# Patient Record
Sex: FEMALE | Employment: UNEMPLOYED | URBAN - METROPOLITAN AREA
[De-identification: names, ages, dates, MRNs, and addresses within clinical notes are randomized per-mention and may not be internally consistent; named-entity substitution may affect disease eponyms.]

---

## 2020-08-12 ENCOUNTER — HOSPITAL ENCOUNTER (INPATIENT)
Facility: HOSPITAL | Age: 40
LOS: 48 days | Discharge: HOME | DRG: 856 | End: 2020-09-29
Attending: SURGERY | Admitting: SURGERY

## 2020-08-12 DIAGNOSIS — I10 ESSENTIAL HYPERTENSION: ICD-10-CM

## 2020-08-12 DIAGNOSIS — R00.0 TACHYCARDIA: ICD-10-CM

## 2020-08-12 DIAGNOSIS — D72.825 BANDEMIA: ICD-10-CM

## 2020-08-12 DIAGNOSIS — R10.11 RIGHT UPPER QUADRANT ABDOMINAL PAIN: ICD-10-CM

## 2020-08-12 DIAGNOSIS — D62 ACUTE BLOOD LOSS ANEMIA: ICD-10-CM

## 2020-08-12 DIAGNOSIS — E87.20 METABOLIC ACIDOSIS: ICD-10-CM

## 2020-08-12 DIAGNOSIS — K55.029 ISCHEMIC NECROSIS OF SMALL BOWEL: Primary | ICD-10-CM

## 2020-08-12 DIAGNOSIS — A41.9 SEPTIC SHOCK: ICD-10-CM

## 2020-08-12 DIAGNOSIS — E43 SEVERE PROTEIN-CALORIE MALNUTRITION: ICD-10-CM

## 2020-08-12 DIAGNOSIS — D63.8 ANEMIA, CHRONIC DISEASE: ICD-10-CM

## 2020-08-12 DIAGNOSIS — R65.20 SEVERE SEPSIS: ICD-10-CM

## 2020-08-12 DIAGNOSIS — K31.9: ICD-10-CM

## 2020-08-12 DIAGNOSIS — N17.0 ACUTE RENAL FAILURE WITH TUBULAR NECROSIS: ICD-10-CM

## 2020-08-12 DIAGNOSIS — J96.01 ACUTE HYPOXEMIC RESPIRATORY FAILURE: ICD-10-CM

## 2020-08-12 DIAGNOSIS — E87.5 POTASSIUM (K) EXCESS: ICD-10-CM

## 2020-08-12 DIAGNOSIS — Z99.11 ENCOUNTER FOR WEANING FROM VENTILATOR: ICD-10-CM

## 2020-08-12 DIAGNOSIS — R65.21 SEPTIC SHOCK: ICD-10-CM

## 2020-08-12 DIAGNOSIS — A41.9 SEVERE SEPSIS: ICD-10-CM

## 2020-08-12 LAB
ABO + RH BLD: NORMAL
ALBUMIN SERPL BCP-MCNC: 0.9 G/DL (ref 3.5–5.2)
ALBUMIN SERPL BCP-MCNC: 0.9 G/DL (ref 3.5–5.2)
ALLENS TEST: ABNORMAL
ALP SERPL-CCNC: 165 U/L (ref 55–135)
ALP SERPL-CCNC: 188 U/L (ref 55–135)
ALT SERPL W/O P-5'-P-CCNC: 26 U/L (ref 10–44)
ALT SERPL W/O P-5'-P-CCNC: 28 U/L (ref 10–44)
ANION GAP SERPL CALC-SCNC: 5 MMOL/L (ref 8–16)
ANION GAP SERPL CALC-SCNC: 7 MMOL/L (ref 8–16)
ANISOCYTOSIS BLD QL SMEAR: SLIGHT
APTT BLDCRRT: 33.5 SEC (ref 21–32)
AST SERPL-CCNC: 22 U/L (ref 10–40)
AST SERPL-CCNC: 30 U/L (ref 10–40)
BACTERIA #/AREA URNS AUTO: ABNORMAL /HPF
BASOPHILS # BLD AUTO: ABNORMAL K/UL (ref 0–0.2)
BASOPHILS NFR BLD: 0 % (ref 0–1.9)
BILIRUB SERPL-MCNC: 2.1 MG/DL (ref 0.1–1)
BILIRUB SERPL-MCNC: 2.4 MG/DL (ref 0.1–1)
BILIRUB UR QL STRIP: NEGATIVE
BLD GP AB SCN CELLS X3 SERPL QL: NORMAL
BUN SERPL-MCNC: 18 MG/DL (ref 6–20)
BUN SERPL-MCNC: 18 MG/DL (ref 6–20)
BURR CELLS BLD QL SMEAR: ABNORMAL
CALCIUM SERPL-MCNC: 6.7 MG/DL (ref 8.7–10.5)
CALCIUM SERPL-MCNC: 7 MG/DL (ref 8.7–10.5)
CHLORIDE SERPL-SCNC: 109 MMOL/L (ref 95–110)
CHLORIDE SERPL-SCNC: 110 MMOL/L (ref 95–110)
CLARITY UR REFRACT.AUTO: ABNORMAL
CO2 SERPL-SCNC: 21 MMOL/L (ref 23–29)
CO2 SERPL-SCNC: 22 MMOL/L (ref 23–29)
COLOR UR AUTO: YELLOW
CREAT SERPL-MCNC: 1.9 MG/DL (ref 0.5–1.4)
CREAT SERPL-MCNC: 1.9 MG/DL (ref 0.5–1.4)
DELSYS: ABNORMAL
DIFFERENTIAL METHOD: ABNORMAL
EOSINOPHIL # BLD AUTO: ABNORMAL K/UL (ref 0–0.5)
EOSINOPHIL NFR BLD: 0 % (ref 0–8)
ERYTHROCYTE [DISTWIDTH] IN BLOOD BY AUTOMATED COUNT: 15.6 % (ref 11.5–14.5)
ERYTHROCYTE [SEDIMENTATION RATE] IN BLOOD BY WESTERGREN METHOD: 26 MM/H
EST. GFR  (AFRICAN AMERICAN): 37.7 ML/MIN/1.73 M^2
EST. GFR  (AFRICAN AMERICAN): 37.7 ML/MIN/1.73 M^2
EST. GFR  (NON AFRICAN AMERICAN): 32.7 ML/MIN/1.73 M^2
EST. GFR  (NON AFRICAN AMERICAN): 32.7 ML/MIN/1.73 M^2
FIBRINOGEN PPP-MCNC: 552 MG/DL (ref 182–366)
FIO2: 100
FIO2: 70
GLUCOSE SERPL-MCNC: 139 MG/DL (ref 70–110)
GLUCOSE SERPL-MCNC: 143 MG/DL (ref 70–110)
GLUCOSE UR QL STRIP: NEGATIVE
HCO3 UR-SCNC: 19 MMOL/L (ref 24–28)
HCO3 UR-SCNC: 19.6 MMOL/L (ref 24–28)
HCO3 UR-SCNC: 21.4 MMOL/L (ref 24–28)
HCT VFR BLD AUTO: 31.7 % (ref 37–48.5)
HCT VFR BLD CALC: 33 %PCV (ref 36–54)
HGB BLD-MCNC: 10.3 G/DL (ref 12–16)
HGB UR QL STRIP: ABNORMAL
HYALINE CASTS UR QL AUTO: 0 /LPF
IMM GRANULOCYTES # BLD AUTO: ABNORMAL K/UL (ref 0–0.04)
IMM GRANULOCYTES NFR BLD AUTO: ABNORMAL % (ref 0–0.5)
INR PPP: 1 (ref 0.8–1.2)
KETONES UR QL STRIP: NEGATIVE
LACTATE SERPL-SCNC: 2.9 MMOL/L (ref 0.5–2.2)
LACTATE SERPL-SCNC: 3 MMOL/L (ref 0.5–2.2)
LDH SERPL L TO P-CCNC: 2.95 MMOL/L (ref 0.36–1.25)
LEUKOCYTE ESTERASE UR QL STRIP: ABNORMAL
LYMPHOCYTES # BLD AUTO: ABNORMAL K/UL (ref 1–4.8)
LYMPHOCYTES NFR BLD: 1 % (ref 18–48)
MAGNESIUM SERPL-MCNC: 1.4 MG/DL (ref 1.6–2.6)
MAGNESIUM SERPL-MCNC: 1.4 MG/DL (ref 1.6–2.6)
MAGNESIUM SERPL-MCNC: 1.6 MG/DL (ref 1.6–2.6)
MCH RBC QN AUTO: 29.9 PG (ref 27–31)
MCHC RBC AUTO-ENTMCNC: 32.5 G/DL (ref 32–36)
MCV RBC AUTO: 92 FL (ref 82–98)
METAMYELOCYTES NFR BLD MANUAL: 1 %
MICROSCOPIC COMMENT: ABNORMAL
MIN VOL: 10.1
MIN VOL: 10.3
MODE: ABNORMAL
MONOCYTES # BLD AUTO: ABNORMAL K/UL (ref 0.3–1)
MONOCYTES NFR BLD: 4 % (ref 4–15)
NEUTROPHILS NFR BLD: 78 % (ref 38–73)
NEUTS BAND NFR BLD MANUAL: 16 %
NITRITE UR QL STRIP: NEGATIVE
NRBC BLD-RTO: 0 /100 WBC
PCO2 BLDA: 41.4 MMHG (ref 35–45)
PCO2 BLDA: 45.1 MMHG (ref 35–45)
PCO2 BLDA: 49 MMHG (ref 35–45)
PEEP: 10
PEEP: 10
PEEP: 14
PEEP: 14
PH SMN: 7.25 [PH] (ref 7.35–7.45)
PH SMN: 7.25 [PH] (ref 7.35–7.45)
PH SMN: 7.27 [PH] (ref 7.35–7.45)
PH UR STRIP: 6 [PH] (ref 5–8)
PHOSPHATE SERPL-MCNC: 4.1 MG/DL (ref 2.7–4.5)
PHOSPHATE SERPL-MCNC: 4.4 MG/DL (ref 2.7–4.5)
PIP: 37
PIP: 40
PLATELET # BLD AUTO: 325 K/UL (ref 150–350)
PLATELET BLD QL SMEAR: ABNORMAL
PMV BLD AUTO: 10.8 FL (ref 9.2–12.9)
PO2 BLDA: 100 MMHG (ref 80–100)
PO2 BLDA: 59 MMHG (ref 80–100)
PO2 BLDA: 62 MMHG (ref 80–100)
POC BE: -6 MMOL/L
POC BE: -8 MMOL/L
POC BE: -8 MMOL/L
POC IONIZED CALCIUM: 0.99 MMOL/L (ref 1.06–1.42)
POC SATURATED O2: 86 % (ref 95–100)
POC SATURATED O2: 88 % (ref 95–100)
POC SATURATED O2: 97 % (ref 95–100)
POC TCO2: 20 MMOL/L (ref 23–27)
POC TCO2: 21 MMOL/L (ref 23–27)
POC TCO2: 23 MMOL/L (ref 23–27)
POCT GLUCOSE: 140 MG/DL (ref 70–110)
POCT GLUCOSE: 153 MG/DL (ref 70–110)
POCT GLUCOSE: 157 MG/DL (ref 70–110)
POTASSIUM BLD-SCNC: 4 MMOL/L (ref 3.5–5.1)
POTASSIUM SERPL-SCNC: 4.1 MMOL/L (ref 3.5–5.1)
POTASSIUM SERPL-SCNC: 4.1 MMOL/L (ref 3.5–5.1)
PROT SERPL-MCNC: 3.9 G/DL (ref 6–8.4)
PROT SERPL-MCNC: 4.2 G/DL (ref 6–8.4)
PROT UR QL STRIP: ABNORMAL
PROTHROMBIN TIME: 11.4 SEC (ref 9–12.5)
RBC # BLD AUTO: 3.45 M/UL (ref 4–5.4)
RBC #/AREA URNS AUTO: 10 /HPF (ref 0–4)
SAMPLE: ABNORMAL
SITE: ABNORMAL
SODIUM BLD-SCNC: 138 MMOL/L (ref 136–145)
SODIUM SERPL-SCNC: 137 MMOL/L (ref 136–145)
SODIUM SERPL-SCNC: 137 MMOL/L (ref 136–145)
SP GR UR STRIP: 1.01 (ref 1–1.03)
SP02: 100
SP02: 95
SQUAMOUS #/AREA URNS AUTO: >100 /HPF
TOXIC GRANULES BLD QL SMEAR: PRESENT
URN SPEC COLLECT METH UR: ABNORMAL
VT: 280
VT: 380
WBC # BLD AUTO: 54.62 K/UL (ref 3.9–12.7)
WBC #/AREA URNS AUTO: 8 /HPF (ref 0–5)
YEAST UR QL AUTO: ABNORMAL

## 2020-08-12 PROCEDURE — 27000221 HC OXYGEN, UP TO 24 HOURS

## 2020-08-12 PROCEDURE — 86920 COMPATIBILITY TEST SPIN: CPT

## 2020-08-12 PROCEDURE — 87040 BLOOD CULTURE FOR BACTERIA: CPT

## 2020-08-12 PROCEDURE — 99900026 HC AIRWAY MAINTENANCE (STAT)

## 2020-08-12 PROCEDURE — 83605 ASSAY OF LACTIC ACID: CPT

## 2020-08-12 PROCEDURE — 85060 PATHOLOGIST REVIEW: ICD-10-PCS | Mod: ,,, | Performed by: PATHOLOGY

## 2020-08-12 PROCEDURE — 83735 ASSAY OF MAGNESIUM: CPT

## 2020-08-12 PROCEDURE — 85025 COMPLETE CBC W/AUTO DIFF WBC: CPT

## 2020-08-12 PROCEDURE — 85384 FIBRINOGEN ACTIVITY: CPT

## 2020-08-12 PROCEDURE — 94761 N-INVAS EAR/PLS OXIMETRY MLT: CPT

## 2020-08-12 PROCEDURE — 99900035 HC TECH TIME PER 15 MIN (STAT)

## 2020-08-12 PROCEDURE — 85060 BLOOD SMEAR INTERPRETATION: CPT | Mod: ,,, | Performed by: PATHOLOGY

## 2020-08-12 PROCEDURE — 82803 BLOOD GASES ANY COMBINATION: CPT

## 2020-08-12 PROCEDURE — 20000000 HC ICU ROOM

## 2020-08-12 PROCEDURE — 84132 ASSAY OF SERUM POTASSIUM: CPT

## 2020-08-12 PROCEDURE — 37799 UNLISTED PX VASCULAR SURGERY: CPT

## 2020-08-12 PROCEDURE — 85007 BL SMEAR W/DIFF WBC COUNT: CPT

## 2020-08-12 PROCEDURE — 85610 PROTHROMBIN TIME: CPT

## 2020-08-12 PROCEDURE — 85014 HEMATOCRIT: CPT

## 2020-08-12 PROCEDURE — 84100 ASSAY OF PHOSPHORUS: CPT

## 2020-08-12 PROCEDURE — 82330 ASSAY OF CALCIUM: CPT

## 2020-08-12 PROCEDURE — 63600175 PHARM REV CODE 636 W HCPCS: Performed by: STUDENT IN AN ORGANIZED HEALTH CARE EDUCATION/TRAINING PROGRAM

## 2020-08-12 PROCEDURE — 81001 URINALYSIS AUTO W/SCOPE: CPT

## 2020-08-12 PROCEDURE — 36415 COLL VENOUS BLD VENIPUNCTURE: CPT

## 2020-08-12 PROCEDURE — 84100 ASSAY OF PHOSPHORUS: CPT | Mod: 91

## 2020-08-12 PROCEDURE — 80053 COMPREHEN METABOLIC PANEL: CPT | Mod: 91

## 2020-08-12 PROCEDURE — 80053 COMPREHEN METABOLIC PANEL: CPT

## 2020-08-12 PROCEDURE — 83735 ASSAY OF MAGNESIUM: CPT | Mod: 91

## 2020-08-12 PROCEDURE — 82800 BLOOD PH: CPT

## 2020-08-12 PROCEDURE — 84295 ASSAY OF SERUM SODIUM: CPT

## 2020-08-12 PROCEDURE — 85027 COMPLETE CBC AUTOMATED: CPT

## 2020-08-12 PROCEDURE — 94002 VENT MGMT INPAT INIT DAY: CPT

## 2020-08-12 PROCEDURE — 86901 BLOOD TYPING SEROLOGIC RH(D): CPT

## 2020-08-12 PROCEDURE — 25000003 PHARM REV CODE 250: Performed by: STUDENT IN AN ORGANIZED HEALTH CARE EDUCATION/TRAINING PROGRAM

## 2020-08-12 PROCEDURE — 85730 THROMBOPLASTIN TIME PARTIAL: CPT

## 2020-08-12 RX ORDER — FENTANYL CITRATE 50 UG/ML
50 INJECTION, SOLUTION INTRAMUSCULAR; INTRAVENOUS
Status: DISCONTINUED | OUTPATIENT
Start: 2020-08-12 | End: 2020-08-14 | Stop reason: ALTCHOICE

## 2020-08-12 RX ORDER — PROPOFOL 10 MG/ML
10 INJECTION, EMULSION INTRAVENOUS CONTINUOUS
Status: DISCONTINUED | OUTPATIENT
Start: 2020-08-12 | End: 2020-08-20

## 2020-08-12 RX ORDER — FENTANYL CITRATE 50 UG/ML
50 INJECTION, SOLUTION INTRAMUSCULAR; INTRAVENOUS ONCE
Status: COMPLETED | OUTPATIENT
Start: 2020-08-12 | End: 2020-08-12

## 2020-08-12 RX ORDER — NOREPINEPHRINE BITARTRATE/D5W 4MG/250ML
0.02 PLASTIC BAG, INJECTION (ML) INTRAVENOUS CONTINUOUS
Status: DISCONTINUED | OUTPATIENT
Start: 2020-08-12 | End: 2020-08-14

## 2020-08-12 RX ORDER — FLUCONAZOLE 2 MG/ML
400 INJECTION, SOLUTION INTRAVENOUS
Status: DISCONTINUED | OUTPATIENT
Start: 2020-08-12 | End: 2020-08-12

## 2020-08-12 RX ORDER — FLUCONAZOLE 2 MG/ML
200 INJECTION, SOLUTION INTRAVENOUS
Status: COMPLETED | OUTPATIENT
Start: 2020-08-12 | End: 2020-08-23

## 2020-08-12 RX ORDER — SODIUM CHLORIDE, SODIUM LACTATE, POTASSIUM CHLORIDE, CALCIUM CHLORIDE 600; 310; 30; 20 MG/100ML; MG/100ML; MG/100ML; MG/100ML
INJECTION, SOLUTION INTRAVENOUS CONTINUOUS
Status: DISCONTINUED | OUTPATIENT
Start: 2020-08-13 | End: 2020-08-24

## 2020-08-12 RX ADMIN — FLUCONAZOLE 200 MG: 200 INJECTION, SOLUTION INTRAVENOUS at 11:08

## 2020-08-12 RX ADMIN — CEFTRIAXONE 2 G: 2 INJECTION, SOLUTION INTRAVENOUS at 10:08

## 2020-08-12 RX ADMIN — CALCIUM GLUCONATE 3 G: 98 INJECTION, SOLUTION INTRAVENOUS at 08:08

## 2020-08-12 RX ADMIN — SODIUM CHLORIDE 1000 ML: 0.9 INJECTION, SOLUTION INTRAVENOUS at 09:08

## 2020-08-12 RX ADMIN — SODIUM CHLORIDE 1000 ML: 0.9 INJECTION, SOLUTION INTRAVENOUS at 08:08

## 2020-08-12 RX ADMIN — FENTANYL CITRATE 50 MCG: 50 INJECTION INTRAMUSCULAR; INTRAVENOUS at 09:08

## 2020-08-12 RX ADMIN — VASOPRESSIN 0.04 UNITS/MIN: 20 INJECTION INTRAVENOUS at 09:08

## 2020-08-12 RX ADMIN — PROPOFOL 40 MCG/KG/MIN: 10 INJECTION, EMULSION INTRAVENOUS at 09:08

## 2020-08-13 ENCOUNTER — ANESTHESIA (OUTPATIENT)
Dept: SURGERY | Facility: HOSPITAL | Age: 40
DRG: 856 | End: 2020-08-13

## 2020-08-13 ENCOUNTER — ANESTHESIA EVENT (OUTPATIENT)
Dept: SURGERY | Facility: HOSPITAL | Age: 40
DRG: 856 | End: 2020-08-13

## 2020-08-13 PROBLEM — R65.20 SEVERE SEPSIS: Status: ACTIVE | Noted: 2020-08-13

## 2020-08-13 PROBLEM — A41.9 SEVERE SEPSIS: Status: ACTIVE | Noted: 2020-08-13

## 2020-08-13 LAB
ALBUMIN SERPL BCP-MCNC: 1.4 G/DL (ref 3.5–5.2)
ALBUMIN SERPL BCP-MCNC: 1.5 G/DL (ref 3.5–5.2)
ALBUMIN SERPL BCP-MCNC: 1.9 G/DL (ref 3.5–5.2)
ALBUMIN SERPL BCP-MCNC: 1.9 G/DL (ref 3.5–5.2)
ALLENS TEST: ABNORMAL
ALLENS TEST: NORMAL
ALP SERPL-CCNC: 126 U/L (ref 55–135)
ALP SERPL-CCNC: 134 U/L (ref 55–135)
ALP SERPL-CCNC: 80 U/L (ref 55–135)
ALP SERPL-CCNC: 87 U/L (ref 55–135)
ALT SERPL W/O P-5'-P-CCNC: 23 U/L (ref 10–44)
ALT SERPL W/O P-5'-P-CCNC: 24 U/L (ref 10–44)
AMYLASE SERPL-CCNC: 50 U/L (ref 20–110)
ANION GAP SERPL CALC-SCNC: 7 MMOL/L (ref 8–16)
ANION GAP SERPL CALC-SCNC: 8 MMOL/L (ref 8–16)
ANISOCYTOSIS BLD QL SMEAR: SLIGHT
AST SERPL-CCNC: 33 U/L (ref 10–40)
AST SERPL-CCNC: 34 U/L (ref 10–40)
AST SERPL-CCNC: 70 U/L (ref 10–40)
AST SERPL-CCNC: 78 U/L (ref 10–40)
BASO STIPL BLD QL SMEAR: ABNORMAL
BASO STIPL BLD QL SMEAR: ABNORMAL
BASOPHILS # BLD AUTO: 0.04 K/UL (ref 0–0.2)
BASOPHILS # BLD AUTO: 0.12 K/UL (ref 0–0.2)
BASOPHILS # BLD AUTO: 0.19 K/UL (ref 0–0.2)
BASOPHILS # BLD AUTO: ABNORMAL K/UL (ref 0–0.2)
BASOPHILS NFR BLD: 0 % (ref 0–1.9)
BASOPHILS NFR BLD: 0 % (ref 0–1.9)
BASOPHILS NFR BLD: 0.1 % (ref 0–1.9)
BASOPHILS NFR BLD: 0.2 % (ref 0–1.9)
BASOPHILS NFR BLD: 0.4 % (ref 0–1.9)
BILIRUB SERPL-MCNC: 1.8 MG/DL (ref 0.1–1)
BILIRUB SERPL-MCNC: 2.1 MG/DL (ref 0.1–1)
BILIRUB SERPL-MCNC: 2.6 MG/DL (ref 0.1–1)
BILIRUB SERPL-MCNC: 3.6 MG/DL (ref 0.1–1)
BLD PROD TYP BPU: NORMAL
BLD PROD TYP BPU: NORMAL
BLOOD UNIT EXPIRATION DATE: NORMAL
BLOOD UNIT EXPIRATION DATE: NORMAL
BLOOD UNIT TYPE CODE: 7300
BLOOD UNIT TYPE CODE: 7300
BLOOD UNIT TYPE: NORMAL
BLOOD UNIT TYPE: NORMAL
BUN SERPL-MCNC: 20 MG/DL (ref 6–20)
BUN SERPL-MCNC: 22 MG/DL (ref 6–20)
BUN SERPL-MCNC: 22 MG/DL (ref 6–20)
BUN SERPL-MCNC: 23 MG/DL (ref 6–20)
BURR CELLS BLD QL SMEAR: ABNORMAL
CA-I BLDV-SCNC: 0.89 MMOL/L (ref 1.06–1.42)
CA-I BLDV-SCNC: 1.02 MMOL/L (ref 1.06–1.42)
CALCIUM SERPL-MCNC: 6.9 MG/DL (ref 8.7–10.5)
CALCIUM SERPL-MCNC: 7.1 MG/DL (ref 8.7–10.5)
CALCIUM SERPL-MCNC: 7.6 MG/DL (ref 8.7–10.5)
CALCIUM SERPL-MCNC: 7.7 MG/DL (ref 8.7–10.5)
CHLORIDE SERPL-SCNC: 107 MMOL/L (ref 95–110)
CHLORIDE SERPL-SCNC: 107 MMOL/L (ref 95–110)
CHLORIDE SERPL-SCNC: 108 MMOL/L (ref 95–110)
CHLORIDE SERPL-SCNC: 108 MMOL/L (ref 95–110)
CO2 SERPL-SCNC: 20 MMOL/L (ref 23–29)
CO2 SERPL-SCNC: 20 MMOL/L (ref 23–29)
CO2 SERPL-SCNC: 21 MMOL/L (ref 23–29)
CO2 SERPL-SCNC: 21 MMOL/L (ref 23–29)
CODING SYSTEM: NORMAL
CODING SYSTEM: NORMAL
CREAT SERPL-MCNC: 2 MG/DL (ref 0.5–1.4)
CREAT SERPL-MCNC: 2.3 MG/DL (ref 0.5–1.4)
CREAT SERPL-MCNC: 2.3 MG/DL (ref 0.5–1.4)
CREAT SERPL-MCNC: 2.4 MG/DL (ref 0.5–1.4)
DELSYS: ABNORMAL
DELSYS: NORMAL
DIFFERENTIAL METHOD: ABNORMAL
DISPENSE STATUS: NORMAL
DISPENSE STATUS: NORMAL
DOHLE BOD BLD QL SMEAR: PRESENT
EOSINOPHIL # BLD AUTO: 0 K/UL (ref 0–0.5)
EOSINOPHIL # BLD AUTO: ABNORMAL K/UL (ref 0–0.5)
EOSINOPHIL NFR BLD: 0 % (ref 0–8)
ERYTHROCYTE [DISTWIDTH] IN BLOOD BY AUTOMATED COUNT: 15.1 % (ref 11.5–14.5)
ERYTHROCYTE [DISTWIDTH] IN BLOOD BY AUTOMATED COUNT: 15.6 % (ref 11.5–14.5)
ERYTHROCYTE [DISTWIDTH] IN BLOOD BY AUTOMATED COUNT: 15.7 % (ref 11.5–14.5)
ERYTHROCYTE [DISTWIDTH] IN BLOOD BY AUTOMATED COUNT: 15.9 % (ref 11.5–14.5)
ERYTHROCYTE [DISTWIDTH] IN BLOOD BY AUTOMATED COUNT: 15.9 % (ref 11.5–14.5)
ERYTHROCYTE [SEDIMENTATION RATE] IN BLOOD BY WESTERGREN METHOD: 26 MM/H
EST. GFR  (AFRICAN AMERICAN): 28.5 ML/MIN/1.73 M^2
EST. GFR  (AFRICAN AMERICAN): 30 ML/MIN/1.73 M^2
EST. GFR  (AFRICAN AMERICAN): 30 ML/MIN/1.73 M^2
EST. GFR  (AFRICAN AMERICAN): 35.5 ML/MIN/1.73 M^2
EST. GFR  (NON AFRICAN AMERICAN): 24.7 ML/MIN/1.73 M^2
EST. GFR  (NON AFRICAN AMERICAN): 26 ML/MIN/1.73 M^2
EST. GFR  (NON AFRICAN AMERICAN): 26 ML/MIN/1.73 M^2
EST. GFR  (NON AFRICAN AMERICAN): 30.8 ML/MIN/1.73 M^2
FIO2: 100
FIO2: 60
FIO2: 60
FIO2: 70
FIO2: 70
FIO2: 80
GLUCOSE SERPL-MCNC: 100 MG/DL (ref 70–110)
GLUCOSE SERPL-MCNC: 100 MG/DL (ref 70–110)
GLUCOSE SERPL-MCNC: 112 MG/DL (ref 70–110)
GLUCOSE SERPL-MCNC: 113 MG/DL (ref 70–110)
GLUCOSE SERPL-MCNC: 142 MG/DL (ref 70–110)
GLUCOSE SERPL-MCNC: 91 MG/DL (ref 70–110)
GLUCOSE SERPL-MCNC: 94 MG/DL (ref 70–110)
HCO3 UR-SCNC: 18.2 MMOL/L (ref 24–28)
HCO3 UR-SCNC: 18.5 MMOL/L (ref 24–28)
HCO3 UR-SCNC: 18.7 MMOL/L (ref 24–28)
HCO3 UR-SCNC: 20 MMOL/L (ref 24–28)
HCO3 UR-SCNC: 20.2 MMOL/L (ref 24–28)
HCO3 UR-SCNC: 20.4 MMOL/L (ref 24–28)
HCO3 UR-SCNC: 20.6 MMOL/L (ref 24–28)
HCO3 UR-SCNC: 20.9 MMOL/L (ref 24–28)
HCO3 UR-SCNC: 22.1 MMOL/L (ref 24–28)
HCO3 UR-SCNC: 22.4 MMOL/L (ref 24–28)
HCT VFR BLD AUTO: 23 % (ref 37–48.5)
HCT VFR BLD AUTO: 23.9 % (ref 37–48.5)
HCT VFR BLD AUTO: 27.2 % (ref 37–48.5)
HCT VFR BLD AUTO: 28.7 % (ref 37–48.5)
HCT VFR BLD AUTO: 30.2 % (ref 37–48.5)
HCT VFR BLD CALC: 20 %PCV (ref 36–54)
HCT VFR BLD CALC: 24 %PCV (ref 36–54)
HCT VFR BLD CALC: 24 %PCV (ref 36–54)
HCT VFR BLD CALC: 26 %PCV (ref 36–54)
HCT VFR BLD CALC: 27 %PCV (ref 36–54)
HCT VFR BLD CALC: 30 %PCV (ref 36–54)
HCT VFR BLD CALC: 30 %PCV (ref 36–54)
HGB BLD-MCNC: 10.2 G/DL (ref 12–16)
HGB BLD-MCNC: 7.3 G/DL (ref 12–16)
HGB BLD-MCNC: 7.7 G/DL (ref 12–16)
HGB BLD-MCNC: 9.1 G/DL (ref 12–16)
HGB BLD-MCNC: 9.4 G/DL (ref 12–16)
HYPOCHROMIA BLD QL SMEAR: ABNORMAL
IMM GRANULOCYTES # BLD AUTO: 1.23 K/UL (ref 0–0.04)
IMM GRANULOCYTES # BLD AUTO: 1.68 K/UL (ref 0–0.04)
IMM GRANULOCYTES # BLD AUTO: 1.9 K/UL (ref 0–0.04)
IMM GRANULOCYTES # BLD AUTO: ABNORMAL K/UL (ref 0–0.04)
IMM GRANULOCYTES # BLD AUTO: ABNORMAL K/UL (ref 0–0.04)
IMM GRANULOCYTES NFR BLD AUTO: 2.2 % (ref 0–0.5)
IMM GRANULOCYTES NFR BLD AUTO: 3.5 % (ref 0–0.5)
IMM GRANULOCYTES NFR BLD AUTO: 3.6 % (ref 0–0.5)
IMM GRANULOCYTES NFR BLD AUTO: ABNORMAL % (ref 0–0.5)
IMM GRANULOCYTES NFR BLD AUTO: ABNORMAL % (ref 0–0.5)
LACTATE SERPL-SCNC: 1.1 MMOL/L (ref 0.5–2.2)
LACTATE SERPL-SCNC: 1.7 MMOL/L (ref 0.5–2.2)
LACTATE SERPL-SCNC: 2 MMOL/L (ref 0.5–2.2)
LDH SERPL L TO P-CCNC: 1.08 MMOL/L (ref 0.36–1.25)
LIPASE SERPL-CCNC: 24 U/L (ref 4–60)
LYMPHOCYTES # BLD AUTO: 0.7 K/UL (ref 1–4.8)
LYMPHOCYTES # BLD AUTO: 0.9 K/UL (ref 1–4.8)
LYMPHOCYTES # BLD AUTO: 1.1 K/UL (ref 1–4.8)
LYMPHOCYTES # BLD AUTO: ABNORMAL K/UL (ref 1–4.8)
LYMPHOCYTES NFR BLD: 0 % (ref 18–48)
LYMPHOCYTES NFR BLD: 1 % (ref 18–48)
LYMPHOCYTES NFR BLD: 1.3 % (ref 18–48)
LYMPHOCYTES NFR BLD: 1.9 % (ref 18–48)
LYMPHOCYTES NFR BLD: 2.1 % (ref 18–48)
MAGNESIUM SERPL-MCNC: 1.7 MG/DL (ref 1.6–2.6)
MAGNESIUM SERPL-MCNC: 2.1 MG/DL (ref 1.6–2.6)
MAGNESIUM SERPL-MCNC: 2.1 MG/DL (ref 1.6–2.6)
MAGNESIUM SERPL-MCNC: 2.2 MG/DL (ref 1.6–2.6)
MCH RBC QN AUTO: 29.6 PG (ref 27–31)
MCH RBC QN AUTO: 29.7 PG (ref 27–31)
MCH RBC QN AUTO: 29.9 PG (ref 27–31)
MCH RBC QN AUTO: 30 PG (ref 27–31)
MCH RBC QN AUTO: 30.2 PG (ref 27–31)
MCHC RBC AUTO-ENTMCNC: 31.7 G/DL (ref 32–36)
MCHC RBC AUTO-ENTMCNC: 32.2 G/DL (ref 32–36)
MCHC RBC AUTO-ENTMCNC: 32.8 G/DL (ref 32–36)
MCHC RBC AUTO-ENTMCNC: 33.5 G/DL (ref 32–36)
MCHC RBC AUTO-ENTMCNC: 33.8 G/DL (ref 32–36)
MCV RBC AUTO: 89 FL (ref 82–98)
MCV RBC AUTO: 90 FL (ref 82–98)
MCV RBC AUTO: 91 FL (ref 82–98)
MCV RBC AUTO: 92 FL (ref 82–98)
MCV RBC AUTO: 93 FL (ref 82–98)
MIN VOL: 10.3
MIN VOL: 10.4
MIN VOL: 11.4
MODE: ABNORMAL
MONOCYTES # BLD AUTO: 1.2 K/UL (ref 0.3–1)
MONOCYTES # BLD AUTO: 1.2 K/UL (ref 0.3–1)
MONOCYTES # BLD AUTO: 1.5 K/UL (ref 0.3–1)
MONOCYTES # BLD AUTO: ABNORMAL K/UL (ref 0.3–1)
MONOCYTES NFR BLD: 2 % (ref 4–15)
MONOCYTES NFR BLD: 2.1 % (ref 4–15)
MONOCYTES NFR BLD: 2.6 % (ref 4–15)
MONOCYTES NFR BLD: 2.7 % (ref 4–15)
MONOCYTES NFR BLD: 6 % (ref 4–15)
MYELOCYTES NFR BLD MANUAL: 1 %
NEUTROPHILS # BLD AUTO: 42.2 K/UL (ref 1.8–7.7)
NEUTROPHILS # BLD AUTO: 50 K/UL (ref 1.8–7.7)
NEUTROPHILS # BLD AUTO: 53 K/UL (ref 1.8–7.7)
NEUTROPHILS NFR BLD: 91.5 % (ref 38–73)
NEUTROPHILS NFR BLD: 91.6 % (ref 38–73)
NEUTROPHILS NFR BLD: 93 % (ref 38–73)
NEUTROPHILS NFR BLD: 94 % (ref 38–73)
NEUTROPHILS NFR BLD: 94.2 % (ref 38–73)
NEUTS BAND NFR BLD MANUAL: 3 %
NRBC BLD-RTO: 0 /100 WBC
NUM UNITS TRANS PACKED RBC: NORMAL
NUM UNITS TRANS PACKED RBC: NORMAL
OVALOCYTES BLD QL SMEAR: ABNORMAL
OVALOCYTES BLD QL SMEAR: ABNORMAL
PATH REV BLD -IMP: NORMAL
PCO2 BLDA: 30.3 MMHG (ref 35–45)
PCO2 BLDA: 34.8 MMHG (ref 35–45)
PCO2 BLDA: 37 MMHG (ref 35–45)
PCO2 BLDA: 37.4 MMHG (ref 35–45)
PCO2 BLDA: 37.5 MMHG (ref 35–45)
PCO2 BLDA: 38.1 MMHG (ref 35–45)
PCO2 BLDA: 39.1 MMHG (ref 35–45)
PCO2 BLDA: 39.8 MMHG (ref 35–45)
PCO2 BLDA: 43.4 MMHG (ref 35–45)
PCO2 BLDA: 43.4 MMHG (ref 35–45)
PEEP: 10
PEEP: 10
PEEP: 12
PEEP: 12
PEEP: 14
PEEP: 14
PH SMN: 7.28 [PH] (ref 7.35–7.45)
PH SMN: 7.28 [PH] (ref 7.35–7.45)
PH SMN: 7.29 [PH] (ref 7.35–7.45)
PH SMN: 7.31 [PH] (ref 7.35–7.45)
PH SMN: 7.34 [PH] (ref 7.35–7.45)
PH SMN: 7.38 [PH] (ref 7.35–7.45)
PH SMN: 7.38 [PH] (ref 7.35–7.45)
PH SMN: 7.39 [PH] (ref 7.35–7.45)
PHOSPHATE SERPL-MCNC: 2.9 MG/DL (ref 2.7–4.5)
PHOSPHATE SERPL-MCNC: 3.5 MG/DL (ref 2.7–4.5)
PHOSPHATE SERPL-MCNC: 4 MG/DL (ref 2.7–4.5)
PHOSPHATE SERPL-MCNC: 4.1 MG/DL (ref 2.7–4.5)
PIP: 34
PIP: 37
PIP: 38
PLATELET # BLD AUTO: 166 K/UL (ref 150–350)
PLATELET # BLD AUTO: 177 K/UL (ref 150–350)
PLATELET # BLD AUTO: 239 K/UL (ref 150–350)
PLATELET # BLD AUTO: 244 K/UL (ref 150–350)
PLATELET # BLD AUTO: 289 K/UL (ref 150–350)
PLATELET BLD QL SMEAR: ABNORMAL
PMV BLD AUTO: 10.8 FL (ref 9.2–12.9)
PMV BLD AUTO: 11 FL (ref 9.2–12.9)
PMV BLD AUTO: 11 FL (ref 9.2–12.9)
PMV BLD AUTO: 11.4 FL (ref 9.2–12.9)
PMV BLD AUTO: 11.4 FL (ref 9.2–12.9)
PO2 BLDA: 109 MMHG (ref 80–100)
PO2 BLDA: 123 MMHG (ref 80–100)
PO2 BLDA: 130 MMHG (ref 80–100)
PO2 BLDA: 131 MMHG (ref 80–100)
PO2 BLDA: 139 MMHG (ref 80–100)
PO2 BLDA: 40 MMHG (ref 40–60)
PO2 BLDA: 64 MMHG (ref 80–100)
PO2 BLDA: 73 MMHG (ref 80–100)
PO2 BLDA: 88 MMHG (ref 80–100)
PO2 BLDA: 94 MMHG (ref 80–100)
POC BE: -3 MMOL/L
POC BE: -3 MMOL/L
POC BE: -5 MMOL/L
POC BE: -6 MMOL/L
POC BE: -7 MMOL/L
POC BE: -7 MMOL/L
POC BE: -8 MMOL/L
POC IONIZED CALCIUM: 0.96 MMOL/L (ref 1.06–1.42)
POC IONIZED CALCIUM: 0.98 MMOL/L (ref 1.06–1.42)
POC IONIZED CALCIUM: 0.99 MMOL/L (ref 1.06–1.42)
POC IONIZED CALCIUM: 1.02 MMOL/L (ref 1.06–1.42)
POC IONIZED CALCIUM: 1.03 MMOL/L (ref 1.06–1.42)
POC IONIZED CALCIUM: 1.03 MMOL/L (ref 1.06–1.42)
POC IONIZED CALCIUM: 1.04 MMOL/L (ref 1.06–1.42)
POC SATURATED O2: 73 % (ref 95–100)
POC SATURATED O2: 92 % (ref 95–100)
POC SATURATED O2: 94 % (ref 95–100)
POC SATURATED O2: 96 % (ref 95–100)
POC SATURATED O2: 96 % (ref 95–100)
POC SATURATED O2: 98 % (ref 95–100)
POC SATURATED O2: 98 % (ref 95–100)
POC SATURATED O2: 99 % (ref 95–100)
POC TCO2: 19 MMOL/L (ref 23–27)
POC TCO2: 20 MMOL/L (ref 23–27)
POC TCO2: 20 MMOL/L (ref 24–29)
POC TCO2: 21 MMOL/L (ref 23–27)
POC TCO2: 21 MMOL/L (ref 23–27)
POC TCO2: 22 MMOL/L (ref 23–27)
POC TCO2: 23 MMOL/L (ref 23–27)
POC TCO2: 24 MMOL/L (ref 23–27)
POCT GLUCOSE: 96 MG/DL (ref 70–110)
POCT GLUCOSE: 99 MG/DL (ref 70–110)
POIKILOCYTOSIS BLD QL SMEAR: SLIGHT
POLYCHROMASIA BLD QL SMEAR: ABNORMAL
POTASSIUM BLD-SCNC: 3.7 MMOL/L (ref 3.5–5.1)
POTASSIUM BLD-SCNC: 3.7 MMOL/L (ref 3.5–5.1)
POTASSIUM BLD-SCNC: 3.8 MMOL/L (ref 3.5–5.1)
POTASSIUM BLD-SCNC: 3.9 MMOL/L (ref 3.5–5.1)
POTASSIUM BLD-SCNC: 4 MMOL/L (ref 3.5–5.1)
POTASSIUM SERPL-SCNC: 3.8 MMOL/L (ref 3.5–5.1)
POTASSIUM SERPL-SCNC: 3.9 MMOL/L (ref 3.5–5.1)
POTASSIUM SERPL-SCNC: 4 MMOL/L (ref 3.5–5.1)
POTASSIUM SERPL-SCNC: 4.2 MMOL/L (ref 3.5–5.1)
PROT SERPL-MCNC: 3.5 G/DL (ref 6–8.4)
PROT SERPL-MCNC: 3.9 G/DL (ref 6–8.4)
PROT SERPL-MCNC: 4.1 G/DL (ref 6–8.4)
PROT SERPL-MCNC: 4.3 G/DL (ref 6–8.4)
RBC # BLD AUTO: 2.47 M/UL (ref 4–5.4)
RBC # BLD AUTO: 2.59 M/UL (ref 4–5.4)
RBC # BLD AUTO: 3.03 M/UL (ref 4–5.4)
RBC # BLD AUTO: 3.14 M/UL (ref 4–5.4)
RBC # BLD AUTO: 3.38 M/UL (ref 4–5.4)
SAMPLE: ABNORMAL
SAMPLE: NORMAL
SARS-COV-2 RDRP RESP QL NAA+PROBE: NEGATIVE
SITE: ABNORMAL
SITE: NORMAL
SODIUM BLD-SCNC: 136 MMOL/L (ref 136–145)
SODIUM BLD-SCNC: 137 MMOL/L (ref 136–145)
SODIUM BLD-SCNC: 137 MMOL/L (ref 136–145)
SODIUM BLD-SCNC: 138 MMOL/L (ref 136–145)
SODIUM SERPL-SCNC: 135 MMOL/L (ref 136–145)
SODIUM SERPL-SCNC: 136 MMOL/L (ref 136–145)
SP02: 100
SP02: 99
SP02: 99
TARGETS BLD QL SMEAR: ABNORMAL
TOXIC GRANULES BLD QL SMEAR: PRESENT
VANCOMYCIN SERPL-MCNC: 51.7 UG/ML
VT: 380
WBC # BLD AUTO: 46.2 K/UL (ref 3.9–12.7)
WBC # BLD AUTO: 53.74 K/UL (ref 3.9–12.7)
WBC # BLD AUTO: 53.88 K/UL (ref 3.9–12.7)
WBC # BLD AUTO: 54.52 K/UL (ref 3.9–12.7)
WBC # BLD AUTO: 56.28 K/UL (ref 3.9–12.7)
WBC TOXIC VACUOLES BLD QL SMEAR: PRESENT

## 2020-08-13 PROCEDURE — 44120 PR RESECT SMALL INTEST,SINGL RESEC/ANAS: ICD-10-PCS | Mod: ,,, | Performed by: SURGERY

## 2020-08-13 PROCEDURE — 63600175 PHARM REV CODE 636 W HCPCS: Mod: JG

## 2020-08-13 PROCEDURE — 85025 COMPLETE CBC W/AUTO DIFF WBC: CPT | Mod: 91

## 2020-08-13 PROCEDURE — 63600175 PHARM REV CODE 636 W HCPCS: Mod: JG | Performed by: NURSE ANESTHETIST, CERTIFIED REGISTERED

## 2020-08-13 PROCEDURE — 97605 NEG PRS WND THER DME<=50SQCM: CPT | Mod: ,,, | Performed by: SURGERY

## 2020-08-13 PROCEDURE — 44799: ICD-10-PCS | Mod: ,,, | Performed by: SURGERY

## 2020-08-13 PROCEDURE — 63600175 PHARM REV CODE 636 W HCPCS: Performed by: STUDENT IN AN ORGANIZED HEALTH CARE EDUCATION/TRAINING PROGRAM

## 2020-08-13 PROCEDURE — 85027 COMPLETE CBC AUTOMATED: CPT | Mod: 91

## 2020-08-13 PROCEDURE — D9220A PRA ANESTHESIA: ICD-10-PCS | Mod: ,,, | Performed by: ANESTHESIOLOGY

## 2020-08-13 PROCEDURE — 44799 UNLISTED PX SMALL INTESTINE: CPT | Mod: ,,, | Performed by: SURGERY

## 2020-08-13 PROCEDURE — 37000009 HC ANESTHESIA EA ADD 15 MINS: Performed by: SURGERY

## 2020-08-13 PROCEDURE — 83605 ASSAY OF LACTIC ACID: CPT | Mod: 91

## 2020-08-13 PROCEDURE — 87070 CULTURE OTHR SPECIMN AEROBIC: CPT

## 2020-08-13 PROCEDURE — 44120 REMOVAL OF SMALL INTESTINE: CPT | Mod: ,,, | Performed by: SURGERY

## 2020-08-13 PROCEDURE — 84100 ASSAY OF PHOSPHORUS: CPT | Mod: 91

## 2020-08-13 PROCEDURE — 43820 PR GASTROJEJUNOSTOMY: ICD-10-PCS | Mod: ,,, | Performed by: SURGERY

## 2020-08-13 PROCEDURE — 82150 ASSAY OF AMYLASE: CPT

## 2020-08-13 PROCEDURE — 43820 GASTROJEJUNOSTOMY WO VAGOTMY: CPT | Mod: ,,, | Performed by: SURGERY

## 2020-08-13 PROCEDURE — P9016 RBC LEUKOCYTES REDUCED: HCPCS

## 2020-08-13 PROCEDURE — 82330 ASSAY OF CALCIUM: CPT

## 2020-08-13 PROCEDURE — 84295 ASSAY OF SERUM SODIUM: CPT

## 2020-08-13 PROCEDURE — 25000003 PHARM REV CODE 250: Performed by: STUDENT IN AN ORGANIZED HEALTH CARE EDUCATION/TRAINING PROGRAM

## 2020-08-13 PROCEDURE — S0028 INJECTION, FAMOTIDINE, 20 MG: HCPCS | Performed by: STUDENT IN AN ORGANIZED HEALTH CARE EDUCATION/TRAINING PROGRAM

## 2020-08-13 PROCEDURE — 25000003 PHARM REV CODE 250: Performed by: NURSE ANESTHETIST, CERTIFIED REGISTERED

## 2020-08-13 PROCEDURE — 27000221 HC OXYGEN, UP TO 24 HOURS

## 2020-08-13 PROCEDURE — D9220A PRA ANESTHESIA: Mod: ,,, | Performed by: ANESTHESIOLOGY

## 2020-08-13 PROCEDURE — 36000709 HC OR TIME LEV III EA ADD 15 MIN: Performed by: SURGERY

## 2020-08-13 PROCEDURE — 25000003 PHARM REV CODE 250: Performed by: SURGERY

## 2020-08-13 PROCEDURE — 63600175 PHARM REV CODE 636 W HCPCS: Performed by: SURGERY

## 2020-08-13 PROCEDURE — 37799 UNLISTED PX VASCULAR SURGERY: CPT

## 2020-08-13 PROCEDURE — 27201423 OPTIME MED/SURG SUP & DEVICES STERILE SUPPLY: Performed by: SURGERY

## 2020-08-13 PROCEDURE — 83690 ASSAY OF LIPASE: CPT

## 2020-08-13 PROCEDURE — 84132 ASSAY OF SERUM POTASSIUM: CPT

## 2020-08-13 PROCEDURE — 44120 REMOVAL OF SMALL INTESTINE: CPT | Mod: 82,,, | Performed by: SURGERY

## 2020-08-13 PROCEDURE — 80053 COMPREHEN METABOLIC PANEL: CPT | Mod: 91

## 2020-08-13 PROCEDURE — 88305 TISSUE EXAM BY PATHOLOGIST: CPT | Performed by: PATHOLOGY

## 2020-08-13 PROCEDURE — 85014 HEMATOCRIT: CPT

## 2020-08-13 PROCEDURE — 88305 TISSUE EXAM BY PATHOLOGIST: CPT | Mod: 26,,, | Performed by: PATHOLOGY

## 2020-08-13 PROCEDURE — 36000708 HC OR TIME LEV III 1ST 15 MIN: Performed by: SURGERY

## 2020-08-13 PROCEDURE — 63600175 PHARM REV CODE 636 W HCPCS: Performed by: NURSE ANESTHETIST, CERTIFIED REGISTERED

## 2020-08-13 PROCEDURE — 82800 BLOOD PH: CPT

## 2020-08-13 PROCEDURE — 83735 ASSAY OF MAGNESIUM: CPT | Mod: 91

## 2020-08-13 PROCEDURE — 88307 TISSUE EXAM BY PATHOLOGIST: CPT | Performed by: PATHOLOGY

## 2020-08-13 PROCEDURE — 87205 SMEAR GRAM STAIN: CPT

## 2020-08-13 PROCEDURE — P9045 ALBUMIN (HUMAN), 5%, 250 ML: HCPCS | Mod: JG

## 2020-08-13 PROCEDURE — 99900026 HC AIRWAY MAINTENANCE (STAT)

## 2020-08-13 PROCEDURE — 88305 TISSUE EXAM BY PATHOLOGIST: ICD-10-PCS | Mod: 26,,, | Performed by: PATHOLOGY

## 2020-08-13 PROCEDURE — P9045 ALBUMIN (HUMAN), 5%, 250 ML: HCPCS | Mod: JG | Performed by: NURSE ANESTHETIST, CERTIFIED REGISTERED

## 2020-08-13 PROCEDURE — 99900035 HC TECH TIME PER 15 MIN (STAT)

## 2020-08-13 PROCEDURE — U0002 COVID-19 LAB TEST NON-CDC: HCPCS

## 2020-08-13 PROCEDURE — 37000008 HC ANESTHESIA 1ST 15 MINUTES: Performed by: SURGERY

## 2020-08-13 PROCEDURE — 87106 FUNGI IDENTIFICATION YEAST: CPT

## 2020-08-13 PROCEDURE — 44120 PR RESECT SMALL INTEST,SINGL RESEC/ANAS: ICD-10-PCS | Mod: 82,,, | Performed by: SURGERY

## 2020-08-13 PROCEDURE — 85007 BL SMEAR W/DIFF WBC COUNT: CPT

## 2020-08-13 PROCEDURE — 80202 ASSAY OF VANCOMYCIN: CPT

## 2020-08-13 PROCEDURE — 83605 ASSAY OF LACTIC ACID: CPT

## 2020-08-13 PROCEDURE — 20000000 HC ICU ROOM

## 2020-08-13 PROCEDURE — 82803 BLOOD GASES ANY COMBINATION: CPT

## 2020-08-13 PROCEDURE — 97605 PR NEG PRESS WOUND THERAPY (NPWT) W/NON-DISPOSABLE WOUND VAC DEVICE (DME), <=50 CM: ICD-10-PCS | Mod: ,,, | Performed by: SURGERY

## 2020-08-13 PROCEDURE — 82330 ASSAY OF CALCIUM: CPT | Mod: 91

## 2020-08-13 PROCEDURE — 25500020 PHARM REV CODE 255: Performed by: SURGERY

## 2020-08-13 PROCEDURE — 94761 N-INVAS EAR/PLS OXIMETRY MLT: CPT

## 2020-08-13 RX ORDER — HYDROMORPHONE HYDROCHLORIDE 1 MG/ML
0.2 INJECTION, SOLUTION INTRAMUSCULAR; INTRAVENOUS; SUBCUTANEOUS EVERY 5 MIN PRN
Status: DISCONTINUED | OUTPATIENT
Start: 2020-08-13 | End: 2020-08-18

## 2020-08-13 RX ORDER — POTASSIUM CHLORIDE 14.9 MG/ML
60 INJECTION INTRAVENOUS
Status: DISCONTINUED | OUTPATIENT
Start: 2020-08-13 | End: 2020-08-13

## 2020-08-13 RX ORDER — ALBUMIN HUMAN 50 G/1000ML
SOLUTION INTRAVENOUS CONTINUOUS PRN
Status: DISCONTINUED | OUTPATIENT
Start: 2020-08-13 | End: 2020-08-13

## 2020-08-13 RX ORDER — POTASSIUM CHLORIDE 29.8 MG/ML
40 INJECTION INTRAVENOUS
Status: DISCONTINUED | OUTPATIENT
Start: 2020-08-13 | End: 2020-08-13

## 2020-08-13 RX ORDER — PROPOFOL 10 MG/ML
VIAL (ML) INTRAVENOUS
Status: DISCONTINUED | OUTPATIENT
Start: 2020-08-13 | End: 2020-08-13

## 2020-08-13 RX ORDER — METOPROLOL TARTRATE 1 MG/ML
INJECTION, SOLUTION INTRAVENOUS
Status: DISCONTINUED | OUTPATIENT
Start: 2020-08-13 | End: 2020-08-13

## 2020-08-13 RX ORDER — ESMOLOL HYDROCHLORIDE 10 MG/ML
INJECTION INTRAVENOUS
Status: DISCONTINUED | OUTPATIENT
Start: 2020-08-13 | End: 2020-08-13

## 2020-08-13 RX ORDER — ONDANSETRON 2 MG/ML
4 INJECTION INTRAMUSCULAR; INTRAVENOUS ONCE AS NEEDED
Status: COMPLETED | OUTPATIENT
Start: 2020-08-13 | End: 2020-08-28

## 2020-08-13 RX ORDER — ALBUMIN HUMAN 50 G/1000ML
25 SOLUTION INTRAVENOUS ONCE
Status: COMPLETED | OUTPATIENT
Start: 2020-08-13 | End: 2020-08-13

## 2020-08-13 RX ORDER — MAGNESIUM SULFATE HEPTAHYDRATE 40 MG/ML
4 INJECTION, SOLUTION INTRAVENOUS
Status: DISCONTINUED | OUTPATIENT
Start: 2020-08-13 | End: 2020-08-13

## 2020-08-13 RX ORDER — ROCURONIUM BROMIDE 10 MG/ML
INJECTION, SOLUTION INTRAVENOUS
Status: DISCONTINUED | OUTPATIENT
Start: 2020-08-13 | End: 2020-08-13

## 2020-08-13 RX ORDER — SUMATRIPTAN 50 MG/1
TABLET, FILM COATED ORAL
COMMUNITY
Start: 2018-06-19

## 2020-08-13 RX ORDER — METOPROLOL TARTRATE 25 MG/1
TABLET, FILM COATED ORAL
COMMUNITY
Start: 2018-10-11

## 2020-08-13 RX ORDER — ASPIRIN 81 MG/1
TABLET ORAL
COMMUNITY
Start: 2017-12-19

## 2020-08-13 RX ORDER — MAGNESIUM SULFATE HEPTAHYDRATE 40 MG/ML
2 INJECTION, SOLUTION INTRAVENOUS ONCE
Status: COMPLETED | OUTPATIENT
Start: 2020-08-13 | End: 2020-08-13

## 2020-08-13 RX ORDER — POTASSIUM CHLORIDE 29.8 MG/ML
80 INJECTION INTRAVENOUS
Status: DISCONTINUED | OUTPATIENT
Start: 2020-08-13 | End: 2020-08-13

## 2020-08-13 RX ORDER — FENTANYL CITRATE 50 UG/ML
25 INJECTION, SOLUTION INTRAMUSCULAR; INTRAVENOUS EVERY 5 MIN PRN
Status: DISCONTINUED | OUTPATIENT
Start: 2020-08-13 | End: 2020-08-14

## 2020-08-13 RX ORDER — ALBUMIN HUMAN 50 G/1000ML
SOLUTION INTRAVENOUS
Status: COMPLETED
Start: 2020-08-13 | End: 2020-08-13

## 2020-08-13 RX ORDER — FAMOTIDINE 10 MG/ML
20 INJECTION INTRAVENOUS DAILY
Status: DISCONTINUED | OUTPATIENT
Start: 2020-08-13 | End: 2020-09-04

## 2020-08-13 RX ORDER — MAGNESIUM SULFATE HEPTAHYDRATE 40 MG/ML
2 INJECTION, SOLUTION INTRAVENOUS
Status: DISCONTINUED | OUTPATIENT
Start: 2020-08-13 | End: 2020-08-13

## 2020-08-13 RX ORDER — FENTANYL CITRATE 50 UG/ML
INJECTION, SOLUTION INTRAMUSCULAR; INTRAVENOUS
Status: DISCONTINUED | OUTPATIENT
Start: 2020-08-13 | End: 2020-08-13

## 2020-08-13 RX ORDER — ONDANSETRON 2 MG/ML
INJECTION INTRAMUSCULAR; INTRAVENOUS
Status: DISCONTINUED | OUTPATIENT
Start: 2020-08-13 | End: 2020-08-13

## 2020-08-13 RX ADMIN — METOPROLOL TARTRATE 1 MG: 5 INJECTION, SOLUTION INTRAVENOUS at 03:08

## 2020-08-13 RX ADMIN — FENTANYL CITRATE 50 MCG: 50 INJECTION INTRAMUSCULAR; INTRAVENOUS at 10:08

## 2020-08-13 RX ADMIN — ESMOLOL HYDROCHLORIDE 10 MG: 10 INJECTION INTRAVENOUS at 01:08

## 2020-08-13 RX ADMIN — PIPERACILLIN SODIUM AND TAZOBACTAM SODIUM 4.5 G: 4; .5 INJECTION, POWDER, LYOPHILIZED, FOR SOLUTION INTRAVENOUS at 11:08

## 2020-08-13 RX ADMIN — METOPROLOL TARTRATE 3 MG: 5 INJECTION, SOLUTION INTRAVENOUS at 05:08

## 2020-08-13 RX ADMIN — ALBUMIN (HUMAN): 12.5 SOLUTION INTRAVENOUS at 03:08

## 2020-08-13 RX ADMIN — IOHEXOL 100 ML: 350 INJECTION, SOLUTION INTRAVENOUS at 10:08

## 2020-08-13 RX ADMIN — VANCOMYCIN HYDROCHLORIDE 1500 MG: 1.5 INJECTION, POWDER, LYOPHILIZED, FOR SOLUTION INTRAVENOUS at 12:08

## 2020-08-13 RX ADMIN — ESMOLOL HYDROCHLORIDE 10 MG: 10 INJECTION INTRAVENOUS at 11:08

## 2020-08-13 RX ADMIN — FENTANYL CITRATE 50 MCG: 50 INJECTION, SOLUTION INTRAMUSCULAR; INTRAVENOUS at 03:08

## 2020-08-13 RX ADMIN — FENTANYL CITRATE 50 MCG: 50 INJECTION INTRAMUSCULAR; INTRAVENOUS at 12:08

## 2020-08-13 RX ADMIN — PIPERACILLIN SODIUM AND TAZOBACTAM SODIUM 4.5 G: 4; .5 INJECTION, POWDER, LYOPHILIZED, FOR SOLUTION INTRAVENOUS at 03:08

## 2020-08-13 RX ADMIN — ROCURONIUM BROMIDE 20 MG: 10 INJECTION, SOLUTION INTRAVENOUS at 05:08

## 2020-08-13 RX ADMIN — CALCIUM GLUCONATE 2 G: 98 INJECTION, SOLUTION INTRAVENOUS at 07:08

## 2020-08-13 RX ADMIN — ALBUMIN HUMAN 25 G: 50 SOLUTION INTRAVENOUS at 12:08

## 2020-08-13 RX ADMIN — ALBUMIN (HUMAN) 25 G: 12.5 SOLUTION INTRAVENOUS at 12:08

## 2020-08-13 RX ADMIN — PROPOFOL 30 MCG/KG/MIN: 10 INJECTION, EMULSION INTRAVENOUS at 12:08

## 2020-08-13 RX ADMIN — ROCURONIUM BROMIDE 50 MG: 10 INJECTION, SOLUTION INTRAVENOUS at 12:08

## 2020-08-13 RX ADMIN — FENTANYL CITRATE 50 MCG: 50 INJECTION, SOLUTION INTRAMUSCULAR; INTRAVENOUS at 04:08

## 2020-08-13 RX ADMIN — Medication 0.04 MCG/KG/MIN: at 12:08

## 2020-08-13 RX ADMIN — SODIUM CHLORIDE, SODIUM LACTATE, POTASSIUM CHLORIDE, AND CALCIUM CHLORIDE: .6; .31; .03; .02 INJECTION, SOLUTION INTRAVENOUS at 12:08

## 2020-08-13 RX ADMIN — SODIUM CHLORIDE, SODIUM GLUCONATE, SODIUM ACETATE, POTASSIUM CHLORIDE, MAGNESIUM CHLORIDE, SODIUM PHOSPHATE, DIBASIC, AND POTASSIUM PHOSPHATE: .53; .5; .37; .037; .03; .012; .00082 INJECTION, SOLUTION INTRAVENOUS at 01:08

## 2020-08-13 RX ADMIN — FENTANYL CITRATE 50 MCG: 50 INJECTION, SOLUTION INTRAMUSCULAR; INTRAVENOUS at 01:08

## 2020-08-13 RX ADMIN — FENTANYL CITRATE 50 MCG: 50 INJECTION INTRAMUSCULAR; INTRAVENOUS at 07:08

## 2020-08-13 RX ADMIN — SODIUM CHLORIDE, SODIUM LACTATE, POTASSIUM CHLORIDE, AND CALCIUM CHLORIDE 1000 ML: .6; .31; .03; .02 INJECTION, SOLUTION INTRAVENOUS at 01:08

## 2020-08-13 RX ADMIN — FAMOTIDINE 20 MG: 10 INJECTION INTRAVENOUS at 08:08

## 2020-08-13 RX ADMIN — CALCIUM GLUCONATE 3 G: 98 INJECTION, SOLUTION INTRAVENOUS at 08:08

## 2020-08-13 RX ADMIN — ROCURONIUM BROMIDE 10 MG: 10 INJECTION, SOLUTION INTRAVENOUS at 01:08

## 2020-08-13 RX ADMIN — SODIUM CHLORIDE, SODIUM GLUCONATE, SODIUM ACETATE, POTASSIUM CHLORIDE, MAGNESIUM CHLORIDE, SODIUM PHOSPHATE, DIBASIC, AND POTASSIUM PHOSPHATE: .53; .5; .37; .037; .03; .012; .00082 INJECTION, SOLUTION INTRAVENOUS at 12:08

## 2020-08-13 RX ADMIN — CALCIUM CHLORIDE 500 MG: 100 INJECTION, SOLUTION INTRAVENOUS at 05:08

## 2020-08-13 RX ADMIN — CALCIUM CHLORIDE 500 MG: 100 INJECTION, SOLUTION INTRAVENOUS at 03:08

## 2020-08-13 RX ADMIN — MAGNESIUM SULFATE IN WATER 2 G: 40 INJECTION, SOLUTION INTRAVENOUS at 12:08

## 2020-08-13 RX ADMIN — PROPOFOL 50 MG: 10 INJECTION, EMULSION INTRAVENOUS at 12:08

## 2020-08-13 RX ADMIN — PROPOFOL 40 MCG/KG/MIN: 10 INJECTION, EMULSION INTRAVENOUS at 06:08

## 2020-08-13 RX ADMIN — SODIUM CHLORIDE, SODIUM LACTATE, POTASSIUM CHLORIDE, AND CALCIUM CHLORIDE 1000 ML: .6; .31; .03; .02 INJECTION, SOLUTION INTRAVENOUS at 08:08

## 2020-08-13 RX ADMIN — CALCIUM GLUCONATE 1 G: 98 INJECTION, SOLUTION INTRAVENOUS at 09:08

## 2020-08-13 RX ADMIN — SODIUM CHLORIDE, SODIUM GLUCONATE, SODIUM ACETATE, POTASSIUM CHLORIDE, MAGNESIUM CHLORIDE, SODIUM PHOSPHATE, DIBASIC, AND POTASSIUM PHOSPHATE: .53; .5; .37; .037; .03; .012; .00082 INJECTION, SOLUTION INTRAVENOUS at 05:08

## 2020-08-13 RX ADMIN — MAGNESIUM SULFATE 2 G: 2 INJECTION INTRAVENOUS at 05:08

## 2020-08-13 RX ADMIN — ROCURONIUM BROMIDE 20 MG: 10 INJECTION, SOLUTION INTRAVENOUS at 03:08

## 2020-08-13 NOTE — ASSESSMENT & PLAN NOTE
38 yo F s/p antrectomy with BII reconstruction c/b duodenal necrosis requiring ex lap, washout, drainage c/b aspiration event. Now with severe sepsis and ARDS    .Neuro:  - Sedation with propofol  - fentanyl   - Sedation vacations, neuro exams    Resp:  - Intubated on vent  - Wean vent per ARDSnet protocol    CV:  - Ongoing IVF resuscitation   - Levo gtt - wean as tolerated   - HR improving with volume     Heme:  - Hgb/Hct - trend  - Transfuse as indicated    ID:  - WBC 56,000  - Broad spectrum abx/antifungal   - F/u cultures     Renal:  - Parker in place  - Oliguric on arrival  - Cr 1.9 - trend    FEN/GI:  - NPO  - NGT to LIWS  - Maintain drains to bulb suction  - GI ppx    Endo:  - Monitor BG    Dispo:  - Continue ICU care; appreciate SICU assistance

## 2020-08-13 NOTE — NURSING
Vaso gtt discontinued, Levo gtt initiated to maintain MAP > 65. LR infusion ordered @ 125 mL/hr as well as 500 mL 5% albumin per Dr. Wilson. Continuing to monitor.

## 2020-08-13 NOTE — HPI
Pt is a 40 yo F with recent history of antrectomy with BII reconstruction for ulcer disease at outside hospital. 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 Patient also reportedly aspirated on induction in the OR prior to this, resulting in severe pulmonary edema and hypoxia. [place] [facility] [place]. She arrived as a direct SICU admit on near maximal vent settings and requiring low dose vasopressors with HR in the upper 140s. Her midline laparotomy is closed with 4 Navdeep drains in place draining bilious output.

## 2020-08-13 NOTE — BRIEF OP NOTE
[facility]  Surgery Department  Operative Note    SUMMARY     Date of Procedure: 8/13/2020     Procedure: Procedure(s) (LRB):  LAPAROTOMY, EXPLORATORY (N/A)  EGD (ESOPHAGOGASTRODUODENOSCOPY) (N/A)  APPLICATION, WOUND VAC WITH ABTHERA  DUODENOJEJUNAL ANASTAMOSIS, GASTROJEJUNAL ANASTOMOSIS  KOCHERIZATION OF DUODENUM, DUODENECTOMY     Surgeon(s) and Role:     * Donis Roa MD - Primary     * Ying Cesar MD - Resident - Assisting     * Jann Jeffries MD - Resident - Assisting     * Jim Gordon MD - Resident - Observing     * Chico Ledezma MD - Resident - Observing     * Anthony Barnhart MD - Resident - Observing     * Quang Nicholson MD        Pre-Operative Diagnosis: Duodenum disorder [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    Description of the Findings of the Procedure: Patchy necrosis of duodenum 2nd-4th portion with perforation. Ischemia of first portion of jejunum, breakdown with leakage of gastrojejunal anastomosis. Resection of duodenum up to ampulla. Kayden-N-Y duodenojejunal anastomosis, and gastrojejunal anastomosis. Open abdomen with Abthera wound vac. Plan for take back in 48 hours.      Complications: No    Estimated Blood Loss (EBL): 150 mL           Implants:     Specimens:   Specimen (12h ago, onward)    None                  Condition: Critical    Disposition: ICU - intubated and critically ill.    Attestation: I was present and scrubbed for the entire procedure.

## 2020-08-13 NOTE — ANESTHESIA POSTPROCEDURE EVALUATION
Anesthesia Post Evaluation    Patient: Jaquan Farmer    Procedure(s) Performed: Procedure(s) (LRB):  LAPAROTOMY, EXPLORATORY, kocherization of duodenum, duodenectomy, duodenojejunal anastomosis, gastrojejunal anastomosis, abthera, EGD (N/A)    Final Anesthesia Type: general    Patient location during evaluation: ICU  Patient participation: No - Unable to Participate, Sedation  Level of consciousness: sedated  Post-procedure vital signs: reviewed and stable  Pain management: adequate  Airway patency: patent    PONV status at discharge: No PONV  Anesthetic complications: no      Cardiovascular status: blood pressure returned to baseline  Respiratory status: ETT  Hydration status: euvolemic  Follow-up not needed.          Vitals Value Taken Time   /58 08/13/20 1746   Temp 36.3 °C (97.34 °F) 08/13/20 1746   Pulse 114 08/13/20 1757   Resp 12 08/13/20 1757   SpO2 100 % 08/13/20 1757   Vitals shown include unvalidated device data.      No case tracking events are documented in the log.      Pain/Rod Score: Pain Rating Prior to Med Admin: 10 (8/13/2020 12:07 PM)  Pain Rating Post Med Admin: 0 (8/13/2020 11:01 AM)

## 2020-08-13 NOTE — PROGRESS NOTES
[facility]  General Surgery  Progress Note    Subjective:     History of Present Illness:  Pt is a 40 yo F with recent history of antrectomy with BII reconstruction for ulcer disease at outside hospital. 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 Patient also reportedly aspirated on induction in the OR prior to this, resulting in severe pulmonary edema and hypoxia. [place] [facility] [place]. She arrived as a direct SICU admit on near maximal vent settings and requiring low dose vasopressors with HR in the upper 140s. Her midline laparotomy is closed with 4 Navdeep drains in place draining bilious output.     Post-Op Info:  Procedure(s) (LRB):  LAPAROTOMY, EXPLORATORY (N/A)   Day of Surgery     Interval History: Off pressors. Vent settings weaning. Still tachycardic. Plan for CTA today with possible OR.    Medications:  Continuous Infusions:   lactated ringers 125 mL/hr at 08/13/20 0800    norepinephrine bitartrate-D5W Stopped (08/13/20 0130)    propofoL 50 mcg/kg/min (08/13/20 0800)    vasopressin (PITRESSIN) infusion Stopped (08/12/20 2140)     Scheduled Meds:   cefTRIAXone (ROCEPHIN) IVPB  2 g Intravenous Q24H    famotidine (PF)  20 mg Intravenous Daily    fluconazole (DIFLUCAN) IVPB  200 mg Intravenous Q24H     PRN Meds:calcium gluconate IVPB, calcium gluconate IVPB, calcium gluconate IVPB, fentaNYL, magnesium sulfate IVPB, magnesium sulfate IVPB, potassium chloride in water **AND** potassium chloride in water **AND** potassium chloride in water, sodium phosphate IVPB, sodium phosphate IVPB, sodium phosphate IVPB, Pharmacy to dose Vancomycin consult **AND** vancomycin - pharmacy to dose     Review of patient's allergies indicates:   Allergen Reactions    Latex      Objective:     Vital Signs (Most Recent):  Temp: 99.3 °F (37.4 °C) (08/13/20 0900)  Pulse: (!) 122 (08/13/20 0900)  Resp: (!) 26 (08/13/20  0900)  BP: (!) 100/53 (08/13/20 0900)  SpO2: 98 % (08/13/20 0900) Vital Signs (24h Range):  Temp:  [95.9 °F (35.5 °C)-100 °F (37.8 °C)] 99.3 °F (37.4 °C)  Pulse:  [117-147] 122  Resp:  [26-28] 26  SpO2:  [84 %-100 %] 98 %  BP: ()/(53-71) 100/53  Arterial Line BP: ()/(46-78) 107/50     Weight: 77.7 kg (171 lb 4.8 oz)  Body mass index is 31.33 kg/m².    Intake/Output - Last 3 Shifts       08/11 0700 - 08/12 0659 08/12 0700 - 08/13 0659 08/13 0700 - 08/14 0659    I.V. (mL/kg)  1965.4 (25.3)     IV Piggyback  3000     Total Intake(mL/kg)  4965.4 (63.9)     Urine (mL/kg/hr)  22 10 (0.1)    Drains  515 265    Total Output  537 275    Net  +4428.4 -275                 Physical Exam  Constitutional:       Interventions: She is sedated and intubated.   Eyes:      Conjunctiva/sclera: Conjunctivae normal.      Pupils: Pupils are equal, round, and reactive to light.   Cardiovascular:      Rate and Rhythm: Regular rhythm. Tachycardia present.   Pulmonary:      Effort: She is intubated.      Breath sounds: Rhonchi present.      Comments: Intubated on vent  Vent Mode: A/C  Oxygen Concentration (%):  () 60  Resp Rate Total:  (26 br/min-28 br/min) 26 br/min  Vt Set:  (380 mL) 380 mL  PEEP/CPAP:  (10 umJ68-73 cmH20) 12 cmH20  Mean Airway Pressure:  (16 ypP16-12 cmH20) 20 cmH20  Abdominal:      General: There is distension.      Comments: Navdeep drains x 4 in place - right-sided drains with bilious output; left drains with SS output  Midline incision closed with staples with penrose drain in place draining SS output   Musculoskeletal:         General: Swelling present.      Right lower leg: Edema present.      Left lower leg: Edema present.   Skin:     General: Skin is warm and dry.   Neurological:      Comments: Sedated on propofol         Significant Labs:  CBC:   Recent Labs   Lab 08/13/20  0400 08/13/20  0730   WBC 53.74*  --    RBC 2.59*  --    HGB 7.7*  --    HCT 23.9* 24*     --    MCV 92  --    MCH 29.7   --    MCHC 32.2  --      CMP:   Recent Labs   Lab 08/13/20  0400   *   CALCIUM 6.9*   ALBUMIN 1.5*   PROT 4.1*   *   K 4.2   CO2 20*      BUN 20   CREATININE 2.0*   ALKPHOS 134   ALT 23   AST 34   BILITOT 2.1*     ABGs:   Recent Labs   Lab 08/13/20  0730   PH 7.379   PCO2 37.4   PO2 131*   HCO3 22.1*   POCSATURATED 99   BE -3     Assessment/Plan:     Severe sepsis  38 yo F s/p antrectomy with BII reconstruction c/b duodenal necrosis requiring ex lap, washout, drainage c/b aspiration event. Now with severe sepsis and ARDS    .Neuro:  - Sedation with propofol  - fentanyl   - Sedation vacations, neuro exams    Resp:  - Intubated on vent  - Wean vent per ARDSnet protocol    CV:  - Ongoing IVF resuscitation   - Levo gtt - off  - Tachycardic    Heme:  - Hgb/Hct - trend  - Transfuse as indicated    ID:  - WBC 53,000  - Broad spectrum abx/antifungal   - F/u cultures     Renal:  - Parker in place  - Oliguric on arrival  - Cr 2 - trend    FEN/GI:  - NPO  - NGT to LIWS  - Maintain drains to bulb suction  - GI ppx  - CTA today with plans on OR pending CTA results    Endo:  - Monitor BG    Dispo:  - Continue ICU care; appreciate SICU assistance             Ying Cesar MD  General Surgery  [facility]

## 2020-08-13 NOTE — PROGRESS NOTES
Pharmacokinetic Assessment Follow Up: IV Vancomycin    Vancomycin Regimen Assessment & Plan:  - Vancomycin level drawn ~10h from loading dose this morning resulted as 51.7 ug/mL, supra therapeutic for goal range of 10-20 ug/mL.  - Patient with FLORIDALMA. Will continue pulse dosing while renal function remains unstable.  - No need for vancomycin dose today. Draw vancomycin level with morning labs tomorrow. Will re-dose as needed depending on level and renal function.    Drug levels (last 3 results):  Recent Labs   Lab Result Units 08/13/20  1100   Vancomycin, Random ug/mL 51.7       Pharmacy will continue to follow and monitor vancomycin.    Please contact pharmacy at extension [phone] for questions regarding this assessment.    Thank you for the consult,   Wes Kline     Patient brief summary:  Jaquan Farmer is a 39 y.o. female initiated on antimicrobial therapy with IV Vancomycin for treatment of intra-abdominal infection    The patient's current regimen is pulse dosing.    Drug Allergies:   Review of patient's allergies indicates:   Allergen Reactions    Latex      Actual Body Weight:   77.7 kg    Renal Function:   Estimated Creatinine Clearance: 31.7 mL/min (A) (based on SCr of 2.3 mg/dL (H)).,     Dialysis Method (if applicable):  N/A

## 2020-08-13 NOTE — NURSING
Patient arrived to SICU room [id number] via EMS transport from [facility]. Arrived intubated and sedated, propofol, vaso and bicarb gtts infusing during transport. Connected to bedside monitor and ventilator. Dr. Wilson and Dr. Bates with general surgery at bedside during admission, Dr. Bates updating Dr. Roa on patient's status. Labs and ABG collected. 2L NS bolus ordered and administered. Additional IV access obtained.

## 2020-08-13 NOTE — NURSING
Admit skin note: no acute pressure or moisture-related skin breakdown noted upon admission. Preventative sacral and bilateral heel foam dressings in place as well as heel off-loading boots. Patient on waffle overlay mattress as well.

## 2020-08-13 NOTE — H&P
[facility]  General Surgery  History & Physical    Patient Name: Jaquan Farmer  MRN: [id number]  Admission Date: 8/12/2020  Attending Physician: Donis Roa MD   Primary Care Provider: Primary Doctor No    Patient information was obtained from past medical records and ER records.     Subjective:     Chief Complaint/Reason for Admission: Duodenal necrosis, sepsis, ARDS    History of Present Illness: Pt is a 40 yo F with recent history of antrectomy with BII reconstruction for ulcer disease at outside hospital. 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 Patient also reportedly aspirated on induction in the OR prior to this, resulting in severe pulmonary edema and hypoxia. Patient was [place] [facility] [place] of care. She arrived as a direct SICU admit on near maximal vent settings and requiring low dose vasopressors with HR in the upper 140s. Her midline laparotomy is closed with 4 Navdeep drains in place draining bilious output.     No current facility-administered medications on file prior to encounter.      No current outpatient medications on file prior to encounter.       Review of patient's allergies indicates:  No Known Allergies    No past medical history on file.  No past surgical history on file.  Family History     None        Tobacco Use    Smoking status: Not on file   Substance and Sexual Activity    Alcohol use: Not on file    Drug use: Not on file    Sexual activity: Not on file     Review of Systems   Unable to perform ROS: Intubated     Objective:     Vital Signs (Most Recent):  Temp: 98.4 °F (36.9 °C) (08/13/20 0130)  Pulse: (!) 122 (08/13/20 0130)  Resp: (!) 26 (08/13/20 0130)  BP: 99/64 (08/13/20 0100)  SpO2: 100 % (08/13/20 0130) Vital Signs (24h Range):  Temp:  [95.9 °F (35.5 °C)-98.6 °F (37 °C)] 98.4 °F (36.9 °C)  Pulse:  [117-147] 122  Resp:  [26] 26  SpO2:  [84 %-100 %] 100 %  BP:  ()/(55-71) 99/64  Arterial Line BP: ()/(46-78) 124/59     Weight: 77.7 kg (171 lb 4.8 oz)  Body mass index is 31.33 kg/m².    Physical Exam  Constitutional:       Interventions: She is sedated and intubated.   Eyes:      Conjunctiva/sclera: Conjunctivae normal.      Pupils: Pupils are equal, round, and reactive to light.   Cardiovascular:      Rate and Rhythm: Regular rhythm. Tachycardia present.   Pulmonary:      Effort: She is intubated.      Breath sounds: Rhonchi present.      Comments: Intubated on vent  Vent Mode: A/C  Oxygen Concentration (%):  () 80  Resp Rate Total:  (26 br/min-27 br/min) 26 br/min  Vt Set:  (380 mL) 380 mL  PEEP/CPAP:  (10 glY96-35 cmH20) 14 cmH20  Mean Airway Pressure:  (16 ylZ34-35 cmH20) 22 cmH20  Abdominal:      General: There is distension.      Comments: Navdeep drains x 4 in place - right-sided drains with bilious output; left drains with SS output  Midline incision closed with staples with penrose drain in place draining SS output   Musculoskeletal:         General: Swelling present.      Right lower leg: Edema present.      Left lower leg: Edema present.   Skin:     General: Skin is warm and dry.   Neurological:      Comments: Sedated on propofol         Significant Labs:  CBC:   Recent Labs   Lab 08/12/20 2200   WBC 56.28*   RBC 3.14*   HGB 9.4*   HCT 28.7*      MCV 91   MCH 29.9   MCHC 32.8     CMP:   Recent Labs   Lab 08/12/20  2200   *   CALCIUM 7.0*   ALBUMIN 0.9*   PROT 3.9*      K 4.1   CO2 22*      BUN 18   CREATININE 1.9*   ALKPHOS 165*   ALT 26   AST 30   BILITOT 2.1*     Coagulation:   Recent Labs   Lab 08/12/20  1945   LABPROT 11.4   INR 1.0   APTT 33.5*     ABGs:   Recent Labs   Lab 08/13/20  0132   PH 7.314*   PCO2 39.8   PO2 123*   HCO3 20.2*   POCSATURATED 98   BE -6     Microbiology Results (last 7 days)     Procedure Component Value Units Date/Time    Blood culture [[id number]] Collected: 08/12/20 2035    Order Status:  Sent Specimen: Blood from Peripheral, Forearm, Left Updated: 08/12/20 2120    Blood culture [[id number]] Collected: 08/12/20 2030    Order Status: Sent Specimen: Blood from Peripheral, Forearm, Right Updated: 08/12/20 2120        Lactate 3.0 --> 2.9    Significant Diagnostics:  CXR: Bilateral patchy infiltrates concerning for ARDS    Assessment/Plan:     Severe sepsis  40 yo F s/p antrectomy with BII reconstruction c/b duodenal necrosis requiring ex lap, washout, drainage c/b aspiration event. Now with severe sepsis and ARDS    .Neuro:  - Sedation with propofol  - fentanyl   - Sedation vacations, neuro exams    Resp:  - Intubated on vent  - Wean vent per ARDSnet protocol    CV:  - Ongoing IVF resuscitation   - Levo gtt - wean as tolerated   - HR improving with volume     Heme:  - Hgb/Hct - trend  - Transfuse as indicated    ID:  - WBC 56,000  - Broad spectrum abx/antifungal   - F/u cultures     Renal:  - Parker in place  - Oliguric on arrival  - Cr 1.9 - trend    FEN/GI:  - NPO  - NGT to LIWS  - Maintain drains to bulb suction  - GI ppx    Endo:  - Monitor BG    Dispo:  - Continue ICU care; appreciate SICU assistance           VTE Risk Mitigation (From admission, onward)         Ordered     Place sequential compression device  Until discontinued      08/12/20 2148                Donis Bates MD  General Surgery  [facility]        I have personally performed a detailed history and physical examination on this patient. My findings are summarized in the resident's note included in the record.  Will initially focus on resuscitation  If we can stabilize her will go to surgery to determine if compromised bowel can be resected and reconstructed  If

## 2020-08-13 NOTE — PROGRESS NOTES
Pharmacokinetic Initial Assessment: IV Vancomycin    Assessment/Plan:    Initiate intravenous vancomycin with loading dose of 1500 mg once with subsequent doses when random concentrations are less than 20 mcg/mL  Desired empiric serum trough concentration is 10 to 20 mcg/mL  Draw vancomycin random level on 8/13 at 2330.  Pharmacy will continue to follow and monitor vancomycin.      Please contact pharmacy at extension [phone] with any questions regarding this assessment.     Thank you for the consult,   Maria Antonia Garcia       Patient brief summary:  Jaquan Farmer is a 39 y.o. female initiated on antimicrobial therapy with IV Vancomycin for treatment of suspected intra-abdominal infection    Drug Allergies:   Review of patient's allergies indicates:  No Known Allergies    Actual Body Weight:   77.7kg    Renal Function:   Estimated Creatinine Clearance: 38.3 mL/min (A) (based on SCr of 1.9 mg/dL (H)).,     Dialysis Method (if applicable):  N/A    CBC (last 72 hours):  Recent Labs   Lab Result Units 08/12/20 1945 08/12/20  2200   WBC K/uL 54.62* 56.28*   Hemoglobin g/dL 10.3* 9.4*   Hematocrit % 31.7* 28.7*   Platelets K/uL 325 289   Gran% % 78.0*  --    Lymph% % 1.0*  --    Mono% % 4.0  --    Eosinophil% % 0.0  --    Basophil% % 0.0  --    Differential Method  Manual  --        Metabolic Panel (last 72 hours):  Recent Labs   Lab Result Units 08/12/20 1945 08/12/20  2045   Sodium mmol/L 137  --    Potassium mmol/L 4.1  --    Chloride mmol/L 109  --    CO2 mmol/L 21*  --    Glucose mg/dL 143*  --    Glucose, UA   --  Negative   BUN, Bld mg/dL 18  --    Creatinine mg/dL 1.9*  --    Albumin g/dL 0.9*  --    Total Bilirubin mg/dL 2.4*  --    Alkaline Phosphatase U/L 188*  --    AST U/L 22  --    ALT U/L 28  --    Magnesium mg/dL 1.6  --    Phosphorus mg/dL 4.4  --        Drug levels (last 3 results):  No results for input(s): VANCOMYCINRA, VANCOMYCINPE, VANCOMYCINTR in the last 72 hours.    Microbiologic  Results:  Microbiology Results (last 7 days)     Procedure Component Value Units Date/Time    Blood culture [[id number]] Collected: 08/12/20 2035    Order Status: Sent Specimen: Blood from Peripheral, Forearm, Left Updated: 08/12/20 2120    Blood culture [[id number]] Collected: 08/12/20 2030    Order Status: Sent Specimen: Blood from Peripheral, Forearm, Right Updated: 08/12/20 2120

## 2020-08-13 NOTE — NURSING
Pt disconnected from wall monitor and Ventilator, and connected to portable monitor and Portable ventilator. Pt transferred to 1st floor CT - portable telemetry and ambu bag present. CTA Obtained. Pt transferred back to SICU [id number] and reconnected to wall monitor and Ventilator. Pt reassessed, findings concur with prior assessments. Vital signs remained appropriate for this particular patient during transport and during of procedure.

## 2020-08-13 NOTE — ANESTHESIA PREPROCEDURE EVALUATION
[facility]  Anesthesia Pre-Operative Evaluation         Patient Name: Jaquan Farmer  YOB: 1980  MRN: [id number]    SUBJECTIVE:     Pre-operative evaluation for Procedure(s) (LRB):  LAPAROTOMY, EXPLORATORY (N/A)     08/13/2020    Jaquan Farmer is a 39 y.o. female w/ a significant PMHx of pHTN and recent history of antrectomy with BII reconstruction for ulcer disease at outside hospital. 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 Patient also reportedly aspirated on induction in the OR prior to this, resulting in severe pulmonary edema and hypoxia. Patient was transferred to [facility] [place] higher level of care.    Patient now presents for the above procedure(s).      LDA:  PICC Double Lumen right brachial (Active)   Number of days:             Peripheral IV - Single Lumen 08/12/20 18 G Right Forearm (Active)   Number of days: 1            Peripheral IV - Single Lumen 08/12/20 2007 18 G Left Forearm (Active)   Number of days: 0            Arterial Line 08/06/20 Left Radial (Active)   Number of days: 7           Prev airway:   Currently intubated  Vent Mode: A/C  Oxygen Concentration (%):  [] 60  Resp Rate Total:  [26 br/min-28 br/min] 26 br/min  Vt Set:  [380 mL] 380 mL  PEEP/CPAP:  [10 gjR96-03 cmH20] 12 cmH20  Mean Airway Pressure:  [16 woM74-14 cmH20] 20 cmH20    Drips:   lactated ringers 125 mL/hr at 08/13/20 0700    norepinephrine bitartrate-D5W Stopped (08/13/20 0130)    propofoL 40 mcg/kg/min (08/13/20 0700)    vasopressin (PITRESSIN) infusion Stopped (08/12/20 2140)       Patient Active Problem List   Diagnosis    Duodenum disorder    Severe sepsis       Review of patient's allergies indicates:   Allergen Reactions    Latex        Current Inpatient Medications:   cefTRIAXone (ROCEPHIN) IVPB  2 g Intravenous Q24H    fluconazole (DIFLUCAN) IVPB  200 mg Intravenous Q24H       No current  facility-administered medications on file prior to encounter.      Current Outpatient Medications on File Prior to Encounter   Medication Sig Dispense Refill    aspirin (ECOTRIN) 81 MG EC tablet   = 1 tab, Oral, Daily, Ordered by Dr. Moreira, # 90 tab, 3 Refill(s), Maintenance      metoprolol tartrate (LOPRESSOR) 25 MG tablet   See Instructions, TAKE 1 TABLET BY MOUTH TWICE DAILY, tab, # 60, eRx: [facility] [id number]      sumatriptan (IMITREX) 50 MG tablet   = 1 tab, Oral, Daily, PRN for migraine headache, may repeat dose after 2 hours up to a maximum of 200 mg in 24 hours, # 9 tab, 1 Refill(s), Maintenance, Pharmacy: [facility] [id number]         Past Surgical History:   Procedure Laterality Date    COLONOSCOPY      ESOPHAGOGASTRODUODENOSCOPY  01/23/2018    TUBAL LIGATION         Social History     Socioeconomic History    Marital status: Unknown     Spouse name: Not on file    Number of children: Not on file    Years of education: Not on file    Highest education level: Not on file   Occupational History    Not on file   Social Needs    Financial resource strain: Not on file    Food insecurity     Worry: Not on file     Inability: Not on file    Transportation needs     Medical: Not on file     Non-medical: Not on file   Tobacco Use    Smoking status: Current Some Day Smoker     Packs/day: 0.25     Types: Cigarettes    Smokeless tobacco: Never Used   Substance and Sexual Activity    Alcohol use: Not Currently    Drug use: Not on file    Sexual activity: Not on file   Lifestyle    Physical activity     Days per week: Not on file     Minutes per session: Not on file    Stress: Not on file   Relationships    Social connections     Talks on phone: Not on file     Gets together: Not on file     Attends Gnosticist service: Not on file     Active member of club or organization: Not on file     Attends meetings of clubs or organizations: Not on file     Relationship status: Not on file    Other Topics Concern    Not on file   Social History Narrative    Not on file       OBJECTIVE:     Vital Signs Range (Last 24H):  Temp:  [35.5 °C (95.9 °F)-37.8 °C (100 °F)]   Pulse:  [117-147]   Resp:  [26-28]   BP: ()/(55-71)   SpO2:  [84 %-100 %]   Arterial Line BP: ()/(46-78)       Significant Labs:  Lab Results   Component Value Date    WBC 53.74 (HH) 08/13/2020    HGB 7.7 (L) 08/13/2020    HCT 24 (L) 08/13/2020     08/13/2020    ALT 23 08/13/2020    AST 34 08/13/2020     (L) 08/13/2020    K 4.2 08/13/2020     08/13/2020    CREATININE 2.0 (H) 08/13/2020    BUN 20 08/13/2020    CO2 20 (L) 08/13/2020    INR 1.0 08/12/2020       Diagnostic Studies: No relevant studies.    EKG:   No results found for this or any previous visit.    2D ECHO:  TTE:  No results found for this or any previous visit.    TOBIAS:  No results found for this or any previous visit.    ASSESSMENT/PLAN:         Anesthesia Evaluation    I have reviewed the Patient Summary Reports.   I have reviewed the NPO Status.   I have reviewed the Medications.     Review of Systems  Anesthesia Hx:  History of prior surgery of interest to airway management or planning: Denies Family Hx of Anesthesia complications.   Denies Personal Hx of Anesthesia complications.   Pulmonary:  Pulmonary Hypertension        Physical Exam  General:  Well nourished    Airway/Jaw/Neck:  Airway Findings: Pre-Existing Airway Tube(s): Oral Endotracheal tube      Chest/Lungs:  Chest/Lungs Findings: Normal Respiratory Rate     Heart/Vascular:  Heart Findings: Rate: Tachycardia  Rhythm: Regular Rhythm        Mental Status:  Mental Status Findings: (sedated)         Anesthesia Plan  Type of Anesthesia, risks & benefits discussed:  Anesthesia Type:  general  Patient's Preference:   Intra-op Monitoring Plan: standard ASA monitors and arterial line  Intra-op Monitoring Plan Comments:   Post Op Pain Control Plan: multimodal analgesia, IV/PO Opioids PRN and  per primary service following discharge from PACU  Post Op Pain Control Plan Comments:   Induction:   IV  Beta Blocker:  Patient is not currently on a Beta-Blocker (No further documentation required).       Informed Consent: Patient representative understands risks and agrees with Anesthesia plan.  Questions answered. Anesthesia consent signed with patient representative.  ASA Score: 4     Day of Surgery Review of History & Physical:    H&P update referred to the surgeon.         Ready For Surgery From Anesthesia Perspective.

## 2020-08-13 NOTE — SUBJECTIVE & OBJECTIVE
Interval History: Off pressors. Vent settings weaning. Still tachycardic. Plan for CTA today with possible OR.    Medications:  Continuous Infusions:   lactated ringers 125 mL/hr at 08/13/20 0800    norepinephrine bitartrate-D5W Stopped (08/13/20 0130)    propofoL 50 mcg/kg/min (08/13/20 0800)    vasopressin (PITRESSIN) infusion Stopped (08/12/20 2140)     Scheduled Meds:   cefTRIAXone (ROCEPHIN) IVPB  2 g Intravenous Q24H    famotidine (PF)  20 mg Intravenous Daily    fluconazole (DIFLUCAN) IVPB  200 mg Intravenous Q24H     PRN Meds:calcium gluconate IVPB, calcium gluconate IVPB, calcium gluconate IVPB, fentaNYL, magnesium sulfate IVPB, magnesium sulfate IVPB, potassium chloride in water **AND** potassium chloride in water **AND** potassium chloride in water, sodium phosphate IVPB, sodium phosphate IVPB, sodium phosphate IVPB, Pharmacy to dose Vancomycin consult **AND** vancomycin - pharmacy to dose     Review of patient's allergies indicates:   Allergen Reactions    Latex      Objective:     Vital Signs (Most Recent):  Temp: 99.3 °F (37.4 °C) (08/13/20 0900)  Pulse: (!) 122 (08/13/20 0900)  Resp: (!) 26 (08/13/20 0900)  BP: (!) 100/53 (08/13/20 0900)  SpO2: 98 % (08/13/20 0900) Vital Signs (24h Range):  Temp:  [95.9 °F (35.5 °C)-100 °F (37.8 °C)] 99.3 °F (37.4 °C)  Pulse:  [117-147] 122  Resp:  [26-28] 26  SpO2:  [84 %-100 %] 98 %  BP: ()/(53-71) 100/53  Arterial Line BP: ()/(46-78) 107/50     Weight: 77.7 kg (171 lb 4.8 oz)  Body mass index is 31.33 kg/m².    Intake/Output - Last 3 Shifts       08/11 0700 - 08/12 0659 08/12 0700 - 08/13 0659 08/13 0700 - 08/14 0659    I.V. (mL/kg)  1965.4 (25.3)     IV Piggyback  3000     Total Intake(mL/kg)  4965.4 (63.9)     Urine (mL/kg/hr)  22 10 (0.1)    Drains  515 265    Total Output  537 275    Net  +4428.4 -275                 Physical Exam  Constitutional:       Interventions: She is sedated and intubated.   Eyes:      Conjunctiva/sclera:  Conjunctivae normal.      Pupils: Pupils are equal, round, and reactive to light.   Cardiovascular:      Rate and Rhythm: Regular rhythm. Tachycardia present.   Pulmonary:      Effort: She is intubated.      Breath sounds: Rhonchi present.      Comments: Intubated on vent  Vent Mode: A/C  Oxygen Concentration (%):  () 60  Resp Rate Total:  (26 br/min-28 br/min) 26 br/min  Vt Set:  (380 mL) 380 mL  PEEP/CPAP:  (10 yxY07-41 cmH20) 12 cmH20  Mean Airway Pressure:  (16 seG76-04 cmH20) 20 cmH20  Abdominal:      General: There is distension.      Comments: Navdeep drains x 4 in place - right-sided drains with bilious output; left drains with SS output  Midline incision closed with staples with penrose drain in place draining SS output   Musculoskeletal:         General: Swelling present.      Right lower leg: Edema present.      Left lower leg: Edema present.   Skin:     General: Skin is warm and dry.   Neurological:      Comments: Sedated on propofol         Significant Labs:  CBC:   Recent Labs   Lab 08/13/20  0400 08/13/20  0730   WBC 53.74*  --    RBC 2.59*  --    HGB 7.7*  --    HCT 23.9* 24*     --    MCV 92  --    MCH 29.7  --    MCHC 32.2  --      CMP:   Recent Labs   Lab 08/13/20  0400   *   CALCIUM 6.9*   ALBUMIN 1.5*   PROT 4.1*   *   K 4.2   CO2 20*      BUN 20   CREATININE 2.0*   ALKPHOS 134   ALT 23   AST 34   BILITOT 2.1*     ABGs:   Recent Labs   Lab 08/13/20  0730   PH 7.379   PCO2 37.4   PO2 131*   HCO3 22.1*   POCSATURATED 99   BE -3

## 2020-08-13 NOTE — PLAN OF CARE
Updated Dr. Bates and Dr. Wilson with general surgery service on patient's status overnight. 2L NS, 1L LR and 500 mL albumin given. Maintenance LR @ 125 mL/hr infusing. Trending lactic acid results every 6 hours and further fluid resuscitating this morning. ABG results reviewed with Dr. Bates, current ventilator settings AC/VC FiO2 70% PEEP 12 rate 26. SpO2 %. Follows commands and moves all extremities spontaneously. Sinus tach on bedside monitor 120s-130s. MAP maintained > 65. Low-dose Levo gtt needed for a short time, now stopped. No UOP since admission, jimenez replaced and irrigated. MDs aware. Abdominal GODFREY drains x 4 with minimal bilious output. NGT with dark red/brown output. Abdominal incision dressing changed. Pain controlled with PRN fentanyl. Updates given to and plan of care reviewed with patient's sister over the phone overnight. Emotional support provided. Bed in low position and brake set. See flowsheets for detailed assessment. Continuing to monitor closely.

## 2020-08-13 NOTE — HPI
Pt is a 40 yo F with recent history of antrectomy with BII reconstruction for ulcer disease at outside hospital. 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 Patient also reportedly aspirated on induction in the OR prior to this, resulting in severe pulmonary edema and hypoxia. Patient was transferred to [facility] [place] urgently for higher level of care.

## 2020-08-13 NOTE — NURSING
General surgery team including Dr. Pereyra and Dr. Cesar at bedside this morning examining patient/ Updated on overnight events, current labs, gtts and vitals signs. Plan for today to continue weaning ventilator and obtain a CT A/P. Continuing to monitor.

## 2020-08-13 NOTE — PLAN OF CARE
CM obtained discharge planning assessment from call to patient's sister, due to patient being intubated/sedated.  Per sister, patient does not have medical insurance.      [id number] - [place], [place] - [address] AT N[address] OF [facility]  & [address]  [address]  [facility] [address]  Phone: [phone] Fax: [phone]    Payor: /     Extended Emergency Contact Information  Primary Emergency Contact: Elham Cabrera  Mobile Phone: [phone]  Relation: Sister  Preferred language: English   needed? No  Secondary Emergency Contact: Katerine Kirkpatrick  Mobile Phone: [phone]  Relation: Daughter  Preferred language: English   needed? No    No future appointments.       08/13/20 1502   Discharge Assessment   Assessment Type Discharge Planning Assessment   Confirmed/corrected address and phone number on facesheet? Yes   Assessment information obtained from? Caregiver;Medical Record   Communicated expected length of stay with patient/caregiver yes   Prior to hospitilization cognitive status: Alert/Oriented   Prior to hospitalization functional status: Independent   Current cognitive status: Coma/Sedated/Intubated   Current Functional Status: Completely Dependent   Facility Arrived From: [facility]   Lives With child(parker), dependent   Able to Return to Prior Arrangements other (see comments)  (TBD)   Is patient able to care for self after discharge? Unable to determine at this time (comments)   Who are your caregiver(s) and their phone number(s)? Elham Cabrera (sister)- [phone]/ Katerine Darian (daughter)- [phone]   Readmission Within the Last 30 Days other (see comments)  (transfer from [place], [place])   Patient currently being followed by outpatient case management? No   Patient currently receives any other outside agency services? No   Equipment Currently Used at Home none   Do you have any problems affording any of  your prescribed medications? No   Is the patient taking medications as prescribed? yes   Does the patient have transportation home? Yes   Transportation Anticipated family or friend will provide   Dialysis Name and Scheduled days N/A   Does the patient receive services at the [facility]? No   Discharge Plan A Other  (TBD; patient's discharge disposition will depend on prognosis)   DME Needed Upon Discharge  other (see comments)  (TBD)   Patient/Family in Agreement with Plan yes       Tangela Bradley MPH, RN, CM  Ext. [phone]

## 2020-08-13 NOTE — SUBJECTIVE & OBJECTIVE
No current facility-administered medications on file prior to encounter.      No current outpatient medications on file prior to encounter.       Review of patient's allergies indicates:  No Known Allergies    No past medical history on file.  No past surgical history on file.  Family History     None        Tobacco Use    Smoking status: Not on file   Substance and Sexual Activity    Alcohol use: Not on file    Drug use: Not on file    Sexual activity: Not on file     Review of Systems   Unable to perform ROS: Intubated     Objective:     Vital Signs (Most Recent):  Temp: 98.4 °F (36.9 °C) (08/13/20 0130)  Pulse: (!) 122 (08/13/20 0130)  Resp: (!) 26 (08/13/20 0130)  BP: 99/64 (08/13/20 0100)  SpO2: 100 % (08/13/20 0130) Vital Signs (24h Range):  Temp:  [95.9 °F (35.5 °C)-98.6 °F (37 °C)] 98.4 °F (36.9 °C)  Pulse:  [117-147] 122  Resp:  [26] 26  SpO2:  [84 %-100 %] 100 %  BP: ()/(55-71) 99/64  Arterial Line BP: ()/(46-78) 124/59     Weight: 77.7 kg (171 lb 4.8 oz)  Body mass index is 31.33 kg/m².    Physical Exam  Constitutional:       Interventions: She is sedated and intubated.   Eyes:      Conjunctiva/sclera: Conjunctivae normal.      Pupils: Pupils are equal, round, and reactive to light.   Cardiovascular:      Rate and Rhythm: Regular rhythm. Tachycardia present.   Pulmonary:      Effort: She is intubated.      Breath sounds: Rhonchi present.      Comments: Intubated on vent  Vent Mode: A/C  Oxygen Concentration (%):  () 80  Resp Rate Total:  (26 br/min-27 br/min) 26 br/min  Vt Set:  (380 mL) 380 mL  PEEP/CPAP:  (10 tiL18-68 cmH20) 14 cmH20  Mean Airway Pressure:  (16 ytL91-64 cmH20) 22 cmH20  Abdominal:      General: There is distension.      Comments: Navdeep drains x 4 in place - right-sided drains with bilious output; left drains with SS output  Midline incision closed with staples with penrose drain in place draining SS output   Musculoskeletal:         General: Swelling present.       Right lower leg: Edema present.      Left lower leg: Edema present.   Skin:     General: Skin is warm and dry.   Neurological:      Comments: Sedated on propofol         Significant Labs:  CBC:   Recent Labs   Lab 08/12/20 2200   WBC 56.28*   RBC 3.14*   HGB 9.4*   HCT 28.7*      MCV 91   MCH 29.9   MCHC 32.8     CMP:   Recent Labs   Lab 08/12/20 2200   *   CALCIUM 7.0*   ALBUMIN 0.9*   PROT 3.9*      K 4.1   CO2 22*      BUN 18   CREATININE 1.9*   ALKPHOS 165*   ALT 26   AST 30   BILITOT 2.1*     Coagulation:   Recent Labs   Lab 08/12/20  1945   LABPROT 11.4   INR 1.0   APTT 33.5*     ABGs:   Recent Labs   Lab 08/13/20 0132   PH 7.314*   PCO2 39.8   PO2 123*   HCO3 20.2*   POCSATURATED 98   BE -6     Microbiology Results (last 7 days)     Procedure Component Value Units Date/Time    Blood culture [[id number]] Collected: 08/12/20 2035    Order Status: Sent Specimen: Blood from Peripheral, Forearm, Left Updated: 08/12/20 2120    Blood culture [[id number]] Collected: 08/12/20 2030    Order Status: Sent Specimen: Blood from Peripheral, Forearm, Right Updated: 08/12/20 2120        Lactate 3.0 --> 2.9    Significant Diagnostics:  CXR: Bilateral patchy infiltrates concerning for ARDS

## 2020-08-13 NOTE — PROGRESS NOTES
Anesthesia @ bedside to transport patient to OR. Disconnected form bedside monitor and ventilator per Anesthesia and connected to ambu bag and portable cardiac monitor. VSS at the time of transfer of care.

## 2020-08-13 NOTE — TRANSFER OF CARE
"Anesthesia Transfer of Care Note    Patient: Jaquan Farmer    Procedure(s) Performed: Procedure(s) (LRB):  LAPAROTOMY, EXPLORATORY, kocherization of duodenum, duodenectomy, duodenojejunal anastomosis, gastrojejunal anastomosis, abthera, EGD (N/A)    Patient location: ICU    Anesthesia Type: general    Transport from OR: Transported from OR intubated on 100% O2 by AMBU with assisted ventilation. Continuous ECG monitoring in transport. Continuous SpO2 monitoring in transport. Upon arrival to PACU/ICU, patient attached to ventilator and auscultated to confirm bilateral breath sounds and adequate TV. Continuos invasive BP monitoring in transport    Post pain: adequate analgesia    Post assessment: no apparent anesthetic complications    Post vital signs: stable    Level of consciousness: sedated    Complications: none    Transfer of care protocol was followed      Last vitals:   Visit Vitals  BP (!) 142/63   Pulse (!) 116   Temp 37 °C (98.6 °F)   Resp (!) 26   Ht 5' 2" (1.575 m)   Wt 77.7 kg (171 lb 4.8 oz)   SpO2 100%   Breastfeeding No   BMI 31.33 kg/m²     "

## 2020-08-13 NOTE — PROGRESS NOTES
Dr Singletary notified that Arterial line is reading SBP in the 170s and Cuff is reading SBP 120s. Per Dr. Singletary, go by cuff pressures for a goal SBP < 160.

## 2020-08-13 NOTE — H&P
[facility]  Critical Care - Surgery  History & Physical    Patient Name: Jaquan Farmer  MRN: [id number]  Admission Date: 8/12/2020  Code Status: Full Code  Attending Physician: Donis Roa MD   Primary Care Provider: Primary Doctor No   Principal Problem: <principal problem not specified>    Subjective:     HPI: Pt is a 38 yo F with PMH pHTN and recent history of antrectomy with BII reconstruction for ulcer disease at outside hospital. 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 Patient also reportedly aspirated on induction in the OR prior to this, resulting in severe pulmonary edema and hypoxia. Patient was transferred to [facility] urgently for higher level of care.    Hospital/ICU Course: Patient arrives to the SICU as direct admit on near maximal vent settings and requiring low dose vasopressors with HR in the upper 140s. Her midline laparotomy is closed with 4 Navdeep drains in place draining bilious output.          No past medical history on file.    No past surgical history on file.    Review of patient's allergies indicates:  No Known Allergies    Family History     None        Tobacco Use    Smoking status: Not on file   Substance and Sexual Activity    Alcohol use: Not on file    Drug use: Not on file    Sexual activity: Not on file      Review of Systems   Patient intubated and sedated  Objective:     Vital Signs (Most Recent):  Temp: 98.6 °F (37 °C) (08/13/20 0200)  Pulse: (!) 119 (08/13/20 0200)  Resp: (!) 26 (08/13/20 0200)  BP: (!) 112/59 (08/13/20 0200)  SpO2: 100 % (08/13/20 0200) Vital Signs (24h Range):  Temp:  [95.9 °F (35.5 °C)-98.6 °F (37 °C)] 98.6 °F (37 °C)  Pulse:  [117-147] 119  Resp:  [26-27] 26  SpO2:  [84 %-100 %] 100 %  BP: ()/(55-71) 112/59  Arterial Line BP: ()/(46-78) 136/67     Weight: 77.7 kg (171 lb 4.8 oz)  Body mass index is 31.33 kg/m².      Intake/Output Summary (Last 24 hours) at 8/13/2020  0211  Last data filed at 8/13/2020 0130  Gross per 24 hour   Intake 3691.72 ml   Output 237 ml   Net 3454.72 ml       Physical Exam  Constitutional:       Interventions: She is sedated and intubated.   Eyes:      Conjunctiva/sclera: Conjunctivae normal.      Pupils: Pupils are equal, round, and reactive to light.   Cardiovascular:      Rate and Rhythm: Regular rhythm. Tachycardia present.   Pulmonary:      Effort: She is intubated.      Breath sounds: Rhonchi present.      Comments: Intubated on vent  Abdominal:      General: There is distension.      Comments: drains x 4 in place - right-sided drains with bilious output; left drains with SS output  Midline incision closed with staples with penrose drain in place draining SS output   Musculoskeletal:         General: Swelling present.      Right lower leg: Edema present.      Left lower leg: Edema present.   Skin:     General: Skin is warm and dry.   Neurological:      Comments: Sedated on propofol     Vents:  Vent Mode: A/C (08/12/20 2317)  Ventilator Initiated: Yes (08/12/20 1930)  Set Rate: 26 BPM (08/12/20 2317)  Vt Set: 380 mL (08/12/20 2317)  PEEP/CPAP: 14 cmH20 (08/12/20 2317)  Oxygen Concentration (%): 70 (08/13/20 0200)  Peak Airway Pressure: 36 cmH2O (08/12/20 2317)  Plateau Pressure: 32 cmH20 (08/12/20 2317)  Total Ve: 10.8 mL (08/12/20 2317)  F/VT Ratio<105 (RSBI): (!) 64.04 (08/12/20 2317)    Lines/Drains/Airways     Peripherally Inserted Central Catheter Line            PICC Double Lumen right brachial -- days          Drain                 Closed/Suction Drain 08/06/20 Left 7 days         Closed/Suction Drain 08/06/20 Left 7 days         Closed/Suction Drain 08/06/20 Right 7 days         Closed/Suction Drain 08/06/20 Right 7 days         NG/OG Tube 08/06/20 nasogastric Left nostril 7 days         Urethral Catheter 08/12/20 2021 less than 1 day          Airway                 Airway - Non-Surgical Endotracheal Tube -- days          Arterial Line                  Arterial Line 08/06/20 Left Radial 7 days          Peripheral Intravenous Line                 Peripheral IV - Single Lumen 08/12/20 18 G Right Forearm 1 day         Peripheral IV - Single Lumen 08/12/20 2007 18 G Left Forearm less than 1 day                Significant Labs:    CBC/Anemia Profile:  Recent Labs   Lab 08/12/20 1945 08/12/20 1946 08/12/20 2200   WBC 54.62*  --  56.28*   HGB 10.3*  --  9.4*   HCT 31.7* 33* 28.7*     --  289   MCV 92  --  91   RDW 15.6*  --  15.7*        Chemistries:  Recent Labs   Lab 08/12/20 1945 08/12/20 2200    137   K 4.1 4.1    110   CO2 21* 22*   BUN 18 18   CREATININE 1.9* 1.9*   CALCIUM 6.7* 7.0*   ALBUMIN 0.9* 0.9*   PROT 4.2* 3.9*   BILITOT 2.4* 2.1*   ALKPHOS 188* 165*   ALT 28 26   AST 22 30   MG 1.6 1.4*  1.4*   PHOS 4.4 4.1       ABGs:   Recent Labs   Lab 08/13/20  0132   PH 7.314*   PCO2 39.8   HCO3 20.2*   POCSATURATED 98   BE -6     CMP:   Recent Labs   Lab 08/12/20 1945 08/12/20 2200    137   K 4.1 4.1    110   CO2 21* 22*   * 139*   BUN 18 18   CREATININE 1.9* 1.9*   CALCIUM 6.7* 7.0*   PROT 4.2* 3.9*   ALBUMIN 0.9* 0.9*   BILITOT 2.4* 2.1*   ALKPHOS 188* 165*   AST 22 30   ALT 28 26   ANIONGAP 7* 5*   EGFRNONAA 32.7* 32.7*     Lactic Acid:   Recent Labs   Lab 08/12/20 1945 08/12/20 2200   LACTATE 3.0* 2.9*     All pertinent labs within the past 24 hours have been reviewed.    Significant Imaging: I have reviewed all pertinent imaging results/findings within the past 24 hours.    Assessment/Plan:     Active Diagnoses:    Diagnosis Date Noted POA    Severe sepsis [A41.9, R65.20] 08/13/2020 Unknown    Duodenum disorder [K31.9] 08/12/2020 Yes      Problems Resolved During this Admission:       40 yo F s/p antrectomy with BII reconstruction c/b duodenal necrosis requiring ex lap, washout, drainage c/b aspiration event. Now with severe sepsis and ARDS     .Neuro:  - Pain controlled on fentanyl  - Sedated with  propofol     Resp:  - Intubated on vent  - Wean vent per ARDSnet protocol  - Daily SBT  - PRN breathing treatments  - Daily CXR     CV:  - MAP goal >65  - Systolic <160  - Ongoing IVF resuscitation   - Levo and Vaso gtt - wean as tolerated   - HR improving with volume      Heme:  - Hgb/Hct 9.4 - trend  - Transfuse as indicated     ID:  - WBC 56.28  - Vanc, Rocephin, Fluconazole  - F/u cultures      Renal:  - Parker in place  - Oliguric on arrival  - Cr 1.9 - trend  - Monitor I/Os  - Trend BMP daily     FEN/GI:  - NPO  - NGT to LIWS  - Maintain drains to bulb suction  - GI ppx  - Replace electrolytes as needed to keep K>4, Mg>2     Endo:  - Monitor BG     Dispo:  - Continue SICU care         Thu Wilson MD  Critical Care - Surgery  [facility]

## 2020-08-13 NOTE — PROGRESS NOTES
Dr. Singletary notified of ABG results, ok to decrease FiO2 to 90%. Also notified there is still a discrepency between arterial line and cuff pressures--art line SBP 180s, Cuff SBP 140s. Per Dr Singletary, continue to go by cuff pressures.

## 2020-08-13 NOTE — ASSESSMENT & PLAN NOTE
38 yo F s/p antrectomy with BII reconstruction c/b duodenal necrosis requiring ex lap, washout, drainage c/b aspiration event. Now with severe sepsis and ARDS    .Neuro:  - Sedation with propofol  - fentanyl   - Sedation vacations, neuro exams    Resp:  - Intubated on vent  - Wean vent per ARDSnet protocol    CV:  - Ongoing IVF resuscitation   - Levo gtt - off  - Tachycardic    Heme:  - Hgb/Hct - trend  - Transfuse as indicated    ID:  - WBC 53,000  - Broad spectrum abx/antifungal   - F/u cultures     Renal:  - Parker in place  - Oliguric on arrival  - Cr 2 - trend    FEN/GI:  - NPO  - NGT to LIWS  - Maintain drains to bulb suction  - GI ppx  - CTA today with plans on OR pending CTA results    Endo:  - Monitor BG    Dispo:  - Continue ICU care; appreciate SICU assistance

## 2020-08-14 ENCOUNTER — ANESTHESIA EVENT (OUTPATIENT)
Dept: SURGERY | Facility: HOSPITAL | Age: 40
DRG: 856 | End: 2020-08-14

## 2020-08-14 PROBLEM — E87.20 METABOLIC ACIDOSIS: Status: ACTIVE | Noted: 2020-08-14

## 2020-08-14 PROBLEM — N17.0 ACUTE RENAL FAILURE WITH TUBULAR NECROSIS: Status: ACTIVE | Noted: 2020-08-14

## 2020-08-14 LAB
ALBUMIN SERPL BCP-MCNC: 1.4 G/DL (ref 3.5–5.2)
ALBUMIN SERPL BCP-MCNC: 1.4 G/DL (ref 3.5–5.2)
ALBUMIN SERPL BCP-MCNC: 1.5 G/DL (ref 3.5–5.2)
ALBUMIN SERPL BCP-MCNC: 1.7 G/DL (ref 3.5–5.2)
ALLENS TEST: ABNORMAL
ALLENS TEST: NORMAL
ALP SERPL-CCNC: 79 U/L (ref 55–135)
ALP SERPL-CCNC: 85 U/L (ref 55–135)
ALP SERPL-CCNC: 90 U/L (ref 55–135)
ALP SERPL-CCNC: 92 U/L (ref 55–135)
ALT SERPL W/O P-5'-P-CCNC: 21 U/L (ref 10–44)
ALT SERPL W/O P-5'-P-CCNC: 22 U/L (ref 10–44)
AMYLASE SERPL-CCNC: 64 U/L (ref 20–110)
ANION GAP SERPL CALC-SCNC: 6 MMOL/L (ref 8–16)
ANION GAP SERPL CALC-SCNC: 6 MMOL/L (ref 8–16)
ANION GAP SERPL CALC-SCNC: 7 MMOL/L (ref 8–16)
ANION GAP SERPL CALC-SCNC: 7 MMOL/L (ref 8–16)
ANION GAP SERPL CALC-SCNC: 9 MMOL/L (ref 8–16)
ANION GAP SERPL CALC-SCNC: 9 MMOL/L (ref 8–16)
ANISOCYTOSIS BLD QL SMEAR: ABNORMAL
ANISOCYTOSIS BLD QL SMEAR: SLIGHT
APTT BLDCRRT: 37.1 SEC (ref 21–32)
AST SERPL-CCNC: 47 U/L (ref 10–40)
AST SERPL-CCNC: 52 U/L (ref 10–40)
AST SERPL-CCNC: 52 U/L (ref 10–40)
AST SERPL-CCNC: 66 U/L (ref 10–40)
BASO STIPL BLD QL SMEAR: ABNORMAL
BASOPHILS # BLD AUTO: ABNORMAL K/UL (ref 0–0.2)
BASOPHILS NFR BLD: 0 % (ref 0–1.9)
BILIRUB SERPL-MCNC: 1.5 MG/DL (ref 0.1–1)
BILIRUB SERPL-MCNC: 2.4 MG/DL (ref 0.1–1)
BUN SERPL-MCNC: 13 MG/DL (ref 6–20)
BUN SERPL-MCNC: 16 MG/DL (ref 6–20)
BUN SERPL-MCNC: 23 MG/DL (ref 6–20)
BUN SERPL-MCNC: 23 MG/DL (ref 6–20)
BUN SERPL-MCNC: 8 MG/DL (ref 6–20)
BUN SERPL-MCNC: 8 MG/DL (ref 6–20)
BURR CELLS BLD QL SMEAR: ABNORMAL
CA-I BLDV-SCNC: 1.01 MMOL/L (ref 1.06–1.42)
CA-I BLDV-SCNC: 1.05 MMOL/L (ref 1.06–1.42)
CA-I BLDV-SCNC: 1.06 MMOL/L (ref 1.06–1.42)
CA-I BLDV-SCNC: 1.09 MMOL/L (ref 1.06–1.42)
CALCIUM SERPL-MCNC: 7.4 MG/DL (ref 8.7–10.5)
CALCIUM SERPL-MCNC: 7.5 MG/DL (ref 8.7–10.5)
CALCIUM SERPL-MCNC: 7.5 MG/DL (ref 8.7–10.5)
CALCIUM SERPL-MCNC: 7.8 MG/DL (ref 8.7–10.5)
CALCIUM SERPL-MCNC: 7.8 MG/DL (ref 8.7–10.5)
CALCIUM SERPL-MCNC: 8 MG/DL (ref 8.7–10.5)
CHLORIDE SERPL-SCNC: 108 MMOL/L (ref 95–110)
CHLORIDE SERPL-SCNC: 109 MMOL/L (ref 95–110)
CHLORIDE SERPL-SCNC: 109 MMOL/L (ref 95–110)
CO2 SERPL-SCNC: 19 MMOL/L (ref 23–29)
CO2 SERPL-SCNC: 20 MMOL/L (ref 23–29)
CO2 SERPL-SCNC: 22 MMOL/L (ref 23–29)
CO2 SERPL-SCNC: 24 MMOL/L (ref 23–29)
CREAT SERPL-MCNC: 1 MG/DL (ref 0.5–1.4)
CREAT SERPL-MCNC: 1 MG/DL (ref 0.5–1.4)
CREAT SERPL-MCNC: 1.5 MG/DL (ref 0.5–1.4)
CREAT SERPL-MCNC: 1.8 MG/DL (ref 0.5–1.4)
CREAT SERPL-MCNC: 2.5 MG/DL (ref 0.5–1.4)
CREAT SERPL-MCNC: 2.5 MG/DL (ref 0.5–1.4)
DELSYS: ABNORMAL
DELSYS: NORMAL
DIFFERENTIAL METHOD: ABNORMAL
DOHLE BOD BLD QL SMEAR: PRESENT
DOHLE BOD BLD QL SMEAR: PRESENT
EOSINOPHIL # BLD AUTO: ABNORMAL K/UL (ref 0–0.5)
EOSINOPHIL NFR BLD: 0 % (ref 0–8)
EOSINOPHIL NFR BLD: 0 % (ref 0–8)
EOSINOPHIL NFR BLD: 0.5 % (ref 0–8)
EOSINOPHIL NFR BLD: 1 % (ref 0–8)
ERYTHROCYTE [DISTWIDTH] IN BLOOD BY AUTOMATED COUNT: 15.6 % (ref 11.5–14.5)
ERYTHROCYTE [DISTWIDTH] IN BLOOD BY AUTOMATED COUNT: 15.7 % (ref 11.5–14.5)
ERYTHROCYTE [DISTWIDTH] IN BLOOD BY AUTOMATED COUNT: 15.9 % (ref 11.5–14.5)
ERYTHROCYTE [DISTWIDTH] IN BLOOD BY AUTOMATED COUNT: 16 % (ref 11.5–14.5)
ERYTHROCYTE [SEDIMENTATION RATE] IN BLOOD BY WESTERGREN METHOD: 20 MM/H
ERYTHROCYTE [SEDIMENTATION RATE] IN BLOOD BY WESTERGREN METHOD: 26 MM/H
ERYTHROCYTE [SEDIMENTATION RATE] IN BLOOD BY WESTERGREN METHOD: 30 MM/H
EST. GFR  (AFRICAN AMERICAN): 27.1 ML/MIN/1.73 M^2
EST. GFR  (AFRICAN AMERICAN): 27.1 ML/MIN/1.73 M^2
EST. GFR  (AFRICAN AMERICAN): 40.3 ML/MIN/1.73 M^2
EST. GFR  (AFRICAN AMERICAN): 50.2 ML/MIN/1.73 M^2
EST. GFR  (AFRICAN AMERICAN): >60 ML/MIN/1.73 M^2
EST. GFR  (AFRICAN AMERICAN): >60 ML/MIN/1.73 M^2
EST. GFR  (NON AFRICAN AMERICAN): 23.5 ML/MIN/1.73 M^2
EST. GFR  (NON AFRICAN AMERICAN): 23.5 ML/MIN/1.73 M^2
EST. GFR  (NON AFRICAN AMERICAN): 35 ML/MIN/1.73 M^2
EST. GFR  (NON AFRICAN AMERICAN): 43.6 ML/MIN/1.73 M^2
EST. GFR  (NON AFRICAN AMERICAN): >60 ML/MIN/1.73 M^2
EST. GFR  (NON AFRICAN AMERICAN): >60 ML/MIN/1.73 M^2
FIO2: 40
FIO2: 50
FIO2: 70
FIO2: 90
GLUCOSE SERPL-MCNC: 114 MG/DL (ref 70–110)
GLUCOSE SERPL-MCNC: 76 MG/DL (ref 70–110)
GLUCOSE SERPL-MCNC: 77 MG/DL (ref 70–110)
GLUCOSE SERPL-MCNC: 80 MG/DL (ref 70–110)
GLUCOSE SERPL-MCNC: 88 MG/DL (ref 70–110)
HCO3 UR-SCNC: 19.5 MMOL/L (ref 24–28)
HCO3 UR-SCNC: 20.2 MMOL/L (ref 24–28)
HCO3 UR-SCNC: 20.7 MMOL/L (ref 24–28)
HCO3 UR-SCNC: 21.2 MMOL/L (ref 24–28)
HCO3 UR-SCNC: 22 MMOL/L (ref 24–28)
HCO3 UR-SCNC: 22.6 MMOL/L (ref 24–28)
HCO3 UR-SCNC: 23.2 MMOL/L (ref 24–28)
HCO3 UR-SCNC: 24.9 MMOL/L (ref 24–28)
HCO3 UR-SCNC: 25.2 MMOL/L (ref 24–28)
HCO3 UR-SCNC: 27.4 MMOL/L (ref 24–28)
HCT VFR BLD AUTO: 24.2 % (ref 37–48.5)
HCT VFR BLD AUTO: 26.2 % (ref 37–48.5)
HCT VFR BLD AUTO: 26.5 % (ref 37–48.5)
HCT VFR BLD AUTO: 29.6 % (ref 37–48.5)
HCT VFR BLD CALC: 27 %PCV (ref 36–54)
HCT VFR BLD CALC: 27 %PCV (ref 36–54)
HCT VFR BLD CALC: 31 %PCV (ref 36–54)
HGB BLD-MCNC: 8.4 G/DL (ref 12–16)
HGB BLD-MCNC: 8.8 G/DL (ref 12–16)
HGB BLD-MCNC: 8.8 G/DL (ref 12–16)
HGB BLD-MCNC: 9.9 G/DL (ref 12–16)
HYPOCHROMIA BLD QL SMEAR: ABNORMAL
IMM GRANULOCYTES # BLD AUTO: ABNORMAL K/UL (ref 0–0.04)
IMM GRANULOCYTES NFR BLD AUTO: ABNORMAL % (ref 0–0.5)
INR PPP: 1 (ref 0.8–1.2)
LDH SERPL L TO P-CCNC: 0.77 MMOL/L (ref 0.36–1.25)
LDH SERPL L TO P-CCNC: 0.96 MMOL/L (ref 0.36–1.25)
LIPASE SERPL-CCNC: 31 U/L (ref 4–60)
LYMPHOCYTES # BLD AUTO: ABNORMAL K/UL (ref 1–4.8)
LYMPHOCYTES NFR BLD: 1 % (ref 18–48)
LYMPHOCYTES NFR BLD: 3.5 % (ref 18–48)
LYMPHOCYTES NFR BLD: 4 % (ref 18–48)
LYMPHOCYTES NFR BLD: 4 % (ref 18–48)
MAGNESIUM SERPL-MCNC: 1.8 MG/DL (ref 1.6–2.6)
MAGNESIUM SERPL-MCNC: 1.9 MG/DL (ref 1.6–2.6)
MAGNESIUM SERPL-MCNC: 2 MG/DL (ref 1.6–2.6)
MAGNESIUM SERPL-MCNC: 2.1 MG/DL (ref 1.6–2.6)
MCH RBC QN AUTO: 29.7 PG (ref 27–31)
MCH RBC QN AUTO: 29.8 PG (ref 27–31)
MCH RBC QN AUTO: 30.1 PG (ref 27–31)
MCH RBC QN AUTO: 30.4 PG (ref 27–31)
MCHC RBC AUTO-ENTMCNC: 33.2 G/DL (ref 32–36)
MCHC RBC AUTO-ENTMCNC: 33.4 G/DL (ref 32–36)
MCHC RBC AUTO-ENTMCNC: 33.6 G/DL (ref 32–36)
MCHC RBC AUTO-ENTMCNC: 34.7 G/DL (ref 32–36)
MCV RBC AUTO: 88 FL (ref 82–98)
MCV RBC AUTO: 89 FL (ref 82–98)
MCV RBC AUTO: 90 FL (ref 82–98)
MCV RBC AUTO: 90 FL (ref 82–98)
METAMYELOCYTES NFR BLD MANUAL: 0.5 %
METAMYELOCYTES NFR BLD MANUAL: 0.5 %
MIN VOL: 6.7
MIN VOL: 7.81
MIN VOL: 8.32
MIN VOL: 8.4
MIN VOL: 9.1
MODE: ABNORMAL
MODE: NORMAL
MONOCYTES # BLD AUTO: ABNORMAL K/UL (ref 0.3–1)
MONOCYTES NFR BLD: 1 % (ref 4–15)
MONOCYTES NFR BLD: 1.5 % (ref 4–15)
MONOCYTES NFR BLD: 2 % (ref 4–15)
MONOCYTES NFR BLD: 2.5 % (ref 4–15)
MYELOCYTES NFR BLD MANUAL: 0.5 %
NEUTROPHILS NFR BLD: 88 % (ref 38–73)
NEUTROPHILS NFR BLD: 93 % (ref 38–73)
NEUTROPHILS NFR BLD: 94 % (ref 38–73)
NEUTROPHILS NFR BLD: 94 % (ref 38–73)
NEUTS BAND NFR BLD MANUAL: 1 %
NEUTS BAND NFR BLD MANUAL: 1 %
NEUTS BAND NFR BLD MANUAL: 1.5 %
NEUTS BAND NFR BLD MANUAL: 5 %
NRBC BLD-RTO: 0 /100 WBC
OVALOCYTES BLD QL SMEAR: ABNORMAL
PCO2 BLDA: 39.9 MMHG (ref 35–45)
PCO2 BLDA: 41.5 MMHG (ref 35–45)
PCO2 BLDA: 41.7 MMHG (ref 35–45)
PCO2 BLDA: 42 MMHG (ref 35–45)
PCO2 BLDA: 43.3 MMHG (ref 35–45)
PCO2 BLDA: 43.6 MMHG (ref 35–45)
PCO2 BLDA: 45.5 MMHG (ref 35–45)
PCO2 BLDA: 47.2 MMHG (ref 35–45)
PCO2 BLDA: 49.8 MMHG (ref 35–45)
PCO2 BLDA: 53.5 MMHG (ref 35–45)
PEEP: 10
PEEP: 12
PEEP: 12
PEEP: 8
PH SMN: 7.23 [PH] (ref 7.35–7.45)
PH SMN: 7.25 [PH] (ref 7.35–7.45)
PH SMN: 7.26 [PH] (ref 7.35–7.45)
PH SMN: 7.31 [PH] (ref 7.35–7.45)
PH SMN: 7.36 [PH] (ref 7.35–7.45)
PH SMN: 7.38 [PH] (ref 7.35–7.45)
PH SMN: 7.39 [PH] (ref 7.35–7.45)
PH SMN: 7.41 [PH] (ref 7.35–7.45)
PHOSPHATE SERPL-MCNC: 2.4 MG/DL (ref 2.7–4.5)
PHOSPHATE SERPL-MCNC: 2.4 MG/DL (ref 2.7–4.5)
PHOSPHATE SERPL-MCNC: 2.6 MG/DL (ref 2.7–4.5)
PHOSPHATE SERPL-MCNC: 3.2 MG/DL (ref 2.7–4.5)
PHOSPHATE SERPL-MCNC: 3.4 MG/DL (ref 2.7–4.5)
PHOSPHATE SERPL-MCNC: 4.1 MG/DL (ref 2.7–4.5)
PIP: 26
PIP: 27
PIP: 27
PIP: 28
PIP: 31
PLATELET # BLD AUTO: 131 K/UL (ref 150–350)
PLATELET # BLD AUTO: 143 K/UL (ref 150–350)
PLATELET # BLD AUTO: 158 K/UL (ref 150–350)
PLATELET # BLD AUTO: 176 K/UL (ref 150–350)
PLATELET BLD QL SMEAR: ABNORMAL
PLATELET BLD QL SMEAR: ABNORMAL
PMV BLD AUTO: 11.6 FL (ref 9.2–12.9)
PMV BLD AUTO: 11.8 FL (ref 9.2–12.9)
PMV BLD AUTO: 11.9 FL (ref 9.2–12.9)
PMV BLD AUTO: 12.2 FL (ref 9.2–12.9)
PO2 BLDA: 101 MMHG (ref 80–100)
PO2 BLDA: 102 MMHG (ref 80–100)
PO2 BLDA: 110 MMHG (ref 80–100)
PO2 BLDA: 112 MMHG (ref 80–100)
PO2 BLDA: 145 MMHG (ref 80–100)
PO2 BLDA: 207 MMHG (ref 80–100)
PO2 BLDA: 243 MMHG (ref 80–100)
PO2 BLDA: 90 MMHG (ref 80–100)
POC BE: -2 MMOL/L
POC BE: -5 MMOL/L
POC BE: -5 MMOL/L
POC BE: -6 MMOL/L
POC BE: -8 MMOL/L
POC BE: 0 MMOL/L
POC BE: 0 MMOL/L
POC BE: 3 MMOL/L
POC IONIZED CALCIUM: 0.94 MMOL/L (ref 1.06–1.42)
POC IONIZED CALCIUM: 1.11 MMOL/L (ref 1.06–1.42)
POC IONIZED CALCIUM: 1.12 MMOL/L (ref 1.06–1.42)
POC SATURATED O2: 100 % (ref 95–100)
POC SATURATED O2: 100 % (ref 95–100)
POC SATURATED O2: 97 % (ref 95–100)
POC SATURATED O2: 97 % (ref 95–100)
POC SATURATED O2: 98 % (ref 95–100)
POC SATURATED O2: 99 % (ref 95–100)
POC TCO2: 21 MMOL/L (ref 23–27)
POC TCO2: 21 MMOL/L (ref 23–27)
POC TCO2: 22 MMOL/L (ref 23–27)
POC TCO2: 23 MMOL/L (ref 23–27)
POC TCO2: 24 MMOL/L (ref 23–27)
POC TCO2: 26 MMOL/L (ref 23–27)
POC TCO2: 26 MMOL/L (ref 23–27)
POC TCO2: 29 MMOL/L (ref 23–27)
POCT GLUCOSE: 103 MG/DL (ref 70–110)
POCT GLUCOSE: 80 MG/DL (ref 70–110)
POCT GLUCOSE: 81 MG/DL (ref 70–110)
POCT GLUCOSE: 85 MG/DL (ref 70–110)
POCT GLUCOSE: 86 MG/DL (ref 70–110)
POCT GLUCOSE: 88 MG/DL (ref 70–110)
POCT GLUCOSE: 88 MG/DL (ref 70–110)
POIKILOCYTOSIS BLD QL SMEAR: ABNORMAL
POIKILOCYTOSIS BLD QL SMEAR: SLIGHT
POLYCHROMASIA BLD QL SMEAR: ABNORMAL
POTASSIUM BLD-SCNC: 3.9 MMOL/L (ref 3.5–5.1)
POTASSIUM BLD-SCNC: 4.1 MMOL/L (ref 3.5–5.1)
POTASSIUM BLD-SCNC: 4.1 MMOL/L (ref 3.5–5.1)
POTASSIUM SERPL-SCNC: 3.8 MMOL/L (ref 3.5–5.1)
POTASSIUM SERPL-SCNC: 3.8 MMOL/L (ref 3.5–5.1)
POTASSIUM SERPL-SCNC: 3.9 MMOL/L (ref 3.5–5.1)
POTASSIUM SERPL-SCNC: 3.9 MMOL/L (ref 3.5–5.1)
POTASSIUM SERPL-SCNC: 4.1 MMOL/L (ref 3.5–5.1)
POTASSIUM SERPL-SCNC: 4.3 MMOL/L (ref 3.5–5.1)
PROT SERPL-MCNC: 3.7 G/DL (ref 6–8.4)
PROT SERPL-MCNC: 3.8 G/DL (ref 6–8.4)
PROT SERPL-MCNC: 4 G/DL (ref 6–8.4)
PROT SERPL-MCNC: 4 G/DL (ref 6–8.4)
PROTHROMBIN TIME: 10.9 SEC (ref 9–12.5)
RBC # BLD AUTO: 2.76 M/UL (ref 4–5.4)
RBC # BLD AUTO: 2.95 M/UL (ref 4–5.4)
RBC # BLD AUTO: 2.96 M/UL (ref 4–5.4)
RBC # BLD AUTO: 3.29 M/UL (ref 4–5.4)
SAMPLE: ABNORMAL
SAMPLE: NORMAL
SCHISTOCYTES BLD QL SMEAR: ABNORMAL
SITE: ABNORMAL
SITE: NORMAL
SODIUM BLD-SCNC: 136 MMOL/L (ref 136–145)
SODIUM SERPL-SCNC: 136 MMOL/L (ref 136–145)
SODIUM SERPL-SCNC: 137 MMOL/L (ref 136–145)
SODIUM SERPL-SCNC: 137 MMOL/L (ref 136–145)
SODIUM SERPL-SCNC: 139 MMOL/L (ref 136–145)
SP02: 100
SP02: 99
TARGETS BLD QL SMEAR: ABNORMAL
TOXIC GRANULES BLD QL SMEAR: PRESENT
VANCOMYCIN SERPL-MCNC: 37.1 UG/ML
VT: 380
VT: 420
VT: 420
WBC # BLD AUTO: 26.31 K/UL (ref 3.9–12.7)
WBC # BLD AUTO: 54.3 K/UL (ref 3.9–12.7)
WBC # BLD AUTO: 54.42 K/UL (ref 3.9–12.7)
WBC # BLD AUTO: 58.36 K/UL (ref 3.9–12.7)

## 2020-08-14 PROCEDURE — 83735 ASSAY OF MAGNESIUM: CPT | Mod: 91

## 2020-08-14 PROCEDURE — 83605 ASSAY OF LACTIC ACID: CPT

## 2020-08-14 PROCEDURE — 63600175 PHARM REV CODE 636 W HCPCS: Performed by: STUDENT IN AN ORGANIZED HEALTH CARE EDUCATION/TRAINING PROGRAM

## 2020-08-14 PROCEDURE — 82330 ASSAY OF CALCIUM: CPT

## 2020-08-14 PROCEDURE — 85027 COMPLETE CBC AUTOMATED: CPT | Mod: 91

## 2020-08-14 PROCEDURE — 82330 ASSAY OF CALCIUM: CPT | Mod: 91

## 2020-08-14 PROCEDURE — C1751 CATH, INF, PER/CENT/MIDLINE: HCPCS

## 2020-08-14 PROCEDURE — 27200188 HC TRANSDUCER, ART ADULT/PEDS

## 2020-08-14 PROCEDURE — 20000000 HC ICU ROOM

## 2020-08-14 PROCEDURE — C1729 CATH, DRAINAGE: HCPCS

## 2020-08-14 PROCEDURE — 99291 CRITICAL CARE FIRST HOUR: CPT | Mod: ,,, | Performed by: SURGERY

## 2020-08-14 PROCEDURE — 25000003 PHARM REV CODE 250: Performed by: STUDENT IN AN ORGANIZED HEALTH CARE EDUCATION/TRAINING PROGRAM

## 2020-08-14 PROCEDURE — 84295 ASSAY OF SERUM SODIUM: CPT

## 2020-08-14 PROCEDURE — 80202 ASSAY OF VANCOMYCIN: CPT

## 2020-08-14 PROCEDURE — 99900035 HC TECH TIME PER 15 MIN (STAT)

## 2020-08-14 PROCEDURE — 99900026 HC AIRWAY MAINTENANCE (STAT)

## 2020-08-14 PROCEDURE — 85007 BL SMEAR W/DIFF WBC COUNT: CPT | Mod: 91

## 2020-08-14 PROCEDURE — 84100 ASSAY OF PHOSPHORUS: CPT | Mod: 91

## 2020-08-14 PROCEDURE — 85014 HEMATOCRIT: CPT

## 2020-08-14 PROCEDURE — 82803 BLOOD GASES ANY COMBINATION: CPT

## 2020-08-14 PROCEDURE — 85610 PROTHROMBIN TIME: CPT

## 2020-08-14 PROCEDURE — 83735 ASSAY OF MAGNESIUM: CPT

## 2020-08-14 PROCEDURE — 37799 UNLISTED PX VASCULAR SURGERY: CPT

## 2020-08-14 PROCEDURE — 94761 N-INVAS EAR/PLS OXIMETRY MLT: CPT

## 2020-08-14 PROCEDURE — 94003 VENT MGMT INPAT SUBQ DAY: CPT

## 2020-08-14 PROCEDURE — 25000003 PHARM REV CODE 250: Performed by: SURGERY

## 2020-08-14 PROCEDURE — 83690 ASSAY OF LIPASE: CPT

## 2020-08-14 PROCEDURE — S0028 INJECTION, FAMOTIDINE, 20 MG: HCPCS | Performed by: STUDENT IN AN ORGANIZED HEALTH CARE EDUCATION/TRAINING PROGRAM

## 2020-08-14 PROCEDURE — 90945 DIALYSIS ONE EVALUATION: CPT

## 2020-08-14 PROCEDURE — 84132 ASSAY OF SERUM POTASSIUM: CPT

## 2020-08-14 PROCEDURE — 80053 COMPREHEN METABOLIC PANEL: CPT

## 2020-08-14 PROCEDURE — 99233 SBSQ HOSP IP/OBS HIGH 50: CPT | Mod: ,,, | Performed by: INTERNAL MEDICINE

## 2020-08-14 PROCEDURE — 32551 INSERTION OF CHEST TUBE: CPT

## 2020-08-14 PROCEDURE — 99291 PR CRITICAL CARE, E/M 30-74 MINUTES: ICD-10-PCS | Mod: ,,, | Performed by: SURGERY

## 2020-08-14 PROCEDURE — 85730 THROMBOPLASTIN TIME PARTIAL: CPT

## 2020-08-14 PROCEDURE — 80053 COMPREHEN METABOLIC PANEL: CPT | Mod: 91

## 2020-08-14 PROCEDURE — 99233 PR SUBSEQUENT HOSPITAL CARE,LEVL III: ICD-10-PCS | Mod: ,,, | Performed by: INTERNAL MEDICINE

## 2020-08-14 PROCEDURE — 80069 RENAL FUNCTION PANEL: CPT

## 2020-08-14 PROCEDURE — 27000221 HC OXYGEN, UP TO 24 HOURS

## 2020-08-14 PROCEDURE — 82150 ASSAY OF AMYLASE: CPT

## 2020-08-14 RX ORDER — POTASSIUM CHLORIDE 29.8 MG/ML
80 INJECTION INTRAVENOUS
Status: DISCONTINUED | OUTPATIENT
Start: 2020-08-14 | End: 2020-08-16

## 2020-08-14 RX ORDER — MAGNESIUM SULFATE HEPTAHYDRATE 40 MG/ML
4 INJECTION, SOLUTION INTRAVENOUS
Status: DISCONTINUED | OUTPATIENT
Start: 2020-08-14 | End: 2020-08-16

## 2020-08-14 RX ORDER — POTASSIUM CHLORIDE 29.8 MG/ML
40 INJECTION INTRAVENOUS
Status: DISCONTINUED | OUTPATIENT
Start: 2020-08-14 | End: 2020-08-16

## 2020-08-14 RX ORDER — MAGNESIUM SULFATE HEPTAHYDRATE 40 MG/ML
2 INJECTION, SOLUTION INTRAVENOUS
Status: DISCONTINUED | OUTPATIENT
Start: 2020-08-14 | End: 2020-08-16

## 2020-08-14 RX ORDER — MAGNESIUM SULFATE HEPTAHYDRATE 40 MG/ML
2 INJECTION, SOLUTION INTRAVENOUS
Status: DISPENSED | OUTPATIENT
Start: 2020-08-14 | End: 2020-08-15

## 2020-08-14 RX ORDER — HEPARIN SODIUM 5000 [USP'U]/ML
5000 INJECTION, SOLUTION INTRAVENOUS; SUBCUTANEOUS EVERY 8 HOURS
Status: DISCONTINUED | OUTPATIENT
Start: 2020-08-14 | End: 2020-09-29 | Stop reason: HOSPADM

## 2020-08-14 RX ORDER — HYDROCODONE BITARTRATE AND ACETAMINOPHEN 500; 5 MG/1; MG/1
TABLET ORAL CONTINUOUS
Status: ACTIVE | OUTPATIENT
Start: 2020-08-14 | End: 2020-08-15

## 2020-08-14 RX ORDER — POTASSIUM CHLORIDE 14.9 MG/ML
60 INJECTION INTRAVENOUS
Status: DISCONTINUED | OUTPATIENT
Start: 2020-08-14 | End: 2020-08-16

## 2020-08-14 RX ORDER — FENTANYL CITRATE-0.9 % NACL/PF 10 MCG/ML
PLASTIC BAG, INJECTION (ML) INTRAVENOUS CONTINUOUS
Status: DISCONTINUED | OUTPATIENT
Start: 2020-08-14 | End: 2020-08-19

## 2020-08-14 RX ORDER — FUROSEMIDE 10 MG/ML
120 INJECTION INTRAMUSCULAR; INTRAVENOUS ONCE
Status: COMPLETED | OUTPATIENT
Start: 2020-08-14 | End: 2020-08-14

## 2020-08-14 RX ADMIN — PROPOFOL 20 MCG/KG/MIN: 10 INJECTION, EMULSION INTRAVENOUS at 02:08

## 2020-08-14 RX ADMIN — CALCIUM GLUCONATE 2 G: 98 INJECTION, SOLUTION INTRAVENOUS at 05:08

## 2020-08-14 RX ADMIN — FLUCONAZOLE 200 MG: 200 INJECTION, SOLUTION INTRAVENOUS at 10:08

## 2020-08-14 RX ADMIN — CALCIUM GLUCONATE 1 G: 98 INJECTION, SOLUTION INTRAVENOUS at 11:08

## 2020-08-14 RX ADMIN — CALCIUM GLUCONATE 1 G: 98 INJECTION, SOLUTION INTRAVENOUS at 08:08

## 2020-08-14 RX ADMIN — HEPARIN SODIUM 5000 UNITS: 5000 INJECTION INTRAVENOUS; SUBCUTANEOUS at 02:08

## 2020-08-14 RX ADMIN — FAMOTIDINE 20 MG: 10 INJECTION INTRAVENOUS at 08:08

## 2020-08-14 RX ADMIN — PROPOFOL 35 MCG/KG/MIN: 10 INJECTION, EMULSION INTRAVENOUS at 08:08

## 2020-08-14 RX ADMIN — FENTANYL CITRATE 50 MCG: 50 INJECTION INTRAMUSCULAR; INTRAVENOUS at 08:08

## 2020-08-14 RX ADMIN — PIPERACILLIN SODIUM AND TAZOBACTAM SODIUM 4.5 G: 4; .5 INJECTION, POWDER, LYOPHILIZED, FOR SOLUTION INTRAVENOUS at 11:08

## 2020-08-14 RX ADMIN — PIPERACILLIN SODIUM AND TAZOBACTAM SODIUM 4.5 G: 4; .5 INJECTION, POWDER, LYOPHILIZED, FOR SOLUTION INTRAVENOUS at 03:08

## 2020-08-14 RX ADMIN — SODIUM PHOSPHATE, MONOBASIC, MONOHYDRATE 15 MMOL: 276; 142 INJECTION, SOLUTION INTRAVENOUS at 11:08

## 2020-08-14 RX ADMIN — POTASSIUM CHLORIDE 40 MEQ: 400 INJECTION, SOLUTION INTRAVENOUS at 11:08

## 2020-08-14 RX ADMIN — SODIUM CHLORIDE: 0.9 INJECTION, SOLUTION INTRAVENOUS at 10:08

## 2020-08-14 RX ADMIN — HEPARIN SODIUM 5000 UNITS: 5000 INJECTION INTRAVENOUS; SUBCUTANEOUS at 09:08

## 2020-08-14 RX ADMIN — POTASSIUM CHLORIDE 40 MEQ: 400 INJECTION, SOLUTION INTRAVENOUS at 06:08

## 2020-08-14 RX ADMIN — SODIUM CHLORIDE, SODIUM LACTATE, POTASSIUM CHLORIDE, AND CALCIUM CHLORIDE 1000 ML: .6; .31; .03; .02 INJECTION, SOLUTION INTRAVENOUS at 12:08

## 2020-08-14 RX ADMIN — SODIUM PHOSPHATE, MONOBASIC, MONOHYDRATE 20.01 MMOL: 276; 142 INJECTION, SOLUTION INTRAVENOUS at 03:08

## 2020-08-14 RX ADMIN — FLUCONAZOLE 200 MG: 200 INJECTION, SOLUTION INTRAVENOUS at 12:08

## 2020-08-14 RX ADMIN — SODIUM CHLORIDE, SODIUM LACTATE, POTASSIUM CHLORIDE, AND CALCIUM CHLORIDE 1000 ML: .6; .31; .03; .02 INJECTION, SOLUTION INTRAVENOUS at 04:08

## 2020-08-14 RX ADMIN — Medication 25 MCG/HR: at 09:08

## 2020-08-14 RX ADMIN — CALCIUM GLUCONATE 2 G: 94 INJECTION, SOLUTION INTRAVENOUS at 12:08

## 2020-08-14 RX ADMIN — FUROSEMIDE 120 MG: 10 INJECTION, SOLUTION INTRAMUSCULAR; INTRAVENOUS at 09:08

## 2020-08-14 RX ADMIN — PIPERACILLIN SODIUM AND TAZOBACTAM SODIUM 4.5 G: 4; .5 INJECTION, POWDER, LYOPHILIZED, FOR SOLUTION INTRAVENOUS at 08:08

## 2020-08-14 RX ADMIN — MAGNESIUM SULFATE IN WATER 2 G: 40 INJECTION, SOLUTION INTRAVENOUS at 06:08

## 2020-08-14 NOTE — PROCEDURES
"Jaquan Farmer is a 39 y.o. female patient.    Temp: 98.8 °F (37.1 °C) (08/13/20 2315)  Pulse: (!) 128 (08/14/20 0000)  Resp: 20 (08/14/20 0000)  BP: 102/65 (08/14/20 0000)  SpO2: 98 % (08/14/20 0000)  Weight: 77.7 kg (171 lb 4.8 oz) (08/12/20 2030)  Height: 5' 2" (157.5 cm) (08/13/20 1113)       Central Line    Date/Time: 8/14/2020 2:00 AM  Performed by: Neal Singletary MD  Authorized by: Neal Singletary MD     Location procedure was performed:  [facility] SURGICAL ICU (SICU)  Consent Done ?:  Yes  Time out complete?: Verified correct patient, procedure, equipment, staff, and site/side    Indications:  Hemodialysis and med administration  Preparation:  Skin prepped with ChloraPrep  Skin prep agent dried: Skin prep agent completely dried prior to procedure    Sterile barriers: All five maximal sterile barriers used - gloves, gown, cap, mask and large sterile sheet    Hand hygiene: Hand hygiene performed immediately prior to central venous catheter insertion    Location:  Right internal jugular  Catheter type:  Trialysis  Inserted Catheter Length (cm):  16  Ultrasound guidance: Yes    Vessel Caliber:  Medium   patent  Comprressibility:  Normal  Doppler:  Not done  Needle advanced into vessel with real time ultrasound guidance.    Guidewire confirmed in vessel.    Manometry: No    Number of attempts:  1  Securement:  Line sutured, sterile dressing applied, chlorhexidine patch and blood return through all ports  Complications: Yes (specify)    Specimens: No    Implants: No    XRay:  Placement verified by x-ray  Adverse Events:  Pneumothorax  Other Complications:  Pneumothorax on CXR. Chest tube emergently placed. Reduction in size of pneumothorax.   Pneumothorax on CXR. Chest tube emergently placed. Reduction in size of pneumothorax.        Neal Singletary  8/14/2020  "

## 2020-08-14 NOTE — PROGRESS NOTES
Pharmacokinetic Assessment Follow Up: IV Vancomycin    Vancomycin Regimen Assessment & Plan:  - Vancomycin level drawn with morning labs today resulted as 37.1 ug/mL, goal trough range 10-20 ug/mL.  - Patient with FLORIDALMA, nephrology consulted. Will continue pulse dosing while renal function remains unstable.  - No need for vancomycin dose today. Draw vancomycin level with morning labs tomorrow. Will re-dose as needed depending on level and renal function.    Drug levels (last 3 results):  Recent Labs   Lab Result Units 08/13/20  1100 08/14/20  0315   Vancomycin, Random ug/mL 51.7 37.1       Pharmacy will continue to follow and monitor vancomycin.    Please contact pharmacy at extension [phone] for questions regarding this assessment.    Thank you for the consult,   Wes Kline     Patient brief summary:  Jaquan Farmer is a 39 y.o. female initiated on antimicrobial therapy with IV Vancomycin for treatment of intra-abdominal infection    The patient's current regimen is pulse dosing.    Drug Allergies:   Review of patient's allergies indicates:   Allergen Reactions    Latex      Actual Body Weight:   77.7 kg    Renal Function:   Estimated Creatinine Clearance: 30 mL/min (A) (based on SCr of 2.5 mg/dL (H)).,     Dialysis Method (if applicable):  N/A

## 2020-08-14 NOTE — PLAN OF CARE
Pt remains intubated, ACVC, 50%, 8 peep, rate 30, sats 100%. Sinus tach, 110-120s. All other VSS at this time. Labs trended, lytes replaced. UO minimal, CRRT SLED started at 1000, UF goal 550, tolerating well. Wound vac and drains monitored. BM x1, bloody stool.  See flowsheets for I/Os and assessments.     Pt remains free from falls, injuries, skin breakdown. Turned q2h, bed plugged in, foams in place. Restraints monitored per protocol, no breakdown, injuries. POC reviewed.

## 2020-08-14 NOTE — ASSESSMENT & PLAN NOTE
40 yo F s/p antrectomy with BII reconstruction c/b duodenal necrosis requiring ex lap, washout, drainage c/b aspiration event. Now with severe sepsis and ARDS     .Neuro:  - Pain controlled on fentanyl  - Sedated with propofol     Resp:  - Intubated on vent Fi 50, PEEP 8, rate 30 I:E changed from 1:1 to 1:2  - Wean vent per ARDSnet protocol  - Daily SBT  - PRN breathing treatments  - Daily CXR  - Right chest tube with output     CV:  - MAP goal >65  - Systolic <160  - Levo and Vaso gtt - weaned      Heme:  - Hgb/Hct 9.9 - stable  - Transfuse as indicated     ID:  - WBC 56.28 => still elevated  - Vanc, zosyn, Fluconazole  - F/u cultures      Renal:  - Parker in place  - Oliguric on arrival  - Cr 1.9 => 2.5, minimal UOP  - Monitor I/Os  - Trend BMP daily  - Lasix challenge, possible dialysis needed  - Nephrology consulted, appreciate recs and assistance with patient     FEN/GI:  - NPO  - NGT to LIWS  - Maintain drains to bulb suction  - GI ppx  - Replace electrolytes as needed to keep K>4, Mg>2     Endo:  - Monitor BG     Dispo:  - Continue SICU care

## 2020-08-14 NOTE — PLAN OF CARE
"      SICU PLAN OF CARE NOTE    Dx: <principal problem not specified>    Shift Events: CTA, OR ex lap    Goals of Care: wean vent, OR Saturday for repeat ex lap.     Neuro: Not responsive, currently still recovering from paralytic from OR. Dr Singletary aware. Before OR, GEIGER and FC on sedation.     Cardiac: ST 100s-120s. SBP on cuff 110s-150. Arterial line not correlating with cuff, reading 180s SBP, team aware. Going by cuff pressures.     Vital Signs: BP (!) 149/72 (BP Location: Left arm, Patient Position: Lying)   Pulse (!) 116   Temp 97.9 °F (36.6 °C) (Oral)   Resp (!) 26   Ht 5' 2" (1.575 m)   Wt 77.7 kg (171 lb 4.8 oz)   SpO2 100%   Breastfeeding No   BMI 31.33 kg/m²     Respiratory: Ventilator AC/C 90% 10PEEP, gassess changed to q 3 while requirements are high.     Diet: NPO    Gtts: propofol and MIVF.     Urine Output: Urinary Catheter 20 cc/shift, team aware of extremely low UOP.     Drains: midabdominal wound vac with 150 cc sanguinous output, 2x GODFREY drains with a combined 90cc sanguinous output    Restrained with bilateral soft wrist restraints. Safety checks done q 2, no injury noted.      Labs/Accuchecks: labs q 6, gasses q 3hr.    Skin: Skin CDI, no breakdown noted. Midabdominal incision with wound vac therapy. GODFREY x 2 to RLQ. No breakdown to sacrum or heels noted, sacral and heel foams in place. Turned q 2 to prevent breakdown.     PoC reviewed with pt's mother. All questions answered/concerns addressed. / Hayder updated throughout shift of patient vitals, gtts, and pt condition. VSS. WCTM.        "

## 2020-08-14 NOTE — ASSESSMENT & PLAN NOTE
40 yo F s/p antrectomy with BII reconstruction c/b duodenal necrosis requiring ex lap, washout, drainage c/b aspiration event. S/p duodenal resection jejunal anastomosis.    .Neuro:  - Sedation with propofol  - fentanyl   - Sedation vacations, neuro exams    Resp:  - Intubated on vent  - Wean vent per ARDSnet protocol    CV:  - Ongoing IVF resuscitation   - HDS off pressors  - Tachycardic    Heme:  - Hgb/Hct - trend  - Transfuse as indicated; received 2u PRBC in OR yesterday    ID:  - WBC 58  - Broad spectrum abx/antifungal   - F/u cultures     Renal:  - Parker in place; oliguric  - Nephrology consulted; no indication for dialysis at this time  - Cr 2 - trend    FEN/GI:  - NPO  - NGT to LIWS  - Maintain drains to bulb suction  - GI ppx  - Plan for take back to OR tomorrow    Endo:  - Monitor BG    Dispo:  - Continue ICU care; appreciate SICU assistance

## 2020-08-14 NOTE — SUBJECTIVE & OBJECTIVE
Past Medical History:   Diagnosis Date    Anxiety     Aortic insufficiency     Hyperbilirubinemia     Hypertension     Insomnia     Leukocytosis     Migraines     MR (mitral regurgitation)     Peptic ulcer disease     Pulmonary hypertension     Respiratory distress     Sepsis        Past Surgical History:   Procedure Laterality Date    APPLICATION OF WOUND VACUUM-ASSISTED CLOSURE DEVICE  8/13/2020    Procedure: APPLICATION, WOUND VAC WITH ABTHERA;  Surgeon: Donis Roa MD;  Location: [address];  Service: General;;    COLONOSCOPY      DUODENECTOMY  8/13/2020    Procedure: KOCHERIZATION OF DUODENUM, DUODENECTOMY;  Surgeon: Donis Roa MD;  Location: [facility] OR [address] [place] [address];  Service: General;;    ESOPHAGOGASTRODUODENOSCOPY  01/23/2018    ESOPHAGOGASTRODUODENOSCOPY N/A 8/13/2020    Procedure: EGD (ESOPHAGOGASTRODUODENOSCOPY);  Surgeon: Donis Roa MD;  Location: [address];  Service: General;  Laterality: N/A;    TUBAL LIGATION         Review of patient's allergies indicates:   Allergen Reactions    Latex      Current Facility-Administered Medications   Medication Frequency    0.9%  NaCl infusion (CRRT USE ONLY) Continuous    calcium gluconate 2 g in dextrose 5 % 100 mL IVPB Once    famotidine (PF) injection 20 mg Daily    fentaNYL 2500 mcg in 0.9% sodium chloride 250 mL infusion premix (titrating) Continuous    fluconazole (DIFLUCAN) IVPB 200 mg 100 mL Q24H    heparin (porcine) injection 5,000 Units Q8H    HYDROmorphone injection 0.2 mg Q5 Min PRN    lactated ringers infusion Continuous    magnesium sulfate 2g in water 50mL IVPB (premix) PRN    norepinephrine 4 mg in dextrose 5% 250 mL infusion (premix) (titrating) Continuous    ondansetron injection 4 mg Once PRN    piperacillin-tazobactam 4.5 g in sodium chloride 0.9% 100 mL IVPB (ready to mix system) Q8H    propofol (DIPRIVAN) 10 mg/mL infusion Continuous    sodium phosphate 20.01 mmol in dextrose 5 % 250 mL  IVPB PRN    sodium phosphate 30 mmol in dextrose 5 % 250 mL IVPB PRN    sodium phosphate 39.99 mmol in dextrose 5 % 250 mL IVPB PRN    vancomycin - pharmacy to dose pharmacy to manage frequency    vasopressin (PITRESSIN) 0.2 Units/mL in dextrose 5 % 100 mL infusion Continuous     Family History     None        Tobacco Use    Smoking status: Current Some Day Smoker     Packs/day: 0.25     Types: Cigarettes    Smokeless tobacco: Never Used   Substance and Sexual Activity    Alcohol use: Not Currently    Drug use: Not on file    Sexual activity: Not on file     Review of Systems   Unable to perform ROS: Intubated     Objective:     Vital Signs (Most Recent):  Temp: 99.2 °F (37.3 °C) (08/14/20 1115)  Pulse: (!) 116 (08/14/20 1230)  Resp: (!) 31 (08/14/20 1230)  BP: 126/60 (08/14/20 1230)  SpO2: 100 % (08/14/20 1230)  O2 Device (Oxygen Therapy): ventilator (08/14/20 1146) Vital Signs (24h Range):  Temp:  [97.9 °F (36.6 °C)-99.2 °F (37.3 °C)] 99.2 °F (37.3 °C)  Pulse:  [110-130] 116  Resp:  [17-36] 31  SpO2:  [90 %-100 %] 100 %  BP: ()/(51-77) 126/60  Arterial Line BP: (125-181)/(58-87) 146/68     Weight: 82 kg (180 lb 12.4 oz) (08/14/20 0500)  Body mass index is 33.06 kg/m².  Body surface area is 1.89 meters squared.    I/O last 3 completed shifts:  In: 72751.4 [I.V.:34413.4; Blood:1700; NG/GT:30; IV Piggyback:6000]  Out: 3614 [Urine:87; Drains:2222; Other:1100; Blood:150; Chest Tube:55]    Physical Exam  Vitals signs and nursing note reviewed.   Constitutional:       Appearance: She is obese.   Eyes:      General: No scleral icterus.  Cardiovascular:      Rate and Rhythm: Regular rhythm. Tachycardia present.   Pulmonary:      Comments: Intubated; Bronchial breath sounds  Abdominal:      Palpations: Abdomen is soft.      Comments: Open with drain and wound vac   Musculoskeletal:         General: Swelling (anasarca) present.      Right lower leg: Edema present.      Left lower leg: Edema present.   Skin:      Coloration: Skin is not jaundiced.         Significant Labs:  All labs within the past 24 hours have been reviewed.    Significant Imaging:  Labs: Reviewed  X-Ray: Reviewed  CT: Reviewed

## 2020-08-14 NOTE — PROCEDURES
"Jaquan Farmer is a 39 y.o. female patient.    Temp: 98.8 °F (37.1 °C) (08/13/20 2315)  Pulse: (!) 128 (08/14/20 0000)  Resp: 20 (08/14/20 0000)  BP: 102/65 (08/14/20 0000)  SpO2: 98 % (08/14/20 0000)  Weight: 77.7 kg (171 lb 4.8 oz) (08/12/20 2030)  Height: 5' 2" (157.5 cm) (08/13/20 1113)       Chest Tube Insertion    Date/Time: 8/14/2020 2:02 AM  Location procedure was performed: [facility] SURGICAL ICU (SICU)  Performed by: Neal Singletary MD  Authorized by: Neal Singletary MD   Consent Done: Emergent Situation  Indications: pneumothorax  Patient sedated: yes  Preparation: skin prepped with ChloraPrep  Placement location: right lateral  Tube connected to: suction  Post-insertion x-ray findings: tube in good position  Patient tolerance: Patient tolerated the procedure well with no immediate complications  Complications: No  Specimens: No  Implants: No  Comments: Interval reduction in size of pneumothorax          Neal Singletary  8/14/2020  "

## 2020-08-14 NOTE — PROGRESS NOTES
General Surgery team including Dr. Cesar at bedside this morning assessing patient. Updated on most recent assessment, current labs, ABG, gtts, vitals, and change in GODFREY output throughout shift. Plan for take-back to OR on Saturday. Will continue to monitor closely.

## 2020-08-14 NOTE — CONSULTS
[facility]  Nephrology  Consult Note    Patient Name: Jaquan Farmer  MRN: [id number]  Admission Date: 8/12/2020  Hospital Length of Stay: 2 days  Attending Provider: Donis Roa MD   Primary Care Physician: Primary Doctor No  Principal Problem:<principal problem not specified>    Inpatient consult to Nephrology  Consult performed by: Dharmesh Donaldson MD  Consult ordered by: Neal Singletary MD  Reason for consult: sid  Assessment/Recommendations: sled        Subjective:     HPI: Mrs. Farmer is a 39 year old female with antrectomy at [facility] complicated duodenal necrosis post op. She aspirated, was intubated, and became septic. She developed renal failure and required max vent settings and was transferred here for higher level of care. On arrival she was hypotensive on pressers, max vent setting, and had minimal urine output.  Overnight she was given 6L of fluid, 6g calcium, placed on vac, pip-tazo, and fluconazole. Nephro consulted for sid.    Past Medical History:   Diagnosis Date    Anxiety     Aortic insufficiency     Hyperbilirubinemia     Hypertension     Insomnia     Leukocytosis     Migraines     MR (mitral regurgitation)     Peptic ulcer disease     Pulmonary hypertension     Respiratory distress     Sepsis        Past Surgical History:   Procedure Laterality Date    APPLICATION OF WOUND VACUUM-ASSISTED CLOSURE DEVICE  8/13/2020    Procedure: APPLICATION, WOUND VAC WITH ABTHERA;  Surgeon: Donis Roa MD;  Location: [address];  Service: General;;    COLONOSCOPY      DUODENECTOMY  8/13/2020    Procedure: KOCHERIZATION OF DUODENUM, DUODENECTOMY;  Surgeon: Donis Roa MD;  Location: [facility] OR [address] [place] [address];  Service: General;;    ESOPHAGOGASTRODUODENOSCOPY  01/23/2018    ESOPHAGOGASTRODUODENOSCOPY N/A 8/13/2020    Procedure: EGD (ESOPHAGOGASTRODUODENOSCOPY);  Surgeon: Donis Roa MD;  Location: [facility] OR [address] [place] [address];  Service: General;  Laterality: N/A;     TUBAL LIGATION         Review of patient's allergies indicates:   Allergen Reactions    Latex      Current Facility-Administered Medications   Medication Frequency    0.9%  NaCl infusion (CRRT USE ONLY) Continuous    calcium gluconate 2 g in dextrose 5 % 100 mL IVPB Once    famotidine (PF) injection 20 mg Daily    fentaNYL 2500 mcg in 0.9% sodium chloride 250 mL infusion premix (titrating) Continuous    fluconazole (DIFLUCAN) IVPB 200 mg 100 mL Q24H    heparin (porcine) injection 5,000 Units Q8H    HYDROmorphone injection 0.2 mg Q5 Min PRN    lactated ringers infusion Continuous    magnesium sulfate 2g in water 50mL IVPB (premix) PRN    norepinephrine 4 mg in dextrose 5% 250 mL infusion (premix) (titrating) Continuous    ondansetron injection 4 mg Once PRN    piperacillin-tazobactam 4.5 g in sodium chloride 0.9% 100 mL IVPB (ready to mix system) Q8H    propofol (DIPRIVAN) 10 mg/mL infusion Continuous    sodium phosphate 20.01 mmol in dextrose 5 % 250 mL IVPB PRN    sodium phosphate 30 mmol in dextrose 5 % 250 mL IVPB PRN    sodium phosphate 39.99 mmol in dextrose 5 % 250 mL IVPB PRN    vancomycin - pharmacy to dose pharmacy to manage frequency    vasopressin (PITRESSIN) 0.2 Units/mL in dextrose 5 % 100 mL infusion Continuous     Family History     None        Tobacco Use    Smoking status: Current Some Day Smoker     Packs/day: 0.25     Types: Cigarettes    Smokeless tobacco: Never Used   Substance and Sexual Activity    Alcohol use: Not Currently    Drug use: Not on file    Sexual activity: Not on file     Review of Systems   Unable to perform ROS: Intubated     Objective:     Vital Signs (Most Recent):  Temp: 99.2 °F (37.3 °C) (08/14/20 1115)  Pulse: (!) 116 (08/14/20 1230)  Resp: (!) 31 (08/14/20 1230)  BP: 126/60 (08/14/20 1230)  SpO2: 100 % (08/14/20 1230)  O2 Device (Oxygen Therapy): ventilator (08/14/20 1146) Vital Signs (24h Range):  Temp:  [97.9 °F (36.6 °C)-99.2 °F (37.3 °C)]  99.2 °F (37.3 °C)  Pulse:  [110-130] 116  Resp:  [17-36] 31  SpO2:  [90 %-100 %] 100 %  BP: ()/(51-77) 126/60  Arterial Line BP: (125-181)/(58-87) 146/68     Weight: 82 kg (180 lb 12.4 oz) (08/14/20 0500)  Body mass index is 33.06 kg/m².  Body surface area is 1.89 meters squared.    I/O last 3 completed shifts:  In: 96412.4 [I.V.:37030.4; Blood:1700; NG/GT:30; IV Piggyback:6000]  Out: 3614 [Urine:87; Drains:2222; Other:1100; Blood:150; Chest Tube:55]    Physical Exam  Vitals signs and nursing note reviewed.   Constitutional:       Appearance: She is obese.   Eyes:      General: No scleral icterus.  Cardiovascular:      Rate and Rhythm: Regular rhythm. Tachycardia present.   Pulmonary:      Comments: Intubated; Bronchial breath sounds  Abdominal:      Palpations: Abdomen is soft.      Comments: Open with drain and wound vac   Musculoskeletal:         General: Swelling (anasarca) present.      Right lower leg: Edema present.      Left lower leg: Edema present.   Skin:     Coloration: Skin is not jaundiced.         Significant Labs:  All labs within the past 24 hours have been reviewed.    Significant Imaging:  Labs: Reviewed  X-Ray: Reviewed  CT: Reviewed    Assessment/Plan:     Metabolic acidosis  -secondary to renal failure  -on SLED    Acute renal failure with tubular necrosis  -oliguric stage 3 sid with ischemic atn likely secondary to septic shock  -unknown bl cr  -with metabolic acidosis and anasarca  -muddy brown casts on microscopy  -anuric overnight with elevated bun and cr  -plan for 3 liters net negative fluid balance over 24 hours  -SLED continuous 450-550cc/hr 3k bath                      Dharmesh Donaldson MD  Nephrology  [facility]

## 2020-08-14 NOTE — PROGRESS NOTES
Dr. Neal Singletary and Dr. Ac Romero at bedside for RIJ trialysis placement. Consent obtained via telephone, witnessed, signed, and placed in pt's chart. Time out performed before procedure. Pt tolerated procedure. Shortly after, noted increased peak pressures on ventilator (from 35 to 40-45). CXR obtained shortly afterwards.

## 2020-08-14 NOTE — ASSESSMENT & PLAN NOTE
-oliguric stage 3 sid with ischemic atn likely secondary to septic shock  -unknown bl cr  -with metabolic acidosis and anasarca  -muddy brown casts on microscopy  -anuric overnight with elevated bun and cr  -plan for 3 liters net negative fluid balance over 24 hours  -SLED continuous 450-550cc/hr 3k bath

## 2020-08-14 NOTE — PROGRESS NOTES
[facility]  General Surgery  Progress Note    Subjective:     History of Present Illness:  Pt is a 40 yo F with recent history of antrectomy with BII reconstruction for ulcer disease at outside hospital. 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 Patient also reportedly aspirated on induction in the OR prior to this, resulting in severe pulmonary edema and hypoxia. [place] [facility] [place]. She arrived as a direct SICU admit on near maximal vent settings and requiring low dose vasopressors with HR in the upper 140s. Her midline laparotomy is closed with 4 Navdeep drains in place draining bilious output.     Post-Op Info:  Procedure(s) (LRB):  LAPAROTOMY, EXPLORATORY (N/A)  EGD (ESOPHAGOGASTRODUODENOSCOPY) (N/A)  APPLICATION, WOUND VAC WITH ABTHERA  DUODENOJEJUNAL ANASTAMOSIS, GASTROJEJUNAL ANASTOMOSIS  KOCHERIZATION OF DUODENUM, DUODENECTOMY   1 Day Post-Op     Interval History: Overnight patient had CT placed for peumothorax. Vent settings weaned to 50/10. Tachycardic. GODFREY drain #1 with bilious output.    Medications:  Continuous Infusions:   lactated ringers 125 mL/hr at 08/14/20 0715    norepinephrine bitartrate-D5W Stopped (08/13/20 0130)    propofoL 40 mcg/kg/min (08/14/20 0715)    vasopressin (PITRESSIN) infusion Stopped (08/12/20 2140)     Scheduled Meds:   famotidine (PF)  20 mg Intravenous Daily    fluconazole (DIFLUCAN) IVPB  200 mg Intravenous Q24H    piperacillin-tazobactam (ZOSYN) IVPB  4.5 g Intravenous Q8H     PRN Meds:fentaNYL, fentaNYL, HYDROmorphone, ondansetron, Pharmacy to dose Vancomycin consult **AND** vancomycin - pharmacy to dose     Review of patient's allergies indicates:   Allergen Reactions    Latex      Objective:     Vital Signs (Most Recent):  Temp: 98.9 °F (37.2 °C) (08/14/20 0715)  Pulse: (!) 120 (08/14/20 0745)  Resp: (!) 30 (08/14/20 0800)  BP: 126/60 (08/14/20  0730)  SpO2: 100 % (08/14/20 0745) Vital Signs (24h Range):  Temp:  [97.9 °F (36.6 °C)-99.7 °F (37.6 °C)] 98.9 °F (37.2 °C)  Pulse:  [106-135] 120  Resp:  [17-32] 30  SpO2:  [90 %-100 %] 100 %  BP: ()/(51-77) 126/60  Arterial Line BP: (107-181)/(50-87) 142/70     Weight: 82 kg (180 lb 12.4 oz)  Body mass index is 33.06 kg/m².    Intake/Output - Last 3 Shifts       08/12 0700 - 08/13 0659 08/13 0700 - 08/14 0659 08/14 0700 - 08/15 0659    I.V. (mL/kg) 1965.4 (25.3) 8926 (108.9)     Blood  1700     NG/GT  30     IV Piggyback 3000 3000     Total Intake(mL/kg) 4965.4 (63.9) 23594 (166.5)     Urine (mL/kg/hr) 22 65 (0) 10 (0.1)    Drains 515 1707 225    Other  1100 200    Blood  150     Chest Tube  55 10    Total Output 537 3077 445    Net +4428.4 +67352 -445                 Physical Exam  Constitutional:       Interventions: She is sedated and intubated.   HENT:      Head: Normocephalic.   Cardiovascular:      Rate and Rhythm: Regular rhythm. Tachycardia present.   Pulmonary:      Effort: She is intubated.      Comments: Intubated on vent  Vent Mode: A/C  Oxygen Concentration (%):  () 50  Resp Rate Total:  (20 br/min-39 br/min) 30 br/min  Vt Set:  (380 mL) 380 mL  PEEP/CPAP:  (8 ieG96-99 cmH20) 8 cmH20  Mean Airway Pressure:  (14 beI53-96 cmH20) 14 cmH20  Abdominal:      Comments: Abthera with ss output  GODFREY #1 with bilious output  GODFREY #2 with ss output   Musculoskeletal:         General: Swelling present.      Right lower leg: Edema present.      Left lower leg: Edema present.   Skin:     General: Skin is warm and dry.   Neurological:      Comments: Sedated on propofol         Significant Labs:  CBC:   Recent Labs   Lab 08/14/20 0315 08/14/20  0610   WBC 58.36*  --    RBC 3.29*  --    HGB 9.9*  --    HCT 29.6* 27*     --    MCV 90  --    MCH 30.1  --    MCHC 33.4  --      CMP:   Recent Labs   Lab 08/14/20 0315   GLU 88   CALCIUM 7.4*   ALBUMIN 1.7*   PROT 3.8*      K 4.3   CO2 20*       BUN 23*   CREATININE 2.5*   ALKPHOS 79   ALT 21   AST 66*   BILITOT 2.4*     Assessment/Plan:     Severe sepsis  40 yo F s/p antrectomy with BII reconstruction c/b duodenal necrosis requiring ex lap, washout, drainage c/b aspiration event. S/p duodenal resection jejunal anastomosis.    .Neuro:  - Sedation with propofol  - fentanyl   - Sedation vacations, neuro exams    Resp:  - Intubated on vent  - Wean vent per [employer] protocol    CV:  - Ongoing IVF resuscitation   - HDS off pressors  - Tachycardic    Heme:  - Hgb/Hct - trend  - Transfuse as indicated; received 2u PRBC in OR yesterday    ID:  - WBC 58  - Broad spectrum abx/antifungal   - F/u cultures     Renal:  - Parker in place; oliguric  - Nephrology consulted; no indication for dialysis at this time  - Cr 2 - trend    FEN/GI:  - NPO  - NGT to LIWS  - Maintain drains to bulb suction  - GI ppx  - Plan for take back to OR tomorrow    Endo:  - Monitor BG    Dispo:  - Continue ICU care; appreciate SICU assistance             Ying Cesar MD  General Surgery  [facility]

## 2020-08-14 NOTE — PLAN OF CARE
Shift events: RIJ trialysis placement. R chest tube placement. 3L LR bolus    Vitals: Tmax 99 oral. HR 120s. MAPs maintained > 65. CVP 7-9.     Neuro: Follows commands on sedation vacation. Moves all extremities and nods head appropriately. Pupils equal, round, and reactive.    Cardiac: HR ST 120s. No pressor requirements.    Respiratory: Vent: AC/VC+, rate 30, , FiO2 50%, 10 PEEP, SpO2 100%, ABGs q3h    Drains: GODFREY 1 320 mL/shift, more bilious/dark brown output  GODFREY 2 292 mL/shift, dark brown/yellow output  NGT with 400 mL/shift, yellow/green/brown output  Wound vac with serosanguineous/serous output, 700 mL/shift    GI: Abdomen remains open with abthera wound vac, 125 mmHg suction    : Parker in place, remains oliguric, 45 mL/shift, team aware. Nephrology consulted.    Gtts: LR @ 125 mL/hr, Propofol @ 40 mcg/kg/min    Skin: Skin remains CDI, no breakdown noted. Mid-abdominal incision with wound vac in place. JPx2 on right, more medial GODFREY with some leakage at site, dressing in place remains CDI. Heel foam dressings in place. Sacral dressing in place. Turned q2h as tolerated.

## 2020-08-14 NOTE — SUBJECTIVE & OBJECTIVE
Interval History/Significant Events: See above.    Follow-up For: Procedure(s) (LRB):  LAPAROTOMY, EXPLORATORY (N/A)  EGD (ESOPHAGOGASTRODUODENOSCOPY) (N/A)  APPLICATION, WOUND VAC WITH ABTHERA  DUODENOJEJUNAL ANASTAMOSIS, GASTROJEJUNAL ANASTOMOSIS  KOCHERIZATION OF DUODENUM, DUODENECTOMY    Post-Operative Day: 1 Day Post-Op    Objective:     Vital Signs (Most Recent):  Temp: 98.9 °F (37.2 °C) (08/14/20 0715)  Pulse: (!) 124 (08/14/20 0902)  Resp: (!) 31 (08/14/20 0902)  BP: 127/65 (08/14/20 0902)  SpO2: 100 % (08/14/20 0902) Vital Signs (24h Range):  Temp:  [97.9 °F (36.6 °C)-99 °F (37.2 °C)] 98.9 °F (37.2 °C)  Pulse:  [106-135] 124  Resp:  [17-32] 31  SpO2:  [90 %-100 %] 100 %  BP: ()/(51-77) 127/65  Arterial Line BP: (122-181)/(54-87) 148/71     Weight: 82 kg (180 lb 12.4 oz)  Body mass index is 33.06 kg/m².      Intake/Output Summary (Last 24 hours) at 8/14/2020 0917  Last data filed at 8/14/2020 0715  Gross per 24 hour   Intake 43031 ml   Output 3247 ml   Net 28229 ml       Physical Exam  Vitals signs and nursing note reviewed.   Constitutional:       General: She is not in acute distress.     Appearance: She is ill-appearing.      Comments: Sedated on mechanical ventilation.   HENT:      Head: Normocephalic and atraumatic.      Nose: Nose normal.      Comments: NG present.     Mouth/Throat:      Mouth: Mucous membranes are moist.   Eyes:      Conjunctiva/sclera: Conjunctivae normal.      Pupils: Pupils are equal, round, and reactive to light.   Neck:      Musculoskeletal: Neck supple. No muscular tenderness.      Comments: Right IJ in place  Cardiovascular:      Rate and Rhythm: Normal rate and regular rhythm.   Pulmonary:      Effort: Pulmonary effort is normal.      Breath sounds: Rhonchi present. No wheezing.      Comments: Coarse breath sounds bilaterally.  Abdominal:      Palpations: Abdomen is soft.      Comments: Midline surgical drain   Skin:     General: Skin is warm and dry.   Neurological:       Comments: Follows commands off sedation   Psychiatric:      Comments: Sedated         Vents:  Vent Mode: A/C (08/14/20 0902)  Ventilator Initiated: Yes(chart correction) (08/12/20 1930)  Set Rate: 30 BPM (08/14/20 0902)  Vt Set: 420 mL (08/14/20 0902)  PEEP/CPAP: 8 cmH20 (08/14/20 0902)  Oxygen Concentration (%): 50 (08/14/20 0902)  Peak Airway Pressure: 23 cmH2O (08/14/20 0902)  Plateau Pressure: 0 cmH20 (08/14/20 0902)  Total Ve: 8.54 mL (08/14/20 0902)  F/VT Ratio<105 (RSBI): (!) 103.68 (08/14/20 0902)    Lines/Drains/Airways     Peripherally Inserted Central Catheter Line            PICC Double Lumen right brachial -- days          Central Venous Catheter Line            Trialysis (Dialysis) Catheter 08/13/20 2230 right internal jugular less than 1 day          Drain                 NG/OG Tube 08/06/20 nasogastric Left nostril 8 days         Urethral Catheter 08/12/20 2021 1 day         Chest Tube 08/14/20 0007 Right Midaxillary 14 Fr. less than 1 day         Closed/Suction Drain 08/13/20 1659 Right;Inferior Abdomen Bulb 19 Fr. less than 1 day         Closed/Suction Drain 08/13/20 1659 Right;Superior Abdomen Bulb 19 Fr. less than 1 day          Airway                 Airway - Non-Surgical Endotracheal Tube -- days          Arterial Line                 Arterial Line 08/06/20 Left Radial 8 days          Peripheral Intravenous Line                 Peripheral IV - Single Lumen 08/12/20 18 G Right Forearm 2 days         Peripheral IV - Single Lumen 08/12/20 2007 18 G Left Forearm 1 day                Significant Labs:    CBC/Anemia Profile:  Recent Labs   Lab 08/13/20  1751  08/13/20  2154 08/14/20  0041 08/14/20  0315 08/14/20  0610   WBC 46.20*  --  54.52*  --  58.36*  --    HGB 9.1*  --  10.2*  --  9.9*  --    HCT 27.2*   < > 30.2* 31* 29.6* 27*     --  177  --  176  --    MCV 90  --  89  --  90  --    RDW 15.1*  --  15.6*  --  15.9*  --     < > = values in this interval not displayed.         Chemistries:  Recent Labs   Lab 08/13/20  1751 08/13/20  2154 08/14/20  0315   * 136 137   K 3.8 3.9 4.3    107 108   CO2 20* 21* 20*   BUN 22* 23* 23*   CREATININE 2.3* 2.4* 2.5*   CALCIUM 7.1* 7.6* 7.4*   ALBUMIN 1.9* 1.9* 1.7*   PROT 3.5* 3.9* 3.8*   BILITOT 2.6* 3.6* 2.4*   ALKPHOS 80 87 79   ALT 23 24 21   AST 70* 78* 66*   MG 2.2 2.1 2.1   PHOS 4.1 4.0 4.1       All pertinent labs within the past 24 hours have been reviewed.    Significant Imaging:  I have reviewed all pertinent imaging results/findings within the past 24 hours.

## 2020-08-14 NOTE — ANESTHESIA PREPROCEDURE EVALUATION
[facility]  Anesthesia Pre-Operative Evaluation         Patient Name: Jaquan Farmer  YOB: 1980  MRN: [id number]    SUBJECTIVE:     Pre-operative evaluation for Procedure(s) (LRB):  LAPAROTOMY, EXPLORATORY; possible closure or abthera replacement (N/A)     08/14/2020    Jaquan Farmer is a 39 y.o. female w/ a significant PMHx of pHTN and recent history of antrectomy with BII reconstruction for ulcer disease at outside hospital. 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 Patient also reportedly aspirated on induction in the OR prior to this, resulting in severe pulmonary edema and hypoxia. Patient was transferred to [facility] urgently for higher level of care. Now s/p repeat ex lap, washout, drainage. Also complicated pneumothorax and ARF requiring dialysis.    Patient now presents for the above procedure(s).    LDA:  A-line L radial  Trialysis RIJ  PIV 18G R FA  PIV 18G L FA  PICC R brachial  CT 14Fr R midax  NGT    Prev airway:   Currently intubated  Vent Mode: A/C  Oxygen Concentration (%):  [] 50  Resp Rate Total:  [20 br/min-40 br/min] 30 br/min  Vt Set:  [380 mL-420 mL] 420 mL  PEEP/CPAP:  [8 znJ17-19 cmH20] 8 cmH20  Mean Airway Pressure:  [13 oqF12-82 cmH20] 14 cmH20    Drips:   sodium chloride 0.9% 200 mL/hr at 08/14/20 1700    fentanyl 10 mL/hr at 08/14/20 1700    lactated ringers 5 mL/hr at 08/14/20 1700    propofoL 20 mcg/kg/min (08/14/20 1700)       Patient Active Problem List   Diagnosis    Duodenum disorder    Severe sepsis    Acute renal failure with tubular necrosis    Metabolic acidosis       Review of patient's allergies indicates:   Allergen Reactions    Latex        Current Inpatient Medications:      Current Facility-Administered Medications on File Prior to Visit   Medication Dose Route Frequency Provider Last Rate Last Dose    0.9%  NaCl infusion (CRRT USE ONLY)   Intravenous Continuous Dharmesh  ELVIRA Donaldson  mL/hr at 08/14/20 1700      famotidine (PF) injection 20 mg  20 mg Intravenous Daily Thu Wilson MD   20 mg at 08/14/20 0800    fentaNYL 2500 mcg in 0.9% sodium chloride 250 mL infusion premix (titrating)   Intravenous Continuous Juan Rayo MD 10 mL/hr at 08/14/20 1700      fluconazole (DIFLUCAN) IVPB 200 mg 100 mL  200 mg Intravenous Q24H Thu Wilson  mL/hr at 08/14/20 0000 200 mg at 08/14/20 0000    heparin (porcine) injection 5,000 Units  5,000 Units Subcutaneous Q8H Neal Singletary MD   5,000 Units at 08/14/20 1430    HYDROmorphone injection 0.2 mg  0.2 mg Intravenous Q5 Min PRN Azar Desai MD        lactated ringers infusion   Intravenous Continuous Neal Singletary MD 5 mL/hr at 08/14/20 1700      magnesium sulfate 2g in water 50mL IVPB (premix)  2 g Intravenous PRN Dharmesh Donaldson MD        ondansetron injection 4 mg  4 mg Intravenous Once PRN Azar Desai MD        piperacillin-tazobactam 4.5 g in sodium chloride 0.9% 100 mL IVPB (ready to mix system)  4.5 g Intravenous Q8H Serjio Garcia MD 25 mL/hr at 08/14/20 1530 4.5 g at 08/14/20 1530    propofol (DIPRIVAN) 10 mg/mL infusion  10 mcg/kg/min Intravenous Continuous Donis Bates MD 9.3 mL/hr at 08/14/20 1700 20 mcg/kg/min at 08/14/20 1700    sodium phosphate 20.01 mmol in dextrose 5 % 250 mL IVPB  20.01 mmol Intravenous PRN Dharmesh Donaldson  mL/hr at 08/14/20 1530 20.01 mmol at 08/14/20 1530    sodium phosphate 30 mmol in dextrose 5 % 250 mL IVPB  30 mmol Intravenous PRN Dharmesh Donaldson MD        sodium phosphate 39.99 mmol in dextrose 5 % 250 mL IVPB  39.99 mmol Intravenous PRN Dharmesh Donaldson MD        vancomycin - pharmacy to dose   Intravenous pharmacy to manage frequency Thu Wilson MD         Current Outpatient Medications on File Prior to Visit   Medication Sig Dispense Refill    aspirin (ECOTRIN) 81 MG EC tablet   = 1 tab, Oral,  Daily, Ordered by Dr. Moreira, # 90 tab, 3 Refill(s), Maintenance      metoprolol tartrate (LOPRESSOR) 25 MG tablet   See Instructions, TAKE 1 TABLET BY MOUTH TWICE DAILY, tab, # 60, eRx: [id number]      sumatriptan (IMITREX) 50 MG tablet   = 1 tab, Oral, Daily, PRN for migraine headache, may repeat dose after 2 hours up to a maximum of 200 mg in 24 hours, # 9 tab, 1 Refill(s), Maintenance, Pharmacy: [facility] [id number]         Past Surgical History:   Procedure Laterality Date    APPLICATION OF WOUND VACUUM-ASSISTED CLOSURE DEVICE  8/13/2020    Procedure: APPLICATION, WOUND VAC WITH ABTHERA;  Surgeon: Donis Roa MD;  Location: [facility] OR [address] [place] [address];  Service: General;;    COLONOSCOPY      DUODENECTOMY  8/13/2020    Procedure: KOCHERIZATION OF DUODENUM, DUODENECTOMY;  Surgeon: Donis Roa MD;  Location: [facility] OR [address] [place] [address];  Service: General;;    ESOPHAGOGASTRODUODENOSCOPY  01/23/2018    ESOPHAGOGASTRODUODENOSCOPY N/A 8/13/2020    Procedure: EGD (ESOPHAGOGASTRODUODENOSCOPY);  Surgeon: Donis Roa MD;  Location: [facility] OR [address] [place] [address];  Service: General;  Laterality: N/A;    TUBAL LIGATION         Social History     Socioeconomic History    Marital status: Unknown     Spouse name: Not on file    Number of children: Not on file    Years of education: Not on file    Highest education level: Not on file   Occupational History    Not on file   Social Needs    Financial resource strain: Not on file    Food insecurity     Worry: Not on file     Inability: Not on file    Transportation needs     Medical: Not on file     Non-medical: Not on file   Tobacco Use    Smoking status: Current Some Day Smoker     Packs/day: 0.25     Types: Cigarettes    Smokeless tobacco: Never Used   Substance and Sexual Activity    Alcohol use: Not Currently    Drug use: Not on file    Sexual activity: Not on file   Lifestyle    Physical activity     Days per week: Not on file     Minutes per  session: Not on file    Stress: Not on file   Relationships    Social connections     Talks on phone: Not on file     Gets together: Not on file     Attends Nondenominational service: Not on file     Active member of club or organization: Not on file     Attends meetings of clubs or organizations: Not on file     Relationship status: Not on file   Other Topics Concern    Not on file   Social History Narrative    Not on file       OBJECTIVE:     Vital Signs Range (Last 24H):  Temp:  [36.6 °C (97.9 °F)-37.4 °C (99.3 °F)]   Pulse:  [108-130]   Resp:  [17-36]   BP: ()/(51-77)   SpO2:  [90 %-100 %]   Arterial Line BP: (124-181)/(56-87)       Significant Labs:  Lab Results   Component Value Date    WBC 54.30 (HH) 08/14/2020    HGB 8.8 (L) 08/14/2020    HCT 26.2 (L) 08/14/2020     08/14/2020    ALT 21 08/14/2020    AST 52 (H) 08/14/2020     08/14/2020    K 3.8 08/14/2020     08/14/2020    CREATININE 1.5 (H) 08/14/2020    BUN 13 08/14/2020    CO2 24 08/14/2020    INR 1.0 08/14/2020       Diagnostic Studies: No relevant studies.    EKG:   No results found for this or any previous visit.    2D ECHO:  TTE:  No results found for this or any previous visit.    TOBIAS:  No results found for this or any previous visit.    ASSESSMENT/PLAN:         Pre-op Assessment    I have reviewed the Patient Summary Reports.    I have reviewed the NPO Status.   I have reviewed the Medications.     Review of Systems  Anesthesia Hx:  History of prior surgery of interest to airway management or planning: Denies Family Hx of Anesthesia complications.   Denies Personal Hx of Anesthesia complications.   Pulmonary:  Pulmonary Hypertension        Physical Exam  General:  Well nourished    Airway/Jaw/Neck:  Airway Findings: Pre-Existing Airway Tube(s): Oral Endotracheal tube      Chest/Lungs:  Chest/Lungs Findings: Normal Respiratory Rate     Heart/Vascular:  Heart Findings: Rate: Tachycardia  Rhythm: Regular Rhythm        Mental  Status:  Mental Status Findings: (sedated)         Anesthesia Plan  Type of Anesthesia, risks & benefits discussed:  Anesthesia Type:  general  Patient's Preference:   Intra-op Monitoring Plan: standard ASA monitors and arterial line  Intra-op Monitoring Plan Comments:   Post Op Pain Control Plan: multimodal analgesia, IV/PO Opioids PRN and per primary service following discharge from PACU  Post Op Pain Control Plan Comments:   Induction:   IV  Beta Blocker:  Patient is not currently on a Beta-Blocker (No further documentation required).       Informed Consent: Patient representative understands risks and agrees with Anesthesia plan.  Questions answered. Anesthesia consent signed with patient representative.  ASA Score: 4     Day of Surgery Review of History & Physical:    H&P update referred to the surgeon.         Ready For Surgery From Anesthesia Perspective.

## 2020-08-14 NOTE — SUBJECTIVE & OBJECTIVE
Interval History: Overnight patient had CT placed for peumothorax. Vent settings weaned to 50/10. Tachycardic. GODFREY drain #1 with bilious output.    Medications:  Continuous Infusions:   lactated ringers 125 mL/hr at 08/14/20 0715    norepinephrine bitartrate-D5W Stopped (08/13/20 0130)    propofoL 40 mcg/kg/min (08/14/20 0715)    vasopressin (PITRESSIN) infusion Stopped (08/12/20 2140)     Scheduled Meds:   famotidine (PF)  20 mg Intravenous Daily    fluconazole (DIFLUCAN) IVPB  200 mg Intravenous Q24H    piperacillin-tazobactam (ZOSYN) IVPB  4.5 g Intravenous Q8H     PRN Meds:fentaNYL, fentaNYL, HYDROmorphone, ondansetron, Pharmacy to dose Vancomycin consult **AND** vancomycin - pharmacy to dose     Review of patient's allergies indicates:   Allergen Reactions    Latex      Objective:     Vital Signs (Most Recent):  Temp: 98.9 °F (37.2 °C) (08/14/20 0715)  Pulse: (!) 120 (08/14/20 0745)  Resp: (!) 30 (08/14/20 0800)  BP: 126/60 (08/14/20 0730)  SpO2: 100 % (08/14/20 0745) Vital Signs (24h Range):  Temp:  [97.9 °F (36.6 °C)-99.7 °F (37.6 °C)] 98.9 °F (37.2 °C)  Pulse:  [106-135] 120  Resp:  [17-32] 30  SpO2:  [90 %-100 %] 100 %  BP: ()/(51-77) 126/60  Arterial Line BP: (107-181)/(50-87) 142/70     Weight: 82 kg (180 lb 12.4 oz)  Body mass index is 33.06 kg/m².    Intake/Output - Last 3 Shifts       08/12 0700 - 08/13 0659 08/13 0700 - 08/14 0659 08/14 0700 - 08/15 0659    I.V. (mL/kg) 1965.4 (25.3) 8926 (108.9)     Blood  1700     NG/GT  30     IV Piggyback 3000 3000     Total Intake(mL/kg) 4965.4 (63.9) 32035 (166.5)     Urine (mL/kg/hr) 22 65 (0) 10 (0.1)    Drains 515 1707 225    Other  1100 200    Blood  150     Chest Tube  55 10    Total Output 537 3077 445    Net +4428.4 +76419 -445                 Physical Exam  Constitutional:       Interventions: She is sedated and intubated.   HENT:      Head: Normocephalic.   Cardiovascular:      Rate and Rhythm: Regular rhythm. Tachycardia present.    Pulmonary:      Effort: She is intubated.      Comments: Intubated on vent  Vent Mode: A/C  Oxygen Concentration (%):  () 50  Resp Rate Total:  (20 br/min-39 br/min) 30 br/min  Vt Set:  (380 mL) 380 mL  PEEP/CPAP:  (8 rpX57-41 cmH20) 8 cmH20  Mean Airway Pressure:  (14 grA24-92 cmH20) 14 cmH20  Abdominal:      Comments: Abthera with ss output  GODFREY #1 with bilious output  GODFREY #2 with ss output   Musculoskeletal:         General: Swelling present.      Right lower leg: Edema present.      Left lower leg: Edema present.   Skin:     General: Skin is warm and dry.   Neurological:      Comments: Sedated on propofol         Significant Labs:  CBC:   Recent Labs   Lab 08/14/20 0315 08/14/20  0610   WBC 58.36*  --    RBC 3.29*  --    HGB 9.9*  --    HCT 29.6* 27*     --    MCV 90  --    MCH 30.1  --    MCHC 33.4  --      CMP:   Recent Labs   Lab 08/14/20 0315   GLU 88   CALCIUM 7.4*   ALBUMIN 1.7*   PROT 3.8*      K 4.3   CO2 20*      BUN 23*   CREATININE 2.5*   ALKPHOS 79   ALT 21   AST 66*   BILITOT 2.4*

## 2020-08-14 NOTE — PROGRESS NOTES
2305: Shortly after lying patient flat to measure CVP, returned pt's HOB to 30 degrees and noted peak pressures to drop to 25 and tidal volumes dropped to 50-70. SpO2 decreased from 98% to 80s. Pt immediately disconnected from ventilator and manually bagged ETT. SpO2 immediately increased back to 100%. Dr. Cesar and Dr. Singletary presents to bedside. All other VSS at this time. Orders to obtain CXR STAT.     2345: CXR shows large R pneumo. Pt emergently prepped for right chest tube placement. HR 120s, MAP >65, SpO2 90%. Propofol @ 50 mcg/kg/min. 1L LR bolus infusing. Dr. Romero and Dr. Cesar at bedside for chest tube insertion. Time out performed. Pt tolerated procedure without incident. Chest tube placed to wall suction @ -20 mmHg. 20 mL serous fluid output. CXR ordered.     0010: CXR of right chest tube placement confirmation performed without incident. Dr. Cesar notified of imaging completion.     0040: Notified Dr. Cesar and Dr. Singletary of ABG. Orders to decrease FiO2 to 70% and increase rate from 20 to 26. Will carry out.

## 2020-08-14 NOTE — HPI
Mrs. Farmer is a 39 year old female with antrectomy at [facility] complicated duodenal necrosis post op. She aspirated, was intubated, and became septic. She developed renal failure and required max vent settings and was transferred here for higher level of care. On arrival she was hypotensive on pressers, max vent setting, and had minimal urine output.  Overnight she was given 6L of fluid, 6g calcium, placed on vac, pip-tazo, and fluconazole. Nephro consulted for sid.

## 2020-08-14 NOTE — HOSPITAL COURSE
8/14 Patient had a line replaced along with central line placed, with complication of pneumothorax. Rt pigtail was placed. Patient tolerated complications well. Decreasing vent settings.  8/15 NAEO. Patient was taken back to OR early AM for washout. Possible closure 8/18. ETT exchanged in OR 7.0 -> 7.5  8/16 - NAEON. HDS. Intubated, sedated.   8/17 - NAEON. OR tomorrow for closure.  8/18 - Washed out and closed in OR  8/22 - NAEO. Plan for Repeat CT AP today  8/23 - NAEO. Hbg lower. Giving blood x2 units.  8/25 - NAEO. Went to OR yesterday for Abdominal wall exploration and ligation of bleeding vessels. Wound vac exchanged  8/26 - NAEO. Hypertensive overnight. Hgb trending. Plan for CT today  8/27 - NAEO. Hypertensive overnight. CT yesterday showed no extravasation or acute changes  8/28 - NAEO.   8/29 - Patient in respiratory distress upon arrival. Chest XR w/ obvious left sided pulmonary occlusion. Patient intubated and bronchoscopy performed.  8/30 - Repeat Bronch today. Possible extubation later today  8/31 - Extubated yesterday. Wound vac change, bronch scheduled for OR today. Patient out of bed in chair on AM exam.  9/1 - Doing well without complaints this morning.  L sided permacath placed yesterday  9/2 - NAEO.  Continues to be hypertensive/tachycardic.  9/3 - NAEO.  G-tube clamped by primary team.  Increased output from superior GODFREY  9/4 - Overnight 2 episodes of SOB and increased WOB. CXR stable and ABG without significant abnormality. Placed on comfort flow. Cardene titrated off and oral BP medications increased.

## 2020-08-14 NOTE — PROGRESS NOTES
[facility]  Critical Care - Surgery  Progress Note    Patient Name: Jaquan Farmer  MRN: [id number]  Admission Date: 8/12/2020  Hospital Length of Stay: 2 days  Code Status: Full Code  Attending Provider: Donis Roa MD  Primary Care Provider: Primary Doctor No   Principal Problem: <principal problem not specified>    Subjective:     Hospital/ICU Course:  8/14 Patient had a line replaced along with central line placed, with complication of pneumothorax. Rt pigtail was placed. Patient tolerated complications well. Decreasing vent settings.    Interval History/Significant Events: See above.    Follow-up For: Procedure(s) (LRB):  LAPAROTOMY, EXPLORATORY (N/A)  EGD (ESOPHAGOGASTRODUODENOSCOPY) (N/A)  APPLICATION, WOUND VAC WITH ABTHERA  DUODENOJEJUNAL ANASTAMOSIS, GASTROJEJUNAL ANASTOMOSIS  KOCHERIZATION OF DUODENUM, DUODENECTOMY    Post-Operative Day: 1 Day Post-Op    Objective:     Vital Signs (Most Recent):  Temp: 98.9 °F (37.2 °C) (08/14/20 0715)  Pulse: (!) 124 (08/14/20 0902)  Resp: (!) 31 (08/14/20 0902)  BP: 127/65 (08/14/20 0902)  SpO2: 100 % (08/14/20 0902) Vital Signs (24h Range):  Temp:  [97.9 °F (36.6 °C)-99 °F (37.2 °C)] 98.9 °F (37.2 °C)  Pulse:  [106-135] 124  Resp:  [17-32] 31  SpO2:  [90 %-100 %] 100 %  BP: ()/(51-77) 127/65  Arterial Line BP: (122-181)/(54-87) 148/71     Weight: 82 kg (180 lb 12.4 oz)  Body mass index is 33.06 kg/m².      Intake/Output Summary (Last 24 hours) at 8/14/2020 0917  Last data filed at 8/14/2020 0715  Gross per 24 hour   Intake 26795 ml   Output 3247 ml   Net 64562 ml       Physical Exam  Vitals signs and nursing note reviewed.   Constitutional:       General: She is not in acute distress.     Appearance: She is ill-appearing.      Comments: Sedated on mechanical ventilation.   HENT:      Head: Normocephalic and atraumatic.      Nose: Nose normal.      Comments: NG present.     Mouth/Throat:      Mouth: Mucous membranes are moist.   Eyes:       Conjunctiva/sclera: Conjunctivae normal.      Pupils: Pupils are equal, round, and reactive to light.   Neck:      Musculoskeletal: Neck supple. No muscular tenderness.      Comments: Right IJ in place  Cardiovascular:      Rate and Rhythm: Normal rate and regular rhythm.   Pulmonary:      Effort: Pulmonary effort is normal.      Breath sounds: Rhonchi present. No wheezing.      Comments: Coarse breath sounds bilaterally.  Abdominal:      Palpations: Abdomen is soft.      Comments: Midline surgical drain   Skin:     General: Skin is warm and dry.   Neurological:      Comments: Follows commands off sedation   Psychiatric:      Comments: Sedated         Vents:  Vent Mode: A/C (08/14/20 0902)  Ventilator Initiated: Yes(chart correction) (08/12/20 1930)  Set Rate: 30 BPM (08/14/20 0902)  Vt Set: 420 mL (08/14/20 0902)  PEEP/CPAP: 8 cmH20 (08/14/20 0902)  Oxygen Concentration (%): 50 (08/14/20 0902)  Peak Airway Pressure: 23 cmH2O (08/14/20 0902)  Plateau Pressure: 0 cmH20 (08/14/20 0902)  Total Ve: 8.54 mL (08/14/20 0902)  F/VT Ratio<105 (RSBI): (!) 103.68 (08/14/20 0902)    Lines/Drains/Airways     Peripherally Inserted Central Catheter Line            PICC Double Lumen right brachial -- days          Central Venous Catheter Line            Trialysis (Dialysis) Catheter 08/13/20 2230 right internal jugular less than 1 day          Drain                 NG/OG Tube 08/06/20 nasogastric Left nostril 8 days         Urethral Catheter 08/12/20 2021 1 day         Chest Tube 08/14/20 0007 Right Midaxillary 14 Fr. less than 1 day         Closed/Suction Drain 08/13/20 1659 Right;Inferior Abdomen Bulb 19 Fr. less than 1 day         Closed/Suction Drain 08/13/20 1659 Right;Superior Abdomen Bulb 19 Fr. less than 1 day          Airway                 Airway - Non-Surgical Endotracheal Tube -- days          Arterial Line                 Arterial Line 08/06/20 Left Radial 8 days          Peripheral Intravenous Line                  Peripheral IV - Single Lumen 08/12/20 18 G Right Forearm 2 days         Peripheral IV - Single Lumen 08/12/20 2007 18 G Left Forearm 1 day                Significant Labs:    CBC/Anemia Profile:  Recent Labs   Lab 08/13/20 1751 08/13/20 2154 08/14/20  0041 08/14/20  0315 08/14/20  0610   WBC 46.20*  --  54.52*  --  58.36*  --    HGB 9.1*  --  10.2*  --  9.9*  --    HCT 27.2*   < > 30.2* 31* 29.6* 27*     --  177  --  176  --    MCV 90  --  89  --  90  --    RDW 15.1*  --  15.6*  --  15.9*  --     < > = values in this interval not displayed.        Chemistries:  Recent Labs   Lab 08/13/20 1751 08/13/20 2154 08/14/20 0315   * 136 137   K 3.8 3.9 4.3    107 108   CO2 20* 21* 20*   BUN 22* 23* 23*   CREATININE 2.3* 2.4* 2.5*   CALCIUM 7.1* 7.6* 7.4*   ALBUMIN 1.9* 1.9* 1.7*   PROT 3.5* 3.9* 3.8*   BILITOT 2.6* 3.6* 2.4*   ALKPHOS 80 87 79   ALT 23 24 21   AST 70* 78* 66*   MG 2.2 2.1 2.1   PHOS 4.1 4.0 4.1       All pertinent labs within the past 24 hours have been reviewed.    Significant Imaging:  I have reviewed all pertinent imaging results/findings within the past 24 hours.    Assessment/Plan:     Severe sepsis   40 yo F s/p antrectomy with BII reconstruction c/b duodenal necrosis requiring ex lap, washout, drainage c/b aspiration event. Now with severe sepsis and ARDS     .Neuro:  - Pain controlled on fentanyl  - Sedated with propofol     Resp:  - Intubated on vent Fi 50, PEEP 8, rate 30 I:E changed from 1:1 to 1:2  - Wean vent per ARDSnet protocol  - Daily SBT  - PRN breathing treatments  - Daily CXR  - Right chest tube with output     CV:  - MAP goal >65  - Systolic <160  - Levo and Vaso gtt - weaned      Heme:  - Hgb/Hct 9.9 - stable  - Transfuse as indicated     ID:  - WBC 56.28 => still elevated  - Vanc, zosyn, Fluconazole  - F/u cultures      Renal:  - Parekr in place  - Oliguric on arrival  - Cr 1.9 => 2.5, minimal UOP  - Monitor I/Os  - Trend BMP daily  - Lasix challenge, possible  dialysis needed  - Nephrology consulted, appreciate recs and assistance with patient     FEN/GI:  - NPO  - NGT to LIWS  - Maintain drains to bulb suction  - GI ppx  - Replace electrolytes as needed to keep K>4, Mg>2     Endo:  - Monitor BG     Dispo:  - Continue SICU care           Jadon Tran, DO  Critical Care - Surgery  [facility]

## 2020-08-14 NOTE — OP NOTE
Date of procedure - 08/13/2020  Preoperative diagnosis - proximal gastrointestinal perforation  Postoperative diagnosis - duodenal ischemia with perforation  Procedure - intraoperative esophagogastroduodenoscopy  Resection of the duodenum  Duodeno - jejunostomy  Gastrojejunostomy  Application of ABThera wound VAC  Surgeon - Crispin  Intraoperative consultant and co-surgeon - Quang Nicholson was asked to participate in this case due to the complexity of the operation and no available resident capable of adequately helping with the resection and duodenal-jejunal anastomosis  Assist - Morello  Anesthesia - general  Blood loss - 500 cc  Indications - sign 39-year-old patient was transferred from an [facility] hospital after surgery for perforated ulcer that included vagotomy and antrectomy about a week after surgery bilious output was noted in the abdomen reoperation demonstrated duodenal ischemia leading to the decision to transfer  Operative findings - patient was demonstrated to have the patchy duodenal ischemia and with necrosis and perforation involving the proximal jejunum 3rd and 4th portions of the duodenum the anterolateral wall of the 2nd portion of the due pass gas as to the etiology of this is secondary to a closed loop obstruction with the distal obstructing point being the mesentery through which the jejunal limb was brought up to the stomach for anastomosis proximal obstruction is the staple line across the 1st part of the duodenum  Operative report in detail - after ICU resuscitation during which the patient has an settings were reduced from 100% FiO2 60 and hemodynamic stability was accomplished without the use of vasopressor therapy the patient was taken to the operating room placed in the supine position and the previous midline incision was opened after satisfactory general anesthesia was induced  Very little contamination noted on entering the abdomen  However once the right colon was  mobilized bile staining and soiling were noted along the retroperitoneum with the colon fully mobilized the duodenum was identified and its 2nd portion and demonstrated profound ischemia with a focal point of perforation  Attention was turned to the left side of the abdomen and the gastrojejunostomy was identified just proximal to this (about 5 cm) Once an area of ischemia this was followed more proximally with the 3rd and 4th portion of the duodenum was frankly ischemic in the 4th portion of the duodenum a in the mother small perforation was identified  An EGD was inserted through this area of perforation allowing for assessment of the integrity of the duodenum mucosa profound necrosis was identified and the distal part of the duodenum proximally the posterior medial wall the duodenum appeared viable however  The anterior and lateral walls were ischemic  The gastrojejunostomy was taken down in order to provide full mobilization of the proximal small bowel the proximal jejunum was divided just distal to the gastrojejunal anastomosis the mesentery was taken down with the LigaSure the course of the superior mesenteric artery was clearly identified a 2 in ensure maintaining its viability of the 3rd 4th portion of the duodenum were taken down from its mesentery using the LigaSure and care taken to avoid compromising the remainder of the mesenteric blood flow    It should be noted that during the EGD the ampulla was identified in the mucosa around this area was normal  The 2nd portion of the duodenum was only partially excised excising the anterior and lateral walls that were ischemic but maintaining the viable appearing medial and posterior walls    The divided proximal jejunum was brought up to the right upper quadrant through a defect in the right mesocolon and in an end-to-end fashion the jejunum was opened and anastomosed to the remaining duodenal mucosa prior to performing the anastomosis a 5 mm pediatric feeding  tube was placed in the MP FRANKY as a reminder as to the course of this critical structure assuring the preservation of its integrity    The anastomosis was performed in a single layer of interrupted 2 0 Vicryl suture in an antecolic fashion the more distal proximal jejunum was brought up to the opened and the stomach were a single layer 2 0 silk gastrojejunostomy was performed    Patient was markedly edematous and the thought of closure of the abdomen raise concerns about the possibility of compartment syndrome so an ABThera wound VAC was applied patient remained stable throughout the procedure and was transported to the intensive care unit in critical but stable condition all

## 2020-08-15 ENCOUNTER — ANESTHESIA (OUTPATIENT)
Dept: SURGERY | Facility: HOSPITAL | Age: 40
DRG: 856 | End: 2020-08-15

## 2020-08-15 LAB
ALBUMIN SERPL BCP-MCNC: 1.2 G/DL (ref 3.5–5.2)
ALBUMIN SERPL BCP-MCNC: 1.3 G/DL (ref 3.5–5.2)
ALBUMIN SERPL BCP-MCNC: 1.4 G/DL (ref 3.5–5.2)
ALBUMIN SERPL BCP-MCNC: 1.4 G/DL (ref 3.5–5.2)
ALLENS TEST: ABNORMAL
ALLENS TEST: NORMAL
ALP SERPL-CCNC: 107 U/L (ref 55–135)
ALP SERPL-CCNC: 114 U/L (ref 55–135)
ALP SERPL-CCNC: 90 U/L (ref 55–135)
ALP SERPL-CCNC: 96 U/L (ref 55–135)
ALT SERPL W/O P-5'-P-CCNC: 22 U/L (ref 10–44)
ALT SERPL W/O P-5'-P-CCNC: 25 U/L (ref 10–44)
ALT SERPL W/O P-5'-P-CCNC: 25 U/L (ref 10–44)
ALT SERPL W/O P-5'-P-CCNC: 26 U/L (ref 10–44)
ANION GAP SERPL CALC-SCNC: 7 MMOL/L (ref 8–16)
ANION GAP SERPL CALC-SCNC: 8 MMOL/L (ref 8–16)
ANION GAP SERPL CALC-SCNC: 9 MMOL/L (ref 8–16)
ANION GAP SERPL CALC-SCNC: 9 MMOL/L (ref 8–16)
ANISOCYTOSIS BLD QL SMEAR: SLIGHT
APTT BLDCRRT: 34.1 SEC (ref 21–32)
AST SERPL-CCNC: 59 U/L (ref 10–40)
AST SERPL-CCNC: 59 U/L (ref 10–40)
AST SERPL-CCNC: 67 U/L (ref 10–40)
AST SERPL-CCNC: 71 U/L (ref 10–40)
BASOPHILS # BLD AUTO: ABNORMAL K/UL (ref 0–0.2)
BASOPHILS NFR BLD: 0 % (ref 0–1.9)
BILIRUB SERPL-MCNC: 1.2 MG/DL (ref 0.1–1)
BILIRUB SERPL-MCNC: 1.2 MG/DL (ref 0.1–1)
BILIRUB SERPL-MCNC: 1.3 MG/DL (ref 0.1–1)
BILIRUB SERPL-MCNC: 1.4 MG/DL (ref 0.1–1)
BUN SERPL-MCNC: 5 MG/DL (ref 6–20)
BUN SERPL-MCNC: 7 MG/DL (ref 6–20)
BUN SERPL-MCNC: 7 MG/DL (ref 6–20)
BURR CELLS BLD QL SMEAR: ABNORMAL
CA-I BLDV-SCNC: 1.02 MMOL/L (ref 1.06–1.42)
CA-I BLDV-SCNC: 1.06 MMOL/L (ref 1.06–1.42)
CA-I BLDV-SCNC: 1.06 MMOL/L (ref 1.06–1.42)
CA-I BLDV-SCNC: 1.08 MMOL/L (ref 1.06–1.42)
CALCIUM SERPL-MCNC: 7.1 MG/DL (ref 8.7–10.5)
CALCIUM SERPL-MCNC: 7.1 MG/DL (ref 8.7–10.5)
CALCIUM SERPL-MCNC: 7.2 MG/DL (ref 8.7–10.5)
CALCIUM SERPL-MCNC: 7.2 MG/DL (ref 8.7–10.5)
CALCIUM SERPL-MCNC: 7.4 MG/DL (ref 8.7–10.5)
CALCIUM SERPL-MCNC: 7.5 MG/DL (ref 8.7–10.5)
CALCIUM SERPL-MCNC: 7.5 MG/DL (ref 8.7–10.5)
CHLORIDE SERPL-SCNC: 107 MMOL/L (ref 95–110)
CHLORIDE SERPL-SCNC: 107 MMOL/L (ref 95–110)
CHLORIDE SERPL-SCNC: 108 MMOL/L (ref 95–110)
CHLORIDE SERPL-SCNC: 109 MMOL/L (ref 95–110)
CHLORIDE SERPL-SCNC: 109 MMOL/L (ref 95–110)
CO2 SERPL-SCNC: 23 MMOL/L (ref 23–29)
CO2 SERPL-SCNC: 24 MMOL/L (ref 23–29)
CO2 SERPL-SCNC: 24 MMOL/L (ref 23–29)
CO2 SERPL-SCNC: 25 MMOL/L (ref 23–29)
CREAT SERPL-MCNC: 0.7 MG/DL (ref 0.5–1.4)
CREAT SERPL-MCNC: 0.7 MG/DL (ref 0.5–1.4)
CREAT SERPL-MCNC: 0.8 MG/DL (ref 0.5–1.4)
CREAT SERPL-MCNC: 1 MG/DL (ref 0.5–1.4)
CREAT SERPL-MCNC: 1 MG/DL (ref 0.5–1.4)
DELSYS: ABNORMAL
DELSYS: ABNORMAL
DELSYS: NORMAL
DIFFERENTIAL METHOD: ABNORMAL
DOHLE BOD BLD QL SMEAR: PRESENT
DOHLE BOD BLD QL SMEAR: PRESENT
EOSINOPHIL # BLD AUTO: ABNORMAL K/UL (ref 0–0.5)
EOSINOPHIL NFR BLD: 0 % (ref 0–8)
EOSINOPHIL NFR BLD: 1 % (ref 0–8)
EOSINOPHIL NFR BLD: 2 % (ref 0–8)
EOSINOPHIL NFR BLD: 2.5 % (ref 0–8)
ERYTHROCYTE [DISTWIDTH] IN BLOOD BY AUTOMATED COUNT: 15.7 % (ref 11.5–14.5)
ERYTHROCYTE [DISTWIDTH] IN BLOOD BY AUTOMATED COUNT: 15.8 % (ref 11.5–14.5)
ERYTHROCYTE [SEDIMENTATION RATE] IN BLOOD BY WESTERGREN METHOD: 25 MM/H
ERYTHROCYTE [SEDIMENTATION RATE] IN BLOOD BY WESTERGREN METHOD: 30 MM/H
ERYTHROCYTE [SEDIMENTATION RATE] IN BLOOD BY WESTERGREN METHOD: 30 MM/H
EST. GFR  (AFRICAN AMERICAN): >60 ML/MIN/1.73 M^2
EST. GFR  (NON AFRICAN AMERICAN): >60 ML/MIN/1.73 M^2
FIO2: 40
GIANT PLATELETS BLD QL SMEAR: PRESENT
GIANT PLATELETS BLD QL SMEAR: PRESENT
GLUCOSE SERPL-MCNC: 80 MG/DL (ref 70–110)
GLUCOSE SERPL-MCNC: 80 MG/DL (ref 70–110)
GLUCOSE SERPL-MCNC: 82 MG/DL (ref 70–110)
GLUCOSE SERPL-MCNC: 82 MG/DL (ref 70–110)
GLUCOSE SERPL-MCNC: 85 MG/DL (ref 70–110)
HCO3 UR-SCNC: 24.8 MMOL/L (ref 24–28)
HCO3 UR-SCNC: 24.8 MMOL/L (ref 24–28)
HCO3 UR-SCNC: 25 MMOL/L (ref 24–28)
HCO3 UR-SCNC: 25.5 MMOL/L (ref 24–28)
HCO3 UR-SCNC: 25.8 MMOL/L (ref 24–28)
HCO3 UR-SCNC: 26 MMOL/L (ref 24–28)
HCT VFR BLD AUTO: 22.9 % (ref 37–48.5)
HCT VFR BLD AUTO: 23.6 % (ref 37–48.5)
HCT VFR BLD AUTO: 25.2 % (ref 37–48.5)
HCT VFR BLD AUTO: 25.4 % (ref 37–48.5)
HGB BLD-MCNC: 7.4 G/DL (ref 12–16)
HGB BLD-MCNC: 7.9 G/DL (ref 12–16)
HGB BLD-MCNC: 8.2 G/DL (ref 12–16)
HGB BLD-MCNC: 8.5 G/DL (ref 12–16)
HYPOCHROMIA BLD QL SMEAR: ABNORMAL
IMM GRANULOCYTES # BLD AUTO: ABNORMAL K/UL (ref 0–0.04)
IMM GRANULOCYTES NFR BLD AUTO: ABNORMAL % (ref 0–0.5)
INR PPP: 0.9 (ref 0.8–1.2)
LACTATE SERPL-SCNC: 1 MMOL/L (ref 0.5–2.2)
LYMPHOCYTES # BLD AUTO: ABNORMAL K/UL (ref 1–4.8)
LYMPHOCYTES NFR BLD: 2 % (ref 18–48)
LYMPHOCYTES NFR BLD: 3 % (ref 18–48)
LYMPHOCYTES NFR BLD: 3 % (ref 18–48)
LYMPHOCYTES NFR BLD: 4 % (ref 18–48)
MAGNESIUM SERPL-MCNC: 1.9 MG/DL (ref 1.6–2.6)
MAGNESIUM SERPL-MCNC: 1.9 MG/DL (ref 1.6–2.6)
MAGNESIUM SERPL-MCNC: 2.1 MG/DL (ref 1.6–2.6)
MAGNESIUM SERPL-MCNC: 2.2 MG/DL (ref 1.6–2.6)
MAGNESIUM SERPL-MCNC: 2.2 MG/DL (ref 1.6–2.6)
MCH RBC QN AUTO: 29.5 PG (ref 27–31)
MCH RBC QN AUTO: 29.5 PG (ref 27–31)
MCH RBC QN AUTO: 30.4 PG (ref 27–31)
MCH RBC QN AUTO: 30.6 PG (ref 27–31)
MCHC RBC AUTO-ENTMCNC: 32.3 G/DL (ref 32–36)
MCHC RBC AUTO-ENTMCNC: 32.5 G/DL (ref 32–36)
MCHC RBC AUTO-ENTMCNC: 33.5 G/DL (ref 32–36)
MCHC RBC AUTO-ENTMCNC: 33.5 G/DL (ref 32–36)
MCV RBC AUTO: 91 FL (ref 82–98)
MCV RBC AUTO: 92 FL (ref 82–98)
METAMYELOCYTES NFR BLD MANUAL: 1.5 %
METAMYELOCYTES NFR BLD MANUAL: 3 %
METAMYELOCYTES NFR BLD MANUAL: 6 %
MIN VOL: 11.4
MIN VOL: 8.97
MODE: ABNORMAL
MODE: ABNORMAL
MODE: NORMAL
MONOCYTES # BLD AUTO: ABNORMAL K/UL (ref 0.3–1)
MONOCYTES NFR BLD: 1 % (ref 4–15)
MONOCYTES NFR BLD: 1 % (ref 4–15)
MONOCYTES NFR BLD: 3 % (ref 4–15)
MONOCYTES NFR BLD: 4.5 % (ref 4–15)
MYELOCYTES NFR BLD MANUAL: 1.5 %
MYELOCYTES NFR BLD MANUAL: 3 %
MYELOCYTES NFR BLD MANUAL: 3 %
NEUTROPHILS NFR BLD: 75 % (ref 38–73)
NEUTROPHILS NFR BLD: 88 % (ref 38–73)
NEUTROPHILS NFR BLD: 89 % (ref 38–73)
NEUTROPHILS NFR BLD: 93 % (ref 38–73)
NEUTS BAND NFR BLD MANUAL: 1 %
NEUTS BAND NFR BLD MANUAL: 1 %
NEUTS BAND NFR BLD MANUAL: 8 %
NRBC BLD-RTO: 0 /100 WBC
OVALOCYTES BLD QL SMEAR: ABNORMAL
OVALOCYTES BLD QL SMEAR: ABNORMAL
PCO2 BLDA: 39 MMHG (ref 35–45)
PCO2 BLDA: 42.4 MMHG (ref 35–45)
PCO2 BLDA: 46.8 MMHG (ref 35–45)
PCO2 BLDA: 48.4 MMHG (ref 35–45)
PCO2 BLDA: 49.3 MMHG (ref 35–45)
PCO2 BLDA: 49.8 MMHG (ref 35–45)
PEEP: 8
PH SMN: 7.31 [PH] (ref 7.35–7.45)
PH SMN: 7.32 [PH] (ref 7.35–7.45)
PH SMN: 7.33 [PH] (ref 7.35–7.45)
PH SMN: 7.33 [PH] (ref 7.35–7.45)
PH SMN: 7.39 [PH] (ref 7.35–7.45)
PH SMN: 7.41 [PH] (ref 7.35–7.45)
PHOSPHATE SERPL-MCNC: 2.5 MG/DL (ref 2.7–4.5)
PHOSPHATE SERPL-MCNC: 2.6 MG/DL (ref 2.7–4.5)
PHOSPHATE SERPL-MCNC: 2.6 MG/DL (ref 2.7–4.5)
PHOSPHATE SERPL-MCNC: 2.7 MG/DL (ref 2.7–4.5)
PHOSPHATE SERPL-MCNC: 2.7 MG/DL (ref 2.7–4.5)
PHOSPHATE SERPL-MCNC: 3.4 MG/DL (ref 2.7–4.5)
PHOSPHATE SERPL-MCNC: 3.4 MG/DL (ref 2.7–4.5)
PIP: 31
PIP: 38
PLATELET # BLD AUTO: 135 K/UL (ref 150–350)
PLATELET # BLD AUTO: 143 K/UL (ref 150–350)
PLATELET # BLD AUTO: 149 K/UL (ref 150–350)
PLATELET # BLD AUTO: 153 K/UL (ref 150–350)
PLATELET BLD QL SMEAR: ABNORMAL
PMV BLD AUTO: 11.9 FL (ref 9.2–12.9)
PMV BLD AUTO: 11.9 FL (ref 9.2–12.9)
PMV BLD AUTO: 12.1 FL (ref 9.2–12.9)
PMV BLD AUTO: 12.4 FL (ref 9.2–12.9)
PO2 BLDA: 100 MMHG (ref 80–100)
PO2 BLDA: 100 MMHG (ref 80–100)
PO2 BLDA: 130 MMHG (ref 80–100)
PO2 BLDA: 60 MMHG (ref 80–100)
PO2 BLDA: 75 MMHG (ref 80–100)
PO2 BLDA: 85 MMHG (ref 80–100)
POC BE: -1 MMOL/L
POC BE: -1 MMOL/L
POC BE: 0 MMOL/L
POC BE: 1 MMOL/L
POC SATURATED O2: 88 % (ref 95–100)
POC SATURATED O2: 95 % (ref 95–100)
POC SATURATED O2: 95 % (ref 95–100)
POC SATURATED O2: 97 % (ref 95–100)
POC SATURATED O2: 98 % (ref 95–100)
POC SATURATED O2: 99 % (ref 95–100)
POC TCO2: 26 MMOL/L (ref 23–27)
POC TCO2: 27 MMOL/L (ref 23–27)
POCT GLUCOSE: 100 MG/DL (ref 70–110)
POCT GLUCOSE: 76 MG/DL (ref 70–110)
POCT GLUCOSE: 77 MG/DL (ref 70–110)
POCT GLUCOSE: 81 MG/DL (ref 70–110)
POCT GLUCOSE: 83 MG/DL (ref 70–110)
POCT GLUCOSE: 85 MG/DL (ref 70–110)
POIKILOCYTOSIS BLD QL SMEAR: SLIGHT
POLYCHROMASIA BLD QL SMEAR: ABNORMAL
POTASSIUM SERPL-SCNC: 4.1 MMOL/L (ref 3.5–5.1)
POTASSIUM SERPL-SCNC: 4.1 MMOL/L (ref 3.5–5.1)
POTASSIUM SERPL-SCNC: 4.3 MMOL/L (ref 3.5–5.1)
POTASSIUM SERPL-SCNC: 4.4 MMOL/L (ref 3.5–5.1)
PROT SERPL-MCNC: 4 G/DL (ref 6–8.4)
PROT SERPL-MCNC: 4 G/DL (ref 6–8.4)
PROT SERPL-MCNC: 4.2 G/DL (ref 6–8.4)
PROT SERPL-MCNC: 4.2 G/DL (ref 6–8.4)
PROTHROMBIN TIME: 10.4 SEC (ref 9–12.5)
RBC # BLD AUTO: 2.51 M/UL (ref 4–5.4)
RBC # BLD AUTO: 2.58 M/UL (ref 4–5.4)
RBC # BLD AUTO: 2.78 M/UL (ref 4–5.4)
RBC # BLD AUTO: 2.8 M/UL (ref 4–5.4)
SAMPLE: ABNORMAL
SAMPLE: NORMAL
SITE: ABNORMAL
SITE: NORMAL
SMUDGE CELLS BLD QL SMEAR: PRESENT
SODIUM SERPL-SCNC: 139 MMOL/L (ref 136–145)
SODIUM SERPL-SCNC: 140 MMOL/L (ref 136–145)
SODIUM SERPL-SCNC: 140 MMOL/L (ref 136–145)
SODIUM SERPL-SCNC: 142 MMOL/L (ref 136–145)
SODIUM SERPL-SCNC: 142 MMOL/L (ref 136–145)
SP02: 100
SP02: 100
TARGETS BLD QL SMEAR: ABNORMAL
TOXIC GRANULES BLD QL SMEAR: PRESENT
TOXIC GRANULES BLD QL SMEAR: PRESENT
TRIGL SERPL-MCNC: 255 MG/DL (ref 30–150)
VANCOMYCIN SERPL-MCNC: 23 UG/ML
VT: 300
VT: 380
VT: 380
WBC # BLD AUTO: 46.85 K/UL (ref 3.9–12.7)
WBC # BLD AUTO: 56.99 K/UL (ref 3.9–12.7)
WBC # BLD AUTO: 60.13 K/UL (ref 3.9–12.7)
WBC # BLD AUTO: 61.69 K/UL (ref 3.9–12.7)

## 2020-08-15 PROCEDURE — 63600175 PHARM REV CODE 636 W HCPCS: Performed by: SURGERY

## 2020-08-15 PROCEDURE — 25000003 PHARM REV CODE 250: Performed by: SURGERY

## 2020-08-15 PROCEDURE — 27000221 HC OXYGEN, UP TO 24 HOURS

## 2020-08-15 PROCEDURE — 83735 ASSAY OF MAGNESIUM: CPT

## 2020-08-15 PROCEDURE — 84478 ASSAY OF TRIGLYCERIDES: CPT

## 2020-08-15 PROCEDURE — 94003 VENT MGMT INPAT SUBQ DAY: CPT

## 2020-08-15 PROCEDURE — 36000707: Performed by: SURGERY

## 2020-08-15 PROCEDURE — 25000003 PHARM REV CODE 250: Performed by: STUDENT IN AN ORGANIZED HEALTH CARE EDUCATION/TRAINING PROGRAM

## 2020-08-15 PROCEDURE — 94761 N-INVAS EAR/PLS OXIMETRY MLT: CPT

## 2020-08-15 PROCEDURE — 83605 ASSAY OF LACTIC ACID: CPT

## 2020-08-15 PROCEDURE — D9220A PRA ANESTHESIA: ICD-10-PCS | Mod: ,,, | Performed by: ANESTHESIOLOGY

## 2020-08-15 PROCEDURE — 84100 ASSAY OF PHOSPHORUS: CPT | Mod: 91

## 2020-08-15 PROCEDURE — 63600175 PHARM REV CODE 636 W HCPCS: Performed by: NURSE ANESTHETIST, CERTIFIED REGISTERED

## 2020-08-15 PROCEDURE — 63600175 PHARM REV CODE 636 W HCPCS: Performed by: STUDENT IN AN ORGANIZED HEALTH CARE EDUCATION/TRAINING PROGRAM

## 2020-08-15 PROCEDURE — 85007 BL SMEAR W/DIFF WBC COUNT: CPT | Mod: 91

## 2020-08-15 PROCEDURE — 99291 CRITICAL CARE FIRST HOUR: CPT | Mod: 24,,, | Performed by: SURGERY

## 2020-08-15 PROCEDURE — S0028 INJECTION, FAMOTIDINE, 20 MG: HCPCS | Performed by: STUDENT IN AN ORGANIZED HEALTH CARE EDUCATION/TRAINING PROGRAM

## 2020-08-15 PROCEDURE — 37799 UNLISTED PX VASCULAR SURGERY: CPT

## 2020-08-15 PROCEDURE — 82800 BLOOD PH: CPT

## 2020-08-15 PROCEDURE — 90945 DIALYSIS ONE EVALUATION: CPT

## 2020-08-15 PROCEDURE — 25000242 PHARM REV CODE 250 ALT 637 W/ HCPCS: Performed by: NURSE ANESTHETIST, CERTIFIED REGISTERED

## 2020-08-15 PROCEDURE — 25000003 PHARM REV CODE 250: Performed by: GENERAL PRACTICE

## 2020-08-15 PROCEDURE — 99900035 HC TECH TIME PER 15 MIN (STAT)

## 2020-08-15 PROCEDURE — 85610 PROTHROMBIN TIME: CPT

## 2020-08-15 PROCEDURE — 37000008 HC ANESTHESIA 1ST 15 MINUTES: Performed by: SURGERY

## 2020-08-15 PROCEDURE — 37000009 HC ANESTHESIA EA ADD 15 MINS: Performed by: SURGERY

## 2020-08-15 PROCEDURE — 20000000 HC ICU ROOM

## 2020-08-15 PROCEDURE — 99233 SBSQ HOSP IP/OBS HIGH 50: CPT | Mod: ,,, | Performed by: INTERNAL MEDICINE

## 2020-08-15 PROCEDURE — 85027 COMPLETE CBC AUTOMATED: CPT | Mod: 91

## 2020-08-15 PROCEDURE — 97605 PR NEG PRESS WOUND THERAPY (NPWT) W/NON-DISPOSABLE WOUND VAC DEVICE (DME), <=50 CM: ICD-10-PCS | Mod: ,,, | Performed by: SURGERY

## 2020-08-15 PROCEDURE — 99900026 HC AIRWAY MAINTENANCE (STAT)

## 2020-08-15 PROCEDURE — 80100008 HC CRRT DAILY MAINTENANCE

## 2020-08-15 PROCEDURE — 82803 BLOOD GASES ANY COMBINATION: CPT

## 2020-08-15 PROCEDURE — 99233 PR SUBSEQUENT HOSPITAL CARE,LEVL III: ICD-10-PCS | Mod: ,,, | Performed by: INTERNAL MEDICINE

## 2020-08-15 PROCEDURE — 99291 PR CRITICAL CARE, E/M 30-74 MINUTES: ICD-10-PCS | Mod: 24,,, | Performed by: SURGERY

## 2020-08-15 PROCEDURE — 82330 ASSAY OF CALCIUM: CPT | Mod: 91

## 2020-08-15 PROCEDURE — 36000706: Performed by: SURGERY

## 2020-08-15 PROCEDURE — 85730 THROMBOPLASTIN TIME PARTIAL: CPT

## 2020-08-15 PROCEDURE — 80053 COMPREHEN METABOLIC PANEL: CPT

## 2020-08-15 PROCEDURE — 43830 PR GASTROSTOMY,OPEN,W/O TUBE CNSTR: ICD-10-PCS | Mod: ,,, | Performed by: SURGERY

## 2020-08-15 PROCEDURE — 43830 GSTRST OPEN WO CONSTJ TUBE: CPT | Mod: ,,, | Performed by: SURGERY

## 2020-08-15 PROCEDURE — D9220A PRA ANESTHESIA: Mod: ,,, | Performed by: ANESTHESIOLOGY

## 2020-08-15 PROCEDURE — 97605 NEG PRS WND THER DME<=50SQCM: CPT | Mod: ,,, | Performed by: SURGERY

## 2020-08-15 PROCEDURE — 80202 ASSAY OF VANCOMYCIN: CPT

## 2020-08-15 PROCEDURE — 25000003 PHARM REV CODE 250: Performed by: NURSE ANESTHETIST, CERTIFIED REGISTERED

## 2020-08-15 PROCEDURE — 27201423 OPTIME MED/SURG SUP & DEVICES STERILE SUPPLY: Performed by: SURGERY

## 2020-08-15 RX ORDER — FENTANYL CITRATE 50 UG/ML
INJECTION, SOLUTION INTRAMUSCULAR; INTRAVENOUS
Status: DISCONTINUED | OUTPATIENT
Start: 2020-08-15 | End: 2020-08-15

## 2020-08-15 RX ORDER — ROCURONIUM BROMIDE 10 MG/ML
INJECTION, SOLUTION INTRAVENOUS
Status: DISCONTINUED | OUTPATIENT
Start: 2020-08-15 | End: 2020-08-15

## 2020-08-15 RX ORDER — ALBUTEROL SULFATE 90 UG/1
AEROSOL, METERED RESPIRATORY (INHALATION)
Status: DISCONTINUED | OUTPATIENT
Start: 2020-08-15 | End: 2020-08-15

## 2020-08-15 RX ORDER — VASOPRESSIN 20 [USP'U]/ML
INJECTION, SOLUTION INTRAMUSCULAR; SUBCUTANEOUS
Status: DISCONTINUED | OUTPATIENT
Start: 2020-08-15 | End: 2020-08-15

## 2020-08-15 RX ORDER — MIDAZOLAM HYDROCHLORIDE 1 MG/ML
INJECTION, SOLUTION INTRAMUSCULAR; INTRAVENOUS
Status: DISCONTINUED | OUTPATIENT
Start: 2020-08-15 | End: 2020-08-15

## 2020-08-15 RX ORDER — ONDANSETRON 2 MG/ML
INJECTION INTRAMUSCULAR; INTRAVENOUS
Status: DISCONTINUED | OUTPATIENT
Start: 2020-08-15 | End: 2020-08-15

## 2020-08-15 RX ORDER — HYDROCODONE BITARTRATE AND ACETAMINOPHEN 500; 5 MG/1; MG/1
TABLET ORAL CONTINUOUS
Status: DISCONTINUED | OUTPATIENT
Start: 2020-08-15 | End: 2020-08-17

## 2020-08-15 RX ADMIN — PROPOFOL 50 MCG/KG/MIN: 10 INJECTION, EMULSION INTRAVENOUS at 05:08

## 2020-08-15 RX ADMIN — MAGNESIUM SULFATE IN WATER 2 G: 40 INJECTION, SOLUTION INTRAVENOUS at 06:08

## 2020-08-15 RX ADMIN — VASOPRESSIN 2 UNITS: 20 INJECTION INTRAVENOUS at 08:08

## 2020-08-15 RX ADMIN — Medication 125 MCG: at 09:08

## 2020-08-15 RX ADMIN — SODIUM PHOSPHATE, MONOBASIC, MONOHYDRATE 15 MMOL: 276; 142 INJECTION, SOLUTION INTRAVENOUS at 10:08

## 2020-08-15 RX ADMIN — FENTANYL CITRATE 50 MCG: 50 INJECTION, SOLUTION INTRAMUSCULAR; INTRAVENOUS at 07:08

## 2020-08-15 RX ADMIN — PIPERACILLIN SODIUM AND TAZOBACTAM SODIUM 4.5 G: 4; .5 INJECTION, POWDER, LYOPHILIZED, FOR SOLUTION INTRAVENOUS at 03:08

## 2020-08-15 RX ADMIN — SODIUM CHLORIDE, SODIUM GLUCONATE, SODIUM ACETATE, POTASSIUM CHLORIDE, MAGNESIUM CHLORIDE, SODIUM PHOSPHATE, DIBASIC, AND POTASSIUM PHOSPHATE: .53; .5; .37; .037; .03; .012; .00082 INJECTION, SOLUTION INTRAVENOUS at 07:08

## 2020-08-15 RX ADMIN — VANCOMYCIN HYDROCHLORIDE 500 MG: 500 INJECTION, POWDER, LYOPHILIZED, FOR SOLUTION INTRAVENOUS at 10:08

## 2020-08-15 RX ADMIN — FLUCONAZOLE 200 MG: 200 INJECTION, SOLUTION INTRAVENOUS at 10:08

## 2020-08-15 RX ADMIN — DEXTROSE MONOHYDRATE 12.5 G: 25 INJECTION, SOLUTION INTRAVENOUS at 08:08

## 2020-08-15 RX ADMIN — ROCURONIUM BROMIDE 50 MG: 10 INJECTION, SOLUTION INTRAVENOUS at 07:08

## 2020-08-15 RX ADMIN — PROPOFOL 40 MCG/KG/MIN: 10 INJECTION, EMULSION INTRAVENOUS at 04:08

## 2020-08-15 RX ADMIN — HEPARIN SODIUM 5000 UNITS: 5000 INJECTION INTRAVENOUS; SUBCUTANEOUS at 05:08

## 2020-08-15 RX ADMIN — PROPOFOL 50 MCG/KG/MIN: 10 INJECTION, EMULSION INTRAVENOUS at 10:08

## 2020-08-15 RX ADMIN — HEPARIN SODIUM 5000 UNITS: 5000 INJECTION INTRAVENOUS; SUBCUTANEOUS at 09:08

## 2020-08-15 RX ADMIN — ALBUTEROL SULFATE 4 PUFF: 90 AEROSOL, METERED RESPIRATORY (INHALATION) at 08:08

## 2020-08-15 RX ADMIN — FENTANYL CITRATE 50 MCG: 50 INJECTION, SOLUTION INTRAMUSCULAR; INTRAVENOUS at 08:08

## 2020-08-15 RX ADMIN — VASOPRESSIN 0.04 UNITS/MIN: 20 INJECTION INTRAVENOUS at 01:08

## 2020-08-15 RX ADMIN — CALCIUM GLUCONATE 1 G: 98 INJECTION, SOLUTION INTRAVENOUS at 06:08

## 2020-08-15 RX ADMIN — SUGAMMADEX 159 MG: 100 INJECTION, SOLUTION INTRAVENOUS at 09:08

## 2020-08-15 RX ADMIN — VASOPRESSIN 0.5 UNITS: 20 INJECTION INTRAVENOUS at 08:08

## 2020-08-15 RX ADMIN — PROPOFOL 40 MCG/KG/MIN: 10 INJECTION, EMULSION INTRAVENOUS at 03:08

## 2020-08-15 RX ADMIN — VASOPRESSIN 0.04 UNITS/MIN: 20 INJECTION INTRAVENOUS at 06:08

## 2020-08-15 RX ADMIN — PIPERACILLIN SODIUM AND TAZOBACTAM SODIUM 4.5 G: 4; .5 INJECTION, POWDER, LYOPHILIZED, FOR SOLUTION INTRAVENOUS at 11:08

## 2020-08-15 RX ADMIN — SODIUM CHLORIDE: 0.9 INJECTION, SOLUTION INTRAVENOUS at 08:08

## 2020-08-15 RX ADMIN — PROPOFOL 40 MCG/KG/MIN: 10 INJECTION, EMULSION INTRAVENOUS at 08:08

## 2020-08-15 RX ADMIN — MIDAZOLAM HYDROCHLORIDE 2 MG: 1 INJECTION, SOLUTION INTRAMUSCULAR; INTRAVENOUS at 07:08

## 2020-08-15 RX ADMIN — HEPARIN SODIUM 5000 UNITS: 5000 INJECTION INTRAVENOUS; SUBCUTANEOUS at 02:08

## 2020-08-15 RX ADMIN — FAMOTIDINE 20 MG: 10 INJECTION INTRAVENOUS at 10:08

## 2020-08-15 RX ADMIN — PIPERACILLIN SODIUM AND TAZOBACTAM SODIUM 4.5 G: 4; .5 INJECTION, POWDER, LYOPHILIZED, FOR SOLUTION INTRAVENOUS at 06:08

## 2020-08-15 RX ADMIN — VASOPRESSIN 0.5 UNITS: 20 INJECTION INTRAVENOUS at 09:08

## 2020-08-15 RX ADMIN — ONDANSETRON 4 MG: 2 INJECTION, SOLUTION INTRAMUSCULAR; INTRAVENOUS at 08:08

## 2020-08-15 NOTE — OP NOTE
Date of procedure-08/15/2020  Preop diagnosis - status post duodenectomy with reconstruction  Postop diagnosis - same  Procedure - exploratory laparotomy abdominal washout gastrostomy tube placement re-application of wound VAC  Surgeon - Crispin  Assist - Shexnayder and Berenson  Anesthesia - general  Blood loss - minimal  Indications - 39-year-old patient with ischemic duodenum resected 2 days prior this is a take back for inspection of surgery abdominal washout and placement of gastrostomy tube  Operative findings - no evidence of leak  Operative report in detail - patient brought the operating placed in supine position prepped draped sterile fashion after satisfactory general endotracheal anesthesia was induced  Wound VAC removed there was no evidence of leakage in the abdominal cavity after thorough exploration abdominal washout was accomplished with several L of saline  The duodenal jejunal anastomotic suture line was thoroughly inspected and demonstrated no evidence of leakage  An 18 Parker was brought in through a left upper quadrant stab incision and secured to the residual stomach in a standard Witzel fashion  The abdominal wall demonstrated continued but less overall edema compared to the original operation  A decision was made to defer closure for an additional 72 hr and so an ABThera wound VAC was reapplied  Patient remained stable throughout the operation was transported back to the ICU in stable condition

## 2020-08-15 NOTE — PLAN OF CARE
Pt remains intubated/sedated; follows commands in all extremities. VSS currently. MAP maintained > 65; vaso started for soft pressures (low 60s) this afternoon. Fentanyl and propofol gtts continued as well. Vent settings AC/VC FiO2 40%, PEEP 8, .  Pt returned from OR this morning s/p washout, g-tube placement, and wound vac exchange. Pt also had an ETT exchange; changed from 7.0 to 7.5. Pt had somewhat difficult time with exchange (I.e. bronchospasm); if needed, future ETT exchanges are to be done with anesthesia present only for pt's safety). G-tube to gravity with ~225 cc brown bile output. NGT with ~200cc brown bile output. CT with ~30cc serous output. GODFREY drains with ~140cc serosanguinous output. U/O minimal with 0-10cc/hr. 1 large bloody, mucoid BM. CRRT SLED restarted this evening at 1800. No skin breakdown noted. Foam dressings and heel boots in place. Family and pt updated on POC. WCTM.

## 2020-08-15 NOTE — PROGRESS NOTES
[facility]  General Surgery  Progress Note    Subjective:     History of Present Illness:  Pt is a 40 yo F with recent history of antrectomy with BII reconstruction for ulcer disease at outside hospital. 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 Patient also reportedly aspirated on induction in the OR prior to this, resulting in severe pulmonary edema and hypoxia. Patient was transferred to [facility] urgently for higher level of care. She arrived as a direct SICU admit on near maximal vent settings and requiring low dose vasopressors with HR in the upper 140s. Her midline laparotomy is closed with 4 Navdeep drains in place draining bilious output.     Post-Op Info:  Procedure(s) (LRB):  LAPAROTOMY, EXPLORATORY; abthera replacement (N/A)  INSERTION, GASTROSTOMY TUBE, PERCUTANEOUS  WASHOUT; abdominal (N/A)   Day of Surgery     Interval History: Stable overnight  Tolerating CRRT with volume removal  No pressers  Minimal UOP  GODFREY drains with minimal bile stained drainage    Medications:  Continuous Infusions:   sodium chloride 0.9% Stopped (08/15/20 0730)    fentanyl 125 mcg/hr (08/15/20 0701)    lactated ringers 5 mL/hr at 08/15/20 0701    propofoL 100 mcg/kg/min (08/15/20 0750)     Scheduled Meds:   famotidine (PF)  20 mg Intravenous Daily    fluconazole (DIFLUCAN) IVPB  200 mg Intravenous Q24H    heparin (porcine)  5,000 Units Subcutaneous Q8H    piperacillin-tazobactam (ZOSYN) IVPB  4.5 g Intravenous Q8H    vancomycin (VANCOCIN) IVPB  500 mg Intravenous Once     PRN Meds:calcium gluconate IVPB, calcium gluconate IVPB, calcium gluconate IVPB, HYDROmorphone, magnesium sulfate IVPB, magnesium sulfate IVPB, magnesium sulfate IVPB, ondansetron, potassium chloride in water **AND** potassium chloride in water **AND** potassium chloride in water, sodium phosphate IVPB, sodium phosphate IVPB, sodium phosphate IVPB, sodium phosphate IVPB, sodium phosphate  IVPB, sodium phosphate IVPB, Pharmacy to dose Vancomycin consult **AND** vancomycin - pharmacy to dose     Review of patient's allergies indicates:   Allergen Reactions    Latex      Objective:     Vital Signs (Most Recent):  Temp: 98.5 °F (36.9 °C) (08/15/20 0945)  Pulse: 105 (08/15/20 0945)  Resp: (!) 4 (08/15/20 0945)  BP: (!) 120/58 (08/15/20 0730)  SpO2: 100 % (08/15/20 0945) Vital Signs (24h Range):  Temp:  [98.4 °F (36.9 °C)-99.3 °F (37.4 °C)] 98.5 °F (36.9 °C)  Pulse:  [] 105  Resp:  [4-36] 4  SpO2:  [96 %-100 %] 100 %  BP: (102-127)/(52-73) 120/58  Arterial Line BP: (112-162)/(53-77) 113/55     Weight: 79.5 kg (175 lb 4.3 oz)  Body mass index is 32.06 kg/m².    Intake/Output - Last 3 Shifts       08/13 0700 - 08/14 0659 08/14 0700 - 08/15 0659 08/15 0700 - 08/16 0659    I.V. (mL/kg) 8926 (108.9) 6340.5 (79.8) 300 (3.8)    Blood 1700      NG/GT 30      IV Piggyback 3000      Total Intake(mL/kg) 64477 (166.5) 6340.5 (79.8) 300 (3.8)    Urine (mL/kg/hr) 65 (0) 364 (0.2) 13 (0.1)    Drains 1707 1658 20    Other 1100 29381 926    Stool  0     Blood 150  50    Chest Tube 55 85 6    Total Output 3077 47973 1015    Net +31839 -7543.5 -715           Stool Occurrence  1 x           Physical Exam  Constitutional:       Interventions: She is sedated and intubated.   HENT:      Head: Normocephalic.   Cardiovascular:      Rate and Rhythm: Regular rhythm. Tachycardia present.      Comments: Improving  Pulmonary:      Effort: She is intubated.      Comments: Intubated and mechanically ventilated  Vent Mode: A/C  Oxygen Concentration (%):  (40-50) 40  Resp Rate Total:  (29 br/min-40 br/min) 30 br/min  Vt Set:  (380 mL-420 mL) 380 mL  PEEP/CPAP:  (8 cmH20) 8 cmH20  Mean Airway Pressure:  (13 ajY16-14 cmH20) 13 cmH20    Abdominal:      Comments: Abthera with ss output  GODFREY x2 with bile stained output   Musculoskeletal:         General: Swelling present.      Right lower leg: Edema present.      Left lower leg: Edema  present.   Skin:     General: Skin is warm and dry.   Neurological:      Comments: Sedated on propofol         Significant Labs:  Reviewed    Significant Diagnostics:  Reviewed    Assessment/Plan:     Severe sepsis  38 yo F s/p antrectomy with BII reconstruction c/b duodenal necrosis requiring ex lap, washout, drainage c/b aspiration event. S/p duodenal resection with d-j anastomosis, g-j and abthera vac placement on 8/13 and washout with g-tube placement on 8/15.    .Neuro:  - Sedation with propofol  - fentanyl   - Sedation vacations, neuro exams    Resp:  - Intubated on vent  - Wean vent per ARDSnet protocol    CV:  - HDS off pressors  - Tachycardia improving    Heme:  - Hgb/Hct - stable  - Transfuse as indicated; received 2u PRBC on 8/13    ID:  - Broad spectrum abx/antifungal  - F/u cultures     Renal:  - Parker in place; oliguric  - Nephrology consulted; Continue CRRT with aggressive volume removal as tolerated  - Cr 2 - trend    FEN/GI:  - NPO  - NGT to LIWS  - Maintain drains to bulb suction  - G tube to gravity  - GI ppx  - Will discuss starting TPN with staff    Endo:  - Monitor BG    Dispo:  - Continue ICU care; appreciate SICU assistance; Plan for return to OR on 8/18 for possible closure          Jann Jeffries MD  General Surgery  [facility]

## 2020-08-15 NOTE — PROGRESS NOTES
0945: Pt returned from OR after wash out, new G-tube in place to gravity. Abdomen remains open with wound vac in place. VSS. Dialysis RN notified of pt's return to restart pt on CRRT. WCTM.

## 2020-08-15 NOTE — PLAN OF CARE
"      SICU PLAN OF CARE NOTE    Dx: <principal problem not specified>    Shift Events: CHG bath given. Vent weaning from fio2 50% to 40% at start of shift with p02 fluctuation - latest is 75.     Goals of Care: OR today for washout/close abdomen    Neuro: Sedated, Follows Commands, and Moves All Extremities    Vital Signs: /61 (BP Location: Left arm, Patient Position: Lying)   Pulse 103   Temp 98.4 °F (36.9 °C) (Oral)   Resp (!) 27   Ht 5' 2" (1.575 m)   Wt 79.5 kg (175 lb 4.3 oz)   SpO2 97%   Breastfeeding No   BMI 32.06 kg/m²     Respiratory: Ventilator 40%/8 PEEP    Diet: NPO    Gtts: Propfol, Fentanyl, and MIVF    Urine Output: Urinary Catheter 164 cc/shift    Drains: Chest Tube, total output 12 cc /  shift  GODFREY: 59 ml total  N ml shift  Abd Woundvac:  300 ml shift    CRRT  @ goal     Labs/Accuchecks: Q6H labs, Q4H accuchecks    Skin: patient on hay bed plugged in, heel foams and heel boots on, turned as best q2h with abdomen open. Foams in between thighs for prevention. Godfrey dressings changed. Sacrum CDI. No other skin breakdown noted.        "

## 2020-08-15 NOTE — ANESTHESIA PROCEDURE NOTES
Intubation  Performed by: Brittany Cage CRNA  Authorized by: Tree Maurer MD     Intubation:     Mask Ventilation:  N/a    Attempts:  1    Attempted By:  CRNA    Method of Intubation:  Other (see comments) (Cook airway exchanger)    Blade:  Francois 3    Laryngeal View Grade: Grade I - full view of chords      Difficult Airway Encountered?: No      Complications:  None    Airway Device:  Oral endotracheal tube    Airway Device Size:  7.5    Style/Cuff Inflation:  Cuffed (inflated to minimal occlusive pressure)    Tube secured:  22    Secured at:  The lips    Placement Verified By:  Capnometry    Complicating Factors:  None    Findings Post-Intubation:  BS equal bilateral and atraumatic/condition of teeth unchanged

## 2020-08-15 NOTE — PROGRESS NOTES
Pt transported to OR by CRNAs for wash out/possible closure. Connected to portable monitor and O2 tank, propofol and fentanyl gtts infusing. VSS. CRRT rinsed back prior to transfer, blood returned successfully. WCTM.

## 2020-08-15 NOTE — PROGRESS NOTES
[facility]  Critical Care - Surgery  Progress Note    Patient Name: Jaquan Farmer  MRN: [id number]  Admission Date: 8/12/2020  Hospital Length of Stay: 3 days  Code Status: Full Code  Attending Provider: Donis Roa MD  Primary Care Provider: Primary Doctor No   Principal Problem: <principal problem not specified>    Subjective:     Hospital/ICU Course:  8/14 Patient had a line replaced along with central line placed, with complication of pneumothorax. Rt pigtail was placed. Patient tolerated complications well. Decreasing vent settings.  8/15 NAEO. Patient was taken back to OR early AM for washout. Possible closure 8/18. ETT exchanged in OR 7.0 -> 7.5    Interval History/Significant Events: See above.    Follow-up For: Procedure(s) (LRB):  LAPAROTOMY, EXPLORATORY (N/A)  EGD (ESOPHAGOGASTRODUODENOSCOPY) (N/A)  APPLICATION, WOUND VAC WITH ABTHERA  DUODENOJEJUNAL ANASTAMOSIS, GASTROJEJUNAL ANASTOMOSIS  KOCHERIZATION OF DUODENUM, DUODENECTOMY    Post-Operative Day: Day of surgery    Objective:     Vital Signs (Most Recent):  Temp: 98.5 °F (36.9 °C) (08/15/20 0945)  Pulse: (!) 115 (08/15/20 1230)  Resp: (!) 31 (08/15/20 1230)  BP: (!) 113/58 (08/15/20 1200)  SpO2: 97 % (08/15/20 1230) Vital Signs (24h Range):  Temp:  [98.4 °F (36.9 °C)-99.3 °F (37.4 °C)] 98.5 °F (36.9 °C)  Pulse:  [] 115  Resp:  [20-38] 31  SpO2:  [94 %-100 %] 97 %  BP: (102-127)/(52-73) 113/58  Arterial Line BP: (112-162)/(53-77) 121/59     Weight: 79.5 kg (175 lb 4.3 oz)  Body mass index is 32.06 kg/m².      Intake/Output Summary (Last 24 hours) at 8/15/2020 1251  Last data filed at 8/15/2020 1200  Gross per 24 hour   Intake 6640.5 ml   Output 57878 ml   Net -6326.5 ml       Physical Exam  Vitals signs and nursing note reviewed.   Constitutional:       General: She is not in acute distress.     Appearance: She is ill-appearing.      Comments: Sedated on mechanical ventilation.   HENT:      Head: Normocephalic and  atraumatic.      Nose: Nose normal.      Comments: NG present.     Mouth/Throat:      Mouth: Mucous membranes are moist.   Eyes:      Conjunctiva/sclera: Conjunctivae normal.      Pupils: Pupils are equal, round, and reactive to light.   Neck:      Musculoskeletal: Neck supple. No muscular tenderness.      Comments: Right IJ in place  Cardiovascular:      Rate and Rhythm: Normal rate and regular rhythm.      Heart sounds: Normal heart sounds.   Pulmonary:      Effort: Pulmonary effort is normal.      Breath sounds: Rhonchi present. No wheezing.      Comments: Coarse breath sounds bilaterally.  Abdominal:      Palpations: Abdomen is soft.      Comments: Midline surgical drain   Skin:     General: Skin is warm and dry.   Neurological:      Comments: Follows commands off sedation   Psychiatric:      Comments: Sedated         Vents:  Vent Mode: A/C (08/15/20 1150)  Ventilator Initiated: Yes(chart correction) (08/12/20 1930)  Set Rate: 25 BPM (08/15/20 1150)  Vt Set: 300 mL (08/15/20 1150)  PEEP/CPAP: 8 cmH20 (08/15/20 1150)  Oxygen Concentration (%): 50 (08/15/20 1230)  Peak Airway Pressure: 21 cmH2O (08/15/20 1150)  Plateau Pressure: 27 cmH20 (08/15/20 1150)  Total Ve: 8.85 mL (08/15/20 1150)  F/VT Ratio<105 (RSBI): (!) 83.09 (08/15/20 1150)    Lines/Drains/Airways     Peripherally Inserted Central Catheter Line            PICC Double Lumen right brachial -- days          Central Venous Catheter Line            Trialysis (Dialysis) Catheter 08/13/20 2230 right internal jugular 1 day          Drain                 NG/OG Tube 08/06/20 nasogastric Left nostril 9 days         Urethral Catheter 08/12/20 2021 2 days         Chest Tube 08/14/20 0007 Right Midaxillary 14 Fr. 1 day         Closed/Suction Drain 08/13/20 1659 Right;Inferior Abdomen Bulb 19 Fr. 1 day         Closed/Suction Drain 08/13/20 1659 Right;Superior Abdomen Bulb 19 Fr. 1 day         Gastrostomy/Enterostomy 08/15/20 0916 Gastrostomy tube w/ balloon LUQ  decompression less than 1 day          Airway                 Airway - Non-Surgical 08/15/20 0835 Endotracheal Tube less than 1 day          Arterial Line                 Arterial Line 08/06/20 Left Radial 9 days          Peripheral Intravenous Line                 Peripheral IV - Single Lumen 08/12/20 18 G Right Forearm 3 days         Peripheral IV - Single Lumen 08/12/20 2007 18 G Left Forearm 2 days                Significant Labs:    CBC/Anemia Profile:  Recent Labs   Lab 08/14/20  2200 08/15/20  0400 08/15/20  0949   WBC 54.42* 56.99* 61.69*   HGB 8.4* 8.2* 8.5*   HCT 24.2* 25.2* 25.4*   * 143* 153   MCV 88 91 91   RDW 15.7* 15.8* 15.7*        Chemistries:  Recent Labs   Lab 08/14/20  2200 08/15/20  0400 08/15/20  0949     139 140  140 139   K 3.9  3.9 4.3  4.3 4.4     109 108  108 108   CO2 24  24 25  25 23   BUN 8  8 5*  5* 5*   CREATININE 1.0  1.0 0.8  0.8 0.8   CALCIUM 7.8*  7.8* 7.1*  7.1* 7.4*   ALBUMIN 1.4*  1.4* 1.4*  1.4* 1.3*   PROT 4.0* 4.2* 4.2*   BILITOT 1.5* 1.4* 1.3*   ALKPHOS 90 114 96   ALT 22 22 25   AST 52* 71* 59*   MG 2.1  2.1 1.9 1.9   PHOS 2.4*  2.4* 2.7  2.7 2.5*       All pertinent labs within the past 24 hours have been reviewed.    Significant Imaging:  I have reviewed all pertinent imaging results/findings within the past 24 hours.    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 Now with severe sepsis and ARDS     .Neuro:  - Pain controlled on fentanyl  - Sedated with propofol     Resp:  - Intubated on vent Fi 50, PEEP 8, rate 30 I:E changed from 1:1 to 1:2  - Wean vent per ARDSnet protocol  - Daily SBT  - PRN breathing treatments  - Daily CXR  - Right chest tube with output     CV:  - MAP goal >65  - Systolic <160  - Levo and Vaso gtt - off     Heme:  - Hgb/Hct 8.2 - stable  - Transfuse as indicated     ID:  - WBC 57 => still  elevated  - Vanc, zosyn, Fluconazole  - F/u cultures      Renal:  - Parker in place  - Oliguric on arrival  - Cr 1.9 => 0.8, minimal UOP  - Monitor I/Os  - Trend BMP daily  - Lasix challenge, possible dialysis needed  - Nephrology consulted, appreciate recs and assistance with patient     FEN/GI:  - NPO  - NGT to LIWS  - Maintain drains to bulb suction  - GI ppx  - Replace electrolytes as needed to keep K>4, Mg>2     Endo:  - Monitor BG     Dispo:  - Continue SICU care             Jadon Tran, DO  Critical Care - Surgery  [facility]

## 2020-08-15 NOTE — PROGRESS NOTES
Progress Note  Nephrology      Consult Requested By: PCP  Reason for Consult: FLORIDALMA    HISTORY OF PRESENT ILLNESS      Mrs. Farmer is a 39 year old female with antrectomy at [facility] complicated duodenal necrosis post op. She aspirated, was intubated, and became septic. She developed renal failure and required max vent settings and was transferred here for higher level of care. On arrival she was hypotensive on pressers, max vent setting, and had minimal urine output. She was given 6L of fluid, 6g calcium, placed on vac, pip-tazo, and fluconazole. Nephro consulted for floridalma.      Patient Active Problem List   Diagnosis    Duodenum disorder    Severe sepsis    Acute renal failure with tubular necrosis    Metabolic acidosis       Past Medical History:   Diagnosis Date    Anxiety     Aortic insufficiency     Hyperbilirubinemia     Hypertension     Insomnia     Leukocytosis     Migraines     MR (mitral regurgitation)     Peptic ulcer disease     Pulmonary hypertension     Respiratory distress     Sepsis        Past Surgical History:   Procedure Laterality Date    APPLICATION OF WOUND VACUUM-ASSISTED CLOSURE DEVICE  8/13/2020    Procedure: APPLICATION, WOUND VAC WITH ABTHERA;  Surgeon: Donis Roa MD;  Location: [facility] OR [address] [place] [address];  Service: General;;    COLONOSCOPY      DUODENECTOMY  8/13/2020    Procedure: KOCHERIZATION OF DUODENUM, DUODENECTOMY;  Surgeon: Donis Roa MD;  Location: [facility] OR [address] [place] [address];  Service: General;;    ESOPHAGOGASTRODUODENOSCOPY  01/23/2018    ESOPHAGOGASTRODUODENOSCOPY N/A 8/13/2020    Procedure: EGD (ESOPHAGOGASTRODUODENOSCOPY);  Surgeon: Donis Roa MD;  Location: [address];  Service: General;  Laterality: N/A;    TUBAL LIGATION         History reviewed. No pertinent family history.    Social History     Socioeconomic History    Marital status: Unknown     Spouse name: Not on file    Number of children: Not on file    Years of education: Not on file     Highest education level: Not on file   Occupational History    Not on file   Social Needs    Financial resource strain: Not on file    Food insecurity     Worry: Not on file     Inability: Not on file    Transportation needs     Medical: Not on file     Non-medical: Not on file   Tobacco Use    Smoking status: Current Some Day Smoker     Packs/day: 0.25     Types: Cigarettes    Smokeless tobacco: Never Used   Substance and Sexual Activity    Alcohol use: Not Currently    Drug use: Not on file    Sexual activity: Not on file   Lifestyle    Physical activity     Days per week: Not on file     Minutes per session: Not on file    Stress: Not on file   Relationships    Social connections     Talks on phone: Not on file     Gets together: Not on file     Attends Evangelical service: Not on file     Active member of club or organization: Not on file     Attends meetings of clubs or organizations: Not on file     Relationship status: Not on file   Other Topics Concern    Not on file   Social History Narrative    Not on file       Current Facility-Administered Medications   Medication Dose Route Frequency Provider Last Rate Last Dose    0.9%  NaCl infusion (CRRT USE ONLY)   Intravenous Continuous Gunner Stapleton  mL/hr at 08/16/20 0500      calcium gluconate 1g in dextrose 5% 100mL (ready to mix system)  1 g Intravenous PRN Serjio Garcia MD   1 g at 08/15/20 1811    calcium gluconate 2 g in dextrose 5 % 100 mL IVPB  2 g Intravenous PRN Serjio Garcia MD   2 g at 08/16/20 0125    calcium gluconate 3 g in dextrose 5 % 100 mL IVPB  3 g Intravenous PRN Serjio Garcia MD        famotidine (PF) injection 20 mg  20 mg Intravenous Daily Thu Wilson MD   20 mg at 08/15/20 1000    fentaNYL 2500 mcg in 0.9% sodium chloride 250 mL infusion premix (titrating)   Intravenous Continuous Juan Rayo MD 12.5 mL/hr at 08/16/20 0500      fluconazole (DIFLUCAN) IVPB 200 mg 100  mL  200 mg Intravenous Q24H Thu Wilson  mL/hr at 08/15/20 2202 200 mg at 08/15/20 2202    heparin (porcine) injection 5,000 Units  5,000 Units Subcutaneous Q8H Neal Singletary MD   5,000 Units at 08/16/20 0521    HYDROmorphone injection 0.2 mg  0.2 mg Intravenous Q5 Min PRN Azar Desai MD        lactated ringers infusion   Intravenous Continuous Neal Singletary MD 5 mL/hr at 08/16/20 0500      magnesium sulfate 2g in water 50mL IVPB (premix)  2 g Intravenous PRN Serjio Garcia MD   2 g at 08/15/20 0611    magnesium sulfate 2g in water 50mL IVPB (premix)  4 g Intravenous PRN Serjio Garcia MD        ondansetron injection 4 mg  4 mg Intravenous Once PRN Azar Desai MD        piperacillin-tazobactam 4.5 g in sodium chloride 0.9% 100 mL IVPB (ready to mix system)  4.5 g Intravenous Q8H Serjio Garcia MD 25 mL/hr at 08/15/20 2300 4.5 g at 08/15/20 2300    potassium chloride 40 mEq in 100 mL IVPB (FOR CENTRAL LINE ADMINISTRATION ONLY)  40 mEq Intravenous PRN Serjio Garcia MD 25 mL/hr at 08/14/20 2319 40 mEq at 08/14/20 2319    And    potassium chloride 20 mEq in 100 mL IVPB (FOR CENTRAL LINE ADMINISTRATION ONLY)  60 mEq Intravenous PRN Serjio Garcia MD        And    potassium chloride 40 mEq in 100 mL IVPB (FOR CENTRAL LINE ADMINISTRATION ONLY)  80 mEq Intravenous PRN Serjio Garcia MD        propofol (DIPRIVAN) 10 mg/mL infusion  10 mcg/kg/min Intravenous Continuous Donis Bates MD 21 mL/hr at 08/16/20 0500 45 mcg/kg/min at 08/16/20 0500    sodium phosphate 15 mmol in dextrose 5 % 250 mL IVPB  15 mmol Intravenous PRN Serjio Garcia MD 83.3 mL/hr at 08/16/20 0125 15 mmol at 08/16/20 0125    sodium phosphate 20.01 mmol in dextrose 5 % 250 mL IVPB  20.01 mmol Intravenous PRN Serjio Garcia MD        sodium phosphate 30 mmol in dextrose 5 % 250 mL IVPB  30 mmol Intravenous PRN Serjio Garcia MD         vancomycin - pharmacy to dose   Intravenous pharmacy to manage frequency Thu Wilson MD        vasopressin (PITRESSIN) 0.2 Units/mL in dextrose 5 % 100 mL infusion  0.04 Units/min Intravenous Continuous Jadon Tran    Stopped at 08/16/20 0316       Review of patient's allergies indicates:   Allergen Reactions    Latex          OBJECTIVE:     Medications:  No current facility-administered medications on file prior to encounter.      Current Outpatient Medications on File Prior to Encounter   Medication Sig Dispense Refill    aspirin (ECOTRIN) 81 MG EC tablet   = 1 tab, Oral, Daily, Ordered by Dr. Moreira, # 90 tab, 3 Refill(s), Maintenance      metoprolol tartrate (LOPRESSOR) 25 MG tablet   See Instructions, TAKE 1 TABLET BY MOUTH TWICE DAILY, tab, # 60, eRx: [email or web address] [id number]      sumatriptan (IMITREX) 50 MG tablet   = 1 tab, Oral, Daily, PRN for migraine headache, may repeat dose after 2 hours up to a maximum of 200 mg in 24 hours, # 9 tab, 1 Refill(s), Maintenance, Pharmacy: [email or web address] [id number]         Vitals:    08/16/20 0515   BP:    Pulse: 95   Resp: (!) 25   Temp:      Wt Readings from Last 3 Encounters:   08/16/20 74.5 kg (164 lb 3.9 oz)   08/12/20 68.5 kg (151 lb 0.2 oz)     Temp Readings from Last 3 Encounters:   08/16/20 98.6 °F (37 °C) (Oral)     BP Readings from Last 3 Encounters:   08/16/20 (!) 103/56     Pulse Readings from Last 3 Encounters:   08/16/20 95       Physical Exam:  Physical Exam   Constitutional: No distress.   HENT:   ET on MV   Cardiovascular: Normal rate and regular rhythm.   Abdominal: She exhibits distension.   JPx2. Wound VAC. Distended   Musculoskeletal:         General: Edema (B/L LE) present.   Neurological:   Sedated       Laboratory:  Lab Results   Component Value Date    WBC 55.67 (HH) 08/16/2020    HGB 7.5 (L) 08/16/2020    HCT 23.5 (L) 08/16/2020    MCV 93 08/16/2020     (L) 08/16/2020     Sodium   Date Value Ref Range Status    08/15/2020 142 136 - 145 mmol/L Final   08/15/2020 142 136 - 145 mmol/L Final     Potassium   Date Value Ref Range Status   08/15/2020 4.1 3.5 - 5.1 mmol/L Final   08/15/2020 4.1 3.5 - 5.1 mmol/L Final     Chloride   Date Value Ref Range Status   08/15/2020 109 95 - 110 mmol/L Final   08/15/2020 109 95 - 110 mmol/L Final     CO2   Date Value Ref Range Status   08/15/2020 24 23 - 29 mmol/L Final   08/15/2020 24 23 - 29 mmol/L Final     Glucose   Date Value Ref Range Status   08/15/2020 82 70 - 110 mg/dL Final   08/15/2020 82 70 - 110 mg/dL Final     BUN, Bld   Date Value Ref Range Status   08/15/2020 5 (L) 6 - 20 mg/dL Final   08/15/2020 5 (L) 6 - 20 mg/dL Final     Creatinine   Date Value Ref Range Status   08/15/2020 0.7 0.5 - 1.4 mg/dL Final   08/15/2020 0.7 0.5 - 1.4 mg/dL Final     Calcium   Date Value Ref Range Status   08/15/2020 7.2 (L) 8.7 - 10.5 mg/dL Final   08/15/2020 7.2 (L) 8.7 - 10.5 mg/dL Final     Total Protein   Date Value Ref Range Status   08/15/2020 4.0 (L) 6.0 - 8.4 g/dL Final     Albumin   Date Value Ref Range Status   08/15/2020 1.2 (L) 3.5 - 5.2 g/dL Final   08/15/2020 1.2 (L) 3.5 - 5.2 g/dL Final     Total Bilirubin   Date Value Ref Range Status   08/15/2020 1.2 (H) 0.1 - 1.0 mg/dL Final     Comment:     For infants and newborns, interpretation of results should be based  on gestational age, weight and in agreement with clinical  observations.  Premature Infant recommended reference ranges:  Up to 24 hours.............<8.0 mg/dL  Up to 48 hours............<12.0 mg/dL  3-5 days..................<15.0 mg/dL  6-29 days.................<15.0 mg/dL       Alkaline Phosphatase   Date Value Ref Range Status   08/15/2020 107 55 - 135 U/L Final     AST   Date Value Ref Range Status   08/15/2020 59 (H) 10 - 40 U/L Final     ALT   Date Value Ref Range Status   08/15/2020 25 10 - 44 U/L Final     Anion Gap   Date Value Ref Range Status   08/15/2020 9 8 - 16 mmol/L Final   08/15/2020 9 8 - 16 mmol/L  Final     eGFR if    Date Value Ref Range Status   08/15/2020 >60.0 >60 mL/min/1.73 m^2 Final   08/15/2020 >60.0 >60 mL/min/1.73 m^2 Final     eGFR if non    Date Value Ref Range Status   08/15/2020 >60.0 >60 mL/min/1.73 m^2 Final     Comment:     Calculation used to obtain the estimated glomerular filtration  rate (eGFR) is the CKD-EPI equation.      08/15/2020 >60.0 >60 mL/min/1.73 m^2 Final     Comment:     Calculation used to obtain the estimated glomerular filtration  rate (eGFR) is the CKD-EPI equation.        Color, UA   Date Value Ref Range Status   08/12/2020 Yellow Yellow, Straw, Lise Final     Appearance, UA   Date Value Ref Range Status   08/12/2020 Hazy (A) Clear Final     pH, UA   Date Value Ref Range Status   08/12/2020 6.0 5.0 - 8.0 Final     Specific Gravity, UA   Date Value Ref Range Status   08/12/2020 1.010 1.005 - 1.030 Final     Protein, UA   Date Value Ref Range Status   08/12/2020 1+ (A) Negative Final     Comment:     Recommend a 24 hour urine protein or a urine   protein/creatinine ratio if globulin induced proteinuria is  clinically suspected.       Glucose, UA   Date Value Ref Range Status   08/12/2020 Negative Negative Final     Ketones, UA   Date Value Ref Range Status   08/12/2020 Negative Negative Final     Bilirubin (UA)   Date Value Ref Range Status   08/12/2020 Negative Negative Final     Occult Blood UA   Date Value Ref Range Status   08/12/2020 3+ (A) Negative Final     Nitrite, UA   Date Value Ref Range Status   08/12/2020 Negative Negative Final     Leukocytes, UA   Date Value Ref Range Status   08/12/2020 1+ (A) Negative Final     RBC, UA   Date Value Ref Range Status   08/12/2020 10 (H) 0 - 4 /hpf Final     WBC, UA   Date Value Ref Range Status   08/12/2020 8 (H) 0 - 5 /hpf Final     Bacteria   Date Value Ref Range Status   08/12/2020 Rare None-Occ /hpf Final     Microscopic Comment   Date Value Ref Range Status   08/12/2020 SEE COMMENT  Final      Comment:     Other formed elements not mentioned in the report are not   present in the microscopic examination.          Labs reviewed  Diagnostic Results:  X-Ray: Reviewed  US: Reviewed      ASSESSMENT/PLAN:       Jaquan Farmer is a 39 y.o. female currently on SLED for removal of uremic toxins and volume.     Patient seen and evaluated on SLED, tolerating treatment, see CRRT flowsheet for vitals and assessments.    Labs have been reviewed and the dialysate bath has been adjusted.    Labs:      Recent Labs   Lab 08/15/20  0949 08/15/20  1606 08/15/20  2200    139  139 142  142   K 4.4 4.3  4.3 4.1  4.1    107  107 109  109   CO2 23 25  25 24  24   BUN 5* 7  7 5*  5*   CREATININE 0.8 1.0  1.0 0.7  0.7   CALCIUM 7.4* 7.5*  7.5* 7.2*  7.2*   PHOS 2.5* 3.4  3.4 2.6*  2.6*       Recent Labs   Lab 08/15/20  1606 08/15/20  2200 08/16/20  0400   WBC 60.13* 46.85* 55.67*   HGB 7.9* 7.4* 7.5*   HCT 23.6* 22.9* 23.5*   * 135* 137*        Patient Active Problem List   Diagnosis    Duodenum disorder    Severe sepsis    Acute renal failure with tubular necrosis    Metabolic acidosis     Assessment/Plan    -Pt s/p exploratory laparotomy, washout and drainage today.  -Continues with peripheral edema and increasing O2 requirement.  -Patient currently hemodynamically not stable for intermittent hemodialysis.   -Will plan on Continuous SLED  for metabolic clearance and volume management.  -Will decrease UF to 300cc/hr  -SLED prescription and dialysate baths were reviewed and changes made according to most recent labs.    -Maintain MAP>65  -Monitor electrolytes with serial labs while on SLED.  -Follow replacement protocol.  -Avoid morphine in ESRD patients.  -Renal function panel daily.   -Avoid nephrotoxins.  -Renal dose meds to GFR   -Strict I+O daily.  -Daily weights   -Plan discussed with HD RN.  -Please see attending attestation to follow    Gunner Sparks MD  Nephrology  [facility]

## 2020-08-15 NOTE — SUBJECTIVE & OBJECTIVE
Interval History/Significant Events: See above.    Follow-up For: Procedure(s) (LRB):  LAPAROTOMY, EXPLORATORY (N/A)  EGD (ESOPHAGOGASTRODUODENOSCOPY) (N/A)  APPLICATION, WOUND VAC WITH ABTHERA  DUODENOJEJUNAL ANASTAMOSIS, GASTROJEJUNAL ANASTOMOSIS  KOCHERIZATION OF DUODENUM, DUODENECTOMY    Post-Operative Day: Day of surgery    Objective:     Vital Signs (Most Recent):  Temp: 98.5 °F (36.9 °C) (08/15/20 0945)  Pulse: (!) 115 (08/15/20 1230)  Resp: (!) 31 (08/15/20 1230)  BP: (!) 113/58 (08/15/20 1200)  SpO2: 97 % (08/15/20 1230) Vital Signs (24h Range):  Temp:  [98.4 °F (36.9 °C)-99.3 °F (37.4 °C)] 98.5 °F (36.9 °C)  Pulse:  [] 115  Resp:  [20-38] 31  SpO2:  [94 %-100 %] 97 %  BP: (102-127)/(52-73) 113/58  Arterial Line BP: (112-162)/(53-77) 121/59     Weight: 79.5 kg (175 lb 4.3 oz)  Body mass index is 32.06 kg/m².      Intake/Output Summary (Last 24 hours) at 8/15/2020 1251  Last data filed at 8/15/2020 1200  Gross per 24 hour   Intake 6640.5 ml   Output 53364 ml   Net -6326.5 ml       Physical Exam  Vitals signs and nursing note reviewed.   Constitutional:       General: She is not in acute distress.     Appearance: She is ill-appearing.      Comments: Sedated on mechanical ventilation.   HENT:      Head: Normocephalic and atraumatic.      Nose: Nose normal.      Comments: NG present.     Mouth/Throat:      Mouth: Mucous membranes are moist.   Eyes:      Conjunctiva/sclera: Conjunctivae normal.      Pupils: Pupils are equal, round, and reactive to light.   Neck:      Musculoskeletal: Neck supple. No muscular tenderness.      Comments: Right IJ in place  Cardiovascular:      Rate and Rhythm: Normal rate and regular rhythm.      Heart sounds: Normal heart sounds.   Pulmonary:      Effort: Pulmonary effort is normal.      Breath sounds: Rhonchi present. No wheezing.      Comments: Coarse breath sounds bilaterally.  Abdominal:      Palpations: Abdomen is soft.      Comments: Midline surgical drain   Skin:      General: Skin is warm and dry.   Neurological:      Comments: Follows commands off sedation   Psychiatric:      Comments: Sedated         Vents:  Vent Mode: A/C (08/15/20 1150)  Ventilator Initiated: Yes(chart correction) (08/12/20 1930)  Set Rate: 25 BPM (08/15/20 1150)  Vt Set: 300 mL (08/15/20 1150)  PEEP/CPAP: 8 cmH20 (08/15/20 1150)  Oxygen Concentration (%): 50 (08/15/20 1230)  Peak Airway Pressure: 21 cmH2O (08/15/20 1150)  Plateau Pressure: 27 cmH20 (08/15/20 1150)  Total Ve: 8.85 mL (08/15/20 1150)  F/VT Ratio<105 (RSBI): (!) 83.09 (08/15/20 1150)    Lines/Drains/Airways     Peripherally Inserted Central Catheter Line            PICC Double Lumen right brachial -- days          Central Venous Catheter Line            Trialysis (Dialysis) Catheter 08/13/20 2230 right internal jugular 1 day          Drain                 NG/OG Tube 08/06/20 nasogastric Left nostril 9 days         Urethral Catheter 08/12/20 2021 2 days         Chest Tube 08/14/20 0007 Right Midaxillary 14 Fr. 1 day         Closed/Suction Drain 08/13/20 1659 Right;Inferior Abdomen Bulb 19 Fr. 1 day         Closed/Suction Drain 08/13/20 1659 Right;Superior Abdomen Bulb 19 Fr. 1 day         Gastrostomy/Enterostomy 08/15/20 0916 Gastrostomy tube w/ balloon LUQ decompression less than 1 day          Airway                 Airway - Non-Surgical 08/15/20 0835 Endotracheal Tube less than 1 day          Arterial Line                 Arterial Line 08/06/20 Left Radial 9 days          Peripheral Intravenous Line                 Peripheral IV - Single Lumen 08/12/20 18 G Right Forearm 3 days         Peripheral IV - Single Lumen 08/12/20 2007 18 G Left Forearm 2 days                Significant Labs:    CBC/Anemia Profile:  Recent Labs   Lab 08/14/20  2200 08/15/20  0400 08/15/20  0949   WBC 54.42* 56.99* 61.69*   HGB 8.4* 8.2* 8.5*   HCT 24.2* 25.2* 25.4*   * 143* 153   MCV 88 91 91   RDW 15.7* 15.8* 15.7*        Chemistries:  Recent Labs   Lab  08/14/20  2200 08/15/20  0400 08/15/20  0949     139 140  140 139   K 3.9  3.9 4.3  4.3 4.4     109 108  108 108   CO2 24  24 25  25 23   BUN 8  8 5*  5* 5*   CREATININE 1.0  1.0 0.8  0.8 0.8   CALCIUM 7.8*  7.8* 7.1*  7.1* 7.4*   ALBUMIN 1.4*  1.4* 1.4*  1.4* 1.3*   PROT 4.0* 4.2* 4.2*   BILITOT 1.5* 1.4* 1.3*   ALKPHOS 90 114 96   ALT 22 22 25   AST 52* 71* 59*   MG 2.1  2.1 1.9 1.9   PHOS 2.4*  2.4* 2.7  2.7 2.5*       All pertinent labs within the past 24 hours have been reviewed.    Significant Imaging:  I have reviewed all pertinent imaging results/findings within the past 24 hours.

## 2020-08-15 NOTE — ASSESSMENT & PLAN NOTE
38 yo F s/p antrectomy with BII reconstruction c/b duodenal necrosis requiring ex lap, washout, drainage c/b aspiration event. Now with severe sepsis and ARDS     .Neuro:  - Pain controlled on fentanyl  - Sedated with propofol     Resp:  - Intubated on vent Fi 50, PEEP 8, rate 30 I:E changed from 1:1 to 1:2  - Wean vent per ARDSnet protocol  - Daily SBT  - PRN breathing treatments  - Daily CXR  - Right chest tube with output     CV:  - MAP goal >65  - Systolic <160  - Levo and Vaso gtt - off     Heme:  - Hgb/Hct 8.2 - stable  - Transfuse as indicated     ID:  - WBC 57 => still elevated  - Vanc, zosyn, Fluconazole  - F/u cultures      Renal:  - Parker in place  - Oliguric on arrival  - Cr 1.9 => 0.8, minimal UOP  - Monitor I/Os  - Trend BMP daily  - Lasix challenge, possible dialysis needed  - Nephrology consulted, appreciate recs and assistance with patient     FEN/GI:  - NPO  - NGT to LIWS  - Maintain drains to bulb suction  - GI ppx  - Replace electrolytes as needed to keep K>4, Mg>2     Endo:  - Monitor BG     Dispo:  - Continue SICU care

## 2020-08-15 NOTE — OP NOTE
[facility]  Surgery Department  Operative Note       Date of Procedure: 8/13/2020       Surgeon(s):  Surgeon(s) and Role:     * Donis Roa MD - Co-surgeon     * Quang Nicholson MD - Co-surgeon      Pre-Operative Diagnosis:   1. See Dr. Roa's note    Post-Operative Diagnosis:   1. same     Anesthesia: General    Operative Findings:   1. Necrotic and perforated duodenum and afferent limb    Procedures:  1. Resection of duodenum and proximal jejunum  2. Duodenojejunostomy  3. On table enteroscopy    Estimated Blood Loss (EBL):  See Dr. Roa's note      Specimen(s):    Specimen (12h ago, onward)    None                 Indications:  Jaquan Farmer was undergoing laparotomy with Dr. Roa.  I was asked to scrub in.    Details:  The duodenum and afferent limb were markedly thin and necrotic.  This was multifocal and quite extensive.  The prior GJ had been taken down and on table enteroscopy was performed via a perforation in the distal duodenum.   This confirmed multifocal full-thickness necrosis.  The medial (mesenteric/pancreatic-side) of the duodenal sweep remained grossly viable.  The scope was used to localize the ampulla endoscopically.      We considered our options, which were severely limited.  Given the degree of duodenal loss, tube duodenostomy or patch was not feasible.  We had to remove the non-viable duodenum.  She was in no condition for pancreaticoduodenectomy.     The right colon and small bowel mesentery were mobilized completely in [facility] fasion.  The proximal jejunum, D4 and D3 were resected flush with the bowel with the Ligasure up to the level of the ampulla.  The necrotic portions of the remaining D3 and D2 were cut out with scissors back to bleeding bowel wall.  This left us with a short segment of D1/D2 at the prior duodenal stump, which was closed and viable, and with the duodenal wall surrounding the ampullary complex.  The ampulla was  cannulated with a 5Fr pediatric feeding, which was left in place for the reconstruction.      The remaining proximal jejunum was brought through a defect in the mesocolon.  This was oriented for an end-end duodenojejunostomy, which was performed using single-layer interrupted 2-0 Vicryl sutures between proximal jejunum and medial wall of periampullary duodenum.  Care was taken not to occlude the ampulla.  This required an antimesenteric slit in the jejunum for size match.  The repair was technically satisfactory, but certainly tenuous and high-risk given her condition and the quality of tissues.      The case was returned to Dr. Roa for gastrojejunostomy and temporary abdominal closure.    Attestation: I was scrubbed for the procedures named above.

## 2020-08-15 NOTE — SUBJECTIVE & OBJECTIVE
Interval History: Stable overnight  Tolerating CRRT with volume removal  No pressers  Minimal UOP  GODFREY drains with minimal bile stained drainage    Medications:  Continuous Infusions:   sodium chloride 0.9% Stopped (08/15/20 0730)    fentanyl 125 mcg/hr (08/15/20 0701)    lactated ringers 5 mL/hr at 08/15/20 0701    propofoL 100 mcg/kg/min (08/15/20 0750)     Scheduled Meds:   famotidine (PF)  20 mg Intravenous Daily    fluconazole (DIFLUCAN) IVPB  200 mg Intravenous Q24H    heparin (porcine)  5,000 Units Subcutaneous Q8H    piperacillin-tazobactam (ZOSYN) IVPB  4.5 g Intravenous Q8H    vancomycin (VANCOCIN) IVPB  500 mg Intravenous Once     PRN Meds:calcium gluconate IVPB, calcium gluconate IVPB, calcium gluconate IVPB, HYDROmorphone, magnesium sulfate IVPB, magnesium sulfate IVPB, magnesium sulfate IVPB, ondansetron, potassium chloride in water **AND** potassium chloride in water **AND** potassium chloride in water, sodium phosphate IVPB, sodium phosphate IVPB, sodium phosphate IVPB, sodium phosphate IVPB, sodium phosphate IVPB, sodium phosphate IVPB, Pharmacy to dose Vancomycin consult **AND** vancomycin - pharmacy to dose     Review of patient's allergies indicates:   Allergen Reactions    Latex      Objective:     Vital Signs (Most Recent):  Temp: 98.5 °F (36.9 °C) (08/15/20 0945)  Pulse: 105 (08/15/20 0945)  Resp: (!) 4 (08/15/20 0945)  BP: (!) 120/58 (08/15/20 0730)  SpO2: 100 % (08/15/20 0945) Vital Signs (24h Range):  Temp:  [98.4 °F (36.9 °C)-99.3 °F (37.4 °C)] 98.5 °F (36.9 °C)  Pulse:  [] 105  Resp:  [4-36] 4  SpO2:  [96 %-100 %] 100 %  BP: (102-127)/(52-73) 120/58  Arterial Line BP: (112-162)/(53-77) 113/55     Weight: 79.5 kg (175 lb 4.3 oz)  Body mass index is 32.06 kg/m².    Intake/Output - Last 3 Shifts       08/13 0700 - 08/14 0659 08/14 0700 - 08/15 0659 08/15 0700 - 08/16 0659    I.V. (mL/kg) 8926 (108.9) 6340.5 (79.8) 300 (3.8)    Blood 1700      NG/GT 30      IV Piggyback 3000       Total Intake(mL/kg) 10100 (166.5) 6340.5 (79.8) 300 (3.8)    Urine (mL/kg/hr) 65 (0) 364 (0.2) 13 (0.1)    Drains 1707 1658 20    Other 1100 47855 926    Stool  0     Blood 150  50    Chest Tube 55 85 6    Total Output 3077 09514 1015    Net +[phone] -7543.5 -715           Stool Occurrence  1 x           Physical Exam  Constitutional:       Interventions: She is sedated and intubated.   HENT:      Head: Normocephalic.   Cardiovascular:      Rate and Rhythm: Regular rhythm. Tachycardia present.      Comments: Improving  Pulmonary:      Effort: She is intubated.      Comments: Intubated and mechanically ventilated  Vent Mode: A/C  Oxygen Concentration (%):  (40-50) 40  Resp Rate Total:  (29 br/min-40 br/min) 30 br/min  Vt Set:  (380 mL-420 mL) 380 mL  PEEP/CPAP:  (8 cmH20) 8 cmH20  Mean Airway Pressure:  (13 bkK96-67 cmH20) 13 cmH20    Abdominal:      Comments: Abthera with ss output  GODFREY x2 with bile stained output   Musculoskeletal:         General: Swelling present.      Right lower leg: Edema present.      Left lower leg: Edema present.   Skin:     General: Skin is warm and dry.   Neurological:      Comments: Sedated on propofol         Significant Labs:  Reviewed    Significant Diagnostics:  Reviewed

## 2020-08-15 NOTE — NURSING
Pt's god-sister (Elham; also goes by Christy) at bedside to visit pt, updated on POC. Elham took pt's light blue wallet home with her for bill paying/safe-keeping, left pt's cell phone in room with pt.

## 2020-08-15 NOTE — ASSESSMENT & PLAN NOTE
38 yo F s/p antrectomy with BII reconstruction c/b duodenal necrosis requiring ex lap, washout, drainage c/b aspiration event. S/p duodenal resection with d-j anastomosis, g-j and abthera vac placement on 8/13 and washout with g-tube placement on 8/15.    .Neuro:  - Sedation with propofol  - fentanyl   - Sedation vacations, neuro exams    Resp:  - Intubated on vent  - Wean vent per ARDSnet protocol    CV:  - HDS off pressors  - Tachycardia improving    Heme:  - Hgb/Hct - stable  - Transfuse as indicated; received 2u PRBC on 8/13    ID:  - Broad spectrum abx/antifungal  - F/u cultures     Renal:  - Parker in place; oliguric  - Nephrology consulted; Continue CRRT with aggressive volume removal as tolerated  - Cr 2 - trend    FEN/GI:  - NPO  - NGT to LIWS  - Maintain drains to bulb suction  - G tube to gravity  - GI ppx  - Will discuss starting TPN with staff    Endo:  - Monitor BG    Dispo:  - Continue ICU care; appreciate SICU assistance; Plan for return to OR on 8/18 for possible closure

## 2020-08-15 NOTE — PROGRESS NOTES
Pharmacokinetic Assessment Follow Up: IV Vancomycin    Vancomycin Regimen Assessment & Plan:  - Vancomycin level drawn with morning labs today resulted as 23.0 ug/mL, goal trough range 10-20 ug/mL.  - Patient with FLORIDALMA, nephrology consulted for RRT, SLED initiated.    - Significant decrease in serum vancomycin levels, redose with vancomycin 500 mg once today.   - Draw vancomycin level with morning labs tomorrow.   - Will re-dose as needed depending on level and renal function.    Drug levels (last 3 results):  Recent Labs   Lab Result Units 08/13/20  1100 08/14/20  0315 08/15/20  0400   Vancomycin, Random ug/mL 51.7 37.1 23.0       Pharmacy will continue to follow and monitor vancomycin.    Please contact pharmacy at extension [phone] for questions regarding this assessment.    Thank you for the consult,   El Shetty     Patient brief summary:  Jaquan Farmer is a 39 y.o. female initiated on antimicrobial therapy with IV Vancomycin for treatment of intra-abdominal infection    The patient's current regimen is pulse dosing.    Drug Allergies:   Review of patient's allergies indicates:   Allergen Reactions    Latex      Actual Body Weight:   79.5 kg    Renal Function:   Estimated Creatinine Clearance: 92.3 mL/min (based on SCr of 0.8 mg/dL).,     Dialysis Method (if applicable):  N/A

## 2020-08-15 NOTE — TRANSFER OF CARE
"Anesthesia Transfer of Care Note    Patient: Jaquan Farmer    Procedure(s) Performed: Procedure(s) (LRB):  LAPAROTOMY, EXPLORATORY; abthera replacement (N/A)  INSERTION, GASTROSTOMY TUBE, PERCUTANEOUS  WASHOUT; abdominal (N/A)    Patient location: ICU    Anesthesia Type: general    Transport from OR: Transported from OR intubated on 100% O2 by AMBU with assisted ventilation. Upon arrival to PACU/ICU, patient attached to ventilator and auscultated to confirm bilateral breath sounds and adequate TV. Continuous ECG monitoring in transport. Continuous SpO2 monitoring in transport. Continuos invasive BP monitoring in transport    Post pain: adequate analgesia    Post assessment: no apparent anesthetic complications and tolerated procedure well    Post vital signs: stable    Level of consciousness: sedated    Nausea/Vomiting: no nausea/vomiting    Complications: none    Transfer of care protocol was followed      Last vitals:   Visit Vitals  BP (!) 120/58   Pulse 105   Temp 36.9 °C (98.5 °F) (Oral)   Resp (!) 4   Ht 5' 2" (1.575 m)   Wt 79.5 kg (175 lb 4.3 oz)   SpO2 100%   Breastfeeding No   BMI 32.06 kg/m²     "

## 2020-08-16 LAB
ABO + RH BLD: NORMAL
ALBUMIN SERPL BCP-MCNC: 1.2 G/DL (ref 3.5–5.2)
ALLENS TEST: ABNORMAL
ALP SERPL-CCNC: 105 U/L (ref 55–135)
ALP SERPL-CCNC: 114 U/L (ref 55–135)
ALP SERPL-CCNC: 132 U/L (ref 55–135)
ALP SERPL-CCNC: 155 U/L (ref 55–135)
ALT SERPL W/O P-5'-P-CCNC: 26 U/L (ref 10–44)
ALT SERPL W/O P-5'-P-CCNC: 27 U/L (ref 10–44)
ALT SERPL W/O P-5'-P-CCNC: 27 U/L (ref 10–44)
ALT SERPL W/O P-5'-P-CCNC: 28 U/L (ref 10–44)
ANION GAP SERPL CALC-SCNC: 6 MMOL/L (ref 8–16)
ANION GAP SERPL CALC-SCNC: 7 MMOL/L (ref 8–16)
ANION GAP SERPL CALC-SCNC: 8 MMOL/L (ref 8–16)
ANION GAP SERPL CALC-SCNC: 8 MMOL/L (ref 8–16)
ANISOCYTOSIS BLD QL SMEAR: SLIGHT
APTT BLDCRRT: 34.2 SEC (ref 21–32)
AST SERPL-CCNC: 54 U/L (ref 10–40)
AST SERPL-CCNC: 54 U/L (ref 10–40)
AST SERPL-CCNC: 63 U/L (ref 10–40)
AST SERPL-CCNC: 67 U/L (ref 10–40)
BACTERIA SPEC AEROBE CULT: ABNORMAL
BACTERIA SPEC AEROBE CULT: ABNORMAL
BASO STIPL BLD QL SMEAR: ABNORMAL
BASOPHILS # BLD AUTO: ABNORMAL K/UL (ref 0–0.2)
BASOPHILS NFR BLD: 0 % (ref 0–1.9)
BASOPHILS NFR BLD: 0 % (ref 0–1.9)
BASOPHILS NFR BLD: 0.5 % (ref 0–1.9)
BASOPHILS NFR BLD: 0.5 % (ref 0–1.9)
BILIRUB SERPL-MCNC: 0.9 MG/DL (ref 0.1–1)
BILIRUB SERPL-MCNC: 1 MG/DL (ref 0.1–1)
BILIRUB SERPL-MCNC: 1 MG/DL (ref 0.1–1)
BILIRUB SERPL-MCNC: 1.1 MG/DL (ref 0.1–1)
BLD GP AB SCN CELLS X3 SERPL QL: NORMAL
BUN SERPL-MCNC: 3 MG/DL (ref 6–20)
BUN SERPL-MCNC: 3 MG/DL (ref 6–20)
BUN SERPL-MCNC: <2 MG/DL (ref 6–20)
CA-I BLDV-SCNC: 1.03 MMOL/L (ref 1.06–1.42)
CA-I BLDV-SCNC: 1.08 MMOL/L (ref 1.06–1.42)
CA-I BLDV-SCNC: 1.11 MMOL/L (ref 1.06–1.42)
CA-I BLDV-SCNC: 1.13 MMOL/L (ref 1.06–1.42)
CALCIUM SERPL-MCNC: 7.1 MG/DL (ref 8.7–10.5)
CALCIUM SERPL-MCNC: 7.1 MG/DL (ref 8.7–10.5)
CALCIUM SERPL-MCNC: 7.2 MG/DL (ref 8.7–10.5)
CALCIUM SERPL-MCNC: 7.6 MG/DL (ref 8.7–10.5)
CALCIUM SERPL-MCNC: 7.6 MG/DL (ref 8.7–10.5)
CALCIUM SERPL-MCNC: 7.7 MG/DL (ref 8.7–10.5)
CHLORIDE SERPL-SCNC: 107 MMOL/L (ref 95–110)
CO2 SERPL-SCNC: 23 MMOL/L (ref 23–29)
CO2 SERPL-SCNC: 23 MMOL/L (ref 23–29)
CO2 SERPL-SCNC: 24 MMOL/L (ref 23–29)
CO2 SERPL-SCNC: 24 MMOL/L (ref 23–29)
CO2 SERPL-SCNC: 26 MMOL/L (ref 23–29)
CO2 SERPL-SCNC: 27 MMOL/L (ref 23–29)
CREAT SERPL-MCNC: 0.5 MG/DL (ref 0.5–1.4)
CREAT SERPL-MCNC: 0.6 MG/DL (ref 0.5–1.4)
DELSYS: ABNORMAL
DIFFERENTIAL METHOD: ABNORMAL
DOHLE BOD BLD QL SMEAR: PRESENT
EOSINOPHIL # BLD AUTO: ABNORMAL K/UL (ref 0–0.5)
EOSINOPHIL NFR BLD: 1.5 % (ref 0–8)
EOSINOPHIL NFR BLD: 1.5 % (ref 0–8)
EOSINOPHIL NFR BLD: 2 % (ref 0–8)
EOSINOPHIL NFR BLD: 2.5 % (ref 0–8)
ERYTHROCYTE [DISTWIDTH] IN BLOOD BY AUTOMATED COUNT: 15.6 % (ref 11.5–14.5)
ERYTHROCYTE [DISTWIDTH] IN BLOOD BY AUTOMATED COUNT: 15.6 % (ref 11.5–14.5)
ERYTHROCYTE [DISTWIDTH] IN BLOOD BY AUTOMATED COUNT: 15.7 % (ref 11.5–14.5)
ERYTHROCYTE [DISTWIDTH] IN BLOOD BY AUTOMATED COUNT: 15.7 % (ref 11.5–14.5)
ERYTHROCYTE [SEDIMENTATION RATE] IN BLOOD BY WESTERGREN METHOD: 25 MM/H
EST. GFR  (AFRICAN AMERICAN): >60 ML/MIN/1.73 M^2
EST. GFR  (NON AFRICAN AMERICAN): >60 ML/MIN/1.73 M^2
FIO2: 40
GIANT PLATELETS BLD QL SMEAR: PRESENT
GLUCOSE SERPL-MCNC: 74 MG/DL (ref 70–110)
GLUCOSE SERPL-MCNC: 88 MG/DL (ref 70–110)
GLUCOSE SERPL-MCNC: 88 MG/DL (ref 70–110)
GLUCOSE SERPL-MCNC: 91 MG/DL (ref 70–110)
GLUCOSE SERPL-MCNC: 91 MG/DL (ref 70–110)
GLUCOSE SERPL-MCNC: 93 MG/DL (ref 70–110)
GRAM STN SPEC: ABNORMAL
HCO3 UR-SCNC: 23.9 MMOL/L (ref 24–28)
HCO3 UR-SCNC: 26.7 MMOL/L (ref 24–28)
HCO3 UR-SCNC: 27.3 MMOL/L (ref 24–28)
HCT VFR BLD AUTO: 23 % (ref 37–48.5)
HCT VFR BLD AUTO: 23.2 % (ref 37–48.5)
HCT VFR BLD AUTO: 23.3 % (ref 37–48.5)
HCT VFR BLD AUTO: 23.5 % (ref 37–48.5)
HGB BLD-MCNC: 7.4 G/DL (ref 12–16)
HGB BLD-MCNC: 7.5 G/DL (ref 12–16)
HGB BLD-MCNC: 7.5 G/DL (ref 12–16)
HGB BLD-MCNC: 7.6 G/DL (ref 12–16)
HYPOCHROMIA BLD QL SMEAR: ABNORMAL
IMM GRANULOCYTES # BLD AUTO: ABNORMAL K/UL (ref 0–0.04)
IMM GRANULOCYTES NFR BLD AUTO: ABNORMAL % (ref 0–0.5)
INR PPP: 0.9 (ref 0.8–1.2)
LYMPHOCYTES # BLD AUTO: ABNORMAL K/UL (ref 1–4.8)
LYMPHOCYTES NFR BLD: 2 % (ref 18–48)
LYMPHOCYTES NFR BLD: 3.5 % (ref 18–48)
LYMPHOCYTES NFR BLD: 4 % (ref 18–48)
LYMPHOCYTES NFR BLD: 4.5 % (ref 18–48)
MAGNESIUM SERPL-MCNC: 1.7 MG/DL (ref 1.6–2.6)
MAGNESIUM SERPL-MCNC: 1.8 MG/DL (ref 1.6–2.6)
MAGNESIUM SERPL-MCNC: 1.9 MG/DL (ref 1.6–2.6)
MAGNESIUM SERPL-MCNC: 1.9 MG/DL (ref 1.6–2.6)
MCH RBC QN AUTO: 29.6 PG (ref 27–31)
MCH RBC QN AUTO: 29.8 PG (ref 27–31)
MCH RBC QN AUTO: 30 PG (ref 27–31)
MCH RBC QN AUTO: 30 PG (ref 27–31)
MCHC RBC AUTO-ENTMCNC: 31.9 G/DL (ref 32–36)
MCHC RBC AUTO-ENTMCNC: 31.9 G/DL (ref 32–36)
MCHC RBC AUTO-ENTMCNC: 32.2 G/DL (ref 32–36)
MCHC RBC AUTO-ENTMCNC: 33 G/DL (ref 32–36)
MCV RBC AUTO: 91 FL (ref 82–98)
MCV RBC AUTO: 93 FL (ref 82–98)
MCV RBC AUTO: 93 FL (ref 82–98)
MCV RBC AUTO: 94 FL (ref 82–98)
METAMYELOCYTES NFR BLD MANUAL: 0.5 %
METAMYELOCYTES NFR BLD MANUAL: 0.5 %
METAMYELOCYTES NFR BLD MANUAL: 1 %
METAMYELOCYTES NFR BLD MANUAL: 5 %
MODE: ABNORMAL
MONOCYTES # BLD AUTO: ABNORMAL K/UL (ref 0.3–1)
MONOCYTES NFR BLD: 2.5 % (ref 4–15)
MONOCYTES NFR BLD: 2.5 % (ref 4–15)
MONOCYTES NFR BLD: 3 % (ref 4–15)
MONOCYTES NFR BLD: 4 % (ref 4–15)
MYELOCYTES NFR BLD MANUAL: 1.5 %
MYELOCYTES NFR BLD MANUAL: 1.5 %
MYELOCYTES NFR BLD MANUAL: 3 %
MYELOCYTES NFR BLD MANUAL: 4.5 %
NEUTROPHILS NFR BLD: 74 % (ref 38–73)
NEUTROPHILS NFR BLD: 82.5 % (ref 38–73)
NEUTROPHILS NFR BLD: 85.5 % (ref 38–73)
NEUTROPHILS NFR BLD: 88 % (ref 38–73)
NEUTS BAND NFR BLD MANUAL: 1.5 %
NEUTS BAND NFR BLD MANUAL: 2 %
NEUTS BAND NFR BLD MANUAL: 3.5 %
NEUTS BAND NFR BLD MANUAL: 9 %
NRBC BLD-RTO: 0 /100 WBC
OVALOCYTES BLD QL SMEAR: ABNORMAL
PCO2 BLDA: 43.8 MMHG (ref 35–45)
PCO2 BLDA: 48.4 MMHG (ref 35–45)
PCO2 BLDA: 50.2 MMHG (ref 35–45)
PEEP: 8
PH SMN: 7.34 [PH] (ref 7.35–7.45)
PH SMN: 7.35 [PH] (ref 7.35–7.45)
PH SMN: 7.35 [PH] (ref 7.35–7.45)
PHOSPHATE SERPL-MCNC: 1.6 MG/DL (ref 2.7–4.5)
PHOSPHATE SERPL-MCNC: 1.7 MG/DL (ref 2.7–4.5)
PHOSPHATE SERPL-MCNC: 1.8 MG/DL (ref 2.7–4.5)
PHOSPHATE SERPL-MCNC: 3.3 MG/DL (ref 2.7–4.5)
PHOSPHATE SERPL-MCNC: 3.3 MG/DL (ref 2.7–4.5)
PLATELET # BLD AUTO: 137 K/UL (ref 150–350)
PLATELET # BLD AUTO: 167 K/UL (ref 150–350)
PLATELET # BLD AUTO: 183 K/UL (ref 150–350)
PLATELET # BLD AUTO: 184 K/UL (ref 150–350)
PLATELET BLD QL SMEAR: ABNORMAL
PMV BLD AUTO: 11.8 FL (ref 9.2–12.9)
PMV BLD AUTO: 12 FL (ref 9.2–12.9)
PO2 BLDA: 102 MMHG (ref 80–100)
PO2 BLDA: 103 MMHG (ref 80–100)
PO2 BLDA: 93 MMHG (ref 80–100)
POC BE: -2 MMOL/L
POC BE: 1 MMOL/L
POC BE: 2 MMOL/L
POC SATURATED O2: 97 % (ref 95–100)
POC TCO2: 25 MMOL/L (ref 23–27)
POC TCO2: 28 MMOL/L (ref 23–27)
POC TCO2: 29 MMOL/L (ref 23–27)
POCT GLUCOSE: 110 MG/DL (ref 70–110)
POCT GLUCOSE: 70 MG/DL (ref 70–110)
POCT GLUCOSE: 71 MG/DL (ref 70–110)
POCT GLUCOSE: 79 MG/DL (ref 70–110)
POCT GLUCOSE: 80 MG/DL (ref 70–110)
POCT GLUCOSE: 82 MG/DL (ref 70–110)
POCT GLUCOSE: 93 MG/DL (ref 70–110)
POCT GLUCOSE: 96 MG/DL (ref 70–110)
POCT GLUCOSE: 98 MG/DL (ref 70–110)
POIKILOCYTOSIS BLD QL SMEAR: SLIGHT
POLYCHROMASIA BLD QL SMEAR: ABNORMAL
POTASSIUM SERPL-SCNC: 3.5 MMOL/L (ref 3.5–5.1)
POTASSIUM SERPL-SCNC: 3.5 MMOL/L (ref 3.5–5.1)
POTASSIUM SERPL-SCNC: 3.7 MMOL/L (ref 3.5–5.1)
POTASSIUM SERPL-SCNC: 3.9 MMOL/L (ref 3.5–5.1)
POTASSIUM SERPL-SCNC: 4.2 MMOL/L (ref 3.5–5.1)
POTASSIUM SERPL-SCNC: 4.2 MMOL/L (ref 3.5–5.1)
PROMYELOCYTES NFR BLD MANUAL: 0.5 %
PROMYELOCYTES NFR BLD MANUAL: 1.5 %
PROT SERPL-MCNC: 4 G/DL (ref 6–8.4)
PROT SERPL-MCNC: 4.4 G/DL (ref 6–8.4)
PROT SERPL-MCNC: 4.4 G/DL (ref 6–8.4)
PROT SERPL-MCNC: 4.5 G/DL (ref 6–8.4)
PROTHROMBIN TIME: 10.4 SEC (ref 9–12.5)
RBC # BLD AUTO: 2.47 M/UL (ref 4–5.4)
RBC # BLD AUTO: 2.52 M/UL (ref 4–5.4)
RBC # BLD AUTO: 2.53 M/UL (ref 4–5.4)
RBC # BLD AUTO: 2.53 M/UL (ref 4–5.4)
SAMPLE: ABNORMAL
SITE: ABNORMAL
SMUDGE CELLS BLD QL SMEAR: PRESENT
SMUDGE CELLS BLD QL SMEAR: PRESENT
SODIUM SERPL-SCNC: 137 MMOL/L (ref 136–145)
SODIUM SERPL-SCNC: 137 MMOL/L (ref 136–145)
SODIUM SERPL-SCNC: 139 MMOL/L (ref 136–145)
SODIUM SERPL-SCNC: 141 MMOL/L (ref 136–145)
TARGETS BLD QL SMEAR: ABNORMAL
TOXIC GRANULES BLD QL SMEAR: PRESENT
URATE SERPL-MCNC: 0.6 MG/DL (ref 2.4–5.7)
VANCOMYCIN SERPL-MCNC: 18.8 UG/ML
VT: 300
WBC # BLD AUTO: 49.23 K/UL (ref 3.9–12.7)
WBC # BLD AUTO: 54.49 K/UL (ref 3.9–12.7)
WBC # BLD AUTO: 55.67 K/UL (ref 3.9–12.7)
WBC # BLD AUTO: 59.45 K/UL (ref 3.9–12.7)

## 2020-08-16 PROCEDURE — 94761 N-INVAS EAR/PLS OXIMETRY MLT: CPT

## 2020-08-16 PROCEDURE — 86901 BLOOD TYPING SEROLOGIC RH(D): CPT

## 2020-08-16 PROCEDURE — 82330 ASSAY OF CALCIUM: CPT | Mod: 91

## 2020-08-16 PROCEDURE — 99233 PR SUBSEQUENT HOSPITAL CARE,LEVL III: ICD-10-PCS | Mod: ,,, | Performed by: INTERNAL MEDICINE

## 2020-08-16 PROCEDURE — 25000003 PHARM REV CODE 250: Performed by: STUDENT IN AN ORGANIZED HEALTH CARE EDUCATION/TRAINING PROGRAM

## 2020-08-16 PROCEDURE — 99900026 HC AIRWAY MAINTENANCE (STAT)

## 2020-08-16 PROCEDURE — A4217 STERILE WATER/SALINE, 500 ML: HCPCS | Performed by: STUDENT IN AN ORGANIZED HEALTH CARE EDUCATION/TRAINING PROGRAM

## 2020-08-16 PROCEDURE — 83735 ASSAY OF MAGNESIUM: CPT | Mod: 91

## 2020-08-16 PROCEDURE — 82803 BLOOD GASES ANY COMBINATION: CPT

## 2020-08-16 PROCEDURE — 80100008 HC CRRT DAILY MAINTENANCE

## 2020-08-16 PROCEDURE — 63600175 PHARM REV CODE 636 W HCPCS: Performed by: SURGERY

## 2020-08-16 PROCEDURE — 85027 COMPLETE CBC AUTOMATED: CPT

## 2020-08-16 PROCEDURE — 99291 PR CRITICAL CARE, E/M 30-74 MINUTES: ICD-10-PCS | Mod: 24,,, | Performed by: SURGERY

## 2020-08-16 PROCEDURE — 94003 VENT MGMT INPAT SUBQ DAY: CPT

## 2020-08-16 PROCEDURE — 85730 THROMBOPLASTIN TIME PARTIAL: CPT

## 2020-08-16 PROCEDURE — 90945 DIALYSIS ONE EVALUATION: CPT

## 2020-08-16 PROCEDURE — 25000003 PHARM REV CODE 250: Performed by: SURGERY

## 2020-08-16 PROCEDURE — 84100 ASSAY OF PHOSPHORUS: CPT | Mod: 91

## 2020-08-16 PROCEDURE — 84550 ASSAY OF BLOOD/URIC ACID: CPT

## 2020-08-16 PROCEDURE — 20000000 HC ICU ROOM

## 2020-08-16 PROCEDURE — 63600175 PHARM REV CODE 636 W HCPCS: Performed by: STUDENT IN AN ORGANIZED HEALTH CARE EDUCATION/TRAINING PROGRAM

## 2020-08-16 PROCEDURE — 80069 RENAL FUNCTION PANEL: CPT

## 2020-08-16 PROCEDURE — 85007 BL SMEAR W/DIFF WBC COUNT: CPT | Mod: 91

## 2020-08-16 PROCEDURE — 99291 CRITICAL CARE FIRST HOUR: CPT | Mod: 24,,, | Performed by: SURGERY

## 2020-08-16 PROCEDURE — 37799 UNLISTED PX VASCULAR SURGERY: CPT

## 2020-08-16 PROCEDURE — 27000221 HC OXYGEN, UP TO 24 HOURS

## 2020-08-16 PROCEDURE — S0028 INJECTION, FAMOTIDINE, 20 MG: HCPCS | Performed by: STUDENT IN AN ORGANIZED HEALTH CARE EDUCATION/TRAINING PROGRAM

## 2020-08-16 PROCEDURE — 83735 ASSAY OF MAGNESIUM: CPT

## 2020-08-16 PROCEDURE — 99900035 HC TECH TIME PER 15 MIN (STAT)

## 2020-08-16 PROCEDURE — 99233 SBSQ HOSP IP/OBS HIGH 50: CPT | Mod: ,,, | Performed by: INTERNAL MEDICINE

## 2020-08-16 PROCEDURE — 80202 ASSAY OF VANCOMYCIN: CPT

## 2020-08-16 PROCEDURE — 85610 PROTHROMBIN TIME: CPT

## 2020-08-16 PROCEDURE — 80053 COMPREHEN METABOLIC PANEL: CPT | Mod: 91

## 2020-08-16 RX ORDER — MAGNESIUM SULFATE HEPTAHYDRATE 40 MG/ML
4 INJECTION, SOLUTION INTRAVENOUS
Status: DISCONTINUED | OUTPATIENT
Start: 2020-08-16 | End: 2020-08-17

## 2020-08-16 RX ORDER — MIDAZOLAM HYDROCHLORIDE 1 MG/ML
1 INJECTION INTRAMUSCULAR; INTRAVENOUS EVERY 4 HOURS PRN
Status: DISCONTINUED | OUTPATIENT
Start: 2020-08-16 | End: 2020-08-21

## 2020-08-16 RX ORDER — POTASSIUM CHLORIDE 29.8 MG/ML
80 INJECTION INTRAVENOUS
Status: DISCONTINUED | OUTPATIENT
Start: 2020-08-16 | End: 2020-08-17

## 2020-08-16 RX ORDER — POTASSIUM CHLORIDE 14.9 MG/ML
60 INJECTION INTRAVENOUS
Status: DISCONTINUED | OUTPATIENT
Start: 2020-08-16 | End: 2020-08-17

## 2020-08-16 RX ORDER — POTASSIUM CHLORIDE 29.8 MG/ML
40 INJECTION INTRAVENOUS
Status: DISCONTINUED | OUTPATIENT
Start: 2020-08-16 | End: 2020-08-17

## 2020-08-16 RX ORDER — MAGNESIUM SULFATE HEPTAHYDRATE 40 MG/ML
2 INJECTION, SOLUTION INTRAVENOUS
Status: DISCONTINUED | OUTPATIENT
Start: 2020-08-16 | End: 2020-08-17

## 2020-08-16 RX ADMIN — FAMOTIDINE 20 MG: 10 INJECTION INTRAVENOUS at 08:08

## 2020-08-16 RX ADMIN — VASOPRESSIN 0.02 UNITS/MIN: 20 INJECTION INTRAVENOUS at 01:08

## 2020-08-16 RX ADMIN — POTASSIUM CHLORIDE 40 MEQ: 400 INJECTION, SOLUTION INTRAVENOUS at 05:08

## 2020-08-16 RX ADMIN — CALCIUM GLUCONATE 1 G: 98 INJECTION, SOLUTION INTRAVENOUS at 06:08

## 2020-08-16 RX ADMIN — SODIUM PHOSPHATE, MONOBASIC, MONOHYDRATE 20.01 MMOL: 276; 142 INJECTION, SOLUTION INTRAVENOUS at 03:08

## 2020-08-16 RX ADMIN — MIDAZOLAM 1 MG: 1 INJECTION INTRAMUSCULAR; INTRAVENOUS at 03:08

## 2020-08-16 RX ADMIN — PIPERACILLIN SODIUM AND TAZOBACTAM SODIUM 4.5 G: 4; .5 INJECTION, POWDER, LYOPHILIZED, FOR SOLUTION INTRAVENOUS at 11:08

## 2020-08-16 RX ADMIN — FLUCONAZOLE 200 MG: 200 INJECTION, SOLUTION INTRAVENOUS at 10:08

## 2020-08-16 RX ADMIN — PIPERACILLIN SODIUM AND TAZOBACTAM SODIUM 4.5 G: 4; .5 INJECTION, POWDER, LYOPHILIZED, FOR SOLUTION INTRAVENOUS at 07:08

## 2020-08-16 RX ADMIN — HEPARIN SODIUM 5000 UNITS: 5000 INJECTION INTRAVENOUS; SUBCUTANEOUS at 05:08

## 2020-08-16 RX ADMIN — CALCIUM GLUCONATE 2 G: 98 INJECTION, SOLUTION INTRAVENOUS at 01:08

## 2020-08-16 RX ADMIN — SODIUM PHOSPHATE, MONOBASIC, MONOHYDRATE 20.01 MMOL: 276; 142 INJECTION, SOLUTION INTRAVENOUS at 11:08

## 2020-08-16 RX ADMIN — HEPARIN SODIUM 5000 UNITS: 5000 INJECTION INTRAVENOUS; SUBCUTANEOUS at 09:08

## 2020-08-16 RX ADMIN — PROPOFOL 50 MCG/KG/MIN: 10 INJECTION, EMULSION INTRAVENOUS at 05:08

## 2020-08-16 RX ADMIN — PIPERACILLIN SODIUM AND TAZOBACTAM SODIUM 4.5 G: 4; .5 INJECTION, POWDER, LYOPHILIZED, FOR SOLUTION INTRAVENOUS at 03:08

## 2020-08-16 RX ADMIN — PROPOFOL 50 MCG/KG/MIN: 10 INJECTION, EMULSION INTRAVENOUS at 01:08

## 2020-08-16 RX ADMIN — VANCOMYCIN HYDROCHLORIDE 750 MG: 750 INJECTION, POWDER, LYOPHILIZED, FOR SOLUTION INTRAVENOUS at 11:08

## 2020-08-16 RX ADMIN — MAGNESIUM SULFATE 2 G: 2 INJECTION INTRAVENOUS at 03:08

## 2020-08-16 RX ADMIN — PROPOFOL 50 MCG/KG/MIN: 10 INJECTION, EMULSION INTRAVENOUS at 04:08

## 2020-08-16 RX ADMIN — CALCIUM GLUCONATE 1 G: 98 INJECTION, SOLUTION INTRAVENOUS at 05:08

## 2020-08-16 RX ADMIN — PROPOFOL 45 MCG/KG/MIN: 10 INJECTION, EMULSION INTRAVENOUS at 10:08

## 2020-08-16 RX ADMIN — MIDAZOLAM 1 MG: 1 INJECTION INTRAMUSCULAR; INTRAVENOUS at 11:08

## 2020-08-16 RX ADMIN — SODIUM PHOSPHATE, MONOBASIC, MONOHYDRATE 15 MMOL: 276; 142 INJECTION, SOLUTION INTRAVENOUS at 01:08

## 2020-08-16 RX ADMIN — MAGNESIUM SULFATE 2 G: 2 INJECTION INTRAVENOUS at 11:08

## 2020-08-16 RX ADMIN — Medication 150 MCG: at 08:08

## 2020-08-16 RX ADMIN — HEPARIN SODIUM 5000 UNITS: 5000 INJECTION INTRAVENOUS; SUBCUTANEOUS at 02:08

## 2020-08-16 RX ADMIN — ASCORBIC ACID, VITAMIN A PALMITATE, CHOLECALCIFEROL, THIAMINE HYDROCHLORIDE, RIBOFLAVIN-5 PHOSPHATE SODIUM, PYRIDOXINE HYDROCHLORIDE, NIACINAMIDE, DEXPANTHENOL, ALPHA-TOCOPHEROL ACETATE, VITAMIN K1, FOLIC ACID, BIOTIN, CYANOCOBALAMIN: 200; 3300; 200; 6; 3.6; 6; 40; 15; 10; 150; 600; 60; 5 INJECTION, SOLUTION INTRAVENOUS at 09:08

## 2020-08-16 RX ADMIN — DEXTROSE MONOHYDRATE 12.5 G: 25 INJECTION, SOLUTION INTRAVENOUS at 12:08

## 2020-08-16 RX ADMIN — PROPOFOL 45 MCG/KG/MIN: 10 INJECTION, EMULSION INTRAVENOUS at 01:08

## 2020-08-16 NOTE — PLAN OF CARE
Pt remains intubated/sedated; follows commands in all extremities. VSS currently. MAP maintained > 65. Fentanyl and propofol gtts continued. Vent settings AC/VC FiO2 40%, PEEP 8, , rate 25. Abdomen remains open with abthera wound vac in place, ~300cc serosanguinous output.  G-tube to gravity with ~340cc brown bile output. NGT to LIMS with no output. CT to water seal with ~10cc serous output. GODFREY drains with ~80cc serosanguinous output. U/O minimal with 0-5cc/hr, jimenez d/c'd this morning per Dr. Rayo, no urine output noted since. CRRT SLED continued, electrolytes replaced as needed. No skin breakdown noted. Foam dressings and heel boots in place. Family and pt updated on POC. WCTM.

## 2020-08-16 NOTE — PLAN OF CARE
08/16/20 0903   Post-Acute Status   Post-Acute Authorization Other   Discharge Delays None known at this time   Discharge Plan   Discharge Plan A Home with family   Discharge Plan B Other  (TBD)

## 2020-08-16 NOTE — ASSESSMENT & PLAN NOTE
38 yo F s/p antrectomy with BII reconstruction c/b duodenal necrosis requiring ex lap, washout, drainage c/b aspiration event. Now with severe sepsis and ARDS     .Neuro:  - Pain controlled on fentanyl  - Sedated with propofol  - Add Versed     Resp:  - Intubated on vent Fi 50, PEEP 8, rate 30 I:E changed from 1:1 to 1:2  - Wean vent per ARDSnet protocol  - Daily SBT  - PRN breathing treatments  - Daily CXR  - Right chest tube with output     CV:  - MAP goal >65  - Systolic <160  - Levo and Vaso gtt - off     Heme:  - Hgb/Hct 7.5 - stable  - Transfuse as indicated     ID:  - WBC 55 => still elevated  - Vanc, zosyn, Fluconazole  - F/u cultures      Renal:  - D/c Parker  - Oliguric on arrival  - Cr 1.9 => 0.6, minimal UOP  - Monitor I/Os  - Trend BMP daily  - Lasix challenge, possible dialysis needed  - Nephrology consulted, appreciate recs and assistance with patient     FEN/GI:  - NPO  - Start TPN  - NGT to LIWS  - Maintain drains to bulb suction  - GI ppx  - Replace electrolytes as needed to keep K>4, Mg>2     Endo:  - Monitor BG     Dispo:  - Continue SICU care

## 2020-08-16 NOTE — PLAN OF CARE
"      SICU PLAN OF CARE NOTE    Dx: <principal problem not specified>    Shift Events: Vaso gtt on/off throughout the night to maintain map > 65, currently off. 1 BM. 2 doses of 12.5 mg D50 given for BG 75 & 70 overnight.     Neuro: Arouses to Voice, Follows Commands, and Moves All Extremities    Vital Signs: BP (!) 103/56 (BP Location: Left arm, Patient Position: Lying)   Pulse 95   Temp 98.6 °F (37 °C) (Oral)   Resp (!) 25   Ht 5' 2" (1.575 m)   Wt 74.5 kg (164 lb 3.9 oz)   SpO2 (!) 93%   Breastfeeding No   BMI 30.04 kg/m²     Respiratory: Ventilator 40%/8 PEEP    Diet: NPO    Gtts: Propfol, Fentanyl, Vasopressin, and MIVF    Urine Output: Urinary Catheter 29 cc/shift    Drains: Chest Tube, total output 14 cc /  shift  GODFREY total: 72 ml  G tube: 275 ml   Wound vac: 350 ml    CRRT UF at 300, goal is to be net negative 3L in 24 hours.     Labs/Accuchecks: Q6h labs, Q4H accuchecks    Skin: no skin breakdown noted for shift. Patient turned with hay bed Q2H. Heel foams and boots on. Foams placed on thighs to protect from lines.        "

## 2020-08-16 NOTE — ASSESSMENT & PLAN NOTE
40 yo F s/p antrectomy with BII reconstruction c/b duodenal necrosis requiring ex lap, washout, drainage c/b aspiration event. S/p duodenal resection with d-j anastomosis, g-j and abthera vac placement on 8/13 and washout with g-tube placement on 8/15.    .Neuro:  - Sedation with propofol  - fentanyl   - Sedation vacations, neuro exams    Resp:  - Intubated on vent  - Wean vent as tolerated    CV:  - HDS off pressors  - Tachycardia resolved    Heme:  - Hgb/Hct - stable  - Transfuse as indicated; received 2u PRBC on 8/13    ID:  - Broad spectrum abx/antifungal  - F/u cultures     Renal:  - Parker in place; oliguric  - Nephrology consulted; Continue CRRT with aggressive volume removal as tolerated    FEN/GI:  - NPO  - NGT to LIWS  - Maintain drains to bulb suction  - G tube to gravity  - GI ppx  - Start Tpn    Endo:  - Monitor BG    Dispo:  - Continue ICU care; appreciate SICU assistance; Plan for return to OR on 8/18 for possible closure

## 2020-08-16 NOTE — ANESTHESIA POSTPROCEDURE EVALUATION
Anesthesia Post Evaluation    Patient: Jaquan Farmer had no intraoperative  Complications. ETT change with video laryngoscopy showed laryngeal edema and large amounts of mucopurulent secretions were suctioned from the trachea. No complications.    Procedure(s) Performed: Procedure(s) (LRB):  LAPAROTOMY, EXPLORATORY; abthera replacement (N/A)  INSERTION, GASTROSTOMY TUBE, PERCUTANEOUS  WASHOUT; abdominal (N/A)    Final Anesthesia Type: general    Patient location during evaluation: ICU  Patient participation: No - Unable to Participate, Intubation  Level of consciousness: awake and alert and sedated  Post-procedure vital signs: reviewed and stable  Pain management: adequate  Airway patency: patent    PONV status at discharge: No PONV  Anesthetic complications: no      Cardiovascular status: blood pressure returned to baseline  Respiratory status: intubated, ventilator and ETT  Hydration status: euvolemic  Follow-up not needed.          Vitals Value Taken Time   BP 98/56 08/16/20 1801   Temp 37.4 °C (99.3 °F) 08/16/20 1500   Pulse 105 08/16/20 1851   Resp 25 08/16/20 1851   SpO2 99 % 08/16/20 1851   Vitals shown include unvalidated device data.      No case tracking events are documented in the log.      Pain/Rod Score: Pain Rating Prior to Med Admin: 6 (8/16/2020  3:00 AM)

## 2020-08-16 NOTE — SUBJECTIVE & OBJECTIVE
Interval History: NAEON. Per primary team, UF increased to 400-450.     Review of patient's allergies indicates:   Allergen Reactions    Latex      Current Facility-Administered Medications   Medication Frequency    0.9%  NaCl infusion (CRRT USE ONLY) Continuous    calcium gluconate 1g in dextrose 5% 100mL (ready to mix system) PRN    calcium gluconate 2 g in dextrose 5 % 100 mL IVPB PRN    calcium gluconate 3 g in dextrose 5 % 100 mL IVPB PRN    famotidine (PF) injection 20 mg Daily    fentaNYL 2500 mcg in 0.9% sodium chloride 250 mL infusion premix (titrating) Continuous    fluconazole (DIFLUCAN) IVPB 200 mg 100 mL Q24H    heparin (porcine) injection 5,000 Units Q8H    HYDROmorphone injection 0.2 mg Q5 Min PRN    lactated ringers infusion Continuous    magnesium sulfate 2g in water 50mL IVPB (premix) PRN    magnesium sulfate 2g in water 50mL IVPB (premix) PRN    midazolam (VERSED) 1 mg/mL injection 1 mg Q4H PRN    ondansetron injection 4 mg Once PRN    piperacillin-tazobactam 4.5 g in sodium chloride 0.9% 100 mL IVPB (ready to mix system) Q8H    potassium chloride 20 mEq in 100 mL IVPB (FOR CENTRAL LINE ADMINISTRATION ONLY) PRN    potassium chloride 40 mEq in 100 mL IVPB (FOR CENTRAL LINE ADMINISTRATION ONLY) PRN    potassium chloride 40 mEq in 100 mL IVPB (FOR CENTRAL LINE ADMINISTRATION ONLY) PRN    propofol (DIPRIVAN) 10 mg/mL infusion Continuous    sodium phosphate 15 mmol in dextrose 5 % 250 mL IVPB PRN    sodium phosphate 20.01 mmol in dextrose 5 % 250 mL IVPB PRN    sodium phosphate 30 mmol in dextrose 5 % 250 mL IVPB PRN    Standard Custom Day One ADULT TPN for patient WITH electrolyte abnormality or renal dysfunction (CENTRAL) Continuous    vancomycin - pharmacy to dose pharmacy to manage frequency       Objective:     Vital Signs (Most Recent):  Temp: 99.3 °F (37.4 °C) (08/16/20 1500)  Pulse: 108 (08/16/20 1500)  Resp: (!) 25 (08/16/20 1500)  BP: (!) 104/59 (08/16/20 1500)  SpO2:  100 % (08/16/20 1500)  O2 Device (Oxygen Therapy): ventilator (08/16/20 1500) Vital Signs (24h Range):  Temp:  [98.6 °F (37 °C)-99.5 °F (37.5 °C)] 99.3 °F (37.4 °C)  Pulse:  [] 108  Resp:  [6-34] 25  SpO2:  [91 %-100 %] 100 %  BP: ()/(53-68) 104/59  Arterial Line BP: ()/(48-73) 122/53     Weight: 74.5 kg (164 lb 3.9 oz) (08/16/20 0500)  Body mass index is 30.04 kg/m².  Body surface area is 1.81 meters squared.    I/O last 3 completed shifts:  In: 8912.5 [I.V.:8912.5]  Out: 00492 [Urine:264; Drains:1371; Other:52636; Blood:50; Chest Tube:56]    Physical Exam  Constitutional:       Appearance: She is ill-appearing.   Eyes:      Conjunctiva/sclera: Conjunctivae normal.      Pupils: Pupils are equal, round, and reactive to light.   Cardiovascular:      Rate and Rhythm: Normal rate and regular rhythm.      Pulses: Normal pulses.      Heart sounds: Normal heart sounds.   Pulmonary:      Effort: Pulmonary effort is normal. No respiratory distress.      Breath sounds: Rhonchi present.   Abdominal:      General: Bowel sounds are normal.      Palpations: Abdomen is soft.      Comments: Wound vac in place   Skin:     General: Skin is warm and dry.      Findings: No bruising or rash.   Neurological:      Comments: Sedated on vent         Significant Labs:  CBC:   Recent Labs   Lab 08/16/20  1045   WBC 54.49*   RBC 2.53*   HGB 7.6*   HCT 23.0*      MCV 91   MCH 30.0   MCHC 33.0     CMP:   Recent Labs   Lab 08/16/20  1045   GLU 93   CALCIUM 7.7*   ALBUMIN 1.2*   PROT 4.5*      K 3.7   CO2 27      BUN <2*   CREATININE 0.6   ALKPHOS 114   ALT 28   AST 54*   BILITOT 1.0     Coagulation:   Recent Labs   Lab 08/16/20  0400   INR 0.9   APTT 34.2*     All labs within the past 24 hours have been reviewed.     Significant Imaging:  All imaging within the last 24 hours have been reviewed.

## 2020-08-16 NOTE — PROGRESS NOTES
[facility]  Nephrology  Progress Note    Patient Name: Jaquan Farmer  MRN: [id number]  Admission Date: 8/12/2020  Hospital Length of Stay: 4 days  Attending Provider: Donis Roa MD   Primary Care Physician: Primary Doctor No  Principal Problem:<principal problem not specified>    Subjective:     HPI: Mrs. Farmer is a 39 year old female with antrectomy at [facility] complicated duodenal necrosis post op. She aspirated, was intubated, and became septic. She developed renal failure and required max vent settings and was transferred here for higher level of care. On arrival she was hypotensive on pressers, max vent setting, and had minimal urine output.  Overnight she was given 6L of fluid, 6g calcium, placed on vac, pip-tazo, and fluconazole. Nephro consulted for sid.    Interval History: NAEON. Per primary team, UF increased to 400-450.     Review of patient's allergies indicates:   Allergen Reactions    Latex      Current Facility-Administered Medications   Medication Frequency    0.9%  NaCl infusion (CRRT USE ONLY) Continuous    calcium gluconate 1g in dextrose 5% 100mL (ready to mix system) PRN    calcium gluconate 2 g in dextrose 5 % 100 mL IVPB PRN    calcium gluconate 3 g in dextrose 5 % 100 mL IVPB PRN    famotidine (PF) injection 20 mg Daily    fentaNYL 2500 mcg in 0.9% sodium chloride 250 mL infusion premix (titrating) Continuous    fluconazole (DIFLUCAN) IVPB 200 mg 100 mL Q24H    heparin (porcine) injection 5,000 Units Q8H    HYDROmorphone injection 0.2 mg Q5 Min PRN    lactated ringers infusion Continuous    magnesium sulfate 2g in water 50mL IVPB (premix) PRN    magnesium sulfate 2g in water 50mL IVPB (premix) PRN    midazolam (VERSED) 1 mg/mL injection 1 mg Q4H PRN    ondansetron injection 4 mg Once PRN    piperacillin-tazobactam 4.5 g in sodium chloride 0.9% 100 mL IVPB (ready to mix system) Q8H    potassium chloride 20 mEq in 100 mL IVPB (FOR CENTRAL LINE  ADMINISTRATION ONLY) PRN    potassium chloride 40 mEq in 100 mL IVPB (FOR CENTRAL LINE ADMINISTRATION ONLY) PRN    potassium chloride 40 mEq in 100 mL IVPB (FOR CENTRAL LINE ADMINISTRATION ONLY) PRN    propofol (DIPRIVAN) 10 mg/mL infusion Continuous    sodium phosphate 15 mmol in dextrose 5 % 250 mL IVPB PRN    sodium phosphate 20.01 mmol in dextrose 5 % 250 mL IVPB PRN    sodium phosphate 30 mmol in dextrose 5 % 250 mL IVPB PRN    Standard Custom Day One ADULT TPN for patient WITH electrolyte abnormality or renal dysfunction (CENTRAL) Continuous    vancomycin - pharmacy to dose pharmacy to manage frequency       Objective:     Vital Signs (Most Recent):  Temp: 99.3 °F (37.4 °C) (08/16/20 1500)  Pulse: 108 (08/16/20 1500)  Resp: (!) 25 (08/16/20 1500)  BP: (!) 104/59 (08/16/20 1500)  SpO2: 100 % (08/16/20 1500)  O2 Device (Oxygen Therapy): ventilator (08/16/20 1500) Vital Signs (24h Range):  Temp:  [98.6 °F (37 °C)-99.5 °F (37.5 °C)] 99.3 °F (37.4 °C)  Pulse:  [] 108  Resp:  [6-34] 25  SpO2:  [91 %-100 %] 100 %  BP: ()/(53-68) 104/59  Arterial Line BP: ()/(48-73) 122/53     Weight: 74.5 kg (164 lb 3.9 oz) (08/16/20 0500)  Body mass index is 30.04 kg/m².  Body surface area is 1.81 meters squared.    I/O last 3 completed shifts:  In: 8912.5 [I.V.:8912.5]  Out: 51154 [Urine:264; Drains:1371; Other:39744; Blood:50; Chest Tube:56]    Physical Exam  Constitutional:       Appearance: She is ill-appearing.   Eyes:      Conjunctiva/sclera: Conjunctivae normal.      Pupils: Pupils are equal, round, and reactive to light.   Cardiovascular:      Rate and Rhythm: Normal rate and regular rhythm.      Pulses: Normal pulses.      Heart sounds: Normal heart sounds.   Pulmonary:      Effort: Pulmonary effort is normal. No respiratory distress.      Breath sounds: Rhonchi present.   Abdominal:      General: Bowel sounds are normal.      Palpations: Abdomen is soft.      Comments: Wound vac in place   Skin:      General: Skin is warm and dry.      Findings: No bruising or rash.   Neurological:      Comments: Sedated on vent         Significant Labs:  CBC:   Recent Labs   Lab 08/16/20  1045   WBC 54.49*   RBC 2.53*   HGB 7.6*   HCT 23.0*      MCV 91   MCH 30.0   MCHC 33.0     CMP:   Recent Labs   Lab 08/16/20  1045   GLU 93   CALCIUM 7.7*   ALBUMIN 1.2*   PROT 4.5*      K 3.7   CO2 27      BUN <2*   CREATININE 0.6   ALKPHOS 114   ALT 28   AST 54*   BILITOT 1.0     Coagulation:   Recent Labs   Lab 08/16/20  0400   INR 0.9   APTT 34.2*     All labs within the past 24 hours have been reviewed.     Significant Imaging:  All imaging within the last 24 hours have been reviewed.    Assessment/Plan:     Metabolic acidosis  -secondary to renal failure  -on SLED    Acute renal failure with tubular necrosis  -oliguric stage 3 sid with ischemic atn likely secondary to septic shock  -unknown bl cr  -with metabolic acidosis and anasarca  -muddy brown casts on microscopy  -anuric overnight with elevated bun and cr  -plan for 3 liters net negative fluid balance over 24 hours  -SLED continuous 450-550cc/hr 3k bath  - Will consider intermittent SLED tomorrow.                        Thank you for your consult. I will follow-up with patient. Please contact us if you have any additional questions.    Nneka Alvarado MD  Nephrology  [facility]    I have personally verified the history and examined the patient. I thoroughly reviewed the demographic, clinical, laboratorial and imaging information available in medical records. I agree with the assessment and recommendations provided except as I have documented.     Change potassium bath to 4 mmol/L. Continue fluid removal as tolerated.     Alanna Nicolas MD  [facility], Nephorlogy

## 2020-08-16 NOTE — PROGRESS NOTES
Pharmacokinetic Assessment Follow Up: IV Vancomycin    Vancomycin Regimen Assessment & Plan:  - Vancomycin level drawn with morning labs today resulted as 18.8 ug/mL, goal trough range 10-20 ug/mL.  - Patient with FLORIDALMA, nephrology consulted for RRT, SLED initiated.    - Redose with vancomycin 750 mg once today.   - Draw vancomycin level with morning labs tomorrow.   - Will re-dose as needed depending on level and renal function.    Drug levels (last 3 results):  Recent Labs   Lab Result Units 08/14/20  0315 08/15/20  0400 08/16/20  0400   Vancomycin, Random ug/mL 37.1 23.0 18.8       Pharmacy will continue to follow and monitor vancomycin.    Please contact pharmacy at extension [phone] for questions regarding this assessment.    Thank you for the consult,   El Shetty     Patient brief summary:  Jaquan Farmer is a 39 y.o. female initiated on antimicrobial therapy with IV Vancomycin for treatment of intra-abdominal infection    The patient's current regimen is pulse dosing.    Drug Allergies:   Review of patient's allergies indicates:   Allergen Reactions    Latex      Actual Body Weight:   79.5 kg    Renal Function:   Estimated Creatinine Clearance: 119 mL/min (based on SCr of 0.6 mg/dL).,     Dialysis Method (if applicable):  N/A

## 2020-08-16 NOTE — PROGRESS NOTES
[facility]  General Surgery  Progress Note    Subjective:     History of Present Illness:  Pt is a 38 yo F with recent history of antrectomy with BII reconstruction for ulcer disease at outside hospital. 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 Patient also reportedly aspirated on induction in the OR prior to this, resulting in severe pulmonary edema and hypoxia. [place] [facility] [place] higher level of care. She arrived as a direct SICU admit on near maximal vent settings and requiring low dose vasopressors with HR in the upper 140s. Her midline laparotomy is closed with 4 Navdeep drains in place draining bilious output.     Post-Op Info:  Procedure(s) (LRB):  LAPAROTOMY, EXPLORATORY; abthera replacement (N/A)  INSERTION, GASTROSTOMY TUBE, PERCUTANEOUS  WASHOUT; abdominal (N/A)   1 Day Post-Op     Interval History: Stable overnight  Tolerating CRRT  GODFREY drains with minimal bile stained output    Medications:  Continuous Infusions:   sodium chloride 0.9% 200 mL/hr at 08/16/20 0900    fentanyl 150 mcg/hr (08/16/20 0900)    lactated ringers 5 mL/hr at 08/16/20 0600    propofoL 45 mcg/kg/min (08/16/20 0900)    Standard Custom Day One ADULT TPN for patient WITH electrolyte abnormality or renal dysfunction (CENTRAL)      vasopressin (PITRESSIN) infusion Stopped (08/16/20 0316)     Scheduled Meds:   famotidine (PF)  20 mg Intravenous Daily    fluconazole (DIFLUCAN) IVPB  200 mg Intravenous Q24H    heparin (porcine)  5,000 Units Subcutaneous Q8H    piperacillin-tazobactam (ZOSYN) IVPB  4.5 g Intravenous Q8H    vancomycin (VANCOCIN) IVPB  750 mg Intravenous Once     PRN Meds:HYDROmorphone, midazolam, ondansetron, Pharmacy to dose Vancomycin consult **AND** vancomycin - pharmacy to dose     Review of patient's allergies indicates:   Allergen Reactions    Latex      Objective:     Vital Signs (Most Recent):  Temp: 99 °F (37.2  °C) (08/16/20 0700)  Pulse: 104 (08/16/20 0930)  Resp: (!) 25 (08/16/20 0930)  BP: (!) 113/58 (08/16/20 0900)  SpO2: 99 % (08/16/20 0930) Vital Signs (24h Range):  Temp:  [98.6 °F (37 °C)-99.5 °F (37.5 °C)] 99 °F (37.2 °C)  Pulse:  [] 104  Resp:  [6-34] 25  SpO2:  [91 %-100 %] 99 %  BP: ()/(53-66) 113/58  Arterial Line BP: ()/(48-70) 141/56     Weight: 74.5 kg (164 lb 3.9 oz)  Body mass index is 30.04 kg/m².    Intake/Output - Last 3 Shifts       08/14 0700 - 08/15 0659 08/15 0700 - 08/16 0659 08/16 0700 - 08/17 0659    I.V. (mL/kg) 6340.5 (79.8) 5466 (73.4)     Blood       NG/GT       IV Piggyback       Total Intake(mL/kg) 6340.5 (79.8) 5466 (73.4)     Urine (mL/kg/hr) 364 (0.2) 100 (0.1) 0 (0)    Drains 1658 912 165    Other 50025 5206 1405    Stool 0 0     Blood  50     Chest Tube 85 44 8    Total Output 60408 6312 1578    Net -7543.5 -846 -1578           Stool Occurrence 1 x 2 x           Physical Exam  Constitutional:       Interventions: She is sedated and intubated.   HENT:      Head: Normocephalic.   Cardiovascular:      Rate and Rhythm: Regular rhythm. Tachycardia present.      Comments: Improving  Pulmonary:      Effort: She is intubated.      Comments: Intubated and mechanically ventilated  Vent Mode: A/C  Oxygen Concentration (%):  (40-50) 40  Resp Rate Total:  (25 br/min-34 br/min) 26 br/min  Vt Set:  (300 mL) 300 mL  PEEP/CPAP:  (8 cmH20) 8 cmH20  Mean Airway Pressure:  (9.8 cvL99-93 cmH20) 17 cmH20  Abdominal:      Comments: Abthera with ss output  GODFREY x2 with bile stained output   Musculoskeletal:         General: Swelling present.      Right lower leg: Edema present.      Left lower leg: Edema present.   Skin:     General: Skin is warm and dry.   Neurological:      Comments: Sedated on propofol         Significant Labs:  Reviewed    Significant Diagnostics:  reviewed    Assessment/Plan:     Severe sepsis  38 yo F s/p antrectomy with BII reconstruction c/b duodenal necrosis requiring  ex lap, washout, drainage c/b aspiration event. S/p duodenal resection with d-j anastomosis, g-j and abthera vac placement on 8/13 and washout with g-tube placement on 8/15.    .Neuro:  - Sedation with propofol  - fentanyl   - Sedation vacations, neuro exams    Resp:  - Intubated on vent  - Wean vent as tolerated    CV:  - HDS off pressors  - Tachycardia resolved    Heme:  - Hgb/Hct - stable  - Transfuse as indicated; received 2u PRBC on 8/13    ID:  - Broad spectrum abx/antifungal  - F/u cultures     Renal:  - Parker in place; oliguric  - Nephrology consulted; Continue CRRT with aggressive volume removal as tolerated    FEN/GI:  - NPO  - NGT to LIWS  - Maintain drains to bulb suction  - G tube to gravity  - GI ppx  - Start Tpn    Endo:  - Monitor BG    Dispo:  - Continue ICU care; appreciate SICU assistance; Plan for return to OR on 8/18 for possible closure            Jann Jeffries MD  General Surgery  [facility]

## 2020-08-16 NOTE — PROGRESS NOTES
[facility]  Critical Care - Surgery  Progress Note    Patient Name: Jaquan Farmer  MRN: [id number]  Admission Date: 8/12/2020  Hospital Length of Stay: 4 days  Code Status: Full Code  Attending Provider: Donis Roa MD  Primary Care Provider: Primary Doctor No   Principal Problem: <principal problem not specified>    Subjective:     Hospital/ICU Course:  8/14 Patient had a line replaced along with central line placed, with complication of pneumothorax. Rt pigtail was placed. Patient tolerated complications well. Decreasing vent settings.  8/15 NAEO. Patient was taken back to OR early AM for washout. Possible closure 8/18. ETT exchanged in OR 7.0 -> 7.5    Interval History/Significant Events: NAEON. HDS. Intubated, sedated. OR tomorrow for closure.    Follow-up For: Procedure(s) (LRB):  LAPAROTOMY, EXPLORATORY (N/A)  EGD (ESOPHAGOGASTRODUODENOSCOPY) (N/A)  APPLICATION, WOUND VAC WITH ABTHERA  DUODENOJEJUNAL ANASTAMOSIS, GASTROJEJUNAL ANASTOMOSIS  KOCHERIZATION OF DUODENUM, DUODENECTOMY    Post-Operative Day: POD 1 s/p washout    Objective:     Vital Signs (Most Recent):  Temp: 99 °F (37.2 °C) (08/16/20 1100)  Pulse: 103 (08/16/20 1200)  Resp: (!) 25 (08/16/20 1200)  BP: 119/63 (08/16/20 1200)  SpO2: 100 % (08/16/20 1200) Vital Signs (24h Range):  Temp:  [98.6 °F (37 °C)-99.5 °F (37.5 °C)] 99 °F (37.2 °C)  Pulse:  [] 103  Resp:  [6-34] 25  SpO2:  [91 %-100 %] 100 %  BP: ()/(53-68) 119/63  Arterial Line BP: ()/(48-73) 140/60     Weight: 74.5 kg (164 lb 3.9 oz)  Body mass index is 30.04 kg/m².      Intake/Output Summary (Last 24 hours) at 8/16/2020 1220  Last data filed at 8/16/2020 1200  Gross per 24 hour   Intake 5166 ml   Output 7826 ml   Net -2660 ml       Physical Exam  Vitals signs and nursing note reviewed.   Constitutional:       General: She is not in acute distress.     Appearance: She is ill-appearing.      Comments: Sedated on mechanical ventilation.   HENT:       Head: Normocephalic and atraumatic.      Nose: Nose normal.      Comments: NG present.     Mouth/Throat:      Mouth: Mucous membranes are moist.   Eyes:      Conjunctiva/sclera: Conjunctivae normal.      Pupils: Pupils are equal, round, and reactive to light.   Neck:      Musculoskeletal: Neck supple. No muscular tenderness.      Comments: Right IJ in place  Cardiovascular:      Rate and Rhythm: Normal rate and regular rhythm.      Heart sounds: Normal heart sounds.   Pulmonary:      Effort: Pulmonary effort is normal.      Breath sounds: Rhonchi present. No wheezing.      Comments: Coarse breath sounds bilaterally.  Abdominal:      Palpations: Abdomen is soft.      Comments: Midline surgical drain   Skin:     General: Skin is warm and dry.   Neurological:      Comments: Follows commands off sedation   Psychiatric:      Comments: Sedated         Vents:  Vent Mode: A/C (08/16/20 0739)  Ventilator Initiated: Yes(chart correction) (08/12/20 1930)  Set Rate: 25 BPM (08/16/20 0739)  Vt Set: 300 mL (08/16/20 0739)  PEEP/CPAP: 8 cmH20 (08/16/20 0739)  Oxygen Concentration (%): 40 (08/16/20 1200)  Peak Airway Pressure: 38 cmH2O (08/16/20 0739)  Plateau Pressure: 22 cmH20 (08/16/20 0739)  Total Ve: 6.44 mL (08/16/20 0739)  F/VT Ratio<105 (RSBI): 110.58 (08/16/20 0739)    Lines/Drains/Airways     Peripherally Inserted Central Catheter Line            PICC Double Lumen right brachial -- days          Central Venous Catheter Line            Trialysis (Dialysis) Catheter 08/13/20 2230 right internal jugular 2 days          Drain                 NG/OG Tube 08/06/20 nasogastric Left nostril 10 days         Chest Tube 08/14/20 0007 Right Midaxillary 14 Fr. 2 days         Closed/Suction Drain 08/13/20 1659 Right;Inferior Abdomen Bulb 19 Fr. 2 days         Closed/Suction Drain 08/13/20 1659 Right;Superior Abdomen Bulb 19 Fr. 2 days         Gastrostomy/Enterostomy 08/15/20 0916 Gastrostomy tube w/ balloon LUQ decompression 1 day           Airway                 Airway - Non-Surgical 08/15/20 0835 Endotracheal Tube 1 day          Arterial Line                 Arterial Line 08/06/20 Left Radial 10 days          Peripheral Intravenous Line                 Peripheral IV - Single Lumen 08/12/20 18 G Right Forearm 4 days         Peripheral IV - Single Lumen 08/12/20 2007 18 G Left Forearm 3 days                Significant Labs:    CBC/Anemia Profile:  Recent Labs   Lab 08/15/20  1606 08/15/20  2200 08/16/20  0400   WBC 60.13* 46.85* 55.67*   HGB 7.9* 7.4* 7.5*   HCT 23.6* 22.9* 23.5*   * 135* 137*   MCV 92 91 93   RDW 15.7* 15.7* 15.6*        Chemistries:  Recent Labs   Lab 08/15/20  1606 08/15/20  2200 08/16/20  0400     139 142  142 139  139   K 4.3  4.3 4.1  4.1 3.5  3.5     107 109  109 107  107   CO2 25  25 24  24 24  24   BUN 7  7 5*  5* 3*  3*   CREATININE 1.0  1.0 0.7  0.7 0.6  0.6   CALCIUM 7.5*  7.5* 7.2*  7.2* 7.6*  7.6*   ALBUMIN 1.2*  1.2* 1.2*  1.2* 1.2*  1.2*   PROT 4.0* 4.0* 4.0*   BILITOT 1.2* 1.2* 1.0   ALKPHOS 90 107 105   ALT 26 25 26   AST 67* 59* 54*   MG 2.2  2.2 2.1 1.9   PHOS 3.4  3.4 2.6*  2.6* 3.3  3.3       All pertinent labs within the past 24 hours have been reviewed.    Significant Imaging:  I have reviewed all pertinent imaging results/findings within the past 24 hours.    Assessment/Plan:     Severe sepsis   40 yo F s/p antrectomy with BII reconstruction c/b duodenal necrosis requiring ex lap, washout, drainage c/b aspiration event. Now with severe sepsis and ARDS     .Neuro:  - Pain controlled on fentanyl  - Sedated with propofol  - Add Versed     Resp:  - Intubated on vent Fi 50, PEEP 8, rate 30 I:E changed from 1:1 to 1:2  - Wean vent per ARDSnet protocol  - Daily SBT  - PRN breathing treatments  - Daily CXR  - Right chest tube with output     CV:  - MAP goal >65  - Systolic <160  - Levo and Vaso gtt - off     Heme:  - Hgb/Hct 7.5 - stable  - Transfuse as indicated      ID:  - WBC 55 => still elevated  - Vanc, zosyn, Fluconazole  - F/u cultures      Renal:  - D/c Parker  - Oliguric on arrival  - Cr 1.9 => 0.6, minimal UOP  - Monitor I/Os  - Trend BMP daily  - Lasix challenge, possible dialysis needed  - Nephrology consulted, appreciate recs and assistance with patient     FEN/GI:  - NPO  - Start TPN  - NGT to LIWS  - Maintain drains to bulb suction  - GI ppx  - Replace electrolytes as needed to keep K>4, Mg>2     Endo:  - Monitor BG     Dispo:  - Continue SICU care           Thu Wilson MD  Critical Care - Surgery  [facility]

## 2020-08-16 NOTE — SUBJECTIVE & OBJECTIVE
Interval History: Stable overnight  Tolerating CRRT  GODFREY drains with minimal bile stained output    Medications:  Continuous Infusions:   sodium chloride 0.9% 200 mL/hr at 08/16/20 0900    fentanyl 150 mcg/hr (08/16/20 0900)    lactated ringers 5 mL/hr at 08/16/20 0600    propofoL 45 mcg/kg/min (08/16/20 0900)    Standard Custom Day One ADULT TPN for patient WITH electrolyte abnormality or renal dysfunction (CENTRAL)      vasopressin (PITRESSIN) infusion Stopped (08/16/20 0316)     Scheduled Meds:   famotidine (PF)  20 mg Intravenous Daily    fluconazole (DIFLUCAN) IVPB  200 mg Intravenous Q24H    heparin (porcine)  5,000 Units Subcutaneous Q8H    piperacillin-tazobactam (ZOSYN) IVPB  4.5 g Intravenous Q8H    vancomycin (VANCOCIN) IVPB  750 mg Intravenous Once     PRN Meds:HYDROmorphone, midazolam, ondansetron, Pharmacy to dose Vancomycin consult **AND** vancomycin - pharmacy to dose     Review of patient's allergies indicates:   Allergen Reactions    Latex      Objective:     Vital Signs (Most Recent):  Temp: 99 °F (37.2 °C) (08/16/20 0700)  Pulse: 104 (08/16/20 0930)  Resp: (!) 25 (08/16/20 0930)  BP: (!) 113/58 (08/16/20 0900)  SpO2: 99 % (08/16/20 0930) Vital Signs (24h Range):  Temp:  [98.6 °F (37 °C)-99.5 °F (37.5 °C)] 99 °F (37.2 °C)  Pulse:  [] 104  Resp:  [6-34] 25  SpO2:  [91 %-100 %] 99 %  BP: ()/(53-66) 113/58  Arterial Line BP: ()/(48-70) 141/56     Weight: 74.5 kg (164 lb 3.9 oz)  Body mass index is 30.04 kg/m².    Intake/Output - Last 3 Shifts       08/14 0700 - 08/15 0659 08/15 0700 - 08/16 0659 08/16 0700 - 08/17 0659    I.V. (mL/kg) 6340.5 (79.8) 5466 (73.4)     Blood       NG/GT       IV Piggyback       Total Intake(mL/kg) 6340.5 (79.8) 5466 (73.4)     Urine (mL/kg/hr) 364 (0.2) 100 (0.1) 0 (0)    Drains 1658 912 165    Other 34348 5206 1405    Stool 0 0     Blood  50     Chest Tube 85 44 8    Total Output 86115 8451 0061    Net -7543.5 -154 -4614           Stool  Occurrence 1 x 2 x           Physical Exam  Constitutional:       Interventions: She is sedated and intubated.   HENT:      Head: Normocephalic.   Cardiovascular:      Rate and Rhythm: Regular rhythm. Tachycardia present.      Comments: Improving  Pulmonary:      Effort: She is intubated.      Comments: Intubated and mechanically ventilated  Vent Mode: A/C  Oxygen Concentration (%):  (40-50) 40  Resp Rate Total:  (25 br/min-34 br/min) 26 br/min  Vt Set:  (300 mL) 300 mL  PEEP/CPAP:  (8 cmH20) 8 cmH20  Mean Airway Pressure:  (9.8 viH44-53 cmH20) 17 cmH20  Abdominal:      Comments: Abthera with ss output  GODFREY x2 with bile stained output   Musculoskeletal:         General: Swelling present.      Right lower leg: Edema present.      Left lower leg: Edema present.   Skin:     General: Skin is warm and dry.   Neurological:      Comments: Sedated on propofol         Significant Labs:  Reviewed    Significant Diagnostics:  reviewed

## 2020-08-16 NOTE — SUBJECTIVE & OBJECTIVE
Interval History/Significant Events: NAEON. HDS. Intubated, sedated. OR tomorrow for closure.    Follow-up For: Procedure(s) (LRB):  LAPAROTOMY, EXPLORATORY (N/A)  EGD (ESOPHAGOGASTRODUODENOSCOPY) (N/A)  APPLICATION, WOUND VAC WITH ABTHERA  DUODENOJEJUNAL ANASTAMOSIS, GASTROJEJUNAL ANASTOMOSIS  KOCHERIZATION OF DUODENUM, DUODENECTOMY    Post-Operative Day: POD 1 s/p washout    Objective:     Vital Signs (Most Recent):  Temp: 99 °F (37.2 °C) (08/16/20 1100)  Pulse: 103 (08/16/20 1200)  Resp: (!) 25 (08/16/20 1200)  BP: 119/63 (08/16/20 1200)  SpO2: 100 % (08/16/20 1200) Vital Signs (24h Range):  Temp:  [98.6 °F (37 °C)-99.5 °F (37.5 °C)] 99 °F (37.2 °C)  Pulse:  [] 103  Resp:  [6-34] 25  SpO2:  [91 %-100 %] 100 %  BP: ()/(53-68) 119/63  Arterial Line BP: ()/(48-73) 140/60     Weight: 74.5 kg (164 lb 3.9 oz)  Body mass index is 30.04 kg/m².      Intake/Output Summary (Last 24 hours) at 8/16/2020 1220  Last data filed at 8/16/2020 1200  Gross per 24 hour   Intake 5166 ml   Output 7826 ml   Net -2660 ml       Physical Exam  Vitals signs and nursing note reviewed.   Constitutional:       General: She is not in acute distress.     Appearance: She is ill-appearing.      Comments: Sedated on mechanical ventilation.   HENT:      Head: Normocephalic and atraumatic.      Nose: Nose normal.      Comments: NG present.     Mouth/Throat:      Mouth: Mucous membranes are moist.   Eyes:      Conjunctiva/sclera: Conjunctivae normal.      Pupils: Pupils are equal, round, and reactive to light.   Neck:      Musculoskeletal: Neck supple. No muscular tenderness.      Comments: Right IJ in place  Cardiovascular:      Rate and Rhythm: Normal rate and regular rhythm.      Heart sounds: Normal heart sounds.   Pulmonary:      Effort: Pulmonary effort is normal.      Breath sounds: Rhonchi present. No wheezing.      Comments: Coarse breath sounds bilaterally.  Abdominal:      Palpations: Abdomen is soft.      Comments:  Midline surgical drain   Skin:     General: Skin is warm and dry.   Neurological:      Comments: Follows commands off sedation   Psychiatric:      Comments: Sedated         Vents:  Vent Mode: A/C (08/16/20 0739)  Ventilator Initiated: Yes(chart correction) (08/12/20 1930)  Set Rate: 25 BPM (08/16/20 0739)  Vt Set: 300 mL (08/16/20 0739)  PEEP/CPAP: 8 cmH20 (08/16/20 0739)  Oxygen Concentration (%): 40 (08/16/20 1200)  Peak Airway Pressure: 38 cmH2O (08/16/20 0739)  Plateau Pressure: 22 cmH20 (08/16/20 0739)  Total Ve: 6.44 mL (08/16/20 0739)  F/VT Ratio<105 (RSBI): 110.58 (08/16/20 0739)    Lines/Drains/Airways     Peripherally Inserted Central Catheter Line            PICC Double Lumen right brachial -- days          Central Venous Catheter Line            Trialysis (Dialysis) Catheter 08/13/20 2230 right internal jugular 2 days          Drain                 NG/OG Tube 08/06/20 nasogastric Left nostril 10 days         Chest Tube 08/14/20 0007 Right Midaxillary 14 Fr. 2 days         Closed/Suction Drain 08/13/20 1659 Right;Inferior Abdomen Bulb 19 Fr. 2 days         Closed/Suction Drain 08/13/20 1659 Right;Superior Abdomen Bulb 19 Fr. 2 days         Gastrostomy/Enterostomy 08/15/20 0916 Gastrostomy tube w/ balloon LUQ decompression 1 day          Airway                 Airway - Non-Surgical 08/15/20 0835 Endotracheal Tube 1 day          Arterial Line                 Arterial Line 08/06/20 Left Radial 10 days          Peripheral Intravenous Line                 Peripheral IV - Single Lumen 08/12/20 18 G Right Forearm 4 days         Peripheral IV - Single Lumen 08/12/20 2007 18 G Left Forearm 3 days                Significant Labs:    CBC/Anemia Profile:  Recent Labs   Lab 08/15/20  1606 08/15/20  2200 08/16/20  0400   WBC 60.13* 46.85* 55.67*   HGB 7.9* 7.4* 7.5*   HCT 23.6* 22.9* 23.5*   * 135* 137*   MCV 92 91 93   RDW 15.7* 15.7* 15.6*        Chemistries:  Recent Labs   Lab 08/15/20  1605 08/15/20  9728  08/16/20  0400     139 142  142 139  139   K 4.3  4.3 4.1  4.1 3.5  3.5     107 109  109 107  107   CO2 25  25 24  24 24  24   BUN 7  7 5*  5* 3*  3*   CREATININE 1.0  1.0 0.7  0.7 0.6  0.6   CALCIUM 7.5*  7.5* 7.2*  7.2* 7.6*  7.6*   ALBUMIN 1.2*  1.2* 1.2*  1.2* 1.2*  1.2*   PROT 4.0* 4.0* 4.0*   BILITOT 1.2* 1.2* 1.0   ALKPHOS 90 107 105   ALT 26 25 26   AST 67* 59* 54*   MG 2.2  2.2 2.1 1.9   PHOS 3.4  3.4 2.6*  2.6* 3.3  3.3       All pertinent labs within the past 24 hours have been reviewed.    Significant Imaging:  I have reviewed all pertinent imaging results/findings within the past 24 hours.

## 2020-08-16 NOTE — ASSESSMENT & PLAN NOTE
-oliguric stage 3 sid with ischemic atn likely secondary to septic shock  -unknown bl cr  -with metabolic acidosis and anasarca  -muddy brown casts on microscopy  -anuric overnight with elevated bun and cr  -plan for 3 liters net negative fluid balance over 24 hours  -SLED continuous 450-550cc/hr 3k bath  - Will consider intermittent SLED tomorrow.

## 2020-08-16 NOTE — PROGRESS NOTES
Spoke with nephrology MD, Dr. Colbert, regarding urine labs awaiting collection. Parker d/c'd this morning per Dr. Rayo's order, pt anuric; 18cc on bladder scan at 1630. Verbal order received to hold on collecting urine labs until am in hopes more urine is available. TM.

## 2020-08-17 ENCOUNTER — ANESTHESIA EVENT (OUTPATIENT)
Dept: SURGERY | Facility: HOSPITAL | Age: 40
DRG: 856 | End: 2020-08-17

## 2020-08-17 LAB
ALBUMIN SERPL BCP-MCNC: 1.1 G/DL (ref 3.5–5.2)
ALBUMIN SERPL BCP-MCNC: 1.2 G/DL (ref 3.5–5.2)
ALP SERPL-CCNC: 136 U/L (ref 55–135)
ALP SERPL-CCNC: 141 U/L (ref 55–135)
ALP SERPL-CCNC: 142 U/L (ref 55–135)
ALP SERPL-CCNC: 144 U/L (ref 55–135)
ALT SERPL W/O P-5'-P-CCNC: 27 U/L (ref 10–44)
ALT SERPL W/O P-5'-P-CCNC: 29 U/L (ref 10–44)
ALT SERPL W/O P-5'-P-CCNC: 30 U/L (ref 10–44)
ALT SERPL W/O P-5'-P-CCNC: 31 U/L (ref 10–44)
ANION GAP SERPL CALC-SCNC: 4 MMOL/L (ref 8–16)
ANION GAP SERPL CALC-SCNC: 4 MMOL/L (ref 8–16)
ANION GAP SERPL CALC-SCNC: 6 MMOL/L (ref 8–16)
ANION GAP SERPL CALC-SCNC: 7 MMOL/L (ref 8–16)
ANION GAP SERPL CALC-SCNC: 7 MMOL/L (ref 8–16)
ANISOCYTOSIS BLD QL SMEAR: SLIGHT
AST SERPL-CCNC: 39 U/L (ref 10–40)
AST SERPL-CCNC: 41 U/L (ref 10–40)
AST SERPL-CCNC: 43 U/L (ref 10–40)
AST SERPL-CCNC: 60 U/L (ref 10–40)
BACTERIA BLD CULT: NORMAL
BACTERIA BLD CULT: NORMAL
BASOPHILS # BLD AUTO: ABNORMAL K/UL (ref 0–0.2)
BASOPHILS NFR BLD: 0 % (ref 0–1.9)
BILIRUB SERPL-MCNC: 0.9 MG/DL (ref 0.1–1)
BILIRUB SERPL-MCNC: 0.9 MG/DL (ref 0.1–1)
BILIRUB SERPL-MCNC: 1 MG/DL (ref 0.1–1)
BILIRUB SERPL-MCNC: 1 MG/DL (ref 0.1–1)
BUN SERPL-MCNC: 2 MG/DL (ref 6–20)
BUN SERPL-MCNC: 2 MG/DL (ref 6–20)
BUN SERPL-MCNC: <2 MG/DL (ref 6–20)
BURR CELLS BLD QL SMEAR: ABNORMAL
BURR CELLS BLD QL SMEAR: ABNORMAL
CA-I BLDV-SCNC: 1.02 MMOL/L (ref 1.06–1.42)
CA-I BLDV-SCNC: 1.03 MMOL/L (ref 1.06–1.42)
CA-I BLDV-SCNC: 1.08 MMOL/L (ref 1.06–1.42)
CA-I BLDV-SCNC: 1.09 MMOL/L (ref 1.06–1.42)
CALCIUM SERPL-MCNC: 7.1 MG/DL (ref 8.7–10.5)
CALCIUM SERPL-MCNC: 7.2 MG/DL (ref 8.7–10.5)
CALCIUM SERPL-MCNC: 7.2 MG/DL (ref 8.7–10.5)
CALCIUM SERPL-MCNC: 7.4 MG/DL (ref 8.7–10.5)
CALCIUM SERPL-MCNC: 7.6 MG/DL (ref 8.7–10.5)
CHLORIDE SERPL-SCNC: 105 MMOL/L (ref 95–110)
CHLORIDE SERPL-SCNC: 106 MMOL/L (ref 95–110)
CO2 SERPL-SCNC: 25 MMOL/L (ref 23–29)
CREAT SERPL-MCNC: 0.5 MG/DL (ref 0.5–1.4)
CREAT SERPL-MCNC: 0.6 MG/DL (ref 0.5–1.4)
CREAT SERPL-MCNC: 0.7 MG/DL (ref 0.5–1.4)
CREAT SERPL-MCNC: 0.8 MG/DL (ref 0.5–1.4)
CREAT SERPL-MCNC: 0.8 MG/DL (ref 0.5–1.4)
DACRYOCYTES BLD QL SMEAR: ABNORMAL
DACRYOCYTES BLD QL SMEAR: ABNORMAL
DIFFERENTIAL METHOD: ABNORMAL
DOHLE BOD BLD QL SMEAR: PRESENT
EOSINOPHIL # BLD AUTO: ABNORMAL K/UL (ref 0–0.5)
EOSINOPHIL NFR BLD: 2 % (ref 0–8)
EOSINOPHIL NFR BLD: 3 % (ref 0–8)
EOSINOPHIL NFR BLD: 3 % (ref 0–8)
EOSINOPHIL NFR BLD: 4 % (ref 0–8)
ERYTHROCYTE [DISTWIDTH] IN BLOOD BY AUTOMATED COUNT: 15.4 % (ref 11.5–14.5)
ERYTHROCYTE [DISTWIDTH] IN BLOOD BY AUTOMATED COUNT: 15.5 % (ref 11.5–14.5)
ERYTHROCYTE [DISTWIDTH] IN BLOOD BY AUTOMATED COUNT: 15.6 % (ref 11.5–14.5)
ERYTHROCYTE [DISTWIDTH] IN BLOOD BY AUTOMATED COUNT: 15.7 % (ref 11.5–14.5)
EST. GFR  (AFRICAN AMERICAN): >60 ML/MIN/1.73 M^2
EST. GFR  (NON AFRICAN AMERICAN): >60 ML/MIN/1.73 M^2
GIANT PLATELETS BLD QL SMEAR: PRESENT
GLUCOSE SERPL-MCNC: 110 MG/DL (ref 70–110)
GLUCOSE SERPL-MCNC: 113 MG/DL (ref 70–110)
GLUCOSE SERPL-MCNC: 113 MG/DL (ref 70–110)
GLUCOSE SERPL-MCNC: 119 MG/DL (ref 70–110)
GLUCOSE SERPL-MCNC: 126 MG/DL (ref 70–110)
HCT VFR BLD AUTO: 23.2 % (ref 37–48.5)
HCT VFR BLD AUTO: 24 % (ref 37–48.5)
HCT VFR BLD AUTO: 24.4 % (ref 37–48.5)
HCT VFR BLD AUTO: 24.8 % (ref 37–48.5)
HGB BLD-MCNC: 7.5 G/DL (ref 12–16)
HGB BLD-MCNC: 7.7 G/DL (ref 12–16)
HGB BLD-MCNC: 7.7 G/DL (ref 12–16)
HGB BLD-MCNC: 7.8 G/DL (ref 12–16)
HYPOCHROMIA BLD QL SMEAR: ABNORMAL
IMM GRANULOCYTES # BLD AUTO: ABNORMAL K/UL (ref 0–0.04)
IMM GRANULOCYTES NFR BLD AUTO: ABNORMAL % (ref 0–0.5)
LYMPHOCYTES # BLD AUTO: ABNORMAL K/UL (ref 1–4.8)
LYMPHOCYTES NFR BLD: 2 % (ref 18–48)
LYMPHOCYTES NFR BLD: 3 % (ref 18–48)
LYMPHOCYTES NFR BLD: 3 % (ref 18–48)
LYMPHOCYTES NFR BLD: 6 % (ref 18–48)
MAGNESIUM SERPL-MCNC: 1.6 MG/DL (ref 1.6–2.6)
MAGNESIUM SERPL-MCNC: 1.7 MG/DL (ref 1.6–2.6)
MAGNESIUM SERPL-MCNC: 2 MG/DL (ref 1.6–2.6)
MCH RBC QN AUTO: 29.3 PG (ref 27–31)
MCH RBC QN AUTO: 29.6 PG (ref 27–31)
MCH RBC QN AUTO: 30.1 PG (ref 27–31)
MCH RBC QN AUTO: 30.4 PG (ref 27–31)
MCHC RBC AUTO-ENTMCNC: 31 G/DL (ref 32–36)
MCHC RBC AUTO-ENTMCNC: 31.6 G/DL (ref 32–36)
MCHC RBC AUTO-ENTMCNC: 32.3 G/DL (ref 32–36)
MCHC RBC AUTO-ENTMCNC: 32.5 G/DL (ref 32–36)
MCV RBC AUTO: 93 FL (ref 82–98)
MCV RBC AUTO: 94 FL (ref 82–98)
METAMYELOCYTES NFR BLD MANUAL: 1 %
METAMYELOCYTES NFR BLD MANUAL: 2 %
MONOCYTES # BLD AUTO: ABNORMAL K/UL (ref 0.3–1)
MONOCYTES NFR BLD: 1 % (ref 4–15)
MONOCYTES NFR BLD: 1 % (ref 4–15)
MONOCYTES NFR BLD: 4 % (ref 4–15)
MONOCYTES NFR BLD: 4 % (ref 4–15)
MYELOCYTES NFR BLD MANUAL: 1 %
NEUTROPHILS NFR BLD: 80 % (ref 38–73)
NEUTROPHILS NFR BLD: 82 % (ref 38–73)
NEUTROPHILS NFR BLD: 82 % (ref 38–73)
NEUTROPHILS NFR BLD: 89 % (ref 38–73)
NEUTS BAND NFR BLD MANUAL: 1 %
NEUTS BAND NFR BLD MANUAL: 10 %
NEUTS BAND NFR BLD MANUAL: 10 %
NEUTS BAND NFR BLD MANUAL: 6 %
NRBC BLD-RTO: 0 /100 WBC
OVALOCYTES BLD QL SMEAR: ABNORMAL
PHOSPHATE SERPL-MCNC: 2 MG/DL (ref 2.7–4.5)
PHOSPHATE SERPL-MCNC: 2 MG/DL (ref 2.7–4.5)
PHOSPHATE SERPL-MCNC: 2.2 MG/DL (ref 2.7–4.5)
PLATELET # BLD AUTO: 197 K/UL (ref 150–350)
PLATELET # BLD AUTO: 203 K/UL (ref 150–350)
PLATELET # BLD AUTO: 203 K/UL (ref 150–350)
PLATELET # BLD AUTO: 208 K/UL (ref 150–350)
PLATELET BLD QL SMEAR: ABNORMAL
PMV BLD AUTO: 12 FL (ref 9.2–12.9)
PMV BLD AUTO: 12.2 FL (ref 9.2–12.9)
PMV BLD AUTO: 12.3 FL (ref 9.2–12.9)
PMV BLD AUTO: 12.4 FL (ref 9.2–12.9)
POCT GLUCOSE: 117 MG/DL (ref 70–110)
POCT GLUCOSE: 117 MG/DL (ref 70–110)
POCT GLUCOSE: 121 MG/DL (ref 70–110)
POCT GLUCOSE: 135 MG/DL (ref 70–110)
POCT GLUCOSE: 149 MG/DL (ref 70–110)
POIKILOCYTOSIS BLD QL SMEAR: SLIGHT
POLYCHROMASIA BLD QL SMEAR: ABNORMAL
POTASSIUM SERPL-SCNC: 3.8 MMOL/L (ref 3.5–5.1)
POTASSIUM SERPL-SCNC: 3.8 MMOL/L (ref 3.5–5.1)
POTASSIUM SERPL-SCNC: 4 MMOL/L (ref 3.5–5.1)
POTASSIUM SERPL-SCNC: 4.1 MMOL/L (ref 3.5–5.1)
POTASSIUM SERPL-SCNC: 4.1 MMOL/L (ref 3.5–5.1)
PROT SERPL-MCNC: 4.6 G/DL (ref 6–8.4)
PROT SERPL-MCNC: 4.7 G/DL (ref 6–8.4)
PROT SERPL-MCNC: 4.8 G/DL (ref 6–8.4)
PROT SERPL-MCNC: 4.9 G/DL (ref 6–8.4)
RBC # BLD AUTO: 2.47 M/UL (ref 4–5.4)
RBC # BLD AUTO: 2.59 M/UL (ref 4–5.4)
RBC # BLD AUTO: 2.6 M/UL (ref 4–5.4)
RBC # BLD AUTO: 2.63 M/UL (ref 4–5.4)
SARS-COV-2 RDRP RESP QL NAA+PROBE: NEGATIVE
SODIUM SERPL-SCNC: 134 MMOL/L (ref 136–145)
SODIUM SERPL-SCNC: 134 MMOL/L (ref 136–145)
SODIUM SERPL-SCNC: 136 MMOL/L (ref 136–145)
SODIUM SERPL-SCNC: 137 MMOL/L (ref 136–145)
SODIUM SERPL-SCNC: 138 MMOL/L (ref 136–145)
TARGETS BLD QL SMEAR: ABNORMAL
TOXIC GRANULES BLD QL SMEAR: ABNORMAL
TOXIC GRANULES BLD QL SMEAR: PRESENT
TOXIC GRANULES BLD QL SMEAR: PRESENT
VANCOMYCIN SERPL-MCNC: 15.4 UG/ML
WBC # BLD AUTO: 55.12 K/UL (ref 3.9–12.7)
WBC # BLD AUTO: 56.97 K/UL (ref 3.9–12.7)
WBC # BLD AUTO: 59.79 K/UL (ref 3.9–12.7)
WBC # BLD AUTO: 60.3 K/UL (ref 3.9–12.7)

## 2020-08-17 PROCEDURE — 99900026 HC AIRWAY MAINTENANCE (STAT)

## 2020-08-17 PROCEDURE — 25000003 PHARM REV CODE 250: Performed by: NURSE PRACTITIONER

## 2020-08-17 PROCEDURE — U0002 COVID-19 LAB TEST NON-CDC: HCPCS

## 2020-08-17 PROCEDURE — 99291 CRITICAL CARE FIRST HOUR: CPT | Mod: 24,,, | Performed by: SURGERY

## 2020-08-17 PROCEDURE — 80100008 HC CRRT DAILY MAINTENANCE

## 2020-08-17 PROCEDURE — 99291 PR CRITICAL CARE, E/M 30-74 MINUTES: ICD-10-PCS | Mod: 24,,, | Performed by: SURGERY

## 2020-08-17 PROCEDURE — 85027 COMPLETE CBC AUTOMATED: CPT | Mod: 91

## 2020-08-17 PROCEDURE — 63600175 PHARM REV CODE 636 W HCPCS: Performed by: SURGERY

## 2020-08-17 PROCEDURE — 94003 VENT MGMT INPAT SUBQ DAY: CPT

## 2020-08-17 PROCEDURE — 63600175 PHARM REV CODE 636 W HCPCS: Performed by: NURSE PRACTITIONER

## 2020-08-17 PROCEDURE — 80053 COMPREHEN METABOLIC PANEL: CPT | Mod: 91

## 2020-08-17 PROCEDURE — 80202 ASSAY OF VANCOMYCIN: CPT

## 2020-08-17 PROCEDURE — 63600175 PHARM REV CODE 636 W HCPCS: Performed by: STUDENT IN AN ORGANIZED HEALTH CARE EDUCATION/TRAINING PROGRAM

## 2020-08-17 PROCEDURE — 83735 ASSAY OF MAGNESIUM: CPT

## 2020-08-17 PROCEDURE — 25000003 PHARM REV CODE 250: Performed by: SURGERY

## 2020-08-17 PROCEDURE — S0028 INJECTION, FAMOTIDINE, 20 MG: HCPCS | Performed by: STUDENT IN AN ORGANIZED HEALTH CARE EDUCATION/TRAINING PROGRAM

## 2020-08-17 PROCEDURE — 90945 PR DIALYSIS, NOT HEMO, 1 EVAL: ICD-10-PCS | Mod: ,,, | Performed by: NURSE PRACTITIONER

## 2020-08-17 PROCEDURE — 94761 N-INVAS EAR/PLS OXIMETRY MLT: CPT

## 2020-08-17 PROCEDURE — 99900035 HC TECH TIME PER 15 MIN (STAT)

## 2020-08-17 PROCEDURE — 20000000 HC ICU ROOM

## 2020-08-17 PROCEDURE — 25000003 PHARM REV CODE 250: Performed by: STUDENT IN AN ORGANIZED HEALTH CARE EDUCATION/TRAINING PROGRAM

## 2020-08-17 PROCEDURE — A4217 STERILE WATER/SALINE, 500 ML: HCPCS | Performed by: SURGERY

## 2020-08-17 PROCEDURE — 82330 ASSAY OF CALCIUM: CPT

## 2020-08-17 PROCEDURE — 90945 DIALYSIS ONE EVALUATION: CPT

## 2020-08-17 PROCEDURE — 83735 ASSAY OF MAGNESIUM: CPT | Mod: 91

## 2020-08-17 PROCEDURE — 90945 DIALYSIS ONE EVALUATION: CPT | Mod: ,,, | Performed by: NURSE PRACTITIONER

## 2020-08-17 PROCEDURE — 85007 BL SMEAR W/DIFF WBC COUNT: CPT | Mod: 91

## 2020-08-17 PROCEDURE — 80069 RENAL FUNCTION PANEL: CPT | Mod: 91

## 2020-08-17 PROCEDURE — 27000221 HC OXYGEN, UP TO 24 HOURS

## 2020-08-17 PROCEDURE — 80069 RENAL FUNCTION PANEL: CPT

## 2020-08-17 RX ORDER — MAGNESIUM SULFATE HEPTAHYDRATE 40 MG/ML
2 INJECTION, SOLUTION INTRAVENOUS
Status: DISCONTINUED | OUTPATIENT
Start: 2020-08-17 | End: 2020-08-18

## 2020-08-17 RX ORDER — HYDROCODONE BITARTRATE AND ACETAMINOPHEN 500; 5 MG/1; MG/1
TABLET ORAL CONTINUOUS
Status: DISCONTINUED | OUTPATIENT
Start: 2020-08-17 | End: 2020-08-18

## 2020-08-17 RX ORDER — POTASSIUM CHLORIDE 29.8 MG/ML
40 INJECTION INTRAVENOUS ONCE
Status: COMPLETED | OUTPATIENT
Start: 2020-08-17 | End: 2020-08-17

## 2020-08-17 RX ADMIN — POTASSIUM CHLORIDE 40 MEQ: 29.8 INJECTION, SOLUTION INTRAVENOUS at 10:08

## 2020-08-17 RX ADMIN — MAGNESIUM SULFATE IN WATER 2 G: 40 INJECTION, SOLUTION INTRAVENOUS at 09:08

## 2020-08-17 RX ADMIN — MIDAZOLAM 1 MG: 1 INJECTION INTRAMUSCULAR; INTRAVENOUS at 02:08

## 2020-08-17 RX ADMIN — MIDAZOLAM 1 MG: 1 INJECTION INTRAMUSCULAR; INTRAVENOUS at 06:08

## 2020-08-17 RX ADMIN — Medication 150 MCG/HR: at 08:08

## 2020-08-17 RX ADMIN — Medication 125 MCG/HR: at 01:08

## 2020-08-17 RX ADMIN — FAMOTIDINE 20 MG: 10 INJECTION INTRAVENOUS at 08:08

## 2020-08-17 RX ADMIN — PROPOFOL 50 MCG/KG/MIN: 10 INJECTION, EMULSION INTRAVENOUS at 05:08

## 2020-08-17 RX ADMIN — PROPOFOL 40 MCG/KG/MIN: 10 INJECTION, EMULSION INTRAVENOUS at 03:08

## 2020-08-17 RX ADMIN — CALCIUM GLUCONATE 2 G: 98 INJECTION, SOLUTION INTRAVENOUS at 10:08

## 2020-08-17 RX ADMIN — SODIUM PHOSPHATE, MONOBASIC, MONOHYDRATE 20.01 MMOL: 276; 142 INJECTION, SOLUTION INTRAVENOUS at 05:08

## 2020-08-17 RX ADMIN — HEPARIN SODIUM 5000 UNITS: 5000 INJECTION INTRAVENOUS; SUBCUTANEOUS at 09:08

## 2020-08-17 RX ADMIN — FLUCONAZOLE 200 MG: 200 INJECTION, SOLUTION INTRAVENOUS at 11:08

## 2020-08-17 RX ADMIN — PROPOFOL 50 MCG/KG/MIN: 10 INJECTION, EMULSION INTRAVENOUS at 01:08

## 2020-08-17 RX ADMIN — PIPERACILLIN SODIUM AND TAZOBACTAM SODIUM 4.5 G: 4; .5 INJECTION, POWDER, LYOPHILIZED, FOR SOLUTION INTRAVENOUS at 03:08

## 2020-08-17 RX ADMIN — HEPARIN SODIUM 5000 UNITS: 5000 INJECTION INTRAVENOUS; SUBCUTANEOUS at 05:08

## 2020-08-17 RX ADMIN — CALCIUM GLUCONATE 1 G: 98 INJECTION, SOLUTION INTRAVENOUS at 05:08

## 2020-08-17 RX ADMIN — MAGNESIUM SULFATE HEPTAHYDRATE: 500 INJECTION, SOLUTION INTRAMUSCULAR; INTRAVENOUS at 09:08

## 2020-08-17 RX ADMIN — PIPERACILLIN SODIUM AND TAZOBACTAM SODIUM 4.5 G: 4; .5 INJECTION, POWDER, LYOPHILIZED, FOR SOLUTION INTRAVENOUS at 06:08

## 2020-08-17 RX ADMIN — PROPOFOL 40 MCG/KG/MIN: 10 INJECTION, EMULSION INTRAVENOUS at 11:08

## 2020-08-17 RX ADMIN — CALCIUM GLUCONATE 2 G: 98 INJECTION, SOLUTION INTRAVENOUS at 12:08

## 2020-08-17 RX ADMIN — SODIUM PHOSPHATE, MONOBASIC, MONOHYDRATE 39.99 MMOL: 276; 142 INJECTION, SOLUTION INTRAVENOUS at 09:08

## 2020-08-17 RX ADMIN — HEPARIN SODIUM 5000 UNITS: 5000 INJECTION INTRAVENOUS; SUBCUTANEOUS at 02:08

## 2020-08-17 NOTE — PLAN OF CARE
Recommendations     1.) Continue TPN with 110g of amino acids and 175g of dextrose to provide 1035kcal (100% of EEN with propofol).   2.) Once GI function improves and J tube placed; initiate EN of Peptamen Intense VHP; goal rate at 40mL/hr to provide 960kcal, 88g of protein, 806mL of free water. Initiate at 10mL/hr and advance 5-33hHP3vi as tolerated. Hold EN for residuals >500cc.   3.) Monitor and replace electrolytes as needed.     Goals: 1.) Pt to meet >75% of estimated energy and protein needs over the course of 7 days.  Nutrition Goal Status: new  Communication of RD Recs: reviewed with RN(POC)

## 2020-08-17 NOTE — PLAN OF CARE
SW is following this Pt for DC planning needs. There are no identified needs at this time. Discharge Disposition: TBD - pending return to OR tomorrow    SW will continue to coordinate with patient, family, team and insurance to complete patient's discharge plan.    Jazzy Eid LMSW   - Case Management

## 2020-08-17 NOTE — PROGRESS NOTES
[facility]  Nephrology  Progress Note    Patient Name: Jaquan Farmer  MRN: [id number]  Admission Date: 8/12/2020  Hospital Length of Stay: 5 days  Attending Provider: Donis Roa MD   Primary Care Physician: Primary Doctor No  Principal Problem:<principal problem not specified>    Subjective:     HPI: Mrs. Farmer is a 39 year old female with antrectomy at [facility] complicated duodenal necrosis post op. She aspirated, was intubated, and became septic. She developed renal failure and required max vent settings and was transferred here for higher level of care. On arrival she was hypotensive on pressers, max vent setting, and had minimal urine output.  Overnight she was given 6L of fluid, 6g calcium, placed on vac, pip-tazo, and fluconazole. Nephro consulted for sid.    Interval History: seen at the bedside Stable overnight  Tolerating CRRT    Review of patient's allergies indicates:   Allergen Reactions    Latex      Current Facility-Administered Medications   Medication Frequency    0.9%  NaCl infusion (CRRT USE ONLY) Continuous    calcium gluconate 1g in dextrose 5% 100mL (ready to mix system) PRN    calcium gluconate 2 g in dextrose 5 % 100 mL IVPB PRN    calcium gluconate 3 g in dextrose 5 % 100 mL IVPB PRN    famotidine (PF) injection 20 mg Daily    fentaNYL 2500 mcg in 0.9% sodium chloride 250 mL infusion premix (titrating) Continuous    fluconazole (DIFLUCAN) IVPB 200 mg 100 mL Q24H    heparin (porcine) injection 5,000 Units Q8H    HYDROmorphone injection 0.2 mg Q5 Min PRN    lactated ringers infusion Continuous    magnesium sulfate 2g in water 50mL IVPB (premix) PRN    midazolam (VERSED) 1 mg/mL injection 1 mg Q4H PRN    ondansetron injection 4 mg Once PRN    piperacillin-tazobactam 4.5 g in sodium chloride 0.9% 100 mL IVPB (ready to mix system) Q8H    propofol (DIPRIVAN) 10 mg/mL infusion Continuous    sodium phosphate 20.01 mmol in dextrose 5 % 250 mL IVPB PRN     sodium phosphate 30 mmol in dextrose 5 % 250 mL IVPB PRN    sodium phosphate 39.99 mmol in dextrose 5 % 250 mL IVPB PRN    Standard Custom Day One ADULT TPN for patient WITH electrolyte abnormality or renal dysfunction (CENTRAL) Continuous    TPN ADULT CENTRAL LINE CUSTOM Continuous    vancomycin - pharmacy to dose pharmacy to manage frequency       Objective:     Vital Signs (Most Recent):  Temp: 99.6 °F (37.6 °C) (08/17/20 1500)  Pulse: (!) 114 (08/17/20 1600)  Resp: (!) 23 (08/17/20 1600)  BP: 126/71 (08/17/20 1600)  SpO2: 96 % (08/17/20 1600)  O2 Device (Oxygen Therapy): ventilator (08/17/20 1600) Vital Signs (24h Range):  Temp:  [97.8 °F (36.6 °C)-99.7 °F (37.6 °C)] 99.6 °F (37.6 °C)  Pulse:  [] 114  Resp:  [20-29] 23  SpO2:  [93 %-100 %] 96 %  BP: ()/(52-71) 126/71  Arterial Line BP: (100-129)/(50-63) 118/63     Weight: 73.5 kg (162 lb 0.6 oz) (08/17/20 0600)  Body mass index is 29.64 kg/m².  Body surface area is 1.79 meters squared.    I/O last 3 completed shifts:  In: 30420.8 [I.V.:88462.1]  Out: 30309 [Urine:47; Drains:1020; Other:05482; Chest Tube:22]    Physical Exam  Constitutional:       Appearance: She is ill-appearing.   Eyes:      Conjunctiva/sclera: Conjunctivae normal.      Pupils: Pupils are equal, round, and reactive to light.   Cardiovascular:      Rate and Rhythm: Normal rate and regular rhythm.      Pulses: Normal pulses.      Heart sounds: Normal heart sounds.   Pulmonary:      Effort: Pulmonary effort is normal. No respiratory distress.      Breath sounds: Rhonchi present.   Abdominal:      General: Bowel sounds are normal.      Palpations: Abdomen is soft.      Comments: Wound vac in place   Skin:     General: Skin is warm and dry.      Findings: No bruising or rash.   Neurological:      Comments: Sedated on vent         Significant Labs:  BMP:   Recent Labs   Lab 08/17/20  0330 08/17/20  1009   *  113* 110     105 105   CO2 25  25 25   BUN <2*  <2* <2*    CREATININE 0.5  0.5 0.5   CALCIUM 7.4*  7.4* 7.1*   MG 2.0  --      All labs within the past 24 hours have been reviewed.     Significant Imaging:  bmp    Assessment/Plan:     Metabolic acidosis  -secondary to renal failure  -on SLED    Acute renal failure with tubular necrosis  -oliguric stage 3 sid with ischemic atn likely secondary to septic shock  -unknown bl cr  -with metabolic acidosis and anasarca  -muddy brown casts on microscopy  -anuric with elevated bun and cr  -SLED continuous 200 cc/hr 3k bath                        Thank you for your consult. I will follow-up with patient. Please contact us if you have any additional questions.    Morro Zamarripa MD  Nephrology  [facility]

## 2020-08-17 NOTE — PROGRESS NOTES
[facility]  Critical Care - Surgery  Progress Note    Patient Name: Jaquan Farmer  MRN: [id number]  Admission Date: 8/12/2020  Hospital Length of Stay: 5 days  Code Status: Full Code  Attending Provider: Donis Roa MD  Primary Care Provider: Primary Doctor No   Principal Problem: <principal problem not specified>    Subjective:     Hospital/ICU Course: Pt is a 40 yo F with recent history of antrectomy with BII reconstruction for ulcer disease at outside hospital. 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 Patient also reportedly aspirated on induction in the OR prior to this, resulting in severe pulmonary edema and hypoxia. Patient was transferred to [facility] urgently for higher level of care. She arrived as a direct SICU admit on near maximal vent settings and requiring low dose vasopressors with HR in the upper 140s. Her midline laparotomy is closed with 4 Navdeep drains in place draining bilious output.     8/15: Exploratory Laparotomy; gastrostomy tube with Abthera replacement     Interval History/Significant Events:   Patient receiving continuous CRRT treatment with  to achieve -3L fluid status by ~noon today per nephrology orders.   Remains intubated and sedated, follows commands with sedation paused. Ventilator set to AC/VC, 40% FiO2, 8 PEEP, rate 25. SpO2 > 92%. NSR - ST on bedside monitor.  UOP 15 over 24 hours.   Drains with 673mL out over 24 hours (Gtube 540; JPx2 with 133mL)    Post-Operative Day: 2 Days Post-Op    Objective:     Vital Signs (Most Recent):  Temp: 99.4 °F (37.4 °C) (08/17/20 0315)  Pulse: 105 (08/17/20 0530)  Resp: (!) 23 (08/17/20 0530)  BP: 110/63 (08/17/20 0530)  SpO2: 95 % (08/17/20 0530) Vital Signs (24h Range):  Temp:  [99 °F (37.2 °C)-99.7 °F (37.6 °C)] 99.4 °F (37.4 °C)  Pulse:  [] 105  Resp:  [11-34] 23  SpO2:  [93 %-100 %] 95 %  BP: ()/(52-68) 110/63  Arterial Line BP: (100-165)/(48-73)  118/63     Weight: 74.5 kg (164 lb 3.9 oz)  Body mass index is 30.04 kg/m².    Intake/Output Summary (Last 24 hours) at 8/17/2020 0558  Last data filed at 8/17/2020 0500  Gross per 24 hour   Intake 7428.88 ml   Output 9562 ml   Net -2133.12 ml     Physical Exam  Constitutional:       Interventions: She is sedated and intubated.   HENT:      Head: Normocephalic and atraumatic.   Cardiovascular:      Rate and Rhythm: Normal rate.   Pulmonary:      Effort: Pulmonary effort is normal. She is intubated.   Abdominal:      Comments: Gastrostomy tube in place with no surrounding erythema   Skin:     General: Skin is warm and dry.     Vents:  Vent Mode: A/C (08/17/20 0356)  Ventilator Initiated: Yes(chart correction) (08/12/20 1930)  Set Rate: 25 BPM (08/17/20 0356)  Vt Set: 300 mL (08/17/20 0356)  PEEP/CPAP: 8 cmH20 (08/17/20 0356)  Oxygen Concentration (%): 40 (08/17/20 0530)  Peak Airway Pressure: 21 cmH2O (08/17/20 0356)  Plateau Pressure: 21 cmH20 (08/17/20 0356)  Total Ve: 8.12 mL (08/17/20 0356)  F/VT Ratio<105 (RSBI): (!) 83.61 (08/17/20 0356)    Lines/Drains/Airways     Peripherally Inserted Central Catheter Line            PICC Double Lumen 08/08/20 1400 right brachial 8 days          Central Venous Catheter Line            Trialysis (Dialysis) Catheter 08/13/20 2230 right internal jugular 3 days          Drain                 NG/OG Tube 08/06/20 nasogastric Left nostril 11 days         Chest Tube 08/14/20 0007 Right Midaxillary 14 Fr. 3 days         Closed/Suction Drain 08/13/20 1659 Right;Inferior Abdomen Bulb 19 Fr. 3 days         Closed/Suction Drain 08/13/20 1659 Right;Superior Abdomen Bulb 19 Fr. 3 days         Gastrostomy/Enterostomy 08/15/20 0916 Gastrostomy tube w/ balloon LUQ decompression 1 day          Airway                 Airway - Non-Surgical 08/15/20 0835 Endotracheal Tube 1 day          Peripheral Intravenous Line                 Peripheral IV - Single Lumen 08/12/20 18 G Right Forearm 5 days          Peripheral IV - Single Lumen 08/16/20 1300 20 G Left Forearm less than 1 day              Significant Labs:    CBC/Anemia Profile:  Recent Labs   Lab 08/16/20  1600 08/16/20  2144 08/17/20  0330   WBC 49.23* 59.45* 56.97*   HGB 7.5* 7.4* 7.5*   HCT 23.3* 23.2* 23.2*    183 197   MCV 93 94 94   RDW 15.7* 15.7* 15.7*      Chemistries:  Recent Labs   Lab 08/16/20  1600 08/16/20 2144 08/17/20  0330    137  137 134*  134*   K 3.9 4.2  4.2 4.1  4.1    107  107 105  105   CO2 26 23  23 25  25   BUN <2* <2*  <2* <2*  <2*   CREATININE 0.5 0.5  0.5 0.5  0.5   CALCIUM 7.2* 7.1*  7.1* 7.4*  7.4*   ALBUMIN 1.2* 1.2*  1.2* 1.2*  1.2*   PROT 4.4* 4.4* 4.6*   BILITOT 1.1* 0.9 0.9   ALKPHOS 155* 132 136*   ALT 27 27 27   AST 63* 67* 60*   MG 1.8 1.9 2.0   PHOS 1.8* 1.6* 2.0*     Significant Imaging:  I have reviewed and interpreted all pertinent imaging results/findings within the past 24 hours.    Assessment/Plan:   40 yo F s/p antrectomy with BII reconstruction c/b duodenal necrosis requiring ex lap, washout, drainage c/b aspiration event. S/p duodenal resection with d-j anastomosis, g-j and abthera vac placement on 8/13 and washout with g-tube placement on 8/15.    Neuro:  - Sedation with propofol  - Fentanyl for pain   - Sedation vacations, neuro exams; answers questions when sedation weaned      Resp:  - Intubated on vent  - Wean vent as tolerated     CV:  - HDS off pressors  - Tachycardia resolved     Heme:  - Hgb/Hct - stable  - Transfuse as indicated; received 2u PRBC on 8/13     ID:  - Broad spectrum abx/antifungal  - F/u cultures      Renal:  - Parker removed yesterday; bladder scan this am   - Nephrology consulted; Continue CRRT with aggressive volume removal as tolerated     FEN/GI:  - NPO  - NGT to LIWS  - Maintain drains to bulb suction  - G tube to gravity  - GI ppx  - Started Tpn yesterday   - Planning on returning to OR tomorrow for possible closure.      Endo:  - Monitor  BG     Dispo:  - Continue SICU care; Plan for return to OR on 8/18 for possible closure      Nica Pichardo MD  [employer] General Surgery - PGY2  9/7/2020 6:02 AM

## 2020-08-17 NOTE — PLAN OF CARE
Updated Dr. Gordon and Dr. Wilson on patient's status overnight. No acute events overnight. Patient receiving continuous CRRT treatment with  to achieve -3L fluid status by ~noon today per nephrology orders. Remains intubated and sedated, follows commands with sedation paused. Ventilator set to AC/VC, 40% FiO2, 8 PEEP, rate 25. SpO2 > 92%. NSR - ST on bedside monitor. MAP maintained > 65, no pressors required. Arterial line became dampened and positional overnight, notified Dr. Gordon and Dr. Wilson. Order placed to discontinue art line, no indication for replacement at this time. CVP 7-8. TPN initiated overnight. Blood glucose checks stable every 4 hours. No BM overnight. Abdominal wound vac with moderate amount of serosanguinous output. Chest tube with minimal to no serous output. Plan of care reviewed with patient's sister over the phone, verbalized understanding. Plan for patient to return to OR tomorrow for belly closure, continue with volume removal with CRRT. Bed in low position and brake set. See flowsheets for detailed assessment. Continuing to monitor.

## 2020-08-17 NOTE — ASSESSMENT & PLAN NOTE
38 yo F s/p antrectomy with BII reconstruction c/b duodenal necrosis requiring ex lap, washout, drainage c/b aspiration event. S/p duodenal resection with d-j anastomosis, g-j and abthera vac placement on 8/13 and washout with g-tube placement on 8/15.    .Neuro:  - Sedation with propofol  - fentanyl   - Sedation vacations, neuro exams    Resp:  - Intubated on vent  - Wean vent as tolerated    CV:  - HDS off pressors  - Tachycardia resolved    Heme:  - Hgb/Hct - stable  - Transfuse as indicated; received 2u PRBC on 8/13    ID:  - Broad spectrum abx/antifungal  - F/u cultures     Renal:  - Parker in place; oliguric  - Nephrology consulted; Continue CRRT with aggressive volume removal as tolerated    FEN/GI:  - NPO  - NGT to LIWS  - Maintain drains to bulb suction  - G tube to gravity  - GI ppx  - Start Tpn    Endo:  - Monitor BG    Dispo:  - Continue ICU care; appreciate SICU assistance; Plan for return to OR on 8/18 for possible closure

## 2020-08-17 NOTE — ANESTHESIA PREPROCEDURE EVALUATION
[facility]  Anesthesia Pre-Operative Evaluation         Patient Name: Jaquan Farmer  YOB: 1980  MRN: [id number]    SUBJECTIVE:     Pre-operative evaluation for Procedure(s) (LRB):  LAPAROTOMY, EXPLORATORY; possible abdominal closure (N/A)     08/17/2020    Jaquan Farmer is a 39 y.o. female w/ a recent history of antrectomy with BII reconstruction for ulcer disease at outside hospital. 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 Patient also reportedly aspirated on induction in the OR prior to this, resulting in severe pulmonary edema and hypoxia. Patient was transferred to [facility] urgently for higher level of care. She arrived as a direct SICU admit on near maximal vent settings and requiring low dose vasopressors with HR in the upper 140s. Her midline laparotomy is closed with 4 Navdeep drains in place draining bilious output.     Patient now presents for the above procedure(s).      LDA:   PICC Double Lumen 08/08/20 1400 right brachial (Active)   Verification by X-ray Yes 08/17/20 1100   Site Assessment No drainage;No redness;No swelling;No warmth 08/17/20 1100   Line Securement Device Secured with sutures 08/17/20 1100   Dressing Type Biopatch in place;Central line dressing with pants 08/17/20 1100   Dressing Status Clean;Intact;Dry 08/17/20 1100   Dressing Intervention Integrity maintained 08/17/20 1100   Date on Dressing 08/12/20 08/17/20 1100   Dressing Due to be Changed 08/19/20 08/17/20 1100   Left Lumen Patency/Care Blood return present;Flushed w/o difficulty;Infusing 08/17/20 1100   Right Lumen Patency/Care Blood return present;Flushed w/o difficulty;Infusing 08/17/20 1100   Waveform Other (Comments) 08/17/20 1100   Line Necessity Review Longterm central access required 08/17/20 1100   Number of days: 8       Trialysis (Dialysis) Catheter 08/13/20 2230 right internal jugular (Active)   Verification by X-ray Yes  08/17/20 1100   Site Assessment No drainage;No redness;No warmth;No swelling 08/17/20 1100   Line Securement Device Secured with sutures 08/17/20 1100   Dressing Type Biopatch in place;Central line dressing with pants 08/17/20 1100   Dressing Status Clean;Dry;Intact 08/17/20 1100   Dressing Intervention Integrity maintained 08/17/20 1100   Date on Dressing 08/13/20 08/17/20 1100   Dressing Due to be Changed 08/20/20 08/17/20 1100   Venous Patency/Care flushed w/o difficulty;infusing 08/17/20 1100   Arterial Patency/Care infusing;blood return present 08/17/20 1100   Distal Patency/Care flushed w/o difficulty;infusing 08/17/20 1100   Flows Good 08/17/20 1100   Waveform Normal 08/17/20 1100   Line Necessity Review CRRT/HD;Medication caustic to vasculature 08/17/20 1100   Number of days: 3            Peripheral IV - Single Lumen 08/12/20 18 G Right Forearm (Active)   Site Assessment Clean;Dry;No redness;No swelling;Intact 08/17/20 1100   Line Status Infusing 08/17/20 1100   Dressing Status Clean;Intact;Dry 08/17/20 1100   Dressing Intervention Integrity maintained 08/17/20 1100   Dressing Change Due 08/16/20 08/17/20 1100   Site Change Due 08/16/20 08/17/20 0700   Reason Not Rotated Poor venous access 08/17/20 1100   Number of days: 5            Peripheral IV - Single Lumen 08/16/20 1300 20 G Left Forearm (Active)   Site Assessment Clean;Intact;Dry;No redness;No swelling 08/17/20 1100   Line Status Blood return noted;Flushed;Saline locked 08/17/20 1100   Dressing Status Clean;Intact;Dry 08/17/20 1100   Dressing Intervention Integrity maintained 08/17/20 1100   Dressing Change Due 08/20/20 08/17/20 1100   Site Change Due 08/20/20 08/17/20 0700   Reason Not Rotated Not due 08/17/20 1100   Number of days: 0            Chest Tube 08/14/20 0007 Right Midaxillary 14 Fr. (Active)   Chest Tube WDL ex 08/17/20 0700   Function To water seal 08/17/20 1100   Air Leak/Fluctuation connections tightened;dependent drainage  cleared;fluctuation present 08/17/20 1100   Safety all tubing connections taped;all connections secured;suction checked 08/17/20 1100   Securement tubing secured to body distal to insertion site w/ tape 08/17/20 1100   Patency Intervention Tip/tilt 08/17/20 1100   Drainage Description Serous 08/17/20 1100   Dressing Appearance occlusive gauze dressing intact;clean and dry 08/17/20 1100   Dressing Care dressing reinforced 08/17/20 1100   Left Subcutaneous Emphysema none present 08/17/20 1100   Right Subcutaneous Emphysema none present 08/17/20 1100   Site Assessment Not assessed;Other (Comment) 08/17/20 1100   Surrounding Skin Unable to view 08/17/20 1100   Output (mL) 0 mL 08/17/20 1100   Number of days: 3            Closed/Suction Drain 08/13/20 1659 Right;Superior Abdomen Bulb 19 Fr. (Active)   Site Description Unable to view 08/17/20 1100   Dressing Type Gauze 08/17/20 1100   Dressing Status Clean;Intact;Dry 08/17/20 1100   Dressing Intervention Integrity maintained 08/17/20 1100   Drainage Serosanguineous 08/17/20 1100   Status To bulb suction 08/17/20 1100   Output (mL) 0 mL 08/17/20 1100   Number of days: 3            Closed/Suction Drain 08/13/20 1659 Right;Inferior Abdomen Bulb 19 Fr. (Active)   Site Description Unable to view 08/17/20 1100   Dressing Type Gauze 08/17/20 1100   Dressing Status Clean;Intact;Dry 08/17/20 1100   Dressing Intervention Integrity maintained 08/17/20 1100   Drainage Serosanguineous 08/17/20 1100   Status To bulb suction 08/17/20 1100   Output (mL) 0 mL 08/17/20 1100   Number of days: 3            NG/OG Tube 08/06/20 nasogastric Left nostril (Active)   Placement Check placement verified by x-ray;placement verified by aspirate characteristics 08/17/20 1100   Advancement advanced manually 08/12/20 1945   Distal Tube Length (cm) 60 08/16/20 1905   Tolerance no signs/symptoms of discomfort 08/17/20 1100   Securement secured to nostril center 08/17/20 1100   Clamp Status/Tolerance  unclamped 08/17/20 1100   Suction Setting/Drainage Method suction at;low;intermittent setting 08/17/20 1100   Insertion Site Appearance no redness, warmth, tenderness, skin breakdown, drainage 08/17/20 1100   Drainage Green 08/17/20 1100   Flush/Irrigation flushed w/;no resistance met;water 08/17/20 1100   Intake (mL) 30 mL 08/14/20 0500   Tube Output(mL)(Include Discarded Residual) 0 mL 08/16/20 1500   Number of days: 11            Gastrostomy/Enterostomy 08/15/20 0916 Gastrostomy tube w/ balloon LUQ decompression (Active)   Securement sutured to abdomen 08/17/20 1100   Suction Setting/Drainage Method dependent drainage 08/17/20 1100   Drainage thin;green 08/17/20 1100   Clamp Status/Tolerance unclamped 08/17/20 1100   Dressing dry and intact 08/17/20 1100   Insertion Site dry;suture(s) 08/17/20 1100   Site Care site cleansed w/ sterile normal saline;sterile precut T-slit dressing applied 08/17/20 1100   Dressing Change Due 08/18/20 08/17/20 0315   Tube Output(mL)(Include Discarded Residual) 100 mL 08/17/20 0400   Number of days: 2        Prev airway:     Mask Ventilation:  N/a    Attempts:  1    Attempted By:  CRNA    Method of Intubation:  Other (see comments) (TriCipher airway exchanger)    Blade:  Francois 3    Laryngeal View Grade: Grade I - full view of chords      Difficult Airway Encountered?: No      Complications:  None    Airway Device:  Oral endotracheal tube    Airway Device Size:  7.5    Style/Cuff Inflation:  Cuffed (inflated to minimal occlusive pressure)    Tube secured:  22    Secured at:  The lips    Placement Verified By:  Capnometry    Complicating Factors:  None    Findings Post-Intubation:  BS equal bilateral and atraumatic/condition of teeth unchanged    Drips: None documented.   sodium chloride 0.9%      fentanyl 12.5 mL/hr at 08/17/20 1200    lactated ringers 5 mL/hr at 08/16/20 0600    propofoL 40 mcg/kg/min (08/17/20 1200)    Standard Custom Day One ADULT TPN for patient WITH electrolyte  abnormality or renal dysfunction (CENTRAL) 42 mL/hr at 08/17/20 1200        Patient Active Problem List   Diagnosis    Duodenum disorder    Severe sepsis    Acute renal failure with tubular necrosis    Metabolic acidosis       Review of patient's allergies indicates:   Allergen Reactions    Latex        Current Outpatient Medications:    Current Facility-Administered Medications:     0.9%  NaCl infusion (CRRT USE ONLY), , Intravenous, Continuous, Sary Gutierrez DNP, FNP-C    calcium gluconate 1g in dextrose 5% 100mL (ready to mix system), 1 g, Intravenous, PRN, Donis Roa MD, 1 g at 08/17/20 0500    calcium gluconate 2 g in dextrose 5 % 100 mL IVPB, 2 g, Intravenous, PRN, Donis Roa MD    calcium gluconate 3 g in dextrose 5 % 100 mL IVPB, 3 g, Intravenous, PRN, Donis Roa MD    famotidine (PF) injection 20 mg, 20 mg, Intravenous, Daily, Thu Wilson MD, 20 mg at 08/17/20 0838    fentaNYL 2500 mcg in 0.9% sodium chloride 250 mL infusion premix (titrating), , Intravenous, Continuous, Juan Rayo MD, Last Rate: 12.5 mL/hr at 08/17/20 1200    fluconazole (DIFLUCAN) IVPB 200 mg 100 mL, 200 mg, Intravenous, Q24H, Thu Wilson MD, Last Rate: 100 mL/hr at 08/16/20 2200, 200 mg at 08/16/20 2200    heparin (porcine) injection 5,000 Units, 5,000 Units, Subcutaneous, Q8H, Neal Singletary MD, 5,000 Units at 08/17/20 0510    HYDROmorphone injection 0.2 mg, 0.2 mg, Intravenous, Q5 Min PRN, Azar Desai MD    lactated ringers infusion, , Intravenous, Continuous, Neal Singletary MD, Last Rate: 5 mL/hr at 08/16/20 0600    magnesium sulfate 2g in water 50mL IVPB (premix), 2 g, Intravenous, PRN, Sary Gutierrez DNP, FNP-C    midazolam (VERSED) 1 mg/mL injection 1 mg, 1 mg, Intravenous, Q4H PRN, Thu Wilson MD, 1 mg at 08/17/20 0622    ondansetron injection 4 mg, 4 mg, Intravenous, Once PRN, Azar Desai MD    piperacillin-tazobactam 4.5 g in  sodium chloride 0.9% 100 mL IVPB (ready to mix system), 4.5 g, Intravenous, Q8H, Serjio Garcia MD, Last Rate: 25 mL/hr at 08/17/20 0642, 4.5 g at 08/17/20 0642    propofol (DIPRIVAN) 10 mg/mL infusion, 10 mcg/kg/min, Intravenous, Continuous, Donsi Bates MD, Last Rate: 18.6 mL/hr at 08/17/20 1200, 40 mcg/kg/min at 08/17/20 1200    sodium phosphate 20.01 mmol in dextrose 5 % 250 mL IVPB, 20.01 mmol, Intravenous, PRN, Sary Gutierrez, KAREN, FNP-C    sodium phosphate 30 mmol in dextrose 5 % 250 mL IVPB, 30 mmol, Intravenous, PRN, Sary LIVAN Gutierrez, DNP, FNP-C    sodium phosphate 39.99 mmol in dextrose 5 % 250 mL IVPB, 39.99 mmol, Intravenous, PRN, Sary LIVAN Gutierrez, DNP, FNP-C    Standard Custom Day One ADULT TPN for patient WITH electrolyte abnormality or renal dysfunction (CENTRAL), , Intravenous, Continuous, Thu Wilson MD, Last Rate: 42 mL/hr at 08/17/20 1200    [COMPLETED] Pharmacy to dose Vancomycin consult, , , Once **AND** vancomycin - pharmacy to dose, , Intravenous, pharmacy to manage frequency, Thu Wilson MD    Past Surgical History:   Procedure Laterality Date    APPLICATION OF WOUND VACUUM-ASSISTED CLOSURE DEVICE  8/13/2020    Procedure: APPLICATION, WOUND VAC WITH ABTHERA;  Surgeon: Donis Roa MD;  Location: [facility] OR [address] [place] [address];  Service: General;;    COLONOSCOPY      DUODENECTOMY  8/13/2020    Procedure: KOCHERIZATION OF DUODENUM, DUODENECTOMY;  Surgeon: Donis Roa MD;  Location: [facility] OR [address] [place] [address];  Service: General;;    ESOPHAGOGASTRODUODENOSCOPY  01/23/2018    ESOPHAGOGASTRODUODENOSCOPY N/A 8/13/2020    Procedure: EGD (ESOPHAGOGASTRODUODENOSCOPY);  Surgeon: Donis Roa MD;  Location: [facility] OR [address] [place] [address];  Service: General;  Laterality: N/A;    PERCUTANEOUS INSERTION OF GASTROSTOMY TUBE  8/15/2020    Procedure: INSERTION, GASTROSTOMY TUBE, PERCUTANEOUS;  Surgeon: Donis Roa MD;  Location: [facility] OR [address] [place] [address];  Service: General;;    TUBAL  LIGATION         Social History     Socioeconomic History    Marital status: Unknown     Spouse name: Not on file    Number of children: Not on file    Years of education: Not on file    Highest education level: Not on file   Occupational History    Not on file   Social Needs    Financial resource strain: Not on file    Food insecurity     Worry: Not on file     Inability: Not on file    Transportation needs     Medical: Not on file     Non-medical: Not on file   Tobacco Use    Smoking status: Current Some Day Smoker     Packs/day: 0.25     Types: Cigarettes    Smokeless tobacco: Never Used   Substance and Sexual Activity    Alcohol use: Not Currently    Drug use: Not on file    Sexual activity: Not on file   Lifestyle    Physical activity     Days per week: Not on file     Minutes per session: Not on file    Stress: Not on file   Relationships    Social connections     Talks on phone: Not on file     Gets together: Not on file     Attends Jew service: Not on file     Active member of club or organization: Not on file     Attends meetings of clubs or organizations: Not on file     Relationship status: Not on file   Other Topics Concern    Not on file   Social History Narrative    Not on file       OBJECTIVE:     Vital Signs Range (Last 24H):  Temp:  [36.6 °C (97.8 °F)-37.6 °C (99.7 °F)]   Pulse:  []   Resp:  [23-29]   BP: ()/(52-69)   SpO2:  [93 %-100 %]   Arterial Line BP: (100-155)/(48-67)       Significant Labs:  Lab Results   Component Value Date    WBC 55.12 (HH) 08/17/2020    HGB 7.7 (L) 08/17/2020    HCT 24.8 (L) 08/17/2020     08/17/2020    TRIG 255 (H) 08/15/2020    ALT 30 08/17/2020    AST 43 (H) 08/17/2020     08/17/2020    K 4.0 08/17/2020     08/17/2020    CREATININE 0.5 08/17/2020    BUN <2 (L) 08/17/2020    CO2 25 08/17/2020    INR 0.9 08/16/2020       Diagnostic Studies: No relevant studies.    EKG:   No results found for this or any previous  visit.    2D ECHO:  TTE:  No results found for this or any previous visit.    TOBIAS:  No results found for this or any previous visit.    ASSESSMENT/PLAN:                                                                                                                08/17/2020  Jaquan Farmer is a 39 y.o., female.    Anesthesia Evaluation    I have reviewed the Patient Summary Reports.   I have reviewed the NPO Status.   I have reviewed the Medications.     Review of Systems  Anesthesia Hx:  No problems with previous Anesthesia  History of prior surgery of interest to airway management or planning: Denies Family Hx of Anesthesia complications.   Denies Personal Hx of Anesthesia complications.   Social:  Non-Smoker, Smoker    Hematology/Oncology:     Oncology Normal    -- Anemia:   EENT/Dental:EENT/Dental Normal   Cardiovascular:   Hypertension    Pulmonary:   Pneumonia    Renal/:   Chronic Renal Disease, ARF    Hepatic/GI:   PUD,    Musculoskeletal:  Musculoskeletal Normal    Neurological:  Neurology Normal    Endocrine:  Endocrine Normal    Dermatological:  Skin Normal    Psych:  Psychiatric Normal           Physical Exam  General:  Well nourished    Airway/Jaw/Neck:  Airway Findings: Mouth Opening: Normal Pre-Existing Airway Tube(s): Oral Endotracheal tube  General Airway Assessment: Adult  TM Distance: 4 - 6 cm  Jaw/Neck Findings:  Neck ROM: Normal ROM       Chest/Lungs:  Chest/Lungs Findings: Rhonchi, Tachypnea     Heart/Vascular:  Heart Findings: Rate: Normal  Rhythm: Regular Rhythm  Sounds: Normal     Abdomen:  Abdomen Findings:  Surgical incision      Skin:  Skin Findings:  Surgical Incision, Recent     Mental Status:  Mental Status Findings:  Somnolent         Anesthesia Plan  Type of Anesthesia, risks & benefits discussed:  Anesthesia Type:  general  Patient's Preference:   Intra-op Monitoring Plan: standard ASA monitors and arterial line  Intra-op Monitoring Plan Comments:   Post Op Pain Control Plan:  multimodal analgesia, IV/PO Opioids PRN and per primary service following discharge from PACU  Post Op Pain Control Plan Comments:   Induction:   IV  Beta Blocker:  Patient is not currently on a Beta-Blocker (No further documentation required).       Informed Consent: Patient representative understands risks and agrees with Anesthesia plan.  Questions answered. Anesthesia consent signed with patient representative.  ASA Score: 4     Day of Surgery Review of History & Physical: I have interviewed and examined the patient. I have reviewed the patient's H&P dated:  There are no significant changes.  H&P update referred to the surgeon.         Ready For Surgery From Anesthesia Perspective.

## 2020-08-17 NOTE — PLAN OF CARE
Pt free from falls and injury this shift. All VSS at this time. Pt GEIGER and FC with sedation paused. Sats >93% on vent settings: AC/VC+ 40% and 8. MAPs maintained >65 with no issues, pt on continuous propofol, fentanyl, and TPN drips. Chest tube with no output this shift. Abdomen open, wound vac in place, 150cc serosanguinous output this shift. GODFREY drains with minimal output. NG tube and G tube out 350cc thin/green output this shift. No BM this shift. Pt remains anuric on continuous CRRT SCUF. No significant events this shift. Pt turned q2h to prevent skin breakdown, full bath given. Pt to return to OR tomorrow for washout and abdomen closure. Plan of care reviewed with pt. Will continue to monitor closely.    Skin Note: No new skin breakdown noted this shift, pt turned q2h and heels floated in boots to prevent skin breakdown

## 2020-08-17 NOTE — PROGRESS NOTES
[facility] SLED NOTE    Jaquan Farmer is a 39 y.o. female currently on SLED for removal of uremic toxins and volume.     Patient seen and evaluated on SLED, tolerating treatment, see CRRT flowsheet for vitals and assessments.    Labs have been reviewed and the dialysate bath has been adjusted.    Labs:      Recent Labs   Lab 08/16/20  1600 08/16/20  2144 08/17/20  0330 08/17/20  1009    137  137 134*  134* 137   K 3.9 4.2  4.2 4.1  4.1 4.0    107  107 105  105 105   CO2 26 23  23 25  25 25   BUN <2* <2*  <2* <2*  <2* <2*   CREATININE 0.5 0.5  0.5 0.5  0.5 0.5   CALCIUM 7.2* 7.1*  7.1* 7.4*  7.4* 7.1*   PHOS 1.8* 1.6* 2.0*  --        Recent Labs   Lab 08/16/20  2144 08/17/20  0330 08/17/20  1009   WBC 59.45* 56.97* 55.12*   HGB 7.4* 7.5* 7.7*   HCT 23.2* 23.2* 24.8*    197 203        Assessment/Plan  - Seen on SLED this morning, tolerating session with current UFR, no complications.  Tolerating a UFR of 450 mL/hr as tolerated, keep MAP >65. No pressor requirements thus far.   - Will continue switch to SCUF and continue for volume management.  Patient net negative 1.9 L/24 hrs. Goal to be net negative 2-3 L per 24 hrs per charting. CVP 5 this AM.   - SLED prescription and dialysate baths were reviewed and changes made according to most recent labs.    - Follow labs serially while on SLED to monitor electrolytes and follow replacement protocol as needed.   - Patient hemodynamically not stable for intermittent HD yet.  - Continue to monitor intake and output, daily weights   - Avoid nephrotoxic medication and renal dose medications to GFR  - Plans for OR tomorrow for abdominal closure.     ROBERTA Gutierrez, KAREN, APRN, FNP-C  Department of Nephrology  [facility]  Pager: [phone]

## 2020-08-17 NOTE — SUBJECTIVE & OBJECTIVE
Interval History: Stable overnight  Tolerating CRRT  GODFREY drains with minimal bile stained output.   Will plan for OR tomorrow     Medications:  Continuous Infusions:   sodium chloride 0.9%      fentanyl 12.5 mL/hr at 08/17/20 1200    lactated ringers 5 mL/hr at 08/16/20 0600    propofoL 40 mcg/kg/min (08/17/20 1200)    Standard Custom Day One ADULT TPN for patient WITH electrolyte abnormality or renal dysfunction (CENTRAL) 42 mL/hr at 08/17/20 1200     Scheduled Meds:   famotidine (PF)  20 mg Intravenous Daily    fluconazole (DIFLUCAN) IVPB  200 mg Intravenous Q24H    heparin (porcine)  5,000 Units Subcutaneous Q8H    piperacillin-tazobactam (ZOSYN) IVPB  4.5 g Intravenous Q8H     PRN Meds:calcium gluconate IVPB, calcium gluconate IVPB, calcium gluconate IVPB, HYDROmorphone, magnesium sulfate IVPB, midazolam, ondansetron, sodium phosphate IVPB, sodium phosphate IVPB, sodium phosphate IVPB, [COMPLETED] Pharmacy to dose Vancomycin consult **AND** vancomycin - pharmacy to dose     Review of patient's allergies indicates:   Allergen Reactions    Latex      Objective:     Vital Signs (Most Recent):  Temp: 97.8 °F (36.6 °C) (08/17/20 1100)  Pulse: (!) 119 (08/17/20 1200)  Resp: (!) 24 (08/17/20 1200)  BP: (!) 118/56 (08/17/20 1145)  SpO2: (!) 94 % (08/17/20 1200) Vital Signs (24h Range):  Temp:  [97.8 °F (36.6 °C)-99.7 °F (37.6 °C)] 97.8 °F (36.6 °C)  Pulse:  [] 119  Resp:  [23-29] 24  SpO2:  [93 %-100 %] 94 %  BP: ()/(52-69) 118/56  Arterial Line BP: (100-155)/(48-67) 118/63     Weight: 73.5 kg (162 lb 0.6 oz)  Body mass index is 29.64 kg/m².    Intake/Output - Last 3 Shifts       08/15 0700 - 08/16 0659 08/16 0700 - 08/17 0659 08/17 0700 - 08/18 0659    I.V. (mL/kg) 5466 (73.4) 7830.1 (106.5)     TPN  428.8     Total Intake(mL/kg) 5466 (73.4) 8258.8 (112.4)     Urine (mL/kg/hr) 100 (0.1) 15 (0)     Drains 912 673 0    Other 5206 9045 2199    Stool 0      Blood 50      Chest Tube 44 8 0    Total  Output 6312 9741 2199    Net -846 -1482.2 -2199           Stool Occurrence 2 x            Physical Exam  Constitutional:       Interventions: She is sedated and intubated.   HENT:      Head: Normocephalic.   Cardiovascular:      Rate and Rhythm: Regular rhythm. Tachycardia present.      Comments: Improving  Pulmonary:      Effort: She is intubated.      Comments: Intubated and mechanically ventilated  Vent Mode: A/C  Oxygen Concentration (%):  (40-50) 40  Resp Rate Total:  (25 br/min-34 br/min) 26 br/min  Vt Set:  (300 mL) 300 mL  PEEP/CPAP:  (8 cmH20) 8 cmH20  Mean Airway Pressure:  (9.8 oaW43-66 cmH20) 17 cmH20  Abdominal:      Comments: Abthera with ss output  GODFREY x2 with bile stained output   Musculoskeletal:         General: Swelling present.      Right lower leg: Edema present.      Left lower leg: Edema present.   Skin:     General: Skin is warm and dry.   Neurological:      Comments: Sedated on propofol         Significant Labs:  Reviewed    Significant Diagnostics:  reviewed

## 2020-08-17 NOTE — NURSING
Overnight skin note:    No new pressure or moisture associated skin breakdown assessed. Patient on air fluidized mattress repositioned every 2 hours. Heel boots in place to elevate heels off of bed. Preventative sacral foam dressing in place.

## 2020-08-17 NOTE — PROGRESS NOTES
"[facility]  Adult Nutrition  Progress Note    SUMMARY       Recommendations    1.) Continue TPN with 110g (100% of EPN) of amino acids and 175g of dextrose to provide 1035kcal (100% of EEN with propofol).   2.) Once GI function improves and J tube placed; initiate EN of Peptamen Intense VHP; goal rate at 40mL/hr to provide 960kcal, 88g of protein, 806mL of free water. Initiate at 10mL/hr and advance 5-34gVQ0ks as tolerated. Hold EN for residuals >500cc.   3.) Monitor and replace electrolytes as needed.    Goals: 1.) Pt to meet >75% of estimated energy and protein needs over the course of 7 days.  Nutrition Goal Status: new  Communication of RD Recs: reviewed with RN(POC)    Reason for Assessment    Reason For Assessment: new TPN  Diagnosis: (Duodenum disorder)  Relevant Medical History: HTN, anxiety, respiratory distress, leukocytosis, sepsis, pulmonary HTN  Interdisciplinary Rounds: did not attend  General Information Comments: Nsg staff reports TPN was initiated last night and pt is tolerating well. GI- LBM 8/16, ~540mL output from G tube, minimal output from NG tube. NFPE completed- pt has severe muscle wasting to temple, clavicle; severe fat wasting to orbital; legs, arms and torso were unable to be evaluted due to restraints/waffle boots. Pt likely has severe PCM due to acute illness. RD to monitor.  Nutrition Discharge Planning: Unable to determine at this time.    Nutrition Risk Screen    Nutrition Risk Screen: tube feeding or parenteral nutrition    Nutrition/Diet History    Spiritual, Cultural Beliefs, Jewish Practices, Values that Affect Care: no  Food Allergies: NKFA  Factors Affecting Nutritional Intake: on mechanical ventilation, NPO    Anthropometrics    Temp: 97.8 °F (36.6 °C)  Height Method: Stated(from outside records)  Height: 5' 2" (157.5 cm)  Height (inches): 62 in  Weight Method: Bed Scale  Weight: 73.5 kg (162 lb 0.6 oz)  Weight (lb): 162.04 lb  Ideal Body Weight (IBW), " Female: 110 lb  % Ideal Body Weight, Female (lb): 155.73 %  BMI (Calculated): 29.6  BMI Grade: 25 - 29.9 - overweight  Usual Body Weight (UBW), kg: (No wt history)       Lab/Procedures/Meds    Pertinent Labs Reviewed: reviewed  Pertinent Labs Comments: WBC 56.97, Hgb 7.5, Hct 23.2, Na 134, BUN <2, Glucose 113, Ca 7.4, Phosphorus 2.0, Alb 1.2  Pertinent Medications Reviewed: reviewed  Pertinent Medications Comments: famotidine, heparin, piperacillin, vancomycin, fentanyl    Physical Findings/Assessment     Edema 2+ generalized    Estimated/Assessed Needs    Weight Used For Calorie Calculations: 73 kg (160 lb 15 oz)  Energy Calorie Requirements (kcal): 1460  Energy Need Method: Kcal/kg(20kcal/kg)  Protein Requirements:  g (1.2-1.5g/kg)  Weight Used For Protein Calculations: 73 kg (160 lb 15 oz)  Fluid Requirements (mL): 1mL/kcal or per MD recommendations  Estimated Fluid Requirement Method: RDA Method  RDA Method (mL): 1460         Nutrition Prescription Ordered    Current Diet Order: NPO  Current Nutrition Support Formula Ordered: (Custom TPN 15% Amino Acids; 70% dextrose)  Current Nutrition Support Rate Ordered: 42 (ml)  Current Nutrition Support Frequency Ordered: mL/hr    Evaluation of Received Nutrient/Fluid Intake    Parenteral Calories (kcal): 715  Parenteral Protein (gm): 50  Parenteral Fluid (mL): 1008  GIR (Glucose Infusion Rate) (mg/kg/min): 1.4 mg/kg/min  Other Calories (kcal): 491(Propofol at 18.6mL/hr)  Total Calories (kcal): 1206  Total Calories (kcal/kg): 16.4  % Kcal Needs: 82  I/O: I: 8258; O: 59159; +4.6L since admission  Energy Calories Required: not meeting needs  Protein Required: not meeting needs  Fluid Required: not meeting needs  Tolerance: tolerating  % Intake of Estimated Energy Needs: 82  % Meal Intake: 0    Nutrition Risk    Level of Risk/Frequency of Follow-up: high , 2x weekly    Assessment and Plan        Nutrition Problem  Malnutrition (severe) due to acute illness    Related to  (etiology):   NPO    Signs and Symptoms (as evidenced by):   NPO (8/12) for 5 days.  Severe muscle wasting of temple, clavicle  Severe fat wasting of orbital    Interventions(treatment strategy):  1.) Collaboration with other providers  2.) Parenteral nutrition    Nutrition Diagnosis Status:   new     Monitor and Evaluation    Food and Nutrient Intake: parenteral nutrition intake  Food and Nutrient Adminstration: enteral and parenteral nutrition administration  Anthropometric Measurements: weight, weight change  Biochemical Data, Medical Tests and Procedures: electrolyte and renal panel, gastrointestinal profile  Nutrition-Focused Physical Findings: overall appearance, skin     Malnutrition Assessment  Malnutrition Type: acute illness or injury              Orbital Region (Subcutaneous Fat Loss): severe depletion   [place] Region (Muscle Loss): severe depletion  Clavicle Bone Region (Muscle Loss): severe depletion                 Nutrition Follow-Up    RD Follow-up?: Yes

## 2020-08-17 NOTE — ASSESSMENT & PLAN NOTE
-oliguric stage 3 sid with ischemic atn likely secondary to septic shock  -unknown bl cr  -with metabolic acidosis and anasarca  -muddy brown casts on microscopy  -anuric with elevated bun and cr  -SLED continuous 200 cc/hr 3k bath

## 2020-08-17 NOTE — SUBJECTIVE & OBJECTIVE
Interval History: seen at the bedside Stable overnight  Tolerating CRRT    Review of patient's allergies indicates:   Allergen Reactions    Latex      Current Facility-Administered Medications   Medication Frequency    0.9%  NaCl infusion (CRRT USE ONLY) Continuous    calcium gluconate 1g in dextrose 5% 100mL (ready to mix system) PRN    calcium gluconate 2 g in dextrose 5 % 100 mL IVPB PRN    calcium gluconate 3 g in dextrose 5 % 100 mL IVPB PRN    famotidine (PF) injection 20 mg Daily    fentaNYL 2500 mcg in 0.9% sodium chloride 250 mL infusion premix (titrating) Continuous    fluconazole (DIFLUCAN) IVPB 200 mg 100 mL Q24H    heparin (porcine) injection 5,000 Units Q8H    HYDROmorphone injection 0.2 mg Q5 Min PRN    lactated ringers infusion Continuous    magnesium sulfate 2g in water 50mL IVPB (premix) PRN    midazolam (VERSED) 1 mg/mL injection 1 mg Q4H PRN    ondansetron injection 4 mg Once PRN    piperacillin-tazobactam 4.5 g in sodium chloride 0.9% 100 mL IVPB (ready to mix system) Q8H    propofol (DIPRIVAN) 10 mg/mL infusion Continuous    sodium phosphate 20.01 mmol in dextrose 5 % 250 mL IVPB PRN    sodium phosphate 30 mmol in dextrose 5 % 250 mL IVPB PRN    sodium phosphate 39.99 mmol in dextrose 5 % 250 mL IVPB PRN    Standard Custom Day One ADULT TPN for patient WITH electrolyte abnormality or renal dysfunction (CENTRAL) Continuous    TPN ADULT CENTRAL LINE CUSTOM Continuous    vancomycin - pharmacy to dose pharmacy to manage frequency       Objective:     Vital Signs (Most Recent):  Temp: 99.6 °F (37.6 °C) (08/17/20 1500)  Pulse: (!) 114 (08/17/20 1600)  Resp: (!) 23 (08/17/20 1600)  BP: 126/71 (08/17/20 1600)  SpO2: 96 % (08/17/20 1600)  O2 Device (Oxygen Therapy): ventilator (08/17/20 1600) Vital Signs (24h Range):  Temp:  [97.8 °F (36.6 °C)-99.7 °F (37.6 °C)] 99.6 °F (37.6 °C)  Pulse:  [] 114  Resp:  [20-29] 23  SpO2:  [93 %-100 %] 96 %  BP: ()/(52-71)  126/71  Arterial Line BP: (100-129)/(50-63) 118/63     Weight: 73.5 kg (162 lb 0.6 oz) (08/17/20 0600)  Body mass index is 29.64 kg/m².  Body surface area is 1.79 meters squared.    I/O last 3 completed shifts:  In: 26242.8 [I.V.:60690.1]  Out: 54988 [Urine:47; Drains:1020; Other:02982; Chest Tube:22]    Physical Exam  Constitutional:       Appearance: She is ill-appearing.   Eyes:      Conjunctiva/sclera: Conjunctivae normal.      Pupils: Pupils are equal, round, and reactive to light.   Cardiovascular:      Rate and Rhythm: Normal rate and regular rhythm.      Pulses: Normal pulses.      Heart sounds: Normal heart sounds.   Pulmonary:      Effort: Pulmonary effort is normal. No respiratory distress.      Breath sounds: Rhonchi present.   Abdominal:      General: Bowel sounds are normal.      Palpations: Abdomen is soft.      Comments: Wound vac in place   Skin:     General: Skin is warm and dry.      Findings: No bruising or rash.   Neurological:      Comments: Sedated on vent         Significant Labs:  BMP:   Recent Labs   Lab 08/17/20  0330 08/17/20  1009   *  113* 110     105 105   CO2 25  25 25   BUN <2*  <2* <2*   CREATININE 0.5  0.5 0.5   CALCIUM 7.4*  7.4* 7.1*   MG 2.0  --      All labs within the past 24 hours have been reviewed.     Significant Imaging:  bmp

## 2020-08-17 NOTE — PROGRESS NOTES
[facility]  General Surgery  Progress Note    Subjective:     History of Present Illness:  Pt is a 38 yo F with recent history of antrectomy with BII reconstruction for ulcer disease at outside hospital. 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 Patient also reportedly aspirated on induction in the OR prior to this, resulting in severe pulmonary edema and hypoxia. [place] [facility] [place] higher level of care. She arrived as a direct SICU admit on near maximal vent settings and requiring low dose vasopressors with HR in the upper 140s. Her midline laparotomy is closed with 4 Navdeep drains in place draining bilious output.     Post-Op Info:  Procedure(s) (LRB):  LAPAROTOMY, EXPLORATORY; abthera replacement (N/A)  INSERTION, GASTROSTOMY TUBE, PERCUTANEOUS  WASHOUT; abdominal (N/A)   2 Days Post-Op     Interval History: Stable overnight  Tolerating CRRT  GODFREY drains with minimal bile stained output.   Will plan for OR tomorrow     Medications:  Continuous Infusions:   sodium chloride 0.9%      fentanyl 12.5 mL/hr at 08/17/20 1200    lactated ringers 5 mL/hr at 08/16/20 0600    propofoL 40 mcg/kg/min (08/17/20 1200)    Standard Custom Day One ADULT TPN for patient WITH electrolyte abnormality or renal dysfunction (CENTRAL) 42 mL/hr at 08/17/20 1200     Scheduled Meds:   famotidine (PF)  20 mg Intravenous Daily    fluconazole (DIFLUCAN) IVPB  200 mg Intravenous Q24H    heparin (porcine)  5,000 Units Subcutaneous Q8H    piperacillin-tazobactam (ZOSYN) IVPB  4.5 g Intravenous Q8H     PRN Meds:calcium gluconate IVPB, calcium gluconate IVPB, calcium gluconate IVPB, HYDROmorphone, magnesium sulfate IVPB, midazolam, ondansetron, sodium phosphate IVPB, sodium phosphate IVPB, sodium phosphate IVPB, [COMPLETED] Pharmacy to dose Vancomycin consult **AND** vancomycin - pharmacy to dose     Review of patient's allergies indicates:    Allergen Reactions    Latex      Objective:     Vital Signs (Most Recent):  Temp: 97.8 °F (36.6 °C) (08/17/20 1100)  Pulse: (!) 119 (08/17/20 1200)  Resp: (!) 24 (08/17/20 1200)  BP: (!) 118/56 (08/17/20 1145)  SpO2: (!) 94 % (08/17/20 1200) Vital Signs (24h Range):  Temp:  [97.8 °F (36.6 °C)-99.7 °F (37.6 °C)] 97.8 °F (36.6 °C)  Pulse:  [] 119  Resp:  [23-29] 24  SpO2:  [93 %-100 %] 94 %  BP: ()/(52-69) 118/56  Arterial Line BP: (100-155)/(48-67) 118/63     Weight: 73.5 kg (162 lb 0.6 oz)  Body mass index is 29.64 kg/m².    Intake/Output - Last 3 Shifts       08/15 0700 - 08/16 0659 08/16 0700 - 08/17 0659 08/17 0700 - 08/18 0659    I.V. (mL/kg) 5466 (73.4) 7830.1 (106.5)     TPN  428.8     Total Intake(mL/kg) 5466 (73.4) 8258.8 (112.4)     Urine (mL/kg/hr) 100 (0.1) 15 (0)     Drains 912 673 0    Other 5206 9045 2199    Stool 0      Blood 50      Chest Tube 44 8 0    Total Output 6312 9741 2199    Net -846 -1482.2 -2199           Stool Occurrence 2 x            Physical Exam  Constitutional:       Interventions: She is sedated and intubated.   HENT:      Head: Normocephalic.   Cardiovascular:      Rate and Rhythm: Regular rhythm. Tachycardia present.      Comments: Improving  Pulmonary:      Effort: She is intubated.      Comments: Intubated and mechanically ventilated  Vent Mode: A/C  Oxygen Concentration (%):  (40-50) 40  Resp Rate Total:  (25 br/min-34 br/min) 26 br/min  Vt Set:  (300 mL) 300 mL  PEEP/CPAP:  (8 cmH20) 8 cmH20  Mean Airway Pressure:  (9.8 vzY69-37 cmH20) 17 cmH20  Abdominal:      Comments: Abthera with ss output  GODFREY x2 with bile stained output   Musculoskeletal:         General: Swelling present.      Right lower leg: Edema present.      Left lower leg: Edema present.   Skin:     General: Skin is warm and dry.   Neurological:      Comments: Sedated on propofol         Significant Labs:  Reviewed    Significant Diagnostics:  reviewed    Assessment/Plan:     Severe sepsis  40 yo F  s/p antrectomy with BII reconstruction c/b duodenal necrosis requiring ex lap, washout, drainage c/b aspiration event. S/p duodenal resection with d-j anastomosis, g-j and abthera vac placement on 8/13 and washout with g-tube placement on 8/15.    .Neuro:  - Sedation with propofol  - fentanyl   - Sedation vacations, neuro exams    Resp:  - Intubated on vent  - Wean vent as tolerated    CV:  - HDS off pressors  - Tachycardia resolved    Heme:  - Hgb/Hct - stable  - Transfuse as indicated; received 2u PRBC on 8/13    ID:  - Broad spectrum abx/antifungal  - F/u cultures     Renal:  - Parker in place; oliguric  - Nephrology consulted; Continue CRRT with aggressive volume removal as tolerated    FEN/GI:  - NPO  - NGT to LIWS  - Maintain drains to bulb suction  - G tube to gravity  - GI ppx  - Start Tpn    Endo:  - Monitor BG    Dispo:  - Continue ICU care; appreciate SICU assistance; Plan for return to OR on 8/18 for possible closure            Chico Ledezma MD  General Surgery  [facility]

## 2020-08-18 ENCOUNTER — ANESTHESIA (OUTPATIENT)
Dept: SURGERY | Facility: HOSPITAL | Age: 40
DRG: 856 | End: 2020-08-18

## 2020-08-18 LAB
ABO + RH BLD: NORMAL
ALBUMIN SERPL BCP-MCNC: 0.9 G/DL (ref 3.5–5.2)
ALBUMIN SERPL BCP-MCNC: 0.9 G/DL (ref 3.5–5.2)
ALBUMIN SERPL BCP-MCNC: 1.1 G/DL (ref 3.5–5.2)
ALBUMIN SERPL BCP-MCNC: 1.1 G/DL (ref 3.5–5.2)
ALBUMIN SERPL BCP-MCNC: 1.2 G/DL (ref 3.5–5.2)
ALP SERPL-CCNC: 137 U/L (ref 55–135)
ALP SERPL-CCNC: 159 U/L (ref 55–135)
ALP SERPL-CCNC: 171 U/L (ref 55–135)
ALP SERPL-CCNC: 226 U/L (ref 55–135)
ALT SERPL W/O P-5'-P-CCNC: 26 U/L (ref 10–44)
ALT SERPL W/O P-5'-P-CCNC: 29 U/L (ref 10–44)
ALT SERPL W/O P-5'-P-CCNC: 31 U/L (ref 10–44)
ALT SERPL W/O P-5'-P-CCNC: 33 U/L (ref 10–44)
ANION GAP SERPL CALC-SCNC: 10 MMOL/L (ref 8–16)
ANION GAP SERPL CALC-SCNC: 7 MMOL/L (ref 8–16)
ANION GAP SERPL CALC-SCNC: 9 MMOL/L (ref 8–16)
ANISOCYTOSIS BLD QL SMEAR: ABNORMAL
ANISOCYTOSIS BLD QL SMEAR: SLIGHT
ANISOCYTOSIS BLD QL SMEAR: SLIGHT
AST SERPL-CCNC: 35 U/L (ref 10–40)
AST SERPL-CCNC: 38 U/L (ref 10–40)
AST SERPL-CCNC: 42 U/L (ref 10–40)
AST SERPL-CCNC: 51 U/L (ref 10–40)
BASO STIPL BLD QL SMEAR: ABNORMAL
BASO STIPL BLD QL SMEAR: ABNORMAL
BASOPHILS NFR BLD: 0 % (ref 0–1.9)
BILIRUB SERPL-MCNC: 0.7 MG/DL (ref 0.1–1)
BILIRUB SERPL-MCNC: 0.8 MG/DL (ref 0.1–1)
BILIRUB SERPL-MCNC: 0.9 MG/DL (ref 0.1–1)
BILIRUB SERPL-MCNC: 1 MG/DL (ref 0.1–1)
BLD GP AB SCN CELLS X3 SERPL QL: NORMAL
BUN SERPL-MCNC: 11 MG/DL (ref 6–20)
BUN SERPL-MCNC: 11 MG/DL (ref 6–20)
BUN SERPL-MCNC: 15 MG/DL (ref 6–20)
BUN SERPL-MCNC: 15 MG/DL (ref 6–20)
BUN SERPL-MCNC: 5 MG/DL (ref 6–20)
BUN SERPL-MCNC: 5 MG/DL (ref 6–20)
BUN SERPL-MCNC: 9 MG/DL (ref 6–20)
BURR CELLS BLD QL SMEAR: ABNORMAL
CA-I BLDV-SCNC: 1.05 MMOL/L (ref 1.06–1.42)
CA-I BLDV-SCNC: 1.1 MMOL/L (ref 1.06–1.42)
CA-I BLDV-SCNC: 1.1 MMOL/L (ref 1.06–1.42)
CALCIUM SERPL-MCNC: 7.3 MG/DL (ref 8.7–10.5)
CALCIUM SERPL-MCNC: 7.3 MG/DL (ref 8.7–10.5)
CALCIUM SERPL-MCNC: 7.5 MG/DL (ref 8.7–10.5)
CALCIUM SERPL-MCNC: 7.6 MG/DL (ref 8.7–10.5)
CALCIUM SERPL-MCNC: 7.6 MG/DL (ref 8.7–10.5)
CALCIUM SERPL-MCNC: 7.7 MG/DL (ref 8.7–10.5)
CALCIUM SERPL-MCNC: 7.9 MG/DL (ref 8.7–10.5)
CHLORIDE SERPL-SCNC: 105 MMOL/L (ref 95–110)
CHLORIDE SERPL-SCNC: 107 MMOL/L (ref 95–110)
CO2 SERPL-SCNC: 22 MMOL/L (ref 23–29)
CO2 SERPL-SCNC: 23 MMOL/L (ref 23–29)
CO2 SERPL-SCNC: 23 MMOL/L (ref 23–29)
CO2 SERPL-SCNC: 25 MMOL/L (ref 23–29)
CREAT SERPL-MCNC: 1 MG/DL (ref 0.5–1.4)
CREAT SERPL-MCNC: 1 MG/DL (ref 0.5–1.4)
CREAT SERPL-MCNC: 1.3 MG/DL (ref 0.5–1.4)
CREAT SERPL-MCNC: 1.4 MG/DL (ref 0.5–1.4)
CREAT SERPL-MCNC: 1.5 MG/DL (ref 0.5–1.4)
CREAT SERPL-MCNC: 1.9 MG/DL (ref 0.5–1.4)
CREAT SERPL-MCNC: 1.9 MG/DL (ref 0.5–1.4)
DACRYOCYTES BLD QL SMEAR: ABNORMAL
DIFFERENTIAL METHOD: ABNORMAL
DOHLE BOD BLD QL SMEAR: PRESENT
EOSINOPHIL NFR BLD: 1 % (ref 0–8)
EOSINOPHIL NFR BLD: 1 % (ref 0–8)
EOSINOPHIL NFR BLD: 2 % (ref 0–8)
ERYTHROCYTE [DISTWIDTH] IN BLOOD BY AUTOMATED COUNT: 15.6 % (ref 11.5–14.5)
ERYTHROCYTE [DISTWIDTH] IN BLOOD BY AUTOMATED COUNT: 15.6 % (ref 11.5–14.5)
ERYTHROCYTE [DISTWIDTH] IN BLOOD BY AUTOMATED COUNT: 15.9 % (ref 11.5–14.5)
EST. GFR  (AFRICAN AMERICAN): 37.7 ML/MIN/1.73 M^2
EST. GFR  (AFRICAN AMERICAN): 37.7 ML/MIN/1.73 M^2
EST. GFR  (AFRICAN AMERICAN): 50.2 ML/MIN/1.73 M^2
EST. GFR  (AFRICAN AMERICAN): 54.6 ML/MIN/1.73 M^2
EST. GFR  (AFRICAN AMERICAN): 59.7 ML/MIN/1.73 M^2
EST. GFR  (AFRICAN AMERICAN): >60 ML/MIN/1.73 M^2
EST. GFR  (AFRICAN AMERICAN): >60 ML/MIN/1.73 M^2
EST. GFR  (NON AFRICAN AMERICAN): 32.7 ML/MIN/1.73 M^2
EST. GFR  (NON AFRICAN AMERICAN): 32.7 ML/MIN/1.73 M^2
EST. GFR  (NON AFRICAN AMERICAN): 43.6 ML/MIN/1.73 M^2
EST. GFR  (NON AFRICAN AMERICAN): 47.4 ML/MIN/1.73 M^2
EST. GFR  (NON AFRICAN AMERICAN): 51.8 ML/MIN/1.73 M^2
EST. GFR  (NON AFRICAN AMERICAN): >60 ML/MIN/1.73 M^2
EST. GFR  (NON AFRICAN AMERICAN): >60 ML/MIN/1.73 M^2
GIANT PLATELETS BLD QL SMEAR: PRESENT
GLUCOSE SERPL-MCNC: 106 MG/DL (ref 70–110)
GLUCOSE SERPL-MCNC: 106 MG/DL (ref 70–110)
GLUCOSE SERPL-MCNC: 111 MG/DL (ref 70–110)
GLUCOSE SERPL-MCNC: 115 MG/DL (ref 70–110)
GLUCOSE SERPL-MCNC: 115 MG/DL (ref 70–110)
GLUCOSE SERPL-MCNC: 138 MG/DL (ref 70–110)
GLUCOSE SERPL-MCNC: 140 MG/DL (ref 70–110)
HCT VFR BLD AUTO: 24.6 % (ref 37–48.5)
HCT VFR BLD AUTO: 25.6 % (ref 37–48.5)
HCT VFR BLD AUTO: 25.6 % (ref 37–48.5)
HGB BLD-MCNC: 7.7 G/DL (ref 12–16)
HGB BLD-MCNC: 7.9 G/DL (ref 12–16)
HGB BLD-MCNC: 8.1 G/DL (ref 12–16)
HYPOCHROMIA BLD QL SMEAR: ABNORMAL
HYPOCHROMIA BLD QL SMEAR: ABNORMAL
IMM GRANULOCYTES # BLD AUTO: ABNORMAL K/UL (ref 0–0.04)
IMM GRANULOCYTES NFR BLD AUTO: ABNORMAL % (ref 0–0.5)
LYMPHOCYTES NFR BLD: 2 % (ref 18–48)
LYMPHOCYTES NFR BLD: 3 % (ref 18–48)
LYMPHOCYTES NFR BLD: 5 % (ref 18–48)
MAGNESIUM SERPL-MCNC: 2.2 MG/DL (ref 1.6–2.6)
MAGNESIUM SERPL-MCNC: 2.5 MG/DL (ref 1.6–2.6)
MAGNESIUM SERPL-MCNC: 2.5 MG/DL (ref 1.6–2.6)
MCH RBC QN AUTO: 29.5 PG (ref 27–31)
MCH RBC QN AUTO: 29.7 PG (ref 27–31)
MCH RBC QN AUTO: 29.8 PG (ref 27–31)
MCHC RBC AUTO-ENTMCNC: 30.9 G/DL (ref 32–36)
MCHC RBC AUTO-ENTMCNC: 31.3 G/DL (ref 32–36)
MCHC RBC AUTO-ENTMCNC: 31.6 G/DL (ref 32–36)
MCV RBC AUTO: 94 FL (ref 82–98)
MCV RBC AUTO: 94 FL (ref 82–98)
MCV RBC AUTO: 96 FL (ref 82–98)
METAMYELOCYTES NFR BLD MANUAL: 1 %
METAMYELOCYTES NFR BLD MANUAL: 2 %
METAMYELOCYTES NFR BLD MANUAL: 4 %
MONOCYTES NFR BLD: 1 % (ref 4–15)
MONOCYTES NFR BLD: 1.5 % (ref 4–15)
MONOCYTES NFR BLD: 4 % (ref 4–15)
MYELOCYTES NFR BLD MANUAL: 1 %
MYELOCYTES NFR BLD MANUAL: 2 %
MYELOCYTES NFR BLD MANUAL: 3 %
NEUTROPHILS NFR BLD: 82 % (ref 38–73)
NEUTROPHILS NFR BLD: 88.5 % (ref 38–73)
NEUTROPHILS NFR BLD: 92 % (ref 38–73)
NEUTS BAND NFR BLD MANUAL: 2 %
NEUTS BAND NFR BLD MANUAL: 2 %
NRBC BLD-RTO: 0 /100 WBC
OVALOCYTES BLD QL SMEAR: ABNORMAL
OVALOCYTES BLD QL SMEAR: ABNORMAL
PHOSPHATE SERPL-MCNC: 4.3 MG/DL (ref 2.7–4.5)
PHOSPHATE SERPL-MCNC: 4.7 MG/DL (ref 2.7–4.5)
PHOSPHATE SERPL-MCNC: 5.2 MG/DL (ref 2.7–4.5)
PLATELET # BLD AUTO: 162 K/UL (ref 150–350)
PLATELET # BLD AUTO: 193 K/UL (ref 150–350)
PLATELET # BLD AUTO: 201 K/UL (ref 150–350)
PLATELET BLD QL SMEAR: ABNORMAL
PLATELET BLD QL SMEAR: ABNORMAL
PMV BLD AUTO: 12.1 FL (ref 9.2–12.9)
PMV BLD AUTO: 12.3 FL (ref 9.2–12.9)
PMV BLD AUTO: 12.5 FL (ref 9.2–12.9)
POCT GLUCOSE: 113 MG/DL (ref 70–110)
POCT GLUCOSE: 135 MG/DL (ref 70–110)
POCT GLUCOSE: 140 MG/DL (ref 70–110)
POCT GLUCOSE: 150 MG/DL (ref 70–110)
POIKILOCYTOSIS BLD QL SMEAR: SLIGHT
POLYCHROMASIA BLD QL SMEAR: ABNORMAL
POTASSIUM SERPL-SCNC: 3.9 MMOL/L (ref 3.5–5.1)
POTASSIUM SERPL-SCNC: 4 MMOL/L (ref 3.5–5.1)
POTASSIUM SERPL-SCNC: 4 MMOL/L (ref 3.5–5.1)
POTASSIUM SERPL-SCNC: 4.1 MMOL/L (ref 3.5–5.1)
POTASSIUM SERPL-SCNC: 4.1 MMOL/L (ref 3.5–5.1)
POTASSIUM SERPL-SCNC: 4.2 MMOL/L (ref 3.5–5.1)
POTASSIUM SERPL-SCNC: 4.2 MMOL/L (ref 3.5–5.1)
PROT SERPL-MCNC: 4.7 G/DL (ref 6–8.4)
PROT SERPL-MCNC: 4.9 G/DL (ref 6–8.4)
PROT SERPL-MCNC: 5.2 G/DL (ref 6–8.4)
PROT SERPL-MCNC: 5.4 G/DL (ref 6–8.4)
RBC # BLD AUTO: 2.61 M/UL (ref 4–5.4)
RBC # BLD AUTO: 2.66 M/UL (ref 4–5.4)
RBC # BLD AUTO: 2.72 M/UL (ref 4–5.4)
SCHISTOCYTES BLD QL SMEAR: ABNORMAL
SODIUM SERPL-SCNC: 137 MMOL/L (ref 136–145)
SODIUM SERPL-SCNC: 137 MMOL/L (ref 136–145)
SODIUM SERPL-SCNC: 138 MMOL/L (ref 136–145)
SODIUM SERPL-SCNC: 139 MMOL/L (ref 136–145)
SODIUM SERPL-SCNC: 139 MMOL/L (ref 136–145)
STOMATOCYTES BLD QL SMEAR: PRESENT
TARGETS BLD QL SMEAR: ABNORMAL
TOXIC GRANULES BLD QL SMEAR: PRESENT
WBC # BLD AUTO: 46.04 K/UL (ref 3.9–12.7)
WBC # BLD AUTO: 61.6 K/UL (ref 3.9–12.7)
WBC # BLD AUTO: 65.33 K/UL (ref 3.9–12.7)

## 2020-08-18 PROCEDURE — 15734 PR MUSCLE-SKIN FLAP,TRUNK: ICD-10-PCS | Mod: 59,,, | Performed by: SURGERY

## 2020-08-18 PROCEDURE — 80069 RENAL FUNCTION PANEL: CPT

## 2020-08-18 PROCEDURE — 82330 ASSAY OF CALCIUM: CPT | Mod: 91

## 2020-08-18 PROCEDURE — C1729 CATH, DRAINAGE: HCPCS | Performed by: SURGERY

## 2020-08-18 PROCEDURE — 90945 DIALYSIS ONE EVALUATION: CPT | Mod: ,,, | Performed by: NURSE PRACTITIONER

## 2020-08-18 PROCEDURE — 94003 VENT MGMT INPAT SUBQ DAY: CPT

## 2020-08-18 PROCEDURE — 99900026 HC AIRWAY MAINTENANCE (STAT)

## 2020-08-18 PROCEDURE — 63600175 PHARM REV CODE 636 W HCPCS: Performed by: NURSE ANESTHETIST, CERTIFIED REGISTERED

## 2020-08-18 PROCEDURE — 99291 CRITICAL CARE FIRST HOUR: CPT | Mod: 24,,, | Performed by: SURGERY

## 2020-08-18 PROCEDURE — 25000003 PHARM REV CODE 250: Performed by: STUDENT IN AN ORGANIZED HEALTH CARE EDUCATION/TRAINING PROGRAM

## 2020-08-18 PROCEDURE — 63600175 PHARM REV CODE 636 W HCPCS: Performed by: STUDENT IN AN ORGANIZED HEALTH CARE EDUCATION/TRAINING PROGRAM

## 2020-08-18 PROCEDURE — 94761 N-INVAS EAR/PLS OXIMETRY MLT: CPT

## 2020-08-18 PROCEDURE — 15734 MUSCLE-SKIN GRAFT TRUNK: CPT | Mod: ,,, | Performed by: SURGERY

## 2020-08-18 PROCEDURE — 20000000 HC ICU ROOM

## 2020-08-18 PROCEDURE — D9220A PRA ANESTHESIA: Mod: ,,, | Performed by: ANESTHESIOLOGY

## 2020-08-18 PROCEDURE — 99291 PR CRITICAL CARE, E/M 30-74 MINUTES: ICD-10-PCS | Mod: 24,,, | Performed by: SURGERY

## 2020-08-18 PROCEDURE — 27000221 HC OXYGEN, UP TO 24 HOURS

## 2020-08-18 PROCEDURE — A4217 STERILE WATER/SALINE, 500 ML: HCPCS | Performed by: STUDENT IN AN ORGANIZED HEALTH CARE EDUCATION/TRAINING PROGRAM

## 2020-08-18 PROCEDURE — 36000707: Performed by: SURGERY

## 2020-08-18 PROCEDURE — 85027 COMPLETE CBC AUTOMATED: CPT | Mod: 91

## 2020-08-18 PROCEDURE — 85007 BL SMEAR W/DIFF WBC COUNT: CPT | Mod: 91

## 2020-08-18 PROCEDURE — S0028 INJECTION, FAMOTIDINE, 20 MG: HCPCS | Performed by: STUDENT IN AN ORGANIZED HEALTH CARE EDUCATION/TRAINING PROGRAM

## 2020-08-18 PROCEDURE — 99900035 HC TECH TIME PER 15 MIN (STAT)

## 2020-08-18 PROCEDURE — 86901 BLOOD TYPING SEROLOGIC RH(D): CPT

## 2020-08-18 PROCEDURE — 86920 COMPATIBILITY TEST SPIN: CPT

## 2020-08-18 PROCEDURE — 83735 ASSAY OF MAGNESIUM: CPT | Mod: 91

## 2020-08-18 PROCEDURE — 37000008 HC ANESTHESIA 1ST 15 MINUTES: Performed by: SURGERY

## 2020-08-18 PROCEDURE — D9220A PRA ANESTHESIA: ICD-10-PCS | Mod: ,,, | Performed by: ANESTHESIOLOGY

## 2020-08-18 PROCEDURE — 25000003 PHARM REV CODE 250: Performed by: NURSE PRACTITIONER

## 2020-08-18 PROCEDURE — 36000706: Performed by: SURGERY

## 2020-08-18 PROCEDURE — 25000003 PHARM REV CODE 250: Performed by: SURGERY

## 2020-08-18 PROCEDURE — 25000003 PHARM REV CODE 250: Performed by: NURSE ANESTHETIST, CERTIFIED REGISTERED

## 2020-08-18 PROCEDURE — 80053 COMPREHEN METABOLIC PANEL: CPT | Mod: 91

## 2020-08-18 PROCEDURE — 80100008 HC CRRT DAILY MAINTENANCE

## 2020-08-18 PROCEDURE — 90945 DIALYSIS ONE EVALUATION: CPT

## 2020-08-18 PROCEDURE — 37000009 HC ANESTHESIA EA ADD 15 MINS: Performed by: SURGERY

## 2020-08-18 PROCEDURE — B4185 PARENTERAL SOL 10 GM LIPIDS: HCPCS | Performed by: STUDENT IN AN ORGANIZED HEALTH CARE EDUCATION/TRAINING PROGRAM

## 2020-08-18 PROCEDURE — 90945 PR DIALYSIS, NOT HEMO, 1 EVAL: ICD-10-PCS | Mod: ,,, | Performed by: NURSE PRACTITIONER

## 2020-08-18 RX ORDER — FENTANYL CITRATE 50 UG/ML
INJECTION, SOLUTION INTRAMUSCULAR; INTRAVENOUS
Status: DISCONTINUED | OUTPATIENT
Start: 2020-08-18 | End: 2020-08-18

## 2020-08-18 RX ORDER — DEXMEDETOMIDINE HYDROCHLORIDE 4 UG/ML
0.2 INJECTION, SOLUTION INTRAVENOUS CONTINUOUS
Status: DISCONTINUED | OUTPATIENT
Start: 2020-08-18 | End: 2020-08-21

## 2020-08-18 RX ORDER — LIDOCAINE HYDROCHLORIDE 10 MG/ML
INJECTION INFILTRATION; PERINEURAL
Status: COMPLETED
Start: 2020-08-18 | End: 2020-08-18

## 2020-08-18 RX ORDER — MAGNESIUM SULFATE HEPTAHYDRATE 40 MG/ML
2 INJECTION, SOLUTION INTRAVENOUS
Status: DISCONTINUED | OUTPATIENT
Start: 2020-08-18 | End: 2020-08-19

## 2020-08-18 RX ORDER — HYDROCODONE BITARTRATE AND ACETAMINOPHEN 500; 5 MG/1; MG/1
TABLET ORAL CONTINUOUS
Status: DISCONTINUED | OUTPATIENT
Start: 2020-08-18 | End: 2020-08-19

## 2020-08-18 RX ORDER — PROPOFOL 10 MG/ML
VIAL (ML) INTRAVENOUS
Status: DISCONTINUED | OUTPATIENT
Start: 2020-08-18 | End: 2020-08-18

## 2020-08-18 RX ORDER — ROCURONIUM BROMIDE 10 MG/ML
INJECTION, SOLUTION INTRAVENOUS
Status: DISCONTINUED | OUTPATIENT
Start: 2020-08-18 | End: 2020-08-18

## 2020-08-18 RX ORDER — LIDOCAINE HYDROCHLORIDE 10 MG/ML
INJECTION INFILTRATION; PERINEURAL
Status: DISPENSED
Start: 2020-08-18 | End: 2020-08-19

## 2020-08-18 RX ADMIN — PIPERACILLIN SODIUM AND TAZOBACTAM SODIUM 4.5 G: 4; .5 INJECTION, POWDER, LYOPHILIZED, FOR SOLUTION INTRAVENOUS at 10:08

## 2020-08-18 RX ADMIN — FENTANYL CITRATE 100 MCG: 50 INJECTION, SOLUTION INTRAMUSCULAR; INTRAVENOUS at 11:08

## 2020-08-18 RX ADMIN — PROPOFOL 40 MCG/KG/MIN: 10 INJECTION, EMULSION INTRAVENOUS at 03:08

## 2020-08-18 RX ADMIN — MIDAZOLAM 1 MG: 1 INJECTION INTRAMUSCULAR; INTRAVENOUS at 10:08

## 2020-08-18 RX ADMIN — MIDAZOLAM 2 MG: 1 INJECTION INTRAMUSCULAR; INTRAVENOUS at 11:08

## 2020-08-18 RX ADMIN — DEXMEDETOMIDINE HYDROCHLORIDE 0.4 MCG/KG/HR: 4 INJECTION, SOLUTION INTRAVENOUS at 08:08

## 2020-08-18 RX ADMIN — HEPARIN SODIUM 5000 UNITS: 5000 INJECTION INTRAVENOUS; SUBCUTANEOUS at 02:08

## 2020-08-18 RX ADMIN — I.V. FAT EMULSION 250 ML: 20 EMULSION INTRAVENOUS at 09:08

## 2020-08-18 RX ADMIN — FENTANYL CITRATE 50 MCG: 50 INJECTION, SOLUTION INTRAMUSCULAR; INTRAVENOUS at 12:08

## 2020-08-18 RX ADMIN — FLUCONAZOLE 200 MG: 200 INJECTION, SOLUTION INTRAVENOUS at 10:08

## 2020-08-18 RX ADMIN — MAGNESIUM SULFATE HEPTAHYDRATE: 500 INJECTION, SOLUTION INTRAMUSCULAR; INTRAVENOUS at 09:08

## 2020-08-18 RX ADMIN — PIPERACILLIN SODIUM AND TAZOBACTAM SODIUM 4.5 G: 4; .5 INJECTION, POWDER, LYOPHILIZED, FOR SOLUTION INTRAVENOUS at 12:08

## 2020-08-18 RX ADMIN — FAMOTIDINE 20 MG: 10 INJECTION INTRAVENOUS at 08:08

## 2020-08-18 RX ADMIN — PROPOFOL 40 MCG/KG/MIN: 10 INJECTION, EMULSION INTRAVENOUS at 02:08

## 2020-08-18 RX ADMIN — PROPOFOL 5 MCG/KG/MIN: 10 INJECTION, EMULSION INTRAVENOUS at 10:08

## 2020-08-18 RX ADMIN — HEPARIN SODIUM 5000 UNITS: 5000 INJECTION INTRAVENOUS; SUBCUTANEOUS at 05:08

## 2020-08-18 RX ADMIN — DEXMEDETOMIDINE HYDROCHLORIDE 0.2 MCG/KG/HR: 4 INJECTION, SOLUTION INTRAVENOUS at 03:08

## 2020-08-18 RX ADMIN — Medication 2500 MCG: at 01:08

## 2020-08-18 RX ADMIN — SODIUM CHLORIDE, SODIUM GLUCONATE, SODIUM ACETATE, POTASSIUM CHLORIDE, MAGNESIUM CHLORIDE, SODIUM PHOSPHATE, DIBASIC, AND POTASSIUM PHOSPHATE: .53; .5; .37; .037; .03; .012; .00082 INJECTION, SOLUTION INTRAVENOUS at 11:08

## 2020-08-18 RX ADMIN — HEPARIN SODIUM 5000 UNITS: 5000 INJECTION INTRAVENOUS; SUBCUTANEOUS at 10:08

## 2020-08-18 RX ADMIN — PIPERACILLIN SODIUM AND TAZOBACTAM SODIUM 4.5 G: 4; .5 INJECTION, POWDER, LYOPHILIZED, FOR SOLUTION INTRAVENOUS at 02:08

## 2020-08-18 RX ADMIN — PIPERACILLIN SODIUM AND TAZOBACTAM SODIUM 4.5 G: 4; .5 INJECTION, POWDER, LYOPHILIZED, FOR SOLUTION INTRAVENOUS at 07:08

## 2020-08-18 RX ADMIN — SODIUM CHLORIDE: 0.9 INJECTION, SOLUTION INTRAVENOUS at 09:08

## 2020-08-18 RX ADMIN — PROPOFOL 40 MCG/KG/MIN: 10 INJECTION, EMULSION INTRAVENOUS at 06:08

## 2020-08-18 RX ADMIN — PROPOFOL 80 MG: 10 INJECTION, EMULSION INTRAVENOUS at 11:08

## 2020-08-18 RX ADMIN — ROCURONIUM BROMIDE 30 MG: 10 INJECTION, SOLUTION INTRAVENOUS at 11:08

## 2020-08-18 RX ADMIN — ROCURONIUM BROMIDE 20 MG: 10 INJECTION, SOLUTION INTRAVENOUS at 12:08

## 2020-08-18 NOTE — RESPIRATORY THERAPY
Received pt from OR. See vent flowsheet for settings, which are prior settings. Will continue to monitor.

## 2020-08-18 NOTE — PLAN OF CARE
No acute events overnight. Patient receiving continuous SCUF treatment with UF @ 450. Remains intubated and sedated, follows commands with sedation paused. Ventilator set to AC/VC, 40% FiO2, 8 PEEP, rate 20. SpO2 > 95%. NSR - ST on bedside monitor. MAP maintained > 65, no pressors required. TPN continued overnight. Blood glucose checks stable every 6 hours. No BM overnight. Abdominal wound vac with moderate amount of serosanguinous output. GODFREY drains with serosanguinous output. Chest tube with minimal to no serous output. Plan of care reviewed with patient's sister over the phone, verbalized understanding. Plan for patient to return to OR today for belly closure, continue with volume removal with CRRT. Bed in low position and brake set. See flowsheets for detailed assessment. Continuing to monitor.

## 2020-08-18 NOTE — PLAN OF CARE
SW is following this Pt for DC planning needs. There are no identified needs at this time. Discharge Disposition: TBD - pending patient progress; returned to OR for 3rd visit today.     SW will continue to coordinate with patient, family, team and insurance to complete patient's discharge plan.    Jazzy Eid LMSW   - Case Management

## 2020-08-18 NOTE — PROGRESS NOTES
CRRT:    Treatment ran for  36 Hours until system clotted off.  SCUF  restarted; both ports with good flow.  UF rate set to 450ml/hr    Daily checks done. Orders verified.

## 2020-08-18 NOTE — PHYSICIAN QUERY
PT Name: Jaquan Farmer  MR #: [id number]    Consultant Diagnosis Clarification     CDS: Dayna CARROLL RN  Contact information: [email or web address]    This form is a permanent document in the medical record.    Query Date: August 18, 2020      By submitting this query, we are merely seeking further clarification of documentation.  Please utilize your independent clinical judgment when addressing the question(s) below.    The Medical Record reflects the following:    Clinical Information Location in Medical Record   NFPE completed- pt has severe muscle wasting to temple, clavicle; severe fat wasting to orbital; legs, arms and torso were unable to be evaluted due to restraints/waffle boots. Pt likely has severe PCM due to acute illness. RD to monitor.    Weight: 73.5 kg (162 lb 0.6 oz)  Weight (lb): 162.04 lb  Ideal Body Weight (IBW), Female: 110 lb  % Ideal Body Weight, Female (lb): 155.73 %  BMI (Calculated): 29.6  BMI Grade: 25 - 29.9 - overweight  Usual Body Weight (UBW), kg: (No wt history)    Energy Calories Required: not meeting needs  Protein Required: not meeting needs  Fluid Required: not meeting needs  Tolerance: tolerating  % Intake of Estimated Energy Needs: 82  % Meal Intake: 0    Nutrition Problem  Malnutrition (severe) due to acute illness  Related to (etiology):   NPO  Signs and Symptoms (as evidenced by):   NPO (8/12) for 5 days.  Severe muscle wasting of temple, clavicle  Severe fat wasting of orbital    Malnutrition Assessment  Malnutrition Type: acute illness or injury  Orbital Region (Subcutaneous Fat Loss): severe depletion   [place] Region (Muscle Loss): severe depletion  Clavicle Bone Region (Muscle Loss): severe depletion               Please clarify/confirm the Consultants diagnosis of ____Severe Protein-Calorie Malnutrition__________:     [ x ] Diagnosis ruled in   [  ] Diagnosis ruled out   [  ] Other diagnosis (please specify): _____________________________   [  ] Clinically  undetermined     Present on admission (POA) status:   [   ] Yes (Y)                          [  ] Clinically Undetermined (W)  [   ] No (N)                            [   ] Documentation insufficient to determine if condition is POA (U)            100

## 2020-08-18 NOTE — TRANSFER OF CARE
"Anesthesia Transfer of Care Note    Patient: Jaquan Farmer    Procedure(s) Performed: Procedure(s) (LRB):  LAPAROTOMY, EXPLORATORY; possible abdominal closure (N/A)    Patient location: ICU    Anesthesia Type: general    Transport from OR: Transported from OR intubated on 100% O2 by AMBU with assisted ventilation. Continuous ECG monitoring in transport. Continuous SpO2 monitoring in transport    Post pain: adequate analgesia    Post assessment: no apparent anesthetic complications and tolerated procedure well    Post vital signs: stable    Level of consciousness: sedated    Nausea/Vomiting: no nausea/vomiting    Complications: none    Transfer of care protocol was followed      Last vitals:   Visit Vitals  /68   Pulse 106   Temp 36.6 °C (97.8 °F) (Oral)   Resp (!) 43   Ht 5' 2" (1.575 m)   Wt 69.5 kg (153 lb 3.5 oz)   SpO2 100%   Breastfeeding No   BMI 28.02 kg/m²     "

## 2020-08-18 NOTE — PROGRESS NOTES
[facility] SLED NOTE    Jaquan Farmer is a 39 y.o. female currently on SLED for removal of uremic toxins and volume.     Patient seen and evaluated on SLED, tolerating treatment, see CRRT flowsheet for vitals and assessments.    Labs have been reviewed and the dialysate bath has been adjusted.    Labs:      Recent Labs   Lab 08/17/20  1421 08/17/20  1636 08/17/20  2100 08/18/20  0400    138 136  136 138  138   K 4.0 4.0 3.8  3.8 4.1  4.1    106 105  105 107  107   CO2 25 25 25  25 22*  22*   BUN <2* <2* 2*  2* 5*  5*   CREATININE 0.6 0.7 0.8  0.8 1.0  1.0   CALCIUM 7.6* 7.4* 7.2*  7.2* 7.6*  7.6*   PHOS 2.0*  --  2.2* 5.2*       Recent Labs   Lab 08/17/20  1636 08/17/20  2100 08/18/20  0400   WBC 60.30* 59.79* 46.04*   HGB 7.8* 7.7* 7.7*   HCT 24.0* 24.4* 24.6*    203 162        Assessment/Plan  - Seen on SCUF this morning, tolerating session with current UFR, no complications. Patient net negative 4 L/24 hrs. Intubated FiO2 40%. Electrolytes and acid base stable.   - Plans for OR for abdominal closure today.   - Will continue SLED for volume management and metabolic clearance. Will plan for a 12 hr SCUF tonight for volume management.   - SLED prescription and dialysate baths were reviewed and changes made according to most recent labs.    - Follow labs serially while on SLED to monitor electrolytes and follow replacement protocol as needed.   - Patient hemodynamically not stable for intermittent HD yet.  - Continue to monitor intake and output, daily weights   - Avoid nephrotoxic medication and renal dose medications to GFR    ROBERTA Gutierrez, DNP, APRN, FNP-C  Department of Nephrology  [facility]  Pager: [phone]

## 2020-08-18 NOTE — PROGRESS NOTES
[facility]  Critical Care - Surgery  Progress Note    Patient Name: Jaquan Farmer  MRN: [id number]  Admission Date: 8/12/2020  Hospital Length of Stay: 6 days  Code Status: Full Code  Attending Provider: Donis Roa MD  Primary Care Provider: Primary Doctor No   Principal Problem: <principal problem not specified>    Subjective:     Hospital/ICU Course: Pt is a 38 yo F with recent history of antrectomy with BII reconstruction for ulcer disease at outside hospital. 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 Patient also reportedly aspirated on induction in the OR prior to this, resulting in severe pulmonary edema and hypoxia. Patient was transferred to [facility] urgently for higher level of care. She arrived as a direct SICU admit on near maximal vent settings and requiring low dose vasopressors with HR in the upper 140s. Her midline laparotomy is closed with 4 Navdeep drains in place draining bilious output.     8/15: Exploratory Laparotomy; gastrostomy tube with Abthera replacement     Interval History/Significant Events:   CRRT; goal net negative daily.   Remains intubated and sedated, follows commands with sedation paused. Ventilator set to AC/VC, 40% FiO2, 8 PEEP, rate 25. SpO2 > 92%. NSR - ST on bedside monitor.  UOP 15 over 24 hours.   Drains with 172 and 336 out over 24 hours       Post-Operative Day: 3 Days Post-Op    Objective:     Vital Signs (Most Recent):  Temp: 98.5 °F (36.9 °C) (08/17/20 2305)  Pulse: 106 (08/18/20 0600)  Resp: 19 (08/18/20 0600)  BP: 124/65 (08/18/20 0600)  SpO2: 100 % (08/18/20 0600) Vital Signs (24h Range):  Temp:  [97.8 °F (36.6 °C)-99.6 °F (37.6 °C)] 98.5 °F (36.9 °C)  Pulse:  [] 106  Resp:  [18-39] 19  SpO2:  [93 %-100 %] 100 %  BP: (104-152)/(53-74) 124/65     Weight: 69.5 kg (153 lb 3.5 oz)  Body mass index is 28.02 kg/m².    Intake/Output Summary (Last 24 hours) at 8/18/2020 0662  Last data filed at  8/18/2020 0600  Gross per 24 hour   Intake 7014.48 ml   Output 39116 ml   Net -4024.52 ml     Physical Exam  Constitutional:       Interventions: She is sedated and intubated.   HENT:      Head: Normocephalic and atraumatic.   Cardiovascular:      Rate and Rhythm: Normal rate.   Pulmonary:      Effort: Pulmonary effort is normal. She is intubated.   Abdominal:      Comments: Gastrostomy tube in place with no surrounding erythema   Skin:     General: Skin is warm and dry.     Vents:  Vent Mode: A/C (08/18/20 0354)  Ventilator Initiated: Yes(chart correction) (08/12/20 1930)  Set Rate: 20 BPM (08/18/20 0354)  Vt Set: 330 mL (08/18/20 0354)  PEEP/CPAP: 8 cmH20 (08/18/20 0354)  Oxygen Concentration (%): 40 (08/18/20 0600)  Peak Airway Pressure: 24 cmH2O (08/18/20 0354)  Plateau Pressure: 22 cmH20 (08/18/20 0354)  Total Ve: 6.84 mL (08/18/20 0354)  F/VT Ratio<105 (RSBI): (!) 44.6 (08/18/20 0354)    Lines/Drains/Airways     Peripherally Inserted Central Catheter Line            PICC Double Lumen 08/08/20 1400 right brachial 9 days          Central Venous Catheter Line            Trialysis (Dialysis) Catheter 08/13/20 2230 right internal jugular 4 days          Drain                 NG/OG Tube 08/06/20 nasogastric Left nostril 12 days         Chest Tube 08/14/20 0007 Right Midaxillary 14 Fr. 4 days         Closed/Suction Drain 08/13/20 1659 Right;Inferior Abdomen Bulb 19 Fr. 4 days         Closed/Suction Drain 08/13/20 1659 Right;Superior Abdomen Bulb 19 Fr. 4 days         Gastrostomy/Enterostomy 08/15/20 0916 Gastrostomy tube w/ balloon LUQ decompression 2 days          Airway                 Airway - Non-Surgical 08/15/20 0835 Endotracheal Tube 2 days          Peripheral Intravenous Line                 Peripheral IV - Single Lumen 08/12/20 18 G Right Forearm 6 days         Peripheral IV - Single Lumen 08/16/20 1300 20 G Left Forearm 1 day              Significant Labs:    CBC/Anemia Profile:  Recent Labs   Lab  08/17/20  1636 08/17/20  2100 08/18/20  0400   WBC 60.30* 59.79* 46.04*   HGB 7.8* 7.7* 7.7*   HCT 24.0* 24.4* 24.6*    203 162   MCV 93 94 94   RDW 15.4* 15.5* 15.6*      Chemistries:  Recent Labs   Lab 08/17/20  1421 08/17/20  1636 08/17/20  2100 08/18/20  0400    138 136  136 138  138   K 4.0 4.0 3.8  3.8 4.1  4.1    106 105  105 107  107   CO2 25 25 25  25 22*  22*   BUN <2* <2* 2*  2* 5*  5*   CREATININE 0.6 0.7 0.8  0.8 1.0  1.0   CALCIUM 7.6* 7.4* 7.2*  7.2* 7.6*  7.6*   ALBUMIN 1.1* 1.2* 1.2*  1.2* 1.1*  1.1*   PROT  --  4.8* 4.9* 4.7*   BILITOT  --  1.0 0.9 1.0   ALKPHOS  --  141* 142* 159*   ALT  --  31 29 29   AST  --  41* 39 38   MG 1.6  --  1.7 2.2   PHOS 2.0*  --  2.2* 5.2*     Significant Imaging:  I have reviewed and interpreted all pertinent imaging results/findings within the past 24 hours.    Assessment/Plan:   40 yo F s/p antrectomy with BII reconstruction c/b duodenal necrosis requiring ex lap, washout, drainage c/b aspiration event. S/p duodenal resection with d-j anastomosis, g-j and abthera vac placement on 8/13 and washout with g-tube placement on 8/15.     Neuro:  - Sedation with propofol  - Fentanyl for pain   - Sedation vacations, neuro exams; answers questions when sedation weaned      Resp:  - Intubated on vent  - Wean vent as tolerated     CV:  - HDS off pressors  - Tachycardia resolved     Heme:  - Hgb/Hct - stable  - Transfuse as indicated     ID:  - Broad spectrum abx/antifungal; Vancomycin discontinued yesterday   - F/u cultures      Renal:  - Parker removed yesterday; bladder scan this am   - Nephrology consulted; Continue CRRT with aggressive volume removal as tolerated     FEN/GI:   - NPO  - NGT to LIWS   - Maintain drains to bulb suction  - G tube to gravity   - GI ppx  - Start TPN   - Planning on returning to OR tomorrow for possible     Endo:  - Monitor BG     Dispo:  - Continue SICU care; OR today for possible closure       Nica Andrews  MD Kylee  U General Surgery - PGY2  9/8/2020 6:02 AM

## 2020-08-18 NOTE — SUBJECTIVE & OBJECTIVE
Interval History: Stable overnight  Tolerating CRRT  GODFREY drains with minimal bile stained output.   Will plan for OR today     Medications:  Continuous Infusions:   sodium chloride 0.9% 200 mL/hr at 08/18/20 0700    fentanyl 12.5 mL/hr at 08/18/20 0700    lactated ringers 5 mL/hr at 08/16/20 0600    propofoL 40 mcg/kg/min (08/18/20 0700)    TPN ADULT CENTRAL LINE CUSTOM 45 mL/hr at 08/18/20 0700     Scheduled Meds:   famotidine (PF)  20 mg Intravenous Daily    fluconazole (DIFLUCAN) IVPB  200 mg Intravenous Q24H    heparin (porcine)  5,000 Units Subcutaneous Q8H    piperacillin-tazobactam (ZOSYN) IVPB  4.5 g Intravenous Q8H     PRN Meds:calcium gluconate IVPB, calcium gluconate IVPB, calcium gluconate IVPB, HYDROmorphone, magnesium sulfate IVPB, midazolam, ondansetron, sodium phosphate IVPB, sodium phosphate IVPB, sodium phosphate IVPB     Review of patient's allergies indicates:   Allergen Reactions    Latex      Objective:     Vital Signs (Most Recent):  Temp: 97.8 °F (36.6 °C) (08/18/20 0700)  Pulse: 102 (08/18/20 0748)  Resp: (!) 29 (08/18/20 0748)  BP: 130/78 (08/18/20 0745)  SpO2: 100 % (08/18/20 0748) Vital Signs (24h Range):  Temp:  [97.8 °F (36.6 °C)-99.6 °F (37.6 °C)] 97.8 °F (36.6 °C)  Pulse:  [] 102  Resp:  [18-39] 29  SpO2:  [93 %-100 %] 100 %  BP: (104-152)/(53-78) 130/78     Weight: 69.5 kg (153 lb 3.5 oz)  Body mass index is 28.02 kg/m².    Intake/Output - Last 3 Shifts       08/16 0700 - 08/17 0659 08/17 0700 - 08/18 0659 08/18 0700 - 08/19 0659    I.V. (mL/kg) 7830.1 (106.5) 6038.6 (86.9)     NG/GT   50    .8 975.9     Total Intake(mL/kg) 8258.8 (112.4) 7014.5 (100.9) 50 (0.7)    Urine (mL/kg/hr) 15 (0)      Drains 673 695 54    Other 9045 91089 972    Stool       Blood       Chest Tube 8 0 0    Total Output 9741 83661 1026    Net -1482.2 -4024.5 -976                 Physical Exam  Constitutional:       Interventions: She is sedated and intubated.   HENT:      Head:  Normocephalic.   Cardiovascular:      Rate and Rhythm: Regular rhythm. Tachycardia present.      Comments: Improving  Pulmonary:      Effort: She is intubated.      Comments: Intubated and mechanically ventilated  Vent Mode: A/C  Oxygen Concentration (%):  (40-50) 40  Resp Rate Total:  (25 br/min-34 br/min) 26 br/min  Vt Set:  (300 mL) 300 mL  PEEP/CPAP:  (8 cmH20) 8 cmH20  Mean Airway Pressure:  (9.8 imF42-78 cmH20) 17 cmH20  Abdominal:      Comments: Abthera with ss output  GODFREY x2 with bile stained output   Musculoskeletal:         General: Swelling present.      Right lower leg: Edema present.      Left lower leg: Edema present.   Skin:     General: Skin is warm and dry.   Neurological:      Comments: Sedated on propofol         Significant Labs:  Reviewed    Significant Diagnostics:  reviewedI

## 2020-08-18 NOTE — OP NOTE
Date of procedure - 08/18/2020  Preop diagnosis - status post duodenectomy for necrosis and reconstruction with open abdomen  Postop diagnosis - same  Procedure - bilateral myocutaneous advancement flap closure of the abdomen (component separation)  Surgeon - Crispin  Assist - Morello  Anesthesia - general  Blood loss - 100 cc  Indications - this patient is 5 days out from duodenal resection with reconstruction and jejunal drainage of the medial wall of the residual 2nd part of the duodenum to include the MPL a the patient has made a nice recovery has demonstrated no evidence of leakage from this a reconstruction and is stable enough and about ready for extubation that we have returned her to the operating room for abdominal closure  Operative report in detail - patient brought the operating placed in supine position prepped draped sterile fashion after satisfactory general endotracheal anesthesia was induced  Wound VAC was removed  Bilateral myocutaneous advancement flaps were performed by achieving a component separation the lateral edge of the rectus was divided from the external oblique musculature bilaterally from the arcuate line cephalad to the anterior ribs  This allowed medialization of the midline and a primary closure utilizing a running 1.  PDS with multiple internal retention sutures of 1.  Vicryl allowed for closure of the abdomen  Patient's airway pressures were 31 at the completion of the closure and these were unchanged from prior  A 19 Navdeep drain was placed in the subcutaneous space which was now quite generous given the extensive abdominal wall surgery reconstruction that was required this was secured to the skin with nylon and then the skin edges were reapproximated  With clips  Needle sponge instrument counts were correct patient tolerated the procedure and was stable throughout Marcello

## 2020-08-18 NOTE — BRIEF OP NOTE
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    Attestation: I was present and scrubbed for the entire procedure.

## 2020-08-18 NOTE — NURSING
Overnight skin note:     No new pressure or moisture associated skin breakdown assessed. Patient on air fluidized mattress, repositioned every 2 hours. Heel boots in place to elevate heels off of bed. Preventative sacral foam dressing in place.

## 2020-08-18 NOTE — ASSESSMENT & PLAN NOTE
40 yo F s/p antrectomy with BII reconstruction c/b duodenal necrosis requiring ex lap, washout, drainage c/b aspiration event. S/p duodenal resection with d-j anastomosis, g-j and abthera vac placement on 8/13 and washout with g-tube placement on 8/15.    .Neuro:  - Sedation with propofol  - fentanyl   - Sedation vacations, neuro exams    Resp:  - Intubated on vent  - Wean vent as tolerated    CV:  - HDS off pressors  - Tachycardia resolved    Heme:  - Hgb/Hct - stable  - Transfuse as indicated; received 2u PRBC on 8/13    ID:  - Broad spectrum abx/antifungal  - F/u cultures     Renal:  - Parker in place; oliguric  - Nephrology consulted; Continue CRRT with aggressive volume removal as tolerated    FEN/GI:  - NPO  - NGT to LIWS  - Maintain drains to bulb suction  - G tube to gravity  - GI ppx  - Start Tpn    Endo:  - Monitor BG    Dispo:  - Continue ICU care; appreciate SICU assistance; Plan for return to OR today for possible closure

## 2020-08-19 LAB
ABO + RH BLD: NORMAL
ALBUMIN SERPL BCP-MCNC: 1 G/DL (ref 3.5–5.2)
ALBUMIN SERPL BCP-MCNC: 1 G/DL (ref 3.5–5.2)
ALBUMIN SERPL BCP-MCNC: 1.1 G/DL (ref 3.5–5.2)
ALBUMIN SERPL BCP-MCNC: 1.3 G/DL (ref 3.5–5.2)
ALBUMIN SERPL BCP-MCNC: 1.4 G/DL (ref 3.5–5.2)
ALP SERPL-CCNC: 105 U/L (ref 55–135)
ALP SERPL-CCNC: 112 U/L (ref 55–135)
ALP SERPL-CCNC: 132 U/L (ref 55–135)
ALP SERPL-CCNC: 281 U/L (ref 55–135)
ALT SERPL W/O P-5'-P-CCNC: 22 U/L (ref 10–44)
ALT SERPL W/O P-5'-P-CCNC: 25 U/L (ref 10–44)
ALT SERPL W/O P-5'-P-CCNC: 26 U/L (ref 10–44)
ALT SERPL W/O P-5'-P-CCNC: 26 U/L (ref 10–44)
ANION GAP SERPL CALC-SCNC: 10 MMOL/L (ref 8–16)
ANION GAP SERPL CALC-SCNC: 11 MMOL/L (ref 8–16)
ANION GAP SERPL CALC-SCNC: 11 MMOL/L (ref 8–16)
ANION GAP SERPL CALC-SCNC: 12 MMOL/L (ref 8–16)
ANION GAP SERPL CALC-SCNC: 12 MMOL/L (ref 8–16)
ANISOCYTOSIS BLD QL SMEAR: SLIGHT
AST SERPL-CCNC: 31 U/L (ref 10–40)
AST SERPL-CCNC: 33 U/L (ref 10–40)
AST SERPL-CCNC: 40 U/L (ref 10–40)
AST SERPL-CCNC: 45 U/L (ref 10–40)
BASO STIPL BLD QL SMEAR: ABNORMAL
BASOPHILS # BLD AUTO: ABNORMAL K/UL (ref 0–0.2)
BASOPHILS NFR BLD: 0 % (ref 0–1.9)
BASOPHILS NFR BLD: 0.5 % (ref 0–1.9)
BASOPHILS NFR BLD: 0.5 % (ref 0–1.9)
BILIRUB SERPL-MCNC: 0.6 MG/DL (ref 0.1–1)
BILIRUB SERPL-MCNC: 0.6 MG/DL (ref 0.1–1)
BILIRUB SERPL-MCNC: 0.7 MG/DL (ref 0.1–1)
BILIRUB SERPL-MCNC: 0.7 MG/DL (ref 0.1–1)
BLD GP AB SCN CELLS X3 SERPL QL: NORMAL
BLD PROD TYP BPU: NORMAL
BLOOD UNIT EXPIRATION DATE: NORMAL
BLOOD UNIT TYPE CODE: 7300
BLOOD UNIT TYPE: NORMAL
BUN SERPL-MCNC: 19 MG/DL (ref 6–20)
BUN SERPL-MCNC: 19 MG/DL (ref 6–20)
BUN SERPL-MCNC: 23 MG/DL (ref 6–20)
BUN SERPL-MCNC: 27 MG/DL (ref 6–20)
BUN SERPL-MCNC: 31 MG/DL (ref 6–20)
BURR CELLS BLD QL SMEAR: ABNORMAL
CA-I BLDV-SCNC: 1.1 MMOL/L (ref 1.06–1.42)
CA-I BLDV-SCNC: 1.13 MMOL/L (ref 1.06–1.42)
CA-I BLDV-SCNC: 1.13 MMOL/L (ref 1.06–1.42)
CA-I BLDV-SCNC: 1.14 MMOL/L (ref 1.06–1.42)
CA-I BLDV-SCNC: 1.15 MMOL/L (ref 1.06–1.42)
CALCIUM SERPL-MCNC: 7.5 MG/DL (ref 8.7–10.5)
CALCIUM SERPL-MCNC: 7.5 MG/DL (ref 8.7–10.5)
CALCIUM SERPL-MCNC: 7.8 MG/DL (ref 8.7–10.5)
CALCIUM SERPL-MCNC: 8 MG/DL (ref 8.7–10.5)
CALCIUM SERPL-MCNC: 8.1 MG/DL (ref 8.7–10.5)
CHLORIDE SERPL-SCNC: 106 MMOL/L (ref 95–110)
CHLORIDE SERPL-SCNC: 107 MMOL/L (ref 95–110)
CHLORIDE SERPL-SCNC: 107 MMOL/L (ref 95–110)
CO2 SERPL-SCNC: 20 MMOL/L (ref 23–29)
CO2 SERPL-SCNC: 21 MMOL/L (ref 23–29)
CODING SYSTEM: NORMAL
CREAT SERPL-MCNC: 2 MG/DL (ref 0.5–1.4)
CREAT SERPL-MCNC: 2.1 MG/DL (ref 0.5–1.4)
CREAT SERPL-MCNC: 2.1 MG/DL (ref 0.5–1.4)
CREAT SERPL-MCNC: 2.2 MG/DL (ref 0.5–1.4)
CREAT SERPL-MCNC: 2.4 MG/DL (ref 0.5–1.4)
DACRYOCYTES BLD QL SMEAR: ABNORMAL
DIFFERENTIAL METHOD: ABNORMAL
DISPENSE STATUS: NORMAL
DOHLE BOD BLD QL SMEAR: PRESENT
EOSINOPHIL # BLD AUTO: ABNORMAL K/UL (ref 0–0.5)
EOSINOPHIL NFR BLD: 0 % (ref 0–8)
EOSINOPHIL NFR BLD: 0.5 % (ref 0–8)
EOSINOPHIL NFR BLD: 0.5 % (ref 0–8)
EOSINOPHIL NFR BLD: 1 % (ref 0–8)
ERYTHROCYTE [DISTWIDTH] IN BLOOD BY AUTOMATED COUNT: 14.7 % (ref 11.5–14.5)
ERYTHROCYTE [DISTWIDTH] IN BLOOD BY AUTOMATED COUNT: 15.1 % (ref 11.5–14.5)
ERYTHROCYTE [DISTWIDTH] IN BLOOD BY AUTOMATED COUNT: 15.2 % (ref 11.5–14.5)
ERYTHROCYTE [DISTWIDTH] IN BLOOD BY AUTOMATED COUNT: 15.6 % (ref 11.5–14.5)
ERYTHROCYTE [DISTWIDTH] IN BLOOD BY AUTOMATED COUNT: 15.9 % (ref 11.5–14.5)
ERYTHROCYTE [DISTWIDTH] IN BLOOD BY AUTOMATED COUNT: 15.9 % (ref 11.5–14.5)
ERYTHROCYTE [DISTWIDTH] IN BLOOD BY AUTOMATED COUNT: 16.1 % (ref 11.5–14.5)
EST. GFR  (AFRICAN AMERICAN): 28.5 ML/MIN/1.73 M^2
EST. GFR  (AFRICAN AMERICAN): 31.6 ML/MIN/1.73 M^2
EST. GFR  (AFRICAN AMERICAN): 33.4 ML/MIN/1.73 M^2
EST. GFR  (AFRICAN AMERICAN): 33.4 ML/MIN/1.73 M^2
EST. GFR  (AFRICAN AMERICAN): 35.5 ML/MIN/1.73 M^2
EST. GFR  (NON AFRICAN AMERICAN): 24.7 ML/MIN/1.73 M^2
EST. GFR  (NON AFRICAN AMERICAN): 27.4 ML/MIN/1.73 M^2
EST. GFR  (NON AFRICAN AMERICAN): 29 ML/MIN/1.73 M^2
EST. GFR  (NON AFRICAN AMERICAN): 29 ML/MIN/1.73 M^2
EST. GFR  (NON AFRICAN AMERICAN): 30.8 ML/MIN/1.73 M^2
FINAL PATHOLOGIC DIAGNOSIS: NORMAL
GIANT PLATELETS BLD QL SMEAR: PRESENT
GLUCOSE SERPL-MCNC: 129 MG/DL (ref 70–110)
GLUCOSE SERPL-MCNC: 138 MG/DL (ref 70–110)
GLUCOSE SERPL-MCNC: 140 MG/DL (ref 70–110)
GLUCOSE SERPL-MCNC: 142 MG/DL (ref 70–110)
GLUCOSE SERPL-MCNC: 142 MG/DL (ref 70–110)
GROSS: NORMAL
HCT VFR BLD AUTO: 18.3 % (ref 37–48.5)
HCT VFR BLD AUTO: 19.8 % (ref 37–48.5)
HCT VFR BLD AUTO: 20 % (ref 37–48.5)
HCT VFR BLD AUTO: 20.1 % (ref 37–48.5)
HCT VFR BLD AUTO: 22.8 % (ref 37–48.5)
HCT VFR BLD AUTO: 23.3 % (ref 37–48.5)
HCT VFR BLD AUTO: 23.7 % (ref 37–48.5)
HGB BLD-MCNC: 5.7 G/DL (ref 12–16)
HGB BLD-MCNC: 6.2 G/DL (ref 12–16)
HGB BLD-MCNC: 6.3 G/DL (ref 12–16)
HGB BLD-MCNC: 6.6 G/DL (ref 12–16)
HGB BLD-MCNC: 7.4 G/DL (ref 12–16)
HGB BLD-MCNC: 7.7 G/DL (ref 12–16)
HGB BLD-MCNC: 7.7 G/DL (ref 12–16)
HYPOCHROMIA BLD QL SMEAR: ABNORMAL
IMM GRANULOCYTES # BLD AUTO: ABNORMAL K/UL (ref 0–0.04)
IMM GRANULOCYTES NFR BLD AUTO: ABNORMAL % (ref 0–0.5)
LYMPHOCYTES # BLD AUTO: ABNORMAL K/UL (ref 1–4.8)
LYMPHOCYTES NFR BLD: 2.5 % (ref 18–48)
LYMPHOCYTES NFR BLD: 3 % (ref 18–48)
LYMPHOCYTES NFR BLD: 4 % (ref 18–48)
LYMPHOCYTES NFR BLD: 5 % (ref 18–48)
LYMPHOCYTES NFR BLD: 5.5 % (ref 18–48)
MAGNESIUM SERPL-MCNC: 2.6 MG/DL (ref 1.6–2.6)
MCH RBC QN AUTO: 29 PG (ref 27–31)
MCH RBC QN AUTO: 29.4 PG (ref 27–31)
MCH RBC QN AUTO: 29.7 PG (ref 27–31)
MCH RBC QN AUTO: 30.1 PG (ref 27–31)
MCH RBC QN AUTO: 30.1 PG (ref 27–31)
MCH RBC QN AUTO: 30.2 PG (ref 27–31)
MCH RBC QN AUTO: 30.7 PG (ref 27–31)
MCHC RBC AUTO-ENTMCNC: 31.1 G/DL (ref 32–36)
MCHC RBC AUTO-ENTMCNC: 31.3 G/DL (ref 32–36)
MCHC RBC AUTO-ENTMCNC: 31.3 G/DL (ref 32–36)
MCHC RBC AUTO-ENTMCNC: 32.5 G/DL (ref 32–36)
MCHC RBC AUTO-ENTMCNC: 32.5 G/DL (ref 32–36)
MCHC RBC AUTO-ENTMCNC: 33 G/DL (ref 32–36)
MCHC RBC AUTO-ENTMCNC: 33 G/DL (ref 32–36)
MCV RBC AUTO: 91 FL (ref 82–98)
MCV RBC AUTO: 93 FL (ref 82–98)
MCV RBC AUTO: 94 FL (ref 82–98)
MCV RBC AUTO: 95 FL (ref 82–98)
METAMYELOCYTES NFR BLD MANUAL: 0.5 %
METAMYELOCYTES NFR BLD MANUAL: 1 %
METAMYELOCYTES NFR BLD MANUAL: 2 %
MONOCYTES # BLD AUTO: ABNORMAL K/UL (ref 0.3–1)
MONOCYTES NFR BLD: 0 % (ref 4–15)
MONOCYTES NFR BLD: 0 % (ref 4–15)
MONOCYTES NFR BLD: 1 % (ref 4–15)
MONOCYTES NFR BLD: 1.5 % (ref 4–15)
MONOCYTES NFR BLD: 2 % (ref 4–15)
MONOCYTES NFR BLD: 2.5 % (ref 4–15)
MONOCYTES NFR BLD: 2.5 % (ref 4–15)
MYELOCYTES NFR BLD MANUAL: 0.5 %
MYELOCYTES NFR BLD MANUAL: 1 %
MYELOCYTES NFR BLD MANUAL: 1.5 %
MYELOCYTES NFR BLD MANUAL: 2 %
MYELOCYTES NFR BLD MANUAL: 3 %
NEUTROPHILS NFR BLD: 86 % (ref 38–73)
NEUTROPHILS NFR BLD: 88 % (ref 38–73)
NEUTROPHILS NFR BLD: 90 % (ref 38–73)
NEUTROPHILS NFR BLD: 91 % (ref 38–73)
NEUTROPHILS NFR BLD: 91.5 % (ref 38–73)
NEUTROPHILS NFR BLD: 92 % (ref 38–73)
NEUTROPHILS NFR BLD: 93 % (ref 38–73)
NEUTS BAND NFR BLD MANUAL: 1 %
NEUTS BAND NFR BLD MANUAL: 1.5 %
NEUTS BAND NFR BLD MANUAL: 2.5 %
NEUTS BAND NFR BLD MANUAL: 3.5 %
NRBC BLD-RTO: 0 /100 WBC
OVALOCYTES BLD QL SMEAR: ABNORMAL
PHOSPHATE SERPL-MCNC: 4.6 MG/DL (ref 2.7–4.5)
PLATELET # BLD AUTO: 164 K/UL (ref 150–350)
PLATELET # BLD AUTO: 171 K/UL (ref 150–350)
PLATELET # BLD AUTO: 178 K/UL (ref 150–350)
PLATELET # BLD AUTO: 180 K/UL (ref 150–350)
PLATELET # BLD AUTO: 184 K/UL (ref 150–350)
PLATELET # BLD AUTO: 193 K/UL (ref 150–350)
PLATELET # BLD AUTO: 203 K/UL (ref 150–350)
PLATELET BLD QL SMEAR: ABNORMAL
PMV BLD AUTO: 11.7 FL (ref 9.2–12.9)
PMV BLD AUTO: 12 FL (ref 9.2–12.9)
PMV BLD AUTO: 12.4 FL (ref 9.2–12.9)
PMV BLD AUTO: 12.5 FL (ref 9.2–12.9)
PMV BLD AUTO: 12.6 FL (ref 9.2–12.9)
PMV BLD AUTO: 12.8 FL (ref 9.2–12.9)
PMV BLD AUTO: 12.8 FL (ref 9.2–12.9)
POCT GLUCOSE: 142 MG/DL (ref 70–110)
POCT GLUCOSE: 144 MG/DL (ref 70–110)
POCT GLUCOSE: 148 MG/DL (ref 70–110)
POIKILOCYTOSIS BLD QL SMEAR: SLIGHT
POLYCHROMASIA BLD QL SMEAR: ABNORMAL
POTASSIUM SERPL-SCNC: 4 MMOL/L (ref 3.5–5.1)
POTASSIUM SERPL-SCNC: 4.2 MMOL/L (ref 3.5–5.1)
POTASSIUM SERPL-SCNC: 4.7 MMOL/L (ref 3.5–5.1)
PROT SERPL-MCNC: 4.8 G/DL (ref 6–8.4)
PROT SERPL-MCNC: 5.1 G/DL (ref 6–8.4)
PROT SERPL-MCNC: 5.2 G/DL (ref 6–8.4)
PROT SERPL-MCNC: 5.4 G/DL (ref 6–8.4)
RBC # BLD AUTO: 1.92 M/UL (ref 4–5.4)
RBC # BLD AUTO: 2.11 M/UL (ref 4–5.4)
RBC # BLD AUTO: 2.15 M/UL (ref 4–5.4)
RBC # BLD AUTO: 2.17 M/UL (ref 4–5.4)
RBC # BLD AUTO: 2.46 M/UL (ref 4–5.4)
RBC # BLD AUTO: 2.55 M/UL (ref 4–5.4)
RBC # BLD AUTO: 2.56 M/UL (ref 4–5.4)
SCHISTOCYTES BLD QL SMEAR: ABNORMAL
SODIUM SERPL-SCNC: 137 MMOL/L (ref 136–145)
SODIUM SERPL-SCNC: 138 MMOL/L (ref 136–145)
TARGETS BLD QL SMEAR: ABNORMAL
TOXIC GRANULES BLD QL SMEAR: PRESENT
TRANS ERYTHROCYTES VOL PATIENT: NORMAL ML
WBC # BLD AUTO: 39.09 K/UL (ref 3.9–12.7)
WBC # BLD AUTO: 44.07 K/UL (ref 3.9–12.7)
WBC # BLD AUTO: 45.93 K/UL (ref 3.9–12.7)
WBC # BLD AUTO: 49.57 K/UL (ref 3.9–12.7)
WBC # BLD AUTO: 52.53 K/UL (ref 3.9–12.7)
WBC # BLD AUTO: 52.58 K/UL (ref 3.9–12.7)
WBC # BLD AUTO: 54.62 K/UL (ref 3.9–12.7)

## 2020-08-19 PROCEDURE — 25000003 PHARM REV CODE 250: Performed by: STUDENT IN AN ORGANIZED HEALTH CARE EDUCATION/TRAINING PROGRAM

## 2020-08-19 PROCEDURE — 27000221 HC OXYGEN, UP TO 24 HOURS

## 2020-08-19 PROCEDURE — 99900035 HC TECH TIME PER 15 MIN (STAT)

## 2020-08-19 PROCEDURE — S0028 INJECTION, FAMOTIDINE, 20 MG: HCPCS | Performed by: STUDENT IN AN ORGANIZED HEALTH CARE EDUCATION/TRAINING PROGRAM

## 2020-08-19 PROCEDURE — 86901 BLOOD TYPING SEROLOGIC RH(D): CPT

## 2020-08-19 PROCEDURE — 63600175 PHARM REV CODE 636 W HCPCS: Performed by: STUDENT IN AN ORGANIZED HEALTH CARE EDUCATION/TRAINING PROGRAM

## 2020-08-19 PROCEDURE — 87040 BLOOD CULTURE FOR BACTERIA: CPT

## 2020-08-19 PROCEDURE — 20000000 HC ICU ROOM

## 2020-08-19 PROCEDURE — P9045 ALBUMIN (HUMAN), 5%, 250 ML: HCPCS | Mod: JG

## 2020-08-19 PROCEDURE — 85027 COMPLETE CBC AUTOMATED: CPT | Mod: 91

## 2020-08-19 PROCEDURE — 87071 CULTURE AEROBIC QUANT OTHER: CPT

## 2020-08-19 PROCEDURE — 83735 ASSAY OF MAGNESIUM: CPT

## 2020-08-19 PROCEDURE — P9021 RED BLOOD CELLS UNIT: HCPCS

## 2020-08-19 PROCEDURE — 80100008 HC CRRT DAILY MAINTENANCE

## 2020-08-19 PROCEDURE — 90945 DIALYSIS ONE EVALUATION: CPT

## 2020-08-19 PROCEDURE — 99900026 HC AIRWAY MAINTENANCE (STAT)

## 2020-08-19 PROCEDURE — 25000003 PHARM REV CODE 250: Performed by: SURGERY

## 2020-08-19 PROCEDURE — 90945 DIALYSIS ONE EVALUATION: CPT | Mod: ,,, | Performed by: NURSE PRACTITIONER

## 2020-08-19 PROCEDURE — 87205 SMEAR GRAM STAIN: CPT

## 2020-08-19 PROCEDURE — 99291 PR CRITICAL CARE, E/M 30-74 MINUTES: ICD-10-PCS | Mod: 24,,, | Performed by: SURGERY

## 2020-08-19 PROCEDURE — 80069 RENAL FUNCTION PANEL: CPT

## 2020-08-19 PROCEDURE — 85007 BL SMEAR W/DIFF WBC COUNT: CPT | Mod: 91

## 2020-08-19 PROCEDURE — 89220 SPUTUM SPECIMEN COLLECTION: CPT

## 2020-08-19 PROCEDURE — 94003 VENT MGMT INPAT SUBQ DAY: CPT

## 2020-08-19 PROCEDURE — 90945 PR DIALYSIS, NOT HEMO, 1 EVAL: ICD-10-PCS | Mod: ,,, | Performed by: NURSE PRACTITIONER

## 2020-08-19 PROCEDURE — 80053 COMPREHEN METABOLIC PANEL: CPT | Mod: 91

## 2020-08-19 PROCEDURE — 63600175 PHARM REV CODE 636 W HCPCS: Performed by: SURGERY

## 2020-08-19 PROCEDURE — 82330 ASSAY OF CALCIUM: CPT | Mod: 91

## 2020-08-19 PROCEDURE — 63600175 PHARM REV CODE 636 W HCPCS: Mod: JG

## 2020-08-19 PROCEDURE — 99291 CRITICAL CARE FIRST HOUR: CPT | Mod: 24,,, | Performed by: SURGERY

## 2020-08-19 PROCEDURE — A4217 STERILE WATER/SALINE, 500 ML: HCPCS | Performed by: STUDENT IN AN ORGANIZED HEALTH CARE EDUCATION/TRAINING PROGRAM

## 2020-08-19 PROCEDURE — 94761 N-INVAS EAR/PLS OXIMETRY MLT: CPT

## 2020-08-19 RX ORDER — NOREPINEPHRINE BITARTRATE/D5W 4MG/250ML
PLASTIC BAG, INJECTION (ML) INTRAVENOUS
Status: DISPENSED
Start: 2020-08-19 | End: 2020-08-20

## 2020-08-19 RX ORDER — HYDROCODONE BITARTRATE AND ACETAMINOPHEN 500; 5 MG/1; MG/1
TABLET ORAL
Status: DISCONTINUED | OUTPATIENT
Start: 2020-08-19 | End: 2020-08-20

## 2020-08-19 RX ORDER — MICONAZOLE NITRATE 2 %
POWDER (GRAM) TOPICAL 2 TIMES DAILY
Status: DISCONTINUED | OUTPATIENT
Start: 2020-08-19 | End: 2020-09-29 | Stop reason: HOSPADM

## 2020-08-19 RX ORDER — ALBUMIN HUMAN 50 G/1000ML
12.5 SOLUTION INTRAVENOUS ONCE
Status: COMPLETED | OUTPATIENT
Start: 2020-08-19 | End: 2020-08-19

## 2020-08-19 RX ORDER — HYDROMORPHONE HYDROCHLORIDE 1 MG/ML
0.5 INJECTION, SOLUTION INTRAMUSCULAR; INTRAVENOUS; SUBCUTANEOUS EVERY 4 HOURS PRN
Status: DISCONTINUED | OUTPATIENT
Start: 2020-08-19 | End: 2020-08-22

## 2020-08-19 RX ORDER — ALBUMIN HUMAN 50 G/1000ML
SOLUTION INTRAVENOUS
Status: COMPLETED
Start: 2020-08-19 | End: 2020-08-19

## 2020-08-19 RX ORDER — FENTANYL CITRATE-0.9 % NACL/PF 10 MCG/ML
PLASTIC BAG, INJECTION (ML) INTRAVENOUS CONTINUOUS
Status: DISCONTINUED | OUTPATIENT
Start: 2020-08-19 | End: 2020-08-21

## 2020-08-19 RX ADMIN — CALCIUM GLUCONATE 1 G: 98 INJECTION, SOLUTION INTRAVENOUS at 02:08

## 2020-08-19 RX ADMIN — ALBUMIN (HUMAN) 12.5 G: 12.5 SOLUTION INTRAVENOUS at 03:08

## 2020-08-19 RX ADMIN — MAGNESIUM SULFATE HEPTAHYDRATE: 500 INJECTION, SOLUTION INTRAMUSCULAR; INTRAVENOUS at 10:08

## 2020-08-19 RX ADMIN — MICONAZOLE NITRATE: 20 POWDER TOPICAL at 11:08

## 2020-08-19 RX ADMIN — FLUCONAZOLE 200 MG: 200 INJECTION, SOLUTION INTRAVENOUS at 11:08

## 2020-08-19 RX ADMIN — VASOPRESSIN 0.04 UNITS/MIN: 20 INJECTION INTRAVENOUS at 03:08

## 2020-08-19 RX ADMIN — HYDROMORPHONE HYDROCHLORIDE 0.5 MG: 1 INJECTION, SOLUTION INTRAMUSCULAR; INTRAVENOUS; SUBCUTANEOUS at 01:08

## 2020-08-19 RX ADMIN — FAMOTIDINE 20 MG: 10 INJECTION INTRAVENOUS at 08:08

## 2020-08-19 RX ADMIN — HEPARIN SODIUM 5000 UNITS: 5000 INJECTION INTRAVENOUS; SUBCUTANEOUS at 01:08

## 2020-08-19 RX ADMIN — PIPERACILLIN SODIUM AND TAZOBACTAM SODIUM 4.5 G: 4; .5 INJECTION, POWDER, LYOPHILIZED, FOR SOLUTION INTRAVENOUS at 08:08

## 2020-08-19 RX ADMIN — HEPARIN SODIUM 5000 UNITS: 5000 INJECTION INTRAVENOUS; SUBCUTANEOUS at 05:08

## 2020-08-19 RX ADMIN — DEXMEDETOMIDINE HYDROCHLORIDE 1 MCG/KG/HR: 4 INJECTION, SOLUTION INTRAVENOUS at 04:08

## 2020-08-19 RX ADMIN — ALBUMIN HUMAN 12.5 G: 50 SOLUTION INTRAVENOUS at 03:08

## 2020-08-19 RX ADMIN — HEPARIN SODIUM 5000 UNITS: 5000 INJECTION INTRAVENOUS; SUBCUTANEOUS at 10:08

## 2020-08-19 NOTE — PROGRESS NOTES
Patient tolerated PAV 50% for ~4 hours, patient then became tachypneic, tachycardic, and hypertensive, sedation restarted and patient put back on rate. Will continue to monitor.

## 2020-08-19 NOTE — ANESTHESIA POSTPROCEDURE EVALUATION
Anesthesia Post Evaluation    Patient: Jaquan Farmer    Procedure(s) Performed: Procedure(s) (LRB):  LAPAROTOMY, EXPLORATORY; possible abdominal closure (N/A)    Final Anesthesia Type: general    Patient location during evaluation: PACU  Patient participation: Yes- Able to Participate  Level of consciousness: awake and alert  Post-procedure vital signs: reviewed and stable  Pain management: adequate  Airway patency: patent    PONV status at discharge: No PONV  Anesthetic complications: no      Cardiovascular status: blood pressure returned to baseline and stable  Respiratory status: unassisted  Hydration status: euvolemic  Follow-up not needed.          Vitals Value Taken Time   /60 08/19/20 0917   Temp 36.4 °C (97.5 °F) 08/19/20 0700   Pulse 76 08/19/20 0924   Resp 24 08/19/20 0924   SpO2 100 % 08/19/20 0924   Vitals shown include unvalidated device data.      No case tracking events are documented in the log.      Pain/Rod Score: Pain Rating Prior to Med Admin: 10 (8/19/2020  4:00 AM)

## 2020-08-19 NOTE — PROGRESS NOTES
[facility]  Critical Care - Surgery  Progress Note    Patient Name: Jaquan Farmer  MRN: [id number]  Admission Date: 8/12/2020  Hospital Length of Stay: 7 days  Code Status: Full Code  Attending Provider: Donis Roa MD  Primary Care Provider: Primary Doctor No   Principal Problem: <principal problem not specified>    Subjective:     Hospital/ICU Course:  8/14 Patient had a line replaced along with central line placed, with complication of pneumothorax. Rt pigtail was placed. Patient tolerated complications well. Decreasing vent settings.  8/15 NAEO. Patient was taken back to OR early AM for washout. Possible closure 8/18. ETT exchanged in OR 7.0 -> 7.5  8/16 - NAEON. HDS. Intubated, sedated.   8/17 - NAEON. OR tomorrow for closure.  8/18 - Washed out and closed in OR    Interval History: Abdomen closed in OR yesterday. 1 U pRBC given this am. Remains on minimal vent settings. No respiratory distress. Plan for extubation.    Medications:  Continuous Infusions:   dexmedetomidine (PRECEDEX) infusion 0.2 mcg/kg/hr (08/19/20 1100)    lactated ringers 5 mL/hr at 08/16/20 0600    propofoL Stopped (08/19/20 0400)    TPN ADULT CENTRAL LINE CUSTOM 45 mL/hr at 08/19/20 0800    TPN ADULT CENTRAL LINE CUSTOM       Scheduled Meds:   famotidine (PF)  20 mg Intravenous Daily    fat emulsion 20%  250 mL Intravenous Daily    fluconazole (DIFLUCAN) IVPB  200 mg Intravenous Q24H    heparin (porcine)  5,000 Units Subcutaneous Q8H    piperacillin-tazobactam (ZOSYN) IVPB  4.5 g Intravenous Q12H     PRN Meds:sodium chloride, sodium chloride, calcium gluconate IVPB, calcium gluconate IVPB, calcium gluconate IVPB, HYDROmorphone, midazolam, ondansetron     Review of patient's allergies indicates:   Allergen Reactions    Latex      Objective:     Vital Signs (Most Recent):  Temp: 98 °F (36.7 °C) (08/19/20 1100)  Pulse: 86 (08/19/20 1200)  Resp: (!) 40 (08/19/20 1200)  BP: 112/64 (08/19/20 1200)  SpO2:  96 % (08/19/20 1200) Vital Signs (24h Range):  Temp:  [97.5 °F (36.4 °C)-100.4 °F (38 °C)] 98 °F (36.7 °C)  Pulse:  [] 86  Resp:  [20-53] 40  SpO2:  [96 %-100 %] 96 %  BP: ()/(42-95) 112/64     Weight: 69.5 kg (153 lb 3.5 oz)  Body mass index is 28.02 kg/m².    Intake/Output - Last 3 Shifts       08/17 0700 - 08/18 0659 08/18 0700 - 08/19 0659 08/19 0700 - 08/20 0659    I.V. (mL/kg) 6038.6 (86.9) 2976 (42.8) 955 (13.7)    Blood  286     NG/GT  120     .9 1087     Total Intake(mL/kg) 7014.5 (100.9) 4469 (64.3) 955 (13.7)    Urine (mL/kg/hr)       Drains 695 818 22    Other 15766 5696 1425    Chest Tube 0 0 3    Total Output 87465 6514 1450    Net -4024.5 -8510 -797                 Physical Exam  Constitutional:       Interventions: She is sedated and intubated.   HENT:      Head: Normocephalic.   Cardiovascular:      Rate and Rhythm: Regular rhythm. Tachycardia present.      Comments: Improving  Pulmonary:      Effort: She is intubated.      Comments: Intubated and mechanically ventilated  Vent Mode: PAV+  Oxygen Concentration (%):  (40) 40  Resp Rate Total:  (20 br/min-48 br/min) 33 br/min  Vt Set:  (330 mL-350 mL) 350 mL  PEEP/CPAP:  (5 cmH20) 5 cmH20  Mean Airway Pressure:  (7.9 cmH20-15 cmH20) 7.9 cmH20  Abdominal:      Comments: Abthera with ss output  GODFREY x2 with bile stained output   Musculoskeletal:         General: Swelling present.      Right lower leg: Edema present.      Left lower leg: Edema present.   Skin:     General: Skin is warm and dry.   Neurological:      Comments: Following commands on precedex and fentanyl         Significant Labs:  Reviewed    Significant Diagnostics:  Reviewed    Assessment/Plan:     Severe sepsis   40 yo F s/p antrectomy with BII reconstruction c/b duodenal necrosis requiring ex lap, washout, drainage c/b aspiration event. Now with severe sepsis and ARDS     .Neuro:  - Pain controlled on fentanyl gtt  - Sedated with propofol  - follows commands     Resp:  -  Intubated on vent  - Wean vent per ARDSnet protocol  - Daily SBT, plan for extubation today  - PRN breathing treatments  - Daily CXR  - Right chest tube with no output     CV:  - MAP goal >65  - Systolic <160  - Levo and Vaso gtt - off     Heme:  - Hgb/Hct 7.7 after 1 U pRBC given for hgb 5.7  - Transfuse as indicated     ID:  - WBC 52 => still elevated  - Zosyn, Fluconazole  - F/u cultures   - fever 100.4 - blood cultures and BAL  - remove PICC     Renal:  - D/c Parker  - Oliguric on arrival  - Cr 1.9on arrival  - on SCUF. Trend BUN/Cr  - Monitor I/Os  - Trend BMP daily     FEN/GI:  - NPO  - TPN  - NGT to LIWS  - Maintain drains to bulb suction  - GI ppx  - Replace electrolytes as needed to keep K>4, Mg>2     Endo:  - Monitor BG     Dispo:  - Continue SICU care           Thu Wilson MD  Critical Care - Surgery  [facility]

## 2020-08-19 NOTE — PLAN OF CARE
SW is following this Pt for DC planning needs. There are no identified needs at this time. Discharge Disposition: TBD - pending extubation    SW will continue to coordinate with patient, family, team and insurance to complete patient's discharge plan.    Jazzy Eid LMSW   - Case Management

## 2020-08-19 NOTE — PROGRESS NOTES
[facility]  General Surgery  Progress Note    Subjective:     History of Present Illness:  Pt is a 38 yo F with recent history of antrectomy with BII reconstruction for ulcer disease at outside hospital. 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 Patient also reportedly aspirated on induction in the OR prior to this, resulting in severe pulmonary edema and hypoxia. Patient was transferred to [facility] urgently for higher level of care. She arrived as a direct SICU admit on near maximal vent settings and requiring low dose vasopressors with HR in the upper 140s. Her midline laparotomy is closed with 4 Navdeep drains in place draining bilious output.     Post-Op Info:  Procedure(s) (LRB):  LAPAROTOMY, EXPLORATORY; possible abdominal closure (N/A)   1 Day Post-Op     Interval History:   Abdomen closed in OR yesterday  Remains on minimal vent settings    Medications:  Continuous Infusions:   dexmedetomidine (PRECEDEX) infusion 0.2 mcg/kg/hr (08/19/20 1100)    fentanyl 2.5 mL/hr at 08/19/20 1000    lactated ringers 5 mL/hr at 08/16/20 0600    propofoL Stopped (08/19/20 0400)    TPN ADULT CENTRAL LINE CUSTOM 45 mL/hr at 08/19/20 0800    TPN ADULT CENTRAL LINE CUSTOM       Scheduled Meds:   famotidine (PF)  20 mg Intravenous Daily    fat emulsion 20%  250 mL Intravenous Daily    fluconazole (DIFLUCAN) IVPB  200 mg Intravenous Q24H    heparin (porcine)  5,000 Units Subcutaneous Q8H    piperacillin-tazobactam (ZOSYN) IVPB  4.5 g Intravenous Q8H     PRN Meds:sodium chloride, sodium chloride, calcium gluconate IVPB, calcium gluconate IVPB, calcium gluconate IVPB, midazolam, ondansetron     Review of patient's allergies indicates:   Allergen Reactions    Latex      Objective:     Vital Signs (Most Recent):  Temp: 98 °F (36.7 °C) (08/19/20 1100)  Pulse: 80 (08/19/20 1110)  Resp: (!) 22 (08/19/20 1110)  BP: 111/65 (08/19/20 1100)  SpO2: 97 % (08/19/20 1110)  Vital Signs (24h Range):  Temp:  [97.5 °F (36.4 °C)-100.4 °F (38 °C)] 98 °F (36.7 °C)  Pulse:  [] 80  Resp:  [20-53] 22  SpO2:  [97 %-100 %] 97 %  BP: ()/(42-95) 111/65     Weight: 69.5 kg (153 lb 3.5 oz)  Body mass index is 28.02 kg/m².    Intake/Output - Last 3 Shifts       08/17 0700 - 08/18 0659 08/18 0700 - 08/19 0659 08/19 0700 - 08/20 0659    I.V. (mL/kg) 6038.6 (86.9) 2976 (42.8) 955 (13.7)    Blood  286     NG/GT  120     .9 1087     Total Intake(mL/kg) 7014.5 (100.9) 4469 (64.3) 955 (13.7)    Urine (mL/kg/hr)       Drains 695 818 22    Other 37608 5696 1425    Chest Tube 0 0 3    Total Output 57811 6514 1450    Net -4024.5 -2044 -316                 Physical Exam  Constitutional:       Interventions: She is sedated and intubated.   HENT:      Head: Normocephalic.   Cardiovascular:      Rate and Rhythm: Regular rhythm. Tachycardia present.      Comments: Improving  Pulmonary:      Effort: She is intubated.      Comments: Intubated and mechanically ventilated  Vent Mode: PAV+  Oxygen Concentration (%):  (40) 40  Resp Rate Total:  (20 br/min-48 br/min) 33 br/min  Vt Set:  (330 mL-350 mL) 350 mL  PEEP/CPAP:  (5 cmH20) 5 cmH20  Mean Airway Pressure:  (7.9 cmH20-15 cmH20) 7.9 cmH20  Abdominal:      Comments: Abthera with ss output  GODFREY x2 with bile stained output   Musculoskeletal:         General: Swelling present.      Right lower leg: Edema present.      Left lower leg: Edema present.   Skin:     General: Skin is warm and dry.   Neurological:      Comments: Following commands on precedex and fentanyl         Significant Labs:  Reviewed    Significant Diagnostics:  Reviewed    Assessment/Plan:     Severe sepsis  38 yo F s/p antrectomy with BII reconstruction c/b duodenal necrosis requiring ex lap, washout, drainage c/b aspiration event. S/p duodenal resection with d-j anastomosis, g-j and abthera vac placement on 8/13 and washout with g-tube placement on 8/15.    .Neuro:  - Wean sedation as  tolerated   - Sedation vacations, neuro exams    Resp:  - Intubated on vent  - Wean vent as tolerated    CV:  - HDS off pressors    Heme:  - Hgb/Hct - stable  - Transfuse as indicated; received 2u PRBC on 8/13 and 2u PRBC on 8/18    ID:  - Broad spectrum abx/antifungal  - F/u cultures     Renal:  - Parker in place; oliguric  - Nephrology consulted; appreciate recs    FEN/GI:  - NPO; will discuss starting tube feeds with staff  - NGT to LIWS  - Maintain drains to bulb suction  - G tube to gravity  - GI ppx  - Continue TPN until tolerating goal tube feeds    Endo:  - Monitor BG    Dispo:  - Continue ICU care          Jann Jeffries MD  General Surgery  [facility]

## 2020-08-19 NOTE — SUBJECTIVE & OBJECTIVE
Interval History: Abdomen closed in OR yesterday. 1 U pRBC given this am. Remains on minimal vent settings. No respiratory distress. Plan for extubation.    Medications:  Continuous Infusions:   dexmedetomidine (PRECEDEX) infusion 0.2 mcg/kg/hr (08/19/20 1100)    lactated ringers 5 mL/hr at 08/16/20 0600    propofoL Stopped (08/19/20 0400)    TPN ADULT CENTRAL LINE CUSTOM 45 mL/hr at 08/19/20 0800    TPN ADULT CENTRAL LINE CUSTOM       Scheduled Meds:   famotidine (PF)  20 mg Intravenous Daily    fat emulsion 20%  250 mL Intravenous Daily    fluconazole (DIFLUCAN) IVPB  200 mg Intravenous Q24H    heparin (porcine)  5,000 Units Subcutaneous Q8H    piperacillin-tazobactam (ZOSYN) IVPB  4.5 g Intravenous Q12H     PRN Meds:sodium chloride, sodium chloride, calcium gluconate IVPB, calcium gluconate IVPB, calcium gluconate IVPB, HYDROmorphone, midazolam, ondansetron     Review of patient's allergies indicates:   Allergen Reactions    Latex      Objective:     Vital Signs (Most Recent):  Temp: 98 °F (36.7 °C) (08/19/20 1100)  Pulse: 86 (08/19/20 1200)  Resp: (!) 40 (08/19/20 1200)  BP: 112/64 (08/19/20 1200)  SpO2: 96 % (08/19/20 1200) Vital Signs (24h Range):  Temp:  [97.5 °F (36.4 °C)-100.4 °F (38 °C)] 98 °F (36.7 °C)  Pulse:  [] 86  Resp:  [20-53] 40  SpO2:  [96 %-100 %] 96 %  BP: ()/(42-95) 112/64     Weight: 69.5 kg (153 lb 3.5 oz)  Body mass index is 28.02 kg/m².    Intake/Output - Last 3 Shifts       08/17 0700 - 08/18 0659 08/18 0700 - 08/19 0659 08/19 0700 - 08/20 0659    I.V. (mL/kg) 6038.6 (86.9) 2976 (42.8) 955 (13.7)    Blood  286     NG/GT  120     .9 1087     Total Intake(mL/kg) 7014.5 (100.9) 4469 (64.3) 955 (13.7)    Urine (mL/kg/hr)       Drains 695 818 22    Other 01222 5696 1425    Chest Tube 0 0 3    Total Output 23560 6502 1450    Net -4024.5 -2045 -495                 Physical Exam  Constitutional:       Interventions: She is sedated and intubated.   HENT:      Head:  Normocephalic.   Cardiovascular:      Rate and Rhythm: Regular rhythm. Tachycardia present.      Comments: Improving  Pulmonary:      Effort: She is intubated.      Comments: Intubated and mechanically ventilated  Vent Mode: PAV+  Oxygen Concentration (%):  (40) 40  Resp Rate Total:  (20 br/min-48 br/min) 33 br/min  Vt Set:  (330 mL-350 mL) 350 mL  PEEP/CPAP:  (5 cmH20) 5 cmH20  Mean Airway Pressure:  (7.9 cmH20-15 cmH20) 7.9 cmH20  Abdominal:      Comments: Abthera with ss output  GODFREY x2 with bile stained output   Musculoskeletal:         General: Swelling present.      Right lower leg: Edema present.      Left lower leg: Edema present.   Skin:     General: Skin is warm and dry.   Neurological:      Comments: Following commands on precedex and fentanyl         Significant Labs:  Reviewed    Significant Diagnostics:  Reviewed

## 2020-08-19 NOTE — PROGRESS NOTES
[facility] SLED NOTE    Jaquan Farmer is a 39 y.o. female currently on SLED for removal of uremic toxins and volume.     Patient seen and evaluated on SLED, tolerating treatment, see CRRT flowsheet for vitals and assessments.    Labs have been reviewed and the dialysate bath has been adjusted.    Labs:      Recent Labs   Lab 08/18/20  1431 08/18/20  1545 08/18/20  2232 08/19/20  0350    138 139  139 138  138   K 3.9 4.0 4.2  4.2 4.2  4.2    105 107  107 106  106   CO2 25 23 22*  22* 20*  20*   BUN 11 11 15  15 19  19   CREATININE 1.4 1.5* 1.9*  1.9* 2.1*  2.1*   CALCIUM 7.7* 7.9* 7.3*  7.3* 7.5*  7.5*   PHOS 4.7*  --  4.3 4.6*       Recent Labs   Lab 08/19/20  0350 08/19/20  0628 08/19/20  0952   WBC 52.53* 52.58* 49.57*   HGB 5.7* 7.7* 7.7*   HCT 18.3* 23.7* 23.3*    164 171        Assessment/Plan  - Seen on SLED this morning, tolerating session with current UFR, no complications. Patient net negative 2 L/24 hrs. Electrolytes and acid base stable. Patient s/p abdominal closure on yesterday. Notable bleeding overnight. Hgb 5.7 requiring blood transfusion. Remains intubated, FiO2 40%, following commands.   - SLED prescription and dialysate baths were reviewed and changes made according to most recent labs.    - Follow labs serially while on SLED to monitor electrolytes and follow replacement protocol as needed.   - Will plan to hold CRRT after today's treatment. Will reevaluate in the AM.   - Will consider intermittent HD at some point. Patient has been off of pressor support for > 72 hrs.   - Continue to monitor intake and output, daily weights   - Avoid nephrotoxic medication and renal dose medications to GFR    ROBERTA Gutierrez, KAREN, APRN, FNP-C  Department of Nephrology  [facility]  Pager: [phone]

## 2020-08-19 NOTE — ASSESSMENT & PLAN NOTE
40 yo F s/p antrectomy with BII reconstruction c/b duodenal necrosis requiring ex lap, washout, drainage c/b aspiration event. Now with severe sepsis and ARDS     .Neuro:  - Pain controlled on fentanyl gtt  - Sedated with propofol  - follows commands     Resp:  - Intubated on vent  - Wean vent per ARDSnet protocol  - Daily SBT, plan for extubation today  - PRN breathing treatments  - Daily CXR  - Right chest tube with no output     CV:  - MAP goal >65  - Systolic <160  - Levo and Vaso gtt - off     Heme:  - Hgb/Hct 7.7 after 1 U pRBC given for hgb 5.7  - Transfuse as indicated     ID:  - WBC 52 => still elevated  - Zosyn, Fluconazole  - F/u cultures   - fever 100.4 - blood cultures and BAL  - remove PICC     Renal:  - D/c Parker  - Oliguric on arrival  - Cr 1.9on arrival  - on SCUF. Trend BUN/Cr  - Monitor I/Os  - Trend BMP daily     FEN/GI:  - NPO  - TPN  - NGT to LIWS  - Maintain drains to bulb suction  - GI ppx  - Replace electrolytes as needed to keep K>4, Mg>2     Endo:  - Monitor BG     Dispo:  - Continue SICU care

## 2020-08-19 NOTE — PROGRESS NOTES
MD called for pupillary change. Right pupil change from 3 to 5, left pupil remained at 3. Patient was able to follow commands, and otherwise neurologically intact.

## 2020-08-19 NOTE — ASSESSMENT & PLAN NOTE
40 yo F s/p antrectomy with BII reconstruction c/b duodenal necrosis requiring ex lap, washout, drainage c/b aspiration event. S/p duodenal resection with d-j anastomosis, g-j and abthera vac placement on 8/13 and washout with g-tube placement on 8/15.    .Neuro:  - Wean sedation as tolerated   - Sedation vacations, neuro exams    Resp:  - Intubated on vent  - Wean vent as tolerated    CV:  - HDS off pressors    Heme:  - Hgb/Hct - stable  - Transfuse as indicated; received 2u PRBC on 8/13 and 2u PRBC on 8/18    ID:  - Broad spectrum abx/antifungal  - F/u cultures     Renal:  - Parker in place; oliguric  - Nephrology consulted; appreciate recs    FEN/GI:  - NPO; will discuss starting tube feeds with staff  - NGT to LIWS  - Maintain drains to bulb suction  - G tube to gravity  - GI ppx  - Continue TPN until tolerating goal tube feeds    Endo:  - Monitor BG    Dispo:  - Continue ICU care

## 2020-08-19 NOTE — NURSING
Prior to transfusion, H/H 5.7/18.3. 1 unit PRBCs currently transfusing. MD notified. Will follow up with CBC at 06:30.

## 2020-08-19 NOTE — SUBJECTIVE & OBJECTIVE
Interval History:   Abdomen closed in OR yesterday  Remains on minimal vent settings    Medications:  Continuous Infusions:   dexmedetomidine (PRECEDEX) infusion 0.2 mcg/kg/hr (08/19/20 1100)    fentanyl 2.5 mL/hr at 08/19/20 1000    lactated ringers 5 mL/hr at 08/16/20 0600    propofoL Stopped (08/19/20 0400)    TPN ADULT CENTRAL LINE CUSTOM 45 mL/hr at 08/19/20 0800    TPN ADULT CENTRAL LINE CUSTOM       Scheduled Meds:   famotidine (PF)  20 mg Intravenous Daily    fat emulsion 20%  250 mL Intravenous Daily    fluconazole (DIFLUCAN) IVPB  200 mg Intravenous Q24H    heparin (porcine)  5,000 Units Subcutaneous Q8H    piperacillin-tazobactam (ZOSYN) IVPB  4.5 g Intravenous Q8H     PRN Meds:sodium chloride, sodium chloride, calcium gluconate IVPB, calcium gluconate IVPB, calcium gluconate IVPB, midazolam, ondansetron     Review of patient's allergies indicates:   Allergen Reactions    Latex      Objective:     Vital Signs (Most Recent):  Temp: 98 °F (36.7 °C) (08/19/20 1100)  Pulse: 80 (08/19/20 1110)  Resp: (!) 22 (08/19/20 1110)  BP: 111/65 (08/19/20 1100)  SpO2: 97 % (08/19/20 1110) Vital Signs (24h Range):  Temp:  [97.5 °F (36.4 °C)-100.4 °F (38 °C)] 98 °F (36.7 °C)  Pulse:  [] 80  Resp:  [20-53] 22  SpO2:  [97 %-100 %] 97 %  BP: ()/(42-95) 111/65     Weight: 69.5 kg (153 lb 3.5 oz)  Body mass index is 28.02 kg/m².    Intake/Output - Last 3 Shifts       08/17 0700 - 08/18 0659 08/18 0700 - 08/19 0659 08/19 0700 - 08/20 0659    I.V. (mL/kg) 6038.6 (86.9) 2976 (42.8) 955 (13.7)    Blood  286     NG/GT  120     .9 1087     Total Intake(mL/kg) 7014.5 (100.9) 4469 (64.3) 955 (13.7)    Urine (mL/kg/hr)       Drains 695 818 22    Other 74009 5696 1425    Chest Tube 0 0 3    Total Output 37233 6514 1450    Net -4024.5 -2045 -495                 Physical Exam  Constitutional:       Interventions: She is sedated and intubated.   HENT:      Head: Normocephalic.   Cardiovascular:      Rate and  Rhythm: Regular rhythm. Tachycardia present.      Comments: Improving  Pulmonary:      Effort: She is intubated.      Comments: Intubated and mechanically ventilated  Vent Mode: PAV+  Oxygen Concentration (%):  (40) 40  Resp Rate Total:  (20 br/min-48 br/min) 33 br/min  Vt Set:  (330 mL-350 mL) 350 mL  PEEP/CPAP:  (5 cmH20) 5 cmH20  Mean Airway Pressure:  (7.9 cmH20-15 cmH20) 7.9 cmH20  Abdominal:      Comments: Abthera with ss output  GODFREY x2 with bile stained output   Musculoskeletal:         General: Swelling present.      Right lower leg: Edema present.      Left lower leg: Edema present.   Skin:     General: Skin is warm and dry.   Neurological:      Comments: Following commands on precedex and fentanyl         Significant Labs:  Reviewed    Significant Diagnostics:  Reviewed

## 2020-08-19 NOTE — PROGRESS NOTES
VS stable at this time. Patient remains intubated after failing SBT; 40% 5 PEEP.  Afebrile throughout shift. CRRT rinsed back at 1000. See flowsheet for blood pressure throughout shift, MD aware. Drips titrated to maintain MAPS >65. JOY of -2. 1 unit PBRC given. See flowsheet for drainage from GODFREY drains.     Skin: Full bath given. Dressing changes complete. No skin breakdown noted. Will continue to monitor.

## 2020-08-19 NOTE — PLAN OF CARE
"      SICU PLAN OF CARE NOTE    Dx: duodenal necrosis    Shift Events: OR for ex lap and closure of abdomen, placed new GODFREY drain that required stitches at bedside for continuous bleeding    Goals of Care: wean sedation to extubate tomorrow AM    Neuro: follows commands, PERRLA    Vital Signs: /61   Pulse 104   Temp (!) 100.4 °F (38 °C) (Oral)   Resp (!) 30   Ht 5' 2" (1.575 m)   Wt 69.5 kg (153 lb 3.5 oz)   SpO2 100%   Breastfeeding No   BMI 28.02 kg/m²     Respiratory: A/C, FiO2: 40%, PEEP 5    Diet: TPN @ 45 mL/hr    Gtts: Fentanyl @ 75, Precedex @ 0.5    Urine Output: anuric on CRRT    Drains: R chest tube, 2 GODFREY drains on R side of abdomen, 1 GODFREY drain on L abdomen, G tube to gravity, NG tube to LIS     Labs/Accuchecks: CBC, CMP, Mg, Phos, iCa q 6, accuchecks q 4    Skin: No new skin breakdown noted       Problem: Adult Inpatient Plan of Care  Goal: Optimal Comfort and Wellbeing  Outcome: Ongoing, Progressing  Intervention: Provide Person-Centered Care  Flowsheets (Taken 8/18/2020 1958)  Trust Relationship/Rapport:   care explained   emotional support provided   thoughts/feelings acknowledged   reassurance provided     Problem: Infection  Goal: Infection Symptom Resolution  Outcome: Ongoing, Progressing  Intervention: Prevent or Manage Infection  Flowsheets (Taken 8/18/2020 1958)  Fever Reduction/Comfort Measures:   lightweight clothing   lightweight bedding  Infection Management: aseptic technique maintained  Isolation Precautions: protective environment maintained     Problem: Communication Impairment (Mechanical Ventilation, Invasive)  Goal: Effective Communication  Outcome: Ongoing, Progressing  Intervention: Ensure Effective Communication  Flowsheets (Taken 8/18/2020 1958)  Communication Enhancement Strategies:   extra time allowed for response   nonverbal strategies used   one-step directions provided     Problem: Skin and Tissue Injury (Artificial Airway)  Goal: Absence of Device-Related Skin " or Tissue Injury  Outcome: Ongoing, Progressing  Intervention: Maintain Skin and Tissue Health  Flowsheets (Taken 8/18/2020 1958)  Device Skin Pressure Protection:   absorbent pad utilized/changed   positioning supports utilized   pressure points protected   skin-to-device areas padded   skin-to-skin areas padded

## 2020-08-19 NOTE — PLAN OF CARE
1 unit PRBC given overnight due to drop in H/H.  Pt remains intubated. Plans for SBT this AM.   VSS. Afebrile.    Neuro: Pt follows commands, moving all extremities. PERRLA.    Vent Settings:   A/C 40% 5 PEEP 20 RR.    Gtts:  TPN @ 45   Precedex @ 1.0 mcg/kg/hr   Fentanyl @ 25 mcg/hr     CRRT:  SCUF   UF goal 400-450     Drains: see I/O flowsheet.    Skin: no new breakdown to sacrum, heels, or elbows. Foams in place.

## 2020-08-20 LAB
ALBUMIN SERPL BCP-MCNC: 1.2 G/DL (ref 3.5–5.2)
ALBUMIN SERPL BCP-MCNC: 1.2 G/DL (ref 3.5–5.2)
ALBUMIN SERPL BCP-MCNC: 1.3 G/DL (ref 3.5–5.2)
ALP SERPL-CCNC: 110 U/L (ref 55–135)
ALP SERPL-CCNC: 118 U/L (ref 55–135)
ALP SERPL-CCNC: 123 U/L (ref 55–135)
ALP SERPL-CCNC: 127 U/L (ref 55–135)
ALT SERPL W/O P-5'-P-CCNC: 21 U/L (ref 10–44)
ALT SERPL W/O P-5'-P-CCNC: 22 U/L (ref 10–44)
ALT SERPL W/O P-5'-P-CCNC: 23 U/L (ref 10–44)
ALT SERPL W/O P-5'-P-CCNC: 25 U/L (ref 10–44)
ANION GAP SERPL CALC-SCNC: 10 MMOL/L (ref 8–16)
ANION GAP SERPL CALC-SCNC: 11 MMOL/L (ref 8–16)
ANION GAP SERPL CALC-SCNC: 12 MMOL/L (ref 8–16)
ANISOCYTOSIS BLD QL SMEAR: SLIGHT
AST SERPL-CCNC: 29 U/L (ref 10–40)
AST SERPL-CCNC: 31 U/L (ref 10–40)
AST SERPL-CCNC: 34 U/L (ref 10–40)
AST SERPL-CCNC: 45 U/L (ref 10–40)
BASO STIPL BLD QL SMEAR: ABNORMAL
BASOPHILS # BLD AUTO: ABNORMAL K/UL (ref 0–0.2)
BASOPHILS NFR BLD: 0 % (ref 0–1.9)
BILIRUB SERPL-MCNC: 0.5 MG/DL (ref 0.1–1)
BILIRUB SERPL-MCNC: 0.5 MG/DL (ref 0.1–1)
BILIRUB SERPL-MCNC: 0.6 MG/DL (ref 0.1–1)
BILIRUB SERPL-MCNC: 0.6 MG/DL (ref 0.1–1)
BUN SERPL-MCNC: 23 MG/DL (ref 6–20)
BUN SERPL-MCNC: 23 MG/DL (ref 6–20)
BUN SERPL-MCNC: 35 MG/DL (ref 6–20)
BUN SERPL-MCNC: 39 MG/DL (ref 6–20)
BUN SERPL-MCNC: 45 MG/DL (ref 6–20)
BUN SERPL-MCNC: 45 MG/DL (ref 6–20)
CA-I BLDV-SCNC: 1.06 MMOL/L (ref 1.06–1.42)
CA-I BLDV-SCNC: 1.13 MMOL/L (ref 1.06–1.42)
CA-I BLDV-SCNC: 1.15 MMOL/L (ref 1.06–1.42)
CA-I BLDV-SCNC: 1.15 MMOL/L (ref 1.06–1.42)
CALCIUM SERPL-MCNC: 7.8 MG/DL (ref 8.7–10.5)
CALCIUM SERPL-MCNC: 7.8 MG/DL (ref 8.7–10.5)
CALCIUM SERPL-MCNC: 8.1 MG/DL (ref 8.7–10.5)
CALCIUM SERPL-MCNC: 8.1 MG/DL (ref 8.7–10.5)
CALCIUM SERPL-MCNC: 8.4 MG/DL (ref 8.7–10.5)
CALCIUM SERPL-MCNC: 8.4 MG/DL (ref 8.7–10.5)
CHLORIDE SERPL-SCNC: 105 MMOL/L (ref 95–110)
CHLORIDE SERPL-SCNC: 106 MMOL/L (ref 95–110)
CO2 SERPL-SCNC: 20 MMOL/L (ref 23–29)
CO2 SERPL-SCNC: 21 MMOL/L (ref 23–29)
CO2 SERPL-SCNC: 22 MMOL/L (ref 23–29)
CO2 SERPL-SCNC: 22 MMOL/L (ref 23–29)
CREAT SERPL-MCNC: 1.9 MG/DL (ref 0.5–1.4)
CREAT SERPL-MCNC: 1.9 MG/DL (ref 0.5–1.4)
CREAT SERPL-MCNC: 2.8 MG/DL (ref 0.5–1.4)
CREAT SERPL-MCNC: 2.9 MG/DL (ref 0.5–1.4)
CREAT SERPL-MCNC: 3.3 MG/DL (ref 0.5–1.4)
CREAT SERPL-MCNC: 3.3 MG/DL (ref 0.5–1.4)
DACRYOCYTES BLD QL SMEAR: ABNORMAL
DACRYOCYTES BLD QL SMEAR: ABNORMAL
DIFFERENTIAL METHOD: ABNORMAL
DOHLE BOD BLD QL SMEAR: PRESENT
DOHLE BOD BLD QL SMEAR: PRESENT
EOSINOPHIL # BLD AUTO: ABNORMAL K/UL (ref 0–0.5)
EOSINOPHIL NFR BLD: 1 % (ref 0–8)
EOSINOPHIL NFR BLD: 1 % (ref 0–8)
EOSINOPHIL NFR BLD: 2 % (ref 0–8)
EOSINOPHIL NFR BLD: 3 % (ref 0–8)
ERYTHROCYTE [DISTWIDTH] IN BLOOD BY AUTOMATED COUNT: 14.6 % (ref 11.5–14.5)
ERYTHROCYTE [DISTWIDTH] IN BLOOD BY AUTOMATED COUNT: 15 % (ref 11.5–14.5)
EST. GFR  (AFRICAN AMERICAN): 19.4 ML/MIN/1.73 M^2
EST. GFR  (AFRICAN AMERICAN): 19.4 ML/MIN/1.73 M^2
EST. GFR  (AFRICAN AMERICAN): 22.6 ML/MIN/1.73 M^2
EST. GFR  (AFRICAN AMERICAN): 23.6 ML/MIN/1.73 M^2
EST. GFR  (AFRICAN AMERICAN): 37.7 ML/MIN/1.73 M^2
EST. GFR  (AFRICAN AMERICAN): 37.7 ML/MIN/1.73 M^2
EST. GFR  (NON AFRICAN AMERICAN): 16.8 ML/MIN/1.73 M^2
EST. GFR  (NON AFRICAN AMERICAN): 16.8 ML/MIN/1.73 M^2
EST. GFR  (NON AFRICAN AMERICAN): 19.6 ML/MIN/1.73 M^2
EST. GFR  (NON AFRICAN AMERICAN): 20.5 ML/MIN/1.73 M^2
EST. GFR  (NON AFRICAN AMERICAN): 32.7 ML/MIN/1.73 M^2
EST. GFR  (NON AFRICAN AMERICAN): 32.7 ML/MIN/1.73 M^2
GIANT PLATELETS BLD QL SMEAR: PRESENT
GIANT PLATELETS BLD QL SMEAR: PRESENT
GLUCOSE SERPL-MCNC: 106 MG/DL (ref 70–110)
GLUCOSE SERPL-MCNC: 106 MG/DL (ref 70–110)
GLUCOSE SERPL-MCNC: 112 MG/DL (ref 70–110)
GLUCOSE SERPL-MCNC: 116 MG/DL (ref 70–110)
GLUCOSE SERPL-MCNC: 116 MG/DL (ref 70–110)
GLUCOSE SERPL-MCNC: 141 MG/DL (ref 70–110)
HCT VFR BLD AUTO: 19.8 % (ref 37–48.5)
HCT VFR BLD AUTO: 20.7 % (ref 37–48.5)
HCT VFR BLD AUTO: 22.2 % (ref 37–48.5)
HCT VFR BLD AUTO: 26.1 % (ref 37–48.5)
HGB BLD-MCNC: 6.5 G/DL (ref 12–16)
HGB BLD-MCNC: 6.8 G/DL (ref 12–16)
HGB BLD-MCNC: 7.2 G/DL (ref 12–16)
HGB BLD-MCNC: 8.6 G/DL (ref 12–16)
HYPOCHROMIA BLD QL SMEAR: ABNORMAL
IMM GRANULOCYTES # BLD AUTO: ABNORMAL K/UL (ref 0–0.04)
IMM GRANULOCYTES NFR BLD AUTO: ABNORMAL % (ref 0–0.5)
LYMPHOCYTES # BLD AUTO: ABNORMAL K/UL (ref 1–4.8)
LYMPHOCYTES NFR BLD: 2 % (ref 18–48)
LYMPHOCYTES NFR BLD: 4 % (ref 18–48)
LYMPHOCYTES NFR BLD: 6 % (ref 18–48)
LYMPHOCYTES NFR BLD: 9 % (ref 18–48)
MAGNESIUM SERPL-MCNC: 2.3 MG/DL (ref 1.6–2.6)
MAGNESIUM SERPL-MCNC: 2.7 MG/DL (ref 1.6–2.6)
MAGNESIUM SERPL-MCNC: 2.8 MG/DL (ref 1.6–2.6)
MCH RBC QN AUTO: 30 PG (ref 27–31)
MCH RBC QN AUTO: 30.1 PG (ref 27–31)
MCH RBC QN AUTO: 30.2 PG (ref 27–31)
MCH RBC QN AUTO: 30.2 PG (ref 27–31)
MCHC RBC AUTO-ENTMCNC: 32.4 G/DL (ref 32–36)
MCHC RBC AUTO-ENTMCNC: 32.8 G/DL (ref 32–36)
MCHC RBC AUTO-ENTMCNC: 32.9 G/DL (ref 32–36)
MCHC RBC AUTO-ENTMCNC: 33 G/DL (ref 32–36)
MCV RBC AUTO: 91 FL (ref 82–98)
MCV RBC AUTO: 92 FL (ref 82–98)
MCV RBC AUTO: 92 FL (ref 82–98)
MCV RBC AUTO: 93 FL (ref 82–98)
METAMYELOCYTES NFR BLD MANUAL: 1 %
METAMYELOCYTES NFR BLD MANUAL: 1 %
MONOCYTES # BLD AUTO: ABNORMAL K/UL (ref 0.3–1)
MONOCYTES NFR BLD: 0 % (ref 4–15)
MONOCYTES NFR BLD: 3 % (ref 4–15)
MONOCYTES NFR BLD: 5 % (ref 4–15)
MONOCYTES NFR BLD: 6 % (ref 4–15)
MYELOCYTES NFR BLD MANUAL: 2 %
NEUTROPHILS NFR BLD: 81 % (ref 38–73)
NEUTROPHILS NFR BLD: 85 % (ref 38–73)
NEUTROPHILS NFR BLD: 85 % (ref 38–73)
NEUTROPHILS NFR BLD: 86 % (ref 38–73)
NEUTS BAND NFR BLD MANUAL: 2 %
NEUTS BAND NFR BLD MANUAL: 9 %
NRBC BLD-RTO: 0 /100 WBC
OVALOCYTES BLD QL SMEAR: ABNORMAL
PHOSPHATE SERPL-MCNC: 2.8 MG/DL (ref 2.7–4.5)
PHOSPHATE SERPL-MCNC: 3.5 MG/DL (ref 2.7–4.5)
PHOSPHATE SERPL-MCNC: 4.1 MG/DL (ref 2.7–4.5)
PLATELET # BLD AUTO: 195 K/UL (ref 150–350)
PLATELET # BLD AUTO: 202 K/UL (ref 150–350)
PLATELET # BLD AUTO: 230 K/UL (ref 150–350)
PLATELET # BLD AUTO: 233 K/UL (ref 150–350)
PLATELET BLD QL SMEAR: ABNORMAL
PMV BLD AUTO: 10.9 FL (ref 9.2–12.9)
PMV BLD AUTO: 11.4 FL (ref 9.2–12.9)
PMV BLD AUTO: 11.8 FL (ref 9.2–12.9)
PMV BLD AUTO: 11.9 FL (ref 9.2–12.9)
POCT GLUCOSE: 142 MG/DL (ref 70–110)
POCT GLUCOSE: 142 MG/DL (ref 70–110)
POIKILOCYTOSIS BLD QL SMEAR: SLIGHT
POLYCHROMASIA BLD QL SMEAR: ABNORMAL
POTASSIUM SERPL-SCNC: 4 MMOL/L (ref 3.5–5.1)
POTASSIUM SERPL-SCNC: 4 MMOL/L (ref 3.5–5.1)
POTASSIUM SERPL-SCNC: 4.4 MMOL/L (ref 3.5–5.1)
PROMYELOCYTES NFR BLD MANUAL: 1 %
PROMYELOCYTES NFR BLD MANUAL: 1 %
PROT SERPL-MCNC: 5 G/DL (ref 6–8.4)
PROT SERPL-MCNC: 5 G/DL (ref 6–8.4)
PROT SERPL-MCNC: 5.4 G/DL (ref 6–8.4)
PROT SERPL-MCNC: 5.5 G/DL (ref 6–8.4)
RBC # BLD AUTO: 2.15 M/UL (ref 4–5.4)
RBC # BLD AUTO: 2.25 M/UL (ref 4–5.4)
RBC # BLD AUTO: 2.4 M/UL (ref 4–5.4)
RBC # BLD AUTO: 2.86 M/UL (ref 4–5.4)
SCHISTOCYTES BLD QL SMEAR: ABNORMAL
SCHISTOCYTES BLD QL SMEAR: PRESENT
SODIUM SERPL-SCNC: 137 MMOL/L (ref 136–145)
SODIUM SERPL-SCNC: 137 MMOL/L (ref 136–145)
SODIUM SERPL-SCNC: 138 MMOL/L (ref 136–145)
SODIUM SERPL-SCNC: 138 MMOL/L (ref 136–145)
SODIUM SERPL-SCNC: 139 MMOL/L (ref 136–145)
SODIUM SERPL-SCNC: 139 MMOL/L (ref 136–145)
SPHEROCYTES BLD QL SMEAR: ABNORMAL
TARGETS BLD QL SMEAR: ABNORMAL
TOXIC GRANULES BLD QL SMEAR: PRESENT
TOXIC GRANULES BLD QL SMEAR: PRESENT
WBC # BLD AUTO: 35.01 K/UL (ref 3.9–12.7)
WBC # BLD AUTO: 36.88 K/UL (ref 3.9–12.7)
WBC # BLD AUTO: 40.1 K/UL (ref 3.9–12.7)
WBC # BLD AUTO: 41.09 K/UL (ref 3.9–12.7)

## 2020-08-20 PROCEDURE — 63600175 PHARM REV CODE 636 W HCPCS: Performed by: STUDENT IN AN ORGANIZED HEALTH CARE EDUCATION/TRAINING PROGRAM

## 2020-08-20 PROCEDURE — 99900035 HC TECH TIME PER 15 MIN (STAT)

## 2020-08-20 PROCEDURE — 99900026 HC AIRWAY MAINTENANCE (STAT)

## 2020-08-20 PROCEDURE — 93010 ELECTROCARDIOGRAM REPORT: CPT | Mod: ,,, | Performed by: INTERNAL MEDICINE

## 2020-08-20 PROCEDURE — 25000003 PHARM REV CODE 250

## 2020-08-20 PROCEDURE — 85027 COMPLETE CBC AUTOMATED: CPT | Mod: 91

## 2020-08-20 PROCEDURE — 99291 PR CRITICAL CARE, E/M 30-74 MINUTES: ICD-10-PCS | Mod: 24,,, | Performed by: SURGERY

## 2020-08-20 PROCEDURE — 99900017 HC EXTUBATION W/PARAMETERS (STAT)

## 2020-08-20 PROCEDURE — 83735 ASSAY OF MAGNESIUM: CPT | Mod: 91

## 2020-08-20 PROCEDURE — A4217 STERILE WATER/SALINE, 500 ML: HCPCS | Performed by: STUDENT IN AN ORGANIZED HEALTH CARE EDUCATION/TRAINING PROGRAM

## 2020-08-20 PROCEDURE — 93010 EKG 12-LEAD: ICD-10-PCS | Mod: ,,, | Performed by: INTERNAL MEDICINE

## 2020-08-20 PROCEDURE — 90945 DIALYSIS ONE EVALUATION: CPT

## 2020-08-20 PROCEDURE — 25000003 PHARM REV CODE 250: Performed by: NURSE PRACTITIONER

## 2020-08-20 PROCEDURE — 94761 N-INVAS EAR/PLS OXIMETRY MLT: CPT

## 2020-08-20 PROCEDURE — 93005 ELECTROCARDIOGRAM TRACING: CPT

## 2020-08-20 PROCEDURE — 25000003 PHARM REV CODE 250: Performed by: STUDENT IN AN ORGANIZED HEALTH CARE EDUCATION/TRAINING PROGRAM

## 2020-08-20 PROCEDURE — 94150 VITAL CAPACITY TEST: CPT

## 2020-08-20 PROCEDURE — 83735 ASSAY OF MAGNESIUM: CPT

## 2020-08-20 PROCEDURE — 25000003 PHARM REV CODE 250: Performed by: SURGERY

## 2020-08-20 PROCEDURE — 27000646 HC AEROBIKA DEVICE

## 2020-08-20 PROCEDURE — 82330 ASSAY OF CALCIUM: CPT | Mod: 91

## 2020-08-20 PROCEDURE — P9021 RED BLOOD CELLS UNIT: HCPCS

## 2020-08-20 PROCEDURE — 99291 CRITICAL CARE FIRST HOUR: CPT | Mod: 24,,, | Performed by: SURGERY

## 2020-08-20 PROCEDURE — 99233 SBSQ HOSP IP/OBS HIGH 50: CPT | Mod: ,,, | Performed by: INTERNAL MEDICINE

## 2020-08-20 PROCEDURE — 80069 RENAL FUNCTION PANEL: CPT | Mod: 91

## 2020-08-20 PROCEDURE — 27000221 HC OXYGEN, UP TO 24 HOURS

## 2020-08-20 PROCEDURE — 99233 PR SUBSEQUENT HOSPITAL CARE,LEVL III: ICD-10-PCS | Mod: ,,, | Performed by: INTERNAL MEDICINE

## 2020-08-20 PROCEDURE — 84100 ASSAY OF PHOSPHORUS: CPT

## 2020-08-20 PROCEDURE — 94003 VENT MGMT INPAT SUBQ DAY: CPT

## 2020-08-20 PROCEDURE — 94667 MNPJ CHEST WALL 1ST: CPT

## 2020-08-20 PROCEDURE — 36430 TRANSFUSION BLD/BLD COMPNT: CPT

## 2020-08-20 PROCEDURE — 20000000 HC ICU ROOM

## 2020-08-20 PROCEDURE — 94010 BREATHING CAPACITY TEST: CPT

## 2020-08-20 PROCEDURE — 94664 DEMO&/EVAL PT USE INHALER: CPT

## 2020-08-20 PROCEDURE — 85007 BL SMEAR W/DIFF WBC COUNT: CPT | Mod: 91

## 2020-08-20 PROCEDURE — B4185 PARENTERAL SOL 10 GM LIPIDS: HCPCS | Performed by: STUDENT IN AN ORGANIZED HEALTH CARE EDUCATION/TRAINING PROGRAM

## 2020-08-20 PROCEDURE — 80053 COMPREHEN METABOLIC PANEL: CPT

## 2020-08-20 PROCEDURE — S0028 INJECTION, FAMOTIDINE, 20 MG: HCPCS | Performed by: STUDENT IN AN ORGANIZED HEALTH CARE EDUCATION/TRAINING PROGRAM

## 2020-08-20 PROCEDURE — 80053 COMPREHEN METABOLIC PANEL: CPT | Mod: 91

## 2020-08-20 PROCEDURE — 63600175 PHARM REV CODE 636 W HCPCS: Performed by: SURGERY

## 2020-08-20 RX ORDER — METOPROLOL TARTRATE 1 MG/ML
INJECTION, SOLUTION INTRAVENOUS
Status: COMPLETED
Start: 2020-08-20 | End: 2020-08-20

## 2020-08-20 RX ORDER — FLUCONAZOLE 2 MG/ML
200 INJECTION, SOLUTION INTRAVENOUS
Status: CANCELLED | OUTPATIENT
Start: 2020-08-20 | End: 2020-08-22

## 2020-08-20 RX ORDER — HYDROCODONE BITARTRATE AND ACETAMINOPHEN 500; 5 MG/1; MG/1
TABLET ORAL CONTINUOUS
Status: DISCONTINUED | OUTPATIENT
Start: 2020-08-20 | End: 2020-08-21

## 2020-08-20 RX ORDER — METOPROLOL TARTRATE 1 MG/ML
5 INJECTION, SOLUTION INTRAVENOUS EVERY 4 HOURS PRN
Status: DISCONTINUED | OUTPATIENT
Start: 2020-08-20 | End: 2020-09-02

## 2020-08-20 RX ORDER — HYDROCODONE BITARTRATE AND ACETAMINOPHEN 500; 5 MG/1; MG/1
TABLET ORAL
Status: DISCONTINUED | OUTPATIENT
Start: 2020-08-20 | End: 2020-08-21

## 2020-08-20 RX ORDER — TALC
3 POWDER (GRAM) TOPICAL NIGHTLY PRN
Status: DISCONTINUED | OUTPATIENT
Start: 2020-08-20 | End: 2020-09-08

## 2020-08-20 RX ORDER — MAGNESIUM SULFATE HEPTAHYDRATE 40 MG/ML
2 INJECTION, SOLUTION INTRAVENOUS
Status: DISCONTINUED | OUTPATIENT
Start: 2020-08-20 | End: 2020-08-21

## 2020-08-20 RX ORDER — METOPROLOL TARTRATE 1 MG/ML
5 INJECTION, SOLUTION INTRAVENOUS EVERY 4 HOURS PRN
Status: DISCONTINUED | OUTPATIENT
Start: 2020-08-20 | End: 2020-08-20

## 2020-08-20 RX ADMIN — FAMOTIDINE 20 MG: 10 INJECTION INTRAVENOUS at 08:08

## 2020-08-20 RX ADMIN — METOPROLOL TARTRATE 5 MG: 1 INJECTION, SOLUTION INTRAVENOUS at 07:08

## 2020-08-20 RX ADMIN — FLUCONAZOLE 200 MG: 200 INJECTION, SOLUTION INTRAVENOUS at 11:08

## 2020-08-20 RX ADMIN — HEPARIN SODIUM 5000 UNITS: 5000 INJECTION INTRAVENOUS; SUBCUTANEOUS at 06:08

## 2020-08-20 RX ADMIN — I.V. FAT EMULSION 250 ML: 20 EMULSION INTRAVENOUS at 10:08

## 2020-08-20 RX ADMIN — HEPARIN SODIUM 5000 UNITS: 5000 INJECTION INTRAVENOUS; SUBCUTANEOUS at 03:08

## 2020-08-20 RX ADMIN — MICONAZOLE NITRATE: 20 POWDER TOPICAL at 08:08

## 2020-08-20 RX ADMIN — METOPROLOL TARTRATE 5 MG: 5 INJECTION INTRAVENOUS at 07:08

## 2020-08-20 RX ADMIN — HEPARIN SODIUM 5000 UNITS: 5000 INJECTION INTRAVENOUS; SUBCUTANEOUS at 10:08

## 2020-08-20 RX ADMIN — SODIUM CHLORIDE: 0.9 INJECTION, SOLUTION INTRAVENOUS at 06:08

## 2020-08-20 RX ADMIN — DEXMEDETOMIDINE HYDROCHLORIDE 0.4 MCG/KG/HR: 4 INJECTION, SOLUTION INTRAVENOUS at 12:08

## 2020-08-20 RX ADMIN — PIPERACILLIN SODIUM AND TAZOBACTAM SODIUM 4.5 G: 4; .5 INJECTION, POWDER, LYOPHILIZED, FOR SOLUTION INTRAVENOUS at 08:08

## 2020-08-20 RX ADMIN — MAGNESIUM SULFATE HEPTAHYDRATE: 500 INJECTION, SOLUTION INTRAMUSCULAR; INTRAVENOUS at 10:08

## 2020-08-20 RX ADMIN — MICONAZOLE NITRATE: 20 POWDER TOPICAL at 09:08

## 2020-08-20 NOTE — SUBJECTIVE & OBJECTIVE
Interval History/Significant Events:   SHELBI. Tmax 101.7 @ 03:00am   Noted to have sudden decrease in LLQ GODFREY with acute development of SQ hematoma.   Hgb low at 6.5, transfused 1 upRBCs overnight.   SLED yesterday.     Post-Operative Day: 2 Days Post-Op from abdominal closure     Objective:     Vital Signs (Most Recent):  Temp: 98.9 °F (37.2 °C) (08/20/20 0700)  Pulse: 92 (08/20/20 0738)  Resp: (!) 22 (08/20/20 0738)  BP: (!) 108/55 (08/20/20 0715)  SpO2: 100 % (08/20/20 0738) Vital Signs (24h Range):  Temp:  [98 °F (36.7 °C)-101.7 °F (38.7 °C)] 98.9 °F (37.2 °C)  Pulse:  [] 92  Resp:  [20-52] 22  SpO2:  [92 %-100 %] 100 %  BP: ()/(40-85) 108/55     Weight: 68.5 kg (151 lb 0.2 oz)  Body mass index is 27.62 kg/m².    Intake/Output Summary (Last 24 hours) at 8/20/2020 0815  Last data filed at 8/20/2020 0700  Gross per 24 hour   Intake 3599.25 ml   Output 2194 ml   Net 1405.25 ml     Physical Exam  Vitals signs reviewed.   Constitutional:       Interventions: She is sedated, intubated and restrained.   HENT:      Head: Normocephalic and atraumatic.   Cardiovascular:      Rate and Rhythm: Normal rate.   Pulmonary:      Effort: She is intubated.   Abdominal:      General: There is distension.      Tenderness: There is abdominal tenderness.   Skin:     General: Skin is warm and dry.       Vents:  Vent Mode: A/C (08/20/20 0738)  Ventilator Initiated: Yes(chart correction) (08/12/20 1930)  Set Rate: 20 BPM (08/20/20 0738)  Vt Set: 360 mL (08/20/20 0738)  PEEP/CPAP: 5 cmH20 (08/20/20 0738)  Oxygen Concentration (%): 40 (08/20/20 0738)  Peak Airway Pressure: 25 cmH2O (08/20/20 0738)  Plateau Pressure: 22 cmH20 (08/20/20 0738)  Total Ve: 8.6 mL (08/20/20 0738)  F/VT Ratio<105 (RSBI): (!) 57.44 (08/20/20 0738)    Lines/Drains/Airways     Central Venous Catheter Line            Trialysis (Dialysis) Catheter 08/13/20 2230 right internal jugular 6 days          Drain                 NG/OG Tube 08/06/20 nasogastric Left  nostril 14 days         Chest Tube 08/14/20 0007 Right Midaxillary 14 Fr. 6 days         Closed/Suction Drain 08/13/20 1659 Right;Inferior Abdomen Bulb 19 Fr. 6 days         Closed/Suction Drain 08/13/20 1659 Right;Superior Abdomen Bulb 19 Fr. 6 days         Gastrostomy/Enterostomy 08/15/20 0916 Gastrostomy tube w/ balloon LUQ decompression 4 days         Closed/Suction Drain 08/18/20 1239 Medial Abdomen 19 Fr. 1 day          Airway                 Airway - Non-Surgical 08/15/20 0835 Endotracheal Tube 4 days          Peripheral Intravenous Line                 Peripheral IV - Single Lumen 08/19/20 0930 20 G Anterior;Distal;Left Wrist less than 1 day         Peripheral IV - Single Lumen 08/20/20 0430 20 G Anterior;Right Forearm less than 1 day                Significant Labs:    CBC/Anemia Profile:  Recent Labs   Lab 08/19/20  1549 08/19/20  2102 08/20/20  0258   WBC 45.93* 39.09* 36.88*   HGB 6.3* 7.4* 6.5*   HCT 20.1* 22.8* 19.8*    193 202   MCV 93 93 92   RDW 15.6* 14.7* 15.0*     Chemistries:  Recent Labs   Lab 08/18/20  2232 08/19/20  0350  08/19/20  1549 08/19/20  2102 08/20/20  0258 08/20/20  0301     139 138  138   < > 138 137 137  --    K 4.2  4.2 4.2  4.2   < > 4.7 4.0 4.0  --      107 106  106   < > 107 106 105  --    CO2 22*  22* 20*  20*   < > 20* 20* 20*  --    BUN 15  15 19  19   < > 27* 31* 35*  --    CREATININE 1.9*  1.9* 2.1*  2.1*   < > 2.2* 2.4* 2.8*  --    CALCIUM 7.3*  7.3* 7.5*  7.5*   < > 8.1* 7.8* 7.8*  --    ALBUMIN 0.9*  0.9* 1.0*  1.0*   < > 1.4* 1.3* 1.2*  --    PROT 4.9* 4.8*   < > 5.4* 5.1* 5.0*  --    BILITOT 0.7 0.7   < > 0.6 0.6 0.5  --    ALKPHOS 137* 281*   < > 112 105 110  --    ALT 26 26   < > 25 22 21  --    AST 35 33   < > 40 31 29  --    MG 2.5 2.6  --   --   --   --  2.7*   PHOS 4.3 4.6*  --   --   --   --  3.5    < > = values in this interval not displayed.     Significant Imaging:  I have reviewed and interpreted all pertinent imaging  results/findings within the past 24 hours.

## 2020-08-20 NOTE — SUBJECTIVE & OBJECTIVE
Interval History: On minimal vent settings but became tachypneic on spontaneous. Swelling noted left of midline, L subcutaneous drain with decreased output. Pt is off pressors.     Medications:  Continuous Infusions:   dexmedetomidine (PRECEDEX) infusion 0.6 mcg/kg/hr (08/20/20 0800)    fentanyl 50 mcg/hr (08/20/20 0800)    lactated ringers 5 mL/hr at 08/16/20 0600    TPN ADULT CENTRAL LINE CUSTOM 45 mL/hr at 08/20/20 0800    vasopressin (PITRESSIN) infusion Stopped (08/19/20 1700)     Scheduled Meds:   famotidine (PF)  20 mg Intravenous Daily    fat emulsion 20%  250 mL Intravenous Daily    fluconazole (DIFLUCAN) IVPB  200 mg Intravenous Q24H    heparin (porcine)  5,000 Units Subcutaneous Q8H    miconazole NITRATE 2 %   Topical (Top) BID    piperacillin-tazobactam (ZOSYN) IVPB  4.5 g Intravenous Q12H     PRN Meds:sodium chloride, sodium chloride, sodium chloride, sodium chloride, calcium gluconate IVPB, calcium gluconate IVPB, calcium gluconate IVPB, HYDROmorphone, midazolam, ondansetron     Review of patient's allergies indicates:   Allergen Reactions    Latex      Objective:     Vital Signs (Most Recent):  Temp: 98.9 °F (37.2 °C) (08/20/20 0700)  Pulse: 100 (08/20/20 0815)  Resp: (!) 22 (08/20/20 0815)  BP: 119/60 (08/20/20 0815)  SpO2: 100 % (08/20/20 0815) Vital Signs (24h Range):  Temp:  [98 °F (36.7 °C)-101.7 °F (38.7 °C)] 98.9 °F (37.2 °C)  Pulse:  [] 100  Resp:  [20-52] 22  SpO2:  [92 %-100 %] 100 %  BP: ()/(40-85) 119/60     Weight: 68.5 kg (151 lb 0.2 oz)  Body mass index is 27.62 kg/m².    Intake/Output - Last 3 Shifts       08/18 0700 - 08/19 0659 08/19 0700 - 08/20 0659 08/20 0700 - 08/21 0659    I.V. (mL/kg) 2976 (42.8) 1692 (24.7)     Blood 286 810.3     NG/      TPN 1087 1097     Total Intake(mL/kg) 4469 (64.3) 3599.3 (52.5)     Drains 818 1163 245    Other 5696 1425     Chest Tube 0 8 0    Total Output 6514 2596 245    Net -2045 +1003.3 -245                 Physical  Exam  Constitutional:       Interventions: She is sedated and intubated.   HENT:      Head: Normocephalic.   Cardiovascular:      Rate and Rhythm: Regular rhythm. Tachycardia present.      Comments: Improving  Pulmonary:      Effort: She is intubated.      Comments: Intubated and mechanically ventilated  Vent Mode: PAV+  Oxygen Concentration (%):  (40) 40  Resp Rate Total:  (20 br/min-48 br/min) 33 br/min  Vt Set:  (330 mL-350 mL) 350 mL  PEEP/CPAP:  (5 cmH20) 5 cmH20  Mean Airway Pressure:  (7.9 cmH20-15 cmH20) 7.9 cmH20  Abdominal:      Comments: R abdomen GODFREY x2 with SS fluid present  L subcutaneous GODFREY with minimal bloody drainage present  5 x 5 area of firm swelling, left of midline adjacent to umbilicus   Musculoskeletal:         General: Swelling present.      Right lower leg: Edema present.      Left lower leg: Edema present.   Skin:     General: Skin is warm and dry.   Neurological:      Comments: Following commands on precedex and fentanyl         Significant Labs:  Reviewed    Significant Diagnostics:  Reviewed

## 2020-08-20 NOTE — ASSESSMENT & PLAN NOTE
38 yo F s/p antrectomy with BII reconstruction c/b duodenal necrosis requiring ex lap, washout, drainage c/b aspiration event. S/p duodenal resection with d-j anastomosis, g-j and abthera vac placement on 8/13 and washout with g-tube placement on 8/15.    - Continue ICU care  - OR today for washout   - Respiratory treatment q4h  - NPO  - TPN   - Morning labs   - Trend CBC   - PT / OT

## 2020-08-20 NOTE — PHYSICIAN QUERY
PT Name: Jaquan Farmer  MR #: [id number]    HEMATOLOGY CLARIFICATION      CDS: Dayna CARROLL RN  Contact information: [email or web address]    This form is a permanent document in the medical record.      Query Date: August 20, 2020    By submitting this query, we are merely seeking further clarification of documentation. Please utilize your independent clinical judgment when addressing the question(s) below.    The Medical Record contains the following:   Indicators  Supporting Clinical Findings Location in Medical Record   X Anemia documented Anemia of critical illness and postoperative anemia. Currently no need for further interventions.   PN Crit Care Surg 8/15/2020   X H&H H/H= 10.3/31.7 -- > 9.4/28.7 -- > 7.3/23 -- > 10.3/30.2 -- > 8.8/26.2 -- > 7.4/22.9 -- > 6.6/20 -- > 5.7/18.3 -- > 7.7/23.7 -- > 6.5/19.8 Labs 8/12/2020- 8/20/2020   X BP                    HR Pt hypotensive. MAP < 65.  MD notified. Orders for 1 unit PRBCs.    Vital Signs (24h Range):  Pulse: [] 86  BP: ()/(42-95) 112/64 Nursing Cyndie Guo RN 8/19/2020 0425    PN Gen SUrg 8/19/2020    GI bleeding documented     X Acute bleeding (Non GI site) Blood loss - 500 cc    Blood loss - 100 cc Op Note 8/14/2020    Op Note 8/18/2020   X Transfusion(s) - Transfuse as indicated; received 2u PRBC on 8/13 and 2u PRBC on 8/18    Patient s/p abdominal closure on yesterday. Notable bleeding overnight. Hgb 5.7 requiring blood transfusion.  - Hgb/Hct 7.7 after 1 U pRBC given for hgb 5.7 PN Gen Surg 8/19/2020      PN Nephro 8/19/2020      PN Crit Care Surg 8/19/2020   X Acute/Chronic illness Severe sepsis  38 yo F s/p antrectomy with BII reconstruction c/b duodenal necrosis requiring ex lap, washout, drainage c/b aspiration event. Now with severe sepsis and ARDS    Acute renal failure with tubular necrosis  -oliguric stage 3 sid with ischemic atn likely secondary to septic shock PN Crit Care Surg 8/15/2020        Consult Nephro 8/14/2020    X Treatments Prior to transfusion, H/H 5.7/18.3. 1 unit PRBCs currently transfusing. MD notified. Will follow up with CBC at 06:30. Nursing Cyndie Guo RN 8/19/2020 0555   X Other Procedure - intraoperative esophagogastroduodenoscopy  Resection of the duodenum  Duodeno - jejunostomy  Gastrojejunostomy  Application of ABThera wound VAC    Procedure - bilateral myocutaneous advancement flap closure of the abdomen (component separation) Op Note 8/14/2020              Op Note 8/18/2020     Provider, please specify diagnosis or diagnoses associated with above clinical findings.   [ x  ] Acute blood loss anemia    [   ] Acute blood loss anemia expected post-operatively    [   ] Anemia of chronic kidney disease (please specify stage): _______________   [   ] Anemia, unspecified    [   ] Other Hematological Diagnosis (please specify): _________________   [   ] Clinically Undetermined     Present on admission (POA) status:   [   ] Yes (Y)                          [  ] Clinically Undetermined (W)  [   ] No (N)                            [   ] Documentation insufficient to determine if condition is POA (U)          Please document in your progress notes daily for the duration of treatment, until resolved, and include in your discharge summary.

## 2020-08-20 NOTE — ASSESSMENT & PLAN NOTE
40 yo F s/p antrectomy with BII reconstruction c/b duodenal necrosis requiring ex lap, washout, drainage c/b aspiration event. Now with severe sepsis and ARDS     .Neuro:  - Pain controlled on fentanyl gtt  - Sedated with propofol, precedex prn   - Follows commands     Resp:  - Intubated on vent  - Wean vent per ARDSnet protocol  - Daily SBT, plan for extubation today  - PRN breathing treatments  - Daily CXR  - Right chest tube with no output     CV:  - MAP goal >65  - Systolic <160  - Levo and Vaso gtt - off     Heme:  - Hgb/Hct 6.5; transfused 1upRBCs  - new hematoma in RLQ with acute drop in GODFREY output from that site     ID:  - WBC 36.88 => still elevated but downtrending  - Zosyn, Fluconazole  - F/u cultures   - fever 101.7 @ 0300  - Blood cultures and BAL taken 8/19 with NGTD and no abnormal findings     Renal:  - Oliguric on arrival; Cr 1.9 at that time   - SCUF yesterday  - Trend BUN/Cr; 35/2.8 today   - Monitor I/Os  - Anuric; bladder scan with no fluid      FEN/GI:  - NPO  - TPN  - NGT to LIWS   - Maintain drains to bulb suction; LLQ with acute drop in drainage and accumulation in SQ tissue  - GI ppx  - Replace electrolytes as needed to keep K>4, Mg>2     Endo:  - Monitor BG     Dispo:  - Continue SICU care

## 2020-08-20 NOTE — ASSESSMENT & PLAN NOTE
40 yo F s/p antrectomy with BII reconstruction c/b duodenal necrosis requiring ex lap, washout, drainage c/b aspiration event. S/p duodenal resection with d-j anastomosis, g-j and abthera vac placement on 8/13 and washout with g-tube placement on 8/15.    .Neuro:  - Wean sedation as tolerated   - Sedation vacations, neuro exams    Resp:  - Intubated on vent  - Wean vent as tolerated    CV:  - HDS off pressors    Heme:  - Hgb/Hct - stable  - Transfuse as indicated; received 2u PRBC on 8/13 and 2u PRBC on 8/18  - Monitor hematoma formation on abdominal wall, drain output    ID:  - Broad spectrum abx/antifungal  - F/u cultures     Renal:  - Parker in place; oliguric  - Nephrology consulted; appreciate recs    FEN/GI:  - NPO; will discuss starting tube feeds with staff  - NGT to LIWS  - Maintain drains to bulb suction  - G tube to gravity  - GI ppx  - Continue TPN until tolerating goal tube feeds    Endo:  - Monitor BG    Dispo:  - Continue ICU care

## 2020-08-20 NOTE — PROGRESS NOTES
[facility]  Nephrology  Progress Note    Patient Name: Jaquan Farmer  MRN: [id number]  Admission Date: 8/12/2020  Hospital Length of Stay: 8 days  Attending Provider: Donis Roa MD   Primary Care Physician: Primary Doctor No  Principal Problem:Duodenum disorder    Subjective:     HPI: Mrs. Farmer is a 39 year old female with antrectomy at [facility] complicated duodenal necrosis post op. She aspirated, was intubated, and became septic. She developed renal failure and required max vent settings and was transferred here for higher level of care. On arrival she was hypotensive on pressers, max vent setting, and had minimal urine output.  Overnight she was given 6L of fluid, 6g calcium, placed on vac, pip-tazo, and fluconazole. Nephro consulted for sid.    Interval History:   SLED held overnight. Patient net positive 1.1 L/24 hrs. Failed SBT on yesterday. Remains intubated at FiO2 40%. No pressor support. Electrolytes and acid base acceptable at this time.     Review of patient's allergies indicates:   Allergen Reactions    Latex      Current Facility-Administered Medications   Medication Frequency    0.9%  NaCl infusion (for blood administration) Q24H PRN    0.9%  NaCl infusion (for blood administration) Q24H PRN    0.9%  NaCl infusion (for blood administration) Q24H PRN    0.9%  NaCl infusion (for blood administration) Q24H PRN    calcium gluconate 1g in dextrose 5% 100mL (ready to mix system) PRN    calcium gluconate 2 g in dextrose 5 % 100 mL IVPB PRN    calcium gluconate 3 g in dextrose 5 % 100 mL IVPB PRN    dexmedetomidine (PRECEDEX) 400mcg/100mL 0.9% NaCL infusion Continuous    famotidine (PF) injection 20 mg Daily    fat emulsion 20% infusion 250 mL Daily    fentaNYL 2500 mcg in 0.9% sodium chloride 250 mL infusion premix (titrating) Continuous    fluconazole (DIFLUCAN) IVPB 200 mg 100 mL Q24H    heparin (porcine) injection 5,000 Units Q8H    HYDROmorphone injection 0.5  mg Q4H PRN    lactated ringers infusion Continuous    miconazole NITRATE 2 % top powder BID    midazolam (VERSED) 1 mg/mL injection 1 mg Q4H PRN    ondansetron injection 4 mg Once PRN    piperacillin-tazobactam 4.5 g in sodium chloride 0.9% 100 mL IVPB (ready to mix system) Q12H    TPN ADULT CENTRAL LINE CUSTOM Continuous    vasopressin (PITRESSIN) 0.2 Units/mL in dextrose 5 % 100 mL infusion Continuous       Objective:     Vital Signs (Most Recent):  Temp: 98.9 °F (37.2 °C) (08/20/20 0700)  Pulse: 102 (08/20/20 0900)  Resp: (!) 22 (08/20/20 0900)  BP: 121/67 (08/20/20 0845)  SpO2: 100 % (08/20/20 0900)  O2 Device (Oxygen Therapy): ventilator (08/20/20 0900) Vital Signs (24h Range):  Temp:  [98 °F (36.7 °C)-101.7 °F (38.7 °C)] 98.9 °F (37.2 °C)  Pulse:  [] 102  Resp:  [20-52] 22  SpO2:  [92 %-100 %] 100 %  BP: ()/(40-85) 121/67     Weight: 68.5 kg (151 lb 0.2 oz) (08/20/20 0600)  Body mass index is 27.62 kg/m².  Body surface area is 1.73 meters squared.    I/O last 3 completed shifts:  In: 6440.3 [I.V.:3760; Blood:1096.3]  Out: 6331 [Drains:1719; Other:4604; Chest Tube:8]    Physical Exam  Vitals signs and nursing note reviewed.   Constitutional:       Appearance: She is ill-appearing.      Interventions: She is sedated and intubated.   HENT:      Head: Normocephalic and atraumatic.      Mouth/Throat:      Mouth: Mucous membranes are moist.   Eyes:      General: No scleral icterus.  Cardiovascular:      Rate and Rhythm: Normal rate and regular rhythm.      Pulses: Normal pulses.      Heart sounds: Normal heart sounds.   Pulmonary:      Effort: Pulmonary effort is normal. No respiratory distress. She is intubated.      Breath sounds: No rales.   Abdominal:      General: Bowel sounds are normal.      Palpations: Abdomen is soft.   Musculoskeletal:      Right lower leg: Edema present.      Left lower leg: Edema present.   Skin:     General: Skin is warm and dry.      Findings: No bruising or rash.    Neurological:      Comments: Sedated on vent         Significant Labs:  CBC:   Recent Labs   Lab 08/20/20  0258   WBC 36.88*   RBC 2.15*   HGB 6.5*   HCT 19.8*      MCV 92   MCH 30.2   MCHC 32.8     CMP:   Recent Labs   Lab 08/20/20  0258   *   CALCIUM 7.8*   ALBUMIN 1.2*   PROT 5.0*      K 4.0   CO2 20*      BUN 35*   CREATININE 2.8*   ALKPHOS 110   ALT 21   AST 29   BILITOT 0.5     All labs within the past 24 hours have been reviewed.       Assessment/Plan:     * Duodenum disorder  - Management per primary team     Acute renal failure with tubular necrosis  oliguric stage 3 sid with ischemic atn likely secondary to septic shock  unknown bl cr  with metabolic acidosis and anasarca  muddy brown casts on microscopy              Assessment:   -- Will plan for a 10 hr SLED treatment today for metabolic clearance and volume management, target -450 mL/hr as tolerated. Off pressor support.   -- Will transitioned to intermittent HD treatments once extubated.   -- Renal function panels per RRT protocol  -- Straight I/Os and daily weights  -- Avoid nephrotoxins           Severe sepsis  - Management per primary team         Thank you for your consult. I will follow-up with patient. Please contact us if you have any additional questions.    Sary Gutierrez DNP, FNP-C  Nephrology  [facility]

## 2020-08-20 NOTE — PLAN OF CARE
"  SICU PLAN OF CARE NOTE    Dx: Ischemic Duodenum    Goals of Care: MAP >65, maintain SpO2 on vent settings, extubation tomorrow    Vital Signs: BP (!) 106/52   Pulse (!) 116   Temp 100.2 °F (37.9 °C) (Oral)   Resp (!) 26   Ht 5' 2" (1.575 m)   Wt 69.5 kg (153 lb 3.5 oz)   SpO2 100%   Breastfeeding No   BMI 28.02 kg/m²     Exam: appears acutely ill, well developed, mildly obese    Cardiac: Sinus Tachycardia    Resp: AC/VC+ 40% FiO2, PEEP 5.0, maintained SpO2 >95%, requiring frequent in line suctioning throughout shift w white thick sputum noted    Neuro: follows all commands, moves all extremities, JOSE ARMANDO orientation at this time pt remains intubated    Gtts: TPN @ 45, Propofol @ 10, Fentanyl @ 25, precedex @ 0.4    Urine Output: Anuric, no jimenez present     Drains:   R GODFREY #1 (superior): 28 cc/shift; tan, serous thick drainage  R GODFREY #2 (inferior): 220 cc/shift; serosanguinous thin drainage  L GODFREY #3 (medial): 30 cc/shift sanguinous drainage   R CT: 0 cc/shift   G tube to gravity: 325 cc/shift; green, brown thin drainage  L NGT: 50 cc/shift    Diet: NPO and TPN     Labs/Accuchecks: CMP, ical, accuchecks Q6h, daily CBC    Skin: GODFREY drain #1 and #2 guaze dressing changed, sites remain CDI w scant drainage at the site, GODFREY #3 continues to ooze sanguinous drainage at the site, Abd pad in place for excess drainage and site remains WILFRED, mid abdomen incision dressing in place from OR w dried drainage marked, all pressure points protected w heels elevated, pt turned Q2h as tolerated, sacrum CDI foam dressing in place w no breakdown noted, will continue to monitor all dressings for signs of bleeding, bed plugged into wall and no new injuries noted this shift.    Shift Events: VSS at this time, pt max temp recorded was 101.7, all blankets removed and MD notified, pt monitored for all signs of infection, MD called to bedside for increased swelling and asymmetrical appearance on L side of abdomen above GODFREY drain #3. GODFREY #3 " output decreased and abdomen became more swollen, taut and firm. Area marked and mid abd dressing removed by MD, all staples intact, pt monitored for signs of bleeding and 1 Unit of PRBC given for hbg 6.5, will continue to monitor at this time.

## 2020-08-20 NOTE — PLAN OF CARE
SW is following this Pt for DC planning needs. There are no identified needs at this time. Discharge Disposition: TBD - pending patient progress; patient extubated this afternoon    SW will continue to coordinate with patient, family, team and insurance to complete patient's discharge plan.    Jazzy Eid LMSW   - Case Management

## 2020-08-20 NOTE — PROGRESS NOTES
T-max 99.8. MAPs >65. SpO2 100% on 3LNC. Pt extubated this afternoon. Pt AAOx4. Moves all extremities. TPN @ 45. Chest tube removed. 3 abdominal JPs in place, see I/O flowsheet for details. RLQ hematoma evaluated by general surgery. Plan for 12hr SLED tonight. POC reviewed with pt and pt's family members over the phone, all questions and concerns addressed.

## 2020-08-20 NOTE — PROGRESS NOTES
[facility]  Critical Care - Surgery  Progress Note    Patient Name: Jaquan Farmer  MRN: [id number]  Admission Date: 8/12/2020  Hospital Length of Stay: 8 days  Code Status: Full Code  Attending Provider: Donis Roa MD  Primary Care Provider: Primary Doctor No   Principal Problem: <principal problem not specified>    Subjective:     Hospital/ICU Course:  8/14 Patient had a line replaced along with central line placed, with complication of pneumothorax. Rt pigtail was placed. Patient tolerated complications well. Decreasing vent settings.  8/15 NAEO. Patient was taken back to OR early AM for washout. Possible closure 8/18. ETT exchanged in OR 7.0 -> 7.5  8/16 - NAEON. HDS. Intubated, sedated.   8/17 - NAEON. OR tomorrow for closure.  8/18 - Washed out and closed in OR    Interval History/Significant Events:   NAEON. Tmax 101.7 @ 03:00am   Noted to have sudden decrease in LLQ GODFREY with acute development of SQ hematoma.   Hgb low at 6.5, transfused 1 upRBCs overnight.   SLED yesterday.     Post-Operative Day: 2 Days Post-Op from abdominal closure     Objective:     Vital Signs (Most Recent):  Temp: 98.9 °F (37.2 °C) (08/20/20 0700)  Pulse: 92 (08/20/20 0738)  Resp: (!) 22 (08/20/20 0738)  BP: (!) 108/55 (08/20/20 0715)  SpO2: 100 % (08/20/20 0738) Vital Signs (24h Range):  Temp:  [98 °F (36.7 °C)-101.7 °F (38.7 °C)] 98.9 °F (37.2 °C)  Pulse:  [] 92  Resp:  [20-52] 22  SpO2:  [92 %-100 %] 100 %  BP: ()/(40-85) 108/55     Weight: 68.5 kg (151 lb 0.2 oz)  Body mass index is 27.62 kg/m².    Intake/Output Summary (Last 24 hours) at 8/20/2020 0815  Last data filed at 8/20/2020 0700  Gross per 24 hour   Intake 3599.25 ml   Output 2194 ml   Net 1405.25 ml     Physical Exam  Vitals signs reviewed.   Constitutional:       Interventions: She is sedated, intubated and restrained.   HENT:      Head: Normocephalic and atraumatic.   Cardiovascular:      Rate and Rhythm: Normal rate.    Pulmonary:      Effort: She is intubated.   Abdominal:      General: There is distension.      Tenderness: There is abdominal tenderness.   Skin:     General: Skin is warm and dry.       Vents:  Vent Mode: A/C (08/20/20 0738)  Ventilator Initiated: Yes(chart correction) (08/12/20 1930)  Set Rate: 20 BPM (08/20/20 0738)  Vt Set: 360 mL (08/20/20 0738)  PEEP/CPAP: 5 cmH20 (08/20/20 0738)  Oxygen Concentration (%): 40 (08/20/20 0738)  Peak Airway Pressure: 25 cmH2O (08/20/20 0738)  Plateau Pressure: 22 cmH20 (08/20/20 0738)  Total Ve: 8.6 mL (08/20/20 0738)  F/VT Ratio<105 (RSBI): (!) 57.44 (08/20/20 0738)    Lines/Drains/Airways     Central Venous Catheter Line            Trialysis (Dialysis) Catheter 08/13/20 2230 right internal jugular 6 days          Drain                 NG/OG Tube 08/06/20 nasogastric Left nostril 14 days         Chest Tube 08/14/20 0007 Right Midaxillary 14 Fr. 6 days         Closed/Suction Drain 08/13/20 1659 Right;Inferior Abdomen Bulb 19 Fr. 6 days         Closed/Suction Drain 08/13/20 1659 Right;Superior Abdomen Bulb 19 Fr. 6 days         Gastrostomy/Enterostomy 08/15/20 0916 Gastrostomy tube w/ balloon LUQ decompression 4 days         Closed/Suction Drain 08/18/20 1239 Medial Abdomen 19 Fr. 1 day          Airway                 Airway - Non-Surgical 08/15/20 0835 Endotracheal Tube 4 days          Peripheral Intravenous Line                 Peripheral IV - Single Lumen 08/19/20 0930 20 G Anterior;Distal;Left Wrist less than 1 day         Peripheral IV - Single Lumen 08/20/20 0430 20 G Anterior;Right Forearm less than 1 day                Significant Labs:    CBC/Anemia Profile:  Recent Labs   Lab 08/19/20  1549 08/19/20  2102 08/20/20  0258   WBC 45.93* 39.09* 36.88*   HGB 6.3* 7.4* 6.5*   HCT 20.1* 22.8* 19.8*    193 202   MCV 93 93 92   RDW 15.6* 14.7* 15.0*     Chemistries:  Recent Labs   Lab 08/18/20  2232 08/19/20  0350  08/19/20  1549 08/19/20  2102 08/20/20  0258  08/20/20  0301     139 138  138   < > 138 137 137  --    K 4.2  4.2 4.2  4.2   < > 4.7 4.0 4.0  --      107 106  106   < > 107 106 105  --    CO2 22*  22* 20*  20*   < > 20* 20* 20*  --    BUN 15  15 19  19   < > 27* 31* 35*  --    CREATININE 1.9*  1.9* 2.1*  2.1*   < > 2.2* 2.4* 2.8*  --    CALCIUM 7.3*  7.3* 7.5*  7.5*   < > 8.1* 7.8* 7.8*  --    ALBUMIN 0.9*  0.9* 1.0*  1.0*   < > 1.4* 1.3* 1.2*  --    PROT 4.9* 4.8*   < > 5.4* 5.1* 5.0*  --    BILITOT 0.7 0.7   < > 0.6 0.6 0.5  --    ALKPHOS 137* 281*   < > 112 105 110  --    ALT 26 26   < > 25 22 21  --    AST 35 33   < > 40 31 29  --    MG 2.5 2.6  --   --   --   --  2.7*   PHOS 4.3 4.6*  --   --   --   --  3.5    < > = values in this interval not displayed.     Significant Imaging:  I have reviewed and interpreted all pertinent imaging results/findings within the past 24 hours.         Assessment/Plan:    40 yo F s/p antrectomy with BII reconstruction c/b duodenal necrosis requiring ex lap, washout, drainage c/b aspiration event. Now with severe sepsis and ARDS     .Neuro:  - Pain controlled on fentanyl gtt  - Sedated with propofol, precedex prn   - Follows commands     Resp:  - Intubated on vent  - Wean vent per ARDSnet protocol  - Daily SBT, plan for extubation today  - PRN breathing treatments  - Daily CXR  - Right chest tube with no output     CV:  - MAP goal >65  - Systolic <160  - Levo and Vaso gtt - off     Heme:  - Hgb/Hct 6.5; transfused 1upRBCs  - new hematoma in RLQ with acute drop in GODFREY output from that site     ID:  - WBC 36.88 => still elevated but downtrending  - Zosyn, Fluconazole  - F/u cultures   - fever 101.7 @ 0300  - Blood cultures and BAL taken 8/19 with NGTD and no abnormal findings     Renal:  - Oliguric on arrival; Cr 1.9 at that time   - SCUF yesterday  - Trend BUN/Cr; 35/2.8 today   - Monitor I/Os  - Anuric; bladder scan with no fluid      FEN/GI:  - NPO  - TPN  - NGT to LIWS   - Maintain drains to bulb  suction; LLQ with acute drop in drainage and accumulation in SQ tissue  - GI ppx  - Replace electrolytes as needed to keep K>4, Mg>2     Endo:  - Monitor BG     Dispo:  - Continue SICU care      Nica Pichardo MD  [employer] General Surgery - PGY 2  9/10/2020 8:21 AM

## 2020-08-20 NOTE — PHYSICIAN QUERY
PT Name: Jaquan Farmer  MR #: [id number]     Documentation Clarification      CDS: Dayna CARROLL RN  Contact information: [email or web address]    This form is a permanent document in the medical record.     Query Date: August 20, 2020    By submitting this query, we are merely seeking further clarification of documentation. Please utilize your independent clinical judgment when addressing the question(s) below.    The Medical Record reflects the following:    Supporting Clinical Findings Location in Medical Record   Pt is a 38 yo F with recent history of antrectomy with BII reconstruction for ulcer disease at outside hospital. 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 Patient also reportedly aspirated on induction in the OR prior to this, resulting in severe pulmonary edema and hypoxia.    Date of procedure - 08/13/2020  Preoperative diagnosis - proximal gastrointestinal perforation  Postoperative diagnosis - duodenal ischemia with perforation  Procedure - intraoperative esophagogastroduodenoscopy  Resection of the duodenum  Duodeno - jejunostomy  Gastrojejunostomy  Application of ABThera wound VAC    Operative findings - patient was demonstrated to have the patchy duodenal ischemia and with necrosis and perforation involving the proximal jejunum 3rd and 4th portions of the duodenum the anterolateral wall of the 2nd portion of the due pass gas as to the etiology of this is secondary to a closed loop obstruction with the distal obstructing point being the mesentery through which the jejunal limb was brought up to the stomach for anastomosis proximal obstruction is the staple line across the 1st part of the duodenum    1. DUODENUM, RESECTION:   - Negative for atypia or malignancy.   - Small intestinal mucosa with ischemic histologic changes, necrosis and   perforation.   - Margin is appears viable.   2. JEJUNUM, RESECTION:   - Negative for atypia or malignancy.    - Small intestinal mucosa with ischemic histologic changes.   - Margin is appears viable.    H&P Gen Surg 8/13/2020          Op Note 8/14/2020                  Op Note 8/14/2020                Surgical Pathology 8/13/2020                                                                          Provider, please provide diagnosis or diagnoses associated with above clinical findings.    Doctor, please further specify duodenal ischemia     [   ] Focal duodenal ischemia    [  x ] Diffuse duodenal ischemia    [   ] Other (please specify): ____________   [  ] Clinically undetermined                                                                                                           Present on admission (POA) status:   [   ] Yes (Y)                          [  ] Clinically Undetermined (W)  [   ] No (N)                            [   ] Documentation insufficient to determine if condition is POA (U)

## 2020-08-20 NOTE — PROGRESS NOTES
[facility]  General Surgery  Progress Note    Subjective:     History of Present Illness:  Pt is a 40 yo F with recent history of antrectomy with BII reconstruction for ulcer disease at outside hospital. 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 Patient also reportedly aspirated on induction in the OR prior to this, resulting in severe pulmonary edema and hypoxia. Patient was transferred to [facility] [place] higher level of care. She arrived as a direct SICU admit on near maximal vent settings and requiring low dose vasopressors with HR in the upper 140s. Her midline laparotomy is closed with 4 Navdeep drains in place draining bilious output.     Post-Op Info:  Procedure(s) (LRB):  LAPAROTOMY, EXPLORATORY; possible abdominal closure (N/A)   2 Days Post-Op     Interval History: On minimal vent settings but became tachypneic on spontaneous. Swelling noted left of midline, L subcutaneous drain with decreased output. Pt is off pressors.     Medications:  Continuous Infusions:   dexmedetomidine (PRECEDEX) infusion 0.6 mcg/kg/hr (08/20/20 0800)    fentanyl 50 mcg/hr (08/20/20 0800)    lactated ringers 5 mL/hr at 08/16/20 0600    TPN ADULT CENTRAL LINE CUSTOM 45 mL/hr at 08/20/20 0800    vasopressin (PITRESSIN) infusion Stopped (08/19/20 1700)     Scheduled Meds:   famotidine (PF)  20 mg Intravenous Daily    fat emulsion 20%  250 mL Intravenous Daily    fluconazole (DIFLUCAN) IVPB  200 mg Intravenous Q24H    heparin (porcine)  5,000 Units Subcutaneous Q8H    miconazole NITRATE 2 %   Topical (Top) BID    piperacillin-tazobactam (ZOSYN) IVPB  4.5 g Intravenous Q12H     PRN Meds:sodium chloride, sodium chloride, sodium chloride, sodium chloride, calcium gluconate IVPB, calcium gluconate IVPB, calcium gluconate IVPB, HYDROmorphone, midazolam, ondansetron     Review of patient's allergies indicates:   Allergen Reactions    Latex      Objective:      Vital Signs (Most Recent):  Temp: 98.9 °F (37.2 °C) (08/20/20 0700)  Pulse: 100 (08/20/20 0815)  Resp: (!) 22 (08/20/20 0815)  BP: 119/60 (08/20/20 0815)  SpO2: 100 % (08/20/20 0815) Vital Signs (24h Range):  Temp:  [98 °F (36.7 °C)-101.7 °F (38.7 °C)] 98.9 °F (37.2 °C)  Pulse:  [] 100  Resp:  [20-52] 22  SpO2:  [92 %-100 %] 100 %  BP: ()/(40-85) 119/60     Weight: 68.5 kg (151 lb 0.2 oz)  Body mass index is 27.62 kg/m².    Intake/Output - Last 3 Shifts       08/18 0700 - 08/19 0659 08/19 0700 - 08/20 0659 08/20 0700 - 08/21 0659    I.V. (mL/kg) 2976 (42.8) 1692 (24.7)     Blood 286 810.3     NG/      TPN 1087 1097     Total Intake(mL/kg) 4469 (64.3) 3599.3 (52.5)     Drains 818 1163 245    Other 5696 1425     Chest Tube 0 8 0    Total Output 6514 2596 245    Net -2045 +1003.3 -245                 Physical Exam  Constitutional:       Interventions: She is sedated and intubated.   HENT:      Head: Normocephalic.   Cardiovascular:      Rate and Rhythm: Regular rhythm. Tachycardia present.      Comments: Improving  Pulmonary:      Effort: She is intubated.      Comments: Intubated and mechanically ventilated  Vent Mode: PAV+  Oxygen Concentration (%):  (40) 40  Resp Rate Total:  (20 br/min-48 br/min) 33 br/min  Vt Set:  (330 mL-350 mL) 350 mL  PEEP/CPAP:  (5 cmH20) 5 cmH20  Mean Airway Pressure:  (7.9 cmH20-15 cmH20) 7.9 cmH20  Abdominal:      Comments: R abdomen GODFREY x2 with SS fluid present  L subcutaneous GODFREY with minimal bloody drainage present  5 x 5 area of firm swelling, left of midline adjacent to umbilicus   Musculoskeletal:         General: Swelling present.      Right lower leg: Edema present.      Left lower leg: Edema present.   Skin:     General: Skin is warm and dry.   Neurological:      Comments: Following commands on precedex and fentanyl         Significant Labs:  Reviewed    Significant Diagnostics:  Reviewed    Assessment/Plan:     Severe sepsis  40 yo F s/p antrectomy with BII  reconstruction c/b duodenal necrosis requiring ex lap, washout, drainage c/b aspiration event. S/p duodenal resection with d-j anastomosis, g-j and abthera vac placement on 8/13 and washout with g-tube placement on 8/15.    .Neuro:  - Wean sedation as tolerated   - Sedation vacations, neuro exams    Resp:  - Intubated on vent  - Wean vent as tolerated  - F/u CXR; if stable then remove chest tube    CV:  - HDS off pressors    Heme:  - Hgb/Hct - stable  - Transfuse as indicated; received 2u PRBC on 8/13 and 2u PRBC on 8/18  - Monitor hematoma formation on abdominal wall, drain output    ID:  - Broad spectrum abx/antifungal  - F/u cultures     Renal:  - Parker in place; oliguric  - Nephrology consulted; appreciate recs    FEN/GI:  - NPO; will discuss starting tube feeds with staff  - NGT to LIWS  - Maintain drains to bulb suction  - G tube to gravity  - GI ppx  - Continue TPN until tolerating goal tube feeds    Endo:  - Monitor BG    Dispo:  - Continue ICU care        Jim Gordon MD  General Surgery  [facility]

## 2020-08-20 NOTE — ASSESSMENT & PLAN NOTE
oliguric stage 3 sid with ischemic atn likely secondary to septic shock  unknown bl cr  with metabolic acidosis and anasarca  muddy brown casts on microscopy              Assessment:   -- Will plan for a 10 hr SLED treatment today for metabolic clearance and volume management, target -450 mL/hr as tolerated. Off pressor support.   -- Will transitioned to intermittent HD treatments once extubated.   -- Renal function panels per RRT protocol  -- Straight I/Os and daily weights  -- Avoid nephrotoxins

## 2020-08-20 NOTE — SUBJECTIVE & OBJECTIVE
Interval History:   SLED held overnight. Patient net positive 1.1 L/24 hrs. Failed SBT on yesterday. Remains intubated at FiO2 40%. No pressor support. Electrolytes and acid base acceptable at this time.     Review of patient's allergies indicates:   Allergen Reactions    Latex      Current Facility-Administered Medications   Medication Frequency    0.9%  NaCl infusion (for blood administration) Q24H PRN    0.9%  NaCl infusion (for blood administration) Q24H PRN    0.9%  NaCl infusion (for blood administration) Q24H PRN    0.9%  NaCl infusion (for blood administration) Q24H PRN    calcium gluconate 1g in dextrose 5% 100mL (ready to mix system) PRN    calcium gluconate 2 g in dextrose 5 % 100 mL IVPB PRN    calcium gluconate 3 g in dextrose 5 % 100 mL IVPB PRN    dexmedetomidine (PRECEDEX) 400mcg/100mL 0.9% NaCL infusion Continuous    famotidine (PF) injection 20 mg Daily    fat emulsion 20% infusion 250 mL Daily    fentaNYL 2500 mcg in 0.9% sodium chloride 250 mL infusion premix (titrating) Continuous    fluconazole (DIFLUCAN) IVPB 200 mg 100 mL Q24H    heparin (porcine) injection 5,000 Units Q8H    HYDROmorphone injection 0.5 mg Q4H PRN    lactated ringers infusion Continuous    miconazole NITRATE 2 % top powder BID    midazolam (VERSED) 1 mg/mL injection 1 mg Q4H PRN    ondansetron injection 4 mg Once PRN    piperacillin-tazobactam 4.5 g in sodium chloride 0.9% 100 mL IVPB (ready to mix system) Q12H    TPN ADULT CENTRAL LINE CUSTOM Continuous    vasopressin (PITRESSIN) 0.2 Units/mL in dextrose 5 % 100 mL infusion Continuous       Objective:     Vital Signs (Most Recent):  Temp: 98.9 °F (37.2 °C) (08/20/20 0700)  Pulse: 102 (08/20/20 0900)  Resp: (!) 22 (08/20/20 0900)  BP: 121/67 (08/20/20 0845)  SpO2: 100 % (08/20/20 0900)  O2 Device (Oxygen Therapy): ventilator (08/20/20 0900) Vital Signs (24h Range):  Temp:  [98 °F (36.7 °C)-101.7 °F (38.7 °C)] 98.9 °F (37.2 °C)  Pulse:  []  102  Resp:  [20-52] 22  SpO2:  [92 %-100 %] 100 %  BP: ()/(40-85) 121/67     Weight: 68.5 kg (151 lb 0.2 oz) (08/20/20 0600)  Body mass index is 27.62 kg/m².  Body surface area is 1.73 meters squared.    I/O last 3 completed shifts:  In: 6440.3 [I.V.:3760; Blood:1096.3]  Out: 6331 [Drains:1719; Other:4604; Chest Tube:8]    Physical Exam  Vitals signs and nursing note reviewed.   Constitutional:       Appearance: She is ill-appearing.      Interventions: She is sedated and intubated.   HENT:      Head: Normocephalic and atraumatic.      Mouth/Throat:      Mouth: Mucous membranes are moist.   Eyes:      General: No scleral icterus.  Cardiovascular:      Rate and Rhythm: Normal rate and regular rhythm.      Pulses: Normal pulses.      Heart sounds: Normal heart sounds.   Pulmonary:      Effort: Pulmonary effort is normal. No respiratory distress. She is intubated.      Breath sounds: No rales.   Abdominal:      General: Bowel sounds are normal.      Palpations: Abdomen is soft.   Musculoskeletal:      Right lower leg: Edema present.      Left lower leg: Edema present.   Skin:     General: Skin is warm and dry.      Findings: No bruising or rash.   Neurological:      Comments: Sedated on vent         Significant Labs:  CBC:   Recent Labs   Lab 08/20/20 0258   WBC 36.88*   RBC 2.15*   HGB 6.5*   HCT 19.8*      MCV 92   MCH 30.2   MCHC 32.8     CMP:   Recent Labs   Lab 08/20/20 0258   *   CALCIUM 7.8*   ALBUMIN 1.2*   PROT 5.0*      K 4.0   CO2 20*      BUN 35*   CREATININE 2.8*   ALKPHOS 110   ALT 21   AST 29   BILITOT 0.5     All labs within the past 24 hours have been reviewed.

## 2020-08-21 ENCOUNTER — ANESTHESIA EVENT (OUTPATIENT)
Dept: SURGERY | Facility: HOSPITAL | Age: 40
DRG: 856 | End: 2020-08-21

## 2020-08-21 ENCOUNTER — ANESTHESIA (OUTPATIENT)
Dept: SURGERY | Facility: HOSPITAL | Age: 40
DRG: 856 | End: 2020-08-21

## 2020-08-21 LAB
ABO + RH BLD: NORMAL
ALBUMIN SERPL BCP-MCNC: 1.4 G/DL (ref 3.5–5.2)
ALBUMIN SERPL BCP-MCNC: 1.4 G/DL (ref 3.5–5.2)
ALBUMIN SERPL BCP-MCNC: 1.5 G/DL (ref 3.5–5.2)
ALBUMIN SERPL BCP-MCNC: 1.6 G/DL (ref 3.5–5.2)
ALBUMIN SERPL BCP-MCNC: 1.6 G/DL (ref 3.5–5.2)
ALP SERPL-CCNC: 104 U/L (ref 55–135)
ALP SERPL-CCNC: 105 U/L (ref 55–135)
ALP SERPL-CCNC: 112 U/L (ref 55–135)
ALP SERPL-CCNC: 124 U/L (ref 55–135)
ALT SERPL W/O P-5'-P-CCNC: 22 U/L (ref 10–44)
ALT SERPL W/O P-5'-P-CCNC: 22 U/L (ref 10–44)
ALT SERPL W/O P-5'-P-CCNC: 23 U/L (ref 10–44)
ALT SERPL W/O P-5'-P-CCNC: 24 U/L (ref 10–44)
ANION GAP SERPL CALC-SCNC: 11 MMOL/L (ref 8–16)
ANION GAP SERPL CALC-SCNC: 11 MMOL/L (ref 8–16)
ANION GAP SERPL CALC-SCNC: 9 MMOL/L (ref 8–16)
ANISOCYTOSIS BLD QL SMEAR: SLIGHT
APTT BLDCRRT: 32.2 SEC (ref 21–32)
AST SERPL-CCNC: 28 U/L (ref 10–40)
AST SERPL-CCNC: 29 U/L (ref 10–40)
AST SERPL-CCNC: 34 U/L (ref 10–40)
AST SERPL-CCNC: 55 U/L (ref 10–40)
BACTERIA BAL AEROBE CULT: NO GROWTH
BASO STIPL BLD QL SMEAR: ABNORMAL
BASOPHILS # BLD AUTO: ABNORMAL K/UL (ref 0–0.2)
BASOPHILS NFR BLD: 0 % (ref 0–1.9)
BASOPHILS NFR BLD: 0 % (ref 0–1.9)
BASOPHILS NFR BLD: 1 % (ref 0–1.9)
BASOPHILS NFR BLD: 1 % (ref 0–1.9)
BILIRUB SERPL-MCNC: 0.6 MG/DL (ref 0.1–1)
BILIRUB SERPL-MCNC: 0.7 MG/DL (ref 0.1–1)
BLD GP AB SCN CELLS X3 SERPL QL: NORMAL
BLD PROD TYP BPU: NORMAL
BLOOD UNIT EXPIRATION DATE: NORMAL
BLOOD UNIT TYPE CODE: 1700
BLOOD UNIT TYPE CODE: 1700
BLOOD UNIT TYPE CODE: 5100
BLOOD UNIT TYPE CODE: 7300
BLOOD UNIT TYPE: NORMAL
BUN SERPL-MCNC: 11 MG/DL (ref 6–20)
BUN SERPL-MCNC: 12 MG/DL (ref 6–20)
BUN SERPL-MCNC: 12 MG/DL (ref 6–20)
BUN SERPL-MCNC: 16 MG/DL (ref 6–20)
BUN SERPL-MCNC: 20 MG/DL (ref 6–20)
BURR CELLS BLD QL SMEAR: ABNORMAL
CA-I BLDV-SCNC: 1.04 MMOL/L (ref 1.06–1.42)
CA-I BLDV-SCNC: 1.09 MMOL/L (ref 1.06–1.42)
CA-I BLDV-SCNC: 1.1 MMOL/L (ref 1.06–1.42)
CA-I BLDV-SCNC: 1.12 MMOL/L (ref 1.06–1.42)
CALCIUM SERPL-MCNC: 7.6 MG/DL (ref 8.7–10.5)
CALCIUM SERPL-MCNC: 7.9 MG/DL (ref 8.7–10.5)
CALCIUM SERPL-MCNC: 7.9 MG/DL (ref 8.7–10.5)
CALCIUM SERPL-MCNC: 8 MG/DL (ref 8.7–10.5)
CALCIUM SERPL-MCNC: 8 MG/DL (ref 8.7–10.5)
CHLORIDE SERPL-SCNC: 105 MMOL/L (ref 95–110)
CHLORIDE SERPL-SCNC: 107 MMOL/L (ref 95–110)
CHLORIDE SERPL-SCNC: 107 MMOL/L (ref 95–110)
CO2 SERPL-SCNC: 23 MMOL/L (ref 23–29)
CO2 SERPL-SCNC: 24 MMOL/L (ref 23–29)
CO2 SERPL-SCNC: 25 MMOL/L (ref 23–29)
CODING SYSTEM: NORMAL
CREAT SERPL-MCNC: 1 MG/DL (ref 0.5–1.4)
CREAT SERPL-MCNC: 1.1 MG/DL (ref 0.5–1.4)
CREAT SERPL-MCNC: 1.1 MG/DL (ref 0.5–1.4)
CREAT SERPL-MCNC: 1.4 MG/DL (ref 0.5–1.4)
CREAT SERPL-MCNC: 1.7 MG/DL (ref 0.5–1.4)
DIFFERENTIAL METHOD: ABNORMAL
DISPENSE STATUS: NORMAL
EOSINOPHIL # BLD AUTO: ABNORMAL K/UL (ref 0–0.5)
EOSINOPHIL NFR BLD: 0 % (ref 0–8)
EOSINOPHIL NFR BLD: 1 % (ref 0–8)
ERYTHROCYTE [DISTWIDTH] IN BLOOD BY AUTOMATED COUNT: 13.8 % (ref 11.5–14.5)
ERYTHROCYTE [DISTWIDTH] IN BLOOD BY AUTOMATED COUNT: 14.1 % (ref 11.5–14.5)
ERYTHROCYTE [DISTWIDTH] IN BLOOD BY AUTOMATED COUNT: 14.3 % (ref 11.5–14.5)
ERYTHROCYTE [DISTWIDTH] IN BLOOD BY AUTOMATED COUNT: 14.5 % (ref 11.5–14.5)
EST. GFR  (AFRICAN AMERICAN): 43.2 ML/MIN/1.73 M^2
EST. GFR  (AFRICAN AMERICAN): 54.6 ML/MIN/1.73 M^2
EST. GFR  (AFRICAN AMERICAN): >60 ML/MIN/1.73 M^2
EST. GFR  (NON AFRICAN AMERICAN): 37.5 ML/MIN/1.73 M^2
EST. GFR  (NON AFRICAN AMERICAN): 47.4 ML/MIN/1.73 M^2
EST. GFR  (NON AFRICAN AMERICAN): >60 ML/MIN/1.73 M^2
GIANT PLATELETS BLD QL SMEAR: PRESENT
GLUCOSE SERPL-MCNC: 134 MG/DL (ref 70–110)
GLUCOSE SERPL-MCNC: 134 MG/DL (ref 70–110)
GLUCOSE SERPL-MCNC: 135 MG/DL (ref 70–110)
GLUCOSE SERPL-MCNC: 144 MG/DL (ref 70–110)
GLUCOSE SERPL-MCNC: 165 MG/DL (ref 70–110)
GRAM STN SPEC: NORMAL
HCT VFR BLD AUTO: 17.3 % (ref 37–48.5)
HCT VFR BLD AUTO: 22.5 % (ref 37–48.5)
HCT VFR BLD AUTO: 24.3 % (ref 37–48.5)
HCT VFR BLD AUTO: 28 % (ref 37–48.5)
HGB BLD-MCNC: 5.9 G/DL (ref 12–16)
HGB BLD-MCNC: 7.5 G/DL (ref 12–16)
HGB BLD-MCNC: 8.1 G/DL (ref 12–16)
HGB BLD-MCNC: 9.6 G/DL (ref 12–16)
HYPOCHROMIA BLD QL SMEAR: ABNORMAL
IMM GRANULOCYTES # BLD AUTO: ABNORMAL K/UL (ref 0–0.04)
IMM GRANULOCYTES NFR BLD AUTO: ABNORMAL % (ref 0–0.5)
INR PPP: 1 (ref 0.8–1.2)
LACTATE SERPL-SCNC: 0.9 MMOL/L (ref 0.5–2.2)
LIPASE SERPL-CCNC: 52 U/L (ref 4–60)
LIPASE SERPL-CCNC: 52 U/L (ref 4–60)
LYMPHOCYTES # BLD AUTO: ABNORMAL K/UL (ref 1–4.8)
LYMPHOCYTES NFR BLD: 1 % (ref 18–48)
LYMPHOCYTES NFR BLD: 3.5 % (ref 18–48)
LYMPHOCYTES NFR BLD: 4 % (ref 18–48)
LYMPHOCYTES NFR BLD: 9 % (ref 18–48)
MAGNESIUM SERPL-MCNC: 2 MG/DL (ref 1.6–2.6)
MAGNESIUM SERPL-MCNC: 2.1 MG/DL (ref 1.6–2.6)
MCH RBC QN AUTO: 30.4 PG (ref 27–31)
MCH RBC QN AUTO: 30.5 PG (ref 27–31)
MCH RBC QN AUTO: 30.6 PG (ref 27–31)
MCH RBC QN AUTO: 31.4 PG (ref 27–31)
MCHC RBC AUTO-ENTMCNC: 33.3 G/DL (ref 32–36)
MCHC RBC AUTO-ENTMCNC: 33.3 G/DL (ref 32–36)
MCHC RBC AUTO-ENTMCNC: 34.1 G/DL (ref 32–36)
MCHC RBC AUTO-ENTMCNC: 34.3 G/DL (ref 32–36)
MCV RBC AUTO: 90 FL (ref 82–98)
MCV RBC AUTO: 91 FL (ref 82–98)
MCV RBC AUTO: 91 FL (ref 82–98)
MCV RBC AUTO: 92 FL (ref 82–98)
METAMYELOCYTES NFR BLD MANUAL: 0.5 %
METAMYELOCYTES NFR BLD MANUAL: 0.5 %
METAMYELOCYTES NFR BLD MANUAL: 1 %
MONOCYTES # BLD AUTO: ABNORMAL K/UL (ref 0.3–1)
MONOCYTES NFR BLD: 1 % (ref 4–15)
MONOCYTES NFR BLD: 1 % (ref 4–15)
MONOCYTES NFR BLD: 1.5 % (ref 4–15)
MONOCYTES NFR BLD: 3 % (ref 4–15)
MYELOCYTES NFR BLD MANUAL: 0.5 %
MYELOCYTES NFR BLD MANUAL: 0.5 %
MYELOCYTES NFR BLD MANUAL: 1 %
NEUTROPHILS NFR BLD: 88 % (ref 38–73)
NEUTROPHILS NFR BLD: 90 % (ref 38–73)
NEUTROPHILS NFR BLD: 92 % (ref 38–73)
NEUTROPHILS NFR BLD: 95 % (ref 38–73)
NEUTS BAND NFR BLD MANUAL: 1 %
NEUTS BAND NFR BLD MANUAL: 1 %
NEUTS BAND NFR BLD MANUAL: 2 %
NRBC BLD-RTO: 0 /100 WBC
NUM UNITS TRANS PACKED RBC: NORMAL
OVALOCYTES BLD QL SMEAR: ABNORMAL
PHOSPHATE SERPL-MCNC: 2.2 MG/DL (ref 2.7–4.5)
PLATELET # BLD AUTO: 169 K/UL (ref 150–350)
PLATELET # BLD AUTO: 203 K/UL (ref 150–350)
PLATELET # BLD AUTO: 205 K/UL (ref 150–350)
PLATELET # BLD AUTO: 207 K/UL (ref 150–350)
PLATELET BLD QL SMEAR: ABNORMAL
PMV BLD AUTO: 11.1 FL (ref 9.2–12.9)
PMV BLD AUTO: 11.2 FL (ref 9.2–12.9)
PMV BLD AUTO: 11.2 FL (ref 9.2–12.9)
PMV BLD AUTO: 11.3 FL (ref 9.2–12.9)
POCT GLUCOSE: 125 MG/DL (ref 70–110)
POCT GLUCOSE: 128 MG/DL (ref 70–110)
POCT GLUCOSE: 162 MG/DL (ref 70–110)
POIKILOCYTOSIS BLD QL SMEAR: SLIGHT
POIKILOCYTOSIS BLD QL SMEAR: SLIGHT
POLYCHROMASIA BLD QL SMEAR: ABNORMAL
POTASSIUM SERPL-SCNC: 3.8 MMOL/L (ref 3.5–5.1)
POTASSIUM SERPL-SCNC: 4 MMOL/L (ref 3.5–5.1)
POTASSIUM SERPL-SCNC: 4.4 MMOL/L (ref 3.5–5.1)
PROT SERPL-MCNC: 5.4 G/DL (ref 6–8.4)
PROT SERPL-MCNC: 5.4 G/DL (ref 6–8.4)
PROT SERPL-MCNC: 5.5 G/DL (ref 6–8.4)
PROT SERPL-MCNC: 5.5 G/DL (ref 6–8.4)
PROTHROMBIN TIME: 10.8 SEC (ref 9–12.5)
RBC # BLD AUTO: 1.93 M/UL (ref 4–5.4)
RBC # BLD AUTO: 2.47 M/UL (ref 4–5.4)
RBC # BLD AUTO: 2.66 M/UL (ref 4–5.4)
RBC # BLD AUTO: 3.06 M/UL (ref 4–5.4)
SARS-COV-2 RDRP RESP QL NAA+PROBE: NEGATIVE
SMUDGE CELLS BLD QL SMEAR: PRESENT
SODIUM SERPL-SCNC: 137 MMOL/L (ref 136–145)
SODIUM SERPL-SCNC: 140 MMOL/L (ref 136–145)
SODIUM SERPL-SCNC: 141 MMOL/L (ref 136–145)
TOXIC GRANULES BLD QL SMEAR: PRESENT
TRANS ERYTHROCYTES VOL PATIENT: NORMAL ML
WBC # BLD AUTO: 39.08 K/UL (ref 3.9–12.7)
WBC # BLD AUTO: 39.95 K/UL (ref 3.9–12.7)
WBC # BLD AUTO: 44.59 K/UL (ref 3.9–12.7)
WBC # BLD AUTO: 47.71 K/UL (ref 3.9–12.7)

## 2020-08-21 PROCEDURE — 36000707: Performed by: SURGERY

## 2020-08-21 PROCEDURE — 25000003 PHARM REV CODE 250: Performed by: STUDENT IN AN ORGANIZED HEALTH CARE EDUCATION/TRAINING PROGRAM

## 2020-08-21 PROCEDURE — S0028 INJECTION, FAMOTIDINE, 20 MG: HCPCS | Performed by: STUDENT IN AN ORGANIZED HEALTH CARE EDUCATION/TRAINING PROGRAM

## 2020-08-21 PROCEDURE — P9016 RBC LEUKOCYTES REDUCED: HCPCS

## 2020-08-21 PROCEDURE — 63600175 PHARM REV CODE 636 W HCPCS: Performed by: STUDENT IN AN ORGANIZED HEALTH CARE EDUCATION/TRAINING PROGRAM

## 2020-08-21 PROCEDURE — A4217 STERILE WATER/SALINE, 500 ML: HCPCS | Performed by: STUDENT IN AN ORGANIZED HEALTH CARE EDUCATION/TRAINING PROGRAM

## 2020-08-21 PROCEDURE — 27000646 HC AEROBIKA DEVICE

## 2020-08-21 PROCEDURE — 99900035 HC TECH TIME PER 15 MIN (STAT)

## 2020-08-21 PROCEDURE — 80069 RENAL FUNCTION PANEL: CPT

## 2020-08-21 PROCEDURE — 80053 COMPREHEN METABOLIC PANEL: CPT | Mod: 91

## 2020-08-21 PROCEDURE — U0002 COVID-19 LAB TEST NON-CDC: HCPCS

## 2020-08-21 PROCEDURE — 85007 BL SMEAR W/DIFF WBC COUNT: CPT | Mod: 91

## 2020-08-21 PROCEDURE — 37000009 HC ANESTHESIA EA ADD 15 MINS: Performed by: SURGERY

## 2020-08-21 PROCEDURE — 86920 COMPATIBILITY TEST SPIN: CPT

## 2020-08-21 PROCEDURE — 97605 NEG PRS WND THER DME<=50SQCM: CPT | Mod: ,,, | Performed by: SURGERY

## 2020-08-21 PROCEDURE — 99291 CRITICAL CARE FIRST HOUR: CPT | Mod: 24,,, | Performed by: SURGERY

## 2020-08-21 PROCEDURE — 83605 ASSAY OF LACTIC ACID: CPT

## 2020-08-21 PROCEDURE — B4185 PARENTERAL SOL 10 GM LIPIDS: HCPCS | Performed by: STUDENT IN AN ORGANIZED HEALTH CARE EDUCATION/TRAINING PROGRAM

## 2020-08-21 PROCEDURE — 20000000 HC ICU ROOM

## 2020-08-21 PROCEDURE — P9021 RED BLOOD CELLS UNIT: HCPCS

## 2020-08-21 PROCEDURE — 83690 ASSAY OF LIPASE: CPT

## 2020-08-21 PROCEDURE — 85610 PROTHROMBIN TIME: CPT

## 2020-08-21 PROCEDURE — 82330 ASSAY OF CALCIUM: CPT | Mod: 91

## 2020-08-21 PROCEDURE — 36000706: Performed by: SURGERY

## 2020-08-21 PROCEDURE — 99291 PR CRITICAL CARE, E/M 30-74 MINUTES: ICD-10-PCS | Mod: 24,,, | Performed by: SURGERY

## 2020-08-21 PROCEDURE — 25000003 PHARM REV CODE 250: Performed by: SURGERY

## 2020-08-21 PROCEDURE — 85730 THROMBOPLASTIN TIME PARTIAL: CPT

## 2020-08-21 PROCEDURE — 27000221 HC OXYGEN, UP TO 24 HOURS

## 2020-08-21 PROCEDURE — 63600175 PHARM REV CODE 636 W HCPCS: Mod: JG

## 2020-08-21 PROCEDURE — P9045 ALBUMIN (HUMAN), 5%, 250 ML: HCPCS | Mod: JG

## 2020-08-21 PROCEDURE — 49002 PR REOPEN RECENT ABD EXPLORATORY: ICD-10-PCS | Mod: 78,,, | Performed by: SURGERY

## 2020-08-21 PROCEDURE — 83735 ASSAY OF MAGNESIUM: CPT | Mod: 91

## 2020-08-21 PROCEDURE — 37000008 HC ANESTHESIA 1ST 15 MINUTES: Performed by: SURGERY

## 2020-08-21 PROCEDURE — 85027 COMPLETE CBC AUTOMATED: CPT | Mod: 91

## 2020-08-21 PROCEDURE — 99232 SBSQ HOSP IP/OBS MODERATE 35: CPT | Mod: ,,, | Performed by: INTERNAL MEDICINE

## 2020-08-21 PROCEDURE — 94664 DEMO&/EVAL PT USE INHALER: CPT

## 2020-08-21 PROCEDURE — 49002 REOPENING OF ABDOMEN: CPT | Mod: 78,,, | Performed by: SURGERY

## 2020-08-21 PROCEDURE — 86850 RBC ANTIBODY SCREEN: CPT

## 2020-08-21 PROCEDURE — 83735 ASSAY OF MAGNESIUM: CPT

## 2020-08-21 PROCEDURE — 99232 PR SUBSEQUENT HOSPITAL CARE,LEVL II: ICD-10-PCS | Mod: ,,, | Performed by: INTERNAL MEDICINE

## 2020-08-21 PROCEDURE — 97605 PR NEG PRESS WOUND THERAPY (NPWT) W/NON-DISPOSABLE WOUND VAC DEVICE (DME), <=50 CM: ICD-10-PCS | Mod: ,,, | Performed by: SURGERY

## 2020-08-21 PROCEDURE — D9220A PRA ANESTHESIA: Mod: ,,, | Performed by: SURGERY

## 2020-08-21 PROCEDURE — 94761 N-INVAS EAR/PLS OXIMETRY MLT: CPT

## 2020-08-21 PROCEDURE — D9220A PRA ANESTHESIA: ICD-10-PCS | Mod: ,,, | Performed by: SURGERY

## 2020-08-21 RX ORDER — MAGNESIUM SULFATE HEPTAHYDRATE 40 MG/ML
2 INJECTION, SOLUTION INTRAVENOUS
Status: DISCONTINUED | OUTPATIENT
Start: 2020-08-21 | End: 2020-08-25

## 2020-08-21 RX ORDER — MAGNESIUM SULFATE HEPTAHYDRATE 40 MG/ML
4 INJECTION, SOLUTION INTRAVENOUS
Status: DISCONTINUED | OUTPATIENT
Start: 2020-08-21 | End: 2020-08-21

## 2020-08-21 RX ORDER — POTASSIUM CHLORIDE 14.9 MG/ML
60 INJECTION INTRAVENOUS
Status: DISCONTINUED | OUTPATIENT
Start: 2020-08-21 | End: 2020-08-25

## 2020-08-21 RX ORDER — ONDANSETRON 2 MG/ML
INJECTION INTRAMUSCULAR; INTRAVENOUS
Status: DISCONTINUED | OUTPATIENT
Start: 2020-08-21 | End: 2020-08-21

## 2020-08-21 RX ORDER — HYDROCODONE BITARTRATE AND ACETAMINOPHEN 500; 5 MG/1; MG/1
TABLET ORAL
Status: DISCONTINUED | OUTPATIENT
Start: 2020-08-21 | End: 2020-08-24

## 2020-08-21 RX ORDER — MIDAZOLAM HYDROCHLORIDE 1 MG/ML
INJECTION, SOLUTION INTRAMUSCULAR; INTRAVENOUS
Status: DISCONTINUED | OUTPATIENT
Start: 2020-08-21 | End: 2020-08-21

## 2020-08-21 RX ORDER — FENTANYL CITRATE 50 UG/ML
INJECTION, SOLUTION INTRAMUSCULAR; INTRAVENOUS
Status: DISCONTINUED | OUTPATIENT
Start: 2020-08-21 | End: 2020-08-21

## 2020-08-21 RX ORDER — POTASSIUM CHLORIDE 29.8 MG/ML
80 INJECTION INTRAVENOUS
Status: DISCONTINUED | OUTPATIENT
Start: 2020-08-21 | End: 2020-08-25

## 2020-08-21 RX ORDER — POTASSIUM CHLORIDE 29.8 MG/ML
40 INJECTION INTRAVENOUS
Status: DISCONTINUED | OUTPATIENT
Start: 2020-08-21 | End: 2020-08-25

## 2020-08-21 RX ORDER — POTASSIUM CHLORIDE 7.45 MG/ML
40 INJECTION INTRAVENOUS
Status: DISCONTINUED | OUTPATIENT
Start: 2020-08-21 | End: 2020-08-21

## 2020-08-21 RX ORDER — POTASSIUM CHLORIDE 7.45 MG/ML
60 INJECTION INTRAVENOUS
Status: DISCONTINUED | OUTPATIENT
Start: 2020-08-21 | End: 2020-08-21

## 2020-08-21 RX ORDER — POTASSIUM CHLORIDE 7.45 MG/ML
80 INJECTION INTRAVENOUS
Status: DISCONTINUED | OUTPATIENT
Start: 2020-08-21 | End: 2020-08-21

## 2020-08-21 RX ORDER — MAGNESIUM SULFATE HEPTAHYDRATE 40 MG/ML
4 INJECTION, SOLUTION INTRAVENOUS
Status: DISCONTINUED | OUTPATIENT
Start: 2020-08-21 | End: 2020-08-25

## 2020-08-21 RX ORDER — KETAMINE HCL IN 0.9 % NACL 50 MG/5 ML
SYRINGE (ML) INTRAVENOUS
Status: DISCONTINUED | OUTPATIENT
Start: 2020-08-21 | End: 2020-08-21

## 2020-08-21 RX ORDER — LORAZEPAM 2 MG/ML
1 INJECTION INTRAMUSCULAR ONCE
Status: DISCONTINUED | OUTPATIENT
Start: 2020-08-21 | End: 2020-08-25

## 2020-08-21 RX ORDER — ALBUMIN HUMAN 50 G/1000ML
25 SOLUTION INTRAVENOUS ONCE
Status: COMPLETED | OUTPATIENT
Start: 2020-08-21 | End: 2020-08-21

## 2020-08-21 RX ORDER — ALBUMIN HUMAN 50 G/1000ML
SOLUTION INTRAVENOUS
Status: COMPLETED
Start: 2020-08-21 | End: 2020-08-21

## 2020-08-21 RX ORDER — MAGNESIUM SULFATE HEPTAHYDRATE 40 MG/ML
2 INJECTION, SOLUTION INTRAVENOUS
Status: DISCONTINUED | OUTPATIENT
Start: 2020-08-21 | End: 2020-08-21

## 2020-08-21 RX ADMIN — HEPARIN SODIUM 5000 UNITS: 5000 INJECTION INTRAVENOUS; SUBCUTANEOUS at 05:08

## 2020-08-21 RX ADMIN — Medication 10 MG: at 12:08

## 2020-08-21 RX ADMIN — SODIUM CHLORIDE 1000 ML: 0.9 INJECTION, SOLUTION INTRAVENOUS at 08:08

## 2020-08-21 RX ADMIN — FENTANYL CITRATE 50 MCG: 50 INJECTION, SOLUTION INTRAMUSCULAR; INTRAVENOUS at 12:08

## 2020-08-21 RX ADMIN — ALBUMIN HUMAN 25 G: 50 SOLUTION INTRAVENOUS at 07:08

## 2020-08-21 RX ADMIN — HEPARIN SODIUM 5000 UNITS: 5000 INJECTION INTRAVENOUS; SUBCUTANEOUS at 09:08

## 2020-08-21 RX ADMIN — HEPARIN SODIUM 5000 UNITS: 5000 INJECTION INTRAVENOUS; SUBCUTANEOUS at 01:08

## 2020-08-21 RX ADMIN — SODIUM CHLORIDE: 0.9 INJECTION, SOLUTION INTRAVENOUS at 11:08

## 2020-08-21 RX ADMIN — ALBUMIN (HUMAN) 25 G: 12.5 SOLUTION INTRAVENOUS at 07:08

## 2020-08-21 RX ADMIN — SODIUM CHLORIDE, SODIUM GLUCONATE, SODIUM ACETATE, POTASSIUM CHLORIDE, MAGNESIUM CHLORIDE, SODIUM PHOSPHATE, DIBASIC, AND POTASSIUM PHOSPHATE: .53; .5; .37; .037; .03; .012; .00082 INJECTION, SOLUTION INTRAVENOUS at 12:08

## 2020-08-21 RX ADMIN — Medication 30 MG: at 12:08

## 2020-08-21 RX ADMIN — FAMOTIDINE 20 MG: 10 INJECTION INTRAVENOUS at 09:08

## 2020-08-21 RX ADMIN — CALCIUM GLUCONATE 1 G: 98 INJECTION, SOLUTION INTRAVENOUS at 12:08

## 2020-08-21 RX ADMIN — MICONAZOLE NITRATE: 20 POWDER TOPICAL at 08:08

## 2020-08-21 RX ADMIN — FLUCONAZOLE 200 MG: 200 INJECTION, SOLUTION INTRAVENOUS at 10:08

## 2020-08-21 RX ADMIN — HYDROMORPHONE HYDROCHLORIDE 0.5 MG: 1 INJECTION, SOLUTION INTRAMUSCULAR; INTRAVENOUS; SUBCUTANEOUS at 08:08

## 2020-08-21 RX ADMIN — I.V. FAT EMULSION 250 ML: 20 EMULSION INTRAVENOUS at 09:08

## 2020-08-21 RX ADMIN — CALCIUM GLUCONATE 1 G: 98 INJECTION, SOLUTION INTRAVENOUS at 02:08

## 2020-08-21 RX ADMIN — PIPERACILLIN SODIUM AND TAZOBACTAM SODIUM 4.5 G: 4; .5 INJECTION, POWDER, LYOPHILIZED, FOR SOLUTION INTRAVENOUS at 08:08

## 2020-08-21 RX ADMIN — Medication 20 MG: at 12:08

## 2020-08-21 RX ADMIN — ONDANSETRON 4 MG: 2 INJECTION, SOLUTION INTRAMUSCULAR; INTRAVENOUS at 12:08

## 2020-08-21 RX ADMIN — MAGNESIUM SULFATE HEPTAHYDRATE: 500 INJECTION, SOLUTION INTRAMUSCULAR; INTRAVENOUS at 09:08

## 2020-08-21 RX ADMIN — POTASSIUM CHLORIDE 40 MEQ: 29.8 INJECTION, SOLUTION INTRAVENOUS at 06:08

## 2020-08-21 RX ADMIN — CALCIUM GLUCONATE 1 G: 98 INJECTION, SOLUTION INTRAVENOUS at 07:08

## 2020-08-21 RX ADMIN — MIDAZOLAM HYDROCHLORIDE 2 MG: 1 INJECTION, SOLUTION INTRAMUSCULAR; INTRAVENOUS at 12:08

## 2020-08-21 NOTE — PHYSICIAN QUERY
PT Name: Jaquan Farmer  MR #: [id number]    Relationship to Procedure Clarification     CDS: Dayna Stanford BSN RN  Contact information: [email or web address]    This form is a permanent document in the medical record.     Query Date: August 21, 2020    Dear Provider,  By submitting this query, we are merely seeking further clarification of documentation. Please utilize your independent clinical judgment when addressing the question(s) below.    The medical record contains the following:  Supporting Clinical Findings Location in Medical Record   Pt is a 38 yo F with recent history of antrectomy with BII reconstruction for ulcer disease at outside hospital. 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 Patient also reportedly aspirated on induction in the OR prior to this, resulting in severe pulmonary edema and hypoxia. [place] [facility] [place]. She arrived as a direct SICU admit on near maximal vent settings and requiring low dose vasopressors with HR in the upper 140s. Her midline laparotomy is closed with 4 Navdeep drains in place draining bilious output.     Procedure - bilateral myocutaneous advancement flap closure of the abdomen (component separation)    A 19 Navdeep drain was placed in the subcutaneous space which was now quite generous given the extensive abdominal wall surgery reconstruction that was required this was secured to the skin with nylon and then the skin edges were reapproximated    Shift Events: VSS at this time, pt max temp recorded was 101.7, all blankets removed and MD notified, pt monitored for all signs of infection, MD called to bedside for increased swelling and asymmetrical appearance on L side of abdomen above GODFREY drain #3. GODFREY #3 output decreased and abdomen became more swollen, taut and firm. Area marked and mid abd dressing removed by MD, all staples intact, pt monitored for signs of bleeding and 1 Unit  of PRBC given for hbg 6.5, will continue to monitor at this time.     Noted to have sudden decrease in LLQ GODFREY with acute development of SQ hematoma.   Hgb low at 6.5, transfused 1 upRBCs overnight.    Abdominal:      Comments: R abdomen GODFREY x2 with SS fluid present  L subcutaneous GODFREY with minimal bloody drainage present  5 x 5 area of firm swelling, left of midline adjacent to umbilicus     - Hgb/Hct 6.5; transfused 1upRBCs  - new hematoma in RLQ with acute drop in GODFREY output from that site; expanding, general surgery team aware  - 1u pRBCs overnight    H/H= 10.3/31.7 -- > 9.4/28.7 -- > 7.3/23 -- > 10.3/30.2 -- > 8.8/26.2 -- > 7.4/22.9 -- > 6.6/20 -- > 5.7/18.3 -- > 7.7/23.7 -- > 6.5/19.8 PN Gen Surg 8/17/2020                  Op Note 8/18/2020      Op Note 8/18/2020          Plan of Care Kiana Edmond RN 8/20/2020              PN Crit Care Surg 8/20/2020    PN Gen Surg 8/20/2020          PN Crit Care Surg 8/21/2020        Labs 8/12/2020- 8/20/2020       Please clarify if __hematoma______ (as it relates to __Procedure - bilateral myocutaneous advancement   flap closure of the abdomen on 8/18/2020___) is:    [  ] Inherent/Integral to the procedure   [x  ] Complication of the procedure   [  ] Present, but not a complication of the procedure   [  ] Incidental finding, not clinically significant   [  ] Intended/required to complete procedure   [  ] Other (please specify): __________________   [  ] Clinically Undetermined       Please document in your progress notes daily for the duration of treatment until resolved and include in your discharge summary.

## 2020-08-21 NOTE — PROGRESS NOTES
[facility]  Nephrology  Progress Note    Patient Name: Jaquan Farmer  MRN: [id number]  Admission Date: 8/12/2020  Hospital Length of Stay: 9 days  Attending Provider: Donis Roa MD   Primary Care Physician: Primary Doctor No  Principal Problem:Duodenum disorder    Subjective:     Interval History:   12 hour SLED overnight, discontinued this morning.  Net negative 2.4l/24h with abdominal drains increasing to 2.4L/24h.  Plans for OR today for abdominal washout due to hematoma.  Tachycardic on examination with low CVPs.    Review of patient's allergies indicates:   Allergen Reactions    Latex      Current Facility-Administered Medications   Medication Frequency    0.9%  NaCl infusion (CRRT USE ONLY) Continuous    0.9%  NaCl infusion (for blood administration) Q24H PRN    0.9%  NaCl infusion (for blood administration) Q24H PRN    calcium gluconate 1g in dextrose 5% 100mL (ready to mix system) PRN    calcium gluconate 2 g in dextrose 5 % 100 mL IVPB PRN    calcium gluconate 3 g in dextrose 5 % 100 mL IVPB PRN    dexmedetomidine (PRECEDEX) 400mcg/100mL 0.9% NaCL infusion Continuous    famotidine (PF) injection 20 mg Daily    fat emulsion 20% infusion 250 mL Daily    fentaNYL 2500 mcg in 0.9% sodium chloride 250 mL infusion premix (titrating) Continuous    fluconazole (DIFLUCAN) IVPB 200 mg 100 mL Q24H    heparin (porcine) injection 5,000 Units Q8H    HYDROmorphone injection 0.5 mg Q4H PRN    lactated ringers infusion Continuous    magnesium sulfate 2g in water 50mL IVPB (premix) PRN    melatonin tablet 3 mg Nightly PRN    metoprolol injection 5 mg Q4H PRN    miconazole NITRATE 2 % top powder BID    midazolam (VERSED) 1 mg/mL injection 1 mg Q4H PRN    ondansetron injection 4 mg Once PRN    piperacillin-tazobactam 4.5 g in sodium chloride 0.9% 100 mL IVPB (ready to mix system) Q12H    sodium phosphate 20.01 mmol in dextrose 5 % 250 mL IVPB PRN    sodium phosphate 30  mmol in dextrose 5 % 250 mL IVPB PRN    sodium phosphate 39.99 mmol in dextrose 5 % 250 mL IVPB PRN    TPN ADULT CENTRAL LINE CUSTOM Continuous    vasopressin (PITRESSIN) 0.2 Units/mL in dextrose 5 % 100 mL infusion Continuous       Objective:     Vital Signs (Most Recent):  Temp: 99.4 °F (37.4 °C) (08/21/20 0700)  Pulse: (!) 140 (08/21/20 0800)  Resp: (!) 32 (08/21/20 0800)  BP: 128/62 (08/21/20 0800)  SpO2: 100 % (08/21/20 0800)  O2 Device (Oxygen Therapy): nasal cannula (08/21/20 0800) Vital Signs (24h Range):  Temp:  [98.5 °F (36.9 °C)-99.8 °F (37.7 °C)] 99.4 °F (37.4 °C)  Pulse:  [] 140  Resp:  [21-41] 32  SpO2:  [83 %-100 %] 100 %  BP: ()/(51-81) 128/62     Weight: 68.5 kg (151 lb 0.2 oz) (08/20/20 0600)  Body mass index is 27.62 kg/m².  Body surface area is 1.73 meters squared.    I/O last 3 completed shifts:  In: 5797 [I.V.:3800; Blood:494]  Out: 7923 [Drains:3020; Other:4903]    Physical Exam  Vitals signs and nursing note reviewed.   Constitutional:       Appearance: She is not ill-appearing.      Interventions: She is sedated and intubated.   HENT:      Head: Normocephalic and atraumatic.      Mouth/Throat:      Mouth: Mucous membranes are dry.   Eyes:      General: No scleral icterus.        Right eye: No discharge.         Left eye: No discharge.      Extraocular Movements: Extraocular movements intact.      Conjunctiva/sclera: Conjunctivae normal.   Neck:      Musculoskeletal: Normal range of motion and neck supple.   Cardiovascular:      Rate and Rhythm: Regular rhythm. Tachycardia present.      Pulses: Normal pulses.      Heart sounds: Normal heart sounds.   Pulmonary:      Effort: Pulmonary effort is normal. No respiratory distress. She is intubated.      Breath sounds: No rales.   Abdominal:      General: Bowel sounds are normal.      Palpations: Abdomen is soft.   Musculoskeletal:      Right lower leg: Edema present.      Left lower leg: Edema present.   Skin:     General: Skin is  warm and dry.      Findings: No bruising or rash.   Neurological:      General: No focal deficit present.      Mental Status: She is alert and oriented to person, place, and time.   Psychiatric:         Mood and Affect: Mood normal.         Behavior: Behavior normal.         Significant Labs:  CBC:   Recent Labs   Lab 08/21/20  0332   WBC 44.59*   RBC 2.47*   HGB 7.5*   HCT 22.5*      MCV 91   MCH 30.4   MCHC 33.3     CMP:   Recent Labs   Lab 08/21/20  0332   *  134*   CALCIUM 7.9*  7.9*   ALBUMIN 1.4*  1.4*   PROT 5.5*     140   K 4.0  4.0   CO2 24  24     105   BUN 12  12   CREATININE 1.1  1.1   ALKPHOS 124   ALT 24   AST 34   BILITOT 0.6            Assessment/Plan:     Acute renal failure with tubular necrosis  oliguric stage 3 isd with ischemic atn likely secondary to septic shock  unknown bl cr  muddy brown casts on microscopy              08/21:  SLED overnight, increased drain output, net negative 2.1L/24h.  Tachycardic with low CVPs    Assessment:   -Hold RRT today.  Plans for OR per primary team.  -recheck labs this afternoon  -if electrolytes stable, plan to hold RRT and re-evaluate in AM  - Straight I/Os and daily weights  - Avoid nephrotoxins           Momo Iyer NP  Nephrology  [facility]

## 2020-08-21 NOTE — PROGRESS NOTES
Occupational Therapy      Jaquan Farmer   MRN: [id number]         Pt to OR for hematoma evacuation.  Please re-consult when pt is medically stable.    CHRISTIANO Solo  8/21/2020

## 2020-08-21 NOTE — ASSESSMENT & PLAN NOTE
oliguric stage 3 sid with ischemic atn likely secondary to septic shock  unknown bl cr  muddy brown casts on microscopy              08/21:  SLED overnight, increased drain output, net negative 2.1L/24h.  Tachycardic with low CVPs    Assessment:   -Hold RRT today.  Plans for OR per primary team.  -recheck labs this afternoon  -if electrolytes stable, plan to hold RRT and re-evaluate in AM  - Straight I/Os and daily weights  - Avoid nephrotoxins

## 2020-08-21 NOTE — SUBJECTIVE & OBJECTIVE
Interval History:   Patient extubated yesterday  Received 1pRBC overnight   Stable.       Medications:  Continuous Infusions:   lactated ringers 5 mL/hr at 08/16/20 0600    TPN ADULT CENTRAL LINE CUSTOM 45 mL/hr at 08/21/20 1000    TPN ADULT CENTRAL LINE CUSTOM       Scheduled Meds:   famotidine (PF)  20 mg Intravenous Daily    fat emulsion 20%  250 mL Intravenous Daily    fluconazole (DIFLUCAN) IVPB  200 mg Intravenous Q24H    heparin (porcine)  5,000 Units Subcutaneous Q8H    lorazepam  1 mg Intravenous Once    miconazole NITRATE 2 %   Topical (Top) BID    piperacillin-tazobactam (ZOSYN) IVPB  4.5 g Intravenous Q12H    sodium chloride 0.9%  1,000 mL Intravenous Once     PRN Meds:sodium chloride, calcium gluconate IVPB, calcium gluconate IVPB, calcium gluconate IVPB, HYDROmorphone, melatonin, metoprolol, ondansetron     Review of patient's allergies indicates:   Allergen Reactions    Latex      Objective:     Vital Signs (Most Recent):  Temp: 100 °F (37.8 °C) (08/21/20 1044)  Pulse: (!) 148 (08/21/20 1102)  Resp: (!) 28 (08/21/20 1102)  BP: 130/66 (08/21/20 1102)  SpO2: 99 % (08/21/20 1102) Vital Signs (24h Range):  Temp:  [98.5 °F (36.9 °C)-100 °F (37.8 °C)] 100 °F (37.8 °C)  Pulse:  [] 148  Resp:  [22-41] 28  SpO2:  [83 %-100 %] 99 %  BP: ()/(51-81) 130/66     Weight: 68.5 kg (151 lb 0.2 oz)  Body mass index is 27.62 kg/m².    Intake/Output - Last 3 Shifts       08/19 0700 - 08/20 0659 08/20 0700 - 08/21 0659 08/21 0700 - 08/22 0659    I.V. (mL/kg) 1692 (24.7) 2728 (39.8) 1435 (20.9)    Blood 810.3 494     NG/GT       IV Piggyback   1000    TPN 1097 980     Total Intake(mL/kg) 3599.3 (52.5) 4202 (61.3) 2435 (35.5)    Drains 1163 1720 1160    Other 1425 4399 504    Stool  0 0    Chest Tube 8 0     Total Output 2596 6119 1664    Net +1003.3 -1917 +771           Stool Occurrence  1 x 1 x          Physical Exam  Constitutional:       Interventions: She is sedated and intubated.   HENT:       Head: Normocephalic.   Cardiovascular:      Rate and Rhythm: Regular rhythm. Tachycardia present.      Comments: Improving  Pulmonary:      Effort: Pulmonary effort is normal. She is intubated.      Breath sounds: Normal breath sounds.   Abdominal:      Comments: R abdomen GODFREY x2 with SS fluid present  L subcutaneous GODFREY with minimal bloody drainage present  7 x 5 area of firm swelling, left of midline adjacent to umbilicus   Musculoskeletal:         General: Swelling present.      Right lower leg: Edema present.      Left lower leg: Edema present.   Skin:     General: Skin is warm and dry.   Neurological:      Comments: Following commands on precedex and fentanyl         Significant Labs:  Reviewed    Significant Diagnostics:  Reviewed

## 2020-08-21 NOTE — SUBJECTIVE & OBJECTIVE
Interval History/Significant Events:   Extubated successfully yesterday.   NAEON. Hgb dropped requiring 1u pRBCs yesterday.   Hematoma expanding in subcutaneous tissue with drainage from midline staples that is bloody.     Objective:     Vital Signs (Most Recent):  Temp: 99.6 °F (37.6 °C) (08/21/20 0300)  Pulse: (!) 138 (08/21/20 0615)  Resp: (!) 33 (08/21/20 0615)  BP: (!) 126/59 (08/21/20 0615)  SpO2: 100 % (08/21/20 0615) Vital Signs (24h Range):  Temp:  [98.5 °F (36.9 °C)-99.8 °F (37.7 °C)] 99.6 °F (37.6 °C)  Pulse:  [] 138  Resp:  [21-41] 33  SpO2:  [83 %-100 %] 100 %  BP: ()/(51-81) 126/59     Weight: 68.5 kg (151 lb 0.2 oz)  Body mass index is 27.62 kg/m².    Intake/Output Summary (Last 24 hours) at 8/21/2020 0719  Last data filed at 8/21/2020 0600  Gross per 24 hour   Intake 4202 ml   Output 5874 ml   Net -1672 ml     Physical Exam  Vitals signs reviewed.   HENT:      Head: Normocephalic and atraumatic.   Eyes:      Extraocular Movements: Extraocular movements intact.   Pulmonary:      Comments: On 3L NC   Abdominal:      General: There is distension.      Tenderness: There is no abdominal tenderness. There is no guarding or rebound.      Comments: Hematoma expanded since yesterday with bloody drainage from midline staples    Skin:     General: Skin is warm and dry.   Neurological:      General: No focal deficit present.      Mental Status: She is alert.       Vents:  Extubated yesterday     Lines/Drains/Airways     Central Venous Catheter Line            Trialysis (Dialysis) Catheter 08/13/20 2230 right internal jugular 7 days          Drain                 NG/OG Tube 08/06/20 nasogastric Left nostril 15 days         Closed/Suction Drain 08/13/20 1659 Right;Inferior Abdomen Bulb 19 Fr. 7 days         Closed/Suction Drain 08/13/20 1659 Right;Superior Abdomen Bulb 19 Fr. 7 days         Gastrostomy/Enterostomy 08/15/20 0916 Gastrostomy tube w/ balloon LUQ decompression 5 days         Closed/Suction  Drain 08/18/20 1239 Medial Abdomen 19 Fr. 2 days          Peripheral Intravenous Line                 Peripheral IV - Single Lumen 08/19/20 0930 20 G Anterior;Distal;Left Wrist 1 day         Peripheral IV - Single Lumen 08/20/20 0430 20 G Anterior;Right Forearm 1 day              Significant Labs:  CBC/Anemia Profile:  Recent Labs   Lab 08/20/20 2014 08/21/20  0015 08/21/20  0332   WBC 41.09* 47.71* 44.59*   HGB 6.8* 8.1* 7.5*   HCT 20.7* 24.3* 22.5*    203 207   MCV 92 91 91   RDW 14.6* 14.1 14.3     Chemistries:  Recent Labs   Lab 08/20/20 1600 08/20/20  2202 08/21/20  0332     138 139  139 140  140   K 4.4  4.4 4.4  4.4 4.0  4.0     106 106  106 105  105   CO2 21*  21* 22*  22* 24  24   BUN 45*  45* 23*  23* 12  12   CREATININE 3.3*  3.3* 1.9*  1.9* 1.1  1.1   CALCIUM 8.4*  8.4* 8.1*  8.1* 7.9*  7.9*   ALBUMIN 1.3*  1.3* 1.3*  1.3* 1.4*  1.4*   PROT 5.4* 5.5* 5.5*   BILITOT 0.6 0.6 0.6   ALKPHOS 123 127 124   ALT 23 25 24   AST 31 34 34   MG 2.8* 2.3 2.0   PHOS 4.1 2.8 2.2*     Significant Imaging:  I have reviewed and interpreted all pertinent imaging results/findings within the past 24 hours.

## 2020-08-21 NOTE — PLAN OF CARE
"      SICU PLAN OF CARE NOTE       Dx: Ischemic Duodenum     Goals of Care: MAP >65, maintain SpO2 on 3L NC    Vital Signs: BP (!) 106/52   Pulse (!) 116   Temp 100.2 °F (37.9 °C) (Oral)   Resp (!) 26   Ht 5' 2" (1.575 m)   Wt 69.5 kg (153 lb 3.5 oz)   SpO2 100%   Breastfeeding No   BMI 28.02 kg/m²      Exam: appears acutely ill, well developed, mildly obese     Cardiac: Sinus Tachycardia, Metoprolol given IVP x1 for sustained HR >140     Resp: 3L NC, frequent oral suctioning performed by patient independently     Neuro: follows all commands, moves all extremities, AAOx4     Gtts: TPN @ 45     Urine Output: Anuric, no jimenez present, CRRT SLED running for 12 hrs     Drains:   R GODFREY #1 (superior):75cc/shift ; tan, serous thick drainage  R GODFREY #2 (inferior): 215 cc/shift; serosanguinous thin drainage  L GODFREY #3 (medial):40cc/shift; sanguinous drainage   G tube to gravity: 750 cc/shift; green, brown thin drainage  L NGT: 0cc/shift     Diet: NPO and TPN     Labs/Accuchecks: CMP, ical, accuchecks Q6h, daily CBC, all trended and replaced accordingly     Skin: GODFREY drain #1 and #2 guaze dressing changed, sites remain CDI w scant drainage at the site, GODFREY #3 continues to ooze sanguinous drainage at the site, Abd pad in place for excess drainage and site remains WILFRED, all pressure points protected w heels elevated, pt turned Q2h as tolerated, sacrum CDI foam dressing in place w no breakdown noted, will continue to monitor all dressings for signs of bleeding, bed plugged into wall and no new injuries noted this shift.     Shift Events: VSS at this time, MD called to bedside for increased swelling,asymmetrical appearance on L side of abdomen above GODFREY drain #3, and saturated sheets/ blue pads x2 with bloody drainage from mid abd incision site. Abdomen became more swollen, taut and firm throughout shift, MD aware, labs sent, 1 unit PRBC given. Area marked and mid abd dressing removed by MD, all staples intact, pt monitored for " signs of bleeding.

## 2020-08-21 NOTE — SUBJECTIVE & OBJECTIVE
Interval History:   12 hour SLED overnight, discontinued this morning.  Net negative 2.4l/24h with abdominal drains increasing to 2.4L/24h.  Plans for OR today for abdominal washout due to hematoma.  Tachycardic on examination with low CVPs.    Review of patient's allergies indicates:   Allergen Reactions    Latex      Current Facility-Administered Medications   Medication Frequency    0.9%  NaCl infusion (CRRT USE ONLY) Continuous    0.9%  NaCl infusion (for blood administration) Q24H PRN    0.9%  NaCl infusion (for blood administration) Q24H PRN    calcium gluconate 1g in dextrose 5% 100mL (ready to mix system) PRN    calcium gluconate 2 g in dextrose 5 % 100 mL IVPB PRN    calcium gluconate 3 g in dextrose 5 % 100 mL IVPB PRN    dexmedetomidine (PRECEDEX) 400mcg/100mL 0.9% NaCL infusion Continuous    famotidine (PF) injection 20 mg Daily    fat emulsion 20% infusion 250 mL Daily    fentaNYL 2500 mcg in 0.9% sodium chloride 250 mL infusion premix (titrating) Continuous    fluconazole (DIFLUCAN) IVPB 200 mg 100 mL Q24H    heparin (porcine) injection 5,000 Units Q8H    HYDROmorphone injection 0.5 mg Q4H PRN    lactated ringers infusion Continuous    magnesium sulfate 2g in water 50mL IVPB (premix) PRN    melatonin tablet 3 mg Nightly PRN    metoprolol injection 5 mg Q4H PRN    miconazole NITRATE 2 % top powder BID    midazolam (VERSED) 1 mg/mL injection 1 mg Q4H PRN    ondansetron injection 4 mg Once PRN    piperacillin-tazobactam 4.5 g in sodium chloride 0.9% 100 mL IVPB (ready to mix system) Q12H    sodium phosphate 20.01 mmol in dextrose 5 % 250 mL IVPB PRN    sodium phosphate 30 mmol in dextrose 5 % 250 mL IVPB PRN    sodium phosphate 39.99 mmol in dextrose 5 % 250 mL IVPB PRN    TPN ADULT CENTRAL LINE CUSTOM Continuous    vasopressin (PITRESSIN) 0.2 Units/mL in dextrose 5 % 100 mL infusion Continuous       Objective:     Vital Signs (Most Recent):  Temp: 99.4 °F (37.4 °C) (08/21/20  0700)  Pulse: (!) 140 (08/21/20 0800)  Resp: (!) 32 (08/21/20 0800)  BP: 128/62 (08/21/20 0800)  SpO2: 100 % (08/21/20 0800)  O2 Device (Oxygen Therapy): nasal cannula (08/21/20 0800) Vital Signs (24h Range):  Temp:  [98.5 °F (36.9 °C)-99.8 °F (37.7 °C)] 99.4 °F (37.4 °C)  Pulse:  [] 140  Resp:  [21-41] 32  SpO2:  [83 %-100 %] 100 %  BP: ()/(51-81) 128/62     Weight: 68.5 kg (151 lb 0.2 oz) (08/20/20 0600)  Body mass index is 27.62 kg/m².  Body surface area is 1.73 meters squared.    I/O last 3 completed shifts:  In: 5797 [I.V.:3800; Blood:494]  Out: 7923 [Drains:3020; Other:4903]    Physical Exam  Vitals signs and nursing note reviewed.   Constitutional:       Appearance: She is not ill-appearing.      Interventions: She is sedated and intubated.   HENT:      Head: Normocephalic and atraumatic.      Mouth/Throat:      Mouth: Mucous membranes are dry.   Eyes:      General: No scleral icterus.        Right eye: No discharge.         Left eye: No discharge.      Extraocular Movements: Extraocular movements intact.      Conjunctiva/sclera: Conjunctivae normal.   Neck:      Musculoskeletal: Normal range of motion and neck supple.   Cardiovascular:      Rate and Rhythm: Regular rhythm. Tachycardia present.      Pulses: Normal pulses.      Heart sounds: Normal heart sounds.   Pulmonary:      Effort: Pulmonary effort is normal. No respiratory distress. She is intubated.      Breath sounds: No rales.   Abdominal:      General: Bowel sounds are normal.      Palpations: Abdomen is soft.   Musculoskeletal:      Right lower leg: Edema present.      Left lower leg: Edema present.   Skin:     General: Skin is warm and dry.      Findings: No bruising or rash.   Neurological:      General: No focal deficit present.      Mental Status: She is alert and oriented to person, place, and time.   Psychiatric:         Mood and Affect: Mood normal.         Behavior: Behavior normal.         Significant Labs:  CBC:   Recent Labs    Lab 08/21/20  0332   WBC 44.59*   RBC 2.47*   HGB 7.5*   HCT 22.5*      MCV 91   MCH 30.4   MCHC 33.3     CMP:   Recent Labs   Lab 08/21/20  0332   *  134*   CALCIUM 7.9*  7.9*   ALBUMIN 1.4*  1.4*   PROT 5.5*     140   K 4.0  4.0   CO2 24  24     105   BUN 12  12   CREATININE 1.1  1.1   ALKPHOS 124   ALT 24   AST 34   BILITOT 0.6

## 2020-08-21 NOTE — PROGRESS NOTES
"[facility]  Adult Nutrition  Progress Note    SUMMARY       Recommendations    1.) Continue TPN with 90g of amino acids and 175g of dextrose to provide 955kcal (99% of EEN with propofol).   2.) Once GI function improves; initiate EN of Peptamen Intense VHP; goal rate at 40mL/hr to provide 960kcal, 88g of protein, 806mL of free water. Initiate at 10mL/hr and advance [id number] as tolerated. Hold EN for residuals >500cc. 3.) Monitor and replace electrolytes as needed.    Goals: 1.) Pt to meet >75% of estimated energy and protein needs over the course of 7 days.  Nutrition Goal Status: goal met  Communication of RD Recs: reviewed with RN(POC)    Reason for Assessment    Reason For Assessment: RD follow-up  Diagnosis: (Duodenum disorder)  Relevant Medical History: HTN, anxiety, respiratory distress, leukocytosis, sepsis, pulmonary HTN  Interdisciplinary Rounds: did not attend  General Information Comments: Pt TPN at 45cc/hr. Pt extubated, however pt is scheduled to go to OR today for abdominal washout. NGT to LIWS. G tube with 650cc of output. GI- LBM 8/21. TPN is at 45mL/hr. NFPE completed 8/17, pt meets ASPEN guidelines for severe PCM due to acute illness.   Nutrition Discharge Planning: Unable to determine at this time.    Nutrition Risk Screen    Nutrition Risk Screen: tube feeding or parenteral nutrition    Nutrition/Diet History    Spiritual, Cultural Beliefs, Caodaism Practices, Values that Affect Care: no  Food Allergies: NKFA  Factors Affecting Nutritional Intake: altered gastrointestinal function, NPO    Anthropometrics    Temp: 100 °F (37.8 °C)  Height Method: Stated(from outside records)  Height: 5' 2" (157.5 cm)  Height (inches): 62 in  Weight Method: Bed Scale  Weight: 68.5 kg (151 lb 0.2 oz)  Weight (lb): 151.02 lb  Ideal Body Weight (IBW), Female: 110 lb  % Ideal Body Weight, Female (lb): 155.73 %  BMI (Calculated): 27.6  BMI Grade: 25 - 29.9 - overweight  Usual Body Weight (UBW), " kg: (No wt history)       Lab/Procedures/Meds    Pertinent Labs Reviewed: reviewed  Pertinent Labs Comments: WBC 44.59, Hgb 7.5, Hct 22.5, Glucose 134, Ca 7.9, Phosphorus 2.2, Alb 1.4  Pertinent Medications Reviewed: reviewed  Pertinent Medications Comments: Albumin, famotidine, heparin, piperacillin, precedex, fentanyl    Physical Findings/Assessment     Edema 1+ generalized    Estimated/Assessed Needs    Weight Used For Calorie Calculations: 73 kg (160 lb 15 oz)  Energy Calorie Requirements (kcal): 1460  Energy Need Method: Kcal/kg(20kcal/kg)  Protein Requirements:  g (1.2-1.5g/kg)  Weight Used For Protein Calculations: 73 kg (160 lb 15 oz)  Fluid Requirements (mL): 1mL/kcal or per MD recommendations  Estimated Fluid Requirement Method: RDA Method  RDA Method (mL): 1460         Nutrition Prescription Ordered    Current Diet Order: NPO  Current Nutrition Support Formula Ordered: (Custom TPN 15% Amino Acids; 70% dextrose)  Current Nutrition Support Rate Ordered: 42 (ml)  Current Nutrition Support Frequency Ordered: mL/hr    Evaluation of Received Nutrient/Fluid Intake    Parenteral Calories (kcal): 1471  Parenteral Protein (gm): 90  Parenteral Fluid (mL): 1080  Lipid Calories (kcals): 500 kcals  GIR (Glucose Infusion Rate) (mg/kg/min): 1.8 mg/kg/min  Other Calories (kcal): 0  Total Calories (kcal): 1471  Total Calories (kcal/kg): 21  % Kcal Needs: 100  I/O: I: 4832; O: 7148; -2.4L since admission  Energy Calories Required: meeting needs  Protein Required: meeting needs  Fluid Required: meeting needs  Tolerance: tolerating  % Intake of Estimated Energy Needs: 100  % Meal Intake: 0    Nutrition Risk    Level of Risk/Frequency of Follow-up: high , 2x weekly    Assessment and Plan    Nutrition Problem  Malnutrition (severe) due to acute illness     Related to (etiology):   NPO     Signs and Symptoms (as evidenced by):   NPO (8/12) for 5 days.  Severe muscle wasting of temple, clavicle  Severe fat wasting of  orbital     Interventions(treatment strategy):  1.) Collaboration with other providers  2.) Parenteral nutrition     Nutrition Diagnosis Status:   ongoing        Monitor and Evaluation    Food and Nutrient Intake: parenteral nutrition intake  Food and Nutrient Adminstration: enteral and parenteral nutrition administration  Anthropometric Measurements: weight, weight change  Biochemical Data, Medical Tests and Procedures: electrolyte and renal panel, gastrointestinal profile  Nutrition-Focused Physical Findings: overall appearance, skin     Malnutrition Assessment  Malnutrition Type: acute illness or injury              Orbital Region (Subcutaneous Fat Loss): severe depletion   [place] Region (Muscle Loss): severe depletion  Clavicle Bone Region (Muscle Loss): severe depletion                 Nutrition Follow-Up    RD Follow-up?: Yes

## 2020-08-21 NOTE — ASSESSMENT & PLAN NOTE
40 yo F s/p antrectomy with BII reconstruction c/b duodenal necrosis requiring ex lap, washout, drainage c/b aspiration event. Now with severe sepsis and ARDS     .Neuro:  - Pain controlled on fentanyl gtt  - Follows commands     Resp:  - PRN breathing treatments  - Daily CXR  - Right chest tube with no output     CV:  - MAP goal >65  - Systolic <160  - Levo and Vaso gtt - off     Heme:  - Hgb/Hct 6.5; transfused 1upRBCs  - new hematoma in RLQ with acute drop in GODFREY output from that site; expanding, general surgery team aware  - 1u pRBCs overnight     ID:  - WBC 44 => up trending   - Zosyn, Fluconazole  - Blood cultures and BAL taken 8/19 with NGTD and no abnormal findings     Renal:  - Oliguric on arrival; Cr 1.9 at that time   - SCUF yesterday  - Trend BUN/Cr; 12/1.1 today   - Monitor I/Os     FEN/GI:  - NPO  - TPN  - NGT to LIWS   - Maintain drains to bulb suction; LLQ with acute drop in drainage and accumulation in SQ tissue  - GI ppx  - Replace electrolytes as needed to keep K>4, Mg>2     Endo:  - Monitor BG     Dispo:  - Continue SICU care

## 2020-08-21 NOTE — ANESTHESIA PREPROCEDURE EVALUATION
[facility]  Anesthesia Pre-Operative Evaluation         Patient Name: Jaquan Farmer  YOB: 1980  MRN: [id number]    SUBJECTIVE:     Pre-operative evaluation for Procedure(s) (LRB):  EXPLORATION, WOUND (N/A)     08/21/2020    Jaquan Farmer is a 39 y.o. female w/ a recent history of antrectomy with BII reconstruction for ulcer disease at outside hospital. 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 Patient also reportedly aspirated on induction in the OR prior to this, resulting in severe pulmonary edema and hypoxia. [place] [facility] [place]. She arrived as a direct SICU admit on near maximal vent settings and requiring low dose vasopressors with HR in the upper 140s. Her midline laparotomy is closed with 4 Navdeep drains in place draining bilious output. Has had 3x ex-lap since admission (8/13,15,18/2020). Plan for wound exploration with hematoma evacuation that is superficial to the fascia.   Patient was weaned from the vent and extubated successfully yesterday. Currently not requiring any vasopressors.    Patient now presents for the above procedure(s).    ADDENDUM Sebastian - after speaking with the patient and with the surgical team, request for not intubating. She is NPO per guidelines but given all of her recent history, aspiration is still a concern of mine. We agreed to proceed with Ketamine and patient explained the side effects and agrees to try ketamine prior to general with endotracheal      LDA:   PICC Double Lumen 08/08/20 1400 right brachial (Active)   Verification by X-ray Yes 08/17/20 1100   Site Assessment No drainage;No redness;No swelling;No warmth 08/17/20 1100   Line Securement Device Secured with sutures 08/17/20 1100   Dressing Type Biopatch in place;Central line dressing with pants 08/17/20 1100   Dressing Status Clean;Intact;Dry 08/17/20 1100   Dressing Intervention  Integrity maintained 08/17/20 1100   Date on Dressing 08/12/20 08/17/20 1100   Dressing Due to be Changed 08/19/20 08/17/20 1100   Left Lumen Patency/Care Blood return present;Flushed w/o difficulty;Infusing 08/17/20 1100   Right Lumen Patency/Care Blood return present;Flushed w/o difficulty;Infusing 08/17/20 1100   Waveform Other (Comments) 08/17/20 1100   Line Necessity Review Longterm central access required 08/17/20 1100   Number of days: 8       Trialysis (Dialysis) Catheter 08/13/20 2230 right internal jugular (Active)   Verification by X-ray Yes 08/17/20 1100   Site Assessment No drainage;No redness;No warmth;No swelling 08/17/20 1100   Line Securement Device Secured with sutures 08/17/20 1100   Dressing Type Biopatch in place;Central line dressing with pants 08/17/20 1100   Dressing Status Clean;Dry;Intact 08/17/20 1100   Dressing Intervention Integrity maintained 08/17/20 1100   Date on Dressing 08/13/20 08/17/20 1100   Dressing Due to be Changed 08/20/20 08/17/20 1100   Venous Patency/Care flushed w/o difficulty;infusing 08/17/20 1100   Arterial Patency/Care infusing;blood return present 08/17/20 1100   Distal Patency/Care flushed w/o difficulty;infusing 08/17/20 1100   Flows Good 08/17/20 1100   Waveform Normal 08/17/20 1100   Line Necessity Review CRRT/HD;Medication caustic to vasculature 08/17/20 1100   Number of days: 3            Peripheral IV - Single Lumen 08/12/20 18 G Right Forearm (Active)   Site Assessment Clean;Dry;No redness;No swelling;Intact 08/17/20 1100   Line Status Infusing 08/17/20 1100   Dressing Status Clean;Intact;Dry 08/17/20 1100   Dressing Intervention Integrity maintained 08/17/20 1100   Dressing Change Due 08/16/20 08/17/20 1100   Site Change Due 08/16/20 08/17/20 0700   Reason Not Rotated Poor venous access 08/17/20 1100   Number of days: 5            Peripheral IV - Single Lumen 08/16/20 1300 20 G Left Forearm (Active)   Site Assessment Clean;Intact;Dry;No redness;No swelling  08/17/20 1100   Line Status Blood return noted;Flushed;Saline locked 08/17/20 1100   Dressing Status Clean;Intact;Dry 08/17/20 1100   Dressing Intervention Integrity maintained 08/17/20 1100   Dressing Change Due 08/20/20 08/17/20 1100   Site Change Due 08/20/20 08/17/20 0700   Reason Not Rotated Not due 08/17/20 1100   Number of days: 0            Chest Tube 08/14/20 0007 Right Midaxillary 14 Fr. (Active)   Chest Tube WDL ex 08/17/20 0700   Function To water seal 08/17/20 1100   Air Leak/Fluctuation connections tightened;dependent drainage cleared;fluctuation present 08/17/20 1100   Safety all tubing connections taped;all connections secured;suction checked 08/17/20 1100   Securement tubing secured to body distal to insertion site w/ tape 08/17/20 1100   Patency Intervention Tip/tilt 08/17/20 1100   Drainage Description Serous 08/17/20 1100   Dressing Appearance occlusive gauze dressing intact;clean and dry 08/17/20 1100   Dressing Care dressing reinforced 08/17/20 1100   Left Subcutaneous Emphysema none present 08/17/20 1100   Right Subcutaneous Emphysema none present 08/17/20 1100   Site Assessment Not assessed;Other (Comment) 08/17/20 1100   Surrounding Skin Unable to view 08/17/20 1100   Output (mL) 0 mL 08/17/20 1100   Number of days: 3            Closed/Suction Drain 08/13/20 1659 Right;Superior Abdomen Bulb 19 Fr. (Active)   Site Description Unable to view 08/17/20 1100   Dressing Type Gauze 08/17/20 1100   Dressing Status Clean;Intact;Dry 08/17/20 1100   Dressing Intervention Integrity maintained 08/17/20 1100   Drainage Serosanguineous 08/17/20 1100   Status To bulb suction 08/17/20 1100   Output (mL) 0 mL 08/17/20 1100   Number of days: 3            Closed/Suction Drain 08/13/20 1659 Right;Inferior Abdomen Bulb 19 Fr. (Active)   Site Description Unable to view 08/17/20 1100   Dressing Type Gauze 08/17/20 1100   Dressing Status Clean;Intact;Dry 08/17/20 1100   Dressing Intervention Integrity maintained  08/17/20 1100   Drainage Serosanguineous 08/17/20 1100   Status To bulb suction 08/17/20 1100   Output (mL) 0 mL 08/17/20 1100   Number of days: 3            NG/OG Tube 08/06/20 nasogastric Left nostril (Active)   Placement Check placement verified by x-ray;placement verified by aspirate characteristics 08/17/20 1100   Advancement advanced manually 08/12/20 1945   Distal Tube Length (cm) 60 08/16/20 1905   Tolerance no signs/symptoms of discomfort 08/17/20 1100   Securement secured to nostril center 08/17/20 1100   Clamp Status/Tolerance unclamped 08/17/20 1100   Suction Setting/Drainage Method suction at;low;intermittent setting 08/17/20 1100   Insertion Site Appearance no redness, warmth, tenderness, skin breakdown, drainage 08/17/20 1100   Drainage Green 08/17/20 1100   Flush/Irrigation flushed w/;no resistance met;water 08/17/20 1100   Intake (mL) 30 mL 08/14/20 0500   Tube Output(mL)(Include Discarded Residual) 0 mL 08/16/20 1500   Number of days: 11            Gastrostomy/Enterostomy 08/15/20 0916 Gastrostomy tube w/ balloon LUQ decompression (Active)   Securement sutured to abdomen 08/17/20 1100   Suction Setting/Drainage Method dependent drainage 08/17/20 1100   Drainage thin;green 08/17/20 1100   Clamp Status/Tolerance unclamped 08/17/20 1100   Dressing dry and intact 08/17/20 1100   Insertion Site dry;suture(s) 08/17/20 1100   Site Care site cleansed w/ sterile normal saline;sterile precut T-slit dressing applied 08/17/20 1100   Dressing Change Due 08/18/20 08/17/20 0315   Tube Output(mL)(Include Discarded Residual) 100 mL 08/17/20 0400   Number of days: 2        Prev airway:     Mask Ventilation:  N/a    Attempts:  1    Attempted By:  CRNA    Method of Intubation:  Other (see comments) (Opbeat airway exchanger)    Blade:  Francois 3    Laryngeal View Grade: Grade I - full view of chords      Difficult Airway Encountered?: No      Complications:  None    Airway Device:  Oral endotracheal tube    Airway Device  Size:  7.5    Style/Cuff Inflation:  Cuffed (inflated to minimal occlusive pressure)    Tube secured:  22    Secured at:  The lips    Placement Verified By:  Capnometry    Complicating Factors:  None    Findings Post-Intubation:  BS equal bilateral and atraumatic/condition of teeth unchanged    Drips: None documented.       Patient Active Problem List   Diagnosis    Duodenum disorder    Severe sepsis    Acute renal failure with tubular necrosis    Metabolic acidosis    Ischemic necrosis of small bowel       Review of patient's allergies indicates:   Allergen Reactions    Latex        Current Outpatient Medications:  No current facility-administered medications for this visit.   No current outpatient medications on file.    Facility-Administered Medications Ordered in Other Visits:     0.9%  NaCl infusion (for blood administration), , Intravenous, Q24H PRN, Jadon Tran DO    0.9%  NaCl infusion (for blood administration), , Intravenous, Q24H PRN, Chico Ledezma MD    calcium gluconate 1g in dextrose 5% 100mL (ready to mix system), 1 g, Intravenous, PRN, Donis Roa MD, 1 g at 08/21/20 0739    calcium gluconate 2 g in dextrose 5 % 100 mL IVPB, 2 g, Intravenous, PRN, Donis Roa MD, 2 g at 08/17/20 2240    calcium gluconate 3 g in dextrose 5 % 100 mL IVPB, 3 g, Intravenous, PRN, Donis Roa MD    famotidine (PF) injection 20 mg, 20 mg, Intravenous, Daily, Thu Wilson MD, 20 mg at 08/21/20 0910    fat emulsion 20% infusion 250 mL, 250 mL, Intravenous, Daily, Serjio Garcia MD, Last Rate: 20.8 mL/hr at 08/21/20 0700    fluconazole (DIFLUCAN) IVPB 200 mg 100 mL, 200 mg, Intravenous, Q24H, Nica Pichardo MD, Last Rate: 100 mL/hr at 08/20/20 2300, 200 mg at 08/20/20 2300    heparin (porcine) injection 5,000 Units, 5,000 Units, Subcutaneous, Q8H, Neal Singletary MD, 5,000 Units at 08/21/20 0539    HYDROmorphone injection 0.5 mg, 0.5 mg, Intravenous,  Q4H PRN, Thu Wilson MD, 0.5 mg at 08/19/20 1326    lactated ringers infusion, , Intravenous, Continuous, Neal Singletary MD, Last Rate: 5 mL/hr at 08/16/20 0600    melatonin tablet 3 mg, 3 mg, Per NG tube, Nightly PRN, Thu Wilson MD    metoprolol injection 5 mg, 5 mg, Intravenous, Q4H PRN, Thu Wilson MD    miconazole NITRATE 2 % top powder, , Topical (Top), BID, Jadon Tran DO    midazolam (VERSED) 1 mg/mL injection 1 mg, 1 mg, Intravenous, Q4H PRN, Thu Wilson MD, 1 mg at 08/18/20 2224    ondansetron injection 4 mg, 4 mg, Intravenous, Once PRN, Azar Desai MD    piperacillin-tazobactam 4.5 g in sodium chloride 0.9% 100 mL IVPB (ready to mix system), 4.5 g, Intravenous, Q12H, Nica Pichardo MD, Last Rate: 25 mL/hr at 08/21/20 0809, 4.5 g at 08/21/20 0809    TPN ADULT CENTRAL LINE CUSTOM, , Intravenous, Continuous, Serjiolilly Garcia MD, Last Rate: 45 mL/hr at 08/21/20 0700    Past Surgical History:   Procedure Laterality Date    APPLICATION OF WOUND VACUUM-ASSISTED CLOSURE DEVICE  8/13/2020    Procedure: APPLICATION, WOUND VAC WITH ABTHERA;  Surgeon: Donis Roa MD;  [facility];  Service: General;;    COLONOSCOPY      DUODENECTOMY  8/13/2020    Procedure: KOCHERIZATION OF DUODENUM, DUODENECTOMY;  Surgeon: Donis Roa MD;  Location: [facility] OR 2ND FLR;  Service: General;;    ESOPHAGOGASTRODUODENOSCOPY  01/23/2018    ESOPHAGOGASTRODUODENOSCOPY N/A 8/13/2020    Procedure: EGD (ESOPHAGOGASTRODUODENOSCOPY);  Surgeon: Donis Roa MD;  Location: [facility] OR 2ND FLR;  Service: General;  Laterality: N/A;    PERCUTANEOUS INSERTION OF GASTROSTOMY TUBE  8/15/2020    Procedure: INSERTION, GASTROSTOMY TUBE, PERCUTANEOUS;  Surgeon: Donis Roa MD;  Location: [facility] OR [address] [place] [address];  Service: General;;    TUBAL LIGATION         Social History     Socioeconomic History    Marital status: Unknown     Spouse name: Not on file     Number of children: Not on file    Years of education: Not on file    Highest education level: Not on file   Occupational History    Not on file   Social Needs    Financial resource strain: Not on file    Food insecurity     Worry: Not on file     Inability: Not on file    Transportation needs     Medical: Not on file     Non-medical: Not on file   Tobacco Use    Smoking status: Current Some Day Smoker     Packs/day: 0.25     Types: Cigarettes    Smokeless tobacco: Never Used   Substance and Sexual Activity    Alcohol use: Not Currently    Drug use: Not on file    Sexual activity: Not on file   Lifestyle    Physical activity     Days per week: Not on file     Minutes per session: Not on file    Stress: Not on file   Relationships    Social connections     Talks on phone: Not on file     Gets together: Not on file     Attends Mandaeism service: Not on file     Active member of club or organization: Not on file     Attends meetings of clubs or organizations: Not on file     Relationship status: Not on file   Other Topics Concern    Not on file   Social History Narrative    Not on file       OBJECTIVE:     Vital Signs Range (Last 24H):  Temp:  [36.9 °C (98.5 °F)-37.7 °C (99.8 °F)]   Pulse:  []   Resp:  [21-41]   BP: ()/(51-81)   SpO2:  [83 %-100 %]       Significant Labs:  Lab Results   Component Value Date    WBC 44.59 (H) 08/21/2020    HGB 7.5 (L) 08/21/2020    HCT 22.5 (L) 08/21/2020     08/21/2020    TRIG 255 (H) 08/15/2020    ALT 24 08/21/2020    AST 34 08/21/2020     08/21/2020     08/21/2020    K 4.0 08/21/2020    K 4.0 08/21/2020     08/21/2020     08/21/2020    CREATININE 1.1 08/21/2020    CREATININE 1.1 08/21/2020    BUN 12 08/21/2020    BUN 12 08/21/2020    CO2 24 08/21/2020    CO2 24 08/21/2020    INR 1.0 08/21/2020       Diagnostic Studies: No relevant studies.    EKG:   Results for orders placed or performed during the hospital encounter of  08/12/20   EKG 12-lead    Collection Time: 08/20/20  7:09 PM    Narrative    Test Reason : R00.0,    Vent. Rate : 131 BPM     Atrial Rate : 131 BPM     P-R Int : 100 ms          QRS Dur : 070 ms      QT Int : 276 ms       P-R-T Axes : 073 049 051 degrees     QTc Int : 407 ms    Sinus tachycardia with short IN  Possible Left atrial enlargement  Borderline Abnormal ECG  No previous ECGs available    Referred By: SONALI MACE           Confirmed By:        2D ECHO:  TTE:  No results found for this or any previous visit.    TOBIAS:  No results found for this or any previous visit.    ASSESSMENT/PLAN:                                                                                                                08/21/2020  Jaquan Farmer is a 39 y.o., female.    Pre-op Assessment    I have reviewed the Patient Summary Reports.    I have reviewed the NPO Status.   I have reviewed the Medications.     Review of Systems  Anesthesia Hx:  No problems with previous Anesthesia  History of prior surgery of interest to airway management or planning: Denies Family Hx of Anesthesia complications.   Denies Personal Hx of Anesthesia complications.   Social:  Non-Smoker, Smoker    Hematology/Oncology:     Oncology Normal    -- Anemia:   EENT/Dental:EENT/Dental Normal   Cardiovascular:   Hypertension    Pulmonary:   Pneumonia    Renal/:   Chronic Renal Disease, ARF    Hepatic/GI:   PUD,    Musculoskeletal:  Musculoskeletal Normal    Neurological:  Neurology Normal    Endocrine:  Endocrine Normal    Dermatological:  Skin Normal    Psych:  Psychiatric Normal           Physical Exam  General:  Well nourished    Airway/Jaw/Neck:  Airway Findings: Mouth Opening: Normal General Airway Assessment: Adult  Mallampati: II  TM Distance: 4 - 6 cm  Jaw/Neck Findings:  Neck ROM: Normal ROM       Chest/Lungs:  Chest/Lungs Findings: Rhonchi, Tachypnea     Heart/Vascular:  Heart Findings: Rate: Normal  Rhythm: Regular Rhythm  Sounds: Normal      Abdomen:  Abdomen Findings:  Surgical incision      Skin:  Skin Findings:  Surgical Incision, Recent     Mental Status:  Mental Status Findings:  Cooperative, Alert and Oriented         Anesthesia Plan  Type of Anesthesia, risks & benefits discussed:  Anesthesia Type:  general, MAC  Patient's Preference:   Intra-op Monitoring Plan: standard ASA monitors  Intra-op Monitoring Plan Comments:   Post Op Pain Control Plan: multimodal analgesia, IV/PO Opioids PRN and per primary service following discharge from PACU  Post Op Pain Control Plan Comments:   Induction:   IV  Beta Blocker:  Patient is not currently on a Beta-Blocker (No further documentation required).       Informed Consent: Patient understands risks and agrees with Anesthesia plan.  Questions answered. Anesthesia consent signed with patient.  ASA Score: 4     Day of Surgery Review of History & Physical: I have interviewed and examined the patient. I have reviewed the patient's H&P dated:  There are no significant changes.  H&P update referred to the surgeon.         Ready For Surgery From Anesthesia Perspective.

## 2020-08-21 NOTE — TRANSFER OF CARE
"Anesthesia Transfer of Care Note    Patient: Jaquan Farmer    Procedure(s) Performed: Procedure(s) (LRB):  EXPLORATION, WOUND (N/A)  EVACUATION, HEMATOMA (N/A)  APPLICATION, WOUND VAC    Patient location: PACU    Anesthesia Type: general    Transport from OR: Transported from OR on 2-3 L/min O2 by NC with adequate spontaneous ventilation    Post pain: adequate analgesia    Post assessment: no apparent anesthetic complications    Post vital signs: stable    Level of consciousness: responds to stimulation    Nausea/Vomiting: no nausea/vomiting    Complications: none    Transfer of care protocol was followed      Last vitals:   Visit Vitals  /68 (BP Location: Left arm, Patient Position: Lying)   Pulse (!) 141   Temp 37.8 °C (100 °F) (Oral)   Resp (!) 32   Ht 5' 2" (1.575 m)   Wt 68.5 kg (151 lb 0.2 oz)   SpO2 100%   Breastfeeding No   BMI 27.62 kg/m²     "

## 2020-08-21 NOTE — PT/OT/SLP PROGRESS
Physical Therapy      Patient Name:  Jaquan Farmer   MRN:  [id number]    Patient not seen today secondary to resting heart 146 and patient scheduled to go to OR today. Orders d/c'd as new orders will be required to evaluate. Will follow-up as new orders are placed after procedure.    Ellen Garcia, PT   8/21/2020

## 2020-08-21 NOTE — PROGRESS NOTES
[facility]  General Surgery  Progress Note    Subjective:     History of Present Illness:  Pt is a 40 yo F with recent history of antrectomy with BII reconstruction for ulcer disease at outside hospital. 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 Patient also reportedly aspirated on induction in the OR prior to this, resulting in severe pulmonary edema and hypoxia. Patient was transferred to [facility] [place] higher level of care. She arrived as a direct SICU admit on near maximal vent settings and requiring low dose vasopressors with HR in the upper 140s. Her midline laparotomy is closed with 4 Navdeep drains in place draining bilious output.     Post-Op Info:  Procedure(s) (LRB):  EXPLORATION, WOUND (N/A)   Day of Surgery     Interval History:   Patient extubated yesterday  Received 1pRBC overnight   Stable.       Medications:  Continuous Infusions:   lactated ringers 5 mL/hr at 08/16/20 0600    TPN ADULT CENTRAL LINE CUSTOM 45 mL/hr at 08/21/20 1000    TPN ADULT CENTRAL LINE CUSTOM       Scheduled Meds:   famotidine (PF)  20 mg Intravenous Daily    fat emulsion 20%  250 mL Intravenous Daily    fluconazole (DIFLUCAN) IVPB  200 mg Intravenous Q24H    heparin (porcine)  5,000 Units Subcutaneous Q8H    lorazepam  1 mg Intravenous Once    miconazole NITRATE 2 %   Topical (Top) BID    piperacillin-tazobactam (ZOSYN) IVPB  4.5 g Intravenous Q12H    sodium chloride 0.9%  1,000 mL Intravenous Once     PRN Meds:sodium chloride, calcium gluconate IVPB, calcium gluconate IVPB, calcium gluconate IVPB, HYDROmorphone, melatonin, metoprolol, ondansetron     Review of patient's allergies indicates:   Allergen Reactions    Latex      Objective:     Vital Signs (Most Recent):  Temp: 100 °F (37.8 °C) (08/21/20 1044)  Pulse: (!) 148 (08/21/20 1102)  Resp: (!) 28 (08/21/20 1102)  BP: 130/66 (08/21/20 1102)  SpO2: 99 % (08/21/20 1102) Vital Signs (24h  Range):  Temp:  [98.5 °F (36.9 °C)-100 °F (37.8 °C)] 100 °F (37.8 °C)  Pulse:  [] 148  Resp:  [22-41] 28  SpO2:  [83 %-100 %] 99 %  BP: ()/(51-81) 130/66     Weight: 68.5 kg (151 lb 0.2 oz)  Body mass index is 27.62 kg/m².    Intake/Output - Last 3 Shifts       08/19 0700 - 08/20 0659 08/20 0700 - 08/21 0659 08/21 0700 - 08/22 0659    I.V. (mL/kg) 1692 (24.7) 2728 (39.8) 1435 (20.9)    Blood 810.3 494     NG/GT       IV Piggyback   1000    TPN 1097 980     Total Intake(mL/kg) 3599.3 (52.5) 4202 (61.3) 2435 (35.5)    Drains 1163 1720 1160    Other 1425 4399 504    Stool  0 0    Chest Tube 8 0     Total Output 2596 6119 1664    Net +1003.3 -1917 +771           Stool Occurrence  1 x 1 x          Physical Exam  Constitutional:       Interventions: She is sedated and intubated.   HENT:      Head: Normocephalic.   Cardiovascular:      Rate and Rhythm: Regular rhythm. Tachycardia present.      Comments: Improving  Pulmonary:      Effort: Pulmonary effort is normal. She is intubated.      Breath sounds: Normal breath sounds.   Abdominal:      Comments: R abdomen GODFREY x2 with SS fluid present  L subcutaneous GODFREY with minimal bloody drainage present  7 x 5 area of firm swelling, left of midline adjacent to umbilicus   Musculoskeletal:         General: Swelling present.      Right lower leg: Edema present.      Left lower leg: Edema present.   Skin:     General: Skin is warm and dry.   Neurological:      Comments: Following commands on precedex and fentanyl         Significant Labs:  Reviewed    Significant Diagnostics:  Reviewed    Assessment/Plan:     Severe sepsis  38 yo F s/p antrectomy with BII reconstruction c/b duodenal necrosis requiring ex lap, washout, drainage c/b aspiration event. S/p duodenal resection with d-j anastomosis, g-j and abthera vac placement on 8/13 and washout with g-tube placement on 8/15.    - Continue ICU care  - OR today for washout   - Respiratory treatment q4h  - NPO  - TPN   - Morning  labs   - Trend CBC   - PT / OT         Chico Ledezma MD  General Surgery  [facility]

## 2020-08-21 NOTE — PROGRESS NOTES
[facility]  Critical Care - Surgery  Progress Note    Patient Name: Jaquan Farmer  MRN: [id number]  Admission Date: 8/12/2020  Hospital Length of Stay: 9 days  Code Status: Full Code  Attending Provider: Donis Roa MD  Primary Care Provider: Primary Doctor No   Principal Problem: Duodenum disorder    Subjective:     Hospital/ICU Course:  8/14 Patient had a line replaced along with central line placed, with complication of pneumothorax. Rt pigtail was placed. Patient tolerated complications well. Decreasing vent settings.  8/15 NAEO. Patient was taken back to OR early AM for washout. Possible closure 8/18. ETT exchanged in OR 7.0 -> 7.5  8/16 - NAEON. HDS. Intubated, sedated.   8/17 - NAEON. OR tomorrow for closure.  8/18 - Washed out and closed in OR    Interval History/Significant Events:   Extubated successfully yesterday.   NAEON. Hgb dropped requiring 1u pRBCs yesterday.   Hematoma expanding in subcutaneous tissue with drainage from midline staples that is bloody.     Objective:     Vital Signs (Most Recent):  Temp: 99.6 °F (37.6 °C) (08/21/20 0300)  Pulse: (!) 138 (08/21/20 0615)  Resp: (!) 33 (08/21/20 0615)  BP: (!) 126/59 (08/21/20 0615)  SpO2: 100 % (08/21/20 0615) Vital Signs (24h Range):  Temp:  [98.5 °F (36.9 °C)-99.8 °F (37.7 °C)] 99.6 °F (37.6 °C)  Pulse:  [] 138  Resp:  [21-41] 33  SpO2:  [83 %-100 %] 100 %  BP: ()/(51-81) 126/59     Weight: 68.5 kg (151 lb 0.2 oz)  Body mass index is 27.62 kg/m².    Intake/Output Summary (Last 24 hours) at 8/21/2020 0719  Last data filed at 8/21/2020 0600  Gross per 24 hour   Intake 4202 ml   Output 5874 ml   Net -1672 ml     Physical Exam  Vitals signs reviewed.   HENT:      Head: Normocephalic and atraumatic.   Eyes:      Extraocular Movements: Extraocular movements intact.   Pulmonary:      Comments: On 3L NC   Abdominal:      General: There is distension.      Tenderness: There is no abdominal tenderness. There is no  guarding or rebound.      Comments: Hematoma expanded since yesterday with bloody drainage from midline staples    Skin:     General: Skin is warm and dry.   Neurological:      General: No focal deficit present.      Mental Status: She is alert.       Vents:  Extubated yesterday     Lines/Drains/Airways     Central Venous Catheter Line            Trialysis (Dialysis) Catheter 08/13/20 2230 right internal jugular 7 days          Drain                 NG/OG Tube 08/06/20 nasogastric Left nostril 15 days         Closed/Suction Drain 08/13/20 1659 Right;Inferior Abdomen Bulb 19 Fr. 7 days         Closed/Suction Drain 08/13/20 1659 Right;Superior Abdomen Bulb 19 Fr. 7 days         Gastrostomy/Enterostomy 08/15/20 0916 Gastrostomy tube w/ balloon LUQ decompression 5 days         Closed/Suction Drain 08/18/20 1239 Medial Abdomen 19 Fr. 2 days          Peripheral Intravenous Line                 Peripheral IV - Single Lumen 08/19/20 0930 20 G Anterior;Distal;Left Wrist 1 day         Peripheral IV - Single Lumen 08/20/20 0430 20 G Anterior;Right Forearm 1 day              Significant Labs:  CBC/Anemia Profile:  Recent Labs   Lab 08/20/20 2014 08/21/20  0015 08/21/20  0332   WBC 41.09* 47.71* 44.59*   HGB 6.8* 8.1* 7.5*   HCT 20.7* 24.3* 22.5*    203 207   MCV 92 91 91   RDW 14.6* 14.1 14.3     Chemistries:  Recent Labs   Lab 08/20/20  1600 08/20/20  2202 08/21/20  0332     138 139  139 140  140   K 4.4  4.4 4.4  4.4 4.0  4.0     106 106  106 105  105   CO2 21*  21* 22*  22* 24  24   BUN 45*  45* 23*  23* 12  12   CREATININE 3.3*  3.3* 1.9*  1.9* 1.1  1.1   CALCIUM 8.4*  8.4* 8.1*  8.1* 7.9*  7.9*   ALBUMIN 1.3*  1.3* 1.3*  1.3* 1.4*  1.4*   PROT 5.4* 5.5* 5.5*   BILITOT 0.6 0.6 0.6   ALKPHOS 123 127 124   ALT 23 25 24   AST 31 34 34   MG 2.8* 2.3 2.0   PHOS 4.1 2.8 2.2*     Significant Imaging:  I have reviewed and interpreted all pertinent imaging results/findings within the past  24 hours.    Assessment/Plan:   40 yo F s/p antrectomy with BII reconstruction c/b duodenal necrosis requiring ex lap, washout, drainage c/b aspiration event. Now with severe sepsis and ARDS     .Neuro:  - Pain controlled on fentanyl gtt  - Follows commands     Resp:  - PRN breathing treatments  - Daily CXR  - Right chest tube with no output     CV:  - MAP goal >65  - Systolic <160  - Levo and Vaso gtt - off     Heme:  - Hgb/Hct 6.5; transfused 1upRBCs  - new hematoma in RLQ with acute drop in GODFREY output from that site; expanding, general surgery team aware  - 1u pRBCs overnight     ID:  - WBC 44 => up trending   - Zosyn, Fluconazole  - Blood cultures and BAL taken 8/19 with NGTD and no abnormal findings     Renal:  - Oliguric on arrival; Cr 1.9 at that time   - SCUF yesterday  - Trend BUN/Cr; 12/1.1 today   - Monitor I/Os     FEN/GI:  - NPO  - TPN  - NGT to LIWS   - Maintain drains to bulb suction; LLQ with acute drop in drainage and accumulation in SQ tissue  - GI ppx  - Replace electrolytes as needed to keep K>4, Mg>2     Endo:  - Monitor BG     Dispo:  - Continue SICU care      Nica Pichardo MD  [employer] General Surgery - PGY 2  08/21/2020 7:26 AM

## 2020-08-21 NOTE — ANESTHESIA POSTPROCEDURE EVALUATION
Anesthesia Post Evaluation    Patient: Jaquan Farmer    Procedure(s) Performed: Procedure(s) (LRB):  EXPLORATION, WOUND (N/A)  EVACUATION, HEMATOMA (N/A)  APPLICATION, WOUND VAC    Final Anesthesia Type: general    Patient location during evaluation: ICU  Patient participation: Yes- Able to Participate  Level of consciousness: awake and alert  Post-procedure vital signs: reviewed and stable  Pain management: adequate  Airway patency: patent  DOUGIE mitigation strategies: Extubation while patient is awake  PONV status at discharge: No PONV  Anesthetic complications: no      Cardiovascular status: blood pressure returned to baseline and tachycardic (tachycardic but at baseline to what it was prior to proceeding to the OR. )  Respiratory status: unassisted  Hydration status: euvolemic  Follow-up not needed.          Vitals Value Taken Time   /84 08/21/20 1315   Temp 36.5 08/21/20 1401   Pulse 120 08/21/20 1400   Resp 28 08/21/20 1400   SpO2 100 % 08/21/20 1400   Vitals shown include unvalidated device data.      No case tracking events are documented in the log.      Pain/Rod Score: Pain Rating Prior to Med Admin: 0 (8/21/2020  3:00 AM)  Pain Rating Post Med Admin: 0 (8/21/2020  3:00 AM)

## 2020-08-21 NOTE — PLAN OF CARE
SW is following this Pt for DC planning needs. There are no identified needs at this time. Discharge Disposition: TBD - return to OR today.    SW will continue to coordinate with patient, family, team and insurance to complete patient's discharge plan.    Jazzy Eid LMSW   - Case Management

## 2020-08-21 NOTE — BRIEF OP NOTE
[facility]  Surgery Department  Operative Note    SUMMARY     Date of Procedure: 8/21/2020     Procedure: Procedure(s) (LRB):  EXPLORATION, WOUND (N/A)  EVACUATION, HEMATOMA (N/A)  APPLICATION, WOUND VAC     Surgeon(s) and Role:     * Donis Roa MD - Primary     * Ying Cesar MD - Resident - Assisting        Pre-Operative Diagnosis: Duodenum disorder [K31.9]    Post-Operative Diagnosis: Post-Op Diagnosis Codes:     * Duodenum disorder [K31.9]    Anesthesia: General    Technical Procedures Used: Evacuation of rectus sheath hematoma, wound vac placement.    Description of the Findings of the Procedure: Rectus sheath hematoma (evacuation of 2L of clot), no active bleed identified, wound vac placement    Complications: No    Estimated Blood Loss (EBL): minimal, 2 L of clot removed           Implants: * No implants in log *    Specimens:   Specimen (12h ago, onward)    None                  Condition: Serious    Disposition: ICU - extubated and stable.    Attestation: I was present and scrubbed for the entire procedure.

## 2020-08-22 LAB
ALBUMIN SERPL BCP-MCNC: 1.4 G/DL (ref 3.5–5.2)
ALBUMIN SERPL BCP-MCNC: 1.5 G/DL (ref 3.5–5.2)
ALBUMIN SERPL BCP-MCNC: 1.6 G/DL (ref 3.5–5.2)
ALBUMIN SERPL BCP-MCNC: 1.7 G/DL (ref 3.5–5.2)
ALP SERPL-CCNC: 104 U/L (ref 55–135)
ALP SERPL-CCNC: 105 U/L (ref 55–135)
ALP SERPL-CCNC: 108 U/L (ref 55–135)
ALP SERPL-CCNC: 91 U/L (ref 55–135)
ALT SERPL W/O P-5'-P-CCNC: 18 U/L (ref 10–44)
ALT SERPL W/O P-5'-P-CCNC: 20 U/L (ref 10–44)
ALT SERPL W/O P-5'-P-CCNC: 21 U/L (ref 10–44)
ALT SERPL W/O P-5'-P-CCNC: 22 U/L (ref 10–44)
ANION GAP SERPL CALC-SCNC: 12 MMOL/L (ref 8–16)
ANION GAP SERPL CALC-SCNC: 12 MMOL/L (ref 8–16)
ANION GAP SERPL CALC-SCNC: 13 MMOL/L (ref 8–16)
ANION GAP SERPL CALC-SCNC: 14 MMOL/L (ref 8–16)
ANISOCYTOSIS BLD QL SMEAR: SLIGHT
AST SERPL-CCNC: 23 U/L (ref 10–40)
AST SERPL-CCNC: 25 U/L (ref 10–40)
AST SERPL-CCNC: 31 U/L (ref 10–40)
AST SERPL-CCNC: 32 U/L (ref 10–40)
BASOPHILS # BLD AUTO: 0.2 K/UL (ref 0–0.2)
BASOPHILS # BLD AUTO: 0.23 K/UL (ref 0–0.2)
BASOPHILS # BLD AUTO: ABNORMAL K/UL (ref 0–0.2)
BASOPHILS NFR BLD: 0 % (ref 0–1.9)
BASOPHILS NFR BLD: 0.5 % (ref 0–1.9)
BASOPHILS NFR BLD: 0.6 % (ref 0–1.9)
BILIRUB SERPL-MCNC: 0.6 MG/DL (ref 0.1–1)
BUN SERPL-MCNC: 24 MG/DL (ref 6–20)
BUN SERPL-MCNC: 32 MG/DL (ref 6–20)
BUN SERPL-MCNC: 36 MG/DL (ref 6–20)
BUN SERPL-MCNC: 43 MG/DL (ref 6–20)
BURR CELLS BLD QL SMEAR: ABNORMAL
CA-I BLDV-SCNC: 1.09 MMOL/L (ref 1.06–1.42)
CA-I BLDV-SCNC: 1.15 MMOL/L (ref 1.06–1.42)
CA-I BLDV-SCNC: 1.17 MMOL/L (ref 1.06–1.42)
CA-I BLDV-SCNC: 1.21 MMOL/L (ref 1.06–1.42)
CALCIUM SERPL-MCNC: 8.2 MG/DL (ref 8.7–10.5)
CALCIUM SERPL-MCNC: 8.5 MG/DL (ref 8.7–10.5)
CALCIUM SERPL-MCNC: 8.5 MG/DL (ref 8.7–10.5)
CALCIUM SERPL-MCNC: 8.6 MG/DL (ref 8.7–10.5)
CHLORIDE SERPL-SCNC: 106 MMOL/L (ref 95–110)
CO2 SERPL-SCNC: 20 MMOL/L (ref 23–29)
CO2 SERPL-SCNC: 21 MMOL/L (ref 23–29)
CO2 SERPL-SCNC: 23 MMOL/L (ref 23–29)
CO2 SERPL-SCNC: 23 MMOL/L (ref 23–29)
CREAT SERPL-MCNC: 2.1 MG/DL (ref 0.5–1.4)
CREAT SERPL-MCNC: 2.4 MG/DL (ref 0.5–1.4)
CREAT SERPL-MCNC: 2.8 MG/DL (ref 0.5–1.4)
CREAT SERPL-MCNC: 3.1 MG/DL (ref 0.5–1.4)
DIFFERENTIAL METHOD: ABNORMAL
EOSINOPHIL # BLD AUTO: 0.1 K/UL (ref 0–0.5)
EOSINOPHIL # BLD AUTO: 0.1 K/UL (ref 0–0.5)
EOSINOPHIL # BLD AUTO: ABNORMAL K/UL (ref 0–0.5)
EOSINOPHIL NFR BLD: 0 % (ref 0–8)
EOSINOPHIL NFR BLD: 0.3 % (ref 0–8)
EOSINOPHIL NFR BLD: 0.3 % (ref 0–8)
ERYTHROCYTE [DISTWIDTH] IN BLOOD BY AUTOMATED COUNT: 14.1 % (ref 11.5–14.5)
ERYTHROCYTE [DISTWIDTH] IN BLOOD BY AUTOMATED COUNT: 14.3 % (ref 11.5–14.5)
ERYTHROCYTE [DISTWIDTH] IN BLOOD BY AUTOMATED COUNT: 14.5 % (ref 11.5–14.5)
EST. GFR  (AFRICAN AMERICAN): 20.9 ML/MIN/1.73 M^2
EST. GFR  (AFRICAN AMERICAN): 23.6 ML/MIN/1.73 M^2
EST. GFR  (AFRICAN AMERICAN): 28.5 ML/MIN/1.73 M^2
EST. GFR  (AFRICAN AMERICAN): 33.4 ML/MIN/1.73 M^2
EST. GFR  (NON AFRICAN AMERICAN): 18.1 ML/MIN/1.73 M^2
EST. GFR  (NON AFRICAN AMERICAN): 20.5 ML/MIN/1.73 M^2
EST. GFR  (NON AFRICAN AMERICAN): 24.7 ML/MIN/1.73 M^2
EST. GFR  (NON AFRICAN AMERICAN): 29 ML/MIN/1.73 M^2
GIANT PLATELETS BLD QL SMEAR: PRESENT
GLUCOSE SERPL-MCNC: 124 MG/DL (ref 70–110)
GLUCOSE SERPL-MCNC: 128 MG/DL (ref 70–110)
GLUCOSE SERPL-MCNC: 135 MG/DL (ref 70–110)
GLUCOSE SERPL-MCNC: 147 MG/DL (ref 70–110)
HCT VFR BLD AUTO: 23.4 % (ref 37–48.5)
HCT VFR BLD AUTO: 24.3 % (ref 37–48.5)
HCT VFR BLD AUTO: 26.1 % (ref 37–48.5)
HGB BLD-MCNC: 8 G/DL (ref 12–16)
HGB BLD-MCNC: 8.2 G/DL (ref 12–16)
HGB BLD-MCNC: 8.8 G/DL (ref 12–16)
HYPOCHROMIA BLD QL SMEAR: ABNORMAL
HYPOCHROMIA BLD QL SMEAR: ABNORMAL
IMM GRANULOCYTES # BLD AUTO: 1.48 K/UL (ref 0–0.04)
IMM GRANULOCYTES # BLD AUTO: 1.48 K/UL (ref 0–0.04)
IMM GRANULOCYTES # BLD AUTO: ABNORMAL K/UL (ref 0–0.04)
IMM GRANULOCYTES NFR BLD AUTO: 3.9 % (ref 0–0.5)
IMM GRANULOCYTES NFR BLD AUTO: 4 % (ref 0–0.5)
IMM GRANULOCYTES NFR BLD AUTO: ABNORMAL % (ref 0–0.5)
LYMPHOCYTES # BLD AUTO: 2.6 K/UL (ref 1–4.8)
LYMPHOCYTES # BLD AUTO: 2.6 K/UL (ref 1–4.8)
LYMPHOCYTES # BLD AUTO: ABNORMAL K/UL (ref 1–4.8)
LYMPHOCYTES NFR BLD: 5 % (ref 18–48)
LYMPHOCYTES NFR BLD: 6.8 % (ref 18–48)
LYMPHOCYTES NFR BLD: 7.1 % (ref 18–48)
MAGNESIUM SERPL-MCNC: 2.2 MG/DL (ref 1.6–2.6)
MAGNESIUM SERPL-MCNC: 2.2 MG/DL (ref 1.6–2.6)
MAGNESIUM SERPL-MCNC: 2.3 MG/DL (ref 1.6–2.6)
MCH RBC QN AUTO: 30.7 PG (ref 27–31)
MCH RBC QN AUTO: 31 PG (ref 27–31)
MCH RBC QN AUTO: 31.1 PG (ref 27–31)
MCHC RBC AUTO-ENTMCNC: 33.7 G/DL (ref 32–36)
MCHC RBC AUTO-ENTMCNC: 33.7 G/DL (ref 32–36)
MCHC RBC AUTO-ENTMCNC: 34.2 G/DL (ref 32–36)
MCV RBC AUTO: 90 FL (ref 82–98)
MCV RBC AUTO: 92 FL (ref 82–98)
MCV RBC AUTO: 92 FL (ref 82–98)
METAMYELOCYTES NFR BLD MANUAL: 2 %
MONOCYTES # BLD AUTO: 2.1 K/UL (ref 0.3–1)
MONOCYTES # BLD AUTO: 2.3 K/UL (ref 0.3–1)
MONOCYTES # BLD AUTO: ABNORMAL K/UL (ref 0.3–1)
MONOCYTES NFR BLD: 5.7 % (ref 4–15)
MONOCYTES NFR BLD: 6 % (ref 4–15)
MONOCYTES NFR BLD: 6 % (ref 4–15)
NEUTROPHILS # BLD AUTO: 30.2 K/UL (ref 1.8–7.7)
NEUTROPHILS # BLD AUTO: 31.3 K/UL (ref 1.8–7.7)
NEUTROPHILS NFR BLD: 82.4 % (ref 38–73)
NEUTROPHILS NFR BLD: 82.4 % (ref 38–73)
NEUTROPHILS NFR BLD: 87 % (ref 38–73)
NRBC BLD-RTO: 0 /100 WBC
OVALOCYTES BLD QL SMEAR: ABNORMAL
OVALOCYTES BLD QL SMEAR: ABNORMAL
PHOSPHATE SERPL-MCNC: 2.9 MG/DL (ref 2.7–4.5)
PHOSPHATE SERPL-MCNC: 3.2 MG/DL (ref 2.7–4.5)
PHOSPHATE SERPL-MCNC: 3.7 MG/DL (ref 2.7–4.5)
PLATELET # BLD AUTO: 200 K/UL (ref 150–350)
PLATELET # BLD AUTO: 218 K/UL (ref 150–350)
PLATELET # BLD AUTO: 250 K/UL (ref 150–350)
PLATELET BLD QL SMEAR: ABNORMAL
PLATELET BLD QL SMEAR: ABNORMAL
PMV BLD AUTO: 10.8 FL (ref 9.2–12.9)
PMV BLD AUTO: 11 FL (ref 9.2–12.9)
PMV BLD AUTO: 11 FL (ref 9.2–12.9)
POCT GLUCOSE: 116 MG/DL (ref 70–110)
POCT GLUCOSE: 128 MG/DL (ref 70–110)
POCT GLUCOSE: 140 MG/DL (ref 70–110)
POIKILOCYTOSIS BLD QL SMEAR: SLIGHT
POIKILOCYTOSIS BLD QL SMEAR: SLIGHT
POLYCHROMASIA BLD QL SMEAR: ABNORMAL
POLYCHROMASIA BLD QL SMEAR: ABNORMAL
POTASSIUM SERPL-SCNC: 3.8 MMOL/L (ref 3.5–5.1)
POTASSIUM SERPL-SCNC: 4 MMOL/L (ref 3.5–5.1)
POTASSIUM SERPL-SCNC: 4.1 MMOL/L (ref 3.5–5.1)
POTASSIUM SERPL-SCNC: 4.8 MMOL/L (ref 3.5–5.1)
PROT SERPL-MCNC: 5.4 G/DL (ref 6–8.4)
PROT SERPL-MCNC: 5.5 G/DL (ref 6–8.4)
PROT SERPL-MCNC: 5.6 G/DL (ref 6–8.4)
PROT SERPL-MCNC: 5.7 G/DL (ref 6–8.4)
RBC # BLD AUTO: 2.61 M/UL (ref 4–5.4)
RBC # BLD AUTO: 2.64 M/UL (ref 4–5.4)
RBC # BLD AUTO: 2.84 M/UL (ref 4–5.4)
SODIUM SERPL-SCNC: 140 MMOL/L (ref 136–145)
SODIUM SERPL-SCNC: 140 MMOL/L (ref 136–145)
SODIUM SERPL-SCNC: 141 MMOL/L (ref 136–145)
SODIUM SERPL-SCNC: 141 MMOL/L (ref 136–145)
SPHEROCYTES BLD QL SMEAR: ABNORMAL
TARGETS BLD QL SMEAR: ABNORMAL
TOXIC GRANULES BLD QL SMEAR: PRESENT
WBC # BLD AUTO: 34.48 K/UL (ref 3.9–12.7)
WBC # BLD AUTO: 36.72 K/UL (ref 3.9–12.7)
WBC # BLD AUTO: 37.95 K/UL (ref 3.9–12.7)

## 2020-08-22 PROCEDURE — S0028 INJECTION, FAMOTIDINE, 20 MG: HCPCS | Performed by: STUDENT IN AN ORGANIZED HEALTH CARE EDUCATION/TRAINING PROGRAM

## 2020-08-22 PROCEDURE — 63600175 PHARM REV CODE 636 W HCPCS: Performed by: STUDENT IN AN ORGANIZED HEALTH CARE EDUCATION/TRAINING PROGRAM

## 2020-08-22 PROCEDURE — 84100 ASSAY OF PHOSPHORUS: CPT | Mod: 91

## 2020-08-22 PROCEDURE — 85025 COMPLETE CBC W/AUTO DIFF WBC: CPT | Mod: 91

## 2020-08-22 PROCEDURE — 94761 N-INVAS EAR/PLS OXIMETRY MLT: CPT

## 2020-08-22 PROCEDURE — 94664 DEMO&/EVAL PT USE INHALER: CPT

## 2020-08-22 PROCEDURE — 82330 ASSAY OF CALCIUM: CPT | Mod: 91

## 2020-08-22 PROCEDURE — 99233 PR SUBSEQUENT HOSPITAL CARE,LEVL III: ICD-10-PCS | Mod: 24,,, | Performed by: SURGERY

## 2020-08-22 PROCEDURE — 83735 ASSAY OF MAGNESIUM: CPT

## 2020-08-22 PROCEDURE — 27200966 HC CLOSED SUCTION SYSTEM

## 2020-08-22 PROCEDURE — 25000003 PHARM REV CODE 250: Performed by: STUDENT IN AN ORGANIZED HEALTH CARE EDUCATION/TRAINING PROGRAM

## 2020-08-22 PROCEDURE — 99900035 HC TECH TIME PER 15 MIN (STAT)

## 2020-08-22 PROCEDURE — 27000646 HC AEROBIKA DEVICE

## 2020-08-22 PROCEDURE — 27000221 HC OXYGEN, UP TO 24 HOURS

## 2020-08-22 PROCEDURE — 80053 COMPREHEN METABOLIC PANEL: CPT | Mod: 91

## 2020-08-22 PROCEDURE — 84100 ASSAY OF PHOSPHORUS: CPT

## 2020-08-22 PROCEDURE — 85027 COMPLETE CBC AUTOMATED: CPT

## 2020-08-22 PROCEDURE — 94668 MNPJ CHEST WALL SBSQ: CPT

## 2020-08-22 PROCEDURE — 25500020 PHARM REV CODE 255: Performed by: STUDENT IN AN ORGANIZED HEALTH CARE EDUCATION/TRAINING PROGRAM

## 2020-08-22 PROCEDURE — 85007 BL SMEAR W/DIFF WBC COUNT: CPT

## 2020-08-22 PROCEDURE — 99233 PR SUBSEQUENT HOSPITAL CARE,LEVL III: ICD-10-PCS | Mod: ,,, | Performed by: INTERNAL MEDICINE

## 2020-08-22 PROCEDURE — 83735 ASSAY OF MAGNESIUM: CPT | Mod: 91

## 2020-08-22 PROCEDURE — 80053 COMPREHEN METABOLIC PANEL: CPT

## 2020-08-22 PROCEDURE — 20000000 HC ICU ROOM

## 2020-08-22 PROCEDURE — 99233 SBSQ HOSP IP/OBS HIGH 50: CPT | Mod: ,,, | Performed by: INTERNAL MEDICINE

## 2020-08-22 PROCEDURE — 82330 ASSAY OF CALCIUM: CPT

## 2020-08-22 PROCEDURE — 25000003 PHARM REV CODE 250: Performed by: SURGERY

## 2020-08-22 PROCEDURE — A4217 STERILE WATER/SALINE, 500 ML: HCPCS | Performed by: STUDENT IN AN ORGANIZED HEALTH CARE EDUCATION/TRAINING PROGRAM

## 2020-08-22 PROCEDURE — 99233 SBSQ HOSP IP/OBS HIGH 50: CPT | Mod: 24,,, | Performed by: SURGERY

## 2020-08-22 RX ORDER — HYDROMORPHONE HYDROCHLORIDE 1 MG/ML
0.5 INJECTION, SOLUTION INTRAMUSCULAR; INTRAVENOUS; SUBCUTANEOUS EVERY 4 HOURS PRN
Status: DISCONTINUED | OUTPATIENT
Start: 2020-08-22 | End: 2020-08-26

## 2020-08-22 RX ORDER — METOPROLOL TARTRATE 1 MG/ML
5 INJECTION, SOLUTION INTRAVENOUS EVERY 6 HOURS
Status: DISCONTINUED | OUTPATIENT
Start: 2020-08-22 | End: 2020-08-27

## 2020-08-22 RX ORDER — HYDRALAZINE HYDROCHLORIDE 20 MG/ML
5 INJECTION INTRAMUSCULAR; INTRAVENOUS ONCE
Status: COMPLETED | OUTPATIENT
Start: 2020-08-22 | End: 2020-08-22

## 2020-08-22 RX ADMIN — IOHEXOL 15 ML: 350 INJECTION, SOLUTION INTRAVENOUS at 12:08

## 2020-08-22 RX ADMIN — HYDROMORPHONE HYDROCHLORIDE 0.5 MG: 1 INJECTION, SOLUTION INTRAMUSCULAR; INTRAVENOUS; SUBCUTANEOUS at 05:08

## 2020-08-22 RX ADMIN — POTASSIUM CHLORIDE 40 MEQ: 29.8 INJECTION, SOLUTION INTRAVENOUS at 06:08

## 2020-08-22 RX ADMIN — HEPARIN SODIUM 5000 UNITS: 5000 INJECTION INTRAVENOUS; SUBCUTANEOUS at 05:08

## 2020-08-22 RX ADMIN — PIPERACILLIN SODIUM AND TAZOBACTAM SODIUM 4.5 G: 4; .5 INJECTION, POWDER, LYOPHILIZED, FOR SOLUTION INTRAVENOUS at 07:08

## 2020-08-22 RX ADMIN — MICONAZOLE NITRATE: 20 POWDER TOPICAL at 08:08

## 2020-08-22 RX ADMIN — CALCIUM GLUCONATE 1 G: 98 INJECTION, SOLUTION INTRAVENOUS at 06:08

## 2020-08-22 RX ADMIN — PIPERACILLIN SODIUM AND TAZOBACTAM SODIUM 4.5 G: 4; .5 INJECTION, POWDER, LYOPHILIZED, FOR SOLUTION INTRAVENOUS at 09:08

## 2020-08-22 RX ADMIN — CALCIUM GLUCONATE 1 G: 98 INJECTION, SOLUTION INTRAVENOUS at 12:08

## 2020-08-22 RX ADMIN — FLUCONAZOLE 200 MG: 200 INJECTION, SOLUTION INTRAVENOUS at 11:08

## 2020-08-22 RX ADMIN — METOROPROLOL TARTRATE 5 MG: 5 INJECTION, SOLUTION INTRAVENOUS at 11:08

## 2020-08-22 RX ADMIN — HEPARIN SODIUM 5000 UNITS: 5000 INJECTION INTRAVENOUS; SUBCUTANEOUS at 09:08

## 2020-08-22 RX ADMIN — HYDROMORPHONE HYDROCHLORIDE 0.5 MG: 1 INJECTION, SOLUTION INTRAMUSCULAR; INTRAVENOUS; SUBCUTANEOUS at 11:08

## 2020-08-22 RX ADMIN — MICONAZOLE NITRATE: 20 POWDER TOPICAL at 09:08

## 2020-08-22 RX ADMIN — FAMOTIDINE 20 MG: 10 INJECTION INTRAVENOUS at 08:08

## 2020-08-22 RX ADMIN — HYDRALAZINE HYDROCHLORIDE 5 MG: 20 INJECTION INTRAMUSCULAR; INTRAVENOUS at 03:08

## 2020-08-22 RX ADMIN — HEPARIN SODIUM 5000 UNITS: 5000 INJECTION INTRAVENOUS; SUBCUTANEOUS at 01:08

## 2020-08-22 RX ADMIN — METOROPROLOL TARTRATE 5 MG: 5 INJECTION, SOLUTION INTRAVENOUS at 04:08

## 2020-08-22 RX ADMIN — MAGNESIUM SULFATE HEPTAHYDRATE: 500 INJECTION, SOLUTION INTRAMUSCULAR; INTRAVENOUS at 10:08

## 2020-08-22 NOTE — ASSESSMENT & PLAN NOTE
oliguric stage 3 sid with ischemic atn likely secondary to septic shock  unknown bl cr  muddy brown casts on microscopy              08/21:  SLED overnight, increased drain output, net negative 2.1L/24h.  Tachycardic with low CVPs    Assessment:   -Hold RRT today.    -recheck labs this afternoon  -if electrolytes stable, plan to hold RRT and re-evaluate in AM  - Straight I/Os and daily weights  - Avoid nephrotoxins

## 2020-08-22 NOTE — ASSESSMENT & PLAN NOTE
38 yo F s/p antrectomy with BII reconstruction c/b duodenal necrosis requiring ex lap, washout, drainage c/b aspiration event. S/p duodenal resection with d-j anastomosis, g-j.  - Continue ICU care  - CT today  - Respiratory treatment q4h  - NPO  - NGT to LIWS  - G-tube to gravity  - Abx: diflucan, zosyn  - TPN   - Daily labs  - Trend CBC  - GI and DVT ppx  - PT / OT

## 2020-08-22 NOTE — SUBJECTIVE & OBJECTIVE
Interval History/Significant Events: NAEO. Plan for Repeat CT scan today to assess abdomen.     Follow-up For: Procedure(s) (LRB):  EXPLORATION, WOUND (N/A)  EVACUATION, HEMATOMA (N/A)  APPLICATION, WOUND VAC (N/A)    Post-Operative Day: 1 Day Post-Op    Objective:     Vital Signs (Most Recent):  Temp: 98.9 °F (37.2 °C) (08/22/20 1530)  Pulse: (!) 122 (08/22/20 1545)  Resp: (!) 30 (08/22/20 1545)  BP: (!) 175/87 (08/22/20 1530)  SpO2: 99 % (08/22/20 1545) Vital Signs (24h Range):  Temp:  [98.9 °F (37.2 °C)-100.1 °F (37.8 °C)] 98.9 °F (37.2 °C)  Pulse:  [112-144] 122  Resp:  [18-46] 30  SpO2:  [95 %-100 %] 99 %  BP: (153-180)/() 175/87     Weight: 63.5 kg (139 lb 15.9 oz)  Body mass index is 25.6 kg/m².      Intake/Output Summary (Last 24 hours) at 8/22/2020 1550  Last data filed at 8/22/2020 1500  Gross per 24 hour   Intake 2736 ml   Output 3538 ml   Net -802 ml       Physical Exam  Constitutional:       Interventions: She is sedated.   HENT:      Head: Normocephalic.      Nose:      Comments: NGT in place  Cardiovascular:      Rate and Rhythm: Regular rhythm. Tachycardia present.   Pulmonary:      Effort: Pulmonary effort is normal.      Comments: NC 2L  Abdominal:      General: There is no distension.      Palpations: Abdomen is soft.      Tenderness: There is abdominal tenderness (appropriate).      Comments: R abdomen GODFREY x2 with bilious output  Wound vac in midline incision  G-tube with gastric fluid output   Musculoskeletal:         General: Swelling present.      Right lower leg: Edema present.      Left lower leg: Edema present.   Skin:     General: Skin is warm and dry.   Neurological:      Mental Status: She is alert.         Vents:  Vent Mode: Spont (08/20/20 1501)  Ventilator Initiated: Yes(chart correction) (08/12/20 1930)  Set Rate: 20 BPM (08/20/20 0738)  Vt Set: 360 mL (08/20/20 0738)  PEEP/CPAP: 5 cmH20 (08/20/20 1501)  Oxygen Concentration (%): 28 (08/22/20 0043)  Peak Airway Pressure: 7.8  cmH2O (08/20/20 1501)  Plateau Pressure: 22 cmH20 (08/20/20 1501)  Total Ve: 7.21 mL (08/20/20 1501)  Negative Inspiratory Force (cm H2O): -23 (08/20/20 1501)  F/VT Ratio<105 (RSBI): (!) 64.6 (08/20/20 1205)    Lines/Drains/Airways     Central Venous Catheter Line            Trialysis (Dialysis) Catheter 08/13/20 2230 right internal jugular 8 days          Drain                 Closed/Suction Drain 08/13/20 1659 Right;Inferior Abdomen Bulb 19 Fr. 8 days         Closed/Suction Drain 08/13/20 1659 Right;Superior Abdomen Bulb 19 Fr. 8 days         Gastrostomy/Enterostomy 08/15/20 0916 Gastrostomy tube w/ balloon LUQ decompression 7 days          Peripheral Intravenous Line                 Peripheral IV - Single Lumen 08/19/20 0930 20 G Anterior;Distal;Left Wrist 3 days         Peripheral IV - Single Lumen 08/20/20 0430 20 G Anterior;Right Forearm 2 days                Significant Labs:    CBC/Anemia Profile:  Recent Labs   Lab 08/21/20  1620 08/22/20  0300 08/22/20  1059   WBC 39.95* 34.48* 36.72*   HGB 9.6* 8.8* 8.2*   HCT 28.0* 26.1* 24.3*    200 218   MCV 92 92 92   RDW 13.8 14.1 14.3        Chemistries:  Recent Labs   Lab 08/21/20  0332  08/21/20  1620 08/21/20  2200 08/22/20  0300 08/22/20  1059     140   < > 140 141 141 141   K 4.0  4.0   < > 3.8 4.4 4.1 4.0     105   < > 107 107 106 106   CO2 24  24   < > 24 25 23 23   BUN 12  12   < > 16 20 24* 32*   CREATININE 1.1  1.1   < > 1.4 1.7* 2.1* 2.4*   CALCIUM 7.9*  7.9*   < > 8.0* 8.0* 8.5* 8.2*   ALBUMIN 1.4*  1.4*   < > 1.5* 1.6* 1.7* 1.5*   PROT 5.5*   < > 5.4* 5.5* 5.6* 5.5*   BILITOT 0.6   < > 0.7 0.6 0.6 0.6   ALKPHOS 124   < > 104 105 108 104   ALT 24   < > 22 22 22 21   AST 34   < > 29 28 32 31   MG 2.0  --  2.1  --  2.2 2.3   PHOS 2.2*  --   --   --  3.7 3.2    < > = values in this interval not displayed.       All pertinent labs within the past 24 hours have been reviewed.    Significant Imaging:  I have reviewed all pertinent  imaging results/findings within the past 24 hours.

## 2020-08-22 NOTE — PROGRESS NOTES
[facility]  Critical Care - Surgery  Progress Note    Patient Name: Jaquan Farmer  MRN: [id number]  Admission Date: 8/12/2020  Hospital Length of Stay: 10 days  Code Status: Full Code  Attending Provider: Donis Roa MD  Primary Care Provider: Primary Doctor No   Principal Problem: Duodenum disorder    Subjective:     Hospital/ICU Course:  8/14 Patient had a line replaced along with central line placed, with complication of pneumothorax. Rt pigtail was placed. Patient tolerated complications well. Decreasing vent settings.  8/15 NAEO. Patient was taken back to OR early AM for washout. Possible closure 8/18. ETT exchanged in OR 7.0 -> 7.5  8/16 - NAEON. HDS. Intubated, sedated.   8/17 - NAEON. OR tomorrow for closure.  8/18 - Washed out and closed in OR  8/22 - NAEO. Plan for Repeat CT AP today    Interval History/Significant Events: NAEO. Plan for Repeat CT scan today to assess abdomen.     Follow-up For: Procedure(s) (LRB):  EXPLORATION, WOUND (N/A)  EVACUATION, HEMATOMA (N/A)  APPLICATION, WOUND VAC (N/A)    Post-Operative Day: 1 Day Post-Op    Objective:     Vital Signs (Most Recent):  Temp: 98.9 °F (37.2 °C) (08/22/20 1530)  Pulse: (!) 122 (08/22/20 1545)  Resp: (!) 30 (08/22/20 1545)  BP: (!) 175/87 (08/22/20 1530)  SpO2: 99 % (08/22/20 1545) Vital Signs (24h Range):  Temp:  [98.9 °F (37.2 °C)-100.1 °F (37.8 °C)] 98.9 °F (37.2 °C)  Pulse:  [112-144] 122  Resp:  [18-46] 30  SpO2:  [95 %-100 %] 99 %  BP: (153-180)/() 175/87     Weight: 63.5 kg (139 lb 15.9 oz)  Body mass index is 25.6 kg/m².      Intake/Output Summary (Last 24 hours) at 8/22/2020 1550  Last data filed at 8/22/2020 1500  Gross per 24 hour   Intake 2736 ml   Output 3538 ml   Net -802 ml       Physical Exam  Constitutional:       Interventions: She is sedated.   HENT:      Head: Normocephalic.      Nose:      Comments: NGT in place  Cardiovascular:      Rate and Rhythm: Regular rhythm. Tachycardia present.    Pulmonary:      Effort: Pulmonary effort is normal.      Comments: NC 2L  Abdominal:      General: There is no distension.      Palpations: Abdomen is soft.      Tenderness: There is abdominal tenderness (appropriate).      Comments: R abdomen GODFREY x2 with bilious output  Wound vac in midline incision  G-tube with gastric fluid output   Musculoskeletal:         General: Swelling present.      Right lower leg: Edema present.      Left lower leg: Edema present.   Skin:     General: Skin is warm and dry.   Neurological:      Mental Status: She is alert.         Vents:  Vent Mode: Spont (08/20/20 1501)  Ventilator Initiated: Yes(chart correction) (08/12/20 1930)  Set Rate: 20 BPM (08/20/20 0738)  Vt Set: 360 mL (08/20/20 0738)  PEEP/CPAP: 5 cmH20 (08/20/20 1501)  Oxygen Concentration (%): 28 (08/22/20 0043)  Peak Airway Pressure: 7.8 cmH2O (08/20/20 1501)  Plateau Pressure: 22 cmH20 (08/20/20 1501)  Total Ve: 7.21 mL (08/20/20 1501)  Negative Inspiratory Force (cm H2O): -23 (08/20/20 1501)  F/VT Ratio<105 (RSBI): (!) 64.6 (08/20/20 1205)    Lines/Drains/Airways     Central Venous Catheter Line            Trialysis (Dialysis) Catheter 08/13/20 2230 right internal jugular 8 days          Drain                 Closed/Suction Drain 08/13/20 1659 Right;Inferior Abdomen Bulb 19 Fr. 8 days         Closed/Suction Drain 08/13/20 1659 Right;Superior Abdomen Bulb 19 Fr. 8 days         Gastrostomy/Enterostomy 08/15/20 0916 Gastrostomy tube w/ balloon LUQ decompression 7 days          Peripheral Intravenous Line                 Peripheral IV - Single Lumen 08/19/20 0930 20 G Anterior;Distal;Left Wrist 3 days         Peripheral IV - Single Lumen 08/20/20 0430 20 G Anterior;Right Forearm 2 days                Significant Labs:    CBC/Anemia Profile:  Recent Labs   Lab 08/21/20  1620 08/22/20  0300 08/22/20  1059   WBC 39.95* 34.48* 36.72*   HGB 9.6* 8.8* 8.2*   HCT 28.0* 26.1* 24.3*    200 218   MCV 92 92 92   RDW 13.8 14.1  14.3        Chemistries:  Recent Labs   Lab 08/21/20  0332  08/21/20  1620 08/21/20  2200 08/22/20  0300 08/22/20  1059     140   < > 140 141 141 141   K 4.0  4.0   < > 3.8 4.4 4.1 4.0     105   < > 107 107 106 106   CO2 24  24   < > 24 25 23 23   BUN 12  12   < > 16 20 24* 32*   CREATININE 1.1  1.1   < > 1.4 1.7* 2.1* 2.4*   CALCIUM 7.9*  7.9*   < > 8.0* 8.0* 8.5* 8.2*   ALBUMIN 1.4*  1.4*   < > 1.5* 1.6* 1.7* 1.5*   PROT 5.5*   < > 5.4* 5.5* 5.6* 5.5*   BILITOT 0.6   < > 0.7 0.6 0.6 0.6   ALKPHOS 124   < > 104 105 108 104   ALT 24   < > 22 22 22 21   AST 34   < > 29 28 32 31   MG 2.0  --  2.1  --  2.2 2.3   PHOS 2.2*  --   --   --  3.7 3.2    < > = values in this interval not displayed.       All pertinent labs within the past 24 hours have been reviewed.    Significant Imaging:  I have reviewed all pertinent imaging results/findings within the past 24 hours.    Assessment/Plan:     Severe sepsis   40 yo F s/p antrectomy with BII reconstruction c/b duodenal necrosis requiring ex lap, washout, drainage c/b aspiration event. Now with severe sepsis and ARDS     .Neuro:  - AOx4  - PRN hydromorphone for analgesia     Resp:  - 100% on 2L NC  - PRN breathing treatments  - Right chest tube with no output     CV:  - MAP goal >65  - Systolic <160  - Levo and Vaso gtt - off     Heme:  - Hgb/Hct 8.6;   - S/p Abdominal washout on 8/21  - Repeat CT scan today     ID:  - WBC 34   - Zosyn, Fluconazole  - Blood cultures and BAL taken 8/19 with NGTD and no abnormal findings     Renal:  - Oliguric on arrival; Cr 1.9 at that time   - CRRT held yesterday. Will also hold today. Nephrology appreciated  - Trend BUN/Cr; 24/2.1 today   - Monitor I/Os     FEN/GI:  - NPO  - TPN  - NGT to LIWS   - Maintain drains to bulb suction; LLQ with acute drop in drainage and accumulation in SQ tissue  - GI ppx  - Replace electrolytes as needed to keep K>4, Mg>2     Endo:  - Monitor BG     Dispo:  - Continue SICU care        Critical  care was time spent personally by me on the following activities: development of treatment plan with patient or surrogate and bedside caregivers, discussions with consultants, evaluation of patient's response to treatment, examination of patient, ordering and performing treatments and interventions, ordering and review of laboratory studies, ordering and review of radiographic studies, pulse oximetry, re-evaluation of patient's condition.  This critical care time did not overlap with that of any other provider or involve time for any procedures.     Serjio Garcia MD  Critical Care - Surgery  [facility]

## 2020-08-22 NOTE — PROGRESS NOTES
[facility]  Nephrology  Progress Note    Patient Name: Jaquan Farmer  MRN: [id number]  Admission Date: 8/12/2020  Hospital Length of Stay: 10 days  Attending Provider: Donis Roa MD   Primary Care Physician: Primary Doctor No  Principal Problem:Duodenum disorder    Subjective:     HPI: Mrs. Farmer is a 39 year old female with antrectomy at [facility] complicated duodenal necrosis post op. She aspirated, was intubated, and became septic. She developed renal failure and required max vent settings and was transferred here for higher level of care. On arrival she was hypotensive on pressers, max vent setting, and had minimal urine output.  Overnight she was given 6L of fluid, 6g calcium, placed on vac, pip-tazo, and fluconazole. Nephro consulted for sid.    Interval History: Patient on 2 liters NC, 1 episode of unmeasured urine output. Labs stable since Yesterday. Underwent evacuation of hematoma in the OR yesterday. Off vasopressors.     Review of patient's allergies indicates:   Allergen Reactions    Latex      Current Facility-Administered Medications   Medication Frequency    0.9%  NaCl infusion (for blood administration) Q24H PRN    0.9%  NaCl infusion (for blood administration) Q24H PRN    calcium gluconate 1g in dextrose 5% 100mL (ready to mix system) PRN    calcium gluconate 2 g in dextrose 5 % 100 mL IVPB PRN    calcium gluconate 3 g in dextrose 5 % 100 mL IVPB PRN    famotidine (PF) injection 20 mg Daily    fluconazole (DIFLUCAN) IVPB 200 mg 100 mL Q24H    heparin (porcine) injection 5,000 Units Q8H    HYDROmorphone injection 0.5 mg Q4H PRN    lactated ringers infusion Continuous    lorazepam injection 1 mg Once    magnesium sulfate 2g in water 50mL IVPB (premix) PRN    magnesium sulfate 2g in water 50mL IVPB (premix) PRN    melatonin tablet 3 mg Nightly PRN    metoprolol injection 5 mg Q4H PRN    miconazole NITRATE 2 % top powder BID    ondansetron injection 4 mg  Once PRN    piperacillin-tazobactam 4.5 g in sodium chloride 0.9% 100 mL IVPB (ready to mix system) Q12H    potassium chloride 40 mEq in 100 mL IVPB (FOR CENTRAL LINE ADMINISTRATION ONLY) PRN    And    potassium chloride 20 mEq in 100 mL IVPB (FOR CENTRAL LINE ADMINISTRATION ONLY) PRN    And    potassium chloride 40 mEq in 100 mL IVPB (FOR CENTRAL LINE ADMINISTRATION ONLY) PRN    sodium chloride 0.9% bolus 1,000 mL Once    sodium phosphate 15 mmol in dextrose 5 % 250 mL IVPB PRN    sodium phosphate 20.01 mmol in dextrose 5 % 250 mL IVPB PRN    sodium phosphate 30 mmol in dextrose 5 % 250 mL IVPB PRN    TPN ADULT CENTRAL LINE CUSTOM Continuous       Objective:     Vital Signs (Most Recent):  Temp: 99.8 °F (37.7 °C) (08/22/20 0700)  Pulse: (!) 131 (08/22/20 0924)  Resp: (!) 36 (08/22/20 0924)  BP: (!) 172/84 (08/22/20 0800)  SpO2: 99 % (08/22/20 0924)  O2 Device (Oxygen Therapy): nasal cannula (08/22/20 0924) Vital Signs (24h Range):  Temp:  [99.4 °F (37.4 °C)-100.1 °F (37.8 °C)] 99.8 °F (37.7 °C)  Pulse:  [113-150] 131  Resp:  [18-48] 36  SpO2:  [95 %-100 %] 99 %  BP: (125-180)/() 172/84     Weight: 63.5 kg (139 lb 15.9 oz) (08/22/20 0503)  Body mass index is 25.6 kg/m².  Body surface area is 1.67 meters squared.    I/O last 3 completed shifts:  In: 7803 [I.V.:4614; Blood:725; IV Piggyback:1000]  Out: 9066 [Drains:4323; Other:4743]    Physical Exam  Vitals signs and nursing note reviewed.   Constitutional:       Appearance: She is not ill-appearing.      Interventions: She is sedated and intubated.   HENT:      Head: Normocephalic and atraumatic.      Mouth/Throat:      Mouth: Mucous membranes are dry.   Eyes:      General: No scleral icterus.        Right eye: No discharge.         Left eye: No discharge.      Extraocular Movements: Extraocular movements intact.      Conjunctiva/sclera: Conjunctivae normal.   Neck:      Musculoskeletal: Normal range of motion and neck supple.   Cardiovascular:       Rate and Rhythm: Regular rhythm. Tachycardia present.      Pulses: Normal pulses.      Heart sounds: Normal heart sounds.   Pulmonary:      Effort: Pulmonary effort is normal. No respiratory distress. She is intubated.      Breath sounds: No rales.   Abdominal:      General: Bowel sounds are normal.      Palpations: Abdomen is soft.   Musculoskeletal:      Right lower leg: Edema present.      Left lower leg: Edema present.   Skin:     General: Skin is warm and dry.      Findings: No bruising or rash.   Neurological:      General: No focal deficit present.      Mental Status: She is alert and oriented to person, place, and time.   Psychiatric:         Mood and Affect: Mood normal.         Behavior: Behavior normal.         Significant Labs:  CBC:   Recent Labs   Lab 08/22/20  0300   WBC 34.48*   RBC 2.84*   HGB 8.8*   HCT 26.1*      MCV 92   MCH 31.0   MCHC 33.7     CMP:   Recent Labs   Lab 08/22/20  0300   *   CALCIUM 8.5*   ALBUMIN 1.7*   PROT 5.6*      K 4.1   CO2 23      BUN 24*   CREATININE 2.1*   ALKPHOS 108   ALT 22   AST 32   BILITOT 0.6     Coagulation:   Recent Labs   Lab 08/21/20  0015   INR 1.0   APTT 32.2*     All labs within the past 24 hours have been reviewed.     Significant Imaging:  Labs: Reviewed    Assessment/Plan:     * Duodenum disorder  - Management per primary team     Metabolic acidosis  -secondary to renal failure, stable        Acute renal failure with tubular necrosis  oliguric stage 3 sid with ischemic atn likely secondary to septic shock  unknown bl cr  muddy brown casts on microscopy              08/21:  SLED overnight, increased drain output, net negative 2.1L/24h.  Tachycardic with low CVPs    Assessment:   -Hold RRT today.    -recheck labs this afternoon  -if electrolytes stable, plan to hold RRT and re-evaluate in AM  - Straight I/Os and daily weights  - Avoid nephrotoxins         Severe sepsis  - Management per primary team         Thank you for your  consult. I will follow-up with patient. Please contact us if you have any additional questions.    Nestor Urban MD  Nephrology  [facility]    ATTENDING PHYSICIAN ATTESTATION  I have personally verified the history and examined the patient. I thoroughly reviewed the demographic, clinical, laboratorial and imaging information available in medical records. I agree with the assessment and recommendations provided by the subspecialty resident who was under my supervision.

## 2020-08-22 NOTE — PROGRESS NOTES
[facility]  General Surgery  Progress Note    Subjective:     History of Present Illness:  Pt is a 38 yo F with recent history of antrectomy with BII reconstruction for ulcer disease at outside hospital. 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 Patient also reportedly aspirated on induction in the OR prior to this, resulting in severe pulmonary edema and hypoxia. Patient was transferred to [facility] urgently for higher level of care. She arrived as a direct SICU admit on near maximal vent settings and requiring low dose vasopressors with HR in the upper 140s. Her midline laparotomy is closed with 4 Navdeep drains in place draining bilious output.     Post-Op Info:  Procedure(s) (LRB):  EXPLORATION, WOUND (N/A)  EVACUATION, HEMATOMA (N/A)  APPLICATION, WOUND VAC (N/A)   1 Day Post-Op     Interval History: NAEON. RLQ drain change to bilious overnight. HDS, afebrile.  GODFREY 1: 321cc output bilious  GODFREY 2: 357cc output bilious    Medications:  Continuous Infusions:   lactated ringers 5 mL/hr at 08/16/20 0600    TPN ADULT CENTRAL LINE CUSTOM 45 mL/hr at 08/22/20 1000     Scheduled Meds:   famotidine (PF)  20 mg Intravenous Daily    fluconazole (DIFLUCAN) IVPB  200 mg Intravenous Q24H    heparin (porcine)  5,000 Units Subcutaneous Q8H    lorazepam  1 mg Intravenous Once    miconazole NITRATE 2 %   Topical (Top) BID    piperacillin-tazobactam (ZOSYN) IVPB  4.5 g Intravenous Q12H    sodium chloride 0.9%  1,000 mL Intravenous Once     PRN Meds:sodium chloride, sodium chloride, calcium gluconate IVPB, calcium gluconate IVPB, calcium gluconate IVPB, HYDROmorphone, magnesium sulfate IVPB, magnesium sulfate IVPB, melatonin, metoprolol, ondansetron, potassium chloride in water **AND** potassium chloride in water **AND** potassium chloride in water, sodium phosphate IVPB, sodium phosphate IVPB, sodium phosphate IVPB     Review of patient's allergies indicates:    Allergen Reactions    Latex      Objective:     Vital Signs (Most Recent):  Temp: 99.8 °F (37.7 °C) (08/22/20 0700)  Pulse: (!) 134 (08/22/20 1030)  Resp: (!) 34 (08/22/20 1030)  BP: (!) 156/81 (08/22/20 1030)  SpO2: 99 % (08/22/20 1030) Vital Signs (24h Range):  Temp:  [99.4 °F (37.4 °C)-100.1 °F (37.8 °C)] 99.8 °F (37.7 °C)  Pulse:  [113-150] 134  Resp:  [18-48] 34  SpO2:  [95 %-100 %] 99 %  BP: (125-180)/() 156/81     Weight: 63.5 kg (139 lb 15.9 oz)  Body mass index is 25.6 kg/m².    Intake/Output - Last 3 Shifts       08/20 0700 - 08/21 0659 08/21 0700 - 08/22 0659 08/22 0700 - 08/23 0659    I.V. (mL/kg) 2728 (39.8) 2310 (36.4)     Blood 494 451     IV Piggyback  1000      961     Total Intake(mL/kg) 4202 (61.3) 4722 (74.4)     Urine (mL/kg/hr)  0 (0)     Drains 1720 3178 430    Other 4399 804 50    Stool 0 0 0    Chest Tube 0      Total Output 6119 3982 480    Net -1917 +740 -480           Urine Occurrence  1 x     Stool Occurrence 1 x 3 x 1 x          Physical Exam  Constitutional:       Interventions: She is sedated.   HENT:      Head: Normocephalic.      Nose:      Comments: NGT in place  Cardiovascular:      Rate and Rhythm: Regular rhythm. Tachycardia present.   Pulmonary:      Effort: Pulmonary effort is normal.      Comments: NC 2L  Abdominal:      General: There is no distension.      Palpations: Abdomen is soft.      Tenderness: There is abdominal tenderness (appropriate).      Comments: R abdomen GODFREY x2 with bilious output  Wound vac in midline incision  G-tube with gastric fluid output   Musculoskeletal:         General: Swelling present.      Right lower leg: Edema present.      Left lower leg: Edema present.   Skin:     General: Skin is warm and dry.   Neurological:      Mental Status: She is alert.         Significant Labs:  CBC:   Recent Labs   Lab 08/22/20  0300   WBC 34.48*   RBC 2.84*   HGB 8.8*   HCT 26.1*      MCV 92   MCH 31.0   MCHC 33.7     CMP:   Recent Labs   Lab  08/22/20  0300   *   CALCIUM 8.5*   ALBUMIN 1.7*   PROT 5.6*      K 4.1   CO2 23      BUN 24*   CREATININE 2.1*   ALKPHOS 108   ALT 22   AST 32   BILITOT 0.6     Assessment/Plan:     Severe sepsis  38 yo F s/p antrectomy with BII reconstruction c/b duodenal necrosis requiring ex lap, washout, drainage c/b aspiration event. S/p duodenal resection with d-j anastomosis, g-j.  - Continue ICU care  - CT today  - Respiratory treatment q4h  - NPO  - NGT to LIWS  - G-tube to gravity  - Abx: diflucan, zosyn  - TPN   - Daily labs  - Trend CBC  - GI and DVT ppx  - PT / OT        Ying Cesar MD  General Surgery  [facility]

## 2020-08-22 NOTE — ASSESSMENT & PLAN NOTE
40 yo F s/p antrectomy with BII reconstruction c/b duodenal necrosis requiring ex lap, washout, drainage c/b aspiration event. Now with severe sepsis and ARDS     .Neuro:  - AOx4  - PRN hydromorphone for analgesia     Resp:  - 100% on 2L NC  - PRN breathing treatments  - Right chest tube with no output     CV:  - MAP goal >65  - Systolic <160  - Levo and Vaso gtt - off     Heme:  - Hgb/Hct 8.6;   - S/p Abdominal washout on 8/21  - Repeat CT scan today     ID:  - WBC 34   - Zosyn, Fluconazole  - Blood cultures and BAL taken 8/19 with NGTD and no abnormal findings     Renal:  - Oliguric on arrival; Cr 1.9 at that time   - CRRT held yesterday. Will also hold today. Nephrology appreciated  - Trend BUN/Cr; 24/2.1 today   - Monitor I/Os     FEN/GI:  - NPO  - TPN  - NGT to LIWS   - Maintain drains to bulb suction; LLQ with acute drop in drainage and accumulation in SQ tissue  - GI ppx  - Replace electrolytes as needed to keep K>4, Mg>2     Endo:  - Monitor BG     Dispo:  - Continue SICU care

## 2020-08-22 NOTE — NURSING
Dr. Roa notified of change in color and amount for both GODFREY drains. Superior drain changed from tan/brown/creamy to dark red/brown. Inferior drain changed from serosanguineous/red to dark red/brown. Output is 80-90ml/hr compared to 80-90ml Q4 yesterday. Will plan to go to CT later.

## 2020-08-22 NOTE — SUBJECTIVE & OBJECTIVE
Interval History: Patient on 2 liters NC, 1 episode of unmeasured urine output. Labs stable since Yesterday. Underwent evacuation of hematoma in the OR yesterday. Off vasopressors.     Review of patient's allergies indicates:   Allergen Reactions    Latex      Current Facility-Administered Medications   Medication Frequency    0.9%  NaCl infusion (for blood administration) Q24H PRN    0.9%  NaCl infusion (for blood administration) Q24H PRN    calcium gluconate 1g in dextrose 5% 100mL (ready to mix system) PRN    calcium gluconate 2 g in dextrose 5 % 100 mL IVPB PRN    calcium gluconate 3 g in dextrose 5 % 100 mL IVPB PRN    famotidine (PF) injection 20 mg Daily    fluconazole (DIFLUCAN) IVPB 200 mg 100 mL Q24H    heparin (porcine) injection 5,000 Units Q8H    HYDROmorphone injection 0.5 mg Q4H PRN    lactated ringers infusion Continuous    lorazepam injection 1 mg Once    magnesium sulfate 2g in water 50mL IVPB (premix) PRN    magnesium sulfate 2g in water 50mL IVPB (premix) PRN    melatonin tablet 3 mg Nightly PRN    metoprolol injection 5 mg Q4H PRN    miconazole NITRATE 2 % top powder BID    ondansetron injection 4 mg Once PRN    piperacillin-tazobactam 4.5 g in sodium chloride 0.9% 100 mL IVPB (ready to mix system) Q12H    potassium chloride 40 mEq in 100 mL IVPB (FOR CENTRAL LINE ADMINISTRATION ONLY) PRN    And    potassium chloride 20 mEq in 100 mL IVPB (FOR CENTRAL LINE ADMINISTRATION ONLY) PRN    And    potassium chloride 40 mEq in 100 mL IVPB (FOR CENTRAL LINE ADMINISTRATION ONLY) PRN    sodium chloride 0.9% bolus 1,000 mL Once    sodium phosphate 15 mmol in dextrose 5 % 250 mL IVPB PRN    sodium phosphate 20.01 mmol in dextrose 5 % 250 mL IVPB PRN    sodium phosphate 30 mmol in dextrose 5 % 250 mL IVPB PRN    TPN ADULT CENTRAL LINE CUSTOM Continuous       Objective:     Vital Signs (Most Recent):  Temp: 99.8 °F (37.7 °C) (08/22/20 0700)  Pulse: (!) 131 (08/22/20 0924)  Resp: (!)  36 (08/22/20 0924)  BP: (!) 172/84 (08/22/20 0800)  SpO2: 99 % (08/22/20 0924)  O2 Device (Oxygen Therapy): nasal cannula (08/22/20 0924) Vital Signs (24h Range):  Temp:  [99.4 °F (37.4 °C)-100.1 °F (37.8 °C)] 99.8 °F (37.7 °C)  Pulse:  [113-150] 131  Resp:  [18-48] 36  SpO2:  [95 %-100 %] 99 %  BP: (125-180)/() 172/84     Weight: 63.5 kg (139 lb 15.9 oz) (08/22/20 0503)  Body mass index is 25.6 kg/m².  Body surface area is 1.67 meters squared.    I/O last 3 completed shifts:  In: 7803 [I.V.:4614; Blood:725; IV Piggyback:1000]  Out: 9066 [Drains:4323; Other:4743]    Physical Exam  Vitals signs and nursing note reviewed.   Constitutional:       Appearance: She is not ill-appearing.      Interventions: She is sedated and intubated.   HENT:      Head: Normocephalic and atraumatic.      Mouth/Throat:      Mouth: Mucous membranes are dry.   Eyes:      General: No scleral icterus.        Right eye: No discharge.         Left eye: No discharge.      Extraocular Movements: Extraocular movements intact.      Conjunctiva/sclera: Conjunctivae normal.   Neck:      Musculoskeletal: Normal range of motion and neck supple.   Cardiovascular:      Rate and Rhythm: Regular rhythm. Tachycardia present.      Pulses: Normal pulses.      Heart sounds: Normal heart sounds.   Pulmonary:      Effort: Pulmonary effort is normal. No respiratory distress. She is intubated.      Breath sounds: No rales.   Abdominal:      General: Bowel sounds are normal.      Palpations: Abdomen is soft.   Musculoskeletal:      Right lower leg: Edema present.      Left lower leg: Edema present.   Skin:     General: Skin is warm and dry.      Findings: No bruising or rash.   Neurological:      General: No focal deficit present.      Mental Status: She is alert and oriented to person, place, and time.   Psychiatric:         Mood and Affect: Mood normal.         Behavior: Behavior normal.         Significant Labs:  CBC:   Recent Labs   Lab 08/22/20  0300    WBC 34.48*   RBC 2.84*   HGB 8.8*   HCT 26.1*      MCV 92   MCH 31.0   MCHC 33.7     CMP:   Recent Labs   Lab 08/22/20  0300   *   CALCIUM 8.5*   ALBUMIN 1.7*   PROT 5.6*      K 4.1   CO2 23      BUN 24*   CREATININE 2.1*   ALKPHOS 108   ALT 22   AST 32   BILITOT 0.6     Coagulation:   Recent Labs   Lab 08/21/20  0015   INR 1.0   APTT 32.2*     All labs within the past 24 hours have been reviewed.     Significant Imaging:  Labs: Reviewed

## 2020-08-23 ENCOUNTER — ANESTHESIA EVENT (OUTPATIENT)
Dept: SURGERY | Facility: HOSPITAL | Age: 40
DRG: 856 | End: 2020-08-23

## 2020-08-23 LAB
ABO + RH BLD: NORMAL
ALBUMIN SERPL BCP-MCNC: 1.4 G/DL (ref 3.5–5.2)
ALBUMIN SERPL BCP-MCNC: 1.4 G/DL (ref 3.5–5.2)
ALBUMIN SERPL BCP-MCNC: 1.5 G/DL (ref 3.5–5.2)
ALBUMIN SERPL BCP-MCNC: 1.5 G/DL (ref 3.5–5.2)
ALP SERPL-CCNC: 84 U/L (ref 55–135)
ALP SERPL-CCNC: 85 U/L (ref 55–135)
ALP SERPL-CCNC: 88 U/L (ref 55–135)
ALP SERPL-CCNC: 88 U/L (ref 55–135)
ALT SERPL W/O P-5'-P-CCNC: 16 U/L (ref 10–44)
ALT SERPL W/O P-5'-P-CCNC: 16 U/L (ref 10–44)
ALT SERPL W/O P-5'-P-CCNC: 17 U/L (ref 10–44)
ALT SERPL W/O P-5'-P-CCNC: 17 U/L (ref 10–44)
ANION GAP SERPL CALC-SCNC: 12 MMOL/L (ref 8–16)
ANION GAP SERPL CALC-SCNC: 14 MMOL/L (ref 8–16)
ANISOCYTOSIS BLD QL SMEAR: SLIGHT
AST SERPL-CCNC: 16 U/L (ref 10–40)
AST SERPL-CCNC: 30 U/L (ref 10–40)
AST SERPL-CCNC: 33 U/L (ref 10–40)
AST SERPL-CCNC: 33 U/L (ref 10–40)
BASOPHILS # BLD AUTO: 0.12 K/UL (ref 0–0.2)
BASOPHILS # BLD AUTO: 0.12 K/UL (ref 0–0.2)
BASOPHILS # BLD AUTO: 0.19 K/UL (ref 0–0.2)
BASOPHILS # BLD AUTO: 0.26 K/UL (ref 0–0.2)
BASOPHILS NFR BLD: 0.3 % (ref 0–1.9)
BASOPHILS NFR BLD: 0.3 % (ref 0–1.9)
BASOPHILS NFR BLD: 0.5 % (ref 0–1.9)
BASOPHILS NFR BLD: 0.7 % (ref 0–1.9)
BILIRUB SERPL-MCNC: 0.6 MG/DL (ref 0.1–1)
BILIRUB SERPL-MCNC: 0.7 MG/DL (ref 0.1–1)
BILIRUB SERPL-MCNC: 0.8 MG/DL (ref 0.1–1)
BILIRUB SERPL-MCNC: 1 MG/DL (ref 0.1–1)
BLD GP AB SCN CELLS X3 SERPL QL: NORMAL
BLD PROD TYP BPU: NORMAL
BLOOD UNIT EXPIRATION DATE: NORMAL
BLOOD UNIT TYPE CODE: 7300
BLOOD UNIT TYPE: NORMAL
BUN SERPL-MCNC: 49 MG/DL (ref 6–20)
BUN SERPL-MCNC: 55 MG/DL (ref 6–20)
BUN SERPL-MCNC: 60 MG/DL (ref 6–20)
BUN SERPL-MCNC: 68 MG/DL (ref 6–20)
CA-I BLDV-SCNC: 1.18 MMOL/L (ref 1.06–1.42)
CA-I BLDV-SCNC: 1.19 MMOL/L (ref 1.06–1.42)
CA-I BLDV-SCNC: 1.2 MMOL/L (ref 1.06–1.42)
CA-I BLDV-SCNC: 1.21 MMOL/L (ref 1.06–1.42)
CALCIUM SERPL-MCNC: 8.3 MG/DL (ref 8.7–10.5)
CALCIUM SERPL-MCNC: 8.7 MG/DL (ref 8.7–10.5)
CALCIUM SERPL-MCNC: 8.7 MG/DL (ref 8.7–10.5)
CALCIUM SERPL-MCNC: 8.8 MG/DL (ref 8.7–10.5)
CHLORIDE SERPL-SCNC: 103 MMOL/L (ref 95–110)
CHLORIDE SERPL-SCNC: 104 MMOL/L (ref 95–110)
CHLORIDE SERPL-SCNC: 104 MMOL/L (ref 95–110)
CHLORIDE SERPL-SCNC: 105 MMOL/L (ref 95–110)
CO2 SERPL-SCNC: 20 MMOL/L (ref 23–29)
CO2 SERPL-SCNC: 21 MMOL/L (ref 23–29)
CO2 SERPL-SCNC: 22 MMOL/L (ref 23–29)
CO2 SERPL-SCNC: 22 MMOL/L (ref 23–29)
CODING SYSTEM: NORMAL
CREAT SERPL-MCNC: 3.3 MG/DL (ref 0.5–1.4)
CREAT SERPL-MCNC: 3.7 MG/DL (ref 0.5–1.4)
CREAT SERPL-MCNC: 3.7 MG/DL (ref 0.5–1.4)
CREAT SERPL-MCNC: 4.1 MG/DL (ref 0.5–1.4)
DIFFERENTIAL METHOD: ABNORMAL
DISPENSE STATUS: NORMAL
EOSINOPHIL # BLD AUTO: 0.2 K/UL (ref 0–0.5)
EOSINOPHIL # BLD AUTO: 0.2 K/UL (ref 0–0.5)
EOSINOPHIL # BLD AUTO: 0.3 K/UL (ref 0–0.5)
EOSINOPHIL # BLD AUTO: 0.3 K/UL (ref 0–0.5)
EOSINOPHIL NFR BLD: 0.5 % (ref 0–8)
EOSINOPHIL NFR BLD: 0.7 % (ref 0–8)
EOSINOPHIL NFR BLD: 0.7 % (ref 0–8)
EOSINOPHIL NFR BLD: 0.8 % (ref 0–8)
ERYTHROCYTE [DISTWIDTH] IN BLOOD BY AUTOMATED COUNT: 14 % (ref 11.5–14.5)
ERYTHROCYTE [DISTWIDTH] IN BLOOD BY AUTOMATED COUNT: 14.4 % (ref 11.5–14.5)
ERYTHROCYTE [DISTWIDTH] IN BLOOD BY AUTOMATED COUNT: 14.5 % (ref 11.5–14.5)
ERYTHROCYTE [DISTWIDTH] IN BLOOD BY AUTOMATED COUNT: 14.7 % (ref 11.5–14.5)
EST. GFR  (AFRICAN AMERICAN): 14.9 ML/MIN/1.73 M^2
EST. GFR  (AFRICAN AMERICAN): 16.9 ML/MIN/1.73 M^2
EST. GFR  (AFRICAN AMERICAN): 16.9 ML/MIN/1.73 M^2
EST. GFR  (AFRICAN AMERICAN): 19.4 ML/MIN/1.73 M^2
EST. GFR  (NON AFRICAN AMERICAN): 12.9 ML/MIN/1.73 M^2
EST. GFR  (NON AFRICAN AMERICAN): 14.6 ML/MIN/1.73 M^2
EST. GFR  (NON AFRICAN AMERICAN): 14.6 ML/MIN/1.73 M^2
EST. GFR  (NON AFRICAN AMERICAN): 16.8 ML/MIN/1.73 M^2
GLUCOSE SERPL-MCNC: 124 MG/DL (ref 70–110)
GLUCOSE SERPL-MCNC: 130 MG/DL (ref 70–110)
GLUCOSE SERPL-MCNC: 131 MG/DL (ref 70–110)
GLUCOSE SERPL-MCNC: 131 MG/DL (ref 70–110)
HCT VFR BLD AUTO: 18.5 % (ref 37–48.5)
HCT VFR BLD AUTO: 22.2 % (ref 37–48.5)
HCT VFR BLD AUTO: 24.6 % (ref 37–48.5)
HCT VFR BLD AUTO: 25.6 % (ref 37–48.5)
HGB BLD-MCNC: 6.2 G/DL (ref 12–16)
HGB BLD-MCNC: 7.2 G/DL (ref 12–16)
HGB BLD-MCNC: 8.1 G/DL (ref 12–16)
HGB BLD-MCNC: 8.5 G/DL (ref 12–16)
HYPOCHROMIA BLD QL SMEAR: ABNORMAL
IMM GRANULOCYTES # BLD AUTO: 1.19 K/UL (ref 0–0.04)
IMM GRANULOCYTES # BLD AUTO: 1.3 K/UL (ref 0–0.04)
IMM GRANULOCYTES # BLD AUTO: 1.33 K/UL (ref 0–0.04)
IMM GRANULOCYTES # BLD AUTO: 1.42 K/UL (ref 0–0.04)
IMM GRANULOCYTES NFR BLD AUTO: 3.2 % (ref 0–0.5)
IMM GRANULOCYTES NFR BLD AUTO: 3.6 % (ref 0–0.5)
IMM GRANULOCYTES NFR BLD AUTO: 3.7 % (ref 0–0.5)
IMM GRANULOCYTES NFR BLD AUTO: 3.9 % (ref 0–0.5)
LYMPHOCYTES # BLD AUTO: 3.4 K/UL (ref 1–4.8)
LYMPHOCYTES # BLD AUTO: 3.5 K/UL (ref 1–4.8)
LYMPHOCYTES # BLD AUTO: 3.5 K/UL (ref 1–4.8)
LYMPHOCYTES # BLD AUTO: 3.7 K/UL (ref 1–4.8)
LYMPHOCYTES NFR BLD: 10.2 % (ref 18–48)
LYMPHOCYTES NFR BLD: 9.3 % (ref 18–48)
LYMPHOCYTES NFR BLD: 9.6 % (ref 18–48)
LYMPHOCYTES NFR BLD: 9.6 % (ref 18–48)
MAGNESIUM SERPL-MCNC: 2.5 MG/DL (ref 1.6–2.6)
MAGNESIUM SERPL-MCNC: 2.5 MG/DL (ref 1.6–2.6)
MAGNESIUM SERPL-MCNC: 2.6 MG/DL (ref 1.6–2.6)
MCH RBC QN AUTO: 29.5 PG (ref 27–31)
MCH RBC QN AUTO: 30.4 PG (ref 27–31)
MCH RBC QN AUTO: 30.7 PG (ref 27–31)
MCH RBC QN AUTO: 30.8 PG (ref 27–31)
MCHC RBC AUTO-ENTMCNC: 32.4 G/DL (ref 32–36)
MCHC RBC AUTO-ENTMCNC: 32.9 G/DL (ref 32–36)
MCHC RBC AUTO-ENTMCNC: 33.2 G/DL (ref 32–36)
MCHC RBC AUTO-ENTMCNC: 33.5 G/DL (ref 32–36)
MCV RBC AUTO: 91 FL (ref 82–98)
MCV RBC AUTO: 91 FL (ref 82–98)
MCV RBC AUTO: 92 FL (ref 82–98)
MCV RBC AUTO: 93 FL (ref 82–98)
MONOCYTES # BLD AUTO: 1.8 K/UL (ref 0.3–1)
MONOCYTES # BLD AUTO: 1.8 K/UL (ref 0.3–1)
MONOCYTES # BLD AUTO: 2.1 K/UL (ref 0.3–1)
MONOCYTES # BLD AUTO: 2.2 K/UL (ref 0.3–1)
MONOCYTES NFR BLD: 4.9 % (ref 4–15)
MONOCYTES NFR BLD: 5.2 % (ref 4–15)
MONOCYTES NFR BLD: 5.8 % (ref 4–15)
MONOCYTES NFR BLD: 5.8 % (ref 4–15)
NEUTROPHILS # BLD AUTO: 28.6 K/UL (ref 1.8–7.7)
NEUTROPHILS # BLD AUTO: 28.8 K/UL (ref 1.8–7.7)
NEUTROPHILS # BLD AUTO: 29.6 K/UL (ref 1.8–7.7)
NEUTROPHILS # BLD AUTO: 30.2 K/UL (ref 1.8–7.7)
NEUTROPHILS NFR BLD: 78.7 % (ref 38–73)
NEUTROPHILS NFR BLD: 80.6 % (ref 38–73)
NEUTROPHILS NFR BLD: 80.7 % (ref 38–73)
NEUTROPHILS NFR BLD: 80.7 % (ref 38–73)
NRBC BLD-RTO: 0 /100 WBC
OVALOCYTES BLD QL SMEAR: ABNORMAL
PHOSPHATE SERPL-MCNC: 3.3 MG/DL (ref 2.7–4.5)
PHOSPHATE SERPL-MCNC: 3.5 MG/DL (ref 2.7–4.5)
PHOSPHATE SERPL-MCNC: 4.7 MG/DL (ref 2.7–4.5)
PLATELET # BLD AUTO: 205 K/UL (ref 150–350)
PLATELET # BLD AUTO: 225 K/UL (ref 150–350)
PLATELET # BLD AUTO: 231 K/UL (ref 150–350)
PLATELET # BLD AUTO: 238 K/UL (ref 150–350)
PLATELET BLD QL SMEAR: ABNORMAL
PMV BLD AUTO: 11.1 FL (ref 9.2–12.9)
PMV BLD AUTO: 11.1 FL (ref 9.2–12.9)
PMV BLD AUTO: 11.3 FL (ref 9.2–12.9)
PMV BLD AUTO: 11.5 FL (ref 9.2–12.9)
POCT GLUCOSE: 135 MG/DL (ref 70–110)
POCT GLUCOSE: 140 MG/DL (ref 70–110)
POCT GLUCOSE: 157 MG/DL (ref 70–110)
POIKILOCYTOSIS BLD QL SMEAR: SLIGHT
POLYCHROMASIA BLD QL SMEAR: ABNORMAL
POTASSIUM SERPL-SCNC: 4.5 MMOL/L (ref 3.5–5.1)
POTASSIUM SERPL-SCNC: 4.5 MMOL/L (ref 3.5–5.1)
POTASSIUM SERPL-SCNC: 4.6 MMOL/L (ref 3.5–5.1)
POTASSIUM SERPL-SCNC: 4.6 MMOL/L (ref 3.5–5.1)
PROT SERPL-MCNC: 5.3 G/DL (ref 6–8.4)
PROT SERPL-MCNC: 5.3 G/DL (ref 6–8.4)
PROT SERPL-MCNC: 5.5 G/DL (ref 6–8.4)
PROT SERPL-MCNC: 5.5 G/DL (ref 6–8.4)
RBC # BLD AUTO: 2.01 M/UL (ref 4–5.4)
RBC # BLD AUTO: 2.44 M/UL (ref 4–5.4)
RBC # BLD AUTO: 2.64 M/UL (ref 4–5.4)
RBC # BLD AUTO: 2.8 M/UL (ref 4–5.4)
SODIUM SERPL-SCNC: 138 MMOL/L (ref 136–145)
SODIUM SERPL-SCNC: 141 MMOL/L (ref 136–145)
TRANS ERYTHROCYTES VOL PATIENT: NORMAL ML
WBC # BLD AUTO: 35.39 K/UL (ref 3.9–12.7)
WBC # BLD AUTO: 36.6 K/UL (ref 3.9–12.7)
WBC # BLD AUTO: 36.62 K/UL (ref 3.9–12.7)
WBC # BLD AUTO: 37.46 K/UL (ref 3.9–12.7)

## 2020-08-23 PROCEDURE — 63600175 PHARM REV CODE 636 W HCPCS: Performed by: STUDENT IN AN ORGANIZED HEALTH CARE EDUCATION/TRAINING PROGRAM

## 2020-08-23 PROCEDURE — 99233 PR SUBSEQUENT HOSPITAL CARE,LEVL III: ICD-10-PCS | Mod: 24,,, | Performed by: SURGERY

## 2020-08-23 PROCEDURE — 25000003 PHARM REV CODE 250: Performed by: SURGERY

## 2020-08-23 PROCEDURE — P9021 RED BLOOD CELLS UNIT: HCPCS

## 2020-08-23 PROCEDURE — 94761 N-INVAS EAR/PLS OXIMETRY MLT: CPT

## 2020-08-23 PROCEDURE — 86850 RBC ANTIBODY SCREEN: CPT

## 2020-08-23 PROCEDURE — 86920 COMPATIBILITY TEST SPIN: CPT

## 2020-08-23 PROCEDURE — 36430 TRANSFUSION BLD/BLD COMPNT: CPT

## 2020-08-23 PROCEDURE — 99232 SBSQ HOSP IP/OBS MODERATE 35: CPT | Mod: ,,, | Performed by: INTERNAL MEDICINE

## 2020-08-23 PROCEDURE — 99900035 HC TECH TIME PER 15 MIN (STAT)

## 2020-08-23 PROCEDURE — 25000003 PHARM REV CODE 250: Performed by: STUDENT IN AN ORGANIZED HEALTH CARE EDUCATION/TRAINING PROGRAM

## 2020-08-23 PROCEDURE — P9016 RBC LEUKOCYTES REDUCED: HCPCS

## 2020-08-23 PROCEDURE — 99233 SBSQ HOSP IP/OBS HIGH 50: CPT | Mod: 24,,, | Performed by: SURGERY

## 2020-08-23 PROCEDURE — 80053 COMPREHEN METABOLIC PANEL: CPT

## 2020-08-23 PROCEDURE — 85025 COMPLETE CBC W/AUTO DIFF WBC: CPT

## 2020-08-23 PROCEDURE — 20000000 HC ICU ROOM

## 2020-08-23 PROCEDURE — 84100 ASSAY OF PHOSPHORUS: CPT | Mod: 91

## 2020-08-23 PROCEDURE — 82330 ASSAY OF CALCIUM: CPT | Mod: 91

## 2020-08-23 PROCEDURE — A4217 STERILE WATER/SALINE, 500 ML: HCPCS | Performed by: STUDENT IN AN ORGANIZED HEALTH CARE EDUCATION/TRAINING PROGRAM

## 2020-08-23 PROCEDURE — 83735 ASSAY OF MAGNESIUM: CPT | Mod: 91

## 2020-08-23 PROCEDURE — 94664 DEMO&/EVAL PT USE INHALER: CPT

## 2020-08-23 PROCEDURE — S0028 INJECTION, FAMOTIDINE, 20 MG: HCPCS | Performed by: STUDENT IN AN ORGANIZED HEALTH CARE EDUCATION/TRAINING PROGRAM

## 2020-08-23 PROCEDURE — 99232 PR SUBSEQUENT HOSPITAL CARE,LEVL II: ICD-10-PCS | Mod: ,,, | Performed by: INTERNAL MEDICINE

## 2020-08-23 RX ORDER — HYDROCODONE BITARTRATE AND ACETAMINOPHEN 500; 5 MG/1; MG/1
TABLET ORAL
Status: DISCONTINUED | OUTPATIENT
Start: 2020-08-23 | End: 2020-08-24

## 2020-08-23 RX ADMIN — FAMOTIDINE 20 MG: 10 INJECTION INTRAVENOUS at 08:08

## 2020-08-23 RX ADMIN — HYDROMORPHONE HYDROCHLORIDE 0.5 MG: 1 INJECTION, SOLUTION INTRAMUSCULAR; INTRAVENOUS; SUBCUTANEOUS at 03:08

## 2020-08-23 RX ADMIN — PIPERACILLIN SODIUM AND TAZOBACTAM SODIUM 4.5 G: 4; .5 INJECTION, POWDER, LYOPHILIZED, FOR SOLUTION INTRAVENOUS at 08:08

## 2020-08-23 RX ADMIN — HYDROMORPHONE HYDROCHLORIDE 0.5 MG: 1 INJECTION, SOLUTION INTRAMUSCULAR; INTRAVENOUS; SUBCUTANEOUS at 08:08

## 2020-08-23 RX ADMIN — MAGNESIUM SULFATE HEPTAHYDRATE: 500 INJECTION, SOLUTION INTRAMUSCULAR; INTRAVENOUS at 10:08

## 2020-08-23 RX ADMIN — METOROPROLOL TARTRATE 5 MG: 5 INJECTION, SOLUTION INTRAVENOUS at 06:08

## 2020-08-23 RX ADMIN — MICONAZOLE NITRATE: 20 POWDER TOPICAL at 08:08

## 2020-08-23 RX ADMIN — HEPARIN SODIUM 5000 UNITS: 5000 INJECTION INTRAVENOUS; SUBCUTANEOUS at 02:08

## 2020-08-23 RX ADMIN — HYDROMORPHONE HYDROCHLORIDE 0.5 MG: 1 INJECTION, SOLUTION INTRAMUSCULAR; INTRAVENOUS; SUBCUTANEOUS at 04:08

## 2020-08-23 RX ADMIN — METOROPROLOL TARTRATE 5 MG: 5 INJECTION, SOLUTION INTRAVENOUS at 05:08

## 2020-08-23 RX ADMIN — HEPARIN SODIUM 5000 UNITS: 5000 INJECTION INTRAVENOUS; SUBCUTANEOUS at 10:08

## 2020-08-23 RX ADMIN — METOROPROLOL TARTRATE 5 MG: 5 INJECTION, SOLUTION INTRAVENOUS at 11:08

## 2020-08-23 RX ADMIN — HEPARIN SODIUM 5000 UNITS: 5000 INJECTION INTRAVENOUS; SUBCUTANEOUS at 06:08

## 2020-08-23 RX ADMIN — FLUCONAZOLE 200 MG: 200 INJECTION, SOLUTION INTRAVENOUS at 11:08

## 2020-08-23 RX ADMIN — MICONAZOLE NITRATE: 20 POWDER TOPICAL at 09:08

## 2020-08-23 NOTE — NURSING
"Dx: Duodenum disorder    Neuro: AAOx4. Follows commands.  Vital Signs: /77   Pulse (!) 116   Temp 98.9 °F (37.2 °C) (Oral)   Resp (!) 24   Ht 5' 2" (1.575 m)   Wt 63.5 kg (139 lb 15.9 oz)   SpO2 98%   Breastfeeding No   BMI 25.60 kg/m²   Cardiac: patient remains ST 110s-130s on scheduled metoprolol  Resp: RA  Diet: NPO  Gtts: TPN @ 45  Urine Output: oliguric  Drains:  JPx2 with brown/yellow bile drainage. GODFREY #1 to continuous low wall suction. Prevena wound vac with sanguinous drainage.    Labs/Accuchecks: accuchecks Q6, CMP @6  Skin: midline abdominal incision with prevena wound vac. DTI to bilateral shins; foam dsg applied. Foam dsg in place. Turned Q2. No breakdown noted.  Shift Events: Right sided hematoma extending from RLQ to RUQ. Firm on palpation. Dr. Felix is aware and patient is scheduled for surgery in AM. Consents in chart.         "

## 2020-08-23 NOTE — ANESTHESIA PREPROCEDURE EVALUATION
[facility]  Anesthesia Pre-Operative Evaluation         Patient Name: Jaquan Farmer  YOB: 1980  MRN: [id number]    SUBJECTIVE:     Pre-operative evaluation for Procedure(s) (LRB):  WASHOUT abdomen (N/A)     08/23/2020    Jaquan Farmer is a 39 y.o. female w/ a significant PMHx of recent history of antrectomy with BII reconstruction for ulcer disease at outside hospital. 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 Patient also reportedly aspirated on induction in the OR prior to this, resulting in severe pulmonary edema and hypoxia. [place] [facility] [place] higher level of care. She arrived as a direct SICU admit on near maximal vent settings and requiring low dose vasopressors with HR in the upper 140s. Her midline laparotomy is closed with 4 Navdeep drains in place draining bilious output. Has had 3x ex-lap since admission (8/13,15,18/2020).      Extubated on 8/20/20.  On HD intermittently.    Hemodynamically stable (sinus tachycardia), on room air.  Access: R IJ trialysis.    Currently on room air, not on vasopressors.    Patient now presents for the above procedure(s).      LDA:   Trialysis (Dialysis) Catheter 08/13/20 2230 right internal jugular (Active)   Verification by X-ray Yes 08/23/20 0715   Site Assessment No drainage;No redness;No swelling;No warmth 08/23/20 0715   Line Securement Device Secured with sutures 08/23/20 0715   Dressing Type Biopatch in place;Central line dressing with pants 08/23/20 0715   Dressing Status Clean;Dry;Intact 08/23/20 0715   Dressing Intervention Integrity maintained 08/23/20 0715   Date on Dressing 08/19/20 08/23/20 0715   Dressing Due to be Changed 08/26/20 08/23/20 0715   Venous Patency/Care infusing 08/23/20 0715   Arterial Patency/Care infusing 08/23/20 0715   Distal Patency/Care flushed w/o difficulty;normal saline locked 08/23/20 0715   Flows Good 08/21/20 0300    Waveform Other (Comments) 08/23/20 0715   Line Necessity Review CRRT/HD 08/23/20 0715   Number of days: 9            Peripheral IV - Single Lumen 08/20/20 0430 20 G Anterior;Right Forearm (Active)   Site Assessment Clean;Dry;Intact;No redness;No swelling 08/23/20 0715   Line Status Infusing 08/23/20 0715   Dressing Status Clean;Dry;Intact 08/23/20 0715   Dressing Intervention Integrity maintained 08/23/20 0715   Dressing Change Due 08/24/20 08/23/20 0715   Site Change Due 08/24/20 08/23/20 0715   Reason Not Rotated Not due 08/23/20 0715   Number of days: 3            Closed/Suction Drain 08/13/20 1659 Right;Superior Abdomen Bulb 19 Fr. (Active)   Site Description Leaking at site;Healing 08/23/20 0715   Dressing Type Gauze 08/23/20 0715   Dressing Status New drainage 08/23/20 0715   Dressing Intervention Sterile dressing change 08/23/20 0715   Drainage Yellow;Brown 08/23/20 0715   Status To bulb suction 08/23/20 0715   Output (mL) 100 mL 08/23/20 1100   Number of days: 9            Closed/Suction Drain 08/13/20 1659 Right;Inferior Abdomen Bulb 19 Fr. (Active)   Site Description Leaking at site;Healing 08/23/20 0715   Dressing Type Gauze 08/23/20 0715   Dressing Status New drainage 08/23/20 0715   Dressing Intervention Sterile dressing change 08/23/20 0715   Drainage Brown;Yellow 08/23/20 0715   Status To bulb suction 08/23/20 0715   Output (mL) 100 mL 08/23/20 1100   Number of days: 9            Gastrostomy/Enterostomy 08/15/20 0916 Gastrostomy tube w/ balloon LUQ decompression (Active)   Securement sutured to abdomen 08/23/20 0715   Suction Setting/Drainage Method dependent drainage 08/23/20 0715   Drainage clear;green 08/23/20 0715   Clamp Status/Tolerance unclamped 08/23/20 0715   Dressing dry and intact 08/23/20 0715   Insertion Site dry;no redness;no warmth;no drainage;no tenderness;no swelling 08/23/20 0715   Site Care sterile 4 x 4 gauze dressing applied;secured w/ tape 08/23/20 2013   Dressing Change Due  08/21/20 08/21/20 0300   Intake (mL) 900 mL 08/22/20 1300   Tube Output(mL)(Include Discarded Residual) 75 mL 08/23/20 1100   Intake (mL) - Breast Milk Tube Feeding 120 08/18/20 1500   Number of days: 8       Prev airway:   Placement Date: 08/15/20; Placement Time: 0835 (created via procedure documentation); Method of Intubation: Other (Comment) (MedWhat airway exchanger); Inserted by: CRNA; Airway Device: Endotracheal Tube; Mask Ventilation: Not Attempted; Intubated: Other (see comments); Blade: Other (see comments); Airway Device Size: 7.5; Style: Cuffed; Cuff Inflation: Minimal occlusive pressure; Placement Verified By: Capnometry, Auscultation, ETT Condensation; Grade: Grade I; Complicating Factors: None; Intubation Findings: Bilateral breath sounds, Atraumatic/Condition of teeth unchanged, Positive EtCO2;  Depth of Insertion: 22; Securment: Lips; Complications: None; Breath Sounds: Equal Bilateral; Insertion Attempts: 1; Removal Date: 08/20/20;  Removal Time: 1501    Drips:    lactated ringers 5 mL/hr at 08/16/20 0600    TPN ADULT CENTRAL LINE CUSTOM 45 mL/hr at 08/23/20 1100    TPN ADULT CENTRAL LINE CUSTOM         Patient Active Problem List   Diagnosis    Duodenum disorder    Severe sepsis    Acute renal failure with tubular necrosis    Metabolic acidosis    Ischemic necrosis of small bowel    Septic shock    Encounter for weaning from ventilator    Acute hypoxemic respiratory failure    Acute blood loss anemia    Anemia, chronic disease    Bandemia       Review of patient's allergies indicates:   Allergen Reactions    Latex        Current Inpatient Medications:   famotidine (PF)  20 mg Intravenous Daily    fluconazole (DIFLUCAN) IVPB  200 mg Intravenous Q24H    heparin (porcine)  5,000 Units Subcutaneous Q8H    lorazepam  1 mg Intravenous Once    metoprolol  5 mg Intravenous Q6H    miconazole NITRATE 2 %   Topical (Top) BID    piperacillin-tazobactam (ZOSYN) IVPB  4.5 g Intravenous Q12H     sodium chloride 0.9%  1,000 mL Intravenous Once       No current facility-administered medications on file prior to encounter.      Current Outpatient Medications on File Prior to Encounter   Medication Sig Dispense Refill    aspirin (ECOTRIN) 81 MG EC tablet   = 1 tab, Oral, Daily, Ordered by Dr. Moreira, # 90 tab, 3 Refill(s), Maintenance      metoprolol tartrate (LOPRESSOR) 25 MG tablet   See Instructions, TAKE 1 TABLET BY MOUTH TWICE DAILY, tab, # 60, eRx: [id number]      sumatriptan (IMITREX) 50 MG tablet   = 1 tab, Oral, Daily, PRN for migraine headache, may repeat dose after 2 hours up to a maximum of 200 mg in 24 hours, # 9 tab, 1 Refill(s), Maintenance, Pharmacy: [id number]         Past Surgical History:   Procedure Laterality Date    APPLICATION OF WOUND VACUUM-ASSISTED CLOSURE DEVICE  8/13/2020    Procedure: APPLICATION, WOUND VAC WITH ABTHERA;  Surgeon: Donis Roa MD;  Location: [facility] OR [address] [place] [address];  Service: General;;    COLONOSCOPY      DUODENECTOMY  8/13/2020    Procedure: KOCHERIZATION OF DUODENUM, DUODENECTOMY;  Surgeon: Donis Roa MD;  Location: [facility] OR [address] [place] [address];  Service: General;;    ESOPHAGOGASTRODUODENOSCOPY  01/23/2018    ESOPHAGOGASTRODUODENOSCOPY N/A 8/13/2020    Procedure: EGD (ESOPHAGOGASTRODUODENOSCOPY);  Surgeon: Donis Roa MD;  Location: [address];  Service: General;  Laterality: N/A;    PERCUTANEOUS INSERTION OF GASTROSTOMY TUBE  8/15/2020    Procedure: INSERTION, GASTROSTOMY TUBE, PERCUTANEOUS;  Surgeon: Donis Roa MD;  Location: [facility] OR [address] [place] [address];  Service: General;;    TUBAL LIGATION         Social History     Socioeconomic History    Marital status: Unknown     Spouse name: Not on file    Number of children: Not on file    Years of education: Not on file    Highest education level: Not on file   Occupational History    Not on file   Social Needs    Financial resource strain: Not on file    Food  insecurity     Worry: Not on file     Inability: Not on file    Transportation needs     Medical: Not on file     Non-medical: Not on file   Tobacco Use    Smoking status: Current Some Day Smoker     Packs/day: 0.25     Types: Cigarettes    Smokeless tobacco: Never Used   Substance and Sexual Activity    Alcohol use: Not Currently    Drug use: Not on file    Sexual activity: Not on file   Lifestyle    Physical activity     Days per week: Not on file     Minutes per session: Not on file    Stress: Not on file   Relationships    Social connections     Talks on phone: Not on file     Gets together: Not on file     Attends Hoahaoism service: Not on file     Active member of club or organization: Not on file     Attends meetings of clubs or organizations: Not on file     Relationship status: Not on file   Other Topics Concern    Not on file   Social History Narrative    Not on file       OBJECTIVE:     Vital Signs Range (Last 24H):  Temp:  [36.9 °C (98.5 °F)-37.8 °C (100 °F)]   Pulse:  [110-148]   Resp:  [21-46]   BP: (130-176)/()   SpO2:  [96 %-100 %]   Arterial Line BP: (141-142)/(70-76)       Significant Labs:  Lab Results   Component Value Date    WBC 36.60 (H) 08/23/2020    HGB 8.5 (L) 08/23/2020    HCT 25.6 (L) 08/23/2020     08/23/2020    TRIG 255 (H) 08/15/2020    ALT 17 08/23/2020    AST 33 08/23/2020     08/23/2020    K 4.5 08/23/2020     08/23/2020    CREATININE 3.3 (H) 08/23/2020    BUN 49 (H) 08/23/2020    CO2 22 (L) 08/23/2020    INR 1.0 08/21/2020       Diagnostic Studies: No relevant studies.    EKG:   Results for orders placed or performed during the hospital encounter of 08/12/20   EKG 12-lead    Collection Time: 08/20/20  7:09 PM    Narrative    Test Reason : R00.0,    Vent. Rate : 131 BPM     Atrial Rate : 131 BPM     P-R Int : 100 ms          QRS Dur : 070 ms      QT Int : 276 ms       P-R-T Axes : 073 049 051 degrees     QTc Int : 407 ms    Sinus tachycardia with  short IA  Possible Left atrial enlargement  Borderline Abnormal ECG  No previous ECGs available  Confirmed by Emma Leiva MD ([id number]) on 8/21/2020 12:15:32 PM    Referred By: SONALI RENDON           Confirmed By:Emma Leiva MD       2D ECHO:  TTE:  No results found for this or any previous visit.    TOBIAS:  No results found for this or any previous visit.    ASSESSMENT/PLAN:     08/23/2020  Jaquan Farmer is a 39 y.o., female.    Anesthesia Evaluation    I have reviewed the Patient Summary Reports.    I have reviewed the Nursing Notes. I have reviewed the NPO Status.   I have reviewed the Medications.     Review of Systems  Anesthesia Hx:  No problems with previous Anesthesia  History of prior surgery of interest to airway management or planning: Denies Family Hx of Anesthesia complications.   Denies Personal Hx of Anesthesia complications.   Social:  Non-Smoker, Smoker    Hematology/Oncology:     Oncology Normal    -- Anemia:   EENT/Dental:EENT/Dental Normal   Cardiovascular:   Hypertension    Pulmonary:   Pneumonia    Renal/:   Chronic Renal Disease, ARF    Hepatic/GI:   PUD,    Musculoskeletal:  Musculoskeletal Normal    Neurological:  Neurology Normal    Endocrine:  Endocrine Normal    Dermatological:  Skin Normal    Psych:  Psychiatric Normal           Physical Exam  General:  Well nourished    Airway/Jaw/Neck:  Airway Findings: Mouth Opening: Small, but > 3cm Tongue: Normal  General Airway Assessment: Adult  Mallampati: II  TM Distance: 4 - 6 cm  Jaw/Neck Findings:  Neck ROM: Normal ROM      Dental:  Dental Findings: Periodontal disease, Severe   Chest/Lungs:  Chest/Lungs Findings: Rhonchi  Hoarse voice     Heart/Vascular:  Heart Findings: Rate: Tachycardia  Rhythm: Regular Rhythm  Sounds: Normal     Abdomen:  Abdomen Findings:  Surgical incision      Skin:  Skin Findings:  Surgical Incision, Recent     Mental Status:  Mental Status Findings:  Cooperative, Alert and Oriented         Anesthesia Plan  Type  of Anesthesia, risks & benefits discussed:  Anesthesia Type:  general  Patient's Preference:   Intra-op Monitoring Plan: standard ASA monitors  Intra-op Monitoring Plan Comments:   Post Op Pain Control Plan: multimodal analgesia, IV/PO Opioids PRN and per primary service following discharge from PACU  Post Op Pain Control Plan Comments:   Induction:   IV  Beta Blocker:  Patient is not currently on a Beta-Blocker (No further documentation required).       Informed Consent: Patient understands risks and agrees with Anesthesia plan.  Questions answered. Anesthesia consent signed with patient.  ASA Score: 3     Day of Surgery Review of History & Physical:    H&P update referred to the surgeon.         Ready For Surgery From Anesthesia Perspective.

## 2020-08-23 NOTE — SUBJECTIVE & OBJECTIVE
Interval History/Significant Events: NAEO. Likely back to OR 8/23 or 24. VSS.      Follow-up For: Procedure(s) (LRB):  EXPLORATION, WOUND (N/A)  EVACUATION, HEMATOMA (N/A)  APPLICATION, WOUND VAC (N/A)    Post-Operative Day: 2 Days Post-Op    Objective:     Vital Signs (Most Recent):  Temp: 99.4 °F (37.4 °C) (08/23/20 0820)  Pulse: (!) 120 (08/23/20 0820)  Resp: (!) 21 (08/23/20 0820)  BP: (!) 144/78 (08/23/20 0820)  SpO2: 99 % (08/23/20 0820) Vital Signs (24h Range):  Temp:  [98.9 °F (37.2 °C)-100 °F (37.8 °C)] 99.4 °F (37.4 °C)  Pulse:  [111-148] 120  Resp:  [21-46] 21  SpO2:  [96 %-100 %] 99 %  BP: (130-176)/() 144/78  Arterial Line BP: (141-142)/(70-76) 142/76     Weight: 63.5 kg (139 lb 15.9 oz)  Body mass index is 25.6 kg/m².      Intake/Output Summary (Last 24 hours) at 8/23/2020 1004  Last data filed at 8/23/2020 0820  Gross per 24 hour   Intake 3122 ml   Output 3915 ml   Net -793 ml       Physical Exam  Constitutional:       Interventions: She is sedated.   HENT:      Head: Normocephalic.      Nose:      Comments: NGT in place  Cardiovascular:      Rate and Rhythm: Regular rhythm. Tachycardia present.   Pulmonary:      Effort: Pulmonary effort is normal.      Comments: NC 2L  Abdominal:      General: There is no distension.      Palpations: Abdomen is soft.      Tenderness: There is abdominal tenderness (appropriate).      Comments: R abdomen GODFREY x2 with bilious output  Wound vac in midline incision  G-tube with gastric fluid output   Musculoskeletal:         General: Swelling present.      Right lower leg: Edema present.      Left lower leg: Edema present.   Skin:     General: Skin is warm and dry.   Neurological:      Mental Status: She is alert.         Vents:  Vent Mode: Spont (08/20/20 1501)  Ventilator Initiated: Yes(chart correction) (08/12/20 1930)  Set Rate: 20 BPM (08/20/20 0738)  Vt Set: 360 mL (08/20/20 0738)  PEEP/CPAP: 5 cmH20 (08/20/20 1501)  Oxygen Concentration (%): 28 (08/22/20  0043)  Peak Airway Pressure: 7.8 cmH2O (08/20/20 1501)  Plateau Pressure: 22 cmH20 (08/20/20 1501)  Total Ve: 7.21 mL (08/20/20 1501)  Negative Inspiratory Force (cm H2O): -23 (08/20/20 1501)  F/VT Ratio<105 (RSBI): (!) 64.6 (08/20/20 1205)    Lines/Drains/Airways     Central Venous Catheter Line            Trialysis (Dialysis) Catheter 08/13/20 2230 right internal jugular 9 days          Drain                 Closed/Suction Drain 08/13/20 1659 Right;Inferior Abdomen Bulb 19 Fr. 9 days         Closed/Suction Drain 08/13/20 1659 Right;Superior Abdomen Bulb 19 Fr. 9 days         Gastrostomy/Enterostomy 08/15/20 0916 Gastrostomy tube w/ balloon LUQ decompression 8 days          Peripheral Intravenous Line                 Peripheral IV - Single Lumen 08/20/20 0430 20 G Anterior;Right Forearm 3 days                Significant Labs:    CBC/Anemia Profile:  Recent Labs   Lab 08/22/20  1059 08/22/20  1549 08/23/20  0400   WBC 36.72* 37.95* 35.39*   HGB 8.2* 8.0* 6.2*   HCT 24.3* 23.4* 18.5*    250 231   MCV 92 90 92   RDW 14.3 14.5 14.4        Chemistries:  Recent Labs   Lab 08/22/20  1059 08/22/20  1549 08/22/20  2235 08/23/20  0400    140 140 138   K 4.0 3.8 4.8 4.5    106 106 104   CO2 23 20* 21* 22*   BUN 32* 36* 43* 49*   CREATININE 2.4* 2.8* 3.1* 3.3*   CALCIUM 8.2* 8.6* 8.5* 8.3*   ALBUMIN 1.5* 1.6* 1.4* 1.4*   PROT 5.5* 5.7* 5.4* 5.3*   BILITOT 0.6 0.6 0.6 0.6   ALKPHOS 104 105 91 88   ALT 21 20 18 17   AST 31 25 23 33   MG 2.3 2.2  --  2.5   PHOS 3.2 2.9  --  3.3       All pertinent labs within the past 24 hours have been reviewed.    Significant Imaging:  I have reviewed all pertinent imaging results/findings within the past 24 hours.

## 2020-08-23 NOTE — ASSESSMENT & PLAN NOTE
40 yo F s/p antrectomy with BII reconstruction c/b duodenal necrosis requiring ex lap, washout, drainage c/b aspiration event. Now with severe sepsis and ARDS     .Neuro:  - AOx4  - PRN hydromorphone for analgesia     Resp:  - 100% on 2L NC  - PRN breathing treatments  - Right chest tube with no output     CV:  - MAP goal >65  - Systolic <160  - Levo and Vaso gtt - off     Heme:  - Hgb/Hct 6.2 ; transfusing x2 units PRBCs  - S/p Abdominal washout on 8/21  - Repeated CT scan 8/23; read pending     ID:  - WBC 36  - Zosyn, Fluconazole  - Blood cultures and BAL taken 8/19 with NGTD and no abnormal findings     Renal:  - Oliguric on arrival; Cr 1.9 at that time   - CRRT held yesterday. Will also hold today. Nephrology appreciated  - Trend BUN/Cr; 49/3.3 today   - Monitor I/Os     FEN/GI:  - NPO  - TPN  - NGT to LIWS   - Maintain drains to bulb suction; LLQ with acute drop in drainage and accumulation in SQ tissue  - GI ppx  - Replace electrolytes as needed to keep K>4, Mg>2     Endo:  - Monitor BG     Dispo:  - Continue SICU care

## 2020-08-23 NOTE — PROGRESS NOTES
[facility]  Critical Care - Surgery  Progress Note    Patient Name: Jaquan Farmer  MRN: [id number]  Admission Date: 8/12/2020  Hospital Length of Stay: 11 days  Code Status: Full Code  Attending Provider: Donis Roa MD  Primary Care Provider: Primary Doctor No   Principal Problem: Duodenum disorder    Subjective:     Hospital/ICU Course:  8/14 Patient had a line replaced along with central line placed, with complication of pneumothorax. Rt pigtail was placed. Patient tolerated complications well. Decreasing vent settings.  8/15 NAEO. Patient was taken back to OR early AM for washout. Possible closure 8/18. ETT exchanged in OR 7.0 -> 7.5  8/16 - NAEON. HDS. Intubated, sedated.   8/17 - NAEON. OR tomorrow for closure.  8/18 - Washed out and closed in OR  8/22 - NAEO. Plan for Repeat CT AP today  8/23 - NAEO. Hbg lower. Giving blood x2 units.    Interval History/Significant Events: NAEO. Likely back to OR 8/23 or 24. VSS.      Follow-up For: Procedure(s) (LRB):  EXPLORATION, WOUND (N/A)  EVACUATION, HEMATOMA (N/A)  APPLICATION, WOUND VAC (N/A)    Post-Operative Day: 2 Days Post-Op    Objective:     Vital Signs (Most Recent):  Temp: 99.4 °F (37.4 °C) (08/23/20 0820)  Pulse: (!) 120 (08/23/20 0820)  Resp: (!) 21 (08/23/20 0820)  BP: (!) 144/78 (08/23/20 0820)  SpO2: 99 % (08/23/20 0820) Vital Signs (24h Range):  Temp:  [98.9 °F (37.2 °C)-100 °F (37.8 °C)] 99.4 °F (37.4 °C)  Pulse:  [111-148] 120  Resp:  [21-46] 21  SpO2:  [96 %-100 %] 99 %  BP: (130-176)/() 144/78  Arterial Line BP: (141-142)/(70-76) 142/76     Weight: 63.5 kg (139 lb 15.9 oz)  Body mass index is 25.6 kg/m².      Intake/Output Summary (Last 24 hours) at 8/23/2020 1004  Last data filed at 8/23/2020 0820  Gross per 24 hour   Intake 3122 ml   Output 3915 ml   Net -793 ml       Physical Exam  Constitutional:       Interventions: She is sedated.   HENT:      Head: Normocephalic.      Nose:      Comments: NGT in  place  Cardiovascular:      Rate and Rhythm: Regular rhythm. Tachycardia present.   Pulmonary:      Effort: Pulmonary effort is normal.      Comments: NC 2L  Abdominal:      General: There is no distension.      Palpations: Abdomen is soft.      Tenderness: There is abdominal tenderness (appropriate).      Comments: R abdomen GODFREY x2 with bilious output  Wound vac in midline incision  G-tube with gastric fluid output   Musculoskeletal:         General: Swelling present.      Right lower leg: Edema present.      Left lower leg: Edema present.   Skin:     General: Skin is warm and dry.   Neurological:      Mental Status: She is alert.         Vents:  Vent Mode: Spont (08/20/20 1501)  Ventilator Initiated: Yes(chart correction) (08/12/20 1930)  Set Rate: 20 BPM (08/20/20 0738)  Vt Set: 360 mL (08/20/20 0738)  PEEP/CPAP: 5 cmH20 (08/20/20 1501)  Oxygen Concentration (%): 28 (08/22/20 0043)  Peak Airway Pressure: 7.8 cmH2O (08/20/20 1501)  Plateau Pressure: 22 cmH20 (08/20/20 1501)  Total Ve: 7.21 mL (08/20/20 1501)  Negative Inspiratory Force (cm H2O): -23 (08/20/20 1501)  F/VT Ratio<105 (RSBI): (!) 64.6 (08/20/20 1205)    Lines/Drains/Airways     Central Venous Catheter Line            Trialysis (Dialysis) Catheter 08/13/20 2230 right internal jugular 9 days          Drain                 Closed/Suction Drain 08/13/20 1659 Right;Inferior Abdomen Bulb 19 Fr. 9 days         Closed/Suction Drain 08/13/20 1659 Right;Superior Abdomen Bulb 19 Fr. 9 days         Gastrostomy/Enterostomy 08/15/20 0916 Gastrostomy tube w/ balloon LUQ decompression 8 days          Peripheral Intravenous Line                 Peripheral IV - Single Lumen 08/20/20 0430 20 G Anterior;Right Forearm 3 days                Significant Labs:    CBC/Anemia Profile:  Recent Labs   Lab 08/22/20  1059 08/22/20  1549 08/23/20  0400   WBC 36.72* 37.95* 35.39*   HGB 8.2* 8.0* 6.2*   HCT 24.3* 23.4* 18.5*    250 231   MCV 92 90 92   RDW 14.3 14.5 14.4         Chemistries:  Recent Labs   Lab 08/22/20  1059 08/22/20  1549 08/22/20  2235 08/23/20  0400    140 140 138   K 4.0 3.8 4.8 4.5    106 106 104   CO2 23 20* 21* 22*   BUN 32* 36* 43* 49*   CREATININE 2.4* 2.8* 3.1* 3.3*   CALCIUM 8.2* 8.6* 8.5* 8.3*   ALBUMIN 1.5* 1.6* 1.4* 1.4*   PROT 5.5* 5.7* 5.4* 5.3*   BILITOT 0.6 0.6 0.6 0.6   ALKPHOS 104 105 91 88   ALT 21 20 18 17   AST 31 25 23 33   MG 2.3 2.2  --  2.5   PHOS 3.2 2.9  --  3.3       All pertinent labs within the past 24 hours have been reviewed.    Significant Imaging:  I have reviewed all pertinent imaging results/findings within the past 24 hours.    Assessment/Plan:     Severe sepsis   40 yo F s/p antrectomy with BII reconstruction c/b duodenal necrosis requiring ex lap, washout, drainage c/b aspiration event. Now with severe sepsis and ARDS     .Neuro:  - AOx4  - PRN hydromorphone for analgesia     Resp:  - 100% on 2L NC  - PRN breathing treatments  - Right chest tube with no output     CV:  - MAP goal >65  - Systolic <160  - Levo and Vaso gtt - off     Heme:  - Hgb/Hct 6.2 ; transfusing x2 units PRBCs  - S/p Abdominal washout on 8/21  - Repeated CT scan 8/23; read pending     ID:  - WBC 36  - Zosyn, Fluconazole  - Blood cultures and BAL taken 8/19 with NGTD and no abnormal findings     Renal:  - Oliguric on arrival; Cr 1.9 at that time   - CRRT held yesterday. Will also hold today. Nephrology appreciated  - Trend BUN/Cr; 49/3.3 today   - Monitor I/Os     FEN/GI:  - NPO  - TPN  - NGT to LIWS   - Maintain drains to bulb suction; LLQ with acute drop in drainage and accumulation in SQ tissue  - GI ppx  - Replace electrolytes as needed to keep K>4, Mg>2     Endo:  - Monitor BG     Dispo:  - Continue SICU care         Jadon Marc, DO  Critical Care - Surgery  [facility]

## 2020-08-23 NOTE — SUBJECTIVE & OBJECTIVE
Interval History: NAEON. Pt is feeling ok this am. H/H down with hematoma at incision.    Medications:  Continuous Infusions:   lactated ringers 5 mL/hr at 08/16/20 0600    TPN ADULT CENTRAL LINE CUSTOM 45 mL/hr at 08/23/20 0800     Scheduled Meds:   famotidine (PF)  20 mg Intravenous Daily    fluconazole (DIFLUCAN) IVPB  200 mg Intravenous Q24H    heparin (porcine)  5,000 Units Subcutaneous Q8H    lorazepam  1 mg Intravenous Once    metoprolol  5 mg Intravenous Q6H    miconazole NITRATE 2 %   Topical (Top) BID    piperacillin-tazobactam (ZOSYN) IVPB  4.5 g Intravenous Q12H    sodium chloride 0.9%  1,000 mL Intravenous Once     PRN Meds:sodium chloride, sodium chloride, sodium chloride, sodium chloride, calcium gluconate IVPB, calcium gluconate IVPB, calcium gluconate IVPB, HYDROmorphone, iohexol, magnesium sulfate IVPB, magnesium sulfate IVPB, melatonin, metoprolol, ondansetron, potassium chloride in water **AND** potassium chloride in water **AND** potassium chloride in water, sodium phosphate IVPB, sodium phosphate IVPB, sodium phosphate IVPB     Review of patient's allergies indicates:   Allergen Reactions    Latex      Objective:     Vital Signs (Most Recent):  Temp: 99.4 °F (37.4 °C) (08/23/20 0820)  Pulse: (!) 120 (08/23/20 0820)  Resp: (!) 21 (08/23/20 0820)  BP: (!) 144/78 (08/23/20 0820)  SpO2: 99 % (08/23/20 0820) Vital Signs (24h Range):  Temp:  [98.9 °F (37.2 °C)-100 °F (37.8 °C)] 99.4 °F (37.4 °C)  Pulse:  [111-148] 120  Resp:  [21-46] 21  SpO2:  [96 %-100 %] 99 %  BP: (130-176)/() 144/78  Arterial Line BP: (141-142)/(70-76) 142/76     Weight: 63.5 kg (139 lb 15.9 oz)  Body mass index is 25.6 kg/m².    Intake/Output - Last 3 Shifts       08/21 0700 - 08/22 0659 08/22 0700 - 08/23 0659 08/23 0700 - 08/24 0659    I.V. (mL/kg) 2310 (36.4) 100 (1.6)     Blood 451  616    NG/GT  900     IV Piggyback 1000 300      1206     Total Intake(mL/kg) 4722 (74.4) 2506 (39.5) 616 (9.7)    Urine  (mL/kg/hr) 0 (0) 10 (0)     Drains 3178 3180 355    Other 804 800 50    Stool 0 0     Chest Tube       Total Output 3982 3990 405    Net +740 -1484 +211           Urine Occurrence 1 x      Stool Occurrence 3 x 4 x           Physical Exam  Constitutional:       Interventions: She is sedated.   HENT:      Head: Normocephalic.      Nose:      Comments: NGT in place  Cardiovascular:      Rate and Rhythm: Regular rhythm. Tachycardia present.   Pulmonary:      Effort: Pulmonary effort is normal.      Comments: NC 2L  Abdominal:      General: There is no distension.      Palpations: Abdomen is soft.      Tenderness: There is abdominal tenderness (appropriate).      Comments: R abdomen GODFREY x2 with bilious output  Wound vac in midline incision  G-tube with gastric fluid output  Hematoma left side of incision subcue space   Musculoskeletal:         General: Swelling present.      Right lower leg: Edema present.      Left lower leg: Edema present.   Skin:     General: Skin is warm and dry.   Neurological:      Mental Status: She is alert.         Significant Labs:  CBC:   Recent Labs   Lab 08/23/20  0400   WBC 35.39*   RBC 2.01*   HGB 6.2*   HCT 18.5*      MCV 92   MCH 30.8   MCHC 33.5     CMP:   Recent Labs   Lab 08/23/20  0400   *   CALCIUM 8.3*   ALBUMIN 1.4*   PROT 5.3*      K 4.5   CO2 22*      BUN 49*   CREATININE 3.3*   ALKPHOS 88   ALT 17   AST 33   BILITOT 0.6       Significant Diagnostics:  CT pending

## 2020-08-23 NOTE — ASSESSMENT & PLAN NOTE
40 yo F s/p antrectomy with BII reconstruction c/b duodenal necrosis requiring ex lap, washout, drainage c/b aspiration event. S/p duodenal resection with d-j and g-j anastomosis.  - Continue ICU care  - Transfuse 2 PRBC  - Respiratory treatment q4h  - NPO  - NGT to LIWS  - G-tube to gravity  - Abx: diflucan, zosyn  - TPN   - Daily labs  - Trend CBC  - GI and DVT ppx  - PT / OT  - Plan for wound vac change in OR tomorrow

## 2020-08-23 NOTE — PROGRESS NOTES
[facility]  Nephrology  Progress Note    Patient Name: Jaquan Farmer  MRN: [id number]  Admission Date: 8/12/2020  Hospital Length of Stay: 11 days  Attending Provider: Donis Roa MD   Primary Care Physician: Primary Doctor No  Principal Problem:Duodenum disorder    Subjective:     HPI: Mrs. Farmer is a 39 year old female with antrectomy at [facility] complicated duodenal necrosis post op. She aspirated, was intubated, and became septic. She developed renal failure and required max vent settings and was transferred here for higher level of care. On arrival she was hypotensive on pressers, max vent setting, and had minimal urine output.  Overnight she was given 6L of fluid, 6g calcium, placed on vac, pip-tazo, and fluconazole. Nephro consulted for sid.    Interval History: Patient on room air, Hgb dropped to 6.2, 2 units of PRBC ordered. Cr stable compared to yesterday. Not on vasopressor support.      Review of patient's allergies indicates:   Allergen Reactions    Latex      Current Facility-Administered Medications   Medication Frequency    0.9%  NaCl infusion (for blood administration) Q24H PRN    0.9%  NaCl infusion (for blood administration) Q24H PRN    0.9%  NaCl infusion (for blood administration) Q24H PRN    0.9%  NaCl infusion (for blood administration) Q24H PRN    calcium gluconate 1g in dextrose 5% 100mL (ready to mix system) PRN    calcium gluconate 2 g in dextrose 5 % 100 mL IVPB PRN    calcium gluconate 3 g in dextrose 5 % 100 mL IVPB PRN    famotidine (PF) injection 20 mg Daily    fluconazole (DIFLUCAN) IVPB 200 mg 100 mL Q24H    heparin (porcine) injection 5,000 Units Q8H    HYDROmorphone injection 0.5 mg Q4H PRN    iohexol (OMNIPAQUE) oral solution 15 mL PRN    lactated ringers infusion Continuous    lorazepam injection 1 mg Once    magnesium sulfate 2g in water 50mL IVPB (premix) PRN    magnesium sulfate 2g in water 50mL IVPB (premix) PRN    melatonin  tablet 3 mg Nightly PRN    metoprolol injection 5 mg Q4H PRN    metoprolol injection 5 mg Q6H    miconazole NITRATE 2 % top powder BID    ondansetron injection 4 mg Once PRN    piperacillin-tazobactam 4.5 g in sodium chloride 0.9% 100 mL IVPB (ready to mix system) Q12H    potassium chloride 40 mEq in 100 mL IVPB (FOR CENTRAL LINE ADMINISTRATION ONLY) PRN    And    potassium chloride 20 mEq in 100 mL IVPB (FOR CENTRAL LINE ADMINISTRATION ONLY) PRN    And    potassium chloride 40 mEq in 100 mL IVPB (FOR CENTRAL LINE ADMINISTRATION ONLY) PRN    sodium chloride 0.9% bolus 1,000 mL Once    sodium phosphate 15 mmol in dextrose 5 % 250 mL IVPB PRN    sodium phosphate 20.01 mmol in dextrose 5 % 250 mL IVPB PRN    sodium phosphate 30 mmol in dextrose 5 % 250 mL IVPB PRN    Standard Custom Day One ADULT TPN for patient WITH electrolyte abnormality or renal dysfunction (CENTRAL) Continuous    TPN ADULT CENTRAL LINE CUSTOM Continuous       Objective:     Vital Signs (Most Recent):  Temp: 99.4 °F (37.4 °C) (08/23/20 0820)  Pulse: (!) 120 (08/23/20 0820)  Resp: (!) 21 (08/23/20 0820)  BP: (!) 144/78 (08/23/20 0820)  SpO2: 99 % (08/23/20 0820)  O2 Device (Oxygen Therapy): room air (08/23/20 0820) Vital Signs (24h Range):  Temp:  [98.9 °F (37.2 °C)-100 °F (37.8 °C)] 99.4 °F (37.4 °C)  Pulse:  [111-148] 120  Resp:  [21-46] 21  SpO2:  [96 %-100 %] 99 %  BP: (130-176)/() 144/78  Arterial Line BP: (141-142)/(70-76) 142/76     Weight: 63.5 kg (139 lb 15.9 oz) (08/22/20 0503)  Body mass index is 25.6 kg/m².  Body surface area is 1.67 meters squared.    I/O last 3 completed shifts:  In: 3627 [I.V.:551; NG/GT:900; IV Piggyback:300]  Out: 5497 [Urine:10; Drains:4587; Other:900]    Physical Exam  Vitals signs and nursing note reviewed.   Constitutional:       Appearance: She is not ill-appearing.      Interventions: She is sedated and intubated.   HENT:      Head: Normocephalic and atraumatic.      Mouth/Throat:       Mouth: Mucous membranes are dry.   Eyes:      General: No scleral icterus.        Right eye: No discharge.         Left eye: No discharge.      Extraocular Movements: Extraocular movements intact.      Conjunctiva/sclera: Conjunctivae normal.   Neck:      Musculoskeletal: Normal range of motion and neck supple.   Cardiovascular:      Rate and Rhythm: Regular rhythm. Tachycardia present.      Pulses: Normal pulses.      Heart sounds: Normal heart sounds.   Pulmonary:      Effort: Pulmonary effort is normal. No respiratory distress. She is intubated.      Breath sounds: No rales.   Abdominal:      General: Bowel sounds are normal.      Palpations: Abdomen is soft.   Musculoskeletal:      Right lower leg: Edema present.      Left lower leg: Edema present.   Skin:     General: Skin is warm and dry.      Findings: No bruising or rash.   Neurological:      General: No focal deficit present.      Mental Status: She is alert and oriented to person, place, and time.   Psychiatric:         Mood and Affect: Mood normal.         Behavior: Behavior normal.         Significant Labs:  CBC:   Recent Labs   Lab 08/23/20  0400   WBC 35.39*   RBC 2.01*   HGB 6.2*   HCT 18.5*      MCV 92   MCH 30.8   MCHC 33.5     CMP:   Recent Labs   Lab 08/23/20  0400   *   CALCIUM 8.3*   ALBUMIN 1.4*   PROT 5.3*      K 4.5   CO2 22*      BUN 49*   CREATININE 3.3*   ALKPHOS 88   ALT 17   AST 33   BILITOT 0.6     Coagulation:   Recent Labs   Lab 08/21/20  0015   INR 1.0   APTT 32.2*     All labs within the past 24 hours have been reviewed.     Significant Imaging:  Labs: Reviewed    Assessment/Plan:     * Duodenum disorder  - Management per primary team     Anemia, chronic disease  Hgb at 6.2, 2 units of PRBCs ordered by primary     Metabolic acidosis  -secondary to renal failure, stable        Acute renal failure with tubular necrosis  oliguric stage 3 sid with ischemic atn likely secondary to septic shock  unknown bl  cr  muddy brown casts on microscopy              08/21:  SLED overnight, increased drain output, net negative 2.1L/24h.  Tachycardic with low CVPs    Assessment:   -Hold RRT today.    -recheck labs this afternoon  -if electrolytes stable, plan to hold RRT and re-evaluate in AM  - Straight I/Os and daily weights  - Avoid nephrotoxins         Severe sepsis  - Management per primary team         Thank you for your consult. I will follow-up with patient. Please contact us if you have any additional questions.    Nestor Urban MD  Nephrology  [facility]    ATTENDING PHYSICIAN ATTESTATION  I have personally verified the history and examined the patient. I thoroughly reviewed the demographic, clinical, laboratorial and imaging information available in medical records. I agree with the assessment and recommendations provided by the subspecialty resident who was under my supervision.

## 2020-08-23 NOTE — PROGRESS NOTES
[facility]  General Surgery  Progress Note    Subjective:     History of Present Illness:  Pt is a 38 yo F with recent history of antrectomy with BII reconstruction for ulcer disease at outside hospital. 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 Patient also reportedly aspirated on induction in the OR prior to this, resulting in severe pulmonary edema and hypoxia. [place] [facility] [place]. She arrived as a direct SICU admit on near maximal vent settings and requiring low dose vasopressors with HR in the upper 140s. Her midline laparotomy is closed with 4 Navdeep drains in place draining bilious output.     Post-Op Info:  Procedure(s) (LRB):  EXPLORATION, WOUND (N/A)  EVACUATION, HEMATOMA (N/A)  APPLICATION, WOUND VAC (N/A)   2 Days Post-Op     Interval History: NAEON. Pt is feeling ok this am. H/H down with hematoma at incision.    Medications:  Continuous Infusions:   lactated ringers 5 mL/hr at 08/16/20 0600    TPN ADULT CENTRAL LINE CUSTOM 45 mL/hr at 08/23/20 0800     Scheduled Meds:   famotidine (PF)  20 mg Intravenous Daily    fluconazole (DIFLUCAN) IVPB  200 mg Intravenous Q24H    heparin (porcine)  5,000 Units Subcutaneous Q8H    lorazepam  1 mg Intravenous Once    metoprolol  5 mg Intravenous Q6H    miconazole NITRATE 2 %   Topical (Top) BID    piperacillin-tazobactam (ZOSYN) IVPB  4.5 g Intravenous Q12H    sodium chloride 0.9%  1,000 mL Intravenous Once     PRN Meds:sodium chloride, sodium chloride, sodium chloride, sodium chloride, calcium gluconate IVPB, calcium gluconate IVPB, calcium gluconate IVPB, HYDROmorphone, iohexol, magnesium sulfate IVPB, magnesium sulfate IVPB, melatonin, metoprolol, ondansetron, potassium chloride in water **AND** potassium chloride in water **AND** potassium chloride in water, sodium phosphate IVPB, sodium phosphate IVPB, sodium phosphate IVPB     Review of  patient's allergies indicates:   Allergen Reactions    Latex      Objective:     Vital Signs (Most Recent):  Temp: 99.4 °F (37.4 °C) (08/23/20 0820)  Pulse: (!) 120 (08/23/20 0820)  Resp: (!) 21 (08/23/20 0820)  BP: (!) 144/78 (08/23/20 0820)  SpO2: 99 % (08/23/20 0820) Vital Signs (24h Range):  Temp:  [98.9 °F (37.2 °C)-100 °F (37.8 °C)] 99.4 °F (37.4 °C)  Pulse:  [111-148] 120  Resp:  [21-46] 21  SpO2:  [96 %-100 %] 99 %  BP: (130-176)/() 144/78  Arterial Line BP: (141-142)/(70-76) 142/76     Weight: 63.5 kg (139 lb 15.9 oz)  Body mass index is 25.6 kg/m².    Intake/Output - Last 3 Shifts       08/21 0700 - 08/22 0659 08/22 0700 - 08/23 0659 08/23 0700 - 08/24 0659    I.V. (mL/kg) 2310 (36.4) 100 (1.6)     Blood 451  616    NG/GT  900     IV Piggyback 1000 300      1206     Total Intake(mL/kg) 4722 (74.4) 2506 (39.5) 616 (9.7)    Urine (mL/kg/hr) 0 (0) 10 (0)     Drains 3178 3180 355    Other 804 800 50    Stool 0 0     Chest Tube       Total Output 3982 3990 405    Net +740 -1484 +211           Urine Occurrence 1 x      Stool Occurrence 3 x 4 x           Physical Exam  Constitutional:       Interventions: She is sedated.   HENT:      Head: Normocephalic.      Nose:      Comments: NGT in place  Cardiovascular:      Rate and Rhythm: Regular rhythm. Tachycardia present.   Pulmonary:      Effort: Pulmonary effort is normal.      Comments: NC 2L  Abdominal:      General: There is no distension.      Palpations: Abdomen is soft.      Tenderness: There is abdominal tenderness (appropriate).      Comments: R abdomen GODFREY x2 with bilious output  Wound vac in midline incision  G-tube with gastric fluid output  Hematoma left side of incision subcue space   Musculoskeletal:         General: Swelling present.      Right lower leg: Edema present.      Left lower leg: Edema present.   Skin:     General: Skin is warm and dry.   Neurological:      Mental Status: She is alert.         Significant Labs:  CBC:   Recent  Labs   Lab 08/23/20  0400   WBC 35.39*   RBC 2.01*   HGB 6.2*   HCT 18.5*      MCV 92   MCH 30.8   MCHC 33.5     CMP:   Recent Labs   Lab 08/23/20  0400   *   CALCIUM 8.3*   ALBUMIN 1.4*   PROT 5.3*      K 4.5   CO2 22*      BUN 49*   CREATININE 3.3*   ALKPHOS 88   ALT 17   AST 33   BILITOT 0.6       Significant Diagnostics:  CT pending    Assessment/Plan:     Severe sepsis  38 yo F s/p antrectomy with BII reconstruction c/b duodenal necrosis requiring ex lap, washout, drainage c/b aspiration event. S/p duodenal resection with d-j and g-j anastomosis.  - Continue ICU care  - Transfuse 2 PRBC  - Respiratory treatment q4h  - NPO  - NGT to LIWS  - G-tube to gravity  - Abx: diflucan, zosyn  - TPN   - Daily labs  - Trend CBC  - GI and DVT ppx  - PT / OT  - Plan for wound vac change in OR tomorrow        Ying Cesar MD  General Surgery  [facility]

## 2020-08-23 NOTE — SUBJECTIVE & OBJECTIVE
Interval History: Patient on room air, Hgb dropped to 6.2, 2 units of PRBC ordered. Cr stable compared to yesterday. Not on vasopressor support.      Review of patient's allergies indicates:   Allergen Reactions    Latex      Current Facility-Administered Medications   Medication Frequency    0.9%  NaCl infusion (for blood administration) Q24H PRN    0.9%  NaCl infusion (for blood administration) Q24H PRN    0.9%  NaCl infusion (for blood administration) Q24H PRN    0.9%  NaCl infusion (for blood administration) Q24H PRN    calcium gluconate 1g in dextrose 5% 100mL (ready to mix system) PRN    calcium gluconate 2 g in dextrose 5 % 100 mL IVPB PRN    calcium gluconate 3 g in dextrose 5 % 100 mL IVPB PRN    famotidine (PF) injection 20 mg Daily    fluconazole (DIFLUCAN) IVPB 200 mg 100 mL Q24H    heparin (porcine) injection 5,000 Units Q8H    HYDROmorphone injection 0.5 mg Q4H PRN    iohexol (OMNIPAQUE) oral solution 15 mL PRN    lactated ringers infusion Continuous    lorazepam injection 1 mg Once    magnesium sulfate 2g in water 50mL IVPB (premix) PRN    magnesium sulfate 2g in water 50mL IVPB (premix) PRN    melatonin tablet 3 mg Nightly PRN    metoprolol injection 5 mg Q4H PRN    metoprolol injection 5 mg Q6H    miconazole NITRATE 2 % top powder BID    ondansetron injection 4 mg Once PRN    piperacillin-tazobactam 4.5 g in sodium chloride 0.9% 100 mL IVPB (ready to mix system) Q12H    potassium chloride 40 mEq in 100 mL IVPB (FOR CENTRAL LINE ADMINISTRATION ONLY) PRN    And    potassium chloride 20 mEq in 100 mL IVPB (FOR CENTRAL LINE ADMINISTRATION ONLY) PRN    And    potassium chloride 40 mEq in 100 mL IVPB (FOR CENTRAL LINE ADMINISTRATION ONLY) PRN    sodium chloride 0.9% bolus 1,000 mL Once    sodium phosphate 15 mmol in dextrose 5 % 250 mL IVPB PRN    sodium phosphate 20.01 mmol in dextrose 5 % 250 mL IVPB PRN    sodium phosphate 30 mmol in dextrose 5 % 250 mL IVPB PRN     Standard Custom Day One ADULT TPN for patient WITH electrolyte abnormality or renal dysfunction (CENTRAL) Continuous    TPN ADULT CENTRAL LINE CUSTOM Continuous       Objective:     Vital Signs (Most Recent):  Temp: 99.4 °F (37.4 °C) (08/23/20 0820)  Pulse: (!) 120 (08/23/20 0820)  Resp: (!) 21 (08/23/20 0820)  BP: (!) 144/78 (08/23/20 0820)  SpO2: 99 % (08/23/20 0820)  O2 Device (Oxygen Therapy): room air (08/23/20 0820) Vital Signs (24h Range):  Temp:  [98.9 °F (37.2 °C)-100 °F (37.8 °C)] 99.4 °F (37.4 °C)  Pulse:  [111-148] 120  Resp:  [21-46] 21  SpO2:  [96 %-100 %] 99 %  BP: (130-176)/() 144/78  Arterial Line BP: (141-142)/(70-76) 142/76     Weight: 63.5 kg (139 lb 15.9 oz) (08/22/20 0503)  Body mass index is 25.6 kg/m².  Body surface area is 1.67 meters squared.    I/O last 3 completed shifts:  In: 3627 [I.V.:551; NG/GT:900; IV Piggyback:300]  Out: 5497 [Urine:10; Drains:4587; Other:900]    Physical Exam  Vitals signs and nursing note reviewed.   Constitutional:       Appearance: She is not ill-appearing.      Interventions: She is sedated and intubated.   HENT:      Head: Normocephalic and atraumatic.      Mouth/Throat:      Mouth: Mucous membranes are dry.   Eyes:      General: No scleral icterus.        Right eye: No discharge.         Left eye: No discharge.      Extraocular Movements: Extraocular movements intact.      Conjunctiva/sclera: Conjunctivae normal.   Neck:      Musculoskeletal: Normal range of motion and neck supple.   Cardiovascular:      Rate and Rhythm: Regular rhythm. Tachycardia present.      Pulses: Normal pulses.      Heart sounds: Normal heart sounds.   Pulmonary:      Effort: Pulmonary effort is normal. No respiratory distress. She is intubated.      Breath sounds: No rales.   Abdominal:      General: Bowel sounds are normal.      Palpations: Abdomen is soft.   Musculoskeletal:      Right lower leg: Edema present.      Left lower leg: Edema present.   Skin:     General: Skin is  warm and dry.      Findings: No bruising or rash.   Neurological:      General: No focal deficit present.      Mental Status: She is alert and oriented to person, place, and time.   Psychiatric:         Mood and Affect: Mood normal.         Behavior: Behavior normal.         Significant Labs:  CBC:   Recent Labs   Lab 08/23/20  0400   WBC 35.39*   RBC 2.01*   HGB 6.2*   HCT 18.5*      MCV 92   MCH 30.8   MCHC 33.5     CMP:   Recent Labs   Lab 08/23/20  0400   *   CALCIUM 8.3*   ALBUMIN 1.4*   PROT 5.3*      K 4.5   CO2 22*      BUN 49*   CREATININE 3.3*   ALKPHOS 88   ALT 17   AST 33   BILITOT 0.6     Coagulation:   Recent Labs   Lab 08/21/20  0015   INR 1.0   APTT 32.2*     All labs within the past 24 hours have been reviewed.     Significant Imaging:  Labs: Reviewed

## 2020-08-24 ENCOUNTER — ANESTHESIA (OUTPATIENT)
Dept: SURGERY | Facility: HOSPITAL | Age: 40
DRG: 856 | End: 2020-08-24

## 2020-08-24 LAB
ALBUMIN SERPL BCP-MCNC: 1.5 G/DL (ref 3.5–5.2)
ALP SERPL-CCNC: 90 U/L (ref 55–135)
ALT SERPL W/O P-5'-P-CCNC: 17 U/L (ref 10–44)
ANION GAP SERPL CALC-SCNC: 13 MMOL/L (ref 8–16)
ANION GAP SERPL CALC-SCNC: 15 MMOL/L (ref 8–16)
ANION GAP SERPL CALC-SCNC: 8 MMOL/L (ref 8–16)
ANISOCYTOSIS BLD QL SMEAR: SLIGHT
ANISOCYTOSIS BLD QL SMEAR: SLIGHT
AST SERPL-CCNC: 16 U/L (ref 10–40)
BACTERIA BLD CULT: NORMAL
BASO STIPL BLD QL SMEAR: ABNORMAL
BASOPHILS # BLD AUTO: 0.15 K/UL (ref 0–0.2)
BASOPHILS # BLD AUTO: 0.16 K/UL (ref 0–0.2)
BASOPHILS NFR BLD: 0.4 % (ref 0–1.9)
BASOPHILS NFR BLD: 0.4 % (ref 0–1.9)
BILIRUB SERPL-MCNC: 0.6 MG/DL (ref 0.1–1)
BLD PROD TYP BPU: NORMAL
BLOOD UNIT EXPIRATION DATE: NORMAL
BLOOD UNIT TYPE CODE: 7300
BLOOD UNIT TYPE: NORMAL
BUN SERPL-MCNC: 33 MG/DL (ref 6–20)
BUN SERPL-MCNC: 74 MG/DL (ref 6–20)
BUN SERPL-MCNC: 82 MG/DL (ref 6–20)
CA-I BLDV-SCNC: 1.23 MMOL/L (ref 1.06–1.42)
CALCIUM SERPL-MCNC: 7.7 MG/DL (ref 8.7–10.5)
CALCIUM SERPL-MCNC: 8.3 MG/DL (ref 8.7–10.5)
CALCIUM SERPL-MCNC: 8.5 MG/DL (ref 8.7–10.5)
CHLORIDE SERPL-SCNC: 102 MMOL/L (ref 95–110)
CHLORIDE SERPL-SCNC: 105 MMOL/L (ref 95–110)
CHLORIDE SERPL-SCNC: 105 MMOL/L (ref 95–110)
CO2 SERPL-SCNC: 21 MMOL/L (ref 23–29)
CO2 SERPL-SCNC: 22 MMOL/L (ref 23–29)
CO2 SERPL-SCNC: 26 MMOL/L (ref 23–29)
CODING SYSTEM: NORMAL
CREAT SERPL-MCNC: 2.2 MG/DL (ref 0.5–1.4)
CREAT SERPL-MCNC: 4.4 MG/DL (ref 0.5–1.4)
CREAT SERPL-MCNC: 4.5 MG/DL (ref 0.5–1.4)
DIFFERENTIAL METHOD: ABNORMAL
DIFFERENTIAL METHOD: ABNORMAL
DISPENSE STATUS: NORMAL
EOSINOPHIL # BLD AUTO: 0.3 K/UL (ref 0–0.5)
EOSINOPHIL # BLD AUTO: 0.3 K/UL (ref 0–0.5)
EOSINOPHIL NFR BLD: 0.8 % (ref 0–8)
EOSINOPHIL NFR BLD: 0.9 % (ref 0–8)
ERYTHROCYTE [DISTWIDTH] IN BLOOD BY AUTOMATED COUNT: 14.3 % (ref 11.5–14.5)
ERYTHROCYTE [DISTWIDTH] IN BLOOD BY AUTOMATED COUNT: 14.7 % (ref 11.5–14.5)
EST. GFR  (AFRICAN AMERICAN): 13.3 ML/MIN/1.73 M^2
EST. GFR  (AFRICAN AMERICAN): 13.7 ML/MIN/1.73 M^2
EST. GFR  (AFRICAN AMERICAN): 31.6 ML/MIN/1.73 M^2
EST. GFR  (NON AFRICAN AMERICAN): 11.5 ML/MIN/1.73 M^2
EST. GFR  (NON AFRICAN AMERICAN): 11.9 ML/MIN/1.73 M^2
EST. GFR  (NON AFRICAN AMERICAN): 27.4 ML/MIN/1.73 M^2
GIANT PLATELETS BLD QL SMEAR: PRESENT
GLUCOSE SERPL-MCNC: 113 MG/DL (ref 70–110)
GLUCOSE SERPL-MCNC: 121 MG/DL (ref 70–110)
GLUCOSE SERPL-MCNC: 123 MG/DL (ref 70–110)
HCT VFR BLD AUTO: 20.6 % (ref 37–48.5)
HCT VFR BLD AUTO: 24.4 % (ref 37–48.5)
HGB BLD-MCNC: 6.9 G/DL (ref 12–16)
HGB BLD-MCNC: 7.9 G/DL (ref 12–16)
HYPOCHROMIA BLD QL SMEAR: ABNORMAL
IMM GRANULOCYTES # BLD AUTO: 0.89 K/UL (ref 0–0.04)
IMM GRANULOCYTES # BLD AUTO: 1.12 K/UL (ref 0–0.04)
IMM GRANULOCYTES NFR BLD AUTO: 2.4 % (ref 0–0.5)
IMM GRANULOCYTES NFR BLD AUTO: 3 % (ref 0–0.5)
LYMPHOCYTES # BLD AUTO: 2.9 K/UL (ref 1–4.8)
LYMPHOCYTES # BLD AUTO: 3.6 K/UL (ref 1–4.8)
LYMPHOCYTES NFR BLD: 7.7 % (ref 18–48)
LYMPHOCYTES NFR BLD: 9.6 % (ref 18–48)
MAGNESIUM SERPL-MCNC: 2.2 MG/DL (ref 1.6–2.6)
MAGNESIUM SERPL-MCNC: 2.6 MG/DL (ref 1.6–2.6)
MCH RBC QN AUTO: 29.5 PG (ref 27–31)
MCH RBC QN AUTO: 30.5 PG (ref 27–31)
MCHC RBC AUTO-ENTMCNC: 32.4 G/DL (ref 32–36)
MCHC RBC AUTO-ENTMCNC: 33.5 G/DL (ref 32–36)
MCV RBC AUTO: 91 FL (ref 82–98)
MCV RBC AUTO: 91 FL (ref 82–98)
MONOCYTES # BLD AUTO: 1.5 K/UL (ref 0.3–1)
MONOCYTES # BLD AUTO: 1.8 K/UL (ref 0.3–1)
MONOCYTES NFR BLD: 4.1 % (ref 4–15)
MONOCYTES NFR BLD: 4.7 % (ref 4–15)
NEUTROPHILS # BLD AUTO: 30.3 K/UL (ref 1.8–7.7)
NEUTROPHILS # BLD AUTO: 31.6 K/UL (ref 1.8–7.7)
NEUTROPHILS NFR BLD: 81.4 % (ref 38–73)
NEUTROPHILS NFR BLD: 84.6 % (ref 38–73)
NRBC BLD-RTO: 0 /100 WBC
NRBC BLD-RTO: 0 /100 WBC
PHOSPHATE SERPL-MCNC: 2.1 MG/DL (ref 2.7–4.5)
PHOSPHATE SERPL-MCNC: 3.7 MG/DL (ref 2.7–4.5)
PLATELET # BLD AUTO: 236 K/UL (ref 150–350)
PLATELET # BLD AUTO: 249 K/UL (ref 150–350)
PLATELET BLD QL SMEAR: ABNORMAL
PLATELET BLD QL SMEAR: ABNORMAL
PMV BLD AUTO: 11 FL (ref 9.2–12.9)
PMV BLD AUTO: 11.3 FL (ref 9.2–12.9)
POCT GLUCOSE: 133 MG/DL (ref 70–110)
POCT GLUCOSE: 133 MG/DL (ref 70–110)
POCT GLUCOSE: 145 MG/DL (ref 70–110)
POLYCHROMASIA BLD QL SMEAR: ABNORMAL
POLYCHROMASIA BLD QL SMEAR: ABNORMAL
POTASSIUM SERPL-SCNC: 4.4 MMOL/L (ref 3.5–5.1)
POTASSIUM SERPL-SCNC: 4.4 MMOL/L (ref 3.5–5.1)
POTASSIUM SERPL-SCNC: 4.6 MMOL/L (ref 3.5–5.1)
PROT SERPL-MCNC: 5.5 G/DL (ref 6–8.4)
RBC # BLD AUTO: 2.26 M/UL (ref 4–5.4)
RBC # BLD AUTO: 2.68 M/UL (ref 4–5.4)
SARS-COV-2 RDRP RESP QL NAA+PROBE: NEGATIVE
SMUDGE CELLS BLD QL SMEAR: PRESENT
SODIUM SERPL-SCNC: 138 MMOL/L (ref 136–145)
SODIUM SERPL-SCNC: 139 MMOL/L (ref 136–145)
SODIUM SERPL-SCNC: 140 MMOL/L (ref 136–145)
TRANS ERYTHROCYTES VOL PATIENT: NORMAL ML
TRIGL SERPL-MCNC: 167 MG/DL (ref 30–150)
WBC # BLD AUTO: 37.23 K/UL (ref 3.9–12.7)
WBC # BLD AUTO: 37.37 K/UL (ref 3.9–12.7)

## 2020-08-24 PROCEDURE — D9220A PRA ANESTHESIA: ICD-10-PCS | Mod: ,,, | Performed by: ANESTHESIOLOGY

## 2020-08-24 PROCEDURE — U0002 COVID-19 LAB TEST NON-CDC: HCPCS

## 2020-08-24 PROCEDURE — D9220A PRA ANESTHESIA: Mod: ,,, | Performed by: ANESTHESIOLOGY

## 2020-08-24 PROCEDURE — 83735 ASSAY OF MAGNESIUM: CPT | Mod: 91

## 2020-08-24 PROCEDURE — 37000008 HC ANESTHESIA 1ST 15 MINUTES: Performed by: SURGERY

## 2020-08-24 PROCEDURE — 63600175 PHARM REV CODE 636 W HCPCS: Performed by: STUDENT IN AN ORGANIZED HEALTH CARE EDUCATION/TRAINING PROGRAM

## 2020-08-24 PROCEDURE — 27201423 OPTIME MED/SURG SUP & DEVICES STERILE SUPPLY: Performed by: SURGERY

## 2020-08-24 PROCEDURE — 99233 SBSQ HOSP IP/OBS HIGH 50: CPT | Mod: ,,, | Performed by: INTERNAL MEDICINE

## 2020-08-24 PROCEDURE — A4217 STERILE WATER/SALINE, 500 ML: HCPCS | Performed by: STUDENT IN AN ORGANIZED HEALTH CARE EDUCATION/TRAINING PROGRAM

## 2020-08-24 PROCEDURE — 94664 DEMO&/EVAL PT USE INHALER: CPT

## 2020-08-24 PROCEDURE — 25000003 PHARM REV CODE 250: Performed by: STUDENT IN AN ORGANIZED HEALTH CARE EDUCATION/TRAINING PROGRAM

## 2020-08-24 PROCEDURE — 80053 COMPREHEN METABOLIC PANEL: CPT

## 2020-08-24 PROCEDURE — 37000009 HC ANESTHESIA EA ADD 15 MINS: Performed by: SURGERY

## 2020-08-24 PROCEDURE — 25000003 PHARM REV CODE 250: Performed by: SURGERY

## 2020-08-24 PROCEDURE — 94761 N-INVAS EAR/PLS OXIMETRY MLT: CPT

## 2020-08-24 PROCEDURE — 97605 NEG PRS WND THER DME<=50SQCM: CPT | Mod: ,,, | Performed by: SURGERY

## 2020-08-24 PROCEDURE — 99900026 HC AIRWAY MAINTENANCE (STAT)

## 2020-08-24 PROCEDURE — 36000704 HC OR TIME LEV I 1ST 15 MIN: Performed by: SURGERY

## 2020-08-24 PROCEDURE — 82330 ASSAY OF CALCIUM: CPT

## 2020-08-24 PROCEDURE — 80100008 HC CRRT DAILY MAINTENANCE

## 2020-08-24 PROCEDURE — 85025 COMPLETE CBC W/AUTO DIFF WBC: CPT | Mod: 91

## 2020-08-24 PROCEDURE — 90945 DIALYSIS ONE EVALUATION: CPT

## 2020-08-24 PROCEDURE — P9021 RED BLOOD CELLS UNIT: HCPCS

## 2020-08-24 PROCEDURE — 27000221 HC OXYGEN, UP TO 24 HOURS

## 2020-08-24 PROCEDURE — 99233 PR SUBSEQUENT HOSPITAL CARE,LEVL III: ICD-10-PCS | Mod: ,,, | Performed by: INTERNAL MEDICINE

## 2020-08-24 PROCEDURE — 80069 RENAL FUNCTION PANEL: CPT | Mod: 91

## 2020-08-24 PROCEDURE — 36430 TRANSFUSION BLD/BLD COMPNT: CPT

## 2020-08-24 PROCEDURE — 35840 EXPLORE ABDOMINAL VESSELS: CPT | Mod: 78,,, | Performed by: SURGERY

## 2020-08-24 PROCEDURE — 35840 PR EXPLOR POSTOP BLEED,INFEC,CLOT-ABD: ICD-10-PCS | Mod: 78,,, | Performed by: SURGERY

## 2020-08-24 PROCEDURE — B4185 PARENTERAL SOL 10 GM LIPIDS: HCPCS | Performed by: STUDENT IN AN ORGANIZED HEALTH CARE EDUCATION/TRAINING PROGRAM

## 2020-08-24 PROCEDURE — 84478 ASSAY OF TRIGLYCERIDES: CPT

## 2020-08-24 PROCEDURE — 25000003 PHARM REV CODE 250: Performed by: NURSE PRACTITIONER

## 2020-08-24 PROCEDURE — 97605 PR NEG PRESS WOUND THERAPY (NPWT) W/NON-DISPOSABLE WOUND VAC DEVICE (DME), <=50 CM: ICD-10-PCS | Mod: ,,, | Performed by: SURGERY

## 2020-08-24 PROCEDURE — 94668 MNPJ CHEST WALL SBSQ: CPT

## 2020-08-24 PROCEDURE — 20000000 HC ICU ROOM

## 2020-08-24 PROCEDURE — S0028 INJECTION, FAMOTIDINE, 20 MG: HCPCS | Performed by: STUDENT IN AN ORGANIZED HEALTH CARE EDUCATION/TRAINING PROGRAM

## 2020-08-24 PROCEDURE — 36000705 HC OR TIME LEV I EA ADD 15 MIN: Performed by: SURGERY

## 2020-08-24 PROCEDURE — 99900035 HC TECH TIME PER 15 MIN (STAT)

## 2020-08-24 RX ORDER — SODIUM CHLORIDE 9 MG/ML
INJECTION, SOLUTION INTRAVENOUS CONTINUOUS PRN
Status: DISCONTINUED | OUTPATIENT
Start: 2020-08-24 | End: 2020-08-24

## 2020-08-24 RX ORDER — SUCCINYLCHOLINE CHLORIDE 20 MG/ML
INJECTION INTRAMUSCULAR; INTRAVENOUS
Status: DISCONTINUED | OUTPATIENT
Start: 2020-08-24 | End: 2020-08-24

## 2020-08-24 RX ORDER — MAGNESIUM SULFATE HEPTAHYDRATE 40 MG/ML
2 INJECTION, SOLUTION INTRAVENOUS
Status: DISCONTINUED | OUTPATIENT
Start: 2020-08-24 | End: 2020-08-25

## 2020-08-24 RX ORDER — HYDROCODONE BITARTRATE AND ACETAMINOPHEN 500; 5 MG/1; MG/1
TABLET ORAL
Status: DISCONTINUED | OUTPATIENT
Start: 2020-08-24 | End: 2020-08-24

## 2020-08-24 RX ORDER — PROPOFOL 10 MG/ML
VIAL (ML) INTRAVENOUS
Status: DISCONTINUED | OUTPATIENT
Start: 2020-08-24 | End: 2020-08-24

## 2020-08-24 RX ORDER — HYDROCODONE BITARTRATE AND ACETAMINOPHEN 500; 5 MG/1; MG/1
TABLET ORAL CONTINUOUS
Status: DISCONTINUED | OUTPATIENT
Start: 2020-08-24 | End: 2020-08-25

## 2020-08-24 RX ORDER — FLUCONAZOLE 2 MG/ML
200 INJECTION, SOLUTION INTRAVENOUS
Status: DISCONTINUED | OUTPATIENT
Start: 2020-08-24 | End: 2020-08-24

## 2020-08-24 RX ORDER — MIDAZOLAM HYDROCHLORIDE 1 MG/ML
INJECTION, SOLUTION INTRAMUSCULAR; INTRAVENOUS
Status: DISCONTINUED | OUTPATIENT
Start: 2020-08-24 | End: 2020-08-24

## 2020-08-24 RX ORDER — FLUCONAZOLE 2 MG/ML
200 INJECTION, SOLUTION INTRAVENOUS
Status: DISCONTINUED | OUTPATIENT
Start: 2020-08-25 | End: 2020-09-04

## 2020-08-24 RX ORDER — DEXAMETHASONE SODIUM PHOSPHATE 4 MG/ML
INJECTION, SOLUTION INTRA-ARTICULAR; INTRALESIONAL; INTRAMUSCULAR; INTRAVENOUS; SOFT TISSUE
Status: DISCONTINUED | OUTPATIENT
Start: 2020-08-24 | End: 2020-08-24

## 2020-08-24 RX ORDER — METOPROLOL TARTRATE 1 MG/ML
INJECTION, SOLUTION INTRAVENOUS
Status: DISCONTINUED | OUTPATIENT
Start: 2020-08-24 | End: 2020-08-24

## 2020-08-24 RX ORDER — FENTANYL CITRATE 50 UG/ML
INJECTION, SOLUTION INTRAMUSCULAR; INTRAVENOUS
Status: DISCONTINUED | OUTPATIENT
Start: 2020-08-24 | End: 2020-08-24

## 2020-08-24 RX ORDER — LIDOCAINE HYDROCHLORIDE 20 MG/ML
INJECTION INTRAVENOUS
Status: DISCONTINUED | OUTPATIENT
Start: 2020-08-24 | End: 2020-08-24

## 2020-08-24 RX ORDER — ONDANSETRON 2 MG/ML
INJECTION INTRAMUSCULAR; INTRAVENOUS
Status: DISCONTINUED | OUTPATIENT
Start: 2020-08-24 | End: 2020-08-24

## 2020-08-24 RX ORDER — ROCURONIUM BROMIDE 10 MG/ML
INJECTION, SOLUTION INTRAVENOUS
Status: DISCONTINUED | OUTPATIENT
Start: 2020-08-24 | End: 2020-08-24

## 2020-08-24 RX ADMIN — MICONAZOLE NITRATE: 20 POWDER TOPICAL at 09:08

## 2020-08-24 RX ADMIN — FLUCONAZOLE 200 MG: 200 INJECTION, SOLUTION INTRAVENOUS at 08:08

## 2020-08-24 RX ADMIN — HEPARIN SODIUM 5000 UNITS: 5000 INJECTION INTRAVENOUS; SUBCUTANEOUS at 05:08

## 2020-08-24 RX ADMIN — PIPERACILLIN SODIUM AND TAZOBACTAM SODIUM 4.5 G: 4; .5 INJECTION, POWDER, LYOPHILIZED, FOR SOLUTION INTRAVENOUS at 09:08

## 2020-08-24 RX ADMIN — SODIUM CHLORIDE: 0.9 INJECTION, SOLUTION INTRAVENOUS at 05:08

## 2020-08-24 RX ADMIN — SUGAMMADEX 100 MG: 100 INJECTION, SOLUTION INTRAVENOUS at 11:08

## 2020-08-24 RX ADMIN — SUCCINYLCHOLINE CHLORIDE 160 MG: 20 INJECTION, SOLUTION INTRAMUSCULAR; INTRAVENOUS at 10:08

## 2020-08-24 RX ADMIN — ROCURONIUM BROMIDE 20 MG: 10 INJECTION, SOLUTION INTRAVENOUS at 10:08

## 2020-08-24 RX ADMIN — ONDANSETRON 4 MG: 2 INJECTION, SOLUTION INTRAMUSCULAR; INTRAVENOUS at 11:08

## 2020-08-24 RX ADMIN — HYDROMORPHONE HYDROCHLORIDE 0.5 MG: 1 INJECTION, SOLUTION INTRAMUSCULAR; INTRAVENOUS; SUBCUTANEOUS at 12:08

## 2020-08-24 RX ADMIN — HYDROMORPHONE HYDROCHLORIDE 0.5 MG: 1 INJECTION, SOLUTION INTRAMUSCULAR; INTRAVENOUS; SUBCUTANEOUS at 04:08

## 2020-08-24 RX ADMIN — SODIUM CHLORIDE: 0.9 INJECTION, SOLUTION INTRAVENOUS at 10:08

## 2020-08-24 RX ADMIN — DEXAMETHASONE SODIUM PHOSPHATE 4 MG: 4 INJECTION, SOLUTION INTRAMUSCULAR; INTRAVENOUS at 10:08

## 2020-08-24 RX ADMIN — HYDROMORPHONE HYDROCHLORIDE 0.5 MG: 1 INJECTION, SOLUTION INTRAMUSCULAR; INTRAVENOUS; SUBCUTANEOUS at 06:08

## 2020-08-24 RX ADMIN — METOPROLOL TARTRATE 5 MG: 5 INJECTION, SOLUTION INTRAVENOUS at 10:08

## 2020-08-24 RX ADMIN — HEPARIN SODIUM 5000 UNITS: 5000 INJECTION INTRAVENOUS; SUBCUTANEOUS at 10:08

## 2020-08-24 RX ADMIN — I.V. FAT EMULSION 250 ML: 20 EMULSION INTRAVENOUS at 10:08

## 2020-08-24 RX ADMIN — ROCURONIUM BROMIDE 10 MG: 10 INJECTION, SOLUTION INTRAVENOUS at 10:08

## 2020-08-24 RX ADMIN — HYDROMORPHONE HYDROCHLORIDE 0.5 MG: 1 INJECTION, SOLUTION INTRAMUSCULAR; INTRAVENOUS; SUBCUTANEOUS at 10:08

## 2020-08-24 RX ADMIN — METOROPROLOL TARTRATE 5 MG: 5 INJECTION, SOLUTION INTRAVENOUS at 06:08

## 2020-08-24 RX ADMIN — MICONAZOLE NITRATE: 20 POWDER TOPICAL at 08:08

## 2020-08-24 RX ADMIN — FENTANYL CITRATE 50 MCG: 50 INJECTION, SOLUTION INTRAMUSCULAR; INTRAVENOUS at 10:08

## 2020-08-24 RX ADMIN — METOROPROLOL TARTRATE 5 MG: 5 INJECTION, SOLUTION INTRAVENOUS at 05:08

## 2020-08-24 RX ADMIN — HYDROMORPHONE HYDROCHLORIDE 0.5 MG: 1 INJECTION, SOLUTION INTRAMUSCULAR; INTRAVENOUS; SUBCUTANEOUS at 01:08

## 2020-08-24 RX ADMIN — HYDROMORPHONE HYDROCHLORIDE 0.5 MG: 1 INJECTION, SOLUTION INTRAMUSCULAR; INTRAVENOUS; SUBCUTANEOUS at 08:08

## 2020-08-24 RX ADMIN — HEPARIN SODIUM 5000 UNITS: 5000 INJECTION INTRAVENOUS; SUBCUTANEOUS at 01:08

## 2020-08-24 RX ADMIN — PIPERACILLIN SODIUM AND TAZOBACTAM SODIUM 4.5 G: 4; .5 INJECTION, POWDER, LYOPHILIZED, FOR SOLUTION INTRAVENOUS at 10:08

## 2020-08-24 RX ADMIN — PROPOFOL 100 MG: 10 INJECTION, EMULSION INTRAVENOUS at 10:08

## 2020-08-24 RX ADMIN — FAMOTIDINE 20 MG: 10 INJECTION INTRAVENOUS at 08:08

## 2020-08-24 RX ADMIN — MIDAZOLAM HYDROCHLORIDE 2 MG: 1 INJECTION, SOLUTION INTRAMUSCULAR; INTRAVENOUS at 10:08

## 2020-08-24 RX ADMIN — CALCIUM GLUCONATE: 98 INJECTION, SOLUTION INTRAVENOUS at 10:08

## 2020-08-24 RX ADMIN — LIDOCAINE HYDROCHLORIDE 75 MG: 20 INJECTION, SOLUTION INTRAVENOUS at 10:08

## 2020-08-24 RX ADMIN — FENTANYL CITRATE 25 MCG: 50 INJECTION, SOLUTION INTRAMUSCULAR; INTRAVENOUS at 11:08

## 2020-08-24 RX ADMIN — METOROPROLOL TARTRATE 5 MG: 5 INJECTION, SOLUTION INTRAVENOUS at 11:08

## 2020-08-24 NOTE — PLAN OF CARE
08/24/20 1231   Discharge Reassessment   Assessment Type Discharge Planning Reassessment   Provided patient/caregiver education on the expected discharge date and the discharge plan Yes   Do you have any problems affording any of your prescribed medications? No   Discharge Plan A Other  (discharge disposition will depend on patient's prognosis)   DME Needed Upon Discharge  other (see comments)  (TBD)   Post-Acute Status   Discharge Delays (!) Other  (insurance pending)       Tangela Bradley MPH, RN, CM  Ext. [phone]

## 2020-08-24 NOTE — NURSING
"      SICU PLAN OF CARE NOTE    Dx: Duodenum disorder s/p abdominal wall exploration ligation of bleeding vessel and change of wound VAC    Shift Events: to OR for washout. CRRT. 1 unitPRBC preop     Goals of Care: comfort. MAP >65. CRRT 12 hours.     Neuro: AAO x4, Follows Commands and Moves All Extremities    Vital Signs: BP (!) 145/96 (BP Location: Right arm, Patient Position: Lying)   Pulse 99   Temp 98.9 °F (37.2 °C) (Oral)   Resp (!) 28   Ht 5' 2" (1.575 m)   Wt 63.9 kg (140 lb 14 oz)   SpO2 100%   Breastfeeding No   BMI 25.77 kg/m²     Respiratory: Nasal Cannula 2 L    Diet: NPO TPN ice chips sparingly    Urine Output: oliguria. CRRT  SLED    Drains: GODFREY Drain, total output 835 cc / shift and G-tube/J-tube, total output 350 cc /  shift    CRRT  SLED. 12 hours    Wound Vac 100 ml/shift     Labs/Accuchecks: Renal labs q8, accuchecks q6    Skin: DTI to bilateral shins with foam dressings in place. Wound care consult placed. Turning and repositioning q2h. Bed plugged in. Foam dressings to bilateral heels and sacrum. Pt refused waffle boots. Encouraged patient to frequently adjust feet to prevent pressure ulcers. Pt verbalized understanding. Unable to get pt OOB this afternoon due to CRRT. PT/OT orders placed.        "

## 2020-08-24 NOTE — TRANSFER OF CARE
"Anesthesia Transfer of Care Note    Patient: Jaquan Farmer    Procedure(s) Performed: Procedure(s) (LRB):  WASHOUT abdomen (N/A)    Patient location: ICU    Anesthesia Type: general    Transport from OR: Transported from OR on 6-10 L/min O2 by face mask with adequate spontaneous ventilation    Post pain: adequate analgesia    Post assessment: no apparent anesthetic complications    Post vital signs: stable    Level of consciousness: lethargic and responds to stimulation    Nausea/Vomiting: no nausea/vomiting    Complications: none    Transfer of care protocol was followed      Last vitals:   Visit Vitals  BP (!) 166/88 (BP Location: Right arm, Patient Position: Lying)   Pulse (!) 113   Temp 37.4 °C (99.3 °F) (Oral)   Resp (!) 30   Ht 5' 2" (1.575 m)   Wt 63.9 kg (140 lb 14 oz)   SpO2 (!) 91%   Breastfeeding No   BMI 25.77 kg/m²     "

## 2020-08-24 NOTE — PROGRESS NOTES
[facility]  Nephrology  Progress Note    Patient Name: Jaquan Farmer  MRN: [id number]  Admission Date: 8/12/2020  Hospital Length of Stay: 12 days  Attending Provider: Donis Roa MD   Primary Care Physician: Primary Doctor No  Principal Problem:Duodenum disorder    Subjective:     Interval History:   No RRT over the weekend.  UOP increasing although unable to be quantified.  Poor metabolic clearance.    Oxygenating well on RA.  Plans for OR today for washout.    Review of patient's allergies indicates:   Allergen Reactions    Latex      Current Facility-Administered Medications   Medication Frequency    calcium gluconate 1g in dextrose 5% 100mL (ready to mix system) PRN    calcium gluconate 2 g in dextrose 5 % 100 mL IVPB PRN    calcium gluconate 3 g in dextrose 5 % 100 mL IVPB PRN    famotidine (PF) injection 20 mg Daily    fluconazole (DIFLUCAN) IVPB 200 mg 100 mL Q24H    heparin (porcine) injection 5,000 Units Q8H    HYDROmorphone injection 0.5 mg Q4H PRN    iohexol (OMNIPAQUE) oral solution 15 mL PRN    lactated ringers infusion Continuous    lorazepam injection 1 mg Once    magnesium sulfate 2g in water 50mL IVPB (premix) PRN    magnesium sulfate 2g in water 50mL IVPB (premix) PRN    melatonin tablet 3 mg Nightly PRN    metoprolol injection 5 mg Q4H PRN    metoprolol injection 5 mg Q6H    miconazole NITRATE 2 % top powder BID    ondansetron injection 4 mg Once PRN    piperacillin-tazobactam 4.5 g in sodium chloride 0.9% 100 mL IVPB (ready to mix system) Q12H    potassium chloride 40 mEq in 100 mL IVPB (FOR CENTRAL LINE ADMINISTRATION ONLY) PRN    And    potassium chloride 20 mEq in 100 mL IVPB (FOR CENTRAL LINE ADMINISTRATION ONLY) PRN    And    potassium chloride 40 mEq in 100 mL IVPB (FOR CENTRAL LINE ADMINISTRATION ONLY) PRN    sodium chloride 0.9% bolus 1,000 mL Once    sodium phosphate 15 mmol in dextrose 5 % 250 mL IVPB PRN    sodium phosphate 20.01  mmol in dextrose 5 % 250 mL IVPB PRN    sodium phosphate 30 mmol in dextrose 5 % 250 mL IVPB PRN    TPN ADULT CENTRAL LINE CUSTOM Continuous       Objective:     Vital Signs (Most Recent):  Temp: 99.3 °F (37.4 °C) (08/24/20 0745)  Pulse: (!) 117 (08/24/20 0800)  Resp: 18 (08/24/20 0802)  BP: (!) 159/85 (08/24/20 0800)  SpO2: 97 % (08/24/20 0800)  O2 Device (Oxygen Therapy): room air (08/24/20 0800) Vital Signs (24h Range):  Temp:  [98.5 °F (36.9 °C)-99.3 °F (37.4 °C)] 99.3 °F (37.4 °C)  Pulse:  [107-138] 117  Resp:  [18-38] 18  SpO2:  [97 %-100 %] 97 %  BP: (129-171)/() 159/85     Weight: 63.9 kg (140 lb 14 oz) (08/24/20 0429)  Body mass index is 25.77 kg/m².  Body surface area is 1.67 meters squared.    I/O last 3 completed shifts:  In: 3090 [I.V.:327; Blood:616; NG/GT:20; IV Piggyback:500]  Out: 3800 [Urine:10; Drains:2740; Other:1050]    Physical Exam  Vitals signs and nursing note reviewed.   Constitutional:       Appearance: She is not ill-appearing.      Interventions: She is sedated. She is not intubated.  HENT:      Head: Normocephalic and atraumatic.      Mouth/Throat:      Mouth: Mucous membranes are dry.   Eyes:      General: No scleral icterus.        Right eye: No discharge.         Left eye: No discharge.      Extraocular Movements: Extraocular movements intact.      Conjunctiva/sclera: Conjunctivae normal.   Neck:      Musculoskeletal: Normal range of motion and neck supple.   Cardiovascular:      Rate and Rhythm: Regular rhythm. Tachycardia present.      Pulses: Normal pulses.      Heart sounds: Normal heart sounds.   Pulmonary:      Effort: Pulmonary effort is normal. No respiratory distress. She is not intubated.      Breath sounds: No rales.   Abdominal:      General: Bowel sounds are normal.      Palpations: Abdomen is soft.   Musculoskeletal:      Right lower leg: Edema present.      Left lower leg: Edema present.   Skin:     General: Skin is warm and dry.      Findings: No bruising or  rash.   Neurological:      General: No focal deficit present.      Mental Status: She is alert and oriented to person, place, and time.   Psychiatric:         Mood and Affect: Mood normal.         Behavior: Behavior normal.         Significant Labs:  CBC:   Recent Labs   Lab 08/24/20  0320   WBC 37.23*   RBC 2.26*   HGB 6.9*   HCT 20.6*      MCV 91   MCH 30.5   MCHC 33.5     CMP:   Recent Labs   Lab 08/24/20  0320   *   CALCIUM 8.5*   ALBUMIN 1.5*   PROT 5.5*      K 4.4   CO2 21*      BUN 74*   CREATININE 4.4*   ALKPHOS 90   ALT 17   AST 16   BILITOT 0.6          Assessment/Plan:     Acute renal failure with tubular necrosis  oliguric stage 3 sid with ischemic atn likely secondary to septic shock  unknown bl cr  muddy brown casts on microscopy              Last had SLED on Friday morning.  Held over the weekend. UOP increasing.    Assessment:   -plans for 12 hour SLED after OR today  - cc/hr to keep patient net even  -attempt to quantify urine output  -strict I/O's  -PRN mg/phos replacements  -renal labs q 8 hours while on SLED            Momo Iyer NP  Nephrology  [facility]

## 2020-08-24 NOTE — OP NOTE
Date of procedure - 08/24/2020  Preoperative diagnosis - abdominal wall  Bleeding and hematoma  Postop diagnosis - same  Procedure - abdominal wall exploration ligation of bleeding vessel and change of wound VAC  Surgeon - Crispin Porras  Anesthesia - general  Blood loss - about 1 unit of clotted blood was noted on the right side of the abdominal wall under the wound VAC sponge  Indications - this 39-year-old woman is returned to the operating room after exploration of the abdominal wall for bleeding several days ago  Bleeding was initially controlled and transfuse and requirement ended however at some point on Saturday the bleeding recurred  Operative report in detail - patient brought the operating placed in supine position prepped draped sterile fashion after satisfactory general anesthesia was induced  Wound VAC was removed  About a unit of clotted blood was noted under the right skin and soft tissue flap  This was evacuated  No discrete bleeding vessel was detected  Doppler was utilized to identify the epigastric artery  This vessel was proximally and distally ligated with a 2 0 Vicryl suture ligature  Cautery was then utilized to control several small oozing areas that were very unlikely to be causal in the return to surgery  Micaela was also utilized on the raw edges of muscle in order to stop any ongoing minor bleeding  The wound VAC was replaced  The patient remained stable throughout the procedure

## 2020-08-24 NOTE — NURSING
To OR via bed. Transported to OR elevators by RN with continuous cardiac monitoring in place. OR staff to resume care.

## 2020-08-24 NOTE — ASSESSMENT & PLAN NOTE
oliguric stage 3 sid with ischemic atn likely secondary to septic shock  unknown bl cr  muddy brown casts on microscopy              Last had SLED on Friday morning.  Held over the weekend. UOP increasing.    Assessment:   -plans for 12 hour SLED after OR today  - cc/hr to keep patient net even  -attempt to quantify urine output  -strict I/O's  -PRN mg/phos replacements  -renal labs q 8 hours while on SLED

## 2020-08-24 NOTE — SUBJECTIVE & OBJECTIVE
Interval History: Getting 1 u PRBC today for anemia.  To OR for vac change     Medications:  Continuous Infusions:   lactated ringers 5 mL/hr at 08/16/20 0600    TPN ADULT CENTRAL LINE CUSTOM 45 mL/hr at 08/24/20 0800     Scheduled Meds:   famotidine (PF)  20 mg Intravenous Daily    fluconazole (DIFLUCAN) IVPB  200 mg Intravenous Q24H    heparin (porcine)  5,000 Units Subcutaneous Q8H    lorazepam  1 mg Intravenous Once    metoprolol  5 mg Intravenous Q6H    miconazole NITRATE 2 %   Topical (Top) BID    piperacillin-tazobactam (ZOSYN) IVPB  4.5 g Intravenous Q12H    sodium chloride 0.9%  1,000 mL Intravenous Once     PRN Meds:calcium gluconate IVPB, calcium gluconate IVPB, calcium gluconate IVPB, HYDROmorphone, iohexol, magnesium sulfate IVPB, magnesium sulfate IVPB, melatonin, metoprolol, ondansetron, potassium chloride in water **AND** potassium chloride in water **AND** potassium chloride in water, sodium phosphate IVPB, sodium phosphate IVPB, sodium phosphate IVPB     Review of patient's allergies indicates:   Allergen Reactions    Latex      Objective:     Vital Signs (Most Recent):  Temp: 99.3 °F (37.4 °C) (08/24/20 0745)  Pulse: (!) 117 (08/24/20 0800)  Resp: 18 (08/24/20 0802)  BP: (!) 159/85 (08/24/20 0800)  SpO2: 97 % (08/24/20 0800) Vital Signs (24h Range):  Temp:  [98.5 °F (36.9 °C)-99.3 °F (37.4 °C)] 99.3 °F (37.4 °C)  Pulse:  [107-138] 117  Resp:  [18-38] 18  SpO2:  [97 %-100 %] 97 %  BP: (129-171)/() 159/85     Weight: 63.9 kg (140 lb 14 oz)  Body mass index is 25.77 kg/m².    Intake/Output - Last 3 Shifts       08/22 0700 - 08/23 0659 08/23 0700 - 08/24 0659 08/24 0700 - 08/25 0659    I.V. (mL/kg) 100 (1.6) 327 (5.1)     Blood  616 248    NG/ 20     IV Piggyback 300 200     TPN 1206 1102     Total Intake(mL/kg) 2506 (39.5) 2265 (35.4) 248 (3.9)    Urine (mL/kg/hr) 10 (0) 0 (0)     Drains 3180 1790     Other 800 600     Stool 0      Total Output 3990 2390     Net -1484 -125 +248            Urine Occurrence  2 x 1 x    Stool Occurrence 4 x            Physical Exam  Constitutional:       Interventions: She is sedated.   HENT:      Head: Normocephalic.      Nose:      Comments: NGT in place  Cardiovascular:      Rate and Rhythm: Regular rhythm. Tachycardia present.   Pulmonary:      Effort: Pulmonary effort is normal.      Comments: NC 2L  Abdominal:      General: There is no distension.      Palpations: Abdomen is soft.      Tenderness: There is abdominal tenderness (appropriate).      Comments: R abdomen GODFREY x2 with bilious output  Wound vac in midline incision  G-tube with gastric fluid output  Hematoma left side of incision subcue space   Musculoskeletal:         General: Swelling present.      Right lower leg: Edema present.      Left lower leg: Edema present.   Skin:     General: Skin is warm and dry.   Neurological:      Mental Status: She is alert.         Significant Labs:  CBC:   Recent Labs   Lab 08/24/20  0320   WBC 37.23*   RBC 2.26*   HGB 6.9*   HCT 20.6*      MCV 91   MCH 30.5   MCHC 33.5     CMP:   Recent Labs   Lab 08/24/20  0320   *   CALCIUM 8.5*   ALBUMIN 1.5*   PROT 5.5*      K 4.4   CO2 21*      BUN 74*   CREATININE 4.4*   ALKPHOS 90   ALT 17   AST 16   BILITOT 0.6       Significant Diagnostics:  CT pending

## 2020-08-24 NOTE — ASSESSMENT & PLAN NOTE
38 yo F s/p antrectomy with BII reconstruction c/b duodenal necrosis requiring ex lap, washout, drainage c/b aspiration event. Now with severe sepsis and ARDS     .Neuro:  - AOx4  - PRN hydromorphone for analgesia     Resp:  - 100% on 2L NC  - PRN breathing treatments  - Right chest tube with no output     CV:  - MAP goal >65  - Systolic <160  - Levo and Vaso gtt - off     Heme:  - Hgb/Hct 6.9; transfusing 1 unit PRBCs  - S/p Abdominal washout on 8/21; return to OR today   - Repeated CT scan 8/22     ID:  - WBC remains elevated 37.2  - Zosyn, Fluconazole  - Blood cultures and BAL taken 8/19 with NGTD and no abnormal findings     Renal:  - Oliguric on arrival; Cr 1.9 at that time   - CRRT held yesterday. Will also hold today. Nephrology appreciated  - Trend BUN/Cr; 74/4.4 today   - Monitor I/Os     FEN/GI:  - NPO  - TPN  - NGT to LIWS   - Maintain drains to bulb suction; LLQ with acute drop in drainage and accumulation in SQ tissue  - GI ppx  - Replace electrolytes as needed to keep K>4, Mg>2     Endo:  - Monitor BG     Dispo:  - Continue SICU care

## 2020-08-24 NOTE — PLAN OF CARE
Plan of care reviewed with pt.  Questions answered and encouraged.  Pending OR today for washout and wound vac exchange.  Consents on chart.  TMax 99.2 oral.  Tachycardia continues with scheduled and prn lopressor.  Dilaudid 0.5 mg given prn q4hr for pain.  Lungs coarse bilat.  Room air with O2 sats >95%.  Right side of abdomen remains firm and taut with wound vac intact with moderate bloody/sang output noted.  GODFREY #1 to low continuous wall suction and GODFREY #2 patent and charged.  Gtube to gravity with scant thick output noted.  Flushed prn.  Transfusion of 1 unit PRBC's infusing for h/h 6.9/20.6.  Free from falls or injury.  Turned and repositioned q2hr and prn.  Refer to [facility] for assessment and update.

## 2020-08-24 NOTE — ASSESSMENT & PLAN NOTE
40 yo F s/p antrectomy with BII reconstruction c/b duodenal necrosis requiring ex lap, washout, drainage c/b aspiration event. S/p duodenal resection with d-j and g-j anastomosis.  - Continue ICU care  - Transfuse   PRBC  - Respiratory treatment q4h  - NPO  - NGT to LIWS  - G-tube to gravity  - Abx: diflucan, zosyn  - TPN   - Daily labs  - Trend CBC  - GI and DVT ppx  - PT / OT  - Plan for wound vac change in OR today

## 2020-08-24 NOTE — SUBJECTIVE & OBJECTIVE
Interval History:   No RRT over the weekend.  UOP increasing although unable to be quantified.  Poor metabolic clearance.    Oxygenating well on RA.  Plans for OR today for washout.    Review of patient's allergies indicates:   Allergen Reactions    Latex      Current Facility-Administered Medications   Medication Frequency    calcium gluconate 1g in dextrose 5% 100mL (ready to mix system) PRN    calcium gluconate 2 g in dextrose 5 % 100 mL IVPB PRN    calcium gluconate 3 g in dextrose 5 % 100 mL IVPB PRN    famotidine (PF) injection 20 mg Daily    fluconazole (DIFLUCAN) IVPB 200 mg 100 mL Q24H    heparin (porcine) injection 5,000 Units Q8H    HYDROmorphone injection 0.5 mg Q4H PRN    iohexol (OMNIPAQUE) oral solution 15 mL PRN    lactated ringers infusion Continuous    lorazepam injection 1 mg Once    magnesium sulfate 2g in water 50mL IVPB (premix) PRN    magnesium sulfate 2g in water 50mL IVPB (premix) PRN    melatonin tablet 3 mg Nightly PRN    metoprolol injection 5 mg Q4H PRN    metoprolol injection 5 mg Q6H    miconazole NITRATE 2 % top powder BID    ondansetron injection 4 mg Once PRN    piperacillin-tazobactam 4.5 g in sodium chloride 0.9% 100 mL IVPB (ready to mix system) Q12H    potassium chloride 40 mEq in 100 mL IVPB (FOR CENTRAL LINE ADMINISTRATION ONLY) PRN    And    potassium chloride 20 mEq in 100 mL IVPB (FOR CENTRAL LINE ADMINISTRATION ONLY) PRN    And    potassium chloride 40 mEq in 100 mL IVPB (FOR CENTRAL LINE ADMINISTRATION ONLY) PRN    sodium chloride 0.9% bolus 1,000 mL Once    sodium phosphate 15 mmol in dextrose 5 % 250 mL IVPB PRN    sodium phosphate 20.01 mmol in dextrose 5 % 250 mL IVPB PRN    sodium phosphate 30 mmol in dextrose 5 % 250 mL IVPB PRN    TPN ADULT CENTRAL LINE CUSTOM Continuous       Objective:     Vital Signs (Most Recent):  Temp: 99.3 °F (37.4 °C) (08/24/20 0745)  Pulse: (!) 117 (08/24/20 0800)  Resp: 18 (08/24/20 0802)  BP: (!) 159/85  (08/24/20 0800)  SpO2: 97 % (08/24/20 0800)  O2 Device (Oxygen Therapy): room air (08/24/20 0800) Vital Signs (24h Range):  Temp:  [98.5 °F (36.9 °C)-99.3 °F (37.4 °C)] 99.3 °F (37.4 °C)  Pulse:  [107-138] 117  Resp:  [18-38] 18  SpO2:  [97 %-100 %] 97 %  BP: (129-171)/() 159/85     Weight: 63.9 kg (140 lb 14 oz) (08/24/20 0429)  Body mass index is 25.77 kg/m².  Body surface area is 1.67 meters squared.    I/O last 3 completed shifts:  In: 3090 [I.V.:327; Blood:616; NG/GT:20; IV Piggyback:500]  Out: 3800 [Urine:10; Drains:2740; Other:1050]    Physical Exam  Vitals signs and nursing note reviewed.   Constitutional:       Appearance: She is not ill-appearing.      Interventions: She is sedated. She is not intubated.  HENT:      Head: Normocephalic and atraumatic.      Mouth/Throat:      Mouth: Mucous membranes are dry.   Eyes:      General: No scleral icterus.        Right eye: No discharge.         Left eye: No discharge.      Extraocular Movements: Extraocular movements intact.      Conjunctiva/sclera: Conjunctivae normal.   Neck:      Musculoskeletal: Normal range of motion and neck supple.   Cardiovascular:      Rate and Rhythm: Regular rhythm. Tachycardia present.      Pulses: Normal pulses.      Heart sounds: Normal heart sounds.   Pulmonary:      Effort: Pulmonary effort is normal. No respiratory distress. She is not intubated.      Breath sounds: No rales.   Abdominal:      General: Bowel sounds are normal.      Palpations: Abdomen is soft.   Musculoskeletal:      Right lower leg: Edema present.      Left lower leg: Edema present.   Skin:     General: Skin is warm and dry.      Findings: No bruising or rash.   Neurological:      General: No focal deficit present.      Mental Status: She is alert and oriented to person, place, and time.   Psychiatric:         Mood and Affect: Mood normal.         Behavior: Behavior normal.         Significant Labs:  CBC:   Recent Labs   Lab 08/24/20  0320   WBC 37.23*    RBC 2.26*   HGB 6.9*   HCT 20.6*      MCV 91   MCH 30.5   MCHC 33.5     CMP:   Recent Labs   Lab 08/24/20  0320   *   CALCIUM 8.5*   ALBUMIN 1.5*   PROT 5.5*      K 4.4   CO2 21*      BUN 74*   CREATININE 4.4*   ALKPHOS 90   ALT 17   AST 16   BILITOT 0.6

## 2020-08-24 NOTE — PLAN OF CARE
SW is following this Pt for DC planning needs. There are no identified needs at this time. Discharge Disposition: TBD - pending patient progress    SW will continue to coordinate with patient, family, team and insurance to complete patient's discharge plan.    Jazzy Eid LMSW   - Case Management

## 2020-08-24 NOTE — SUBJECTIVE & OBJECTIVE
Interval History/Significant Events: NAEON. To OR today with general surgery; NPO.    Follow-up For: Procedure(s) (LRB):  EXPLORATION, WOUND (N/A)  EVACUATION, HEMATOMA (N/A)  APPLICATION, WOUND VAC (N/A)    Post-Operative Day: 3 Days Post-Op    Objective:     Vital Signs (Most Recent):  Temp: 99.3 °F (37.4 °C) (08/24/20 0745)  Pulse: (!) 117 (08/24/20 0745)  Resp: (!) 38 (08/24/20 0745)  BP: (!) 157/82 (08/24/20 0700)  SpO2: 98 % (08/24/20 0745) Vital Signs (24h Range):  Temp:  [98.5 °F (36.9 °C)-99.4 °F (37.4 °C)] 99.3 °F (37.4 °C)  Pulse:  [107-138] 117  Resp:  [20-38] 38  SpO2:  [96 %-100 %] 98 %  BP: (129-171)/() 157/82     Weight: 63.9 kg (140 lb 14 oz)  Body mass index is 25.77 kg/m².      Intake/Output Summary (Last 24 hours) at 8/24/2020 0759  Last data filed at 8/24/2020 0745  Gross per 24 hour   Intake 2513 ml   Output 2125 ml   Net 388 ml     Physical Exam  Constitutional:       Appearance: Normal appearance.   HENT:      Head: Normocephalic and atraumatic.   Cardiovascular:      Rate and Rhythm: Normal rate and regular rhythm.      Pulses: Normal pulses.   Pulmonary:      Effort: Pulmonary effort is normal.   Abdominal:      General: There is distension.      Tenderness: There is no abdominal tenderness. There is no guarding.   Skin:     General: Skin is warm and dry.   Neurological:      General: No focal deficit present.      Mental Status: She is alert.       Lines/Drains/Airways     Central Venous Catheter Line            Trialysis (Dialysis) Catheter 08/13/20 2230 right internal jugular 10 days          Drain                 Closed/Suction Drain 08/13/20 1659 Right;Inferior Abdomen Bulb 19 Fr. 10 days         Closed/Suction Drain 08/13/20 1659 Right;Superior Abdomen Bulb 19 Fr. 10 days         Gastrostomy/Enterostomy 08/15/20 0916 Gastrostomy tube w/ balloon LUQ decompression 8 days          Peripheral Intravenous Line                 Peripheral IV - Single Lumen 08/20/20 0430 20 G  Anterior;Right Forearm 4 days              Significant Labs:  CBC/Anemia Profile:  Recent Labs   Lab 08/23/20  1507 08/23/20  2155 08/24/20  0320   WBC 36.62* 37.46* 37.23*   HGB 8.1* 7.2* 6.9*   HCT 24.6* 22.2* 20.6*    238 249   MCV 93 91 91   RDW 14.5 14.7* 14.7*     Chemistries:  Recent Labs   Lab 08/23/20  0400 08/23/20  1051 08/23/20  1507 08/23/20  2155 08/24/20  0320    141 138 138 138   K 4.5 4.6 4.6 4.5 4.4    105 104 103 102   CO2 22* 22* 20* 21* 21*   BUN 49* 55* 60* 68* 74*   CREATININE 3.3* 3.7* 3.7* 4.1* 4.4*   CALCIUM 8.3* 8.8 8.7 8.7 8.5*   ALBUMIN 1.4* 1.5* 1.5* 1.4* 1.5*   PROT 5.3* 5.3* 5.5* 5.5* 5.5*   BILITOT 0.6 1.0 0.8 0.7 0.6   ALKPHOS 88 88 84 85 90   ALT 17 16 17 16 17   AST 33 16 30 33 16   MG 2.5 2.5 2.6  --   --    PHOS 3.3 3.5 4.7*  --   --      Significant Imaging:  I have reviewed and interpreted all pertinent imaging results/findings within the past 24 hours.     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  Patient is status post duodenal resection with D-J and G-J anastomosis. There is large extravasation of contrast into the peritoneum.  Difficult to identify which anastomosis site is causing the leak; it is likely that the G-J anastomosis is the site of the leak, however it may also involve both sites. There is large mixed density extraluminal fluid within the peritoneum that is in addition to the extravasated contrast.  Hematoma is a consideration.  There are two right-sided peritoneal drains with contrast identified along the tracks of both drains.     Dense consolidation of the left lung base may reflect infectious or noninfectious inflammatory process; there is also adjacent atelectasis.  Left dependent pleural effusion.

## 2020-08-24 NOTE — PLAN OF CARE
Plan of care reviewed with pt.  Questions encouraged and answered.  Neuro intact.  Remains tachycardic at 117-148.  Room air with lungs coarse bilat.  Weak ineffective cough.  Instructed on coughing and deep breathing.  Does not perform.  RLQ of abdomen remains firm, taunt, and large.  Firmness to RUQ increasee.  GODFREY x 2 to right quad of abd patent and charged with leaking around site of #2 noted.  Output decreasing over past 12hrs.  Wound vac intact with large amount of bloody sang output.  MD notified of h/h of 6.5/18.5 with orders for transfusion of 1U PRBC noted.  Gtube to gravity patent.  Denies nausea or emesis.  TPN infusing at 45cc/hr.  Blood glucose wnl.  Scant straw colored uop.  Turned and repositioned q2 hrs and prn.  Air flow mattress in use.  Bruises noted to bilat shin areas.  No breakdown or skin tears.  Free from falls or injury.  Pain med given prn as ordered.  Refer to [facility] for assessment and updates.

## 2020-08-24 NOTE — ANESTHESIA PROCEDURE NOTES
Intubation  Performed by: Cornell Rob MD  Authorized by: Abraham Awan MD     Intubation:     Induction:  Rapid sequence induction    Intubated:  Postinduction    Mask Ventilation:  N/a    Attempts:  1    Attempted By:  Resident anesthesiologist    Blade:  January 3    Laryngeal View Grade: Grade IIA - cords partially seen      Difficult Airway Encountered?: No      Complications:  None    Airway Device:  Oral endotracheal tube    Airway Device Size:  7.0    Style/Cuff Inflation:  Cuffed (inflated to minimal occlusive pressure)    Inflation Amount (mL):  6    Tube secured:  21    Secured at:  The lips    Placement Verified By:  Capnometry    Complicating Factors:  Anterior larynx    Findings Post-Intubation:  BS equal bilateral

## 2020-08-24 NOTE — PROGRESS NOTES
[facility]  General Surgery  Progress Note    Subjective:     History of Present Illness:  Pt is a 40 yo F with recent history of antrectomy with BII reconstruction for ulcer disease at outside hospital. 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 Patient also reportedly aspirated on induction in the OR prior to this, resulting in severe pulmonary edema and hypoxia. Patient was transferred to [facility] urgently for higher level of care. She arrived as a direct SICU admit on near maximal vent settings and requiring low dose vasopressors with HR in the upper 140s. Her midline laparotomy is closed with 4 Navdeep drains in place draining bilious output.     Post-Op Info:  Procedure(s) (LRB):  EXPLORATION, WOUND (N/A)  EVACUATION, HEMATOMA (N/A)  APPLICATION, WOUND VAC (N/A)   3 Days Post-Op     Interval History: Getting 1 u PRBC today for anemia.  To OR for vac change     Medications:  Continuous Infusions:   lactated ringers 5 mL/hr at 08/16/20 0600    TPN ADULT CENTRAL LINE CUSTOM 45 mL/hr at 08/24/20 0800     Scheduled Meds:   famotidine (PF)  20 mg Intravenous Daily    fluconazole (DIFLUCAN) IVPB  200 mg Intravenous Q24H    heparin (porcine)  5,000 Units Subcutaneous Q8H    lorazepam  1 mg Intravenous Once    metoprolol  5 mg Intravenous Q6H    miconazole NITRATE 2 %   Topical (Top) BID    piperacillin-tazobactam (ZOSYN) IVPB  4.5 g Intravenous Q12H    sodium chloride 0.9%  1,000 mL Intravenous Once     PRN Meds:calcium gluconate IVPB, calcium gluconate IVPB, calcium gluconate IVPB, HYDROmorphone, iohexol, magnesium sulfate IVPB, magnesium sulfate IVPB, melatonin, metoprolol, ondansetron, potassium chloride in water **AND** potassium chloride in water **AND** potassium chloride in water, sodium phosphate IVPB, sodium phosphate IVPB, sodium phosphate IVPB     Review of patient's allergies indicates:   Allergen Reactions    Latex      Objective:      Vital Signs (Most Recent):  Temp: 99.3 °F (37.4 °C) (08/24/20 0745)  Pulse: (!) 117 (08/24/20 0800)  Resp: 18 (08/24/20 0802)  BP: (!) 159/85 (08/24/20 0800)  SpO2: 97 % (08/24/20 0800) Vital Signs (24h Range):  Temp:  [98.5 °F (36.9 °C)-99.3 °F (37.4 °C)] 99.3 °F (37.4 °C)  Pulse:  [107-138] 117  Resp:  [18-38] 18  SpO2:  [97 %-100 %] 97 %  BP: (129-171)/() 159/85     Weight: 63.9 kg (140 lb 14 oz)  Body mass index is 25.77 kg/m².    Intake/Output - Last 3 Shifts       08/22 0700 - 08/23 0659 08/23 0700 - 08/24 0659 08/24 0700 - 08/25 0659    I.V. (mL/kg) 100 (1.6) 327 (5.1)     Blood  616 248    NG/ 20     IV Piggyback 300 200     TPN 1206 1102     Total Intake(mL/kg) 2506 (39.5) 2265 (35.4) 248 (3.9)    Urine (mL/kg/hr) 10 (0) 0 (0)     Drains 3180 1790     Other 800 600     Stool 0      Total Output 3990 2390     Net -1484 -125 +248           Urine Occurrence  2 x 1 x    Stool Occurrence 4 x            Physical Exam  Constitutional:       Interventions: She is sedated.   HENT:      Head: Normocephalic.      Nose:      Comments: NGT in place  Cardiovascular:      Rate and Rhythm: Regular rhythm. Tachycardia present.   Pulmonary:      Effort: Pulmonary effort is normal.      Comments: NC 2L  Abdominal:      General: There is no distension.      Palpations: Abdomen is soft.      Tenderness: There is abdominal tenderness (appropriate).      Comments: R abdomen GODFREY x2 with bilious output  Wound vac in midline incision  G-tube with gastric fluid output  Hematoma left side of incision subcue space   Musculoskeletal:         General: Swelling present.      Right lower leg: Edema present.      Left lower leg: Edema present.   Skin:     General: Skin is warm and dry.   Neurological:      Mental Status: She is alert.         Significant Labs:  CBC:   Recent Labs   Lab 08/24/20  0320   WBC 37.23*   RBC 2.26*   HGB 6.9*   HCT 20.6*      MCV 91   MCH 30.5   MCHC 33.5     CMP:   Recent Labs   Lab  08/24/20  0320   *   CALCIUM 8.5*   ALBUMIN 1.5*   PROT 5.5*      K 4.4   CO2 21*      BUN 74*   CREATININE 4.4*   ALKPHOS 90   ALT 17   AST 16   BILITOT 0.6       Significant Diagnostics:  CT pending    Assessment/Plan:     Severe sepsis  38 yo F s/p antrectomy with BII reconstruction c/b duodenal necrosis requiring ex lap, washout, drainage c/b aspiration event. S/p duodenal resection with d-j and g-j anastomosis.  - Continue ICU care  - Transfuse   PRBC  - Respiratory treatment q4h  - NPO  - NGT to LIWS  - G-tube to gravity  - Abx: diflucan, zosyn  - TPN   - Daily labs  - Trend CBC  - GI and DVT ppx  - PT / OT  - Plan for wound vac change in OR today         Jadon Land MD  General Surgery  [facility]

## 2020-08-24 NOTE — PROGRESS NOTES
[facility]  Critical Care - Surgery  Progress Note    Patient Name: Jaquan Farmer  MRN: [id number]  Admission Date: 8/12/2020  Hospital Length of Stay: 12 days  Code Status: Full Code  Attending Provider: Donis Roa MD  Primary Care Provider: Primary Doctor No   Principal Problem: Duodenum disorder    Subjective:     Hospital/ICU Course:  8/14 Patient had a line replaced along with central line placed, with complication of pneumothorax. Rt pigtail was placed. Patient tolerated complications well. Decreasing vent settings.  8/15 NAEO. Patient was taken back to OR early AM for washout. Possible closure 8/18. ETT exchanged in OR 7.0 -> 7.5  8/16 - NAEON. HDS. Intubated, sedated.   8/17 - NAEON. OR tomorrow for closure.  8/18 - Washed out and closed in OR  8/22 - NAEO. Plan for Repeat CT AP today  8/23 - NAEO. Hbg lower. Giving blood x2 units.    Interval History/Significant Events: NAEON. To OR today with general surgery; NPO.    Follow-up For: Procedure(s) (LRB):  EXPLORATION, WOUND (N/A)  EVACUATION, HEMATOMA (N/A)  APPLICATION, WOUND VAC (N/A)    Post-Operative Day: 3 Days Post-Op    Objective:     Vital Signs (Most Recent):  Temp: 99.3 °F (37.4 °C) (08/24/20 0745)  Pulse: (!) 117 (08/24/20 0745)  Resp: (!) 38 (08/24/20 0745)  BP: (!) 157/82 (08/24/20 0700)  SpO2: 98 % (08/24/20 0745) Vital Signs (24h Range):  Temp:  [98.5 °F (36.9 °C)-99.4 °F (37.4 °C)] 99.3 °F (37.4 °C)  Pulse:  [107-138] 117  Resp:  [20-38] 38  SpO2:  [96 %-100 %] 98 %  BP: (129-171)/() 157/82     Weight: 63.9 kg (140 lb 14 oz)  Body mass index is 25.77 kg/m².      Intake/Output Summary (Last 24 hours) at 8/24/2020 0757  Last data filed at 8/24/2020 0745  Gross per 24 hour   Intake 2513 ml   Output 2125 ml   Net 388 ml     Physical Exam  Constitutional:       Appearance: Normal appearance.   HENT:      Head: Normocephalic and atraumatic.   Cardiovascular:      Rate and Rhythm: Normal rate and regular rhythm.       Pulses: Normal pulses.   Pulmonary:      Effort: Pulmonary effort is normal.   Abdominal:      General: There is distension.      Tenderness: There is no abdominal tenderness. There is no guarding.   Skin:     General: Skin is warm and dry.   Neurological:      General: No focal deficit present.      Mental Status: She is alert.       Lines/Drains/Airways     Central Venous Catheter Line            Trialysis (Dialysis) Catheter 08/13/20 2230 right internal jugular 10 days          Drain                 Closed/Suction Drain 08/13/20 1659 Right;Inferior Abdomen Bulb 19 Fr. 10 days         Closed/Suction Drain 08/13/20 1659 Right;Superior Abdomen Bulb 19 Fr. 10 days         Gastrostomy/Enterostomy 08/15/20 0916 Gastrostomy tube w/ balloon LUQ decompression 8 days          Peripheral Intravenous Line                 Peripheral IV - Single Lumen 08/20/20 0430 20 G Anterior;Right Forearm 4 days              Significant Labs:  CBC/Anemia Profile:  Recent Labs   Lab 08/23/20  1507 08/23/20  2155 08/24/20  0320   WBC 36.62* 37.46* 37.23*   HGB 8.1* 7.2* 6.9*   HCT 24.6* 22.2* 20.6*    238 249   MCV 93 91 91   RDW 14.5 14.7* 14.7*     Chemistries:  Recent Labs   Lab 08/23/20  0400 08/23/20  1051 08/23/20  1507 08/23/20  2155 08/24/20  0320    141 138 138 138   K 4.5 4.6 4.6 4.5 4.4    105 104 103 102   CO2 22* 22* 20* 21* 21*   BUN 49* 55* 60* 68* 74*   CREATININE 3.3* 3.7* 3.7* 4.1* 4.4*   CALCIUM 8.3* 8.8 8.7 8.7 8.5*   ALBUMIN 1.4* 1.5* 1.5* 1.4* 1.5*   PROT 5.3* 5.3* 5.5* 5.5* 5.5*   BILITOT 0.6 1.0 0.8 0.7 0.6   ALKPHOS 88 88 84 85 90   ALT 17 16 17 16 17   AST 33 16 30 33 16   MG 2.5 2.5 2.6  --   --    PHOS 3.3 3.5 4.7*  --   --      Significant Imaging:  I have reviewed and interpreted all pertinent imaging results/findings within the past 24 hours.     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   Patient is status post duodenal resection with D-J and G-J anastomosis. There is large extravasation of contrast into the peritoneum.  Difficult to identify which anastomosis site is causing the leak; it is likely that the G-J anastomosis is the site of the leak, however it may also involve both sites. There is large mixed density extraluminal fluid within the peritoneum that is in addition to the extravasated contrast.  Hematoma is a consideration.  There are two right-sided peritoneal drains with contrast identified along the tracks of both drains.     Dense consolidation of the left lung base may reflect infectious or noninfectious inflammatory process; there is also adjacent atelectasis.  Left dependent pleural effusion.    Assessment/Plan:   40 yo F s/p antrectomy with BII reconstruction c/b duodenal necrosis requiring ex lap, washout, drainage c/b aspiration event. Now with severe sepsis and ARDS     .Neuro:  - AOx4  - PRN hydromorphone for analgesia     Resp:  - 100% on 2L NC  - PRN breathing treatments  - Right chest tube with no output     CV:  - MAP goal >65  - Systolic <160  - Levo and Vaso gtt - off     Heme:  - Hgb/Hct 6.9; transfusing 1 unit PRBCs  - S/p Abdominal washout on 8/21; return to OR today   - Repeated CT scan 8/22     ID:  - WBC remains elevated 37.2  - Zosyn, Fluconazole  - Blood cultures and BAL taken 8/19 with NGTD and no abnormal findings     Renal:  - Oliguric on arrival; Cr 1.9 at that time   - CRRT held yesterday. Will also hold today. Nephrology appreciated  - Trend BUN/Cr; 74/4.4 today   - Monitor I/Os     FEN/GI:  - NPO  - TPN  - NGT to LIWS   - Maintain drains to bulb suction; LLQ with acute drop in drainage and accumulation in SQ tissue  - GI ppx  - Replace electrolytes as needed to keep K>4, Mg>2     Endo:  - Monitor BG     Dispo:  - Continue SICU care      Nica Pichardo MD  [employer] General Surgery - PGY 2  08/24/2020 8:06 AM

## 2020-08-25 LAB
ALBUMIN SERPL BCP-MCNC: 1.5 G/DL (ref 3.5–5.2)
ALBUMIN SERPL BCP-MCNC: 1.5 G/DL (ref 3.5–5.2)
ALP SERPL-CCNC: 100 U/L (ref 55–135)
ALT SERPL W/O P-5'-P-CCNC: 16 U/L (ref 10–44)
ANION GAP SERPL CALC-SCNC: 10 MMOL/L (ref 8–16)
ANION GAP SERPL CALC-SCNC: 10 MMOL/L (ref 8–16)
ANISOCYTOSIS BLD QL SMEAR: SLIGHT
AST SERPL-CCNC: 21 U/L (ref 10–40)
BASOPHILS # BLD AUTO: 0.12 K/UL (ref 0–0.2)
BASOPHILS # BLD AUTO: 0.14 K/UL (ref 0–0.2)
BASOPHILS # BLD AUTO: 0.15 K/UL (ref 0–0.2)
BASOPHILS # BLD AUTO: 0.16 K/UL (ref 0–0.2)
BASOPHILS NFR BLD: 0.3 % (ref 0–1.9)
BASOPHILS NFR BLD: 0.4 % (ref 0–1.9)
BILIRUB SERPL-MCNC: 0.7 MG/DL (ref 0.1–1)
BLD PROD TYP BPU: NORMAL
BLOOD UNIT EXPIRATION DATE: NORMAL
BLOOD UNIT TYPE CODE: 7300
BLOOD UNIT TYPE: NORMAL
BUN SERPL-MCNC: 15 MG/DL (ref 6–20)
BUN SERPL-MCNC: 15 MG/DL (ref 6–20)
CA-I BLDV-SCNC: 1.03 MMOL/L (ref 1.06–1.42)
CALCIUM SERPL-MCNC: 7.5 MG/DL (ref 8.7–10.5)
CALCIUM SERPL-MCNC: 7.5 MG/DL (ref 8.7–10.5)
CHLORIDE SERPL-SCNC: 106 MMOL/L (ref 95–110)
CHLORIDE SERPL-SCNC: 106 MMOL/L (ref 95–110)
CO2 SERPL-SCNC: 25 MMOL/L (ref 23–29)
CO2 SERPL-SCNC: 25 MMOL/L (ref 23–29)
CODING SYSTEM: NORMAL
CREAT SERPL-MCNC: 1.1 MG/DL (ref 0.5–1.4)
CREAT SERPL-MCNC: 1.1 MG/DL (ref 0.5–1.4)
DIFFERENTIAL METHOD: ABNORMAL
DISPENSE STATUS: NORMAL
EOSINOPHIL # BLD AUTO: 0.1 K/UL (ref 0–0.5)
EOSINOPHIL # BLD AUTO: 0.2 K/UL (ref 0–0.5)
EOSINOPHIL NFR BLD: 0.3 % (ref 0–8)
EOSINOPHIL NFR BLD: 0.4 % (ref 0–8)
EOSINOPHIL NFR BLD: 0.4 % (ref 0–8)
EOSINOPHIL NFR BLD: 0.5 % (ref 0–8)
ERYTHROCYTE [DISTWIDTH] IN BLOOD BY AUTOMATED COUNT: 14.3 % (ref 11.5–14.5)
ERYTHROCYTE [DISTWIDTH] IN BLOOD BY AUTOMATED COUNT: 14.4 % (ref 11.5–14.5)
ERYTHROCYTE [DISTWIDTH] IN BLOOD BY AUTOMATED COUNT: 14.6 % (ref 11.5–14.5)
ERYTHROCYTE [DISTWIDTH] IN BLOOD BY AUTOMATED COUNT: 14.6 % (ref 11.5–14.5)
EST. GFR  (AFRICAN AMERICAN): >60 ML/MIN/1.73 M^2
EST. GFR  (AFRICAN AMERICAN): >60 ML/MIN/1.73 M^2
EST. GFR  (NON AFRICAN AMERICAN): >60 ML/MIN/1.73 M^2
EST. GFR  (NON AFRICAN AMERICAN): >60 ML/MIN/1.73 M^2
GLUCOSE SERPL-MCNC: 130 MG/DL (ref 70–110)
GLUCOSE SERPL-MCNC: 130 MG/DL (ref 70–110)
HCT VFR BLD AUTO: 20.4 % (ref 37–48.5)
HCT VFR BLD AUTO: 23.9 % (ref 37–48.5)
HCT VFR BLD AUTO: 24.1 % (ref 37–48.5)
HCT VFR BLD AUTO: 26 % (ref 37–48.5)
HGB BLD-MCNC: 6.6 G/DL (ref 12–16)
HGB BLD-MCNC: 7.8 G/DL (ref 12–16)
HGB BLD-MCNC: 8.2 G/DL (ref 12–16)
HGB BLD-MCNC: 8.5 G/DL (ref 12–16)
HYPOCHROMIA BLD QL SMEAR: ABNORMAL
IMM GRANULOCYTES # BLD AUTO: 0.77 K/UL (ref 0–0.04)
IMM GRANULOCYTES # BLD AUTO: 0.86 K/UL (ref 0–0.04)
IMM GRANULOCYTES # BLD AUTO: 0.91 K/UL (ref 0–0.04)
IMM GRANULOCYTES # BLD AUTO: 0.94 K/UL (ref 0–0.04)
IMM GRANULOCYTES NFR BLD AUTO: 1.9 % (ref 0–0.5)
IMM GRANULOCYTES NFR BLD AUTO: 2.3 % (ref 0–0.5)
LYMPHOCYTES # BLD AUTO: 2.7 K/UL (ref 1–4.8)
LYMPHOCYTES # BLD AUTO: 2.8 K/UL (ref 1–4.8)
LYMPHOCYTES # BLD AUTO: 2.9 K/UL (ref 1–4.8)
LYMPHOCYTES # BLD AUTO: 2.9 K/UL (ref 1–4.8)
LYMPHOCYTES NFR BLD: 6.6 % (ref 18–48)
LYMPHOCYTES NFR BLD: 7 % (ref 18–48)
LYMPHOCYTES NFR BLD: 7.1 % (ref 18–48)
LYMPHOCYTES NFR BLD: 8 % (ref 18–48)
MAGNESIUM SERPL-MCNC: 1.8 MG/DL (ref 1.6–2.6)
MCH RBC QN AUTO: 29.9 PG (ref 27–31)
MCH RBC QN AUTO: 29.9 PG (ref 27–31)
MCH RBC QN AUTO: 30.6 PG (ref 27–31)
MCH RBC QN AUTO: 31.2 PG (ref 27–31)
MCHC RBC AUTO-ENTMCNC: 32.4 G/DL (ref 32–36)
MCHC RBC AUTO-ENTMCNC: 32.6 G/DL (ref 32–36)
MCHC RBC AUTO-ENTMCNC: 32.7 G/DL (ref 32–36)
MCHC RBC AUTO-ENTMCNC: 34 G/DL (ref 32–36)
MCV RBC AUTO: 92 FL (ref 82–98)
MCV RBC AUTO: 94 FL (ref 82–98)
MONOCYTES # BLD AUTO: 1.4 K/UL (ref 0.3–1)
MONOCYTES # BLD AUTO: 1.5 K/UL (ref 0.3–1)
MONOCYTES # BLD AUTO: 1.6 K/UL (ref 0.3–1)
MONOCYTES # BLD AUTO: 1.7 K/UL (ref 0.3–1)
MONOCYTES NFR BLD: 3.5 % (ref 4–15)
MONOCYTES NFR BLD: 3.7 % (ref 4–15)
MONOCYTES NFR BLD: 4.2 % (ref 4–15)
MONOCYTES NFR BLD: 4.3 % (ref 4–15)
NEUTROPHILS # BLD AUTO: 30.9 K/UL (ref 1.8–7.7)
NEUTROPHILS # BLD AUTO: 34.2 K/UL (ref 1.8–7.7)
NEUTROPHILS # BLD AUTO: 35.2 K/UL (ref 1.8–7.7)
NEUTROPHILS # BLD AUTO: 35.3 K/UL (ref 1.8–7.7)
NEUTROPHILS NFR BLD: 84.5 % (ref 38–73)
NEUTROPHILS NFR BLD: 86.3 % (ref 38–73)
NEUTROPHILS NFR BLD: 86.3 % (ref 38–73)
NEUTROPHILS NFR BLD: 86.6 % (ref 38–73)
NRBC BLD-RTO: 0 /100 WBC
NUM UNITS TRANS PACKED RBC: NORMAL
OVALOCYTES BLD QL SMEAR: ABNORMAL
PHOSPHATE SERPL-MCNC: 3.2 MG/DL (ref 2.7–4.5)
PLATELET # BLD AUTO: 239 K/UL (ref 150–350)
PLATELET # BLD AUTO: 270 K/UL (ref 150–350)
PLATELET # BLD AUTO: 276 K/UL (ref 150–350)
PLATELET # BLD AUTO: 280 K/UL (ref 150–350)
PLATELET BLD QL SMEAR: ABNORMAL
PMV BLD AUTO: 10.5 FL (ref 9.2–12.9)
PMV BLD AUTO: 10.6 FL (ref 9.2–12.9)
PMV BLD AUTO: 10.9 FL (ref 9.2–12.9)
PMV BLD AUTO: 11 FL (ref 9.2–12.9)
POCT GLUCOSE: 127 MG/DL (ref 70–110)
POCT GLUCOSE: 131 MG/DL (ref 70–110)
POCT GLUCOSE: 132 MG/DL (ref 70–110)
POCT GLUCOSE: 162 MG/DL (ref 70–110)
POIKILOCYTOSIS BLD QL SMEAR: SLIGHT
POLYCHROMASIA BLD QL SMEAR: ABNORMAL
POTASSIUM SERPL-SCNC: 4.2 MMOL/L (ref 3.5–5.1)
POTASSIUM SERPL-SCNC: 4.2 MMOL/L (ref 3.5–5.1)
PROT SERPL-MCNC: 5.6 G/DL (ref 6–8.4)
RBC # BLD AUTO: 2.21 M/UL (ref 4–5.4)
RBC # BLD AUTO: 2.55 M/UL (ref 4–5.4)
RBC # BLD AUTO: 2.63 M/UL (ref 4–5.4)
RBC # BLD AUTO: 2.84 M/UL (ref 4–5.4)
SODIUM SERPL-SCNC: 141 MMOL/L (ref 136–145)
SODIUM SERPL-SCNC: 141 MMOL/L (ref 136–145)
WBC # BLD AUTO: 36.63 K/UL (ref 3.9–12.7)
WBC # BLD AUTO: 39.54 K/UL (ref 3.9–12.7)
WBC # BLD AUTO: 40.61 K/UL (ref 3.9–12.7)
WBC # BLD AUTO: 40.86 K/UL (ref 3.9–12.7)

## 2020-08-25 PROCEDURE — 80100008 HC CRRT DAILY MAINTENANCE

## 2020-08-25 PROCEDURE — B4185 PARENTERAL SOL 10 GM LIPIDS: HCPCS | Performed by: STUDENT IN AN ORGANIZED HEALTH CARE EDUCATION/TRAINING PROGRAM

## 2020-08-25 PROCEDURE — 97165 OT EVAL LOW COMPLEX 30 MIN: CPT

## 2020-08-25 PROCEDURE — P9016 RBC LEUKOCYTES REDUCED: HCPCS

## 2020-08-25 PROCEDURE — 63600175 PHARM REV CODE 636 W HCPCS: Performed by: STUDENT IN AN ORGANIZED HEALTH CARE EDUCATION/TRAINING PROGRAM

## 2020-08-25 PROCEDURE — 99233 PR SUBSEQUENT HOSPITAL CARE,LEVL III: ICD-10-PCS | Mod: ,,, | Performed by: INTERNAL MEDICINE

## 2020-08-25 PROCEDURE — 83735 ASSAY OF MAGNESIUM: CPT

## 2020-08-25 PROCEDURE — 25000003 PHARM REV CODE 250: Performed by: STUDENT IN AN ORGANIZED HEALTH CARE EDUCATION/TRAINING PROGRAM

## 2020-08-25 PROCEDURE — A4217 STERILE WATER/SALINE, 500 ML: HCPCS | Performed by: STUDENT IN AN ORGANIZED HEALTH CARE EDUCATION/TRAINING PROGRAM

## 2020-08-25 PROCEDURE — 80053 COMPREHEN METABOLIC PANEL: CPT

## 2020-08-25 PROCEDURE — 27000646 HC AEROBIKA DEVICE

## 2020-08-25 PROCEDURE — 97116 GAIT TRAINING THERAPY: CPT

## 2020-08-25 PROCEDURE — 99233 SBSQ HOSP IP/OBS HIGH 50: CPT | Mod: 24,,, | Performed by: SURGERY

## 2020-08-25 PROCEDURE — 85025 COMPLETE CBC W/AUTO DIFF WBC: CPT | Mod: 91

## 2020-08-25 PROCEDURE — 82330 ASSAY OF CALCIUM: CPT

## 2020-08-25 PROCEDURE — 99233 PR SUBSEQUENT HOSPITAL CARE,LEVL III: ICD-10-PCS | Mod: 24,,, | Performed by: SURGERY

## 2020-08-25 PROCEDURE — 97162 PT EVAL MOD COMPLEX 30 MIN: CPT

## 2020-08-25 PROCEDURE — 94664 DEMO&/EVAL PT USE INHALER: CPT

## 2020-08-25 PROCEDURE — 94761 N-INVAS EAR/PLS OXIMETRY MLT: CPT

## 2020-08-25 PROCEDURE — 99900035 HC TECH TIME PER 15 MIN (STAT)

## 2020-08-25 PROCEDURE — 97110 THERAPEUTIC EXERCISES: CPT

## 2020-08-25 PROCEDURE — 63600175 PHARM REV CODE 636 W HCPCS: Performed by: SURGERY

## 2020-08-25 PROCEDURE — 20000000 HC ICU ROOM

## 2020-08-25 PROCEDURE — 99233 SBSQ HOSP IP/OBS HIGH 50: CPT | Mod: ,,, | Performed by: INTERNAL MEDICINE

## 2020-08-25 PROCEDURE — 25000003 PHARM REV CODE 250: Performed by: SURGERY

## 2020-08-25 PROCEDURE — 25000003 PHARM REV CODE 250: Performed by: NURSE PRACTITIONER

## 2020-08-25 PROCEDURE — 80069 RENAL FUNCTION PANEL: CPT

## 2020-08-25 PROCEDURE — 90945 DIALYSIS ONE EVALUATION: CPT

## 2020-08-25 PROCEDURE — S0028 INJECTION, FAMOTIDINE, 20 MG: HCPCS | Performed by: STUDENT IN AN ORGANIZED HEALTH CARE EDUCATION/TRAINING PROGRAM

## 2020-08-25 RX ORDER — LIDOCAINE 50 MG/G
1 PATCH TOPICAL
Status: DISCONTINUED | OUTPATIENT
Start: 2020-08-25 | End: 2020-09-29 | Stop reason: HOSPADM

## 2020-08-25 RX ORDER — SODIUM CHLORIDE, SODIUM LACTATE, POTASSIUM CHLORIDE, CALCIUM CHLORIDE 600; 310; 30; 20 MG/100ML; MG/100ML; MG/100ML; MG/100ML
INJECTION, SOLUTION INTRAVENOUS CONTINUOUS
Status: DISCONTINUED | OUTPATIENT
Start: 2020-08-25 | End: 2020-08-31

## 2020-08-25 RX ORDER — HYDROCODONE BITARTRATE AND ACETAMINOPHEN 500; 5 MG/1; MG/1
TABLET ORAL
Status: DISCONTINUED | OUTPATIENT
Start: 2020-08-25 | End: 2020-08-27

## 2020-08-25 RX ORDER — SODIUM CHLORIDE 9 MG/ML
INJECTION, SOLUTION INTRAVENOUS CONTINUOUS
Status: DISCONTINUED | OUTPATIENT
Start: 2020-08-25 | End: 2020-08-25

## 2020-08-25 RX ADMIN — HYDROMORPHONE HYDROCHLORIDE 0.5 MG: 1 INJECTION, SOLUTION INTRAMUSCULAR; INTRAVENOUS; SUBCUTANEOUS at 10:08

## 2020-08-25 RX ADMIN — FLUCONAZOLE 200 MG: 2 INJECTION, SOLUTION INTRAVENOUS at 09:08

## 2020-08-25 RX ADMIN — LIDOCAINE 1 PATCH: 50 PATCH CUTANEOUS at 09:08

## 2020-08-25 RX ADMIN — PIPERACILLIN SODIUM AND TAZOBACTAM SODIUM 4.5 G: 4; .5 INJECTION, POWDER, LYOPHILIZED, FOR SOLUTION INTRAVENOUS at 10:08

## 2020-08-25 RX ADMIN — METOROPROLOL TARTRATE 5 MG: 5 INJECTION, SOLUTION INTRAVENOUS at 12:08

## 2020-08-25 RX ADMIN — HEPARIN SODIUM 5000 UNITS: 5000 INJECTION INTRAVENOUS; SUBCUTANEOUS at 05:08

## 2020-08-25 RX ADMIN — PIPERACILLIN SODIUM AND TAZOBACTAM SODIUM 4.5 G: 4; .5 INJECTION, POWDER, LYOPHILIZED, FOR SOLUTION INTRAVENOUS at 09:08

## 2020-08-25 RX ADMIN — MICONAZOLE NITRATE: 20 POWDER TOPICAL at 08:08

## 2020-08-25 RX ADMIN — FAMOTIDINE 20 MG: 10 INJECTION INTRAVENOUS at 09:08

## 2020-08-25 RX ADMIN — HEPARIN SODIUM 5000 UNITS: 5000 INJECTION INTRAVENOUS; SUBCUTANEOUS at 02:08

## 2020-08-25 RX ADMIN — I.V. FAT EMULSION 250 ML: 20 EMULSION INTRAVENOUS at 09:08

## 2020-08-25 RX ADMIN — HEPARIN SODIUM 5000 UNITS: 5000 INJECTION INTRAVENOUS; SUBCUTANEOUS at 09:08

## 2020-08-25 RX ADMIN — SODIUM CHLORIDE, SODIUM LACTATE, POTASSIUM CHLORIDE, AND CALCIUM CHLORIDE 1000 ML: .6; .31; .03; .02 INJECTION, SOLUTION INTRAVENOUS at 12:08

## 2020-08-25 RX ADMIN — HYDROMORPHONE HYDROCHLORIDE 0.5 MG: 1 INJECTION, SOLUTION INTRAMUSCULAR; INTRAVENOUS; SUBCUTANEOUS at 04:08

## 2020-08-25 RX ADMIN — METOROPROLOL TARTRATE 5 MG: 5 INJECTION, SOLUTION INTRAVENOUS at 05:08

## 2020-08-25 RX ADMIN — HYDROMORPHONE HYDROCHLORIDE 0.5 MG: 1 INJECTION, SOLUTION INTRAMUSCULAR; INTRAVENOUS; SUBCUTANEOUS at 06:08

## 2020-08-25 RX ADMIN — MICONAZOLE NITRATE: 20 POWDER TOPICAL at 09:08

## 2020-08-25 RX ADMIN — HYDROMORPHONE HYDROCHLORIDE 0.5 MG: 1 INJECTION, SOLUTION INTRAMUSCULAR; INTRAVENOUS; SUBCUTANEOUS at 02:08

## 2020-08-25 RX ADMIN — SODIUM CHLORIDE, SODIUM LACTATE, POTASSIUM CHLORIDE, AND CALCIUM CHLORIDE: .6; .31; .03; .02 INJECTION, SOLUTION INTRAVENOUS at 01:08

## 2020-08-25 RX ADMIN — SODIUM PHOSPHATE, MONOBASIC, MONOHYDRATE 30 MMOL: 276; 142 INJECTION, SOLUTION INTRAVENOUS at 02:08

## 2020-08-25 RX ADMIN — MAGNESIUM SULFATE HEPTAHYDRATE: 500 INJECTION, SOLUTION INTRAMUSCULAR; INTRAVENOUS at 09:08

## 2020-08-25 RX ADMIN — HYDROMORPHONE HYDROCHLORIDE 0.5 MG: 1 INJECTION, SOLUTION INTRAMUSCULAR; INTRAVENOUS; SUBCUTANEOUS at 08:08

## 2020-08-25 NOTE — PT/OT/SLP EVAL
Physical Therapy Evaluation    Patient Name:  Jaquan Farmer   MRN:  [id number]  Admit Date: 8/12/2020  Admitting Diagnosis:  Duodenum disorder  Length of Stay: 13 days  Recent Surgery: Procedure(s) (LRB):  WASHOUT abdomen (N/A) 1 Day Post-Op    Recommendations:     Discharge Recommendations:  rehabilitation facility   Discharge Equipment Recommendations: (TBD)   Barriers to discharge: None    Assessment:     Jaquan Farmer is a 39 y.o. female admitted with a medical diagnosis of Duodenum disorder. PT found alert lying supine with HOB elevated. Pt expressed severe back pain.  Pt required max A for functional mobility on this date due to pain and generalized weakness. Recommend IP rehab once pt is medically stable to maximize functional mobility and return to indep PLOF.      Problem List: weakness, impaired endurance, impaired functional mobilty, gait instability, impaired balance, pain, decreased lower extremity function, decreased upper extremity function, impaired cardiopulmonary response to activity  Rehab Prognosis: Good; patient would benefit from acute skilled PT services to address these deficits and reach maximum level of function.      Plan:     During this hospitalization, patient to be seen 4 x/week to address the identified rehab impairments via gait training, therapeutic activities, therapeutic exercises, neuromuscular re-education and progress towards the established goals.    · Plan of Care Expires:  09/24/20    Subjective   Communicated with RN prior to session.  Patient found HOB elevated upon PT entry to room, agreeable to evaluation. Jaquan Farmer's alone during session.    Chief Complaint: No chief complaint on file.    Patient/Family Comments/goals: to get better and return home    Pain/Comfort:  · Pain Rating 1: 6/10  · Location 1: (Back)  · Pain Addressed 1: Reposition, Distraction  · Pain Rating Post-Intervention 1: 6/10    Living Environment:  Patient lives with son in a [facility] with  "TH enterance.  Prior Level of Function:   Patient reports being indep with mobility & with ADLs.  Patient uses DME as follows: none. DME owned (not currently used): none.    Patient reports they will have assistance from son upon discharge.    Objective:   Patient found with: telemetry, blood pressure cuff, pulse ox (continuous), central line, peripheral IV, GODFREY drain(G Tube)     General Precautions: Standard, Cardiac fall   Orthopedic Precautions:N/A   Braces: N/A   Oxygen Device: Room Air  Vitals: /73 (BP Location: Right arm, Patient Position: Sitting)   Pulse (!) 137   Temp 99.7 °F (37.6 °C) (Oral)   Resp (!) 25   Ht 5' 2" (1.575 m)   Wt 63.9 kg (140 lb 14 oz)   SpO2 100%   Breastfeeding No   BMI 25.77 kg/m²     Exams:  · Cognition:   · Alert, Cooperative and Flat affect  · Ox4  · Command following: Follows multistep  commands  · Fluency: clear/fluent    · RLE ROM: WFL  · RLE Strength: grossly 3+/5  · LLE ROM: WFL  · LLE Strength: grossly 3+/5    Outcome Measures:  AM-PAC 6 CLICK MOBILITY  Turning over in bed (including adjusting bedclothes, sheets and blankets)?: 2  Sitting down on and standing up from a chair with arms (e.g., wheelchair, bedside commode, etc.): 2  Moving from lying on back to sitting on the side of the bed?: 2  Moving to and from a bed to a chair (including a wheelchair)?: 2  Need to walk in hospital room?: 2  Climbing 3-5 steps with a railing?: 1  Basic Mobility Total Score: 11     Functional Mobility:  Additional staff present: OT  Bed Mobility:  · Supine to Sit: maximal assistance; HOB elevated   · Using log roll technique   · Scooting anteriorly to EOB to have both feet planted on floor: maximal assistance    Sitting Balance at Edge of Bed:   Assistance Level Required: Minimal Assistance   Time: 8 minutes   Comments:   o Pt reported severe back pain  o Worked on sitting balance re: maintain midline and correcting to midline   o Worked on co-contraction of postural muscles "   o Worked on activity tolerance sitting EOB unsupported     Transfers:   · Sit <> Stand Transfer: maximal assistance with no assistive device from EOB      Gait:   · Patient ambulated: 2ft   · Patient required: maximal assist  · Patient used: hand-held assist  · Gait Pattern observed: reciprocal gait  · Gait Deviation(s): unsteady gait, decreased step length, narrow base of support, flexed posture and decreased randell  · Impairments due to: impaired balance, pain and decreased strength  · Comments:   · Facilitation of hip and thoracic extension   · Facilitation of B weight shifting   · Cuing for sequencing of gait       Therapeutic Activities, Exercises, & Education:   Educated pt on PT role/POC  Educated pt on importance of OOB activity and daily ambulation   Pt verbalized understanding     T/f to chair to increase tolerance to OOB activity and to create optimal positioning for lung expansion       Patient left up in chair with all lines intact, call button in reach and RN notified.    GOALS:   Multidisciplinary Problems     Physical Therapy Goals        Problem: Physical Therapy Goal    Goal Priority Disciplines Outcome Goal Variances Interventions   Physical Therapy Goal     PT, PT/OT Ongoing, Progressing     Description: Goals to be met by: 2020     Patient will increase functional independence with mobility by performin. Supine to sit with MInimal Assistance  2. Sit to stand transfer with Minimal Assistance with LRAD  3. Gait  x 50 feet with Minimal Assistance using LRAD.   4. Stand for 3 minutes with Contact Guard Assistance using LRAD  5. Lower extremity exercise program x15 reps per handout, with independence                     History:     Past Medical History:   Diagnosis Date    Anxiety     Aortic insufficiency     Hyperbilirubinemia     Hypertension     Insomnia     Leukocytosis     Migraines     MR (mitral regurgitation)     Peptic ulcer disease     Pulmonary hypertension      Respiratory distress     Sepsis        Past Surgical History:   Procedure Laterality Date    APPLICATION OF WOUND VACUUM-ASSISTED CLOSURE DEVICE  8/13/2020    Procedure: APPLICATION, WOUND VAC WITH ABTHERA;  Surgeon: Donis Roa MD;  [facility];  Service: General;;    APPLICATION OF WOUND VACUUM-ASSISTED CLOSURE DEVICE N/A 8/21/2020    Procedure: APPLICATION, WOUND VAC;  Surgeon: Donis Roa MD;  [facility];  Service: General;  Laterality: N/A;    COLONOSCOPY      DUODENECTOMY  8/13/2020    Procedure: KOCHERIZATION OF DUODENUM, DUODENECTOMY;  Surgeon: Donis Roa MD;  Location: [facility] OR [address] [place] [address];  Service: General;;    ESOPHAGOGASTRODUODENOSCOPY  01/23/2018    ESOPHAGOGASTRODUODENOSCOPY N/A 8/13/2020    Procedure: EGD (ESOPHAGOGASTRODUODENOSCOPY);  Surgeon: Donis Roa MD;  Location: [facility] OR [address] [place] [address];  Service: General;  Laterality: N/A;    EVACUATION OF HEMATOMA N/A 8/21/2020    Procedure: EVACUATION, HEMATOMA;  Surgeon: Donis Roa MD;  [facility];  Service: General;  Laterality: N/A;  OF RECTUS SHEET HEMATOMA    PERCUTANEOUS INSERTION OF GASTROSTOMY TUBE  8/15/2020    Procedure: INSERTION, GASTROSTOMY TUBE, PERCUTANEOUS;  Surgeon: Donis Roa MD;  Location: [facility] OR [address] [place] [address];  Service: General;;    TUBAL LIGATION      WOUND EXPLORATION N/A 8/21/2020    Procedure: EXPLORATION, WOUND;  Surgeon: Donis Roa MD;  Location: [facility] OR [address] [place] [address];  Service: General;  Laterality: N/A;       Time Tracking:     PT Received On: 08/25/20  PT Start Time: 0910     PT Stop Time: 0935  PT Total Time (min): 25 min     Billable Minutes: Evaluation 10 and Gait Training 8    Bonnie Tolbert, PT, DPT  8/25/2020  [phone]

## 2020-08-25 NOTE — PROGRESS NOTES
[facility]  Critical Care - Surgery  Progress Note    Patient Name: Jaquan Farmer  MRN: [id number]  Admission Date: 8/12/2020  Hospital Length of Stay: 13 days  Code Status: Full Code  Attending Provider: Donis Roa MD  Primary Care Provider: Primary Doctor No   Principal Problem: Duodenum disorder    Subjective:     Hospital/ICU Course:  8/14 Patient had a line replaced along with central line placed, with complication of pneumothorax. Rt pigtail was placed. Patient tolerated complications well. Decreasing vent settings.  8/15 NAEO. Patient was taken back to OR early AM for washout. Possible closure 8/18. ETT exchanged in OR 7.0 -> 7.5  8/16 - NAEON. HDS. Intubated, sedated.   8/17 - NAEON. OR tomorrow for closure.  8/18 - Washed out and closed in OR  8/22 - NAEO. Plan for Repeat CT AP today  8/23 - NAEO. Hbg lower. Giving blood x2 units.  8/25 - NAEO. Went to OR yesterday for Abdominal wall exploration and ligation of bleeding vessels. Wound vac exchanged    Interval History/Significant Events: NAEO. S/p abdominal wall exploration yesterday. 1u pRBCs transfused this am    Follow-up For: Procedure(s) (LRB):  WASHOUT abdomen (N/A)    Post-Operative Day: 1 Day Post-Op    Objective:     Vital Signs (Most Recent):  Temp: 99.7 °F (37.6 °C) (08/25/20 1100)  Pulse: (!) 137 (08/25/20 1100)  Resp: (!) 25 (08/25/20 1100)  BP: 133/73 (08/25/20 1100)  SpO2: 100 % (08/25/20 1100) Vital Signs (24h Range):  Temp:  [97.9 °F (36.6 °C)-99.7 °F (37.6 °C)] 99.7 °F (37.6 °C)  Pulse:  [] 137  Resp:  [17-33] 25  SpO2:  [95 %-100 %] 100 %  BP: (124-192)/() 133/73     Weight: 63.9 kg (140 lb 14 oz)  Body mass index is 25.77 kg/m².      Intake/Output Summary (Last 24 hours) at 8/25/2020 1118  Last data filed at 8/25/2020 1100  Gross per 24 hour   Intake 4549 ml   Output 5863 ml   Net -1314 ml       Physical Exam  Vitals signs and nursing note reviewed.   Constitutional:       Appearance: She is not  ill-appearing.      Interventions: She is sedated. She is not intubated.  HENT:      Head: Normocephalic and atraumatic.      Mouth/Throat:      Mouth: Mucous membranes are dry.   Eyes:      General: No scleral icterus.        Right eye: No discharge.         Left eye: No discharge.      Extraocular Movements: Extraocular movements intact.      Conjunctiva/sclera: Conjunctivae normal.   Neck:      Musculoskeletal: Normal range of motion and neck supple.   Cardiovascular:      Rate and Rhythm: Regular rhythm. Tachycardia present.      Pulses: Normal pulses.      Heart sounds: Normal heart sounds.   Pulmonary:      Effort: Pulmonary effort is normal. No respiratory distress. She is not intubated.      Breath sounds: No rales.   Abdominal:      General: Bowel sounds are normal.      Palpations: Abdomen is soft.      Comments: woundvac   Musculoskeletal:      Right lower leg: Edema present.      Left lower leg: Edema present.   Skin:     General: Skin is warm and dry.      Findings: No bruising or rash.   Neurological:      General: No focal deficit present.      Mental Status: She is alert and oriented to person, place, and time.   Psychiatric:         Mood and Affect: Mood normal.         Behavior: Behavior normal.         Vents:  Vent Mode: Spont (08/20/20 1501)  Ventilator Initiated: Yes(chart correction) (08/12/20 1930)  Set Rate: 20 BPM (08/20/20 0738)  Vt Set: 360 mL (08/20/20 0738)  PEEP/CPAP: 5 cmH20 (08/20/20 1501)  Oxygen Concentration (%): 28 (08/22/20 0043)  Peak Airway Pressure: 7.8 cmH2O (08/20/20 1501)  Plateau Pressure: 22 cmH20 (08/20/20 1501)  Total Ve: 7.21 mL (08/20/20 1501)  Negative Inspiratory Force (cm H2O): -23 (08/20/20 1501)  F/VT Ratio<105 (RSBI): (!) 64.6 (08/20/20 1205)    Lines/Drains/Airways     Central Venous Catheter Line            Trialysis (Dialysis) Catheter 08/13/20 2230 right internal jugular 11 days          Drain                 Closed/Suction Drain 08/13/20 1659 Right;Inferior  Abdomen Bulb 19 Fr. 11 days         Closed/Suction Drain 08/13/20 1659 Right;Superior Abdomen Bulb 19 Fr. 11 days         Gastrostomy/Enterostomy 08/15/20 0916 Gastrostomy tube w/ balloon LUQ decompression 10 days          Peripheral Intravenous Line                 Peripheral IV - Single Lumen 08/20/20 0430 20 G Anterior;Right Forearm 5 days                Significant Labs:    CBC/Anemia Profile:  Recent Labs   Lab 08/24/20  1200 08/25/20  0400 08/25/20  0948   WBC 37.37* 40.61* 40.86*   HGB 7.9* 6.6* 8.5*   HCT 24.4* 20.4* 26.0*    239 270   MCV 91 92 92   RDW 14.3 14.4 14.3        Chemistries:  Recent Labs   Lab 08/23/20  2155 08/24/20  0320 08/24/20  1200 08/24/20  2228 08/25/20  0400    138 140 139 141  141   K 4.5 4.4 4.6 4.4 4.2  4.2    102 105 105 106  106   CO2 21* 21* 22* 26 25  25   BUN 68* 74* 82* 33* 15  15   CREATININE 4.1* 4.4* 4.5* 2.2* 1.1  1.1   CALCIUM 8.7 8.5* 8.3* 7.7* 7.5*  7.5*   ALBUMIN 1.4* 1.5* 1.5* 1.5* 1.5*  1.5*   PROT 5.5* 5.5*  --   --  5.6*   BILITOT 0.7 0.6  --   --  0.7   ALKPHOS 85 90  --   --  100   ALT 16 17  --   --  16   AST 33 16  --   --  21   MG  --   --  2.6 2.2 1.8   PHOS  --   --  3.7 2.1* 3.2       All pertinent labs within the past 24 hours have been reviewed.    Significant Imaging:  I have reviewed all pertinent imaging results/findings within the past 24 hours.    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 Now with severe sepsis and ARDS     .Neuro:  - AOx4  - PRN hydromorphone for analgesia     Resp:  - 100% on 2L NC  - PRN breathing treatments  - Right chest tube with no output     CV:  - MAP goal >65  - Systolic <160  - Levo and Vaso gtt - off     Heme:  - Hgb/Hct 6.6; transfusing 1 unit PRBCs  - S/p Abdominal washout on 8/21  - S/p Abdominal exploration w/ woundvac exhange 8/24       ID:  - WBC remains elevated 40  -  Zosyn, Fluconazole  - Blood cultures and BAL taken 8/19 with NGTD and no abnormal findings     Renal:  - Oliguric on arrival; Cr 1.9 at that time   - SLED overnight  - Trend BUN/Cr;   - Monitor I/Os     FEN/GI:  - NPO  - TPN  - NGT to LIWS   - Maintain drains to bulb suction; LLQ with acute drop in drainage and accumulation in SQ tissue  - GI ppx  - Replace electrolytes as needed to keep K>4, Mg>2     Endo:  - Monitor BG     Dispo:  - Continue SICU care             Critical care was time spent personally by me on the following activities: development of treatment plan with patient or surrogate and bedside caregivers, discussions with consultants, evaluation of patient's response to treatment, examination of patient, ordering and performing treatments and interventions, ordering and review of laboratory studies, ordering and review of radiographic studies, pulse oximetry, re-evaluation of patient's condition.  This critical care time did not overlap with that of any other provider or involve time for any procedures.     Serjio Garcia MD  Critical Care - Surgery  [facility]

## 2020-08-25 NOTE — PLAN OF CARE
Problem: Physical Therapy Goal  Goal: Physical Therapy Goal  Description: Goals to be met by: 2020     Patient will increase functional independence with mobility by performin. Supine to sit with MInimal Assistance  2. Sit to stand transfer with Minimal Assistance with LRAD  3. Gait  x 50 feet with Minimal Assistance using LRAD.   4. Stand for 3 minutes with Contact Guard Assistance using LRAD  5. Lower extremity exercise program x15 reps per handout, with independence    Outcome: Ongoing, Progressing     Eval completed. Goals Appropriate

## 2020-08-25 NOTE — PLAN OF CARE
Problem: Occupational Therapy Goal  Goal: Occupational Therapy Goal  Description: Goals to be met by: 9/4/20     Patient will increase functional independence with ADLs by performing:    Feeding with [place].  UE Dressing with Stand-by Assistance.  LE Dressing with Minimal Assistance.  Grooming while standing at sink with Contact Guard Assistance.  Toileting from bedside commode with Minimal Assistance for hygiene and clothing management.   Toilet transfer to bedside commode with Minimal Assistance.    Outcome: Ongoing, Progressing   OT eval completed, and above goals established. CHRISTIANO Head  8/25/2020

## 2020-08-25 NOTE — CONSULTS
Wound consult received on patient's bilateral shins. Light purple discolored tissue noted to bilateral shins, unknown etiology.  Surrounding tissue intact.  Recommend dressing with foam dressing, change twice a week and continue to monitor for any further signs of skin breakdown.  Nursing to continue care. Wound care to follow prn.    Left shin 1cm x 3cm    Right shin 2cm x 2cm

## 2020-08-25 NOTE — NURSING
Dr. Landeros notified of current I/O's and increasing tachycardia throughout AM. Orders received to give 1L NS bolus now and start MIVF @ 100cc/hr. WCTM.    Nephrology at bedside and requesting that bolus and gtt be changed to LR instead of NS.

## 2020-08-25 NOTE — SUBJECTIVE & OBJECTIVE
Interval History: Vac change yesterday.  Hgb drop again this AM     Medications:  Continuous Infusions:   lactated ringers 100 mL/hr at 08/25/20 1330    TPN ADULT CENTRAL LINE CUSTOM 45 mL/hr at 08/25/20 1300    TPN ADULT CENTRAL LINE CUSTOM       Scheduled Meds:   famotidine (PF)  20 mg Intravenous Daily    fat emulsion  250 mL Intravenous Daily    fat emulsion  250 mL Intravenous Daily    fluconazole (DIFLUCAN) IVPB  200 mg Intravenous Q24H    heparin (porcine)  5,000 Units Subcutaneous Q8H    lidocaine  1 patch Transdermal Q24H    metoprolol  5 mg Intravenous Q6H    miconazole NITRATE 2 %   Topical (Top) BID    piperacillin-tazobactam (ZOSYN) IVPB  4.5 g Intravenous Q12H     PRN Meds:sodium chloride, sodium chloride, sodium chloride, HYDROmorphone, melatonin, metoprolol, ondansetron     Review of patient's allergies indicates:   Allergen Reactions    Latex      Objective:     Vital Signs (Most Recent):  Temp: 99.7 °F (37.6 °C) (08/25/20 1200)  Pulse: (!) 125 (08/25/20 1300)  Resp: (!) 25 (08/25/20 1300)  BP: 132/75 (08/25/20 1300)  SpO2: 100 % (08/25/20 1300) Vital Signs (24h Range):  Temp:  [98.3 °F (36.8 °C)-99.7 °F (37.6 °C)] 99.7 °F (37.6 °C)  Pulse:  [] 125  Resp:  [18-31] 25  SpO2:  [95 %-100 %] 100 %  BP: (124-165)/() 132/75     Weight: 63.9 kg (140 lb 14 oz)  Body mass index is 25.77 kg/m².    Intake/Output - Last 3 Shifts       08/23 0700 - 08/24 0659 08/24 0700 - 08/25 0659 08/25 0700 - 08/26 0659    I.V. (mL/kg) 327 (5.1) 3036 (47.5)     Blood 616 248 350    NG/GT 20      IV Piggyback 200  1000    TPN 1102 1163     Total Intake(mL/kg) 2265 (35.4) 4447 (69.6) 1350 (21.1)    Urine (mL/kg/hr) 0 (0) 0 (0)     Drains 1790 2930 1015    Other 600 2413 100    Stool       Total Output 2390 5343 1115    Net -125 -896 +235           Urine Occurrence 2 x 2 x           Physical Exam  Constitutional:       Interventions: She is sedated.   HENT:      Head: Normocephalic.      Nose:       Comments: NGT in place  Cardiovascular:      Rate and Rhythm: Regular rhythm. Tachycardia present.   Pulmonary:      Effort: Pulmonary effort is normal.      Comments: NC 2L  Abdominal:      General: There is no distension.      Palpations: Abdomen is soft.      Tenderness: There is abdominal tenderness (appropriate).      Comments: R abdomen GODFREY x2 with bilious output  Wound vac in midline incision  G-tube with gastric fluid output   Musculoskeletal:         General: Swelling present.      Right lower leg: Edema present.      Left lower leg: Edema present.   Skin:     General: Skin is warm and dry.   Neurological:      Mental Status: She is alert.         Significant Labs:  CBC:   Recent Labs   Lab 08/25/20  0948   WBC 40.86*   RBC 2.84*   HGB 8.5*   HCT 26.0*      MCV 92   MCH 29.9   MCHC 32.7     CMP:   Recent Labs   Lab 08/25/20  0400   *  130*   CALCIUM 7.5*  7.5*   ALBUMIN 1.5*  1.5*   PROT 5.6*     141   K 4.2  4.2   CO2 25  25     106   BUN 15  15   CREATININE 1.1  1.1   ALKPHOS 100   ALT 16   AST 21   BILITOT 0.7       Significant Diagnostics:  CT pending

## 2020-08-25 NOTE — PLAN OF CARE
SW is following this Pt for DC planning needs.  Discharge Disposition: TBD - pending patient progress    SW will continue to coordinate with patient, family, team and insurance to complete patient's discharge plan.    Jazzy Eid LMSW   - Case Management

## 2020-08-25 NOTE — CONSULTS
"  [facility]  Adult Nutrition  Consult Note    SUMMARY     Recommendations    1.) Increase TPN to 110gm of AA, 200gm of dextrose, 50gm of lipids to provide 1620kcal.   2.) Once GI function improves, initiate EN of Impact Peptide 1.5; trickle feeds at 10mL/hr.   3.) Monitor electrolytes and replace as needed.     Goals: 1.) Pt to meet >75% of estimated energy and protein needs over the course of 7 days.  Nutrition Goal Status: progressing towards goal  Communication of RD Recs: reviewed with RN(POC)    Reason for Assessment    Reason For Assessment: RD follow-up  Diagnosis: (Duodenum disorder)  Relevant Medical History: HTN, anxiety, respiratory distress, leukocytosis, sepsis, pulmonary HTN  Interdisciplinary Rounds: did not attend  General Information Comments: Nsg staff reports pt is tolerating TPN well, however magnesium had to be replaced. TPN providing 87% of EEN and 97% of EPN. Pt extubated. NFPE completed 8/17, pt meets ASPEN guidelines for severe malnutrition due to acute illness. GI LBM 8/22. GODFREY drain output of 835cc and G tube/J tube output of 350cc. Pt on HD. Pt had 17% weight loss since admission.   Nutrition Discharge Planning: Unable to determine at this time.    Nutrition Risk Screen    Nutrition Risk Screen: tube feeding or parenteral nutrition    Nutrition/Diet History    Spiritual, Cultural Beliefs, Oriental orthodox Practices, Values that Affect Care: no  Food Allergies: NKFA  Factors Affecting Nutritional Intake: altered gastrointestinal function, NPO    Anthropometrics    Temp: 99.7 °F (37.6 °C)  Height Method: Stated(from outside records)  Height: 5' 2" (157.5 cm)  Height (inches): 62 in  Weight Method: Bed Scale  Weight: 63.9 kg (140 lb 14 oz)  Weight (lb): 140.88 lb  Ideal Body Weight (IBW), Female: 110 lb  % Ideal Body Weight, Female (lb): 155.73 %  BMI (Calculated): 25.8  BMI Grade: 25 - 29.9 - overweight  Usual Body Weight (UBW), kg: (No wt history)  Weight Loss Since Admission: " 30 lb 6.8 oz(17% weight loss since admission 8/12)       Lab/Procedures/Meds    Pertinent Labs Reviewed: reviewed  Pertinent Labs Comments: WBC 40.64, Hgb 6.6, Hct 20.4, Glucose 130, Ca 7.5, Alb 1.5  Pertinent Medications Reviewed: reviewed  Pertinent Medications Comments: famotidine, diflucan, heparin, metoprolol, piperacillin    Physical Findings/Assessment     Edema 1+ generalized    Estimated/Assessed Needs    Weight Used For Calorie Calculations: 77 kg (169 lb 12.1 oz)  Energy Calorie Requirements (kcal): 1677  Energy Need Method: [place]-St Jeor(x1.2 PAL)  Protein Requirements:  gm (1.2-1.5gm/kg)  Weight Used For Protein Calculations: 77 kg (169 lb 12.1 oz)  Fluid Requirements (mL): 1mL/kcal or per MD recommendations  Estimated Fluid Requirement Method: RDA Method  RDA Method (mL): 1677         Nutrition Prescription Ordered    Current Diet Order: NPO  Current Nutrition Support Formula Ordered: (Custom TPN 15% Amino Acids; 70% dextrose)  Current Nutrition Support Rate Ordered: 45 (ml)  Current Nutrition Support Frequency Ordered: mL/hr    Evaluation of Received Nutrient/Fluid Intake    Parenteral Calories (kcal): 1471  Parenteral Protein (gm): 90  Parenteral Fluid (mL): 1330  Lipid Calories (kcals): 500 kcals  GIR (Glucose Infusion Rate) (mg/kg/min): 1.9 mg/kg/min  Other Calories (kcal): 0  Total Calories (kcal): 1471  Total Calories (kcal/kg): 21  % Kcal Needs: 87  % Protein Needs: 97  I/O: I: 4447; O: 5363; -3.6L since admission  Energy Calories Required: not meeting needs  Protein Required: not meeting needs  Fluid Required: meeting needs  Tolerance: tolerating  % Intake of Estimated Energy Needs:   % Meal Intake:     Nutrition Risk    Level of Risk/Frequency of Follow-up: high , 2x weekly    Assessment and Plan    Nutrition Problem  Malnutrition (severe) due to acute illness     Related to (etiology):   NPO     Signs and Symptoms (as evidenced by):   NPO (8/12) for 5 days.  Severe muscle wasting of  temple, clavicle  Severe fat wasting of orbital     Interventions(treatment strategy):  1.) Collaboration with other providers  2.) Parenteral nutrition     Nutrition Diagnosis Status:   ongoing    Monitor and Evaluation    Food and Nutrient Intake: parenteral nutrition intake  Food and Nutrient Adminstration: enteral and parenteral nutrition administration  Anthropometric Measurements: weight, weight change  Biochemical Data, Medical Tests and Procedures: electrolyte and renal panel, gastrointestinal profile  Nutrition-Focused Physical Findings: overall appearance, skin     Malnutrition Assessment  Malnutrition Type: acute illness or injury              Orbital Region (Subcutaneous Fat Loss): severe depletion   [place] Region (Muscle Loss): severe depletion  Clavicle Bone Region (Muscle Loss): severe depletion                 Nutrition Follow-Up    RD Follow-up?: Yes

## 2020-08-25 NOTE — SUBJECTIVE & OBJECTIVE
Interval History:   SLED completed this morning, electrolytes stable.  Net negative 90 ml/24h with an increase in drain output (2.9L/24h).  Oxygenation stable on RA.    Review of patient's allergies indicates:   Allergen Reactions    Latex      Current Facility-Administered Medications   Medication Frequency    0.9%  NaCl infusion (CRRT USE ONLY) Continuous    0.9%  NaCl infusion (for blood administration) Q24H PRN    0.9%  NaCl infusion (for blood administration) Q24H PRN    0.9%  NaCl infusion (for blood administration) Q24H PRN    calcium gluconate 1g in dextrose 5% 100mL (ready to mix system) PRN    calcium gluconate 2 g in dextrose 5 % 100 mL IVPB PRN    calcium gluconate 3 g in dextrose 5 % 100 mL IVPB PRN    famotidine (PF) injection 20 mg Daily    fat emulsion 20 % infusion 250 mL Daily    fluconazole (DIFLUCAN) IVPB 200 mg 100 mL Q24H    heparin (porcine) injection 5,000 Units Q8H    HYDROmorphone injection 0.5 mg Q4H PRN    lorazepam injection 1 mg Once    magnesium sulfate 2g in water 50mL IVPB (premix) PRN    magnesium sulfate 2g in water 50mL IVPB (premix) PRN    magnesium sulfate 2g in water 50mL IVPB (premix) PRN    melatonin tablet 3 mg Nightly PRN    metoprolol injection 5 mg Q4H PRN    metoprolol injection 5 mg Q6H    miconazole NITRATE 2 % top powder BID    ondansetron injection 4 mg Once PRN    piperacillin-tazobactam 4.5 g in sodium chloride 0.9% 100 mL IVPB (ready to mix system) Q12H    potassium chloride 40 mEq in 100 mL IVPB (FOR CENTRAL LINE ADMINISTRATION ONLY) PRN    And    potassium chloride 20 mEq in 100 mL IVPB (FOR CENTRAL LINE ADMINISTRATION ONLY) PRN    And    potassium chloride 40 mEq in 100 mL IVPB (FOR CENTRAL LINE ADMINISTRATION ONLY) PRN    sodium chloride 0.9% bolus 1,000 mL Once    sodium phosphate 15 mmol in dextrose 5 % 250 mL IVPB PRN    sodium phosphate 20.01 mmol in dextrose 5 % 250 mL IVPB PRN    sodium phosphate 20.01 mmol in dextrose 5 %  250 mL IVPB PRN    sodium phosphate 30 mmol in dextrose 5 % 250 mL IVPB PRN    sodium phosphate 30 mmol in dextrose 5 % 250 mL IVPB PRN    sodium phosphate 39.99 mmol in dextrose 5 % 250 mL IVPB PRN    TPN ADULT CENTRAL LINE CUSTOM Continuous       Objective:     Vital Signs (Most Recent):  Temp: 99.6 °F (37.6 °C) (08/25/20 0745)  Pulse: (!) 119 (08/25/20 0815)  Resp: (!) 24 (08/25/20 0815)  BP: 138/71 (08/25/20 0800)  SpO2: 100 % (08/25/20 0815)  O2 Device (Oxygen Therapy): room air (08/25/20 0800) Vital Signs (24h Range):  Temp:  [97.9 °F (36.6 °C)-99.6 °F (37.6 °C)] 99.6 °F (37.6 °C)  Pulse:  [] 119  Resp:  [17-33] 24  SpO2:  [91 %-100 %] 100 %  BP: (124-192)/() 138/71     Weight: 63.9 kg (140 lb 14 oz) (08/25/20 0622)  Body mass index is 25.77 kg/m².  Body surface area is 1.67 meters squared.    I/O last 3 completed shifts:  In: 5472 [I.V.:3258; Blood:248; NG/GT:20; IV Piggyback:200]  Out: 6113 [Drains:3450; Other:2663]    Physical Exam  Vitals signs and nursing note reviewed.   Constitutional:       Appearance: She is not ill-appearing.      Interventions: She is sedated. She is not intubated.  HENT:      Head: Normocephalic and atraumatic.      Mouth/Throat:      Mouth: Mucous membranes are dry.   Eyes:      General: No scleral icterus.        Right eye: No discharge.         Left eye: No discharge.      Extraocular Movements: Extraocular movements intact.      Conjunctiva/sclera: Conjunctivae normal.   Neck:      Musculoskeletal: Normal range of motion and neck supple.   Cardiovascular:      Rate and Rhythm: Regular rhythm. Tachycardia present.      Pulses: Normal pulses.      Heart sounds: Normal heart sounds.   Pulmonary:      Effort: Pulmonary effort is normal. No respiratory distress. She is not intubated.      Breath sounds: No rales.   Abdominal:      General: Bowel sounds are normal.      Palpations: Abdomen is soft.      Comments: woundvac   Musculoskeletal:      Right lower leg: Edema  present.      Left lower leg: Edema present.   Skin:     General: Skin is warm and dry.      Findings: No bruising or rash.   Neurological:      General: No focal deficit present.      Mental Status: She is alert and oriented to person, place, and time.   Psychiatric:         Mood and Affect: Mood normal.         Behavior: Behavior normal.         Significant Labs:  CBC:   Recent Labs   Lab 08/25/20  0400   WBC 40.61*   RBC 2.21*   HGB 6.6*   HCT 20.4*      MCV 92   MCH 29.9   MCHC 32.4     CMP:   Recent Labs   Lab 08/25/20  0400   *  130*   CALCIUM 7.5*  7.5*   ALBUMIN 1.5*  1.5*   PROT 5.6*     141   K 4.2  4.2   CO2 25  25     106   BUN 15  15   CREATININE 1.1  1.1   ALKPHOS 100   ALT 16   AST 21   BILITOT 0.7

## 2020-08-25 NOTE — SUBJECTIVE & OBJECTIVE
Interval History/Significant Events: NAEO. S/p abdominal wall exploration yesterday. 1u pRBCs transfused this am    Follow-up For: Procedure(s) (LRB):  WASHOUT abdomen (N/A)    Post-Operative Day: 1 Day Post-Op    Objective:     Vital Signs (Most Recent):  Temp: 99.7 °F (37.6 °C) (08/25/20 1100)  Pulse: (!) 137 (08/25/20 1100)  Resp: (!) 25 (08/25/20 1100)  BP: 133/73 (08/25/20 1100)  SpO2: 100 % (08/25/20 1100) Vital Signs (24h Range):  Temp:  [97.9 °F (36.6 °C)-99.7 °F (37.6 °C)] 99.7 °F (37.6 °C)  Pulse:  [] 137  Resp:  [17-33] 25  SpO2:  [95 %-100 %] 100 %  BP: (124-192)/() 133/73     Weight: 63.9 kg (140 lb 14 oz)  Body mass index is 25.77 kg/m².      Intake/Output Summary (Last 24 hours) at 8/25/2020 1118  Last data filed at 8/25/2020 1100  Gross per 24 hour   Intake 4549 ml   Output 5863 ml   Net -1314 ml       Physical Exam  Vitals signs and nursing note reviewed.   Constitutional:       Appearance: She is not ill-appearing.      Interventions: She is sedated. She is not intubated.  HENT:      Head: Normocephalic and atraumatic.      Mouth/Throat:      Mouth: Mucous membranes are dry.   Eyes:      General: No scleral icterus.        Right eye: No discharge.         Left eye: No discharge.      Extraocular Movements: Extraocular movements intact.      Conjunctiva/sclera: Conjunctivae normal.   Neck:      Musculoskeletal: Normal range of motion and neck supple.   Cardiovascular:      Rate and Rhythm: Regular rhythm. Tachycardia present.      Pulses: Normal pulses.      Heart sounds: Normal heart sounds.   Pulmonary:      Effort: Pulmonary effort is normal. No respiratory distress. She is not intubated.      Breath sounds: No rales.   Abdominal:      General: Bowel sounds are normal.      Palpations: Abdomen is soft.      Comments: woundvac   Musculoskeletal:      Right lower leg: Edema present.      Left lower leg: Edema present.   Skin:     General: Skin is warm and dry.      Findings: No bruising  or rash.   Neurological:      General: No focal deficit present.      Mental Status: She is alert and oriented to person, place, and time.   Psychiatric:         Mood and Affect: Mood normal.         Behavior: Behavior normal.         Vents:  Vent Mode: Spont (08/20/20 1501)  Ventilator Initiated: Yes(chart correction) (08/12/20 1930)  Set Rate: 20 BPM (08/20/20 0738)  Vt Set: 360 mL (08/20/20 0738)  PEEP/CPAP: 5 cmH20 (08/20/20 1501)  Oxygen Concentration (%): 28 (08/22/20 0043)  Peak Airway Pressure: 7.8 cmH2O (08/20/20 1501)  Plateau Pressure: 22 cmH20 (08/20/20 1501)  Total Ve: 7.21 mL (08/20/20 1501)  Negative Inspiratory Force (cm H2O): -23 (08/20/20 1501)  F/VT Ratio<105 (RSBI): (!) 64.6 (08/20/20 1205)    Lines/Drains/Airways     Central Venous Catheter Line            Trialysis (Dialysis) Catheter 08/13/20 2230 right internal jugular 11 days          Drain                 Closed/Suction Drain 08/13/20 1659 Right;Inferior Abdomen Bulb 19 Fr. 11 days         Closed/Suction Drain 08/13/20 1659 Right;Superior Abdomen Bulb 19 Fr. 11 days         Gastrostomy/Enterostomy 08/15/20 0916 Gastrostomy tube w/ balloon LUQ decompression 10 days          Peripheral Intravenous Line                 Peripheral IV - Single Lumen 08/20/20 0430 20 G Anterior;Right Forearm 5 days                Significant Labs:    CBC/Anemia Profile:  Recent Labs   Lab 08/24/20  1200 08/25/20  0400 08/25/20  0948   WBC 37.37* 40.61* 40.86*   HGB 7.9* 6.6* 8.5*   HCT 24.4* 20.4* 26.0*    239 270   MCV 91 92 92   RDW 14.3 14.4 14.3        Chemistries:  Recent Labs   Lab 08/23/20  2155 08/24/20  0320 08/24/20  1200 08/24/20  2228 08/25/20  0400    138 140 139 141  141   K 4.5 4.4 4.6 4.4 4.2  4.2    102 105 105 106  106   CO2 21* 21* 22* 26 25  25   BUN 68* 74* 82* 33* 15  15   CREATININE 4.1* 4.4* 4.5* 2.2* 1.1  1.1   CALCIUM 8.7 8.5* 8.3* 7.7* 7.5*  7.5*   ALBUMIN 1.4* 1.5* 1.5* 1.5* 1.5*  1.5*   PROT 5.5* 5.5*  --    --  5.6*   BILITOT 0.7 0.6  --   --  0.7   ALKPHOS 85 90  --   --  100   ALT 16 17  --   --  16   AST 33 16  --   --  21   MG  --   --  2.6 2.2 1.8   PHOS  --   --  3.7 2.1* 3.2       All pertinent labs within the past 24 hours have been reviewed.    Significant Imaging:  I have reviewed all pertinent imaging results/findings within the past 24 hours.

## 2020-08-25 NOTE — PT/OT/SLP EVAL
Occupational Therapy   Evaluation    Name: Jaquan Farmer  MRN: [id number]  Admitting Diagnosis:  Duodenum disorder 1 Day Post-Op s/p multiple exp-laps, washouts and W[place] placement    Recommendations:     Discharge Recommendations: rehabilitation facility  Discharge Equipment Recommendations:  (TBD)  Barriers to discharge:  None    Assessment:     Jaquan Farmer is a 39 y.o. female with a medical diagnosis of Duodenum disorder.  She presents with global weakness and pain with prolonged bedrest 2* hospitalization and multiple surgeries. Performance deficits affecting function: weakness, impaired functional mobilty, decreased safety awareness, impaired cardiopulmonary response to activity, impaired endurance, gait instability, impaired balance, impaired self care skills, pain.      Rehab Prognosis: Good; patient would benefit from acute skilled OT services to address these deficits and reach maximum level of function.       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  Patient found supine with telemetry, pulse ox (continuous), blood pressure cuff, wound vac, GODFREY drain,  central line(G-tube) upon OT entry to room.    General Precautions: Standard, fall   Orthopedic Precautions:    Braces:       Occupational Performance:    Bed Mobility:    · Patient completed Supine to Sit with maximal assistance    Functional Mobility/Transfers:  · Patient completed Sit <> Stand Transfer with maximal assistance  with  no assistive device   · Patient completed Bed <> Chair Transfer using Stand Pivot technique with maximal assistance with no assistive device  · Functional Mobility: NT    Activities of Daily Living:  · Feeding:  set-up A    · Grooming: minimum assistance seated  · Upper Body Dressing: moderate assistance    · Lower Body Dressing: maximal assistance      Cognitive/Visual Perceptual:  Pt is alert, oriented and following commands    Physical Exam:  Postural examination/scapula alignment:    -       No postural abnormalities identified  Sensation:    -       Intact  Upper Extremity Range of Motion:     -       Right Upper Extremity: WFL  -       Left Upper Extremity: WFL  Upper Extremity Strength:    -       Right Upper Extremity: WFL  -       Left Upper Extremity: WFL   Strength:    -       Right Upper Extremity: WFL  -       Left Upper Extremity: WFL  Fine Motor Coordination:    -       Intact  Gross motor coordination:   WFL      AMPAC 6 Click ADL:  AMPAC Total Score: 13    Treatment & Education:  Pt ed on OT POC  Pt sat EOB with SBA while engaged in UE ROM Ex's  Pt required max A for sit to stand with B knees buckling  Pt t/f'd to chair via SPT; RN notified  Education:    Patient left up in chair with all lines intact, call button in reach and RN notified    GOALS:   Multidisciplinary Problems     Occupational Therapy Goals        Problem: Occupational Therapy Goal    Goal Priority Disciplines Outcome Interventions   Occupational Therapy Goal     OT, PT/OT Ongoing, Progressing    Description: Goals to be met by: 9/4/20     Patient will increase functional independence with ADLs by  performing:    Feeding with [place].  UE Dressing with Stand-by Assistance.  LE Dressing with Minimal Assistance.  Grooming while standing at sink with Contact Guard Assistance.  Toileting from bedside commode with Minimal Assistance for hygiene and clothing management.   Toilet transfer to bedside commode with Minimal Assistance.                     History:     Past Medical History:   Diagnosis Date    Anxiety     Aortic insufficiency     Hyperbilirubinemia     Hypertension     Insomnia     Leukocytosis     Migraines     MR (mitral regurgitation)     Peptic ulcer disease     Pulmonary hypertension     Respiratory distress     Sepsis        Past Surgical History:   Procedure Laterality Date    APPLICATION OF WOUND VACUUM-ASSISTED CLOSURE DEVICE  8/13/2020    Procedure: APPLICATION, WOUND VAC WITH ABTHERA;  Surgeon: Donis Roa MD;  Location: [address];  Service: General;;    APPLICATION OF WOUND VACUUM-ASSISTED CLOSURE DEVICE N/A 8/21/2020    Procedure: APPLICATION, WOUND VAC;  Surgeon: Donis Roa MD;  Location: [address];  Service: General;  Laterality: N/A;    COLONOSCOPY      DUODENECTOMY  8/13/2020    Procedure: KOCHERIZATION OF DUODENUM, DUODENECTOMY;  Surgeon: Donis Roa MD;  Location: [address];  Service: General;;    ESOPHAGOGASTRODUODENOSCOPY  01/23/2018    ESOPHAGOGASTRODUODENOSCOPY N/A 8/13/2020    Procedure: EGD (ESOPHAGOGASTRODUODENOSCOPY);  Surgeon: Donis Roa MD;  Location: [address];  Service: General;  Laterality: N/A;    EVACUATION OF HEMATOMA N/A 8/21/2020    Procedure: EVACUATION, HEMATOMA;  Surgeon: Donis Roa MD;  Location: [address];  Service: General;  Laterality: N/A;  OF RECTUS SHEET HEMATOMA    PERCUTANEOUS INSERTION OF GASTROSTOMY TUBE  8/15/2020    Procedure: INSERTION, GASTROSTOMY TUBE, PERCUTANEOUS;  Surgeon: Donis Roa MD;  Location: [facility] OR [address] [place] [address];  Service: General;;    TUBAL  LIGATION      WOUND EXPLORATION N/A 8/21/2020    Procedure: EXPLORATION, WOUND;  Surgeon: Donis Roa MD;  Location: [facility] OR [address] [place] [address];  Service: General;  Laterality: N/A;       Time Tracking:     OT Date of Treatment: 08/25/20  OT Start Time: 0909  OT Stop Time: 0932  OT Total Time (min): 23 min    Billable Minutes:Evaluation 10  Therapeutic Exercise 13    CHRISTIANO Head  8/25/2020

## 2020-08-25 NOTE — PLAN OF CARE
Recommendations     1.) Increase TPN to 110gm of AA, 200gm of dextrose, 50gm of lipids to provide 1620kcal.   2.) Once GI function improves, initiate EN of Impact Peptide 1.5; trickle feeds at 10mL/hr.   3.) Monitor electrolytes and replace as needed.      Goals: 1.) Pt to meet >75% of estimated energy and protein needs over the course of 7 days.  Nutrition Goal Status: progressing towards goal  Communication of RD Recs: reviewed with RN(POC)

## 2020-08-25 NOTE — PLAN OF CARE
"Dx: Duodenum disorder     Shift Events: CRRT SLED, 12hrs.  .  VSS, MAPS >65,  ST with -120s.   1uPRBC administered this morning for H&H 6.6/20.4.  Dilaudid 0.5mg administered every 4hrs for patient complaints of pain.  POC reviewed with patient.     Neuro: AAO x4, Follows Commands and Moves All Extremities    Vital Signs: /69   Pulse 109   Temp 99 °F (37.2 °C) (Oral)   Resp 20   Ht 5' 2" (1.575 m)   Wt 63.9 kg (140 lb 14 oz)   SpO2 100%   Breastfeeding No   BMI 25.77 kg/m²     Diet: NPO, TPN, ice chips for comfort    Gtts: TPN    Urine Output: Oliguric, pt on CRRT    GODFREY Superior drain - 500cc output/shift  GODFREY inferior drain - 220cc output/shift  G tube to gravity - 875cc output/shift  Wound Vac - 100cc output/shift     CRRT  SLED, ran for 12 hours. See flowsheets.      Labs/Accuchecks: Labs checked as ordered, replacements administered as ordered. Accuchecks Q6hrs, no correction needed.    Skin: DTI to bilateral shins with foam dsgs in place, CDI.  Turning and positioning Q2hrs, bed plugged in and working.  Foams applied to bilateral heels.  Foam applied to sacrum, no breakdown noted.  Unable to get patient OOBTC this morning due to CRRT running.  PT/OT following.                 "

## 2020-08-25 NOTE — PROGRESS NOTES
[facility]  General Surgery  Progress Note    Subjective:     History of Present Illness:  Pt is a 40 yo F with recent history of antrectomy with BII reconstruction for ulcer disease at outside hospital. 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 Patient also reportedly aspirated on induction in the OR prior to this, resulting in severe pulmonary edema and hypoxia. Patient was transferred to [facility] urgently for higher level of care. She arrived as a direct SICU admit on near maximal vent settings and requiring low dose vasopressors with HR in the upper 140s. Her midline laparotomy is closed with 4 Navdeep drains in place draining bilious output.     Post-Op Info:  Procedure(s) (LRB):  WASHOUT abdomen (N/A)   1 Day Post-Op     Interval History: Vac change yesterday.  Hgb drop again this AM     Medications:  Continuous Infusions:   lactated ringers 100 mL/hr at 08/25/20 1330    TPN ADULT CENTRAL LINE CUSTOM 45 mL/hr at 08/25/20 1300    TPN ADULT CENTRAL LINE CUSTOM       Scheduled Meds:   famotidine (PF)  20 mg Intravenous Daily    fat emulsion  250 mL Intravenous Daily    fat emulsion  250 mL Intravenous Daily    fluconazole (DIFLUCAN) IVPB  200 mg Intravenous Q24H    heparin (porcine)  5,000 Units Subcutaneous Q8H    lidocaine  1 patch Transdermal Q24H    metoprolol  5 mg Intravenous Q6H    miconazole NITRATE 2 %   Topical (Top) BID    piperacillin-tazobactam (ZOSYN) IVPB  4.5 g Intravenous Q12H     PRN Meds:sodium chloride, sodium chloride, sodium chloride, HYDROmorphone, melatonin, metoprolol, ondansetron     Review of patient's allergies indicates:   Allergen Reactions    Latex      Objective:     Vital Signs (Most Recent):  Temp: 99.7 °F (37.6 °C) (08/25/20 1200)  Pulse: (!) 125 (08/25/20 1300)  Resp: (!) 25 (08/25/20 1300)  BP: 132/75 (08/25/20 1300)  SpO2: 100 % (08/25/20 1300) Vital Signs (24h Range):  Temp:  [98.3 °F (36.8 °C)-99.7  °F (37.6 °C)] 99.7 °F (37.6 °C)  Pulse:  [] 125  Resp:  [18-31] 25  SpO2:  [95 %-100 %] 100 %  BP: (124-165)/() 132/75     Weight: 63.9 kg (140 lb 14 oz)  Body mass index is 25.77 kg/m².    Intake/Output - Last 3 Shifts       08/23 0700 - 08/24 0659 08/24 0700 - 08/25 0659 08/25 0700 - 08/26 0659    I.V. (mL/kg) 327 (5.1) 3036 (47.5)     Blood 616 248 350    NG/GT 20      IV Piggyback 200  1000    TPN 1102 1163     Total Intake(mL/kg) 2265 (35.4) 4447 (69.6) 1350 (21.1)    Urine (mL/kg/hr) 0 (0) 0 (0)     Drains 1790 2930 1015    Other 600 2413 100    Stool       Total Output 2390 5343 1115    Net -125 -896 +235           Urine Occurrence 2 x 2 x           Physical Exam  Constitutional:       Interventions: She is sedated.   HENT:      Head: Normocephalic.      Nose:      Comments: NGT in place  Cardiovascular:      Rate and Rhythm: Regular rhythm. Tachycardia present.   Pulmonary:      Effort: Pulmonary effort is normal.      Comments: NC 2L  Abdominal:      General: There is no distension.      Palpations: Abdomen is soft.      Tenderness: There is abdominal tenderness (appropriate).      Comments: R abdomen GODFREY x2 with bilious output  Wound vac in midline incision  G-tube with gastric fluid output   Musculoskeletal:         General: Swelling present.      Right lower leg: Edema present.      Left lower leg: Edema present.   Skin:     General: Skin is warm and dry.   Neurological:      Mental Status: She is alert.         Significant Labs:  CBC:   Recent Labs   Lab 08/25/20  0948   WBC 40.86*   RBC 2.84*   HGB 8.5*   HCT 26.0*      MCV 92   MCH 29.9   MCHC 32.7     CMP:   Recent Labs   Lab 08/25/20  0400   *  130*   CALCIUM 7.5*  7.5*   ALBUMIN 1.5*  1.5*   PROT 5.6*     141   K 4.2  4.2   CO2 25  25     106   BUN 15  15   CREATININE 1.1  1.1   ALKPHOS 100   ALT 16   AST 21   BILITOT 0.7       Significant Diagnostics:  CT pending    Assessment/Plan:     Severe  sepsis  38 yo F s/p antrectomy with BII reconstruction c/b duodenal necrosis requiring ex lap, washout, drainage c/b aspiration event. S/p duodenal resection with d-j and g-j anastomosis.  - Continue ICU care  - Transfuse   PRBC  - Respiratory treatment q4h  - NPO  - NGT to LIWS  - G-tube to gravity  - Abx: diflucan, zosyn  - TPN   - Daily labs  - Trend CBC  - GI and DVT ppx  - PT / OT        Jadon Land MD  General Surgery  [facility]

## 2020-08-25 NOTE — ASSESSMENT & PLAN NOTE
oliguric stage 3 sid with ischemic atn likely secondary to septic shock  unknown bl cr  muddy brown casts on microscopy              Last had SLED on Friday morning.  Held over the weekend. UOP increasing.    Assessment:   -No emergent need for RRT today  -will re-evaluate in AM for further needs  -attempt to quantify urine output  -strict I/O's  -transfuse for hgb < 7

## 2020-08-25 NOTE — ANESTHESIA POSTPROCEDURE EVALUATION
Anesthesia Post Evaluation    Patient: Jaquan Farmer    Procedure(s) Performed: Procedure(s) (LRB):  WASHOUT abdomen (N/A)    Final Anesthesia Type: general    Patient location during evaluation: PACU  Patient participation: Yes- Able to Participate  Level of consciousness: awake and alert  Post-procedure vital signs: reviewed and stable  Pain management: adequate  Airway patency: patent    PONV status at discharge: No PONV  Anesthetic complications: no      Cardiovascular status: blood pressure returned to baseline and stable  Respiratory status: unassisted  Hydration status: euvolemic  Follow-up not needed.          Vitals Value Taken Time   /69 08/25/20 0631   Temp 37.2 °C (99 °F) 08/25/20 0600   Pulse 121 08/25/20 0649   Resp 23 08/25/20 0649   SpO2 100 % 08/25/20 0649   Vitals shown include unvalidated device data.      No case tracking events are documented in the log.      Pain/Rod Score: Pain Rating Prior to Med Admin: 7 (8/25/2020  6:17 AM)  Pain Rating Post Med Admin: 0 (8/25/2020  2:32 AM)

## 2020-08-25 NOTE — ASSESSMENT & PLAN NOTE
38 yo F s/p antrectomy with BII reconstruction c/b duodenal necrosis requiring ex lap, washout, drainage c/b aspiration event. S/p duodenal resection with d-j and g-j anastomosis.  - Continue ICU care  - Transfuse   PRBC  - Respiratory treatment q4h  - NPO  - NGT to LIWS  - G-tube to gravity  - Abx: diflucan, zosyn  - TPN   - Daily labs  - Trend CBC  - GI and DVT ppx  - PT / OT

## 2020-08-25 NOTE — PROGRESS NOTES
[facility]  Nephrology  Progress Note    Patient Name: Jaquan Farmer  MRN: [id number]  Admission Date: 8/12/2020  Hospital Length of Stay: 13 days  Attending Provider: Donis Roa MD   Primary Care Physician: Primary Doctor No  Principal Problem:Duodenum disorder    Subjective:     Interval History:   SLED completed this morning, electrolytes stable.  Net negative 90 ml/24h with an increase in drain output (2.9L/24h).  Oxygenation stable on RA.    Review of patient's allergies indicates:   Allergen Reactions    Latex      Current Facility-Administered Medications   Medication Frequency    0.9%  NaCl infusion (CRRT USE ONLY) Continuous    0.9%  NaCl infusion (for blood administration) Q24H PRN    0.9%  NaCl infusion (for blood administration) Q24H PRN    0.9%  NaCl infusion (for blood administration) Q24H PRN    calcium gluconate 1g in dextrose 5% 100mL (ready to mix system) PRN    calcium gluconate 2 g in dextrose 5 % 100 mL IVPB PRN    calcium gluconate 3 g in dextrose 5 % 100 mL IVPB PRN    famotidine (PF) injection 20 mg Daily    fat emulsion 20 % infusion 250 mL Daily    fluconazole (DIFLUCAN) IVPB 200 mg 100 mL Q24H    heparin (porcine) injection 5,000 Units Q8H    HYDROmorphone injection 0.5 mg Q4H PRN    lorazepam injection 1 mg Once    magnesium sulfate 2g in water 50mL IVPB (premix) PRN    magnesium sulfate 2g in water 50mL IVPB (premix) PRN    magnesium sulfate 2g in water 50mL IVPB (premix) PRN    melatonin tablet 3 mg Nightly PRN    metoprolol injection 5 mg Q4H PRN    metoprolol injection 5 mg Q6H    miconazole NITRATE 2 % top powder BID    ondansetron injection 4 mg Once PRN    piperacillin-tazobactam 4.5 g in sodium chloride 0.9% 100 mL IVPB (ready to mix system) Q12H    potassium chloride 40 mEq in 100 mL IVPB (FOR CENTRAL LINE ADMINISTRATION ONLY) PRN    And    potassium chloride 20 mEq in 100 mL IVPB (FOR CENTRAL LINE ADMINISTRATION ONLY) PRN     And    potassium chloride 40 mEq in 100 mL IVPB (FOR CENTRAL LINE ADMINISTRATION ONLY) PRN    sodium chloride 0.9% bolus 1,000 mL Once    sodium phosphate 15 mmol in dextrose 5 % 250 mL IVPB PRN    sodium phosphate 20.01 mmol in dextrose 5 % 250 mL IVPB PRN    sodium phosphate 20.01 mmol in dextrose 5 % 250 mL IVPB PRN    sodium phosphate 30 mmol in dextrose 5 % 250 mL IVPB PRN    sodium phosphate 30 mmol in dextrose 5 % 250 mL IVPB PRN    sodium phosphate 39.99 mmol in dextrose 5 % 250 mL IVPB PRN    TPN ADULT CENTRAL LINE CUSTOM Continuous       Objective:     Vital Signs (Most Recent):  Temp: 99.6 °F (37.6 °C) (08/25/20 0745)  Pulse: (!) 119 (08/25/20 0815)  Resp: (!) 24 (08/25/20 0815)  BP: 138/71 (08/25/20 0800)  SpO2: 100 % (08/25/20 0815)  O2 Device (Oxygen Therapy): room air (08/25/20 0800) Vital Signs (24h Range):  Temp:  [97.9 °F (36.6 °C)-99.6 °F (37.6 °C)] 99.6 °F (37.6 °C)  Pulse:  [] 119  Resp:  [17-33] 24  SpO2:  [91 %-100 %] 100 %  BP: (124-192)/() 138/71     Weight: 63.9 kg (140 lb 14 oz) (08/25/20 0622)  Body mass index is 25.77 kg/m².  Body surface area is 1.67 meters squared.    I/O last 3 completed shifts:  In: 5472 [I.V.:3258; Blood:248; NG/GT:20; IV Piggyback:200]  Out: 6113 [Drains:3450; Other:2663]    Physical Exam  Vitals signs and nursing note reviewed.   Constitutional:       Appearance: She is not ill-appearing.      Interventions: She is sedated. She is not intubated.  HENT:      Head: Normocephalic and atraumatic.      Mouth/Throat:      Mouth: Mucous membranes are dry.   Eyes:      General: No scleral icterus.        Right eye: No discharge.         Left eye: No discharge.      Extraocular Movements: Extraocular movements intact.      Conjunctiva/sclera: Conjunctivae normal.   Neck:      Musculoskeletal: Normal range of motion and neck supple.   Cardiovascular:      Rate and Rhythm: Regular rhythm. Tachycardia present.      Pulses: Normal pulses.      Heart  sounds: Normal heart sounds.   Pulmonary:      Effort: Pulmonary effort is normal. No respiratory distress. She is not intubated.      Breath sounds: No rales.   Abdominal:      General: Bowel sounds are normal.      Palpations: Abdomen is soft.      Comments: woundvac   Musculoskeletal:      Right lower leg: Edema present.      Left lower leg: Edema present.   Skin:     General: Skin is warm and dry.      Findings: No bruising or rash.   Neurological:      General: No focal deficit present.      Mental Status: She is alert and oriented to person, place, and time.   Psychiatric:         Mood and Affect: Mood normal.         Behavior: Behavior normal.         Significant Labs:  CBC:   Recent Labs   Lab 08/25/20  0400   WBC 40.61*   RBC 2.21*   HGB 6.6*   HCT 20.4*      MCV 92   MCH 29.9   MCHC 32.4     CMP:   Recent Labs   Lab 08/25/20  0400   *  130*   CALCIUM 7.5*  7.5*   ALBUMIN 1.5*  1.5*   PROT 5.6*     141   K 4.2  4.2   CO2 25  25     106   BUN 15  15   CREATININE 1.1  1.1   ALKPHOS 100   ALT 16   AST 21   BILITOT 0.7            Assessment/Plan:     * Duodenum disorder  - Management per primary team     Acute renal failure with tubular necrosis  oliguric stage 3 sid with ischemic atn likely secondary to septic shock  unknown bl cr  muddy brown casts on microscopy              Last had SLED on Friday morning.  Held over the weekend. UOP increasing.    Assessment:   -No emergent need for RRT today  -will re-evaluate in AM for further needs  -attempt to quantify urine output  -strict I/O's  -transfuse for hgb < 7            Momo Iyer NP  Nephrology  [facility]

## 2020-08-25 NOTE — ASSESSMENT & PLAN NOTE
38 yo F s/p antrectomy with BII reconstruction c/b duodenal necrosis requiring ex lap, washout, drainage c/b aspiration event. Now with severe sepsis and ARDS     .Neuro:  - AOx4  - PRN hydromorphone for analgesia     Resp:  - 100% on 2L NC  - PRN breathing treatments  - Right chest tube with no output     CV:  - MAP goal >65  - Systolic <160  - Levo and Vaso gtt - off     Heme:  - Hgb/Hct 6.6; transfusing 1 unit PRBCs  - S/p Abdominal washout on 8/21  - S/p Abdominal exploration w/ woundvac exhange 8/24       ID:  - WBC remains elevated 40  - Zosyn, Fluconazole  - Blood cultures and BAL taken 8/19 with NGTD and no abnormal findings     Renal:  - Oliguric on arrival; Cr 1.9 at that time   - SLED overnight  - Trend BUN/Cr;   - Monitor I/Os     FEN/GI:  - NPO  - TPN  - NGT to LIWS   - Maintain drains to bulb suction; LLQ with acute drop in drainage and accumulation in SQ tissue  - GI ppx  - Replace electrolytes as needed to keep K>4, Mg>2     Endo:  - Monitor BG     Dispo:  - Continue SICU care

## 2020-08-26 PROBLEM — I10 HYPERTENSION: Status: ACTIVE | Noted: 2020-08-26

## 2020-08-26 LAB
ALBUMIN SERPL BCP-MCNC: 1.6 G/DL (ref 3.5–5.2)
ALP SERPL-CCNC: 109 U/L (ref 55–135)
ALT SERPL W/O P-5'-P-CCNC: 15 U/L (ref 10–44)
ANION GAP SERPL CALC-SCNC: 10 MMOL/L (ref 8–16)
ANISOCYTOSIS BLD QL SMEAR: SLIGHT
AST SERPL-CCNC: 22 U/L (ref 10–40)
BASOPHILS # BLD AUTO: 0.11 K/UL (ref 0–0.2)
BASOPHILS NFR BLD: 0.3 % (ref 0–1.9)
BILIRUB SERPL-MCNC: 0.7 MG/DL (ref 0.1–1)
BUN SERPL-MCNC: 31 MG/DL (ref 6–20)
CA-I BLDV-SCNC: 1.14 MMOL/L (ref 1.06–1.42)
CALCIUM SERPL-MCNC: 8.1 MG/DL (ref 8.7–10.5)
CHLORIDE SERPL-SCNC: 102 MMOL/L (ref 95–110)
CO2 SERPL-SCNC: 26 MMOL/L (ref 23–29)
CREAT SERPL-MCNC: 2.2 MG/DL (ref 0.5–1.4)
DIFFERENTIAL METHOD: ABNORMAL
DOHLE BOD BLD QL SMEAR: PRESENT
EOSINOPHIL # BLD AUTO: 0.3 K/UL (ref 0–0.5)
EOSINOPHIL NFR BLD: 0.8 % (ref 0–8)
ERYTHROCYTE [DISTWIDTH] IN BLOOD BY AUTOMATED COUNT: 14.6 % (ref 11.5–14.5)
EST. GFR  (AFRICAN AMERICAN): 31.6 ML/MIN/1.73 M^2
EST. GFR  (NON AFRICAN AMERICAN): 27.4 ML/MIN/1.73 M^2
GLUCOSE SERPL-MCNC: 105 MG/DL (ref 70–110)
HCT VFR BLD AUTO: 22.9 % (ref 37–48.5)
HGB BLD-MCNC: 7.4 G/DL (ref 12–16)
HYPOCHROMIA BLD QL SMEAR: ABNORMAL
IMM GRANULOCYTES # BLD AUTO: 0.68 K/UL (ref 0–0.04)
IMM GRANULOCYTES NFR BLD AUTO: 1.9 % (ref 0–0.5)
LYMPHOCYTES # BLD AUTO: 2.9 K/UL (ref 1–4.8)
LYMPHOCYTES NFR BLD: 8.2 % (ref 18–48)
MAGNESIUM SERPL-MCNC: 1.8 MG/DL (ref 1.6–2.6)
MCH RBC QN AUTO: 30.1 PG (ref 27–31)
MCHC RBC AUTO-ENTMCNC: 32.3 G/DL (ref 32–36)
MCV RBC AUTO: 93 FL (ref 82–98)
MONOCYTES # BLD AUTO: 1.4 K/UL (ref 0.3–1)
MONOCYTES NFR BLD: 3.9 % (ref 4–15)
NEUTROPHILS # BLD AUTO: 30.4 K/UL (ref 1.8–7.7)
NEUTROPHILS NFR BLD: 84.9 % (ref 38–73)
NRBC BLD-RTO: 0 /100 WBC
OVALOCYTES BLD QL SMEAR: ABNORMAL
PHOSPHATE SERPL-MCNC: 2.6 MG/DL (ref 2.7–4.5)
PLATELET # BLD AUTO: 281 K/UL (ref 150–350)
PLATELET BLD QL SMEAR: ABNORMAL
PMV BLD AUTO: 10.6 FL (ref 9.2–12.9)
POCT GLUCOSE: 110 MG/DL (ref 70–110)
POCT GLUCOSE: 125 MG/DL (ref 70–110)
POCT GLUCOSE: 132 MG/DL (ref 70–110)
POIKILOCYTOSIS BLD QL SMEAR: SLIGHT
POLYCHROMASIA BLD QL SMEAR: ABNORMAL
POTASSIUM SERPL-SCNC: 3.8 MMOL/L (ref 3.5–5.1)
PROT SERPL-MCNC: 6.1 G/DL (ref 6–8.4)
RBC # BLD AUTO: 2.46 M/UL (ref 4–5.4)
SCHISTOCYTES BLD QL SMEAR: ABNORMAL
SCHISTOCYTES BLD QL SMEAR: PRESENT
SODIUM SERPL-SCNC: 138 MMOL/L (ref 136–145)
TOXIC GRANULES BLD QL SMEAR: PRESENT
WBC # BLD AUTO: 35.73 K/UL (ref 3.9–12.7)

## 2020-08-26 PROCEDURE — 27000646 HC AEROBIKA DEVICE

## 2020-08-26 PROCEDURE — 85025 COMPLETE CBC W/AUTO DIFF WBC: CPT

## 2020-08-26 PROCEDURE — 97110 THERAPEUTIC EXERCISES: CPT

## 2020-08-26 PROCEDURE — 94761 N-INVAS EAR/PLS OXIMETRY MLT: CPT

## 2020-08-26 PROCEDURE — 25000003 PHARM REV CODE 250: Performed by: STUDENT IN AN ORGANIZED HEALTH CARE EDUCATION/TRAINING PROGRAM

## 2020-08-26 PROCEDURE — 63600175 PHARM REV CODE 636 W HCPCS: Performed by: STUDENT IN AN ORGANIZED HEALTH CARE EDUCATION/TRAINING PROGRAM

## 2020-08-26 PROCEDURE — 82330 ASSAY OF CALCIUM: CPT

## 2020-08-26 PROCEDURE — C1751 CATH, INF, PER/CENT/MIDLINE: HCPCS

## 2020-08-26 PROCEDURE — B4185 PARENTERAL SOL 10 GM LIPIDS: HCPCS | Performed by: STUDENT IN AN ORGANIZED HEALTH CARE EDUCATION/TRAINING PROGRAM

## 2020-08-26 PROCEDURE — 83735 ASSAY OF MAGNESIUM: CPT

## 2020-08-26 PROCEDURE — 94668 MNPJ CHEST WALL SBSQ: CPT

## 2020-08-26 PROCEDURE — 99233 PR SUBSEQUENT HOSPITAL CARE,LEVL III: ICD-10-PCS | Mod: ,,, | Performed by: INTERNAL MEDICINE

## 2020-08-26 PROCEDURE — 99233 SBSQ HOSP IP/OBS HIGH 50: CPT | Mod: ,,, | Performed by: INTERNAL MEDICINE

## 2020-08-26 PROCEDURE — 20000000 HC ICU ROOM

## 2020-08-26 PROCEDURE — 94770 HC EXHALED C02 TEST: CPT

## 2020-08-26 PROCEDURE — S0028 INJECTION, FAMOTIDINE, 20 MG: HCPCS | Performed by: STUDENT IN AN ORGANIZED HEALTH CARE EDUCATION/TRAINING PROGRAM

## 2020-08-26 PROCEDURE — 94664 DEMO&/EVAL PT USE INHALER: CPT

## 2020-08-26 PROCEDURE — 99900035 HC TECH TIME PER 15 MIN (STAT)

## 2020-08-26 PROCEDURE — 84100 ASSAY OF PHOSPHORUS: CPT

## 2020-08-26 PROCEDURE — 99233 PR SUBSEQUENT HOSPITAL CARE,LEVL III: ICD-10-PCS | Mod: 24,,, | Performed by: SURGERY

## 2020-08-26 PROCEDURE — 25500020 PHARM REV CODE 255: Performed by: STUDENT IN AN ORGANIZED HEALTH CARE EDUCATION/TRAINING PROGRAM

## 2020-08-26 PROCEDURE — 25000003 PHARM REV CODE 250: Performed by: SURGERY

## 2020-08-26 PROCEDURE — 80053 COMPREHEN METABOLIC PANEL: CPT

## 2020-08-26 PROCEDURE — 76937 US GUIDE VASCULAR ACCESS: CPT

## 2020-08-26 PROCEDURE — 99233 SBSQ HOSP IP/OBS HIGH 50: CPT | Mod: 24,,, | Performed by: SURGERY

## 2020-08-26 PROCEDURE — 36410 VNPNXR 3YR/> PHY/QHP DX/THER: CPT

## 2020-08-26 PROCEDURE — 97535 SELF CARE MNGMENT TRAINING: CPT

## 2020-08-26 PROCEDURE — 97530 THERAPEUTIC ACTIVITIES: CPT

## 2020-08-26 PROCEDURE — A4217 STERILE WATER/SALINE, 500 ML: HCPCS | Performed by: STUDENT IN AN ORGANIZED HEALTH CARE EDUCATION/TRAINING PROGRAM

## 2020-08-26 RX ORDER — NALOXONE HCL 0.4 MG/ML
0.02 VIAL (ML) INJECTION
Status: DISCONTINUED | OUTPATIENT
Start: 2020-08-26 | End: 2020-08-30

## 2020-08-26 RX ORDER — HYDRALAZINE HYDROCHLORIDE 20 MG/ML
10 INJECTION INTRAMUSCULAR; INTRAVENOUS ONCE
Status: COMPLETED | OUTPATIENT
Start: 2020-08-26 | End: 2020-08-26

## 2020-08-26 RX ORDER — LABETALOL HCL 20 MG/4 ML
20 SYRINGE (ML) INTRAVENOUS ONCE
Status: COMPLETED | OUTPATIENT
Start: 2020-08-26 | End: 2020-08-26

## 2020-08-26 RX ORDER — HYDRALAZINE HYDROCHLORIDE 20 MG/ML
10 INJECTION INTRAMUSCULAR; INTRAVENOUS ONCE
Status: DISCONTINUED | OUTPATIENT
Start: 2020-08-26 | End: 2020-08-26

## 2020-08-26 RX ORDER — LABETALOL HCL 20 MG/4 ML
10 SYRINGE (ML) INTRAVENOUS ONCE
Status: COMPLETED | OUTPATIENT
Start: 2020-08-26 | End: 2020-08-26

## 2020-08-26 RX ORDER — HYDROMORPHONE HCL IN 0.9% NACL 6 MG/30 ML
PATIENT CONTROLLED ANALGESIA SYRINGE INTRAVENOUS CONTINUOUS
Status: DISCONTINUED | OUTPATIENT
Start: 2020-08-26 | End: 2020-08-29

## 2020-08-26 RX ADMIN — LIDOCAINE 1 PATCH: 50 PATCH CUTANEOUS at 09:08

## 2020-08-26 RX ADMIN — IOHEXOL 15 ML: 350 INJECTION, SOLUTION INTRAVENOUS at 03:08

## 2020-08-26 RX ADMIN — METOROPROLOL TARTRATE 5 MG: 5 INJECTION, SOLUTION INTRAVENOUS at 05:08

## 2020-08-26 RX ADMIN — METOROPROLOL TARTRATE 5 MG: 5 INJECTION, SOLUTION INTRAVENOUS at 11:08

## 2020-08-26 RX ADMIN — METOROPROLOL TARTRATE 5 MG: 5 INJECTION, SOLUTION INTRAVENOUS at 12:08

## 2020-08-26 RX ADMIN — PIPERACILLIN SODIUM AND TAZOBACTAM SODIUM 4.5 G: 4; .5 INJECTION, POWDER, LYOPHILIZED, FOR SOLUTION INTRAVENOUS at 08:08

## 2020-08-26 RX ADMIN — SODIUM CHLORIDE, SODIUM LACTATE, POTASSIUM CHLORIDE, AND CALCIUM CHLORIDE 1000 ML: .6; .31; .03; .02 INJECTION, SOLUTION INTRAVENOUS at 10:08

## 2020-08-26 RX ADMIN — FAMOTIDINE 20 MG: 10 INJECTION INTRAVENOUS at 08:08

## 2020-08-26 RX ADMIN — SODIUM CHLORIDE, SODIUM LACTATE, POTASSIUM CHLORIDE, AND CALCIUM CHLORIDE 1000 ML: .6; .31; .03; .02 INJECTION, SOLUTION INTRAVENOUS at 09:08

## 2020-08-26 RX ADMIN — Medication: at 10:08

## 2020-08-26 RX ADMIN — Medication 10 MG: at 10:08

## 2020-08-26 RX ADMIN — HEPARIN SODIUM 5000 UNITS: 5000 INJECTION INTRAVENOUS; SUBCUTANEOUS at 01:08

## 2020-08-26 RX ADMIN — HEPARIN SODIUM 5000 UNITS: 5000 INJECTION INTRAVENOUS; SUBCUTANEOUS at 05:08

## 2020-08-26 RX ADMIN — HYDROMORPHONE HYDROCHLORIDE 0.5 MG: 1 INJECTION, SOLUTION INTRAMUSCULAR; INTRAVENOUS; SUBCUTANEOUS at 07:08

## 2020-08-26 RX ADMIN — Medication 10 MG: at 01:08

## 2020-08-26 RX ADMIN — MICONAZOLE NITRATE: 20 POWDER TOPICAL at 08:08

## 2020-08-26 RX ADMIN — Medication 20 MG: at 11:08

## 2020-08-26 RX ADMIN — HYDROMORPHONE HYDROCHLORIDE 0.5 MG: 1 INJECTION, SOLUTION INTRAMUSCULAR; INTRAVENOUS; SUBCUTANEOUS at 05:08

## 2020-08-26 RX ADMIN — MICONAZOLE NITRATE: 20 POWDER TOPICAL at 09:08

## 2020-08-26 RX ADMIN — FLUCONAZOLE 200 MG: 2 INJECTION, SOLUTION INTRAVENOUS at 08:08

## 2020-08-26 RX ADMIN — MAGNESIUM SULFATE HEPTAHYDRATE: 500 INJECTION, SOLUTION INTRAMUSCULAR; INTRAVENOUS at 09:08

## 2020-08-26 RX ADMIN — HYDROMORPHONE HYDROCHLORIDE 0.5 MG: 1 INJECTION, SOLUTION INTRAMUSCULAR; INTRAVENOUS; SUBCUTANEOUS at 12:08

## 2020-08-26 RX ADMIN — IOHEXOL 15 ML: 350 INJECTION, SOLUTION INTRAVENOUS at 04:08

## 2020-08-26 RX ADMIN — HEPARIN SODIUM 5000 UNITS: 5000 INJECTION INTRAVENOUS; SUBCUTANEOUS at 09:08

## 2020-08-26 RX ADMIN — HYDRALAZINE HYDROCHLORIDE 10 MG: 20 INJECTION INTRAMUSCULAR; INTRAVENOUS at 06:08

## 2020-08-26 RX ADMIN — PIPERACILLIN SODIUM AND TAZOBACTAM SODIUM 4.5 G: 4; .5 INJECTION, POWDER, LYOPHILIZED, FOR SOLUTION INTRAVENOUS at 09:08

## 2020-08-26 RX ADMIN — I.V. FAT EMULSION 250 ML: 20 EMULSION INTRAVENOUS at 09:08

## 2020-08-26 NOTE — PROGRESS NOTES
[facility]  General Surgery  Progress Note    Subjective:       Post-Op Info:  Procedure(s) (LRB):  WASHOUT abdomen (N/A)   2 Days Post-Op     Interval History: Hgb down trending again but remains >7 on morning labs. CT scan planned for today to evaluate. Pain controlled, having bowel function. No complaints.     Medications:  Continuous Infusions:   lactated ringers 100 mL/hr at 08/26/20 0700    TPN ADULT CENTRAL LINE CUSTOM 45 mL/hr at 08/26/20 0700     Scheduled Meds:   famotidine (PF)  20 mg Intravenous Daily    fat emulsion  250 mL Intravenous Daily    fluconazole (DIFLUCAN) IVPB  200 mg Intravenous Q24H    heparin (porcine)  5,000 Units Subcutaneous Q8H    lactated ringers  1,000 mL Intravenous Once    lidocaine  1 patch Transdermal Q24H    metoprolol  5 mg Intravenous Q6H    miconazole NITRATE 2 %   Topical (Top) BID    piperacillin-tazobactam (ZOSYN) IVPB  4.5 g Intravenous Q12H     PRN Meds:sodium chloride, sodium chloride, sodium chloride, melatonin, metoprolol, naloxone, ondansetron     Review of patient's allergies indicates:   Allergen Reactions    Latex      Objective:     Vital Signs (Most Recent):  Temp: 98.8 °F (37.1 °C) (08/26/20 0700)  Pulse: (!) 124 (08/26/20 0730)  Resp: 16 (08/26/20 0749)  BP: (!) 166/83 (08/26/20 0730)  SpO2: 100 % (08/26/20 0730) Vital Signs (24h Range):  Temp:  [98.8 °F (37.1 °C)-100.2 °F (37.9 °C)] 98.8 °F (37.1 °C)  Pulse:  [100-139] 124  Resp:  [12-39] 16  SpO2:  [85 %-100 %] 100 %  BP: (125-182)/() 166/83     Weight: 63.9 kg (140 lb 14 oz)  Body mass index is 25.77 kg/m².    Intake/Output - Last 3 Shifts       08/24 0700 - 08/25 0659 08/25 0700 - 08/26 0659 08/26 0700 - 08/27 0659    I.V. (mL/kg) 3036 (47.5) 1633.7 (25.6)     Blood 248 350     NG/GT       IV Piggyback  1000     TPN 1163 1994.8     Total Intake(mL/kg) 4447 (69.6) 4978.5 (77.9)     Urine (mL/kg/hr) 0 (0) 0 (0)     Drains 2930 3110     Other 2413 150     Stool  0      Total Output 5343 3260     Net -896 +1718.5            Urine Occurrence 2 x 4 x     Stool Occurrence  1 x           Physical Exam  Constitutional:       Interventions: She is sedated.   HENT:      Head: Normocephalic.      Nose:      Comments: NGT in place  Cardiovascular:      Rate and Rhythm: Regular rhythm. Tachycardia present.   Pulmonary:      Effort: Pulmonary effort is normal.      Comments: NC 2L  Abdominal:      General: There is no distension.      Palpations: Abdomen is soft.      Tenderness: There is abdominal tenderness (appropriate).      Comments: R abdomen GODFREY x2 with bilious output  Wound vac in midline incision  G-tube with gastric fluid output   Musculoskeletal:         General: Swelling present.      Right lower leg: Edema present.      Left lower leg: Edema present.   Skin:     General: Skin is warm and dry.   Neurological:      Mental Status: She is alert.         Significant Labs:  CBC:   Recent Labs   Lab 08/26/20  0208   WBC 35.73*   RBC 2.46*   HGB 7.4*   HCT 22.9*      MCV 93   MCH 30.1   MCHC 32.3     CMP:   Recent Labs   Lab 08/26/20  0208      CALCIUM 8.1*   ALBUMIN 1.6*   PROT 6.1      K 3.8   CO2 26      BUN 31*   CREATININE 2.2*   ALKPHOS 109   ALT 15   AST 22   BILITOT 0.7       Significant Diagnostics:  CT pending    Assessment/Plan:     Severe sepsis  40 yo F s/p antrectomy with BII reconstruction c/b duodenal necrosis requiring ex lap, washout, drainage c/b aspiration event. S/p duodenal resection with d-j and g-j anastomosis.  - Continue ICU care  - CT scan today  - Transfuse PRBC as needed for Hgb <7  - Respiratory treatment q4h  - NPO  - NGT to LIWS  - G-tube to gravity  - Abx: diflucan, zosyn  - TPN   - Daily labs  - Trend CBC  - GI and DVT ppx  - PT / OT        Maria Antonia Byrne MD  General Surgery  [facility]

## 2020-08-26 NOTE — SUBJECTIVE & OBJECTIVE
Interval History: Hgb down trending again but remains >7 on morning labs. CT scan planned for today to evaluate. Pain controlled, having bowel function. No complaints.     Medications:  Continuous Infusions:   lactated ringers 100 mL/hr at 08/26/20 0700    TPN ADULT CENTRAL LINE CUSTOM 45 mL/hr at 08/26/20 0700     Scheduled Meds:   famotidine (PF)  20 mg Intravenous Daily    fat emulsion  250 mL Intravenous Daily    fluconazole (DIFLUCAN) IVPB  200 mg Intravenous Q24H    heparin (porcine)  5,000 Units Subcutaneous Q8H    lactated ringers  1,000 mL Intravenous Once    lidocaine  1 patch Transdermal Q24H    metoprolol  5 mg Intravenous Q6H    miconazole NITRATE 2 %   Topical (Top) BID    piperacillin-tazobactam (ZOSYN) IVPB  4.5 g Intravenous Q12H     PRN Meds:sodium chloride, sodium chloride, sodium chloride, melatonin, metoprolol, naloxone, ondansetron     Review of patient's allergies indicates:   Allergen Reactions    Latex      Objective:     Vital Signs (Most Recent):  Temp: 98.8 °F (37.1 °C) (08/26/20 0700)  Pulse: (!) 124 (08/26/20 0730)  Resp: 16 (08/26/20 0749)  BP: (!) 166/83 (08/26/20 0730)  SpO2: 100 % (08/26/20 0730) Vital Signs (24h Range):  Temp:  [98.8 °F (37.1 °C)-100.2 °F (37.9 °C)] 98.8 °F (37.1 °C)  Pulse:  [100-139] 124  Resp:  [12-39] 16  SpO2:  [85 %-100 %] 100 %  BP: (125-182)/() 166/83     Weight: 63.9 kg (140 lb 14 oz)  Body mass index is 25.77 kg/m².    Intake/Output - Last 3 Shifts       08/24 0700 - 08/25 0659 08/25 0700 - 08/26 0659 08/26 0700 - 08/27 0659    I.V. (mL/kg) 3036 (47.5) 1633.7 (25.6)     Blood 248 350     NG/GT       IV Piggyback  1000     TPN 1163 1994.8     Total Intake(mL/kg) 4447 (69.6) 4978.5 (77.9)     Urine (mL/kg/hr) 0 (0) 0 (0)     Drains 2930 3110     Other 2413 150     Stool  0     Total Output 5343 3260     Net -896 +1718.5            Urine Occurrence 2 x 4 x     Stool Occurrence  1 x           Physical Exam  Constitutional:        Interventions: She is sedated.   HENT:      Head: Normocephalic.      Nose:      Comments: NGT in place  Cardiovascular:      Rate and Rhythm: Regular rhythm. Tachycardia present.   Pulmonary:      Effort: Pulmonary effort is normal.      Comments: NC 2L  Abdominal:      General: There is no distension.      Palpations: Abdomen is soft.      Tenderness: There is abdominal tenderness (appropriate).      Comments: R abdomen GODFREY x2 with bilious output  Wound vac in midline incision  G-tube with gastric fluid output   Musculoskeletal:         General: Swelling present.      Right lower leg: Edema present.      Left lower leg: Edema present.   Skin:     General: Skin is warm and dry.   Neurological:      Mental Status: She is alert.         Significant Labs:  CBC:   Recent Labs   Lab 08/26/20  0208   WBC 35.73*   RBC 2.46*   HGB 7.4*   HCT 22.9*      MCV 93   MCH 30.1   MCHC 32.3     CMP:   Recent Labs   Lab 08/26/20  0208      CALCIUM 8.1*   ALBUMIN 1.6*   PROT 6.1      K 3.8   CO2 26      BUN 31*   CREATININE 2.2*   ALKPHOS 109   ALT 15   AST 22   BILITOT 0.7       Significant Diagnostics:  CT pending

## 2020-08-26 NOTE — CONSULTS
Single lumen 18G x 8CM midline placed right basilic vein. Max dwell date 9/24/20, Lot# [id number].  Needle advanced into the vessel under real time ultrasound guidance.  Image recorded and saved.

## 2020-08-26 NOTE — SUBJECTIVE & OBJECTIVE
Interval History:   Increased drainage from G tube yesterday and signs of volume depletion requiring LR boluses + maintenance.  Net positive 1.7L/24h (drain output of 3L).  UOP x 4 occurrences.  She voices no complaints this morning, electrolytes are stable.    Review of patient's allergies indicates:   Allergen Reactions    Latex      Current Facility-Administered Medications   Medication Frequency    0.9%  NaCl infusion (for blood administration) Q24H PRN    0.9%  NaCl infusion (for blood administration) Q24H PRN    0.9%  NaCl infusion (for blood administration) Q24H PRN    famotidine (PF) injection 20 mg Daily    fat emulsion 20 % infusion 250 mL Daily    fluconazole (DIFLUCAN) IVPB 200 mg 100 mL Q24H    heparin (porcine) injection 5,000 Units Q8H    lactated ringers bolus 1,000 mL Once    lactated ringers infusion Continuous    lidocaine 5 % patch 1 patch Q24H    melatonin tablet 3 mg Nightly PRN    metoprolol injection 5 mg Q4H PRN    metoprolol injection 5 mg Q6H    miconazole NITRATE 2 % top powder BID    naloxone 0.4 mg/mL injection 0.02 mg PRN    ondansetron injection 4 mg Once PRN    piperacillin-tazobactam 4.5 g in sodium chloride 0.9% 100 mL IVPB (ready to mix system) Q12H    TPN ADULT CENTRAL LINE CUSTOM Continuous       Objective:     Vital Signs (Most Recent):  Temp: 98.8 °F (37.1 °C) (08/26/20 0700)  Pulse: (!) 124 (08/26/20 0730)  Resp: 16 (08/26/20 0749)  BP: (!) 166/83 (08/26/20 0730)  SpO2: 100 % (08/26/20 0730)  O2 Device (Oxygen Therapy): room air (08/26/20 0355) Vital Signs (24h Range):  Temp:  [98.8 °F (37.1 °C)-100.2 °F (37.9 °C)] 98.8 °F (37.1 °C)  Pulse:  [100-139] 124  Resp:  [12-39] 16  SpO2:  [85 %-100 %] 100 %  BP: (125-182)/() 166/83     Weight: 63.9 kg (140 lb 14 oz) (08/25/20 0622)  Body mass index is 25.77 kg/m².  Body surface area is 1.67 meters squared.    I/O last 3 completed shifts:  In: 8466.5 [I.V.:4387.7; Blood:350; IV Piggyback:1000]  Out: 1836  [Drains:4775; Other:2278]    Physical Exam  Vitals signs and nursing note reviewed.   Constitutional:       Appearance: She is not ill-appearing.      Interventions: She is sedated. She is not intubated.  HENT:      Head: Normocephalic and atraumatic.      Mouth/Throat:      Mouth: Mucous membranes are dry.   Eyes:      General: No scleral icterus.        Right eye: No discharge.         Left eye: No discharge.      Extraocular Movements: Extraocular movements intact.      Conjunctiva/sclera: Conjunctivae normal.   Neck:      Musculoskeletal: Normal range of motion and neck supple.   Cardiovascular:      Rate and Rhythm: Regular rhythm. Tachycardia present.      Pulses: Normal pulses.      Heart sounds: Normal heart sounds.   Pulmonary:      Effort: Pulmonary effort is normal. No respiratory distress. She is not intubated.      Breath sounds: No rales.   Abdominal:      General: Bowel sounds are normal.      Palpations: Abdomen is soft.      Comments: woundvac   Musculoskeletal:      Right lower leg: Edema present.      Left lower leg: Edema present.   Skin:     General: Skin is warm and dry.      Findings: No bruising or rash.   Neurological:      General: No focal deficit present.      Mental Status: She is alert and oriented to person, place, and time.   Psychiatric:         Mood and Affect: Mood normal.         Behavior: Behavior normal.         Significant Labs:  CBC:   Recent Labs   Lab 08/26/20  0208   WBC 35.73*   RBC 2.46*   HGB 7.4*   HCT 22.9*      MCV 93   MCH 30.1   MCHC 32.3     CMP:   Recent Labs   Lab 08/26/20  0208      CALCIUM 8.1*   ALBUMIN 1.6*   PROT 6.1      K 3.8   CO2 26      BUN 31*   CREATININE 2.2*   ALKPHOS 109   ALT 15   AST 22   BILITOT 0.7

## 2020-08-26 NOTE — ASSESSMENT & PLAN NOTE
38 yo F s/p antrectomy with BII reconstruction c/b duodenal necrosis requiring ex lap, washout, drainage c/b aspiration event. Now with severe sepsis and ARDS     .Neuro:  - AOx4  - PRN hydromorphone for analgesia  - Switch to PCA for adequate pain control     Resp:  - 100% on room air  - PRN breathing treatments  - Right chest tube with no output     CV:  - MAP goal >65  - Systolic <160  - Levo and Vaso gtt - off     Heme:  - Hgb/Hct 7.4; slowly trending down, watch and transfuse if possible  - Plan for CT today to look for source  - S/p Abdominal washout on 8/21  - S/p Abdominal exploration w/ woundvac exhange 8/24       ID:  - WBC remains elevated 35  - Zosyn, Fluconazole  - Blood cultures and BAL taken 8/19 with NGTD and no abnormal findings     Renal:  - Oliguric on arrival; Cr 2.2 at that time   - SLED overnight  - Trend BUN/Cr;   - Monitor I/Os  - Increase MIVF to 150cc/hr     FEN/GI:  - NPO  - TPN  - NGT to LIWS   - Maintain drains to bulb suction; LLQ with acute drop in drainage and accumulation in SQ tissue  - GI ppx  - Replace electrolytes as needed to keep K>4, Mg>2     Endo:  - Monitor BG     Dispo:  - Continue SICU care

## 2020-08-26 NOTE — SUBJECTIVE & OBJECTIVE
Interval History/Significant Events: NAEON. Hypertensive to 180's, hydralazine given. Appropriate abdominal tenderness Hgb slowly trending down but above 7. Plan for CT scan today. No respiratory distress on room air.    Follow-up For: Procedure(s) (LRB):  WASHOUT abdomen (N/A)    Post-Operative Day: 2 Days Post-Op    Objective:     Vital Signs (Most Recent):  Temp: 98.8 °F (37.1 °C) (08/26/20 0700)  Pulse: (!) 122 (08/26/20 1015)  Resp: 20 (08/26/20 1015)  BP: (!) 166/83 (08/26/20 0730)  SpO2: 100 % (08/26/20 1015) Vital Signs (24h Range):  Temp:  [98.8 °F (37.1 °C)-100.2 °F (37.9 °C)] 98.8 °F (37.1 °C)  Pulse:  [100-138] 122  Resp:  [12-39] 20  SpO2:  [85 %-100 %] 100 %  BP: (125-182)/() 166/83     Weight: 63.9 kg (140 lb 14 oz)  Body mass index is 25.77 kg/m².      Intake/Output Summary (Last 24 hours) at 8/26/2020 1028  Last data filed at 8/26/2020 0500  Gross per 24 hour   Intake 4628.5 ml   Output 2720 ml   Net 1908.5 ml       Physical Exam  Constitutional:       Interventions: She is sedated.   HENT:      Head: Normocephalic.      Nose:      Comments: NGT in place  Cardiovascular:      Rate and Rhythm: Regular rhythm. Tachycardia present.   Pulmonary:      Effort: Pulmonary effort is normal.      Comments: room air  Abdominal:      General: There is no distension.      Palpations: Abdomen is soft.      Tenderness: There is abdominal tenderness (appropriate).      Comments: R abdomen GODFREY x2 with bilious output  Wound vac in midline incision  G-tube with gastric fluid output   Musculoskeletal:         General: Swelling present.      Right lower leg: Edema present.      Left lower leg: Edema present.   Skin:     General: Skin is warm and dry.   Neurological:      Mental Status: She is alert.     Vents:  Vent Mode: Spont (08/20/20 1501)  Ventilator Initiated: Yes(chart correction) (08/12/20 1930)  Set Rate: 20 BPM (08/20/20 0738)  Vt Set: 360 mL (08/20/20 0738)  PEEP/CPAP: 5 cmH20 (08/20/20 1501)  Oxygen  Concentration (%): 28 (08/22/20 0043)  Peak Airway Pressure: 7.8 cmH2O (08/20/20 1501)  Plateau Pressure: 22 cmH20 (08/20/20 1501)  Total Ve: 7.21 mL (08/20/20 1501)  Negative Inspiratory Force (cm H2O): -23 (08/20/20 1501)  F/VT Ratio<105 (RSBI): (!) 64.6 (08/20/20 1205)    Lines/Drains/Airways     Central Venous Catheter Line            Trialysis (Dialysis) Catheter 08/13/20 2230 right internal jugular 12 days          Drain                 Closed/Suction Drain 08/13/20 1659 Right;Inferior Abdomen Bulb 19 Fr. 12 days         Closed/Suction Drain 08/13/20 1659 Right;Superior Abdomen Bulb 19 Fr. 12 days         Gastrostomy/Enterostomy 08/15/20 0916 Gastrostomy tube w/ balloon LUQ decompression 11 days          Peripheral Intravenous Line                 Peripheral IV - Single Lumen 08/20/20 0430 20 G Anterior;Right Forearm 6 days                Significant Labs:    CBC/Anemia Profile:  Recent Labs   Lab 08/25/20  1453 08/25/20 2009 08/26/20  0208   WBC 39.54* 36.63* 35.73*   HGB 8.2* 7.8* 7.4*   HCT 24.1* 23.9* 22.9*    280 281   MCV 92 94 93   RDW 14.6* 14.6* 14.6*        Chemistries:  Recent Labs   Lab 08/24/20  2228 08/25/20  0400 08/26/20  0208    141  141 138   K 4.4 4.2  4.2 3.8    106  106 102   CO2 26 25  25 26   BUN 33* 15  15 31*   CREATININE 2.2* 1.1  1.1 2.2*   CALCIUM 7.7* 7.5*  7.5* 8.1*   ALBUMIN 1.5* 1.5*  1.5* 1.6*   PROT  --  5.6* 6.1   BILITOT  --  0.7 0.7   ALKPHOS  --  100 109   ALT  --  16 15   AST  --  21 22   MG 2.2 1.8 1.8   PHOS 2.1* 3.2 2.6*       ABGs: No results for input(s): PH, PCO2, HCO3, POCSATURATED, BE in the last 48 hours.  CMP:   Recent Labs   Lab 08/24/20  2228 08/25/20  0400 08/26/20  0208    141  141 138   K 4.4 4.2  4.2 3.8    106  106 102   CO2 26 25  25 26   * 130*  130* 105   BUN 33* 15  15 31*   CREATININE 2.2* 1.1  1.1 2.2*   CALCIUM 7.7* 7.5*  7.5* 8.1*   PROT  --  5.6* 6.1   ALBUMIN 1.5* 1.5*  1.5* 1.6*   BILITOT   --  0.7 0.7   ALKPHOS  --  100 109   AST  --  21 22   ALT  --  16 15   ANIONGAP 8 10  10 10   EGFRNONAA 27.4* >60.0  >60.0 27.4*     Lactic Acid: No results for input(s): LACTATE in the last 48 hours.  All pertinent labs within the past 24 hours have been reviewed.    Significant Imaging:  I have reviewed all pertinent imaging results/findings within the past 24 hours.

## 2020-08-26 NOTE — PLAN OF CARE
SW met with patient at bedside to review discharge planning of IRF. Patient is agreeable to this discharge plan (pending insurance approval - Patient is currently uninsured by but would like assistance from [facility] team to apply for [place] Medicaid. SW sent email to [facility] team with patient's contact information).     Presented patient  at least five facility options within their insurance network for post-acute placement. Educated patient that referrals were sent to:  1. [facility]  2.   [facility] - only an LTAC and does not accept Medicaid primary. Referral cancelled 3:02 PM  3.   [facility]    Discharge Planner will follow up with patient regarding outcome of referrals. Educated patient on the need for early discharge planning to reduce the possibility of financial responsibility once patient is medically stable for discharge.     Patient choice form was explained and signed by patient and placed in patient chart for upload to the medical record.     Patient aware medicaid application results may effect this discharge plan.        08/26/20 1445   Post-Acute Status   Post-Acute Authorization Placement   Post-Acute Placement Status Referrals Sent   Discharge Plan   Discharge Plan A Rehab   Discharge Plan B Home Health       Jazzy Eid LMSW   - Case Management

## 2020-08-26 NOTE — ASSESSMENT & PLAN NOTE
oliguric stage 3 sid with ischemic atn likely secondary to septic shock  unknown bl cr  muddy brown casts on microscopy              Last had SLED on Friday morning.  Held over the weekend. UOP increasing.    Assessment:   -No emergent need for RRT today  -will re-evaluate in AM for further needs  -recommend matching I/O's with LR to keep patient net even  -attempt to quantify urine output  -strict I/O's  -transfuse for hgb < 7

## 2020-08-26 NOTE — ASSESSMENT & PLAN NOTE
Currently NPO requiring IV anti-hypertensives  -Metoprolol 5mg q 6 hours  -recommend PRN hydralazine for sbp > 140's not controlled with scheduled metorpolol

## 2020-08-26 NOTE — PLAN OF CARE
Problem: Occupational Therapy Goal  Goal: Occupational Therapy Goal  Description: Goals to be met by: 9/4/20     Patient will increase functional independence with ADLs by performing:    Feeding with [place].  UE Dressing with Stand-by Assistance.  LE Dressing with Minimal Assistance.  Grooming while standing at sink with Contact Guard Assistance.  Toileting from bedside commode with Minimal Assistance for hygiene and clothing management.   Toilet transfer to bedside commode with Minimal Assistance.    Outcome: Ongoing, Progressing

## 2020-08-26 NOTE — PROGRESS NOTES
[facility]  Critical Care - Surgery  Progress Note    Patient Name: Jaquan Farmer  MRN: [id number]  Admission Date: 8/12/2020  Hospital Length of Stay: 14 days  Code Status: Full Code  Attending Provider: Donis Roa MD  Primary Care Provider: Primary Doctor No   Principal Problem: Duodenum disorder    Subjective:     Hospital/ICU Course:  8/14 Patient had a line replaced along with central line placed, with complication of pneumothorax. Rt pigtail was placed. Patient tolerated complications well. Decreasing vent settings.  8/15 NAEO. Patient was taken back to OR early AM for washout. Possible closure 8/18. ETT exchanged in OR 7.0 -> 7.5  8/16 - NAEON. HDS. Intubated, sedated.   8/17 - NAEON. OR tomorrow for closure.  8/18 - Washed out and closed in OR  8/22 - NAEO. Plan for Repeat CT AP today  8/23 - NAEO. Hbg lower. Giving blood x2 units.  8/25 - NAEO. Went to OR yesterday for Abdominal wall exploration and ligation of bleeding vessels. Wound vac exchanged  8/26 - NAEO. Hypertensive overnight. Hgb trending. Plan for CT today    Interval History/Significant Events: NAEON. Hypertensive to 180's, hydralazine given. Appropriate abdominal tenderness Hgb slowly trending down but above 7. Plan for CT scan today. No respiratory distress on room air.    Follow-up For: Procedure(s) (LRB):  WASHOUT abdomen (N/A)    Post-Operative Day: 2 Days Post-Op    Objective:     Vital Signs (Most Recent):  Temp: 98.8 °F (37.1 °C) (08/26/20 0700)  Pulse: (!) 122 (08/26/20 1015)  Resp: 20 (08/26/20 1015)  BP: (!) 166/83 (08/26/20 0730)  SpO2: 100 % (08/26/20 1015) Vital Signs (24h Range):  Temp:  [98.8 °F (37.1 °C)-100.2 °F (37.9 °C)] 98.8 °F (37.1 °C)  Pulse:  [100-138] 122  Resp:  [12-39] 20  SpO2:  [85 %-100 %] 100 %  BP: (125-182)/() 166/83     Weight: 63.9 kg (140 lb 14 oz)  Body mass index is 25.77 kg/m².      Intake/Output Summary (Last 24 hours) at 8/26/2020 1028  Last data filed at 8/26/2020  0500  Gross per 24 hour   Intake 4628.5 ml   Output 2720 ml   Net 1908.5 ml       Physical Exam  Constitutional:       Interventions: She is sedated.   HENT:      Head: Normocephalic.      Nose:      Comments: NGT in place  Cardiovascular:      Rate and Rhythm: Regular rhythm. Tachycardia present.   Pulmonary:      Effort: Pulmonary effort is normal.      Comments: room air  Abdominal:      General: There is no distension.      Palpations: Abdomen is soft.      Tenderness: There is abdominal tenderness (appropriate).      Comments: R abdomen GODFREY x2 with bilious output  Wound vac in midline incision  G-tube with gastric fluid output   Musculoskeletal:         General: Swelling present.      Right lower leg: Edema present.      Left lower leg: Edema present.   Skin:     General: Skin is warm and dry.   Neurological:      Mental Status: She is alert.     Vents:  Vent Mode: Spont (08/20/20 1501)  Ventilator Initiated: Yes(chart correction) (08/12/20 1930)  Set Rate: 20 BPM (08/20/20 0738)  Vt Set: 360 mL (08/20/20 0738)  PEEP/CPAP: 5 cmH20 (08/20/20 1501)  Oxygen Concentration (%): 28 (08/22/20 0043)  Peak Airway Pressure: 7.8 cmH2O (08/20/20 1501)  Plateau Pressure: 22 cmH20 (08/20/20 1501)  Total Ve: 7.21 mL (08/20/20 1501)  Negative Inspiratory Force (cm H2O): -23 (08/20/20 1501)  F/VT Ratio<105 (RSBI): (!) 64.6 (08/20/20 1205)    Lines/Drains/Airways     Central Venous Catheter Line            Trialysis (Dialysis) Catheter 08/13/20 2230 right internal jugular 12 days          Drain                 Closed/Suction Drain 08/13/20 1659 Right;Inferior Abdomen Bulb 19 Fr. 12 days         Closed/Suction Drain 08/13/20 1659 Right;Superior Abdomen Bulb 19 Fr. 12 days         Gastrostomy/Enterostomy 08/15/20 0916 Gastrostomy tube w/ balloon LUQ decompression 11 days          Peripheral Intravenous Line                 Peripheral IV - Single Lumen 08/20/20 0430 20 G Anterior;Right Forearm 6 days                Significant  Labs:    CBC/Anemia Profile:  Recent Labs   Lab 08/25/20  1453 08/25/20 2009 08/26/20 0208   WBC 39.54* 36.63* 35.73*   HGB 8.2* 7.8* 7.4*   HCT 24.1* 23.9* 22.9*    280 281   MCV 92 94 93   RDW 14.6* 14.6* 14.6*        Chemistries:  Recent Labs   Lab 08/24/20 2228 08/25/20 0400 08/26/20 0208    141  141 138   K 4.4 4.2  4.2 3.8    106  106 102   CO2 26 25  25 26   BUN 33* 15  15 31*   CREATININE 2.2* 1.1  1.1 2.2*   CALCIUM 7.7* 7.5*  7.5* 8.1*   ALBUMIN 1.5* 1.5*  1.5* 1.6*   PROT  --  5.6* 6.1   BILITOT  --  0.7 0.7   ALKPHOS  --  100 109   ALT  --  16 15   AST  --  21 22   MG 2.2 1.8 1.8   PHOS 2.1* 3.2 2.6*       ABGs: No results for input(s): PH, PCO2, HCO3, POCSATURATED, BE in the last 48 hours.  CMP:   Recent Labs   Lab 08/24/20 2228 08/25/20 0400 08/26/20 0208    141  141 138   K 4.4 4.2  4.2 3.8    106  106 102   CO2 26 25  25 26   * 130*  130* 105   BUN 33* 15  15 31*   CREATININE 2.2* 1.1  1.1 2.2*   CALCIUM 7.7* 7.5*  7.5* 8.1*   PROT  --  5.6* 6.1   ALBUMIN 1.5* 1.5*  1.5* 1.6*   BILITOT  --  0.7 0.7   ALKPHOS  --  100 109   AST  --  21 22   ALT  --  16 15   ANIONGAP 8 10  10 10   EGFRNONAA 27.4* >60.0  >60.0 27.4*     Lactic Acid: No results for input(s): LACTATE in the last 48 hours.  All pertinent labs within the past 24 hours have been reviewed.    Significant Imaging:  I have reviewed all pertinent imaging results/findings within the past 24 hours.    Assessment/Plan:     Severe sepsis   40 yo F s/p antrectomy with BII reconstruction c/b duodenal necrosis requiring ex lap, washout, drainage c/b aspiration event. Now with severe sepsis and ARDS     .Neuro:  - AOx4  - PRN hydromorphone for analgesia  - Switch to PCA for adequate pain control     Resp:  - 100% on room air  - PRN breathing treatments  - Right chest tube with no output     CV:  - MAP goal >65  - Systolic <160  - Levo and Vaso gtt - off     Heme:  - Hgb/Hct 7.4; slowly  trending down, watch and transfuse if possible  - Plan for CT today to look for source  - S/p Abdominal washout on 8/21  - S/p Abdominal exploration w/ woundvac exhange 8/24       ID:  - WBC remains elevated 35  - Zosyn, Fluconazole  - Blood cultures and BAL taken 8/19 with NGTD and no abnormal findings     Renal:  - Oliguric on arrival; Cr 2.2 at that time   - SLED overnight  - Trend BUN/Cr;   - Monitor I/Os  - Increase MIVF to 150cc/hr     FEN/GI:  - NPO  - TPN  - NGT to LIWS   - Maintain drains to bulb suction; LLQ with acute drop in drainage and accumulation in SQ tissue  - GI ppx  - Replace electrolytes as needed to keep K>4, Mg>2     Endo:  - Monitor BG     Dispo:  - Continue SICU care           Thu Wilson MD  Critical Care - Surgery  [facility]

## 2020-08-26 NOTE — PROGRESS NOTES
[facility]  Nephrology  Progress Note    Patient Name: Jaquan Farmer  MRN: [id number]  Admission Date: 8/12/2020  Hospital Length of Stay: 14 days  Attending Provider: Donis Roa MD   Primary Care Physician: Primary Doctor No  Principal Problem:Duodenum disorder    Subjective:     Interval History:   Increased drainage from G tube yesterday and signs of volume depletion requiring LR boluses + maintenance.  Net positive 1.7L/24h (drain output of 3L).  UOP x 4 occurrences.  She voices no complaints this morning, electrolytes are stable.    Review of patient's allergies indicates:   Allergen Reactions    Latex      Current Facility-Administered Medications   Medication Frequency    0.9%  NaCl infusion (for blood administration) Q24H PRN    0.9%  NaCl infusion (for blood administration) Q24H PRN    0.9%  NaCl infusion (for blood administration) Q24H PRN    famotidine (PF) injection 20 mg Daily    fat emulsion 20 % infusion 250 mL Daily    fluconazole (DIFLUCAN) IVPB 200 mg 100 mL Q24H    heparin (porcine) injection 5,000 Units Q8H    lactated ringers bolus 1,000 mL Once    lactated ringers infusion Continuous    lidocaine 5 % patch 1 patch Q24H    melatonin tablet 3 mg Nightly PRN    metoprolol injection 5 mg Q4H PRN    metoprolol injection 5 mg Q6H    miconazole NITRATE 2 % top powder BID    naloxone 0.4 mg/mL injection 0.02 mg PRN    ondansetron injection 4 mg Once PRN    piperacillin-tazobactam 4.5 g in sodium chloride 0.9% 100 mL IVPB (ready to mix system) Q12H    TPN ADULT CENTRAL LINE CUSTOM Continuous       Objective:     Vital Signs (Most Recent):  Temp: 98.8 °F (37.1 °C) (08/26/20 0700)  Pulse: (!) 124 (08/26/20 0730)  Resp: 16 (08/26/20 0749)  BP: (!) 166/83 (08/26/20 0730)  SpO2: 100 % (08/26/20 0730)  O2 Device (Oxygen Therapy): room air (08/26/20 0355) Vital Signs (24h Range):  Temp:  [98.8 °F (37.1 °C)-100.2 °F (37.9 °C)] 98.8 °F (37.1 °C)  Pulse:   [100-139] 124  Resp:  [12-39] 16  SpO2:  [85 %-100 %] 100 %  BP: (125-182)/() 166/83     Weight: 63.9 kg (140 lb 14 oz) (08/25/20 0622)  Body mass index is 25.77 kg/m².  Body surface area is 1.67 meters squared.    I/O last 3 completed shifts:  In: 8466.5 [I.V.:4387.7; Blood:350; IV Piggyback:1000]  Out: 7053 [Drains:4775; Other:2278]    Physical Exam  Vitals signs and nursing note reviewed.   Constitutional:       Appearance: She is not ill-appearing.      Interventions: She is sedated. She is not intubated.  HENT:      Head: Normocephalic and atraumatic.      Mouth/Throat:      Mouth: Mucous membranes are dry.   Eyes:      General: No scleral icterus.        Right eye: No discharge.         Left eye: No discharge.      Extraocular Movements: Extraocular movements intact.      Conjunctiva/sclera: Conjunctivae normal.   Neck:      Musculoskeletal: Normal range of motion and neck supple.   Cardiovascular:      Rate and Rhythm: Regular rhythm. Tachycardia present.      Pulses: Normal pulses.      Heart sounds: Normal heart sounds.   Pulmonary:      Effort: Pulmonary effort is normal. No respiratory distress. She is not intubated.      Breath sounds: No rales.   Abdominal:      General: Bowel sounds are normal.      Palpations: Abdomen is soft.      Comments: woundvac   Musculoskeletal:      Right lower leg: Edema present.      Left lower leg: Edema present.   Skin:     General: Skin is warm and dry.      Findings: No bruising or rash.   Neurological:      General: No focal deficit present.      Mental Status: She is alert and oriented to person, place, and time.   Psychiatric:         Mood and Affect: Mood normal.         Behavior: Behavior normal.         Significant Labs:  CBC:   Recent Labs   Lab 08/26/20  0208   WBC 35.73*   RBC 2.46*   HGB 7.4*   HCT 22.9*      MCV 93   MCH 30.1   MCHC 32.3     CMP:   Recent Labs   Lab 08/26/20 0208      CALCIUM 8.1*   ALBUMIN 1.6*   PROT 6.1      K 3.8    CO2 26      BUN 31*   CREATININE 2.2*   ALKPHOS 109   ALT 15   AST 22   BILITOT 0.7            Assessment/Plan:     * Duodenum disorder  - Management per primary team     Acute renal failure with tubular necrosis  oliguric stage 3 sid with ischemic atn likely secondary to septic shock  unknown bl cr  muddy brown casts on microscopy              Last had SLED on Friday morning.  Held over the weekend. UOP increasing.    Assessment:   -No emergent need for RRT today  -will re-evaluate in AM for further needs  -recommend matching I/O's with LR to keep patient net even  -attempt to quantify urine output  -strict I/O's  -transfuse for hgb < 7          Hypertension  Currently NPO requiring IV anti-hypertensives  -Metoprolol 5mg q 6 hours  -recommend PRN hydralazine for sbp > 140's not controlled with scheduled metorpolol      Momo Iyer, NP  Nephrology  [facility]

## 2020-08-26 NOTE — NURSING
Pt AAOX4. O2 sats 100% on room air. MAPs > 65 throughout shift, hydralazine given for systolics > 180. TPN @ 45, lipids @ 10.4, LR @ 100.   BM X 2. Oliguric, urine occurrence X 3. Pt currently OOBTC, tolerating well. Minimal sanguinous drainage from GODFREY drains. Pain controlled with PRN dilaudid. 1.2L from G tube. accu checks q 6 hr, no coverage needed. Plan for CT today. Plan of care reviewed with patient. All questions and concerns addressed.

## 2020-08-26 NOTE — PLAN OF CARE
Problem: Physical Therapy Goal  Goal: Physical Therapy Goal  Description: Goals to be met by: 2020     Patient will increase functional independence with mobility by performin. Supine to sit with MInimal Assistance  2. Sit to stand transfer with Minimal Assistance with LRAD  3. Gait  x 50 feet with Minimal Assistance using LRAD.   4. Stand for 3 minutes with Contact Guard Assistance using LRAD  5. Lower extremity exercise program x15 reps per handout, with independence    Outcome: Ongoing, Progressing    Treatment completed. No goals met. Goals remain appropriate.

## 2020-08-26 NOTE — NURSING
"      SICU PLAN OF CARE NOTE    Dx: Duodenum disorder    Shift Events: Pt OOBTC X 7 hrs; 1L LR bolus given; LR gtt started at 100 ml/hr.    Goals of Care: MAP >65; pain control/comfort     Neuro: AAO x4, Follows Commands and Moves All Extremities    Vital Signs: BP (!) 133/91   Pulse (!) 115   Temp 99.2 °F (37.3 °C) (Oral)   Resp (!) 28   Ht 5' 2" (1.575 m)   Wt 63.9 kg (140 lb 14 oz)   SpO2 98%   Breastfeeding No   BMI 25.77 kg/m²     Respiratory: Room Air    Diet: TPN @ 45ml/hr; Lipids @ 10.4 ml/hr    Gtts: MIVF LR     Urine Output: Oliguric; 1 unmeasured output     Drains: GODFREY Drain, total output 100 cc / shift -- Superior to suction -- minimal sanguinous    GODFREY Drain, total output 60 cc/shift -- Inferior to bulb suction -- minimal sanguinous    G tube, total output 1700 ml/shift    Wound Vac 100 ml/shift      Labs/Accuchecks: Accuchecks q6hr     Skin: Bruising noted on lower limbs. Wound care consulted and instructed to keep foams on. No skin breakdown noted.    POC reviewed with patient and all questions answered at this time. PM RN will assume care.       "

## 2020-08-26 NOTE — ASSESSMENT & PLAN NOTE
40 yo F s/p antrectomy with BII reconstruction c/b duodenal necrosis requiring ex lap, washout, drainage c/b aspiration event. S/p duodenal resection with d-j and g-j anastomosis.  - Continue ICU care  - CT scan today  - Transfuse PRBC as needed for Hgb <7  - Respiratory treatment q4h  - NPO  - NGT to LIWS  - G-tube to gravity  - Abx: diflucan, zosyn  - TPN   - Daily labs  - Trend CBC  - GI and DVT ppx  - PT / OT

## 2020-08-26 NOTE — PT/OT/SLP PROGRESS
Physical Therapy Treatment    Patient Name:  Jaquan Farmer   MRN:  [id number]  Admit Date: 2020  Admitting Diagnosis:  Duodenum disorder   Length of Stay: 14 days  Recent Surgery: Procedure(s) (LRB):  WASHOUT abdomen (N/A) 2 Days Post-Op    Recommendations:     Discharge Recommendations:  rehabilitation facility   Discharge Equipment Recommendations: (TBD)   Barriers to discharge: None    Plan:     During this hospitalization, patient to be seen 4 x/week to address the listed problems via therapeutic activities, gait training, therapeutic exercises, neuromuscular re-education  · Plan of Care Expires:  20   Plan of Care Reviewed with: patient    Assessment:     Jaquan Farmer is a 39 y.o. female admitted with a medical diagnosis of Duodenum disorder. Patient was found awake in chair. Patient reported no pain prior to treatment. Patient was able to stand from seated with max assist 2x for 3/4 attempts. Standing duration was 1 min and limited 2nd to LE weakness, poor balance and poor endurance.  Patient was educated on proper rock to rise technique as well as a controlled decent back to chair. Patient was given LE exercises to complete to address weakness and poor endurance in LE.     Problem List: weakness, impaired endurance, impaired functional mobilty, impaired balance, decreased lower extremity function, impaired cardiopulmonary response to activity.  Rehab Prognosis: Good     GOALS:   Multidisciplinary Problems     Physical Therapy Goals        Problem: Physical Therapy Goal    Goal Priority Disciplines Outcome Goal Variances Interventions   Physical Therapy Goal     PT, PT/OT Ongoing, Progressing     Description: Goals to be met by: 2020     Patient will increase functional independence with mobility by performin. Supine to sit with MInimal Assistance  2. Sit to stand transfer with Minimal Assistance with LRAD  3. Gait  x 50 feet with Minimal Assistance using LRAD.   4. Stand for 3  "minutes with Contact Guard Assistance using LRAD  5. Lower extremity exercise program x15 reps per handout, with independence                     Subjective   Communicated with RN prior to session.  Patient found up in chair upon PT entry to room, agreeable to evaluation. Jaquan Farmer was alone present during session.    Chief Complaint: leg weakness   Patient/Family Comments/goals: To get better and return home  Pain/Comfort:  · Pain Rating 1: 0/10  · Pain Rating Post-Intervention 1: 0/10    Objective:   Patient found with: central line, peripheral IV, blood pressure cuff, pulse ox (continuous), telemetry, wound vac, GODFREY drain(G Tube)   General Precautions: Standard, Cardiac fall   Orthopedic Precautions:N/A   Braces: N/A   Oxygen Device: Room Air  Vitals: BP (!) 163/88   Pulse (!) 116   Temp 98.6 °F (37 °C) (Oral)   Resp (!) 30   Ht 5' 2" (1.575 m)   Wt 63.9 kg (140 lb 14 oz)   SpO2 100%   Breastfeeding No   BMI 25.77 kg/m²     Outcome Measures:  AM-PAC 6 CLICK MOBILITY  Turning over in bed (including adjusting bedclothes, sheets and blankets)?: 2  Sitting down on and standing up from a chair with arms (e.g., wheelchair, bedside commode, etc.): 2  Moving from lying on back to sitting on the side of the bed?: 2  Moving to and from a bed to a chair (including a wheelchair)?: 2  Need to walk in hospital room?: 2  Climbing 3-5 steps with a railing?: 1  Basic Mobility Total Score: 11     Cognition:   · Oriented X 4  · AxOx4  · Command following: Follows multistep  commands  · Fluency: clear/fluent    Functional Mobility:  Additional staff present: OT  Bed Mobility:  Not tested 2nd to patient found in chair.    Transfers:    Sit <> Stand Transfer: maximal assistance and of 2 persons with no assistive device from chair   o Facilitation of hip and thoracic extension   o Knee guarding       Gait:  · Not tested 2nd to poor standing balance and generalized weekness.    Stairs:  N/A    Therapeutic Activities, " Exercises, and Education:   Educated pt on PT role/POC  Educated pt on importance of OOB activity    Patient performed LE strengthening and ROM exercises.   · Position: sitting in chair  · B LAQs x 5 reps; x 10 more reps at end of session  · Sit to stand x 4 trials to increase B LE strength and to increase standing tolerance  Patient educated on proper technique to rise from sitting to standing and back to chair.  Pt verbalized understanding     Patient left up in chair with all lines intact and call button in reach..    Time Tracking:     PT Received On: 08/26/20  PT Start Time: 0915     PT Stop Time: 0945  PT Total Time (min): 30 min       Billable Minutes:   · Therapeutic Activity 15 and Therapeutic Exercise 8    Treatment Type: Treatment  PT/PTA: PT       Bonnie Tolbert PT, DPT  8/26/2020  [phone]

## 2020-08-27 LAB
ABO + RH BLD: NORMAL
ALBUMIN SERPL BCP-MCNC: 1.4 G/DL (ref 3.5–5.2)
ALP SERPL-CCNC: 102 U/L (ref 55–135)
ALT SERPL W/O P-5'-P-CCNC: 14 U/L (ref 10–44)
ANION GAP SERPL CALC-SCNC: 10 MMOL/L (ref 8–16)
ANISOCYTOSIS BLD QL SMEAR: SLIGHT
ASCENDING AORTA: 3.19 CM
AST SERPL-CCNC: 18 U/L (ref 10–40)
AV INDEX (PROSTH): 0.64
AV MEAN GRADIENT: 10 MMHG
AV PEAK GRADIENT: 20 MMHG
AV VALVE AREA: 2.16 CM2
AV VELOCITY RATIO: 0.6
BASO STIPL BLD QL SMEAR: ABNORMAL
BASOPHILS # BLD AUTO: 0.13 K/UL (ref 0–0.2)
BASOPHILS NFR BLD: 0.4 % (ref 0–1.9)
BILIRUB SERPL-MCNC: 0.7 MG/DL (ref 0.1–1)
BLD GP AB SCN CELLS X3 SERPL QL: NORMAL
BLD PROD TYP BPU: NORMAL
BLOOD UNIT EXPIRATION DATE: NORMAL
BLOOD UNIT TYPE CODE: 7300
BLOOD UNIT TYPE: NORMAL
BSA FOR ECHO PROCEDURE: 1.68 M2
BUN SERPL-MCNC: 47 MG/DL (ref 6–20)
CA-I BLDV-SCNC: 1.19 MMOL/L (ref 1.06–1.42)
CALCIUM SERPL-MCNC: 8.2 MG/DL (ref 8.7–10.5)
CHLORIDE SERPL-SCNC: 103 MMOL/L (ref 95–110)
CO2 SERPL-SCNC: 25 MMOL/L (ref 23–29)
CODING SYSTEM: NORMAL
CREAT SERPL-MCNC: 3.1 MG/DL (ref 0.5–1.4)
CV ECHO LV RWT: 0.31 CM
DIFFERENTIAL METHOD: ABNORMAL
DISPENSE STATUS: NORMAL
DOP CALC AO PEAK VEL: 2.25 M/S
DOP CALC AO VTI: 34.44 CM
DOP CALC LVOT AREA: 3.4 CM2
DOP CALC LVOT DIAMETER: 2.07 CM
DOP CALC LVOT PEAK VEL: 1.34 M/S
DOP CALC LVOT STROKE VOLUME: 74.44 CM3
DOP CALCLVOT PEAK VEL VTI: 22.13 CM
E WAVE DECELERATION TIME: 139.71 MSEC
E/A RATIO: 1.13
E/E' RATIO: 12.26 M/S
ECHO LV POSTERIOR WALL: 0.89 CM (ref 0.6–1.1)
EOSINOPHIL # BLD AUTO: 0.1 K/UL (ref 0–0.5)
EOSINOPHIL NFR BLD: 0.4 % (ref 0–8)
ERYTHROCYTE [DISTWIDTH] IN BLOOD BY AUTOMATED COUNT: 14.6 % (ref 11.5–14.5)
EST. GFR  (AFRICAN AMERICAN): 20.9 ML/MIN/1.73 M^2
EST. GFR  (NON AFRICAN AMERICAN): 18.1 ML/MIN/1.73 M^2
FRACTIONAL SHORTENING: 33 % (ref 28–44)
GLUCOSE SERPL-MCNC: 124 MG/DL (ref 70–110)
HCT VFR BLD AUTO: 23.7 % (ref 37–48.5)
HGB BLD-MCNC: 7.5 G/DL (ref 12–16)
HYPOCHROMIA BLD QL SMEAR: ABNORMAL
IMM GRANULOCYTES # BLD AUTO: 0.75 K/UL (ref 0–0.04)
IMM GRANULOCYTES NFR BLD AUTO: 2.2 % (ref 0–0.5)
INTERVENTRICULAR SEPTUM: 0.82 CM (ref 0.6–1.1)
IVRT: 85.63 MSEC
LA MAJOR: 4.49 CM
LA MINOR: 4.61 CM
LA WIDTH: 4.52 CM
LEFT ATRIUM SIZE: 3.59 CM
LEFT ATRIUM VOLUME INDEX: 38 ML/M2
LEFT ATRIUM VOLUME: 62.75 CM3
LEFT INTERNAL DIMENSION IN SYSTOLE: 3.78 CM (ref 2.1–4)
LEFT VENTRICLE DIASTOLIC VOLUME INDEX: 95.71 ML/M2
LEFT VENTRICLE DIASTOLIC VOLUME: 158.17 ML
LEFT VENTRICLE MASS INDEX: 111 G/M2
LEFT VENTRICLE SYSTOLIC VOLUME INDEX: 37 ML/M2
LEFT VENTRICLE SYSTOLIC VOLUME: 61.19 ML
LEFT VENTRICULAR INTERNAL DIMENSION IN DIASTOLE: 5.67 CM (ref 3.5–6)
LEFT VENTRICULAR MASS: 183.36 G
LV LATERAL E/E' RATIO: 10.85 M/S
LV SEPTAL E/E' RATIO: 14.1 M/S
LYMPHOCYTES # BLD AUTO: 1.7 K/UL (ref 1–4.8)
LYMPHOCYTES NFR BLD: 4.9 % (ref 18–48)
MAGNESIUM SERPL-MCNC: 1.7 MG/DL (ref 1.6–2.6)
MCH RBC QN AUTO: 29.9 PG (ref 27–31)
MCHC RBC AUTO-ENTMCNC: 31.6 G/DL (ref 32–36)
MCV RBC AUTO: 94 FL (ref 82–98)
MONOCYTES # BLD AUTO: 1.2 K/UL (ref 0.3–1)
MONOCYTES NFR BLD: 3.4 % (ref 4–15)
MV PEAK A VEL: 1.25 M/S
MV PEAK E VEL: 1.41 M/S
MV STENOSIS PRESSURE HALF TIME: 40.52 MS
MV VALVE AREA P 1/2 METHOD: 5.43 CM2
NEUTROPHILS # BLD AUTO: 30.9 K/UL (ref 1.8–7.7)
NEUTROPHILS NFR BLD: 88.7 % (ref 38–73)
NRBC BLD-RTO: 0 /100 WBC
PHOSPHATE SERPL-MCNC: 3.2 MG/DL (ref 2.7–4.5)
PISA TR MAX VEL: 3.05 M/S
PLATELET # BLD AUTO: 362 K/UL (ref 150–350)
PLATELET BLD QL SMEAR: ABNORMAL
PMV BLD AUTO: 10.4 FL (ref 9.2–12.9)
POCT GLUCOSE: 135 MG/DL (ref 70–110)
POTASSIUM SERPL-SCNC: 3.8 MMOL/L (ref 3.5–5.1)
PROT SERPL-MCNC: 5.7 G/DL (ref 6–8.4)
PULM VEIN S/D RATIO: 1.9
PV PEAK D VEL: 0.41 M/S
PV PEAK S VEL: 0.78 M/S
RA MAJOR: 4.45 CM
RA WIDTH: 3.41 CM
RBC # BLD AUTO: 2.51 M/UL (ref 4–5.4)
RIGHT VENTRICULAR END-DIASTOLIC DIMENSION: 3.13 CM
RV TISSUE DOPPLER FREE WALL SYSTOLIC VELOCITY 1 (APICAL 4 CHAMBER VIEW): 16.26 CM/S
SINUS: 2.8 CM
SODIUM SERPL-SCNC: 138 MMOL/L (ref 136–145)
STJ: 3.14 CM
TDI LATERAL: 0.13 M/S
TDI SEPTAL: 0.1 M/S
TDI: 0.12 M/S
TOXIC GRANULES BLD QL SMEAR: PRESENT
TR MAX PG: 37 MMHG
TRANS ERYTHROCYTES VOL PATIENT: NORMAL ML
TRICUSPID ANNULAR PLANE SYSTOLIC EXCURSION: 2.76 CM
WBC # BLD AUTO: 34.8 K/UL (ref 3.9–12.7)

## 2020-08-27 PROCEDURE — 82330 ASSAY OF CALCIUM: CPT

## 2020-08-27 PROCEDURE — 63600175 PHARM REV CODE 636 W HCPCS: Performed by: SURGERY

## 2020-08-27 PROCEDURE — 85025 COMPLETE CBC W/AUTO DIFF WBC: CPT

## 2020-08-27 PROCEDURE — 80069 RENAL FUNCTION PANEL: CPT

## 2020-08-27 PROCEDURE — 94761 N-INVAS EAR/PLS OXIMETRY MLT: CPT

## 2020-08-27 PROCEDURE — S0028 INJECTION, FAMOTIDINE, 20 MG: HCPCS | Performed by: STUDENT IN AN ORGANIZED HEALTH CARE EDUCATION/TRAINING PROGRAM

## 2020-08-27 PROCEDURE — 83735 ASSAY OF MAGNESIUM: CPT

## 2020-08-27 PROCEDURE — 80100008 HC CRRT DAILY MAINTENANCE

## 2020-08-27 PROCEDURE — 25000003 PHARM REV CODE 250: Performed by: STUDENT IN AN ORGANIZED HEALTH CARE EDUCATION/TRAINING PROGRAM

## 2020-08-27 PROCEDURE — 63600175 PHARM REV CODE 636 W HCPCS: Performed by: NURSE PRACTITIONER

## 2020-08-27 PROCEDURE — 63600175 PHARM REV CODE 636 W HCPCS: Performed by: STUDENT IN AN ORGANIZED HEALTH CARE EDUCATION/TRAINING PROGRAM

## 2020-08-27 PROCEDURE — 99233 SBSQ HOSP IP/OBS HIGH 50: CPT | Mod: ,,, | Performed by: INTERNAL MEDICINE

## 2020-08-27 PROCEDURE — 97530 THERAPEUTIC ACTIVITIES: CPT

## 2020-08-27 PROCEDURE — 86850 RBC ANTIBODY SCREEN: CPT

## 2020-08-27 PROCEDURE — 27000646 HC AEROBIKA DEVICE

## 2020-08-27 PROCEDURE — A4217 STERILE WATER/SALINE, 500 ML: HCPCS | Performed by: STUDENT IN AN ORGANIZED HEALTH CARE EDUCATION/TRAINING PROGRAM

## 2020-08-27 PROCEDURE — 97116 GAIT TRAINING THERAPY: CPT

## 2020-08-27 PROCEDURE — 83735 ASSAY OF MAGNESIUM: CPT | Mod: 91

## 2020-08-27 PROCEDURE — 84100 ASSAY OF PHOSPHORUS: CPT

## 2020-08-27 PROCEDURE — 94664 DEMO&/EVAL PT USE INHALER: CPT

## 2020-08-27 PROCEDURE — 99233 PR SUBSEQUENT HOSPITAL CARE,LEVL III: ICD-10-PCS | Mod: 24,,, | Performed by: SURGERY

## 2020-08-27 PROCEDURE — 20000000 HC ICU ROOM

## 2020-08-27 PROCEDURE — 25000003 PHARM REV CODE 250: Performed by: SURGERY

## 2020-08-27 PROCEDURE — B4185 PARENTERAL SOL 10 GM LIPIDS: HCPCS | Performed by: STUDENT IN AN ORGANIZED HEALTH CARE EDUCATION/TRAINING PROGRAM

## 2020-08-27 PROCEDURE — 80053 COMPREHEN METABOLIC PANEL: CPT

## 2020-08-27 PROCEDURE — 99900035 HC TECH TIME PER 15 MIN (STAT)

## 2020-08-27 PROCEDURE — 25000003 PHARM REV CODE 250: Performed by: NURSE PRACTITIONER

## 2020-08-27 PROCEDURE — 86920 COMPATIBILITY TEST SPIN: CPT

## 2020-08-27 PROCEDURE — 99233 SBSQ HOSP IP/OBS HIGH 50: CPT | Mod: 24,,, | Performed by: SURGERY

## 2020-08-27 PROCEDURE — 99233 PR SUBSEQUENT HOSPITAL CARE,LEVL III: ICD-10-PCS | Mod: ,,, | Performed by: INTERNAL MEDICINE

## 2020-08-27 PROCEDURE — 90945 DIALYSIS ONE EVALUATION: CPT

## 2020-08-27 RX ORDER — LABETALOL HYDROCHLORIDE 5 MG/ML
10 INJECTION, SOLUTION INTRAVENOUS EVERY 4 HOURS
Status: DISCONTINUED | OUTPATIENT
Start: 2020-08-27 | End: 2020-08-27

## 2020-08-27 RX ORDER — METOPROLOL TARTRATE 1 MG/ML
10 INJECTION, SOLUTION INTRAVENOUS EVERY 6 HOURS
Status: DISCONTINUED | OUTPATIENT
Start: 2020-08-27 | End: 2020-09-01

## 2020-08-27 RX ORDER — DEXMEDETOMIDINE HYDROCHLORIDE 4 UG/ML
0.2 INJECTION, SOLUTION INTRAVENOUS CONTINUOUS
Status: DISCONTINUED | OUTPATIENT
Start: 2020-08-27 | End: 2020-08-31

## 2020-08-27 RX ORDER — LABETALOL HCL 20 MG/4 ML
10 SYRINGE (ML) INTRAVENOUS EVERY 4 HOURS PRN
Status: DISCONTINUED | OUTPATIENT
Start: 2020-08-27 | End: 2020-08-28

## 2020-08-27 RX ORDER — HYDROCODONE BITARTRATE AND ACETAMINOPHEN 500; 5 MG/1; MG/1
TABLET ORAL CONTINUOUS
Status: DISCONTINUED | OUTPATIENT
Start: 2020-08-27 | End: 2020-08-28

## 2020-08-27 RX ORDER — CLONIDINE 0.2 MG/24H
1 PATCH, EXTENDED RELEASE TRANSDERMAL
Status: DISCONTINUED | OUTPATIENT
Start: 2020-08-27 | End: 2020-09-02

## 2020-08-27 RX ORDER — MAGNESIUM SULFATE HEPTAHYDRATE 40 MG/ML
2 INJECTION, SOLUTION INTRAVENOUS
Status: DISCONTINUED | OUTPATIENT
Start: 2020-08-27 | End: 2020-08-28

## 2020-08-27 RX ORDER — HYDROMORPHONE HYDROCHLORIDE 1 MG/ML
1 INJECTION, SOLUTION INTRAMUSCULAR; INTRAVENOUS; SUBCUTANEOUS ONCE
Status: COMPLETED | OUTPATIENT
Start: 2020-08-27 | End: 2020-08-27

## 2020-08-27 RX ADMIN — PIPERACILLIN SODIUM AND TAZOBACTAM SODIUM 4.5 G: 4; .5 INJECTION, POWDER, LYOPHILIZED, FOR SOLUTION INTRAVENOUS at 08:08

## 2020-08-27 RX ADMIN — CLONIDINE 1 PATCH: 0.2 PATCH TRANSDERMAL at 11:08

## 2020-08-27 RX ADMIN — HEPARIN SODIUM 5000 UNITS: 5000 INJECTION INTRAVENOUS; SUBCUTANEOUS at 02:08

## 2020-08-27 RX ADMIN — HEPARIN SODIUM 5000 UNITS: 5000 INJECTION INTRAVENOUS; SUBCUTANEOUS at 05:08

## 2020-08-27 RX ADMIN — Medication 20 MG: at 03:08

## 2020-08-27 RX ADMIN — MAGNESIUM SULFATE HEPTAHYDRATE: 500 INJECTION, SOLUTION INTRAMUSCULAR; INTRAVENOUS at 09:08

## 2020-08-27 RX ADMIN — DEXMEDETOMIDINE HYDROCHLORIDE 1 MCG/KG/HR: 4 INJECTION, SOLUTION INTRAVENOUS at 05:08

## 2020-08-27 RX ADMIN — Medication 10 MG: at 10:08

## 2020-08-27 RX ADMIN — SODIUM CHLORIDE, SODIUM LACTATE, POTASSIUM CHLORIDE, AND CALCIUM CHLORIDE: .6; .31; .03; .02 INJECTION, SOLUTION INTRAVENOUS at 03:08

## 2020-08-27 RX ADMIN — MICONAZOLE NITRATE: 20 POWDER TOPICAL at 09:08

## 2020-08-27 RX ADMIN — FLUCONAZOLE 200 MG: 2 INJECTION, SOLUTION INTRAVENOUS at 08:08

## 2020-08-27 RX ADMIN — I.V. FAT EMULSION 250 ML: 20 EMULSION INTRAVENOUS at 09:08

## 2020-08-27 RX ADMIN — FAMOTIDINE 20 MG: 10 INJECTION INTRAVENOUS at 08:08

## 2020-08-27 RX ADMIN — MICONAZOLE NITRATE: 20 POWDER TOPICAL at 08:08

## 2020-08-27 RX ADMIN — LIDOCAINE 1 PATCH: 50 PATCH CUTANEOUS at 10:08

## 2020-08-27 RX ADMIN — HYDROMORPHONE HYDROCHLORIDE 1 MG: 1 INJECTION, SOLUTION INTRAMUSCULAR; INTRAVENOUS; SUBCUTANEOUS at 02:08

## 2020-08-27 RX ADMIN — METOROPROLOL TARTRATE 5 MG: 5 INJECTION, SOLUTION INTRAVENOUS at 05:08

## 2020-08-27 RX ADMIN — SODIUM CHLORIDE: 0.9 INJECTION, SOLUTION INTRAVENOUS at 03:08

## 2020-08-27 RX ADMIN — METOROPROLOL TARTRATE 10 MG: 5 INJECTION, SOLUTION INTRAVENOUS at 05:08

## 2020-08-27 RX ADMIN — Medication: at 12:08

## 2020-08-27 RX ADMIN — METOROPROLOL TARTRATE 10 MG: 5 INJECTION, SOLUTION INTRAVENOUS at 12:08

## 2020-08-27 RX ADMIN — HEPARIN SODIUM 5000 UNITS: 5000 INJECTION INTRAVENOUS; SUBCUTANEOUS at 09:08

## 2020-08-27 RX ADMIN — DEXMEDETOMIDINE HYDROCHLORIDE 0.2 MCG/KG/HR: 4 INJECTION, SOLUTION INTRAVENOUS at 01:08

## 2020-08-27 RX ADMIN — SODIUM CHLORIDE, SODIUM LACTATE, POTASSIUM CHLORIDE, AND CALCIUM CHLORIDE: .6; .31; .03; .02 INJECTION, SOLUTION INTRAVENOUS at 10:08

## 2020-08-27 RX ADMIN — MAGNESIUM SULFATE 2 G: 2 INJECTION INTRAVENOUS at 11:08

## 2020-08-27 RX ADMIN — PIPERACILLIN SODIUM AND TAZOBACTAM SODIUM 4.5 G: 4; .5 INJECTION, POWDER, LYOPHILIZED, FOR SOLUTION INTRAVENOUS at 05:08

## 2020-08-27 NOTE — ASSESSMENT & PLAN NOTE
Currently NPO requiring IV anti-hypertensives (not controlled)  -discontinue metoprolol and switch to labetalol.   -PRN hydralazine

## 2020-08-27 NOTE — PT/OT/SLP PROGRESS
Physical Therapy Treatment    Patient Name:  Jaquan Farmer   MRN:  [id number]    *co-treatment with OT   Recommendations:     Discharge Recommendations:  rehabilitation facility   Discharge Equipment Recommendations: walker, rolling, bedside commode(TBD)   Barriers to discharge: Decreased caregiver support    Assessment:     Jaquan Farmer is a 39 y.o. female admitted with a medical diagnosis of Duodenum disorder.  She presents with the following impairments/functional limitations:  weakness, impaired endurance, impaired self care skills, impaired functional mobilty, gait instability, impaired balance, pain, impaired cardiopulmonary response to activity. Pt tolerated activity with improved mobility reflected by decreased assistance required and ability to advance steps this session. Pt mobility continues to be limited by pain and fatigue with minimal activity. Upon discharge, pt would benefit from continued skilled therapy intervention at an inpatient rehabilitation facility to progress toward more independent mobility. At this time, pt would continue to benefit from acute skilled therapy intervention to address deficits and progress toward prior level of function.       Rehab Prognosis: Good; patient would benefit from acute skilled PT services to address these deficits and reach maximum level of function.    Recent Surgery: Procedure(s) (LRB):  WASHOUT abdomen (N/A) 3 Days Post-Op    Plan:     During this hospitalization, patient to be seen 4 x/week to address the identified rehab impairments via gait training, therapeutic activities, therapeutic exercises, neuromuscular re-education and progress toward the following goals:    · Plan of Care Expires:  09/24/20    Subjective     Chief Complaint: pt c/o fatigue with activity   Patient/Family Comments/goals: to get better and return home   Pain/Comfort:  · Pain Rating 1: (pt reported abdominal pain, did not quantify)  · Pain Addressed 1: Pre-medicate for  activity, Cessation of Activity, Distraction      Objective:     Communicated with RN prior to session.  Patient found up in chair with central line, peripheral IV, blood pressure cuff, pulse ox (continuous), telemetry, GODFREY drain, wound vac upon PT entry to room.     General Precautions: Standard, fall   Orthopedic Precautions:N/A   Braces: N/A     Functional Mobility:  · Transfers:     · Sit to Stand: 2x from chair with moderate assistance of 2 persons with rolling walker and use of arm rest, facilitation at posterior pelvis to promote hip extension and upright posture.   · Gait: Pt ambulated 8 ft forward with RW and minimum assistance. Pt demo'd small step size, decreased foot clearance, narrow RENATE, flexed posture. Facilitation provided to promote lateral weight shift for step initiation, facilitation at posterior pelvis to promote extension and upright posture.       AM-PAC 6 CLICK MOBILITY  Turning over in bed (including adjusting bedclothes, sheets and blankets)?: 2  Sitting down on and standing up from a chair with arms (e.g., wheelchair, bedside commode, etc.): 2  Moving from lying on back to sitting on the side of the bed?: 2  Moving to and from a bed to a chair (including a wheelchair)?: 2  Need to walk in hospital room?: 2  Climbing 3-5 steps with a railing?: 1  Basic Mobility Total Score: 11       Therapeutic Activities and Exercises:   Pt stood ~1 min 2x with RW, performed lateral weight shift with minimum assistance to prevent posterior lean.   Pt educated on role of PT/POC. Pt verbalized understanding.   Pt encouraged to only perform OOB mobility with assistance from nursing/therapy. Pt agreeable.   Pt educated on seated exercises to perform 20x, 3x/day. Exercises included bilateral LAQ, marching, ankle DF/PF  Pt with excessive abdominal drainage at site of GODFREY drains, RN notified.       Patient left up in chair with all lines intact, call button in reach and RN present..    GOALS:   Multidisciplinary  Problems     Physical Therapy Goals        Problem: Physical Therapy Goal    Goal Priority Disciplines Outcome Goal Variances Interventions   Physical Therapy Goal     PT, PT/OT Ongoing, Progressing     Description: Goals to be met by: 2020     Patient will increase functional independence with mobility by performin. Supine to sit with MInimal Assistance  2. Sit to stand transfer with Minimal Assistance with LRAD  3. Gait  x 50 feet with Minimal Assistance using LRAD.   4. Stand for 3 minutes with Contact Guard Assistance using LRAD  5. Lower extremity exercise program x15 reps per handout, with independence                     Time Tracking:     PT Received On: 20  PT Start Time: 1226     PT Stop Time: 1249  PT Total Time (min): 23 min     Billable Minutes: Gait Training 23 mins     Treatment Type: Treatment  PT/PTA: PT     PTA Visit Number: 0     Ashely Jolly, PT  2020

## 2020-08-27 NOTE — PT/OT/SLP PROGRESS
Occupational Therapy   Treatment    Name: Jaquan Farmer  MRN: [id number]  Admitting Diagnosis:  Duodenum disorder  3 Days Post-Op    Recommendations:     Discharge Recommendations: nursing facility, skilled    Assessment:     Jaquan Farmer is a 39 y.o. female with a medical diagnosis of Duodenum disorder. Performance deficits affecting function are weakness, impaired endurance, impaired self care skills, impaired functional mobilty, gait instability, impaired balance. Pt tolerated session well with good effort, progress and performance.     Rehab Prognosis:  Good; patient would benefit from acute skilled OT services to address these deficits and reach maximum level of function.       Plan:     Patient to be seen 4 x/week to address the above listed problems via self-care/home management, therapeutic activities, therapeutic exercises  · Plan of Care Expires: 09/24/20  · Plan of Care Reviewed with: patient    Subjective     Pt agreeable to therapy   Pain/Comfort:  · Pain Rating 1: 3/10  · Location 1: abdomen  · Pain Addressed 1: Pre-medicate for activity, Reposition  Pt using PCA pump twice during session.   Objective:     Communicated with: nsg  prior to session.  Pt found seated in chair     General Precautions: Standard, fall       Occupational Performance:     Functional Mobility/Transfers:  · Sit>stand with MOD A x 2 from chair twice with RW     Activities of Daily Living:  · Feeding: set-up  · G/H: seated with set-up  · LE dressing: TOTAL A       AMPAC 6 Click ADL: 13    Treatment & Education:  Pt completed 2 trials of standing with MOD A x 2. Pt able to take few steps with RW. Increased drainage noted from GODFREY site with nsg arriving to room to address.  Pt requiring TOTAL A for cleaning of legs and replacing footwear.  Pt reports she perfomed UE exs as instructed yesterday and education provided to continue with UE HEP.  Education provided re: OT POC and safety with functional mobility/ADl skills. Pt  receptive.     Patient left up in chair with all lines intact and nsg presentEducation:      GOALS:   Multidisciplinary Problems     Occupational Therapy Goals        Problem: Occupational Therapy Goal    Goal Priority Disciplines Outcome Interventions   Occupational Therapy Goal     OT, PT/OT Ongoing, Progressing    Description: Goals to be met by: 9/4/20     Patient will increase functional independence with ADLs by performing:    Feeding with Isabella.  UE Dressing with Stand-by Assistance.  LE Dressing with Minimal Assistance.  Grooming while standing at sink with Contact Guard Assistance.  Toileting from bedside commode with Minimal Assistance for hygiene and clothing management.   Toilet transfer to bedside commode with Minimal Assistance.                     Time Tracking:     OT Date of Treatment: 08/27/20  OT Start Time: 1230  OT Stop Time: 1247  OT Total Time (min): 17 min    Billable Minutes:Therapeutic Activity 17    CHRISTIANO Lombardi  8/27/2020

## 2020-08-27 NOTE — PLAN OF CARE
Discharge plan is for IRF (referrals sent 8/26/20) pending patient medical readiness and patient insurance application approval.     Jazzy Eid LMSW   - Case Management

## 2020-08-27 NOTE — PROGRESS NOTES
[facility]  Critical Care - Surgery  Progress Note    Patient Name: Jaquan Farmer  MRN: [id number]  Admission Date: 8/12/2020  Hospital Length of Stay: 15 days  Code Status: Full Code  Attending Provider: Donis Roa MD  Primary Care Provider: Primary Doctor No   Principal Problem: Duodenum disorder    Subjective:     Hospital/ICU Course:  8/14 Patient had a line replaced along with central line placed, with complication of pneumothorax. Rt pigtail was placed. Patient tolerated complications well. Decreasing vent settings.  8/15 NAEO. Patient was taken back to OR early AM for washout. Possible closure 8/18. ETT exchanged in OR 7.0 -> 7.5  8/16 - NAEON. HDS. Intubated, sedated.   8/17 - NAEON. OR tomorrow for closure.  8/18 - Washed out and closed in OR  8/22 - NAEO. Plan for Repeat CT AP today  8/23 - NAEO. Hbg lower. Giving blood x2 units.  8/25 - NAEO. Went to OR yesterday for Abdominal wall exploration and ligation of bleeding vessels. Wound vac exchanged  8/26 - NAEO. Hypertensive overnight. Hgb trending. Plan for CT today  8/27 - NAEO. Hypertensive overnight. CT yesterday showed no extravasation or acute changes    Interval History/Significant Events: NAEON. Hypertensive to 180's, labetalol given. Appropriate abdominal tenderness Hgb stable. CT yesterday showed no extravasation and no acute changes. No respiratory distress on room air.    Follow-up For: Procedure(s) (LRB):  WASHOUT abdomen (N/A)    Post-Operative Day: 3 Days Post-Op    Objective:     Vital Signs (Most Recent):  Temp: 99.2 °F (37.3 °C) (08/27/20 0700)  Pulse: 96 (08/27/20 0645)  Resp: (!) 35 (08/27/20 0645)  BP: (!) 161/88 (08/27/20 0600)  SpO2: (!) 93 % (08/27/20 0645) Vital Signs (24h Range):  Temp:  [98.6 °F (37 °C)-99.2 °F (37.3 °C)] 99.2 °F (37.3 °C)  Pulse:  [] 96  Resp:  [19-43] 35  SpO2:  [93 %-100 %] 93 %  BP: (139-197)/() 161/88     Weight: 64.2 kg (141 lb 8.6 oz)  Body mass index is 25.89  kg/m².      Intake/Output Summary (Last 24 hours) at 8/27/2020 1026  Last data filed at 8/27/2020 0834  Gross per 24 hour   Intake 6839 ml   Output 2370 ml   Net 4469 ml       Physical Exam    Constitutional:       Interventions: She is sedated.   HENT:      Head: Normocephalic.      Nose:      Comments: NGT in place  Cardiovascular:      Rate and Rhythm: Regular rhythm. Tachycardia present.   Pulmonary:      Effort: Pulmonary effort is normal.      Comments: room air  Abdominal:      General: There is no distension.      Palpations: Abdomen is soft.      Tenderness: There is abdominal tenderness (appropriate).      Comments: R abdomen GODFREY x2 with bilious output  Wound vac in midline incision  G-tube with gastric fluid output   Musculoskeletal:         General: Swelling present.      Right lower leg: Edema present.      Left lower leg: Edema present.   Skin:     General: Skin is warm and dry.   Neurological:      Mental Status: She is alert.     Vents:  Vent Mode: Spont (08/20/20 1501)  Ventilator Initiated: Yes(chart correction) (08/12/20 1930)  Set Rate: 20 BPM (08/20/20 0738)  Vt Set: 360 mL (08/20/20 0738)  PEEP/CPAP: 5 cmH20 (08/20/20 1501)  Oxygen Concentration (%): 28 (08/22/20 0043)  Peak Airway Pressure: 7.8 cmH2O (08/20/20 1501)  Plateau Pressure: 22 cmH20 (08/20/20 1501)  Total Ve: 7.21 mL (08/20/20 1501)  Negative Inspiratory Force (cm H2O): -23 (08/20/20 1501)  F/VT Ratio<105 (RSBI): (!) 64.6 (08/20/20 1205)    Lines/Drains/Airways     Central Venous Catheter Line            Trialysis (Dialysis) Catheter 08/13/20 2230 right internal jugular 13 days          Drain                 Closed/Suction Drain 08/13/20 1659 Right;Inferior Abdomen Bulb 19 Fr. 13 days         Closed/Suction Drain 08/13/20 1659 Right;Superior Abdomen Bulb 19 Fr. 13 days         Gastrostomy/Enterostomy 08/15/20 0916 Gastrostomy tube w/ balloon LUQ decompression 12 days          Peripheral Intravenous Line                 Midline  Catheter Insertion/Assessment  - Single Lumen 08/26/20 1501 Right basilic vein (medial side of arm) 18g x 8cm less than 1 day                Significant Labs:    CBC/Anemia Profile:  Recent Labs   Lab 08/25/20 2009 08/26/20 0208 08/27/20 0250   WBC 36.63* 35.73* 34.80*   HGB 7.8* 7.4* 7.5*   HCT 23.9* 22.9* 23.7*    281 362*   MCV 94 93 94   RDW 14.6* 14.6* 14.6*        Chemistries:  Recent Labs   Lab 08/26/20 0208 08/27/20  0250    138   K 3.8 3.8    103   CO2 26 25   BUN 31* 47*   CREATININE 2.2* 3.1*   CALCIUM 8.1* 8.2*   ALBUMIN 1.6* 1.4*   PROT 6.1 5.7*   BILITOT 0.7 0.7   ALKPHOS 109 102   ALT 15 14   AST 22 18   MG 1.8 1.7   PHOS 2.6* 3.2       ABGs: No results for input(s): PH, PCO2, HCO3, POCSATURATED, BE in the last 48 hours.  CMP:   Recent Labs   Lab 08/26/20 0208 08/27/20  0250    138   K 3.8 3.8    103   CO2 26 25    124*   BUN 31* 47*   CREATININE 2.2* 3.1*   CALCIUM 8.1* 8.2*   PROT 6.1 5.7*   ALBUMIN 1.6* 1.4*   BILITOT 0.7 0.7   ALKPHOS 109 102   AST 22 18   ALT 15 14   ANIONGAP 10 10   EGFRNONAA 27.4* 18.1*     Lactic Acid: No results for input(s): LACTATE in the last 48 hours.  All pertinent labs within the past 24 hours have been reviewed.    Significant Imaging:  I have reviewed all pertinent imaging results/findings within the past 24 hours.    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 Now with severe sepsis and ARDS     .Neuro:  - AOx4  - PCA for pain control  - Clonidine patch for anxiety     Resp:  - 100% on room air  - PRN breathing treatments  - Right chest tube with no output     CV:  - MAP goal >65  - Systolic <160  - Levo and Vaso gtt - off  - Increase Metoprolol scheduled dose  - Labetalol PRN     Heme:  - Hgb/Hct 7.5; stable  - S/p Abdominal washout on 8/21  - S/p Abdominal exploration w/ woundvac exhange 8/24     ID:  -  WBC remains elevated 34.8  - Zosyn, Fluconazole  - Blood cultures and BAL taken 8/19 with NGTD and no abnormal findings     Renal:  - Oliguric on arrival; Cr 2.2 at that time   - SLED held overnight  - Trend BUN/Cr;   - Monitor I/Os  - MIVF to 150cc/hr     FEN/GI:  - NPO  - TPN  - NGT to LIWS   - Maintain drains to bulb suction; LLQ with acute drop in drainage and accumulation in SQ tissue  - GI ppx  - Replace electrolytes as needed to keep K>4, Mg>2     Endo:  - Monitor BG     Dispo:  - Continue SICU care           Thu Wilson MD  Critical Care - Surgery  [facility]

## 2020-08-27 NOTE — SUBJECTIVE & OBJECTIVE
Interval History:  NAEON.  Net positive 4.6L/24h with LR boluses.  Remains hypertensive on exam, NSR on exam.  She denies any SOB this morning, electrolytes are stable.  UOP with 80 ml and 2 unmeasured occurrences.  SCr continues to worsen.    Review of patient's allergies indicates:   Allergen Reactions    Latex      Current Facility-Administered Medications   Medication Frequency    dexmedetomidine (PRECEDEX) 400mcg/100mL 0.9% NaCL infusion Continuous    famotidine (PF) injection 20 mg Daily    fat emulsion 20 % infusion 250 mL Daily    fluconazole (DIFLUCAN) IVPB 200 mg 100 mL Q24H    heparin (porcine) injection 5,000 Units Q8H    HYDROmorphone PCA syringe 6 mg/30 mL (0.2 mg/mL) NS Continuous    lactated ringers infusion Continuous    lidocaine 5 % patch 1 patch Q24H    melatonin tablet 3 mg Nightly PRN    metoprolol injection 5 mg Q4H PRN    metoprolol injection 5 mg Q6H    miconazole NITRATE 2 % top powder BID    naloxone 0.4 mg/mL injection 0.02 mg PRN    naloxone 0.4 mg/mL injection 0.02 mg PRN    ondansetron injection 4 mg Once PRN    piperacillin-tazobactam 4.5 g in sodium chloride 0.9% 100 mL IVPB (ready to mix system) Q12H    TPN ADULT CENTRAL LINE CUSTOM Continuous       Objective:     Vital Signs (Most Recent):  Temp: 99.2 °F (37.3 °C) (08/27/20 0700)  Pulse: 96 (08/27/20 0645)  Resp: (!) 35 (08/27/20 0645)  BP: (!) 161/88 (08/27/20 0600)  SpO2: (!) 93 % (08/27/20 0645)  O2 Device (Oxygen Therapy): room air (08/27/20 0600) Vital Signs (24h Range):  Temp:  [98.6 °F (37 °C)-99.2 °F (37.3 °C)] 99.2 °F (37.3 °C)  Pulse:  [] 96  Resp:  [19-43] 35  SpO2:  [93 %-100 %] 93 %  BP: (139-197)/() 161/88     Weight: 64.2 kg (141 lb 8.6 oz) (08/27/20 0423)  Body mass index is 25.89 kg/m².  Body surface area is 1.68 meters squared.    I/O last 3 completed shifts:  In: 8652 [I.V.:6294; NG/GT:450]  Out: 3480 [Urine:80; Drains:3175; Other:225]    Physical Exam  Vitals signs and nursing note  reviewed.   Constitutional:       Appearance: She is not ill-appearing.      Interventions: She is sedated. She is not intubated.  HENT:      Head: Normocephalic and atraumatic.      Mouth/Throat:      Mouth: Mucous membranes are moist.      Pharynx: Oropharynx is clear.   Eyes:      General: No scleral icterus.        Right eye: No discharge.         Left eye: No discharge.      Extraocular Movements: Extraocular movements intact.      Conjunctiva/sclera: Conjunctivae normal.   Neck:      Musculoskeletal: Normal range of motion and neck supple.   Cardiovascular:      Rate and Rhythm: Regular rhythm. Tachycardia present.      Pulses: Normal pulses.      Heart sounds: Normal heart sounds.   Pulmonary:      Effort: Pulmonary effort is normal. No respiratory distress. She is not intubated.      Breath sounds: No rales.   Abdominal:      General: Bowel sounds are normal.      Palpations: Abdomen is soft.      Comments: woundvac   Musculoskeletal:      Right lower leg: Edema present.      Left lower leg: Edema present.   Skin:     General: Skin is warm and dry.      Findings: No bruising or rash.   Neurological:      General: No focal deficit present.      Mental Status: She is alert and oriented to person, place, and time.   Psychiatric:         Mood and Affect: Mood normal.         Behavior: Behavior normal.         Significant Labs:  CBC:   Recent Labs   Lab 08/27/20  0250   WBC 34.80*   RBC 2.51*   HGB 7.5*   HCT 23.7*   *   MCV 94   MCH 29.9   MCHC 31.6*     CMP:   Recent Labs   Lab 08/27/20  0250   *   CALCIUM 8.2*   ALBUMIN 1.4*   PROT 5.7*      K 3.8   CO2 25      BUN 47*   CREATININE 3.1*   ALKPHOS 102   ALT 14   AST 18   BILITOT 0.7

## 2020-08-27 NOTE — PROGRESS NOTES
Spoke with SICU regarding patients blood pressure which is remaining systolic 170-180's despite labetalol dose. So far patient is unresponsive to labetalol, metoprolol, or hydralazine. Cardene considered. Will wait to see if CRRT will lower blood pressure with fluid removal.

## 2020-08-27 NOTE — PLAN OF CARE
"      SICU PLAN OF CARE NOTE    Dx: Duodenum disorder    Shift Events: 1L LR given for tachycardia. Labetalol given x2 for HTN. Precedex started d/t anxiety. New Type and Screen drawn and sent to blood bank. Bladder Scan 74ml @ 0550.     Goals of Care: Monitor VS and output from [place]FREY, [place], and G tube. Monitor Labs. MAP >65. Accuchecks Q6H. POC reviewed with pt - all questions and concerns addressed and answered appropriately.     Neuro: AAO x4, Follows Commands, and Moves All Extremities    Vital Signs: BP (!) 157/90 (BP Location: Right arm, Patient Position: Lying)   Pulse 103   Temp 99.1 °F (37.3 °C) (Oral)   Resp (!) 24   Ht 5' 2" (1.575 m)   Wt 64.2 kg (141 lb 8.6 oz)   SpO2 95%   Breastfeeding No   BMI 25.89 kg/m²     Respiratory: Room Air    Diet: TPN and Lipids    Gtts: Precedex, MIVF, and PCA    Urine Output: Voids Spontaneously 50 cc/shift    Drains: GODFREY #1 240ml/shift. GODFREY #2 115ml/shift. G tube to gravity output 670ml/shift.     Wound Vac 75ml/shift     Labs/Accuchecks: Accuchecks Q6H - no PRN dosing needed. Labs daily.     Skin: No acute breakdown noted to edwige prominences. Pt encouraged/assisted to turn Q2H. Heel foams in place. Coccyx foam not in place d/t urinary incontinence. Heparin Q8H. Bruising to bilateral lower extremities - foams in place. Specialty bed in place - no waffle. [place] to incision on abd.     For detailed assessment data view Flowsheets.   Will continue to monitor.        "

## 2020-08-27 NOTE — PROGRESS NOTES
[facility]  Nephrology  Progress Note    Patient Name: Jaquan Farmer  MRN: [id number]  Admission Date: 8/12/2020  Hospital Length of Stay: 15 days  Attending Provider: Donis Roa MD   Primary Care Physician: Primary Doctor No  Principal Problem:Duodenum disorder    Subjective:     Interval History:  NAEON.  Net positive 4.6L/24h with LR boluses.  Remains hypertensive on exam, NSR on exam.  She denies any SOB this morning, electrolytes are stable.  UOP with 80 ml and 2 unmeasured occurrences.  SCr continues to worsen.    Review of patient's allergies indicates:   Allergen Reactions    Latex      Current Facility-Administered Medications   Medication Frequency    dexmedetomidine (PRECEDEX) 400mcg/100mL 0.9% NaCL infusion Continuous    famotidine (PF) injection 20 mg Daily    fat emulsion 20 % infusion 250 mL Daily    fluconazole (DIFLUCAN) IVPB 200 mg 100 mL Q24H    heparin (porcine) injection 5,000 Units Q8H    HYDROmorphone PCA syringe 6 mg/30 mL (0.2 mg/mL) NS Continuous    lactated ringers infusion Continuous    lidocaine 5 % patch 1 patch Q24H    melatonin tablet 3 mg Nightly PRN    metoprolol injection 5 mg Q4H PRN    metoprolol injection 5 mg Q6H    miconazole NITRATE 2 % top powder BID    naloxone 0.4 mg/mL injection 0.02 mg PRN    naloxone 0.4 mg/mL injection 0.02 mg PRN    ondansetron injection 4 mg Once PRN    piperacillin-tazobactam 4.5 g in sodium chloride 0.9% 100 mL IVPB (ready to mix system) Q12H    TPN ADULT CENTRAL LINE CUSTOM Continuous       Objective:     Vital Signs (Most Recent):  Temp: 99.2 °F (37.3 °C) (08/27/20 0700)  Pulse: 96 (08/27/20 0645)  Resp: (!) 35 (08/27/20 0645)  BP: (!) 161/88 (08/27/20 0600)  SpO2: (!) 93 % (08/27/20 0645)  O2 Device (Oxygen Therapy): room air (08/27/20 0600) Vital Signs (24h Range):  Temp:  [98.6 °F (37 °C)-99.2 °F (37.3 °C)] 99.2 °F (37.3 °C)  Pulse:  [] 96  Resp:  [19-43] 35  SpO2:  [93 %-100 %] 93 %  BP:  (139-197)/() 161/88     Weight: 64.2 kg (141 lb 8.6 oz) (08/27/20 0423)  Body mass index is 25.89 kg/m².  Body surface area is 1.68 meters squared.    I/O last 3 completed shifts:  In: 8652 [I.V.:6294; NG/GT:450]  Out: 3480 [Urine:80; Drains:3175; Other:225]    Physical Exam  Vitals signs and nursing note reviewed.   Constitutional:       Appearance: She is not ill-appearing.      Interventions: She is sedated. She is not intubated.  HENT:      Head: Normocephalic and atraumatic.      Mouth/Throat:      Mouth: Mucous membranes are moist.      Pharynx: Oropharynx is clear.   Eyes:      General: No scleral icterus.        Right eye: No discharge.         Left eye: No discharge.      Extraocular Movements: Extraocular movements intact.      Conjunctiva/sclera: Conjunctivae normal.   Neck:      Musculoskeletal: Normal range of motion and neck supple.   Cardiovascular:      Rate and Rhythm: Regular rhythm. Tachycardia present.      Pulses: Normal pulses.      Heart sounds: Normal heart sounds.   Pulmonary:      Effort: Pulmonary effort is normal. No respiratory distress. She is not intubated.      Breath sounds: No rales.   Abdominal:      General: Bowel sounds are normal.      Palpations: Abdomen is soft.      Comments: woundvac   Musculoskeletal:      Right lower leg: Edema present.      Left lower leg: Edema present.   Skin:     General: Skin is warm and dry.      Findings: No bruising or rash.   Neurological:      General: No focal deficit present.      Mental Status: She is alert and oriented to person, place, and time.   Psychiatric:         Mood and Affect: Mood normal.         Behavior: Behavior normal.         Significant Labs:  CBC:   Recent Labs   Lab 08/27/20 0250   WBC 34.80*   RBC 2.51*   HGB 7.5*   HCT 23.7*   *   MCV 94   MCH 29.9   MCHC 31.6*     CMP:   Recent Labs   Lab 08/27/20 0250   *   CALCIUM 8.2*   ALBUMIN 1.4*   PROT 5.7*      K 3.8   CO2 25      BUN 47*    CREATININE 3.1*   ALKPHOS 102   ALT 14   AST 18   BILITOT 0.7            Assessment/Plan:     * Duodenum disorder  - Management per primary team     Acute renal failure with tubular necrosis  oliguric stage 3 sid with ischemic atn likely secondary to septic shock  unknown bl cr  muddy brown casts on microscopy              Last had SLED on Friday morning.  Held over the weekend. UOP increasing.    Assessment:   -recommend holding further IVF today unless significant output from G tube  -will plan for 12 hour SLED tonight  --350 cc/hr as tolerated to keep patient net even volume status  -attempt to quantify urine output  -strict I/O's  -transfuse for hgb < 7          Hypertension  Currently NPO requiring IV anti-hypertensives (not controlled)  -discontinue metoprolol and switch to labetalol.   -PRN hydralazine          Momo Iyer, NP  Nephrology  [facility]

## 2020-08-27 NOTE — PROGRESS NOTES
"[facility]  Adult Nutrition  Progress Note    SUMMARY       Recommendations    1.) Increase TPN to 110gm of AA, 200gm of dextrose, 50gm of lipids to provide 1620kcal.   2.) Once GI function improves, initiate EN of Impact Peptide 1.5; trickle feeds at 10mL/hr.   3.) Monitor electrolytes and replace as needed.     Goals: 1.) Pt to meet >75% of estimated energy and protein needs over the course of 7 days.  Nutrition Goal Status: progressing towards goal  Communication of RD Recs: reviewed with RN(POC)    Reason for Assessment    Reason For Assessment: RD follow-up  Diagnosis: (Duodenum disorder)  Relevant Medical History: HTN, anxiety, respiratory distress, leukocytosis, sepsis, pulmonary HTN  Interdisciplinary Rounds: did not attend  General Information Comments: Nsg staff reports pt is tolerating TPN. NG to LIWS with 850mL of output. GI- LBM 8/26. TPN at 45mL/hr with 250mL of lipids.  Nutrition Discharge Planning: Unable to determine at this time.    Nutrition Risk Screen    Nutrition Risk Screen: tube feeding or parenteral nutrition    Nutrition/Diet History    Spiritual, Cultural Beliefs, [employer] Practices, Values that Affect Care: no  Food Allergies: NKFA  Factors Affecting Nutritional Intake: altered gastrointestinal function, NPO    Anthropometrics    Temp: 99.2 °F (37.3 °C)  Height Method: Stated(from outside records)  Height: 5' 2" (157.5 cm)  Height (inches): 62 in  Weight Method: Bed Scale  Weight: 64.4 kg (142 lb)  Weight (lb): 142 lb  Ideal Body Weight (IBW), Female: 110 lb  % Ideal Body Weight, Female (lb): 129.09 %  BMI (Calculated): 26  BMI Grade: 25 - 29.9 - overweight  Usual Body Weight (UBW), kg: (No wt history)  Weight Loss Since Admission: 30 lb 6.8 oz(17% weight loss since admission 8/12)       Lab/Procedures/Meds    Pertinent Labs Reviewed: reviewed  Pertinent Labs Comments: WBC 34.8, Hgb 7.5, Hct 23.7, BUN 47, Cr 3.1, GFR 20.9, GLucose 124, Ca 8.2, Alb 1.4  Pertinent " Medications Reviewed: reviewed  Pertinent Medications Comments: Famotidine, diflucan, heparin, metoprolol, piperacillin, precedex, hydromorphone    Physical Findings/Assessment     Edema 2+ generalize    Estimated/Assessed Needs    Weight Used For Calorie Calculations: 77 kg (169 lb 12.1 oz)  Energy Calorie Requirements (kcal): 1677  Energy Need Method: [place]-St Jeor(x1.2 PAL)  Protein Requirements:  gm (1.2-1.5gm/kg)  Weight Used For Protein Calculations: 77 kg (169 lb 12.1 oz)  Fluid Requirements (mL): 1mL/kcal or per MD recommendations  Estimated Fluid Requirement Method: RDA Method  RDA Method (mL): 1677         Nutrition Prescription Ordered    Current Diet Order: NPO  Current Nutrition Support Formula Ordered: (Custom TPN 15% Amino Acids; 70% dextrose)  Current Nutrition Support Rate Ordered: 45 (ml)  Current Nutrition Support Frequency Ordered: mL/hr    Evaluation of Received Nutrient/Fluid Intake    Parenteral Calories (kcal): 1471  Parenteral Protein (gm): 90  Parenteral Fluid (mL): 1330  Lipid Calories (kcals): 500 kcals  GIR (Glucose Infusion Rate) (mg/kg/min): 1.9 mg/kg/min  Other Calories (kcal): 0  Total Calories (kcal): 1471  Total Calories (kcal/kg): 21  % Kcal Needs: 87  % Protein Needs: 97  I/O: I: 6839; O: 2180; -1.5L since 8/31  Energy Calories Required: not meeting needs  Protein Required: not meeting needs  Fluid Required: meeting needs  Tolerance: tolerating  % Intake of Estimated Energy Needs: 87  % Meal Intake: 0    Nutrition Risk    Level of Risk/Frequency of Follow-up: high , 2x weekly    Assessment and Plan     Nutrition Problem  Malnutrition (severe) due to acute illness     Related to (etiology):   NPO     Signs and Symptoms (as evidenced by):   NPO (8/12) for 5 days.  Severe muscle wasting of temple, clavicle  Severe fat wasting of orbital     Interventions(treatment strategy):  1.) Collaboration with other providers  2.) Parenteral nutrition     Nutrition Diagnosis  Status:   ongoing    Monitor and Evaluation    Food and Nutrient Intake: parenteral nutrition intake  Food and Nutrient Adminstration: enteral and parenteral nutrition administration  Anthropometric Measurements: weight, weight change  Biochemical Data, Medical Tests and Procedures: electrolyte and renal panel, gastrointestinal profile  Nutrition-Focused Physical Findings: overall appearance, skin     Malnutrition Assessment  Malnutrition Type: acute illness or injury              Orbital Region (Subcutaneous Fat Loss): severe depletion   [place] Region (Muscle Loss): severe depletion  Clavicle Bone Region (Muscle Loss): severe depletion                 Nutrition Follow-Up    RD Follow-up?: Yes

## 2020-08-27 NOTE — SUBJECTIVE & OBJECTIVE
Interval History/Significant Events: NAEON. Hypertensive to 180's, labetalol given. Appropriate abdominal tenderness Hgb stable. CT yesterday showed no extravasation and no acute changes. No respiratory distress on room air.    Follow-up For: Procedure(s) (LRB):  WASHOUT abdomen (N/A)    Post-Operative Day: 3 Days Post-Op    Objective:     Vital Signs (Most Recent):  Temp: 99.2 °F (37.3 °C) (08/27/20 0700)  Pulse: 96 (08/27/20 0645)  Resp: (!) 35 (08/27/20 0645)  BP: (!) 161/88 (08/27/20 0600)  SpO2: (!) 93 % (08/27/20 0645) Vital Signs (24h Range):  Temp:  [98.6 °F (37 °C)-99.2 °F (37.3 °C)] 99.2 °F (37.3 °C)  Pulse:  [] 96  Resp:  [19-43] 35  SpO2:  [93 %-100 %] 93 %  BP: (139-197)/() 161/88     Weight: 64.2 kg (141 lb 8.6 oz)  Body mass index is 25.89 kg/m².      Intake/Output Summary (Last 24 hours) at 8/27/2020 1026  Last data filed at 8/27/2020 0834  Gross per 24 hour   Intake 6839 ml   Output 2370 ml   Net 4469 ml       Physical Exam    Constitutional:       Interventions: She is sedated.   HENT:      Head: Normocephalic.      Nose:      Comments: NGT in place  Cardiovascular:      Rate and Rhythm: Regular rhythm. Tachycardia present.   Pulmonary:      Effort: Pulmonary effort is normal.      Comments: room air  Abdominal:      General: There is no distension.      Palpations: Abdomen is soft.      Tenderness: There is abdominal tenderness (appropriate).      Comments: R abdomen GODFREY x2 with bilious output  Wound vac in midline incision  G-tube with gastric fluid output   Musculoskeletal:         General: Swelling present.      Right lower leg: Edema present.      Left lower leg: Edema present.   Skin:     General: Skin is warm and dry.   Neurological:      Mental Status: She is alert.     Vents:  Vent Mode: Spont (08/20/20 1501)  Ventilator Initiated: Yes(chart correction) (08/12/20 1930)  Set Rate: 20 BPM (08/20/20 0738)  Vt Set: 360 mL (08/20/20 0738)  PEEP/CPAP: 5 cmH20 (08/20/20 1501)  Oxygen  Concentration (%): 28 (08/22/20 0043)  Peak Airway Pressure: 7.8 cmH2O (08/20/20 1501)  Plateau Pressure: 22 cmH20 (08/20/20 1501)  Total Ve: 7.21 mL (08/20/20 1501)  Negative Inspiratory Force (cm H2O): -23 (08/20/20 1501)  F/VT Ratio<105 (RSBI): (!) 64.6 (08/20/20 1205)    Lines/Drains/Airways     Central Venous Catheter Line            Trialysis (Dialysis) Catheter 08/13/20 2230 right internal jugular 13 days          Drain                 Closed/Suction Drain 08/13/20 1659 Right;Inferior Abdomen Bulb 19 Fr. 13 days         Closed/Suction Drain 08/13/20 1659 Right;Superior Abdomen Bulb 19 Fr. 13 days         Gastrostomy/Enterostomy 08/15/20 0916 Gastrostomy tube w/ balloon LUQ decompression 12 days          Peripheral Intravenous Line                 Midline Catheter Insertion/Assessment  - Single Lumen 08/26/20 1501 Right basilic vein (medial side of arm) 18g x 8cm less than 1 day                Significant Labs:    CBC/Anemia Profile:  Recent Labs   Lab 08/25/20 2009 08/26/20 0208 08/27/20  0250   WBC 36.63* 35.73* 34.80*   HGB 7.8* 7.4* 7.5*   HCT 23.9* 22.9* 23.7*    281 362*   MCV 94 93 94   RDW 14.6* 14.6* 14.6*        Chemistries:  Recent Labs   Lab 08/26/20 0208 08/27/20  0250    138   K 3.8 3.8    103   CO2 26 25   BUN 31* 47*   CREATININE 2.2* 3.1*   CALCIUM 8.1* 8.2*   ALBUMIN 1.6* 1.4*   PROT 6.1 5.7*   BILITOT 0.7 0.7   ALKPHOS 109 102   ALT 15 14   AST 22 18   MG 1.8 1.7   PHOS 2.6* 3.2       ABGs: No results for input(s): PH, PCO2, HCO3, POCSATURATED, BE in the last 48 hours.  CMP:   Recent Labs   Lab 08/26/20 0208 08/27/20  0250    138   K 3.8 3.8    103   CO2 26 25    124*   BUN 31* 47*   CREATININE 2.2* 3.1*   CALCIUM 8.1* 8.2*   PROT 6.1 5.7*   ALBUMIN 1.6* 1.4*   BILITOT 0.7 0.7   ALKPHOS 109 102   AST 22 18   ALT 15 14   ANIONGAP 10 10   EGFRNONAA 27.4* 18.1*     Lactic Acid: No results for input(s): LACTATE in the last 48 hours.  All pertinent labs  within the past 24 hours have been reviewed.    Significant Imaging:  I have reviewed all pertinent imaging results/findings within the past 24 hours.

## 2020-08-27 NOTE — PLAN OF CARE
08/27/20 1214   Discharge Reassessment   Assessment Type Discharge Planning Reassessment   Provided patient/caregiver education on the expected discharge date and the discharge plan Yes   Do you have any problems affording any of your prescribed medications? No   Discharge Plan A Rehab   DME Needed Upon Discharge  other (see comments)  (TBD)   Anticipated Discharge Disposition Rehab   Post-Acute Status   Post-Acute Placement Status Awaiting Internal Medical Clearance       Tangela Bradley MPH, RN, CM  Ext. [phone]

## 2020-08-27 NOTE — ASSESSMENT & PLAN NOTE
oliguric stage 3 sid with ischemic atn likely secondary to septic shock  unknown bl cr  muddy brown casts on microscopy              Last had SLED on Friday morning.  Held over the weekend. UOP increasing.    Assessment:   -recommend holding further IVF today unless significant output from G tube  -will plan for 12 hour SLED tonight  --350 cc/hr as tolerated to keep patient net even volume status  -attempt to quantify urine output  -strict I/O's  -transfuse for hgb < 7

## 2020-08-27 NOTE — PLAN OF CARE
Problem: Physical Therapy Goal  Goal: Physical Therapy Goal  Description: Goals to be met by: 2020     Patient will increase functional independence with mobility by performin. Supine to sit with MInimal Assistance  2. Sit to stand transfer with Minimal Assistance with LRAD  3. Gait  x 50 feet with Minimal Assistance using LRAD.   4. Stand for 3 minutes with Contact Guard Assistance using LRAD  5. Lower extremity exercise program x15 reps per handout, with independence    Outcome: Ongoing, Progressing     Pt is progressing toward goals. All goals remain appropriate.    Ashely Jolly, PT, DPT  2020  [phone]

## 2020-08-27 NOTE — SUBJECTIVE & OBJECTIVE
Interval History:   H/H stable overnight  Drains bilious but decreasing in quantity  CT scan showed contrast stays intraluminal    Medications:  Continuous Infusions:   sodium chloride 0.9%      dexmedetomidine (PRECEDEX) infusion Stopped (08/27/20 0800)    hydromorphone in 0.9 % NaCl 6 mg/30 ml      lactated ringers 150 mL/hr at 08/27/20 1046    TPN ADULT CENTRAL LINE CUSTOM 45 mL/hr at 08/27/20 0800    TPN ADULT CENTRAL LINE CUSTOM       Scheduled Meds:   cloNIDine 0.2 mg/24 hr td ptwk  1 patch Transdermal Q7 Days    famotidine (PF)  20 mg Intravenous Daily    fat emulsion  250 mL Intravenous Daily    fat emulsion  250 mL Intravenous Daily    fluconazole (DIFLUCAN) IVPB  200 mg Intravenous Q24H    heparin (porcine)  5,000 Units Subcutaneous Q8H    lidocaine  1 patch Transdermal Q24H    metoprolol  10 mg Intravenous Q6H    miconazole NITRATE 2 %   Topical (Top) BID    piperacillin-tazobactam (ZOSYN) IVPB  4.5 g Intravenous Q8H     PRN Meds:labetaloL, magnesium sulfate IVPB, melatonin, metoprolol, naloxone, naloxone, ondansetron, sodium phosphate IVPB, sodium phosphate IVPB, sodium phosphate IVPB     Review of patient's allergies indicates:   Allergen Reactions    Latex      Objective:     Vital Signs (Most Recent):  Temp: 99.2 °F (37.3 °C) (08/27/20 1100)  Pulse: 92 (08/27/20 1100)  Resp: (!) 31 (08/27/20 1100)  BP: (!) 185/99 (08/27/20 1100)  SpO2: 96 % (08/27/20 1100) Vital Signs (24h Range):  Temp:  [98.7 °F (37.1 °C)-99.2 °F (37.3 °C)] 99.2 °F (37.3 °C)  Pulse:  [] 92  Resp:  [19-43] 31  SpO2:  [93 %-100 %] 96 %  BP: (139-197)/() 185/99     Weight: 64.2 kg (141 lb 8.6 oz)  Body mass index is 25.89 kg/m².    Intake/Output - Last 3 Shifts       08/25 0700 - 08/26 0659 08/26 0700 - 08/27 0659 08/27 0700 - 08/28 0659    I.V. (mL/kg) 1633.7 (25.6) 5088 (79.3)     Blood 350      NG/GT  450     IV Piggyback 1000      TPN 1994.8 1301     Total Intake(mL/kg) 4978.5 (77.9) 6839 (106.5)      Urine (mL/kg/hr) 0 (0) 80 (0.1) 0 (0)    Drains 3110 1925 190    Other 150 175     Stool 0      Total Output 3260 2180 190    Net +1718.5 +4659 -190           Urine Occurrence 4 x 2 x 1 x    Stool Occurrence 1 x            Physical Exam  Constitutional:       General: She is not in acute distress.  Cardiovascular:      Rate and Rhythm: Regular rhythm. Tachycardia present.   Pulmonary:      Effort: Pulmonary effort is normal.   Abdominal:      Comments: Wound vac in place  Drains and G tube with bilious output   Neurological:      General: No focal deficit present.      Mental Status: She is alert.   Psychiatric:         Mood and Affect: Mood normal.         Behavior: Behavior normal.         Significant Labs:  CBC:   Recent Labs   Lab 08/27/20  0250   WBC 34.80*   RBC 2.51*   HGB 7.5*   HCT 23.7*   *   MCV 94   MCH 29.9   MCHC 31.6*     CMP:   Recent Labs   Lab 08/27/20  0250   *   CALCIUM 8.2*   ALBUMIN 1.4*   PROT 5.7*      K 3.8   CO2 25      BUN 47*   CREATININE 3.1*   ALKPHOS 102   ALT 14   AST 18   BILITOT 0.7       Significant Diagnostics:  I have reviewed all pertinent imaging results/findings within the past 24 hours.

## 2020-08-27 NOTE — ASSESSMENT & PLAN NOTE
38 yo F s/p antrectomy with BII reconstruction c/b duodenal necrosis requiring ex lap, washout, drainage c/b aspiration event. S/p duodenal resection with d-j and g-j anastomosis.  - Continue ICU care  - Transfuse PRBC as needed for Hgb <7  - Respiratory treatment q4h  - NPO  - G-tube to gravity  - Abx: diflucan, zosyn  - TPN   - Daily labs  - Trend CBC  - GI and DVT ppx  - PT / OT

## 2020-08-27 NOTE — PROGRESS NOTES
[facility]  General Surgery  Progress Note    Subjective:     History of Present Illness:  Pt is a 38 yo F with recent history of antrectomy with BII reconstruction for ulcer disease at outside hospital. 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 Patient also reportedly aspirated on induction in the OR prior to this, resulting in severe pulmonary edema and hypoxia. Patient was transferred to [facility] urgently for higher level of care. She arrived as a direct SICU admit on near maximal vent settings and requiring low dose vasopressors with HR in the upper 140s. Her midline laparotomy is closed with 4 Navdeep drains in place draining bilious output.     Post-Op Info:  Procedure(s) (LRB):  WASHOUT abdomen (N/A)   3 Days Post-Op     Interval History:   H/H stable overnight  Drains bilious but decreasing in quantity  CT scan showed contrast stays intraluminal    Medications:  Continuous Infusions:   sodium chloride 0.9%      dexmedetomidine (PRECEDEX) infusion Stopped (08/27/20 0800)    hydromorphone in 0.9 % NaCl 6 mg/30 ml      lactated ringers 150 mL/hr at 08/27/20 1046    TPN ADULT CENTRAL LINE CUSTOM 45 mL/hr at 08/27/20 0800    TPN ADULT CENTRAL LINE CUSTOM       Scheduled Meds:   cloNIDine 0.2 mg/24 hr td ptwk  1 patch Transdermal Q7 Days    famotidine (PF)  20 mg Intravenous Daily    fat emulsion  250 mL Intravenous Daily    fat emulsion  250 mL Intravenous Daily    fluconazole (DIFLUCAN) IVPB  200 mg Intravenous Q24H    heparin (porcine)  5,000 Units Subcutaneous Q8H    lidocaine  1 patch Transdermal Q24H    metoprolol  10 mg Intravenous Q6H    miconazole NITRATE 2 %   Topical (Top) BID    piperacillin-tazobactam (ZOSYN) IVPB  4.5 g Intravenous Q8H     PRN Meds:labetaloL, magnesium sulfate IVPB, melatonin, metoprolol, naloxone, naloxone, ondansetron, sodium phosphate IVPB, sodium phosphate IVPB, sodium phosphate IVPB     Review of  patient's allergies indicates:   Allergen Reactions    Latex      Objective:     Vital Signs (Most Recent):  Temp: 99.2 °F (37.3 °C) (08/27/20 1100)  Pulse: 92 (08/27/20 1100)  Resp: (!) 31 (08/27/20 1100)  BP: (!) 185/99 (08/27/20 1100)  SpO2: 96 % (08/27/20 1100) Vital Signs (24h Range):  Temp:  [98.7 °F (37.1 °C)-99.2 °F (37.3 °C)] 99.2 °F (37.3 °C)  Pulse:  [] 92  Resp:  [19-43] 31  SpO2:  [93 %-100 %] 96 %  BP: (139-197)/() 185/99     Weight: 64.2 kg (141 lb 8.6 oz)  Body mass index is 25.89 kg/m².    Intake/Output - Last 3 Shifts       08/25 0700 - 08/26 0659 08/26 0700 - 08/27 0659 08/27 0700 - 08/28 0659    I.V. (mL/kg) 1633.7 (25.6) 5088 (79.3)     Blood 350      NG/GT  450     IV Piggyback 1000      TPN 1994.8 1301     Total Intake(mL/kg) 4978.5 (77.9) 6839 (106.5)     Urine (mL/kg/hr) 0 (0) 80 (0.1) 0 (0)    Drains 3110 1925 190    Other 150 175     Stool 0      Total Output 3260 2180 190    Net +1718.5 +4659 -190           Urine Occurrence 4 x 2 x 1 x    Stool Occurrence 1 x            Physical Exam  Constitutional:       General: She is not in acute distress.  Cardiovascular:      Rate and Rhythm: Regular rhythm. Tachycardia present.   Pulmonary:      Effort: Pulmonary effort is normal.   Abdominal:      Comments: Wound vac in place  Drains and G tube with bilious output   Neurological:      General: No focal deficit present.      Mental Status: She is alert.   Psychiatric:         Mood and Affect: Mood normal.         Behavior: Behavior normal.         Significant Labs:  CBC:   Recent Labs   Lab 08/27/20  0250   WBC 34.80*   RBC 2.51*   HGB 7.5*   HCT 23.7*   *   MCV 94   MCH 29.9   MCHC 31.6*     CMP:   Recent Labs   Lab 08/27/20  0250   *   CALCIUM 8.2*   ALBUMIN 1.4*   PROT 5.7*      K 3.8   CO2 25      BUN 47*   CREATININE 3.1*   ALKPHOS 102   ALT 14   AST 18   BILITOT 0.7       Significant Diagnostics:  I have reviewed all pertinent imaging results/findings  within the past 24 hours.    Assessment/Plan:     Severe sepsis  38 yo F s/p antrectomy with BII reconstruction c/b duodenal necrosis requiring ex lap, washout, drainage c/b aspiration event. S/p duodenal resection with d-j and g-j anastomosis.  - Continue ICU care  - Transfuse PRBC as needed for Hgb <7  - Respiratory treatment q4h  - NPO  - G-tube to gravity  - Abx: diflucan, zosyn  - TPN   - Daily labs  - Trend CBC  - GI and DVT ppx  - PT / OT        Christy Nicole MD  General Surgery  [facility]

## 2020-08-27 NOTE — ASSESSMENT & PLAN NOTE
38 yo F s/p antrectomy with BII reconstruction c/b duodenal necrosis requiring ex lap, washout, drainage c/b aspiration event. Now with severe sepsis and ARDS     .Neuro:  - AOx4  - PCA for pain control  - Clonidine patch for anxiety     Resp:  - 100% on room air  - PRN breathing treatments  - Right chest tube with no output     CV:  - MAP goal >65  - Systolic <160  - Levo and Vaso gtt - off  - Increase Metoprolol scheduled dose  - Labetalol PRN     Heme:  - Hgb/Hct 7.5; stable  - S/p Abdominal washout on 8/21  - S/p Abdominal exploration w/ woundvac exhange 8/24     ID:  - WBC remains elevated 34.8  - Zosyn, Fluconazole  - Blood cultures and BAL taken 8/19 with NGTD and no abnormal findings     Renal:  - Oliguric on arrival; Cr 2.2 at that time   - SLED held overnight  - Trend BUN/Cr;   - Monitor I/Os  - MIVF to 150cc/hr     FEN/GI:  - NPO  - TPN  - NGT to LIWS   - Maintain drains to bulb suction; LLQ with acute drop in drainage and accumulation in SQ tissue  - GI ppx  - Replace electrolytes as needed to keep K>4, Mg>2     Endo:  - Monitor BG     Dispo:  - Continue SICU care

## 2020-08-27 NOTE — PROGRESS NOTES
JOAN Skin Integrity Evaluation      Subjective    JOAN skin integrity champion evaluation of patient as part of the comprehensive skin care team .       Jaquan Farmer is being evaluated as per the Epic  pressure injury skin report.  She has been admitted to SICU for 14 days .   Skin injury was noted on 8/23/20            Limited Physical exam     Lesion 1: 1cm x 3 cm           Lesion 2: 2cm x 2cm        Assessment :       Lesion 1:  Alteration in Skin Integrity    POA : no    Consults: Wound Care           Lesion 2:  Alteration in Skin Integrity    POA : no    Consults:Wound Care    Light purple discolored tissue noted to bilateral shins; surrounding tissue intact. No signs of skin breakdown.       Follow up:    Patient will be re evaluated weekly.

## 2020-08-27 NOTE — NURSING
SICU MD called regarding pt -150's and -190's. 1L LR and 10mg of labetalol ordered. Will continue to monitor.

## 2020-08-27 NOTE — PLAN OF CARE
Problem: Occupational Therapy Goal  Goal: Occupational Therapy Goal  Description: Goals to be met by: 9/4/20     Patient will increase functional independence with ADLs by performing:    Feeding with [place].  UE Dressing with Stand-by Assistance.  LE Dressing with Minimal Assistance.  Grooming while standing at sink with Contact Guard Assistance.  Toileting from bedside commode with Minimal Assistance for hygiene and clothing management.   Toilet transfer to bedside commode with Minimal Assistance.    Outcome: Ongoing, Progressing

## 2020-08-28 ENCOUNTER — ANESTHESIA EVENT (OUTPATIENT)
Dept: SURGERY | Facility: HOSPITAL | Age: 40
DRG: 856 | End: 2020-08-28

## 2020-08-28 LAB
ALBUMIN SERPL BCP-MCNC: 1.3 G/DL (ref 3.5–5.2)
ALBUMIN SERPL BCP-MCNC: 1.3 G/DL (ref 3.5–5.2)
ALBUMIN SERPL BCP-MCNC: 1.4 G/DL (ref 3.5–5.2)
ALLENS TEST: ABNORMAL
ALP SERPL-CCNC: 111 U/L (ref 55–135)
ALT SERPL W/O P-5'-P-CCNC: 12 U/L (ref 10–44)
ANION GAP SERPL CALC-SCNC: 7 MMOL/L (ref 8–16)
ANION GAP SERPL CALC-SCNC: 8 MMOL/L (ref 8–16)
ANION GAP SERPL CALC-SCNC: 8 MMOL/L (ref 8–16)
ANISOCYTOSIS BLD QL SMEAR: SLIGHT
AST SERPL-CCNC: 18 U/L (ref 10–40)
BASOPHILS # BLD AUTO: 0.14 K/UL (ref 0–0.2)
BASOPHILS NFR BLD: 0.4 % (ref 0–1.9)
BILIRUB SERPL-MCNC: 0.7 MG/DL (ref 0.1–1)
BUN SERPL-MCNC: 13 MG/DL (ref 6–20)
BUN SERPL-MCNC: 13 MG/DL (ref 6–20)
BUN SERPL-MCNC: 19 MG/DL (ref 6–20)
CA-I BLDV-SCNC: 1.1 MMOL/L (ref 1.06–1.42)
CALCIUM SERPL-MCNC: 7.7 MG/DL (ref 8.7–10.5)
CALCIUM SERPL-MCNC: 7.7 MG/DL (ref 8.7–10.5)
CALCIUM SERPL-MCNC: 8.2 MG/DL (ref 8.7–10.5)
CHLORIDE SERPL-SCNC: 104 MMOL/L (ref 95–110)
CHLORIDE SERPL-SCNC: 106 MMOL/L (ref 95–110)
CHLORIDE SERPL-SCNC: 106 MMOL/L (ref 95–110)
CO2 SERPL-SCNC: 28 MMOL/L (ref 23–29)
CREAT SERPL-MCNC: 1.1 MG/DL (ref 0.5–1.4)
CREAT SERPL-MCNC: 1.1 MG/DL (ref 0.5–1.4)
CREAT SERPL-MCNC: 1.4 MG/DL (ref 0.5–1.4)
DIFFERENTIAL METHOD: ABNORMAL
EOSINOPHIL # BLD AUTO: 0.3 K/UL (ref 0–0.5)
EOSINOPHIL NFR BLD: 0.7 % (ref 0–8)
ERYTHROCYTE [DISTWIDTH] IN BLOOD BY AUTOMATED COUNT: 14.2 % (ref 11.5–14.5)
EST. GFR  (AFRICAN AMERICAN): 54.6 ML/MIN/1.73 M^2
EST. GFR  (AFRICAN AMERICAN): >60 ML/MIN/1.73 M^2
EST. GFR  (AFRICAN AMERICAN): >60 ML/MIN/1.73 M^2
EST. GFR  (NON AFRICAN AMERICAN): 47.4 ML/MIN/1.73 M^2
EST. GFR  (NON AFRICAN AMERICAN): >60 ML/MIN/1.73 M^2
EST. GFR  (NON AFRICAN AMERICAN): >60 ML/MIN/1.73 M^2
GLUCOSE SERPL-MCNC: 124 MG/DL (ref 70–110)
GLUCOSE SERPL-MCNC: 136 MG/DL (ref 70–110)
GLUCOSE SERPL-MCNC: 136 MG/DL (ref 70–110)
HCO3 UR-SCNC: 29.1 MMOL/L (ref 24–28)
HCT VFR BLD AUTO: 22.9 % (ref 37–48.5)
HGB BLD-MCNC: 7.3 G/DL (ref 12–16)
HYPOCHROMIA BLD QL SMEAR: ABNORMAL
IMM GRANULOCYTES # BLD AUTO: 0.95 K/UL (ref 0–0.04)
IMM GRANULOCYTES NFR BLD AUTO: 2.4 % (ref 0–0.5)
LYMPHOCYTES # BLD AUTO: 1.8 K/UL (ref 1–4.8)
LYMPHOCYTES NFR BLD: 4.5 % (ref 18–48)
MAGNESIUM SERPL-MCNC: 1.9 MG/DL (ref 1.6–2.6)
MAGNESIUM SERPL-MCNC: 2.4 MG/DL (ref 1.6–2.6)
MAGNESIUM SERPL-MCNC: 2.4 MG/DL (ref 1.6–2.6)
MCH RBC QN AUTO: 29.9 PG (ref 27–31)
MCHC RBC AUTO-ENTMCNC: 31.9 G/DL (ref 32–36)
MCV RBC AUTO: 94 FL (ref 82–98)
MONOCYTES # BLD AUTO: 1.9 K/UL (ref 0.3–1)
MONOCYTES NFR BLD: 4.7 % (ref 4–15)
NEUTROPHILS # BLD AUTO: 34.8 K/UL (ref 1.8–7.7)
NEUTROPHILS NFR BLD: 87.3 % (ref 38–73)
NRBC BLD-RTO: 0 /100 WBC
OVALOCYTES BLD QL SMEAR: ABNORMAL
PCO2 BLDA: 38.4 MMHG (ref 35–45)
PH SMN: 7.49 [PH] (ref 7.35–7.45)
PHOSPHATE SERPL-MCNC: 1.4 MG/DL (ref 2.7–4.5)
PHOSPHATE SERPL-MCNC: 3.2 MG/DL (ref 2.7–4.5)
PHOSPHATE SERPL-MCNC: 3.2 MG/DL (ref 2.7–4.5)
PLATELET # BLD AUTO: 345 K/UL (ref 150–350)
PLATELET BLD QL SMEAR: ABNORMAL
PMV BLD AUTO: 10 FL (ref 9.2–12.9)
PO2 BLDA: 33 MMHG (ref 40–60)
POC BE: 6 MMOL/L
POC SATURATED O2: 68 % (ref 95–100)
POC TCO2: 30 MMOL/L (ref 24–29)
POCT GLUCOSE: 132 MG/DL (ref 70–110)
POCT GLUCOSE: 139 MG/DL (ref 70–110)
POCT GLUCOSE: 141 MG/DL (ref 70–110)
POCT GLUCOSE: 143 MG/DL (ref 70–110)
POIKILOCYTOSIS BLD QL SMEAR: SLIGHT
POLYCHROMASIA BLD QL SMEAR: ABNORMAL
POTASSIUM SERPL-SCNC: 3.8 MMOL/L (ref 3.5–5.1)
POTASSIUM SERPL-SCNC: 3.9 MMOL/L (ref 3.5–5.1)
POTASSIUM SERPL-SCNC: 3.9 MMOL/L (ref 3.5–5.1)
PROT SERPL-MCNC: 6.1 G/DL (ref 6–8.4)
RBC # BLD AUTO: 2.44 M/UL (ref 4–5.4)
SAMPLE: ABNORMAL
SITE: ABNORMAL
SODIUM SERPL-SCNC: 139 MMOL/L (ref 136–145)
SODIUM SERPL-SCNC: 142 MMOL/L (ref 136–145)
SODIUM SERPL-SCNC: 142 MMOL/L (ref 136–145)
WBC # BLD AUTO: 39.8 K/UL (ref 3.9–12.7)

## 2020-08-28 PROCEDURE — 25000003 PHARM REV CODE 250: Performed by: STUDENT IN AN ORGANIZED HEALTH CARE EDUCATION/TRAINING PROGRAM

## 2020-08-28 PROCEDURE — 63600175 PHARM REV CODE 636 W HCPCS: Performed by: NURSE PRACTITIONER

## 2020-08-28 PROCEDURE — 82330 ASSAY OF CALCIUM: CPT

## 2020-08-28 PROCEDURE — 20000000 HC ICU ROOM

## 2020-08-28 PROCEDURE — 63600175 PHARM REV CODE 636 W HCPCS: Performed by: SURGERY

## 2020-08-28 PROCEDURE — 25000003 PHARM REV CODE 250: Performed by: NURSE PRACTITIONER

## 2020-08-28 PROCEDURE — 63600175 PHARM REV CODE 636 W HCPCS: Performed by: STUDENT IN AN ORGANIZED HEALTH CARE EDUCATION/TRAINING PROGRAM

## 2020-08-28 PROCEDURE — 99233 SBSQ HOSP IP/OBS HIGH 50: CPT | Mod: 24,,, | Performed by: SURGERY

## 2020-08-28 PROCEDURE — 84100 ASSAY OF PHOSPHORUS: CPT

## 2020-08-28 PROCEDURE — A4217 STERILE WATER/SALINE, 500 ML: HCPCS | Performed by: SURGERY

## 2020-08-28 PROCEDURE — 27000221 HC OXYGEN, UP TO 24 HOURS

## 2020-08-28 PROCEDURE — 25000003 PHARM REV CODE 250: Performed by: SURGERY

## 2020-08-28 PROCEDURE — 90945 DIALYSIS ONE EVALUATION: CPT

## 2020-08-28 PROCEDURE — 80053 COMPREHEN METABOLIC PANEL: CPT

## 2020-08-28 PROCEDURE — 97535 SELF CARE MNGMENT TRAINING: CPT

## 2020-08-28 PROCEDURE — S0028 INJECTION, FAMOTIDINE, 20 MG: HCPCS | Performed by: STUDENT IN AN ORGANIZED HEALTH CARE EDUCATION/TRAINING PROGRAM

## 2020-08-28 PROCEDURE — B4185 PARENTERAL SOL 10 GM LIPIDS: HCPCS | Performed by: SURGERY

## 2020-08-28 PROCEDURE — 99232 SBSQ HOSP IP/OBS MODERATE 35: CPT | Mod: ,,, | Performed by: INTERNAL MEDICINE

## 2020-08-28 PROCEDURE — 37799 UNLISTED PX VASCULAR SURGERY: CPT

## 2020-08-28 PROCEDURE — 80100008 HC CRRT DAILY MAINTENANCE

## 2020-08-28 PROCEDURE — 97116 GAIT TRAINING THERAPY: CPT

## 2020-08-28 PROCEDURE — 85025 COMPLETE CBC W/AUTO DIFF WBC: CPT

## 2020-08-28 PROCEDURE — 97530 THERAPEUTIC ACTIVITIES: CPT

## 2020-08-28 PROCEDURE — 63600175 PHARM REV CODE 636 W HCPCS: Performed by: ANESTHESIOLOGY

## 2020-08-28 PROCEDURE — 99232 PR SUBSEQUENT HOSPITAL CARE,LEVL II: ICD-10-PCS | Mod: ,,, | Performed by: INTERNAL MEDICINE

## 2020-08-28 PROCEDURE — 99233 PR SUBSEQUENT HOSPITAL CARE,LEVL III: ICD-10-PCS | Mod: 24,,, | Performed by: SURGERY

## 2020-08-28 PROCEDURE — 94761 N-INVAS EAR/PLS OXIMETRY MLT: CPT

## 2020-08-28 PROCEDURE — 83735 ASSAY OF MAGNESIUM: CPT

## 2020-08-28 PROCEDURE — 99900035 HC TECH TIME PER 15 MIN (STAT)

## 2020-08-28 RX ORDER — LABETALOL HCL 20 MG/4 ML
10 SYRINGE (ML) INTRAVENOUS EVERY 4 HOURS PRN
Status: DISCONTINUED | OUTPATIENT
Start: 2020-08-28 | End: 2020-08-29

## 2020-08-28 RX ADMIN — Medication 10 MG: at 04:08

## 2020-08-28 RX ADMIN — Medication: at 02:08

## 2020-08-28 RX ADMIN — METOROPROLOL TARTRATE 10 MG: 5 INJECTION, SOLUTION INTRAVENOUS at 05:08

## 2020-08-28 RX ADMIN — FLUCONAZOLE 200 MG: 2 INJECTION, SOLUTION INTRAVENOUS at 09:08

## 2020-08-28 RX ADMIN — METOROPROLOL TARTRATE 10 MG: 5 INJECTION, SOLUTION INTRAVENOUS at 11:08

## 2020-08-28 RX ADMIN — HEPARIN SODIUM 5000 UNITS: 5000 INJECTION INTRAVENOUS; SUBCUTANEOUS at 09:08

## 2020-08-28 RX ADMIN — MICONAZOLE NITRATE: 20 POWDER TOPICAL at 08:08

## 2020-08-28 RX ADMIN — FAMOTIDINE 20 MG: 10 INJECTION INTRAVENOUS at 08:08

## 2020-08-28 RX ADMIN — MAGNESIUM SULFATE 2 G: 2 INJECTION INTRAVENOUS at 12:08

## 2020-08-28 RX ADMIN — METOROPROLOL TARTRATE 10 MG: 5 INJECTION, SOLUTION INTRAVENOUS at 12:08

## 2020-08-28 RX ADMIN — MAGNESIUM SULFATE HEPTAHYDRATE: 500 INJECTION, SOLUTION INTRAMUSCULAR; INTRAVENOUS at 09:08

## 2020-08-28 RX ADMIN — ONDANSETRON 4 MG: 2 INJECTION INTRAMUSCULAR; INTRAVENOUS at 11:08

## 2020-08-28 RX ADMIN — Medication 10 MG: at 03:08

## 2020-08-28 RX ADMIN — PIPERACILLIN SODIUM AND TAZOBACTAM SODIUM 4.5 G: 4; .5 INJECTION, POWDER, LYOPHILIZED, FOR SOLUTION INTRAVENOUS at 08:08

## 2020-08-28 RX ADMIN — LIDOCAINE 1 PATCH: 50 PATCH CUTANEOUS at 09:08

## 2020-08-28 RX ADMIN — Medication 10 MG: at 09:08

## 2020-08-28 RX ADMIN — SODIUM PHOSPHATE, MONOBASIC, MONOHYDRATE 39.99 MMOL: 276; 142 INJECTION, SOLUTION INTRAVENOUS at 02:08

## 2020-08-28 RX ADMIN — PIPERACILLIN SODIUM AND TAZOBACTAM SODIUM 4.5 G: 4; .5 INJECTION, POWDER, LYOPHILIZED, FOR SOLUTION INTRAVENOUS at 12:08

## 2020-08-28 RX ADMIN — HEPARIN SODIUM 5000 UNITS: 5000 INJECTION INTRAVENOUS; SUBCUTANEOUS at 05:08

## 2020-08-28 RX ADMIN — HEPARIN SODIUM 5000 UNITS: 5000 INJECTION INTRAVENOUS; SUBCUTANEOUS at 01:08

## 2020-08-28 RX ADMIN — I.V. FAT EMULSION 250 ML: 20 EMULSION INTRAVENOUS at 09:08

## 2020-08-28 RX ADMIN — PIPERACILLIN SODIUM AND TAZOBACTAM SODIUM 4.5 G: 4; .5 INJECTION, POWDER, LYOPHILIZED, FOR SOLUTION INTRAVENOUS at 09:08

## 2020-08-28 NOTE — PROGRESS NOTES
[facility]  Nephrology  Progress Note    Patient Name: Jaquan Farmer  MRN: [id number]  Admission Date: 8/12/2020  Hospital Length of Stay: 16 days  Attending Provider: Donis Roa MD   Primary Care Physician: Primary Doctor No  Principal Problem:Duodenum disorder    Interval History: NAEON. Pt completed 12hr SLED overnight. Pt remains on continuous LR gtt @ 100cc/hr. 170mL UOP/past 24hrs. Appears that intake for LR gtt was not recorded accurately. Calculated pt to be roughly net positive 2.5L/past 24hrs. Electrolytes stable on morning labs. Pt on room air, resting calmly up in chair this morning.     Review of patient's allergies indicates:   Allergen Reactions    Latex      Current Facility-Administered Medications   Medication Frequency    cloNIDine 0.2 mg/24 hr td ptwk 1 patch Q7 Days    dexmedetomidine (PRECEDEX) 400mcg/100mL 0.9% NaCL infusion Continuous    famotidine (PF) injection 20 mg Daily    fat emulsion 20 % infusion 250 mL Daily    fat emulsion 20 % infusion 250 mL Daily    fluconazole (DIFLUCAN) IVPB 200 mg 100 mL Q24H    heparin (porcine) injection 5,000 Units Q8H    HYDROmorphone PCA syringe 6 mg/30 mL (0.2 mg/mL) NS Continuous    labetalol 20 mg/4 mL (5 mg/mL) IV syring Q4H PRN    lactated ringers infusion Continuous    lidocaine 5 % patch 1 patch Q24H    melatonin tablet 3 mg Nightly PRN    metoprolol injection 10 mg Q6H    metoprolol injection 5 mg Q4H PRN    miconazole NITRATE 2 % top powder BID    naloxone 0.4 mg/mL injection 0.02 mg PRN    naloxone 0.4 mg/mL injection 0.02 mg PRN    piperacillin-tazobactam 4.5 g in sodium chloride 0.9% 100 mL IVPB (ready to mix system) Q12H    TPN ADULT CENTRAL LINE CUSTOM Continuous    TPN ADULT CENTRAL LINE CUSTOM Continuous       Objective:     Vital Signs (Most Recent):  Temp: 99.6 °F (37.6 °C) (08/28/20 1100)  Pulse: (!) 117 (08/28/20 1215)  Resp: (!) 25 (08/28/20 1215)  BP: (!) 158/100 (08/28/20  1215)  SpO2: 97 % (08/28/20 1215)  O2 Device (Oxygen Therapy): room air (08/28/20 1200) Vital Signs (24h Range):  Temp:  [98.5 °F (36.9 °C)-99.6 °F (37.6 °C)] 99.6 °F (37.6 °C)  Pulse:  [101-145] 117  Resp:  [16-36] 25  SpO2:  [89 %-100 %] 97 %  BP: (145-201)/() 158/100     Weight: 64.7 kg (142 lb 10.2 oz) (08/28/20 0400)  Body mass index is 26.09 kg/m².  Body surface area is 1.68 meters squared.    I/O last 3 completed shifts:  In: 96835 [I.V.:8215]  Out: 6707 [Urine:220; Drains:2028; Other:4459]    Physical Exam  Vitals signs and nursing note reviewed.   Constitutional:       Appearance: She is not ill-appearing.      Interventions: She is sedated. She is not intubated.  HENT:      Head: Normocephalic and atraumatic.      Mouth/Throat:      Mouth: Mucous membranes are moist.      Pharynx: Oropharynx is clear.   Eyes:      General: No scleral icterus.        Right eye: No discharge.         Left eye: No discharge.      Extraocular Movements: Extraocular movements intact.      Conjunctiva/sclera: Conjunctivae normal.   Neck:      Musculoskeletal: Normal range of motion and neck supple.   Cardiovascular:      Rate and Rhythm: Regular rhythm. Tachycardia present.      Pulses: Normal pulses.      Heart sounds: Normal heart sounds.   Pulmonary:      Effort: Pulmonary effort is normal. No respiratory distress. She is not intubated.      Breath sounds: No rales.   Abdominal:      General: Bowel sounds are normal.      Palpations: Abdomen is soft.      Comments: woundvac   Musculoskeletal:      Right lower leg: Edema present.      Left lower leg: Edema present.   Skin:     General: Skin is warm and dry.      Findings: No bruising or rash.   Neurological:      General: No focal deficit present.      Mental Status: She is alert and oriented to person, place, and time.   Psychiatric:         Mood and Affect: Mood normal.         Behavior: Behavior normal.         Significant Labs:  CBC:   Recent Labs   Lab  08/28/20  0400   WBC 39.80*   RBC 2.44*   HGB 7.3*   HCT 22.9*      MCV 94   MCH 29.9   MCHC 31.9*     CMP:   Recent Labs   Lab 08/28/20  0400   *  136*   CALCIUM 7.7*  7.7*   ALBUMIN 1.3*  1.3*   PROT 6.1     142   K 3.9  3.9   CO2 28  28     106   BUN 13  13   CREATININE 1.1  1.1   ALKPHOS 111   ALT 12   AST 18   BILITOT 0.7            Assessment/Plan:     * Duodenum disorder  - Management per primary team     Acute renal failure with tubular necrosis  oliguric stage 3 sid with ischemic atn likely secondary to septic shock  unknown bl cr  muddy brown casts on microscopy                Plan/Recommendations:     -Recommend holding further IVF today unless significant output from G tube  -Hold RRT today and reassess tomorrow am for further treatment   -Strict I/O's  -Transfuse for hgb < 7  -Renally dose meds/avoid nephrotoxic meds  -Keep MAP >65  -Will continue to follow closely   -Staff attestation to follow, see for further recommendations               Thank you for your consult. I will follow-up with patient. Please contact us if you have any additional questions.    Olayinka Zarate, NP  Nephrology  [facility]

## 2020-08-28 NOTE — PLAN OF CARE
SW is following this Pt for DC planning needs. Discharge Disposition: IRF - patient is pending [place] Medicaid which does not cover LTAC or SNF. SW sent updates to referrals.     SW will continue to coordinate with patient, family, team and insurance to complete patient's discharge plan.    Jazzy Eid LMSW   - Case Management

## 2020-08-28 NOTE — SUBJECTIVE & OBJECTIVE
Interval History:   H/H stable overnight  Drains bilious but decreasing in quantity  Wound vac replaced at bedside yesterday, tolerated well.    Medications:  Continuous Infusions:   sodium chloride 0.9% 200 mL/hr at 08/28/20 0345    dexmedetomidine (PRECEDEX) infusion Stopped (08/27/20 0800)    hydromorphone in 0.9 % NaCl 6 mg/30 ml      lactated ringers 100 mL/hr at 08/28/20 0800    TPN ADULT CENTRAL LINE CUSTOM 45 mL/hr at 08/28/20 0800     Scheduled Meds:   cloNIDine 0.2 mg/24 hr td ptwk  1 patch Transdermal Q7 Days    famotidine (PF)  20 mg Intravenous Daily    fat emulsion  250 mL Intravenous Daily    fluconazole (DIFLUCAN) IVPB  200 mg Intravenous Q24H    heparin (porcine)  5,000 Units Subcutaneous Q8H    lidocaine  1 patch Transdermal Q24H    metoprolol  10 mg Intravenous Q6H    miconazole NITRATE 2 %   Topical (Top) BID    piperacillin-tazobactam (ZOSYN) IVPB  4.5 g Intravenous Q8H     PRN Meds:labetaloL, melatonin, metoprolol, naloxone, naloxone, ondansetron     Review of patient's allergies indicates:   Allergen Reactions    Latex      Objective:     Vital Signs (Most Recent):  Temp: 99 °F (37.2 °C) (08/28/20 0700)  Pulse: (!) 124 (08/28/20 1000)  Resp: (!) 28 (08/28/20 1000)  BP: (!) 164/98 (08/28/20 1000)  SpO2: 96 % (08/28/20 1000) Vital Signs (24h Range):  Temp:  [98.5 °F (36.9 °C)-99.3 °F (37.4 °C)] 99 °F (37.2 °C)  Pulse:  [] 124  Resp:  [16-36] 28  SpO2:  [89 %-100 %] 96 %  BP: (145-201)/() 164/98     Weight: 64.7 kg (142 lb 10.2 oz)  Body mass index is 26.09 kg/m².    Intake/Output - Last 3 Shifts       08/26 0700 - 08/27 0659 08/27 0700 - 08/28 0659 08/28 0700 - 08/29 0659    I.V. (mL/kg) 5088 (79.3) 5521 (85.3)     Blood       NG/      IV Piggyback       TPN 1301 1285     Total Intake(mL/kg) 6839 (106.5) 6806 (105.2)     Urine (mL/kg/hr) 80 (0.1) 170 (0.1) 50 (0.2)    Drains 1925 1563 280    Other 175 4384 0    Stool       Total Output 2180 6117 330    Net +2134  +689 -330           Urine Occurrence 2 x 2 x           Physical Exam  Constitutional:       General: She is not in acute distress.  Cardiovascular:      Rate and Rhythm: Regular rhythm. Tachycardia present.   Pulmonary:      Effort: Pulmonary effort is normal.   Abdominal:      Comments: Wound vac in place  Drains and G tube with bilious output   Neurological:      General: No focal deficit present.      Mental Status: She is alert.   Psychiatric:         Mood and Affect: Mood normal.         Behavior: Behavior normal.         Significant Labs:  CBC:   Recent Labs   Lab 08/28/20  0400   WBC 39.80*   RBC 2.44*   HGB 7.3*   HCT 22.9*      MCV 94   MCH 29.9   MCHC 31.9*     CMP:   Recent Labs   Lab 08/28/20  0400   *  136*   CALCIUM 7.7*  7.7*   ALBUMIN 1.3*  1.3*   PROT 6.1     142   K 3.9  3.9   CO2 28  28     106   BUN 13  13   CREATININE 1.1  1.1   ALKPHOS 111   ALT 12   AST 18   BILITOT 0.7       Significant Diagnostics:  I have reviewed all pertinent imaging results/findings within the past 24 hours.

## 2020-08-28 NOTE — SUBJECTIVE & OBJECTIVE
Interval History/Significant Events: NAEON. CRRT overnight. Episodes of hypertension, responds to Labetalol. Starting to make urine.    Follow-up For: Procedure(s) (LRB):  WASHOUT abdomen (N/A)    Post-Operative Day: 4 Days Post-Op    Objective:     Vital Signs (Most Recent):  Temp: 99 °F (37.2 °C) (08/28/20 0700)  Pulse: (!) 128 (08/28/20 0845)  Resp: (!) 32 (08/28/20 0845)  BP: (!) 166/102 (08/28/20 0800)  SpO2: 96 % (08/28/20 0845) Vital Signs (24h Range):  Temp:  [98.5 °F (36.9 °C)-99.3 °F (37.4 °C)] 99 °F (37.2 °C)  Pulse:  [] 128  Resp:  [16-36] 32  SpO2:  [89 %-100 %] 96 %  BP: (145-201)/() 166/102     Weight: 64.7 kg (142 lb 10.2 oz)  Body mass index is 26.09 kg/m².      Intake/Output Summary (Last 24 hours) at 8/28/2020 0859  Last data filed at 8/28/2020 0705  Gross per 24 hour   Intake 6806 ml   Output 6257 ml   Net 549 ml       Physical Exam  Constitutional:       Interventions: She is sedated.   HENT:      Head: Normocephalic.      Nose:      Comments: NGT in place  Cardiovascular:      Rate and Rhythm: Regular rhythm. Tachycardia present.   Pulmonary:      Effort: Pulmonary effort is normal.      Comments: room air  Abdominal:      General: There is no distension.      Palpations: Abdomen is soft.      Tenderness: There is abdominal tenderness (appropriate).      Comments: R abdomen GODFREY x2 with bilious output  Wound vac in midline incision  G-tube with gastric fluid output   Musculoskeletal:         General: Swelling present.      Right lower leg: Edema present.      Left lower leg: Edema present.   Skin:     General: Skin is warm and dry.   Neurological:      Mental Status: She is alert.     Vents:  Vent Mode: Spont (08/20/20 1501)  Ventilator Initiated: Yes(chart correction) (08/12/20 1930)  Set Rate: 20 BPM (08/20/20 0738)  Vt Set: 360 mL (08/20/20 0738)  PEEP/CPAP: 5 cmH20 (08/20/20 1501)  Oxygen Concentration (%): 28 (08/22/20 0043)  Peak Airway Pressure: 7.8 cmH2O (08/20/20  1501)  Plateau Pressure: 22 cmH20 (08/20/20 1501)  Total Ve: 7.21 mL (08/20/20 1501)  Negative Inspiratory Force (cm H2O): -23 (08/20/20 1501)  F/VT Ratio<105 (RSBI): (!) 64.6 (08/20/20 1205)    Lines/Drains/Airways     Central Venous Catheter Line            Trialysis (Dialysis) Catheter 08/13/20 2230 right internal jugular 14 days          Drain                 Closed/Suction Drain 08/13/20 1659 Right;Inferior Abdomen Bulb 19 Fr. 14 days         Closed/Suction Drain 08/13/20 1659 Right;Superior Abdomen Bulb 19 Fr. 14 days         Gastrostomy/Enterostomy 08/15/20 0916 Gastrostomy tube w/ balloon LUQ decompression 12 days    Female External Urinary Catheter 08/27/20 1600 less than 1 day          Peripheral Intravenous Line                 Midline Catheter Insertion/Assessment  - Single Lumen 08/26/20 1501 Right basilic vein (medial side of arm) 18g x 8cm 1 day                Significant Labs:    CBC/Anemia Profile:  Recent Labs   Lab 08/27/20  0250 08/28/20  0400   WBC 34.80* 39.80*   HGB 7.5* 7.3*   HCT 23.7* 22.9*   * 345   MCV 94 94   RDW 14.6* 14.2        Chemistries:  Recent Labs   Lab 08/27/20  0250 08/27/20  2338 08/28/20  0400    139 142  142   K 3.8 3.8 3.9  3.9    104 106  106   CO2 25 28 28  28   BUN 47* 19 13  13   CREATININE 3.1* 1.4 1.1  1.1   CALCIUM 8.2* 8.2* 7.7*  7.7*   ALBUMIN 1.4* 1.4* 1.3*  1.3*   PROT 5.7*  --  6.1   BILITOT 0.7  --  0.7   ALKPHOS 102  --  111   ALT 14  --  12   AST 18  --  18   MG 1.7 1.9 2.4  2.4   PHOS 3.2 1.4* 3.2  3.2       ABGs:   Recent Labs   Lab 08/28/20  0103   PH 7.488*   PCO2 38.4   HCO3 29.1*   POCSATURATED 68*   BE 6     CMP:   Recent Labs   Lab 08/27/20  0250 08/27/20  2338 08/28/20  0400    139 142  142   K 3.8 3.8 3.9  3.9    104 106  106   CO2 25 28 28  28   * 124* 136*  136*   BUN 47* 19 13  13   CREATININE 3.1* 1.4 1.1  1.1   CALCIUM 8.2* 8.2* 7.7*  7.7*   PROT 5.7*  --  6.1   ALBUMIN 1.4* 1.4* 1.3*   1.3*   BILITOT 0.7  --  0.7   ALKPHOS 102  --  111   AST 18  --  18   ALT 14  --  12   ANIONGAP 10 7* 8  8   EGFRNONAA 18.1* 47.4* >60.0  >60.0     Lactic Acid: No results for input(s): LACTATE in the last 48 hours.  All pertinent labs within the past 24 hours have been reviewed.    Significant Imaging:  I have reviewed all pertinent imaging results/findings within the past 24 hours.

## 2020-08-28 NOTE — PLAN OF CARE
SICU PLAN OF CARE NOTE     Dx: Duodenum disorder     Shift Events: VSS at this time. Labetalol given x 2 for SBP >170's. SBP parameters changed to <180. Plan to go to OR for permacath placement Monday. POC reviewed with patient. All questions and concerns addressed and answered appropriately. WCTM.      Goals of Care: Monitor VS and output from [place], [place], and G tube. Monitor Labs. MAP >65. SBP <180. Accuchecks Q6H.      Neuro: AAO x4, Follows Commands and Moves All Extremities     Respiratory: Room Air     Diet: NPO. TPN/Lipids      Gtts: MIVF and PCA Dilaudid      Urine Output: Voids Spontaneously via Purewick 100 cc/shift      Drains:   GODFREY #1 215 ml/shift  GODFREY #2 105 ml/shift  G tube to gravity output 320 ml/shift  Wound Vac output 25 ml/shift     Labs/Accuchecks: Accuchecks Q6H. CBC, CMP, Phos Daily.      Skin:  No acute breakdown noted to edwige prominences. Pt encouraged/assisted to turn Q2H - pt repositions independently. Heel foams in place. Coccyx foam not in place d/t urinary incontinence. Heparin Q8H. Bruising to bilateral lower extremities - foams in place. Specialty bed in place - no waffle. Wound vac incision on abdomen CDI.

## 2020-08-28 NOTE — PT/OT/SLP PROGRESS
Physical Therapy Treatment    Patient Name:  Jaquan Farmer   MRN:  [id number]  Admit Date: 2020  Admitting Diagnosis:  Duodenum disorder   Length of Stay: 16 days  Recent Surgery: Procedure(s) (LRB):  WASHOUT abdomen (N/A) 4 Days Post-Op    Recommendations:     Discharge Recommendations:  nursing facility, skilled   Discharge Equipment Recommendations: walker, rolling, bedside commode     Plan:     During this hospitalization, patient to be seen 4 x/week to address the listed problems via gait training, therapeutic activities, therapeutic exercises, neuromuscular re-education  · Plan of Care Expires:  20   Plan of Care Reviewed with: patient    Assessment:     Jaquan Farmer is a 39 y.o. female admitted with a medical diagnosis of Duodenum disorder. Pt reported feeling malaise, nausea, and pain that prevented her from progressing with functional mobility on this date. Pt with good motivation and participation in session     Problem List: weakness, impaired endurance, impaired functional mobilty, gait instability, decreased lower extremity function, pain, impaired cardiopulmonary response to activity.  Rehab Prognosis: Good     GOALS:   Multidisciplinary Problems     Physical Therapy Goals        Problem: Physical Therapy Goal    Goal Priority Disciplines Outcome Goal Variances Interventions   Physical Therapy Goal     PT, PT/OT Ongoing, Progressing     Description: Goals to be met by: 2020     Patient will increase functional independence with mobility by performin. Supine to sit with MInimal Assistance  2. Sit to stand transfer with Minimal Assistance with LRAD  3. Gait  x 50 feet with Minimal Assistance using LRAD.   4. Stand for 3 minutes with Contact Guard Assistance using LRAD  5. Lower extremity exercise program x15 reps per handout, with independence                     Subjective   Communicated with RN prior to session.  Patient found up in chair upon PT entry to room, agreeable  "to evaluation. Jaquan Farmer's alone during session.    Chief Complaint: malaise   Patient/Family Comments/goals: to get better and return home   Pain/Comfort:  · Pain Rating 1: 0/10  · Pain Rating Post-Intervention 1: 0/10    Objective:   Patient found with: telemetry, pulse ox (continuous), blood pressure cuff, GODFREY drain, wound vac, central line, peripheral IV   General Precautions: Standard, Cardiac fall   Orthopedic Precautions:N/A   Braces: N/A   Oxygen Device: Room Air  Vitals: BP (!) 186/105 (BP Location: Left arm, Patient Position: Lying)   Pulse (!) 120   Temp 99.4 °F (37.4 °C) (Oral)   Resp (!) 31   Ht 5' 2" (1.575 m)   Wt 64.7 kg (142 lb 10.2 oz)   SpO2 95%   Breastfeeding No   BMI 26.09 kg/m²     Outcome Measures:  AM-PAC 6 CLICK MOBILITY  Turning over in bed (including adjusting bedclothes, sheets and blankets)?: 2  Sitting down on and standing up from a chair with arms (e.g., wheelchair, bedside commode, etc.): 2  Moving from lying on back to sitting on the side of the bed?: 2  Moving to and from a bed to a chair (including a wheelchair)?: 2  Need to walk in hospital room?: 2  Climbing 3-5 steps with a railing?: 1  Basic Mobility Total Score: 11       Functional Mobility:  Additional staff present: OT  Bed Mobility:   Not performed 2nd to pt found in chair     Transfers:    Sit <> Stand Transfer:  o  moderate assistance with RW from chair  o Minimal assistance with RW from chair       Gait:  · Patient ambulated: 6ft froward/backward   · Patient required: minimal assist  · Patient used: rolling walker  · Gait Pattern observed: reciprocal gait  · Comments:   · Tactile cuing for RW management       Therapeutic Activities, Exercises, and Education:   Educated pt on PT role/POC  Educated pt on importance of OOB activity and daily ambulation   Pt verbalized understanding    Patient left up in chair with all lines intact, call button in reach and RN notified..    Time Tracking:     PT Received " On: 08/28/20  PT Start Time: 1110     PT Stop Time: 1133  PT Total Time (min): 23 min       Billable Minutes:   · Gait Training 23    Treatment Type: Treatment  PT/PTA: PT       Bonnie Tolbert PT, DPT  8/28/2020  [phone]

## 2020-08-28 NOTE — ASSESSMENT & PLAN NOTE
38 yo F s/p antrectomy with BII reconstruction c/b duodenal necrosis requiring ex lap, washout, drainage c/b aspiration event. Now with severe sepsis and ARDS     .Neuro:  - AOx4  - PCA for pain control  - Clonidine patch for anxiety     Resp:  - 100% on room air  - PRN breathing treatments     CV:  - MAP goal >65  - Systolic <160  - Levo and Vaso gtt - off  - Increase Metoprolol scheduled dose  - Labetalol PRN  - Permacath scheduled for Monday     Heme:  - Hgb/Hct 7.3; stable  - S/p Abdominal washout on 8/21  - S/p Abdominal exploration w/ woundvac exhange 8/24     ID:  - WBC remains elevated 39.8  - Zosyn, Fluconazole  - Blood cultures and BAL taken 8/19 with NGTD and no abnormal findings     Renal:  - Oliguric on arrival; Cr 1.1 at that time   - SLED overnight  - Trend BUN/Cr;   - Monitor I/Os  - MIVF to 100cc/hr     FEN/GI:  - NPO  - TPN  - NGT to LIWS   - Maintain drains to bulb suction; LLQ with acute drop in drainage and accumulation in SQ tissue  - GI ppx  - Replace electrolytes as needed to keep K>4, Mg>2     Endo:  - Monitor BG     Dispo:  - Continue SICU care  -LTAC after Permacath Monday

## 2020-08-28 NOTE — SUBJECTIVE & OBJECTIVE
Interval History: NAEON. Pt completed 12hr SLED overnight. Pt remains on continuous LR gtt @ 100cc/hr. 170mL/past 24hrs. Appears that intake for LR gtt was not recorded accurately. Calculated pt to be roughly net positive 2.5L/past 24hrs. Electrolytes stable on morning labs. Pt on room air, resting calmly up in chair this morning.     Review of patient's allergies indicates:   Allergen Reactions    Latex      Current Facility-Administered Medications   Medication Frequency    cloNIDine 0.2 mg/24 hr td ptwk 1 patch Q7 Days    dexmedetomidine (PRECEDEX) 400mcg/100mL 0.9% NaCL infusion Continuous    famotidine (PF) injection 20 mg Daily    fat emulsion 20 % infusion 250 mL Daily    fat emulsion 20 % infusion 250 mL Daily    fluconazole (DIFLUCAN) IVPB 200 mg 100 mL Q24H    heparin (porcine) injection 5,000 Units Q8H    HYDROmorphone PCA syringe 6 mg/30 mL (0.2 mg/mL) NS Continuous    labetalol 20 mg/4 mL (5 mg/mL) IV syring Q4H PRN    lactated ringers infusion Continuous    lidocaine 5 % patch 1 patch Q24H    melatonin tablet 3 mg Nightly PRN    metoprolol injection 10 mg Q6H    metoprolol injection 5 mg Q4H PRN    miconazole NITRATE 2 % top powder BID    naloxone 0.4 mg/mL injection 0.02 mg PRN    naloxone 0.4 mg/mL injection 0.02 mg PRN    piperacillin-tazobactam 4.5 g in sodium chloride 0.9% 100 mL IVPB (ready to mix system) Q12H    TPN ADULT CENTRAL LINE CUSTOM Continuous    TPN ADULT CENTRAL LINE CUSTOM Continuous       Objective:     Vital Signs (Most Recent):  Temp: 99.6 °F (37.6 °C) (08/28/20 1100)  Pulse: (!) 117 (08/28/20 1215)  Resp: (!) 25 (08/28/20 1215)  BP: (!) 158/100 (08/28/20 1215)  SpO2: 97 % (08/28/20 1215)  O2 Device (Oxygen Therapy): room air (08/28/20 1200) Vital Signs (24h Range):  Temp:  [98.5 °F (36.9 °C)-99.6 °F (37.6 °C)] 99.6 °F (37.6 °C)  Pulse:  [101-145] 117  Resp:  [16-36] 25  SpO2:  [89 %-100 %] 97 %  BP: (145-201)/() 158/100     Weight: 64.7 kg (142 lb 10.2  oz) (08/28/20 0400)  Body mass index is 26.09 kg/m².  Body surface area is 1.68 meters squared.    I/O last 3 completed shifts:  In: 98906 [I.V.:8215]  Out: 6707 [Urine:220; Drains:2028; Other:4459]    Physical Exam  Vitals signs and nursing note reviewed.   Constitutional:       Appearance: She is not ill-appearing.      Interventions: She is sedated. She is not intubated.  HENT:      Head: Normocephalic and atraumatic.      Mouth/Throat:      Mouth: Mucous membranes are moist.      Pharynx: Oropharynx is clear.   Eyes:      General: No scleral icterus.        Right eye: No discharge.         Left eye: No discharge.      Extraocular Movements: Extraocular movements intact.      Conjunctiva/sclera: Conjunctivae normal.   Neck:      Musculoskeletal: Normal range of motion and neck supple.   Cardiovascular:      Rate and Rhythm: Regular rhythm. Tachycardia present.      Pulses: Normal pulses.      Heart sounds: Normal heart sounds.   Pulmonary:      Effort: Pulmonary effort is normal. No respiratory distress. She is not intubated.      Breath sounds: No rales.   Abdominal:      General: Bowel sounds are normal.      Palpations: Abdomen is soft.      Comments: woundvac   Musculoskeletal:      Right lower leg: Edema present.      Left lower leg: Edema present.   Skin:     General: Skin is warm and dry.      Findings: No bruising or rash.   Neurological:      General: No focal deficit present.      Mental Status: She is alert and oriented to person, place, and time.   Psychiatric:         Mood and Affect: Mood normal.         Behavior: Behavior normal.         Significant Labs:  CBC:   Recent Labs   Lab 08/28/20 0400   WBC 39.80*   RBC 2.44*   HGB 7.3*   HCT 22.9*      MCV 94   MCH 29.9   MCHC 31.9*     CMP:   Recent Labs   Lab 08/28/20 0400   *  136*   CALCIUM 7.7*  7.7*   ALBUMIN 1.3*  1.3*   PROT 6.1     142   K 3.9  3.9   CO2 28  28     106   BUN 13  13   CREATININE 1.1  1.1    ALKPHOS 111   ALT 12   AST 18   BILITOT 0.7

## 2020-08-28 NOTE — NURSING
SICU MD notified of Pt -130's ST and 's. VBG ordered d/t tachypnea and tachycardia - sats 98% on RA.  PRN metoprolol in for HR >150. Will continue to monitor.

## 2020-08-28 NOTE — PT/OT/SLP PROGRESS
"Occupational Therapy   Treatment    Name: Jaquan Farmer  MRN: [id number]  Admitting Diagnosis:  Duodenum disorder  4 Days Post-Op    Recommendations:     Discharge Recommendations: rehabilitation facility   Discharge Equipment Recommendations:  walker, rolling, bedside commode(TBD)  Barriers to discharge:  None    Assessment:     Jaquan Farmer is a 39 y.o. female with a medical diagnosis of Duodenum disorder.  She presents with the following performance deficits affecting function are weakness, impaired endurance, impaired self care skills, impaired functional mobilty, gait instability, impaired balance, decreased lower extremity function, pain, impaired cardiopulmonary response to activity.     Patient participated well with therapy and is motivated to complete transfers/mobility, however participation limited today due nausea and some dizziness upon achieving unsupported sit in bedside chair; vitals as follows - 146/88 (111) seated prior to transfer, 161/101 (126) following initial transfer and return to sit in bedside chair, nursing made aware. Patient required cues for adaptive strategies to complete safe transfers (predominantly UE placement/breathing techniques), minimal assist with rolling walker for steps at bedside. Patient will continue to benefit from skilled OT services during this admission and placement in skilled nursing facility when deemed medically appropriate.    Rehab Prognosis:  Good; patient would benefit from acute skilled OT services to address these deficits and reach maximum level of function.       Plan:     Patient to be seen 4 x/week to address the above listed problems via self-care/home management, therapeutic activities, therapeutic exercises  · Plan of Care Expires: 09/24/20  · Plan of Care Reviewed with: patient    Subjective   "I am sorry guys." re: patient wanting to participate despite not feeling well   Pain/Comfort:  · Pain Rating 1: (Mod c/o sporadic pain, pt did not " rate numerically)  · Location - Orientation 1: generalized  · Location 1: abdomen  · Pain Addressed 1: Pre-medicate for activity, Distraction, Reposition, Nurse notified  · Pain Rating Post-Intervention 1: (Remain unchanged)    Objective:     Communicated with: Nursing prior to session.  Patient found up in chair with blood pressure cuff, central line, PureWick, peripheral IV, pulse ox (continuous), wound vac, GODFREY drain upon OT entry to room.    General Precautions: Standard, fall   Orthopedic Precautions:N/A   Braces: N/A     Occupational Performance:     Bed Mobility:    · NT - patient seated in bedside chair upon arrival, returned to sit upon end of session     Functional Mobility/Transfers:  · Patient completed Sit <> Stand Transfer with minimum assistance and moderate assistance  with  rolling walker    · 1st trial - moderate assistance, cues for UE placement   · 2nd trial - minimal assistance, moved (B) UE's to rolling walker mid-transfer  · Functional Mobility: Minimal assistance with rolling walker (forward <> back) for six feet at bedside, patient required some assist for RW management to complete steps backwards    Activities of Daily Living:  · Grooming: stand by assistance seated in bedside chair  · Upper Body Dressing: stand by assistance seated in bedside chair  · Lower Body Dressing: total assistance seated, 2* abdominal pain and (B) LE edematous    AMPAC 6 Click ADL: 13    Treatment & Education:  -Patient and family educated on roles/goals of OT and POC.  -White board updated.  -Therapist provided time for questions and answered within scope of practice.  -Patient educated on importance of EOB/OOB activity to maximize recovery.    Patient left up in chair with all lines intact, call button in reach and nursing notifiedEducation:      GOALS:   Multidisciplinary Problems     Occupational Therapy Goals        Problem: Occupational Therapy Goal    Goal Priority Disciplines Outcome Interventions    Occupational Therapy Goal     OT, PT/OT Ongoing, Progressing    Description: Goals to be met by: 9/4/20     Patient will increase functional independence with ADLs by performing:    Feeding with [place].  UE Dressing with Stand-by Assistance.  LE Dressing with Minimal Assistance.  Grooming while standing at sink with Contact Guard Assistance.  Toileting from bedside commode with Minimal Assistance for hygiene and clothing management.   Toilet transfer to bedside commode with Minimal Assistance.                     Time Tracking:     OT Date of Treatment: 08/28/20  OT Start Time: 1104  OT Stop Time: 1132  OT Total Time (min): 28 min    Billable Minutes:Self Care/Home Management 10  Therapeutic Activity 18    Shreya Barnhart OT  8/28/2020

## 2020-08-28 NOTE — PROGRESS NOTES
[facility]  General Surgery  Progress Note    Subjective:     History of Present Illness:  Pt is a 40 yo F with recent history of antrectomy with BII reconstruction for ulcer disease at outside hospital. 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 Patient also reportedly aspirated on induction in the OR prior to this, resulting in severe pulmonary edema and hypoxia. Patient was transferred to [facility] urgently for higher level of care. She arrived as a direct SICU admit on near maximal vent settings and requiring low dose vasopressors with HR in the upper 140s. Her midline laparotomy is closed with 4 Navdeep drains in place draining bilious output.     Post-Op Info:  Procedure(s) (LRB):  WASHOUT abdomen (N/A)   4 Days Post-Op     Interval History:   H/H stable overnight  Drains bilious but decreasing in quantity  Wound vac replaced at bedside yesterday, tolerated well.    Medications:  Continuous Infusions:   sodium chloride 0.9% 200 mL/hr at 08/28/20 0345    dexmedetomidine (PRECEDEX) infusion Stopped (08/27/20 0800)    hydromorphone in 0.9 % NaCl 6 mg/30 ml      lactated ringers 100 mL/hr at 08/28/20 0800    TPN ADULT CENTRAL LINE CUSTOM 45 mL/hr at 08/28/20 0800     Scheduled Meds:   cloNIDine 0.2 mg/24 hr td ptwk  1 patch Transdermal Q7 Days    famotidine (PF)  20 mg Intravenous Daily    fat emulsion  250 mL Intravenous Daily    fluconazole (DIFLUCAN) IVPB  200 mg Intravenous Q24H    heparin (porcine)  5,000 Units Subcutaneous Q8H    lidocaine  1 patch Transdermal Q24H    metoprolol  10 mg Intravenous Q6H    miconazole NITRATE 2 %   Topical (Top) BID    piperacillin-tazobactam (ZOSYN) IVPB  4.5 g Intravenous Q8H     PRN Meds:labetaloL, melatonin, metoprolol, naloxone, naloxone, ondansetron     Review of patient's allergies indicates:   Allergen Reactions    Latex      Objective:     Vital Signs (Most Recent):  Temp: 99 °F (37.2 °C)  (08/28/20 0700)  Pulse: (!) 124 (08/28/20 1000)  Resp: (!) 28 (08/28/20 1000)  BP: (!) 164/98 (08/28/20 1000)  SpO2: 96 % (08/28/20 1000) Vital Signs (24h Range):  Temp:  [98.5 °F (36.9 °C)-99.3 °F (37.4 °C)] 99 °F (37.2 °C)  Pulse:  [] 124  Resp:  [16-36] 28  SpO2:  [89 %-100 %] 96 %  BP: (145-201)/() 164/98     Weight: 64.7 kg (142 lb 10.2 oz)  Body mass index is 26.09 kg/m².    Intake/Output - Last 3 Shifts       08/26 0700 - 08/27 0659 08/27 0700 - 08/28 0659 08/28 0700 - 08/29 0659    I.V. (mL/kg) 5088 (79.3) 5521 (85.3)     Blood       NG/      IV Piggyback       TPN 1301 1285     Total Intake(mL/kg) 6839 (106.5) 6806 (105.2)     Urine (mL/kg/hr) 80 (0.1) 170 (0.1) 50 (0.2)    Drains 1925 1563 280    Other 175 4384 0    Stool       Total Output 2180 6117 330    Net +4659 +689 -330           Urine Occurrence 2 x 2 x           Physical Exam  Constitutional:       General: She is not in acute distress.  Cardiovascular:      Rate and Rhythm: Regular rhythm. Tachycardia present.   Pulmonary:      Effort: Pulmonary effort is normal.   Abdominal:      Comments: Wound vac in place  Drains and G tube with bilious output   Neurological:      General: No focal deficit present.      Mental Status: She is alert.   Psychiatric:         Mood and Affect: Mood normal.         Behavior: Behavior normal.         Significant Labs:  CBC:   Recent Labs   Lab 08/28/20  0400   WBC 39.80*   RBC 2.44*   HGB 7.3*   HCT 22.9*      MCV 94   MCH 29.9   MCHC 31.9*     CMP:   Recent Labs   Lab 08/28/20  0400   *  136*   CALCIUM 7.7*  7.7*   ALBUMIN 1.3*  1.3*   PROT 6.1     142   K 3.9  3.9   CO2 28  28     106   BUN 13  13   CREATININE 1.1  1.1   ALKPHOS 111   ALT 12   AST 18   BILITOT 0.7       Significant Diagnostics:  I have reviewed all pertinent imaging results/findings within the past 24 hours.    Assessment/Plan:     Severe sepsis  40 yo F s/p antrectomy with BII reconstruction c/b  duodenal necrosis requiring ex lap, washout, drainage c/b aspiration event. S/p duodenal resection with d-j and g-j anastomosis.  - Will plan to take to OR for permcath placement on Monday (8/31), can also change wound vac at that time.  - Continue ICU care  - Transfuse PRBC as needed for Hgb <7  - Respiratory treatment q4h  - NPO  - G-tube to gravity  - Abx: diflucan, zosyn  - TPN   - Daily labs  - Trend CBC  - GI and DVT ppx  - PT / OT        Maria Antonia Byrne MD  General Surgery  [facility]

## 2020-08-28 NOTE — PLAN OF CARE
Continue per OT POC, all goals remain appropriate.    Problem: Occupational Therapy Goal  Goal: Occupational Therapy Goal  Description: Goals to be met by: 9/4/20     Patient will increase functional independence with ADLs by performing:    Feeding with [place].  UE Dressing with Stand-by Assistance.  LE Dressing with Minimal Assistance.  Grooming while standing at sink with Contact Guard Assistance.  Toileting from bedside commode with Minimal Assistance for hygiene and clothing management.   Toilet transfer to bedside commode with Minimal Assistance.    Outcome: Ongoing, Progressing

## 2020-08-28 NOTE — ASSESSMENT & PLAN NOTE
40 yo F s/p antrectomy with BII reconstruction c/b duodenal necrosis requiring ex lap, washout, drainage c/b aspiration event. S/p duodenal resection with d-j and g-j anastomosis.  - Will plan to take to OR for permcath placement on Monday (8/31), can also change wound vac at that time.  - Continue ICU care  - Transfuse PRBC as needed for Hgb <7  - Respiratory treatment q4h  - NPO  - G-tube to gravity  - Abx: diflucan, zosyn  - TPN   - Daily labs  - Trend CBC  - GI and DVT ppx  - PT / OT

## 2020-08-28 NOTE — NURSING
Dr. Landeros and SICU team at the bedside. Team aware of patient's current vitals, labs, and plan. Plan to monitor tachycardic HR and administer PRN metoprolol for HR >150. Patient not in any distress at this time. RAHEL.

## 2020-08-28 NOTE — PLAN OF CARE
"      SICU PLAN OF CARE NOTE    Dx: Duodenum disorder    Shift Events: CRRT SLED for 12H - ended at 0345: rinsed back with no issues. Labetalol given x1 for SBP >170's. VBG drawn d/t tachycardia and tachypnea - pt placed on 2L NC for PO2 of 33.     Goals of Care: Monitor VS and output from GODFREY, WV, and G tube. Monitor Labs. MAP >65. Accuchecks Q6H. POC reviewed with pt - all questions and concerns addressed and answered appropriately.     Neuro: AAO x4, Follows Commands and Moves All Extremities    Vital Signs: BP (!) 145/88   Pulse (!) 124   Temp 99.3 °F (37.4 °C) (Oral)   Resp (!) 28   Ht 5' 2" (1.575 m)   Wt 64.7 kg (142 lb 10.2 oz)   SpO2 98%   Breastfeeding No   BMI 26.09 kg/m²     Respiratory: Nasal Cannula @ 2L for PO2 on VBG of 33    Diet: NPO. Oral swabs provided in ice water for pleasure/dry mouth. TPN @ 45ml/hr and Lipids @ 10.4ml/hr.     Gtts: MIVF and PCA Dilaudid     Urine Output: Voids Spontaneously via Purewick 170cc/shift - yellow in appearance.     Drains:    GODFREY #1 230ml/shift.    GODFREY #2 13ml/shift.    G tube to gravity output 645 ml/shift.    WV output 75ml/shift - sanguinous.     Labs/Accuchecks: Accuchecks Q6H - no PRN doses needed. Renal Function Panel and Mag Q8H. CBC, CMP, Phos Daily. Triglycerides Weekly - next due on 8/31/2020. Electrolytes replaced as needed.     Skin:  No acute breakdown noted to edwige prominences. Pt encouraged/assisted to turn Q2H - pt repositions independently. Heel foams in place. Coccyx foam not in place d/t urinary incontinence. Heparin Q8H. Bruising to bilateral lower extremities - foams in place. Specialty bed in place - no waffle. WV to incision on abd.      For detailed assessment data view Flowsheets.   Will continue to monitor.        "

## 2020-08-28 NOTE — PROGRESS NOTES
[facility]  Critical Care - Surgery  Progress Note    Patient Name: Jaquan Farmer  MRN: [id number]  Admission Date: 8/12/2020  Hospital Length of Stay: 16 days  Code Status: Full Code  Attending Provider: Donis Roa MD  Primary Care Provider: Primary Doctor No   Principal Problem: Duodenum disorder    Subjective:     Hospital/ICU Course:  8/14 Patient had a line replaced along with central line placed, with complication of pneumothorax. Rt pigtail was placed. Patient tolerated complications well. Decreasing vent settings.  8/15 NAEO. Patient was taken back to OR early AM for washout. Possible closure 8/18. ETT exchanged in OR 7.0 -> 7.5  8/16 - NAEON. HDS. Intubated, sedated.   8/17 - NAEON. OR tomorrow for closure.  8/18 - Washed out and closed in OR  8/22 - NAEO. Plan for Repeat CT AP today  8/23 - NAEO. Hbg lower. Giving blood x2 units.  8/25 - NAEO. Went to OR yesterday for Abdominal wall exploration and ligation of bleeding vessels. Wound vac exchanged  8/26 - NAEO. Hypertensive overnight. Hgb trending. Plan for CT today  8/27 - NAEO. Hypertensive overnight. CT yesterday showed no extravasation or acute changes  8/28 - NAEO.     Interval History/Significant Events: NAEON. CRRT overnight. Episodes of hypertension, responds to Labetalol. Starting to make urine.    Follow-up For: Procedure(s) (LRB):  WASHOUT abdomen (N/A)    Post-Operative Day: 4 Days Post-Op    Objective:     Vital Signs (Most Recent):  Temp: 99 °F (37.2 °C) (08/28/20 0700)  Pulse: (!) 128 (08/28/20 0845)  Resp: (!) 32 (08/28/20 0845)  BP: (!) 166/102 (08/28/20 0800)  SpO2: 96 % (08/28/20 0845) Vital Signs (24h Range):  Temp:  [98.5 °F (36.9 °C)-99.3 °F (37.4 °C)] 99 °F (37.2 °C)  Pulse:  [] 128  Resp:  [16-36] 32  SpO2:  [89 %-100 %] 96 %  BP: (145-201)/() 166/102     Weight: 64.7 kg (142 lb 10.2 oz)  Body mass index is 26.09 kg/m².      Intake/Output Summary (Last 24 hours) at 8/28/2020 0859  Last data  filed at 8/28/2020 0705  Gross per 24 hour   Intake 6806 ml   Output 6257 ml   Net 549 ml       Physical Exam  Constitutional:       Interventions: She is sedated.   HENT:      Head: Normocephalic.      Nose:      Comments: NGT in place  Cardiovascular:      Rate and Rhythm: Regular rhythm. Tachycardia present.   Pulmonary:      Effort: Pulmonary effort is normal.      Comments: room air  Abdominal:      General: There is no distension.      Palpations: Abdomen is soft.      Tenderness: There is abdominal tenderness (appropriate).      Comments: R abdomen GODFREY x2 with bilious output  Wound vac in midline incision  G-tube with gastric fluid output   Musculoskeletal:         General: Swelling present.      Right lower leg: Edema present.      Left lower leg: Edema present.   Skin:     General: Skin is warm and dry.   Neurological:      Mental Status: She is alert.     Vents:  Vent Mode: Spont (08/20/20 1501)  Ventilator Initiated: Yes(chart correction) (08/12/20 1930)  Set Rate: 20 BPM (08/20/20 0738)  Vt Set: 360 mL (08/20/20 0738)  PEEP/CPAP: 5 cmH20 (08/20/20 1501)  Oxygen Concentration (%): 28 (08/22/20 0043)  Peak Airway Pressure: 7.8 cmH2O (08/20/20 1501)  Plateau Pressure: 22 cmH20 (08/20/20 1501)  Total Ve: 7.21 mL (08/20/20 1501)  Negative Inspiratory Force (cm H2O): -23 (08/20/20 1501)  F/VT Ratio<105 (RSBI): (!) 64.6 (08/20/20 1205)    Lines/Drains/Airways     Central Venous Catheter Line            Trialysis (Dialysis) Catheter 08/13/20 2230 right internal jugular 14 days          Drain                 Closed/Suction Drain 08/13/20 1659 Right;Inferior Abdomen Bulb 19 Fr. 14 days         Closed/Suction Drain 08/13/20 1659 Right;Superior Abdomen Bulb 19 Fr. 14 days         Gastrostomy/Enterostomy 08/15/20 0916 Gastrostomy tube w/ balloon LUQ decompression 12 days    Female External Urinary Catheter 08/27/20 1600 less than 1 day          Peripheral Intravenous Line                 Midline Catheter  Insertion/Assessment  - Single Lumen 08/26/20 1501 Right basilic vein (medial side of arm) 18g x 8cm 1 day                Significant Labs:    CBC/Anemia Profile:  Recent Labs   Lab 08/27/20 0250 08/28/20  0400   WBC 34.80* 39.80*   HGB 7.5* 7.3*   HCT 23.7* 22.9*   * 345   MCV 94 94   RDW 14.6* 14.2        Chemistries:  Recent Labs   Lab 08/27/20  0250 08/27/20 2338 08/28/20  0400    139 142  142   K 3.8 3.8 3.9  3.9    104 106  106   CO2 25 28 28  28   BUN 47* 19 13  13   CREATININE 3.1* 1.4 1.1  1.1   CALCIUM 8.2* 8.2* 7.7*  7.7*   ALBUMIN 1.4* 1.4* 1.3*  1.3*   PROT 5.7*  --  6.1   BILITOT 0.7  --  0.7   ALKPHOS 102  --  111   ALT 14  --  12   AST 18  --  18   MG 1.7 1.9 2.4  2.4   PHOS 3.2 1.4* 3.2  3.2       ABGs:   Recent Labs   Lab 08/28/20  0103   PH 7.488*   PCO2 38.4   HCO3 29.1*   POCSATURATED 68*   BE 6     CMP:   Recent Labs   Lab 08/27/20 0250 08/27/20 2338 08/28/20  0400    139 142  142   K 3.8 3.8 3.9  3.9    104 106  106   CO2 25 28 28  28   * 124* 136*  136*   BUN 47* 19 13  13   CREATININE 3.1* 1.4 1.1  1.1   CALCIUM 8.2* 8.2* 7.7*  7.7*   PROT 5.7*  --  6.1   ALBUMIN 1.4* 1.4* 1.3*  1.3*   BILITOT 0.7  --  0.7   ALKPHOS 102  --  111   AST 18  --  18   ALT 14  --  12   ANIONGAP 10 7* 8  8   EGFRNONAA 18.1* 47.4* >60.0  >60.0     Lactic Acid: No results for input(s): LACTATE in the last 48 hours.  All pertinent labs within the past 24 hours have been reviewed.    Significant Imaging:  I have reviewed all pertinent imaging results/findings within the past 24 hours.    Assessment/Plan:     Severe sepsis   40 yo F s/p antrectomy with BII reconstruction c/b duodenal necrosis requiring ex lap, washout, drainage c/b aspiration event. Now with severe sepsis and ARDS     .Neuro:  - AOx4  - PCA for pain control  - Clonidine patch for anxiety     Resp:  - 100% on room air  - PRN breathing treatments     CV:  - MAP goal >65  - Systolic <160  -  Levo and Vaso gtt - off  - Increase Metoprolol scheduled dose  - Labetalol PRN  - Permacath scheduled for Monday     Heme:  - Hgb/Hct 7.3; stable  - S/p Abdominal washout on 8/21  - S/p Abdominal exploration w/ woundvac exhange 8/24     ID:  - WBC remains elevated 39.8  - Zosyn, Fluconazole  - Blood cultures and BAL taken 8/19 with NGTD and no abnormal findings     Renal:  - Oliguric on arrival; Cr 1.1 at that time   - SLED overnight  - Trend BUN/Cr;   - Monitor I/Os  - MIVF to 100cc/hr     FEN/GI:  - NPO  - TPN  - NGT to LIWS   - Maintain drains to bulb suction; LLQ with acute drop in drainage and accumulation in SQ tissue  - GI ppx  - Replace electrolytes as needed to keep K>4, Mg>2     Endo:  - Monitor BG     Dispo:  - Continue SICU care  -LTAC after Permacath Monday           Thu Wilson MD  Critical Care - Surgery  [facility]

## 2020-08-28 NOTE — ANESTHESIA PREPROCEDURE EVALUATION
[facility]  Anesthesia Pre-Operative Evaluation         Patient Name: Jaquan Farmer  YOB: 1980  MRN: [id number]    SUBJECTIVE:     Pre-operative evaluation for Procedure(s) (LRB):  INSERTION-CATHETER-RUDY (N/A)     08/31/2020    Jaquan Farmer is a 39 y.o. female w/ a significant PMHx of recent history of antrectomy with BII reconstruction for ulcer disease at outside hospital. 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 Patient also reportedly aspirated on induction in the OR prior to this, resulting in severe pulmonary edema and hypoxia. [place] [facility] [place] level of care. She arrived as a direct SICU admit on near maximal vent settings and requiring low dose vasopressors with HR in the upper 140s. Her midline laparotomy is closed with 4 Navdeep drains in place draining bilious output. Has had 3x ex-lap since admission (8/13,15,18/2020) and washout 8/24.    Patient now presents for the above procedure(s).    Hemodynamically stable (sinus tachycardia), on room air.  Access: R IJ trialysis.     Currently on room air, not on vasopressors.    LDA:   Trialysis (Dialysis) Catheter 08/13/20 2230 right internal jugular (Active)   Verification by X-ray Yes 08/28/20 0705   Site Assessment No drainage;No redness;No swelling;No warmth 08/28/20 0705   Line Securement Device Secured with sutures 08/28/20 0705   Dressing Type Biopatch in place;Central line dressing with pants 08/28/20 0705   Dressing Status Clean;Dry;Intact 08/28/20 0705   Dressing Intervention Integrity maintained 08/28/20 0705   Date on Dressing 08/27/20 08/28/20 0705   Dressing Due to be Changed 09/03/20 08/28/20 0705   Venous Patency/Care infusing 08/28/20 0705   Arterial Patency/Care infusing 08/28/20 0705   Distal Patency/Care infusing 08/28/20 0705   Flows Good 08/28/20 0705   Waveform Other (Comments) 08/28/20 0705   Line Necessity  Review CRRT/HD 08/28/20 0705   Number of days: 14            Midline Catheter Insertion/Assessment  - Single Lumen 08/26/20 1501 Right basilic vein (medial side of arm) 18g x 8cm (Active)   Site Assessment Clean;Intact;Dry;No redness;No swelling 08/28/20 0705   IV Device Securement catheter securement device 08/28/20 0705   Line Status Infusing 08/28/20 0705   Dressing Type Biopatch in place;Central line dressing with pants 08/28/20 0705   Dressing Status Clean;Dry;Intact 08/28/20 0705   Dressing Intervention Integrity maintained 08/28/20 0705   Dressing Change Due 09/02/20 08/28/20 0705   Site Change Due 09/24/20 08/28/20 0705   Reason Not Rotated Not due 08/28/20 0705   Number of days: 1            Closed/Suction Drain 08/13/20 1659 Right;Superior Abdomen Bulb 19 Fr. (Active)   Site Description Unable to view 08/28/20 0705   Dressing Type Gauze 08/28/20 0705   Dressing Status Clean;Dry;Intact 08/28/20 0705   Dressing Intervention Integrity maintained 08/28/20 0705   Drainage Green 08/28/20 0705   Status To bulb suction 08/28/20 0705   Output (mL) 75 mL 08/28/20 0705   Number of days: 14            Closed/Suction Drain 08/13/20 1659 Right;Inferior Abdomen Bulb 19 Fr. (Active)   Site Description Unable to view 08/28/20 0705   Dressing Type Gauze 08/28/20 0705   Dressing Status Clean;Dry;Intact 08/28/20 0705   Dressing Intervention Integrity maintained 08/28/20 0705   Drainage Green 08/28/20 0705   Status To bulb suction 08/28/20 0705   Output (mL) 5 mL 08/28/20 0705   Number of days: 14            Gastrostomy/Enterostomy 08/15/20 0916 Gastrostomy tube w/ balloon LUQ decompression (Active)   Securement secured to abdomen 08/28/20 0705   Suction Setting/Drainage Method dependent drainage 08/28/20 0705   Drainage brown;green 08/28/20 0705   Clamp Status/Tolerance unclamped;no restlessness;no residual;no nausea;no emesis;no abdominal distention;no abdominal discomfort 08/28/20 0705   Dressing dry and intact 08/28/20  0705   Insertion Site dry;no warmth;no drainage;no tenderness;no swelling;no redness 08/28/20 0705   Site Care device rotatated;site cleansed w/ sterile normal saline 08/28/20 0705   Dressing Change Due 08/26/20 08/28/20 0300   Intake (mL) 450 mL 08/26/20 1600   Tube Output(mL)(Include Discarded Residual) 200 mL 08/28/20 0705   Intake (mL) - Breast Milk Tube Feeding 120 08/18/20 1500   Number of days: 12       Female External Urinary Catheter 08/27/20 1600 (Active)   Skin no redness;no breakdown;perineum cleansed w/ soap and water;female external urine collection device repositioned 08/28/20 0705   Tolerance no signs/symptoms of discomfort 08/28/20 0705   Suction Continuous suction at 60 mmHg 08/28/20 0705   Date of last wick change 08/28/20 08/28/20 0705   Time of last wick change 0705 08/28/20 0705   Output (mL) 50 mL 08/28/20 0705   Number of days: 0       Prev airway:   Intubation:     Induction:  Rapid sequence induction    Intubated:  Postinduction    Mask Ventilation:  N/a    Attempts:  1    Attempted By:  Resident anesthesiologist    Blade:  January 3    Laryngeal View Grade: Grade IIA - cords partially seen      Difficult Airway Encountered?: No      Complications:  None    Airway Device:  Oral endotracheal tube    Airway Device Size:  7.0    Style/Cuff Inflation:  Cuffed (inflated to minimal occlusive pressure)    Inflation Amount (mL):  6    Tube secured:  21    Secured at:  The lips    Placement Verified By:  Capnometry    Complicating Factors:  Anterior larynx    Findings Post-Intubation:  BS equal bilateral    Drips:    sodium chloride 0.9% 200 mL/hr at 08/28/20 0345    dexmedetomidine (PRECEDEX) infusion Stopped (08/27/20 0800)    hydromorphone in 0.9 % NaCl 6 mg/30 ml      lactated ringers 100 mL/hr at 08/28/20 0800    TPN ADULT CENTRAL LINE CUSTOM 45 mL/hr at 08/28/20 0800       Patient Active Problem List   Diagnosis    Duodenum disorder    Severe sepsis    Acute renal failure with  tubular necrosis    Metabolic acidosis    Ischemic necrosis of small bowel    Septic shock    Encounter for weaning from ventilator    Acute hypoxemic respiratory failure    Acute blood loss anemia    Anemia, chronic disease    Bandemia    Hypertension       Review of patient's allergies indicates:   Allergen Reactions    Latex        Current Inpatient Medications:   cloNIDine 0.2 mg/24 hr td ptwk  1 patch Transdermal Q7 Days    famotidine (PF)  20 mg Intravenous Daily    fat emulsion  250 mL Intravenous Daily    fluconazole (DIFLUCAN) IVPB  200 mg Intravenous Q24H    heparin (porcine)  5,000 Units Subcutaneous Q8H    lidocaine  1 patch Transdermal Q24H    metoprolol  10 mg Intravenous Q6H    miconazole NITRATE 2 %   Topical (Top) BID    piperacillin-tazobactam (ZOSYN) IVPB  4.5 g Intravenous Q8H       No current facility-administered medications on file prior to encounter.      Current Outpatient Medications on File Prior to Encounter   Medication Sig Dispense Refill    aspirin (ECOTRIN) 81 MG EC tablet   = 1 tab, Oral, Daily, Ordered by Dr. Moreira, # 90 tab, 3 Refill(s), Maintenance      metoprolol tartrate (LOPRESSOR) 25 MG tablet   See Instructions, TAKE 1 TABLET BY MOUTH TWICE DAILY, tab, # 60, eRx: [facility] [id number]      sumatriptan (IMITREX) 50 MG tablet   = 1 tab, Oral, Daily, PRN for migraine headache, may repeat dose after 2 hours up to a maximum of 200 mg in 24 hours, # 9 tab, 1 Refill(s), Maintenance, Pharmacy: [facility] [id number]         Past Surgical History:   Procedure Laterality Date    APPLICATION OF WOUND VACUUM-ASSISTED CLOSURE DEVICE  8/13/2020    Procedure: APPLICATION, WOUND VAC WITH ABTHERA;  Surgeon: Donis Roa MD;  Location: [facility] OR [address] [place] [address];  Service: General;;    APPLICATION OF WOUND VACUUM-ASSISTED CLOSURE DEVICE N/A 8/21/2020    Procedure: APPLICATION, WOUND VAC;  Surgeon: Donis Roa MD;  Location: [facility] OR [address] [place] [address];  Service:  General;  Laterality: N/A;    COLONOSCOPY      DUODENECTOMY  8/13/2020    Procedure: KOCHERIZATION OF DUODENUM, DUODENECTOMY;  Surgeon: Donis Roa MD;  [facility];  Service: General;;    ESOPHAGOGASTRODUODENOSCOPY  01/23/2018    ESOPHAGOGASTRODUODENOSCOPY N/A 8/13/2020    Procedure: EGD (ESOPHAGOGASTRODUODENOSCOPY);  Surgeon: Donis Roa MD;  Location: [facility] OR 2ND FLR;  Service: General;  Laterality: N/A;    EVACUATION OF HEMATOMA N/A 8/21/2020    Procedure: EVACUATION, HEMATOMA;  Surgeon: Donis Roa MD;  Location: [facility] OR [facility] FLR;  Service: General;  Laterality: N/A;  OF RECTUS SHEET HEMATOMA    PERCUTANEOUS INSERTION OF GASTROSTOMY TUBE  8/15/2020    Procedure: INSERTION, GASTROSTOMY TUBE, PERCUTANEOUS;  Surgeon: Donis Roa MD;  [facility];  Service: General;;    TUBAL LIGATION      WOUND EXPLORATION N/A 8/21/2020    Procedure: EXPLORATION, WOUND;  Surgeon: Donis Roa MD;  [facility];  Service: General;  Laterality: N/A;       Social History     Socioeconomic History    Marital status: Unknown     Spouse name: Not on file    Number of children: Not on file    Years of education: Not on file    Highest education level: Not on file   Occupational History    Not on file   Social Needs    Financial resource strain: Not on file    Food insecurity     Worry: Not on file     Inability: Not on file    Transportation needs     Medical: Not on file     Non-medical: Not on file   Tobacco Use    Smoking status: Current Some Day Smoker     Packs/day: 0.25     Types: Cigarettes    Smokeless tobacco: Never Used   Substance and Sexual Activity    Alcohol use: Not Currently    Drug use: Not on file    Sexual activity: Not on file   Lifestyle    Physical activity     Days per week: Not on file     Minutes per session: Not on file    Stress: Not on file   Relationships    Social connections     Talks on phone: Not on file      Gets together: Not on file     Attends Mandaeism service: Not on file     Active member of club or organization: Not on file     Attends meetings of clubs or organizations: Not on file     Relationship status: Not on file   Other Topics Concern    Not on file   Social History Narrative    Not on file       OBJECTIVE:     Vital Signs Range (Last 24H):  Temp:  [36.9 °C (98.5 °F)-37.4 °C (99.3 °F)]   Pulse:  []   Resp:  [16-36]   BP: (145-201)/()   SpO2:  [89 %-100 %]       Significant Labs:  Lab Results   Component Value Date    WBC 39.80 (H) 08/28/2020    HGB 7.3 (L) 08/28/2020    HCT 22.9 (L) 08/28/2020     08/28/2020    TRIG 167 (H) 08/24/2020    ALT 12 08/28/2020    AST 18 08/28/2020     08/28/2020     08/28/2020    K 3.9 08/28/2020    K 3.9 08/28/2020     08/28/2020     08/28/2020    CREATININE 1.1 08/28/2020    CREATININE 1.1 08/28/2020    BUN 13 08/28/2020    BUN 13 08/28/2020    CO2 28 08/28/2020    CO2 28 08/28/2020    INR 1.0 08/21/2020       Diagnostic Studies: No relevant studies.    EKG:   Results for orders placed or performed during the hospital encounter of 08/12/20   EKG 12-lead    Collection Time: 08/20/20  7:09 PM    Narrative    Test Reason : R00.0,    Vent. Rate : 131 BPM     Atrial Rate : 131 BPM     P-R Int : 100 ms          QRS Dur : 070 ms      QT Int : 276 ms       P-R-T Axes : 073 049 051 degrees     QTc Int : 407 ms    Sinus tachycardia with short LA  Possible Left atrial enlargement  Borderline Abnormal ECG  No previous ECGs available  Confirmed by Emma Leiva MD ([id number]) on 8/21/2020 12:15:32 PM    Referred By: SONALI RENDON           Confirmed By:Emma Leiva MD       2D ECHO:  TTE:  Results for orders placed or performed during the hospital encounter of 08/12/20   Echo Color Flow Doppler? Yes   Result Value Ref Range    BSA 1.68 m2    TDI SEPTAL 0.10 m/s    LV LATERAL E/E' RATIO 10.85 m/s    LV SEPTAL E/E' RATIO 14.10 m/s    LA WIDTH 4.52 cm     TDI LATERAL 0.13 m/s    LVIDD 5.67 3.5 - 6.0 cm    IVS 0.82 0.6 - 1.1 cm    PW 0.89 0.6 - 1.1 cm    LVIDS 3.78 2.1 - 4.0 cm    FS 33 28 - 44 %    LA volume 62.75 cm3    Sinus 2.80 cm    STJ 3.14 cm    Ascending aorta 3.19 cm    LV mass 183.36 g    LA size 3.59 cm    RVDD 3.13 cm    TAPSE 2.76 cm    RV S' 16.26 cm/s    Left Ventricle Relative Wall Thickness 0.31 cm    AV mean gradient 10 mmHg    AV valve area 2.16 cm2    AV Velocity Ratio 0.60     AV index (prosthetic) 0.64     MV valve area p 1/2 method 5.43 cm2    E/A ratio 1.13     Mean e' 0.12 m/s    E wave decelartion time 139.71 msec    IVRT 85.63 msec    Pulm vein S/D ratio 1.90     LVOT diameter 2.07 cm    LVOT area 3.4 cm2    LVOT peak nestor 1.34 m/s    LVOT peak VTI 22.13 cm    Ao peak nestor 2.25 m/s    Ao VTI 34.44 cm    LVOT stroke volume 74.44 cm3    AV peak gradient 20 mmHg    E/E' ratio 12.26 m/s    MV Peak E Nestor 1.41 m/s    TR Max Nestor 3.05 m/s    MV stenosis pressure 1/2 time 40.52 ms    MV Peak A Nestor 1.25 m/s    PV Peak S Nestor 0.78 m/s    PV Peak D Nestor 0.41 m/s    LV Systolic Volume 61.19 mL    LV Systolic Volume Index 37.0 mL/m2    LV Diastolic Volume 158.17 mL    LV Diastolic Volume Index 95.71 mL/m2    LA Volume Index 38.0 mL/m2    LV Mass Index 111 g/m2    RA Major Axis 4.45 cm    Left Atrium Minor Axis 4.61 cm    Left Atrium Major Axis 4.49 cm    Triscuspid Valve Regurgitation Peak Gradient 37 mmHg    RA Width 3.41 cm    Narrative    · Normal left ventricular systolic function. The estimated ejection   fraction is 60%.  · Eccentric left ventricular hypertrophy.  · Indeterminate left ventricular diastolic function.  · No wall motion abnormalities.  · Normal right ventricular systolic function.  · Mild left atrial enlargement.  · Mild-to-moderate aortic regurgitation.  · Mild mitral regurgitation.  · Mild tricuspid regurgitation.  · Indeterminate central venous pressure. Estimated PA systolic pressure is   at least 37 mmHg.  · There is a bilateral  pleural effusion.          TOBIAS:  No results found for this or any previous visit.    ASSESSMENT/PLAN:         Anesthesia Evaluation    I have reviewed the Patient Summary Reports.    I have reviewed the Nursing Notes. I have reviewed the NPO Status.   I have reviewed the Medications.     Review of Systems  Anesthesia Hx:  No problems with previous Anesthesia Denies Hx of Anesthetic complications  History of prior surgery of interest to airway management or planning: Denies Family Hx of Anesthesia complications.   Denies Personal Hx of Anesthesia complications.   Social:  Non-Smoker, Smoker    Hematology/Oncology:     Oncology Normal    -- Anemia:   EENT/Dental:EENT/Dental Normal   Cardiovascular:   Hypertension no hyperlipidemia    Pulmonary:   Pneumonia Denies COPD.  Denies Sleep Apnea.    Renal/:   Chronic Renal Disease, ARF    Hepatic/GI:   PUD, Denies GERD.    Musculoskeletal:  Musculoskeletal Normal Denies Arthritis.      Neurological:   Headaches    Endocrine:  Endocrine Normal    Dermatological:  Skin Normal    Psych:  Psychiatric Normal           Physical Exam  General:  Well nourished    Airway/Jaw/Neck:  Airway Findings: Mouth Opening: Small, but > 3cm Tongue: Normal  General Airway Assessment: Adult  Mallampati: II  TM Distance: 4 - 6 cm  Jaw/Neck Findings:  Neck ROM: Normal ROM      Dental:  Dental Findings: Periodontal disease, Severe   Chest/Lungs:  Chest/Lungs Findings: Rhonchi  Hoarse voice     Heart/Vascular:  Heart Findings: Rate: Tachycardia  Rhythm: Regular Rhythm  Sounds: Normal     Abdomen:  Abdomen Findings:  Surgical incision      Skin:  Skin Findings:  Surgical Incision, Recent     Mental Status:  Mental Status Findings:  Cooperative, Alert and Oriented         Anesthesia Plan  Type of Anesthesia, risks & benefits discussed:  Anesthesia Type:  general, MAC  Patient's Preference:   Intra-op Monitoring Plan: standard ASA monitors  Intra-op Monitoring Plan Comments:   Post Op Pain Control  Plan: multimodal analgesia, IV/PO Opioids PRN and per primary service following discharge from PACU  Post Op Pain Control Plan Comments:   Induction:   IV  Beta Blocker:  Patient is not currently on a Beta-Blocker (No further documentation required).       Informed Consent:    ASA Score: 3     Day of Surgery Review of History & Physical: I have interviewed and examined the patient. I have reviewed the patient's H&P dated: 8/28/20.   H&P update referred to the surgeon.         Ready For Surgery From Anesthesia Perspective.

## 2020-08-28 NOTE — ASSESSMENT & PLAN NOTE
oliguric stage 3 sid with ischemic atn likely secondary to septic shock  unknown bl cr  muddy brown casts on microscopy                Plan/Recommendations:     -Recommend holding further IVF today unless significant output from G tube  -Hold RRT today and reassess tomorrow am for further treatment   -Strict I/O's  -Transfuse for hgb < 7  -Renally dose meds/avoid nephrotoxic meds  -Keep MAP >65  -Will continue to follow closely   -Staff attestation to follow, see for further recommendations

## 2020-08-29 LAB
ALBUMIN SERPL BCP-MCNC: 1.3 G/DL (ref 3.5–5.2)
ALLENS TEST: ABNORMAL
ALP SERPL-CCNC: 110 U/L (ref 55–135)
ALT SERPL W/O P-5'-P-CCNC: 10 U/L (ref 10–44)
ANION GAP SERPL CALC-SCNC: 10 MMOL/L (ref 8–16)
ANION GAP SERPL CALC-SCNC: 7 MMOL/L (ref 8–16)
ANION GAP SERPL CALC-SCNC: 9 MMOL/L (ref 8–16)
ANISOCYTOSIS BLD QL SMEAR: SLIGHT
AST SERPL-CCNC: 19 U/L (ref 10–40)
BASO STIPL BLD QL SMEAR: ABNORMAL
BASOPHILS # BLD AUTO: 0.21 K/UL (ref 0–0.2)
BASOPHILS NFR BLD: 0.6 % (ref 0–1.9)
BASOPHILS NFR BLD: 1 % (ref 0–1.9)
BILIRUB SERPL-MCNC: 0.6 MG/DL (ref 0.1–1)
BLD PROD TYP BPU: NORMAL
BLOOD UNIT EXPIRATION DATE: NORMAL
BLOOD UNIT TYPE CODE: 7300
BLOOD UNIT TYPE: NORMAL
BUN SERPL-MCNC: 11 MG/DL (ref 6–20)
BUN SERPL-MCNC: 27 MG/DL (ref 6–20)
BUN SERPL-MCNC: 33 MG/DL (ref 6–20)
CA-I BLDV-SCNC: 1 MMOL/L (ref 1.06–1.42)
CALCIUM SERPL-MCNC: 7.9 MG/DL (ref 8.7–10.5)
CALCIUM SERPL-MCNC: 8.2 MG/DL (ref 8.7–10.5)
CALCIUM SERPL-MCNC: 8.7 MG/DL (ref 8.7–10.5)
CHLORIDE SERPL-SCNC: 104 MMOL/L (ref 95–110)
CHLORIDE SERPL-SCNC: 105 MMOL/L (ref 95–110)
CHLORIDE SERPL-SCNC: 106 MMOL/L (ref 95–110)
CO2 SERPL-SCNC: 25 MMOL/L (ref 23–29)
CO2 SERPL-SCNC: 27 MMOL/L (ref 23–29)
CO2 SERPL-SCNC: 27 MMOL/L (ref 23–29)
CODING SYSTEM: NORMAL
CREAT SERPL-MCNC: 0.9 MG/DL (ref 0.5–1.4)
CREAT SERPL-MCNC: 2.1 MG/DL (ref 0.5–1.4)
CREAT SERPL-MCNC: 2.4 MG/DL (ref 0.5–1.4)
DELSYS: ABNORMAL
DIFFERENTIAL METHOD: ABNORMAL
DIFFERENTIAL METHOD: ABNORMAL
DISPENSE STATUS: NORMAL
EOSINOPHIL # BLD AUTO: 0.4 K/UL (ref 0–0.5)
EOSINOPHIL NFR BLD: 1.3 % (ref 0–8)
EOSINOPHIL NFR BLD: 2 % (ref 0–8)
ERYTHROCYTE [DISTWIDTH] IN BLOOD BY AUTOMATED COUNT: 14.5 % (ref 11.5–14.5)
ERYTHROCYTE [DISTWIDTH] IN BLOOD BY AUTOMATED COUNT: 14.6 % (ref 11.5–14.5)
EST. GFR  (AFRICAN AMERICAN): 28.5 ML/MIN/1.73 M^2
EST. GFR  (AFRICAN AMERICAN): 33.4 ML/MIN/1.73 M^2
EST. GFR  (AFRICAN AMERICAN): >60 ML/MIN/1.73 M^2
EST. GFR  (NON AFRICAN AMERICAN): 24.7 ML/MIN/1.73 M^2
EST. GFR  (NON AFRICAN AMERICAN): 29 ML/MIN/1.73 M^2
EST. GFR  (NON AFRICAN AMERICAN): >60 ML/MIN/1.73 M^2
FLOW: 2
GIANT PLATELETS BLD QL SMEAR: PRESENT
GLUCOSE SERPL-MCNC: 111 MG/DL (ref 70–110)
GLUCOSE SERPL-MCNC: 114 MG/DL (ref 70–110)
GLUCOSE SERPL-MCNC: 130 MG/DL (ref 70–110)
HCO3 UR-SCNC: 26.7 MMOL/L (ref 24–28)
HCT VFR BLD AUTO: 22.3 % (ref 37–48.5)
HCT VFR BLD AUTO: 29 % (ref 37–48.5)
HCT VFR BLD CALC: 27 %PCV (ref 36–54)
HGB BLD-MCNC: 6.9 G/DL (ref 12–16)
HGB BLD-MCNC: 9.3 G/DL (ref 12–16)
HYPOCHROMIA BLD QL SMEAR: ABNORMAL
IMM GRANULOCYTES # BLD AUTO: 0.78 K/UL (ref 0–0.04)
IMM GRANULOCYTES # BLD AUTO: ABNORMAL K/UL (ref 0–0.04)
IMM GRANULOCYTES NFR BLD AUTO: 2.3 % (ref 0–0.5)
IMM GRANULOCYTES NFR BLD AUTO: ABNORMAL % (ref 0–0.5)
LYMPHOCYTES # BLD AUTO: 2.2 K/UL (ref 1–4.8)
LYMPHOCYTES NFR BLD: 4 % (ref 18–48)
LYMPHOCYTES NFR BLD: 6.4 % (ref 18–48)
MAGNESIUM SERPL-MCNC: 1.9 MG/DL (ref 1.6–2.6)
MAGNESIUM SERPL-MCNC: 2.3 MG/DL (ref 1.6–2.6)
MAGNESIUM SERPL-MCNC: 2.3 MG/DL (ref 1.6–2.6)
MCH RBC QN AUTO: 30 PG (ref 27–31)
MCH RBC QN AUTO: 30.3 PG (ref 27–31)
MCHC RBC AUTO-ENTMCNC: 30.9 G/DL (ref 32–36)
MCHC RBC AUTO-ENTMCNC: 32.1 G/DL (ref 32–36)
MCV RBC AUTO: 95 FL (ref 82–98)
MCV RBC AUTO: 97 FL (ref 82–98)
METAMYELOCYTES NFR BLD MANUAL: 2 %
MODE: ABNORMAL
MONOCYTES # BLD AUTO: 2 K/UL (ref 0.3–1)
MONOCYTES NFR BLD: 2 % (ref 4–15)
MONOCYTES NFR BLD: 5.8 % (ref 4–15)
MYELOCYTES NFR BLD MANUAL: 2 %
NEUTROPHILS # BLD AUTO: 28.9 K/UL (ref 1.8–7.7)
NEUTROPHILS NFR BLD: 83.6 % (ref 38–73)
NEUTROPHILS NFR BLD: 86 % (ref 38–73)
NEUTS BAND NFR BLD MANUAL: 1 %
NRBC BLD-RTO: 0 /100 WBC
NRBC BLD-RTO: 0 /100 WBC
NUM UNITS TRANS PACKED RBC: NORMAL
OVALOCYTES BLD QL SMEAR: ABNORMAL
PCO2 BLDA: 33.9 MMHG (ref 35–45)
PH SMN: 7.5 [PH] (ref 7.35–7.45)
PHOSPHATE SERPL-MCNC: 1.6 MG/DL (ref 2.7–4.5)
PHOSPHATE SERPL-MCNC: 3.5 MG/DL (ref 2.7–4.5)
PHOSPHATE SERPL-MCNC: 3.7 MG/DL (ref 2.7–4.5)
PLATELET # BLD AUTO: 382 K/UL (ref 150–350)
PLATELET # BLD AUTO: 397 K/UL (ref 150–350)
PLATELET BLD QL SMEAR: ABNORMAL
PMV BLD AUTO: 10.2 FL (ref 9.2–12.9)
PMV BLD AUTO: 10.4 FL (ref 9.2–12.9)
PO2 BLDA: 49 MMHG (ref 80–100)
POC BE: 4 MMOL/L
POC IONIZED CALCIUM: 1.18 MMOL/L (ref 1.06–1.42)
POC SATURATED O2: 88 % (ref 95–100)
POC TCO2: 28 MMOL/L (ref 23–27)
POCT GLUCOSE: 115 MG/DL (ref 70–110)
POCT GLUCOSE: 129 MG/DL (ref 70–110)
POCT GLUCOSE: 150 MG/DL (ref 70–110)
POIKILOCYTOSIS BLD QL SMEAR: SLIGHT
POLYCHROMASIA BLD QL SMEAR: ABNORMAL
POTASSIUM BLD-SCNC: 3.9 MMOL/L (ref 3.5–5.1)
POTASSIUM SERPL-SCNC: 3.6 MMOL/L (ref 3.5–5.1)
POTASSIUM SERPL-SCNC: 3.8 MMOL/L (ref 3.5–5.1)
POTASSIUM SERPL-SCNC: 3.9 MMOL/L (ref 3.5–5.1)
PROT SERPL-MCNC: 6.5 G/DL (ref 6–8.4)
RBC # BLD AUTO: 2.3 M/UL (ref 4–5.4)
RBC # BLD AUTO: 3.07 M/UL (ref 4–5.4)
SAMPLE: ABNORMAL
SITE: ABNORMAL
SODIUM BLD-SCNC: 140 MMOL/L (ref 136–145)
SODIUM SERPL-SCNC: 140 MMOL/L (ref 136–145)
SP02: 91
WBC # BLD AUTO: 33.12 K/UL (ref 3.9–12.7)
WBC # BLD AUTO: 34.54 K/UL (ref 3.9–12.7)

## 2020-08-29 PROCEDURE — 94003 VENT MGMT INPAT SUBQ DAY: CPT

## 2020-08-29 PROCEDURE — 87205 SMEAR GRAM STAIN: CPT

## 2020-08-29 PROCEDURE — 63600175 PHARM REV CODE 636 W HCPCS: Performed by: STUDENT IN AN ORGANIZED HEALTH CARE EDUCATION/TRAINING PROGRAM

## 2020-08-29 PROCEDURE — 31624 PR BRONCHOSCOPY,DIAG2STIC W LAVAGE: ICD-10-PCS | Mod: 51,79,, | Performed by: SURGERY

## 2020-08-29 PROCEDURE — 25000003 PHARM REV CODE 250

## 2020-08-29 PROCEDURE — 99233 PR SUBSEQUENT HOSPITAL CARE,LEVL III: ICD-10-PCS | Mod: 24,25,, | Performed by: SURGERY

## 2020-08-29 PROCEDURE — 87070 CULTURE OTHR SPECIMN AEROBIC: CPT

## 2020-08-29 PROCEDURE — 25000003 PHARM REV CODE 250: Performed by: SURGERY

## 2020-08-29 PROCEDURE — A4217 STERILE WATER/SALINE, 500 ML: HCPCS | Performed by: STUDENT IN AN ORGANIZED HEALTH CARE EDUCATION/TRAINING PROGRAM

## 2020-08-29 PROCEDURE — 82803 BLOOD GASES ANY COMBINATION: CPT

## 2020-08-29 PROCEDURE — 25000003 PHARM REV CODE 250: Performed by: STUDENT IN AN ORGANIZED HEALTH CARE EDUCATION/TRAINING PROGRAM

## 2020-08-29 PROCEDURE — 85027 COMPLETE CBC AUTOMATED: CPT

## 2020-08-29 PROCEDURE — 85007 BL SMEAR W/DIFF WBC COUNT: CPT

## 2020-08-29 PROCEDURE — 84132 ASSAY OF SERUM POTASSIUM: CPT

## 2020-08-29 PROCEDURE — 99233 SBSQ HOSP IP/OBS HIGH 50: CPT | Mod: 24,25,, | Performed by: SURGERY

## 2020-08-29 PROCEDURE — 84295 ASSAY OF SERUM SODIUM: CPT

## 2020-08-29 PROCEDURE — P9016 RBC LEUKOCYTES REDUCED: HCPCS

## 2020-08-29 PROCEDURE — 85025 COMPLETE CBC W/AUTO DIFF WBC: CPT

## 2020-08-29 PROCEDURE — 80053 COMPREHEN METABOLIC PANEL: CPT

## 2020-08-29 PROCEDURE — 80069 RENAL FUNCTION PANEL: CPT

## 2020-08-29 PROCEDURE — 94002 VENT MGMT INPAT INIT DAY: CPT

## 2020-08-29 PROCEDURE — 80100008 HC CRRT DAILY MAINTENANCE

## 2020-08-29 PROCEDURE — 99233 PR SUBSEQUENT HOSPITAL CARE,LEVL III: ICD-10-PCS | Mod: ,,, | Performed by: INTERNAL MEDICINE

## 2020-08-29 PROCEDURE — 63600175 PHARM REV CODE 636 W HCPCS: Performed by: SURGERY

## 2020-08-29 PROCEDURE — 84100 ASSAY OF PHOSPHORUS: CPT

## 2020-08-29 PROCEDURE — 85014 HEMATOCRIT: CPT

## 2020-08-29 PROCEDURE — 36600 WITHDRAWAL OF ARTERIAL BLOOD: CPT

## 2020-08-29 PROCEDURE — 99233 SBSQ HOSP IP/OBS HIGH 50: CPT | Mod: ,,, | Performed by: INTERNAL MEDICINE

## 2020-08-29 PROCEDURE — 82330 ASSAY OF CALCIUM: CPT

## 2020-08-29 PROCEDURE — S0028 INJECTION, FAMOTIDINE, 20 MG: HCPCS | Performed by: STUDENT IN AN ORGANIZED HEALTH CARE EDUCATION/TRAINING PROGRAM

## 2020-08-29 PROCEDURE — 31622 DX BRONCHOSCOPE/WASH: CPT

## 2020-08-29 PROCEDURE — 20000000 HC ICU ROOM

## 2020-08-29 PROCEDURE — 90945 DIALYSIS ONE EVALUATION: CPT

## 2020-08-29 PROCEDURE — 83735 ASSAY OF MAGNESIUM: CPT

## 2020-08-29 PROCEDURE — 27000221 HC OXYGEN, UP TO 24 HOURS

## 2020-08-29 PROCEDURE — 31500 PR INSERT, EMERGENCY ENDOTRACH AIRWAY: ICD-10-PCS | Mod: 79,,, | Performed by: SURGERY

## 2020-08-29 PROCEDURE — 99900035 HC TECH TIME PER 15 MIN (STAT)

## 2020-08-29 PROCEDURE — 63600175 PHARM REV CODE 636 W HCPCS

## 2020-08-29 PROCEDURE — 99900025 HC BRONCHOSCOPY-ASST (STAT)

## 2020-08-29 PROCEDURE — 83735 ASSAY OF MAGNESIUM: CPT | Mod: 91

## 2020-08-29 PROCEDURE — 94761 N-INVAS EAR/PLS OXIMETRY MLT: CPT

## 2020-08-29 PROCEDURE — 99900026 HC AIRWAY MAINTENANCE (STAT)

## 2020-08-29 PROCEDURE — 31624 DX BRONCHOSCOPE/LAVAGE: CPT | Mod: 51,79,, | Performed by: SURGERY

## 2020-08-29 PROCEDURE — 31500 INSERT EMERGENCY AIRWAY: CPT | Mod: 79,,, | Performed by: SURGERY

## 2020-08-29 RX ORDER — HYDROCODONE BITARTRATE AND ACETAMINOPHEN 500; 5 MG/1; MG/1
TABLET ORAL CONTINUOUS
Status: ACTIVE | OUTPATIENT
Start: 2020-08-29 | End: 2020-08-30

## 2020-08-29 RX ORDER — MAGNESIUM SULFATE HEPTAHYDRATE 40 MG/ML
2 INJECTION, SOLUTION INTRAVENOUS
Status: ACTIVE | OUTPATIENT
Start: 2020-08-29 | End: 2020-08-30

## 2020-08-29 RX ORDER — POTASSIUM CHLORIDE 29.8 MG/ML
40 INJECTION INTRAVENOUS ONCE
Status: COMPLETED | OUTPATIENT
Start: 2020-08-29 | End: 2020-08-30

## 2020-08-29 RX ORDER — PROPOFOL 10 MG/ML
100 VIAL (ML) INTRAVENOUS ONCE
Status: COMPLETED | OUTPATIENT
Start: 2020-08-29 | End: 2020-08-29

## 2020-08-29 RX ORDER — HYDROCODONE BITARTRATE AND ACETAMINOPHEN 500; 5 MG/1; MG/1
TABLET ORAL
Status: DISCONTINUED | OUTPATIENT
Start: 2020-08-29 | End: 2020-09-08

## 2020-08-29 RX ORDER — PHENYLEPHRINE HCL IN 0.9% NACL 1 MG/10 ML
SYRINGE (ML) INTRAVENOUS
Status: DISPENSED
Start: 2020-08-29 | End: 2020-08-29

## 2020-08-29 RX ORDER — ROCURONIUM BROMIDE 10 MG/ML
INJECTION, SOLUTION INTRAVENOUS
Status: COMPLETED
Start: 2020-08-29 | End: 2020-08-29

## 2020-08-29 RX ORDER — MAGNESIUM SULFATE HEPTAHYDRATE 40 MG/ML
2 INJECTION, SOLUTION INTRAVENOUS
Status: DISCONTINUED | OUTPATIENT
Start: 2020-08-29 | End: 2020-08-31

## 2020-08-29 RX ORDER — ROCURONIUM BROMIDE 10 MG/ML
50 INJECTION, SOLUTION INTRAVENOUS ONCE
Status: COMPLETED | OUTPATIENT
Start: 2020-08-29 | End: 2020-08-29

## 2020-08-29 RX ORDER — PROPOFOL 10 MG/ML
INJECTION, EMULSION INTRAVENOUS
Status: COMPLETED
Start: 2020-08-29 | End: 2020-08-29

## 2020-08-29 RX ORDER — POTASSIUM CHLORIDE 29.8 MG/ML
40 INJECTION INTRAVENOUS ONCE
Status: COMPLETED | OUTPATIENT
Start: 2020-08-29 | End: 2020-08-29

## 2020-08-29 RX ORDER — LABETALOL HCL 20 MG/4 ML
20 SYRINGE (ML) INTRAVENOUS EVERY 4 HOURS PRN
Status: DISCONTINUED | OUTPATIENT
Start: 2020-08-29 | End: 2020-08-29

## 2020-08-29 RX ORDER — PROPOFOL 10 MG/ML
INJECTION, EMULSION INTRAVENOUS
Status: DISPENSED
Start: 2020-08-29 | End: 2020-08-29

## 2020-08-29 RX ORDER — PROPOFOL 10 MG/ML
10 INJECTION, EMULSION INTRAVENOUS CONTINUOUS
Status: DISCONTINUED | OUTPATIENT
Start: 2020-08-29 | End: 2020-08-31

## 2020-08-29 RX ORDER — LABETALOL HCL 20 MG/4 ML
20 SYRINGE (ML) INTRAVENOUS EVERY 4 HOURS PRN
Status: DISCONTINUED | OUTPATIENT
Start: 2020-08-29 | End: 2020-09-02

## 2020-08-29 RX ORDER — MAGNESIUM SULFATE HEPTAHYDRATE 40 MG/ML
4 INJECTION, SOLUTION INTRAVENOUS
Status: DISCONTINUED | OUTPATIENT
Start: 2020-08-29 | End: 2020-08-31

## 2020-08-29 RX ORDER — HYDRALAZINE HYDROCHLORIDE 20 MG/ML
10 INJECTION INTRAMUSCULAR; INTRAVENOUS EVERY 6 HOURS PRN
Status: DISCONTINUED | OUTPATIENT
Start: 2020-08-29 | End: 2020-09-03

## 2020-08-29 RX ORDER — IPRATROPIUM BROMIDE AND ALBUTEROL SULFATE 2.5; .5 MG/3ML; MG/3ML
3 SOLUTION RESPIRATORY (INHALATION) EVERY 6 HOURS PRN
Status: DISCONTINUED | OUTPATIENT
Start: 2020-08-29 | End: 2020-08-31

## 2020-08-29 RX ORDER — MIDAZOLAM HYDROCHLORIDE 1 MG/ML
2 INJECTION INTRAMUSCULAR; INTRAVENOUS ONCE
Status: COMPLETED | OUTPATIENT
Start: 2020-08-29 | End: 2020-08-29

## 2020-08-29 RX ORDER — MIDAZOLAM HYDROCHLORIDE 1 MG/ML
INJECTION INTRAMUSCULAR; INTRAVENOUS
Status: COMPLETED
Start: 2020-08-29 | End: 2020-08-29

## 2020-08-29 RX ADMIN — PIPERACILLIN SODIUM AND TAZOBACTAM SODIUM 4.5 G: 4; .5 INJECTION, POWDER, LYOPHILIZED, FOR SOLUTION INTRAVENOUS at 09:08

## 2020-08-29 RX ADMIN — PIPERACILLIN SODIUM AND TAZOBACTAM SODIUM 4.5 G: 4; .5 INJECTION, POWDER, LYOPHILIZED, FOR SOLUTION INTRAVENOUS at 08:08

## 2020-08-29 RX ADMIN — HEPARIN SODIUM 5000 UNITS: 5000 INJECTION INTRAVENOUS; SUBCUTANEOUS at 02:08

## 2020-08-29 RX ADMIN — MIDAZOLAM HYDROCHLORIDE 2 MG: 1 INJECTION, SOLUTION INTRAMUSCULAR; INTRAVENOUS at 08:08

## 2020-08-29 RX ADMIN — Medication: at 01:08

## 2020-08-29 RX ADMIN — METOROPROLOL TARTRATE 10 MG: 5 INJECTION, SOLUTION INTRAVENOUS at 05:08

## 2020-08-29 RX ADMIN — ROCURONIUM BROMIDE 50 MG: 10 INJECTION, SOLUTION INTRAVENOUS at 08:08

## 2020-08-29 RX ADMIN — PROPOFOL 20 MCG/KG/MIN: 10 INJECTION, EMULSION INTRAVENOUS at 08:08

## 2020-08-29 RX ADMIN — Medication 10 MG: at 04:08

## 2020-08-29 RX ADMIN — HEPARIN SODIUM 5000 UNITS: 5000 INJECTION INTRAVENOUS; SUBCUTANEOUS at 05:08

## 2020-08-29 RX ADMIN — MIDAZOLAM HYDROCHLORIDE 2 MG: 1 INJECTION INTRAMUSCULAR; INTRAVENOUS at 08:08

## 2020-08-29 RX ADMIN — HYDRALAZINE HYDROCHLORIDE 10 MG: 20 INJECTION INTRAMUSCULAR; INTRAVENOUS at 06:08

## 2020-08-29 RX ADMIN — HYDRALAZINE HYDROCHLORIDE 10 MG: 20 INJECTION INTRAMUSCULAR; INTRAVENOUS at 03:08

## 2020-08-29 RX ADMIN — SODIUM CHLORIDE: 0.9 INJECTION, SOLUTION INTRAVENOUS at 12:08

## 2020-08-29 RX ADMIN — FLUCONAZOLE 200 MG: 2 INJECTION, SOLUTION INTRAVENOUS at 10:08

## 2020-08-29 RX ADMIN — MICONAZOLE NITRATE: 20 POWDER TOPICAL at 09:08

## 2020-08-29 RX ADMIN — SODIUM PHOSPHATE, MONOBASIC, MONOHYDRATE 39.99 MMOL: 276; 142 INJECTION, SOLUTION INTRAVENOUS at 11:08

## 2020-08-29 RX ADMIN — Medication 10 MG: at 06:08

## 2020-08-29 RX ADMIN — PROPOFOL 120 MG: 10 INJECTION, EMULSION INTRAVENOUS at 08:08

## 2020-08-29 RX ADMIN — MAGNESIUM SULFATE HEPTAHYDRATE: 500 INJECTION, SOLUTION INTRAMUSCULAR; INTRAVENOUS at 09:08

## 2020-08-29 RX ADMIN — METOROPROLOL TARTRATE 10 MG: 5 INJECTION, SOLUTION INTRAVENOUS at 11:08

## 2020-08-29 RX ADMIN — FAMOTIDINE 20 MG: 10 INJECTION INTRAVENOUS at 09:08

## 2020-08-29 RX ADMIN — Medication 20 MG: at 08:08

## 2020-08-29 RX ADMIN — POTASSIUM CHLORIDE 40 MEQ: 29.8 INJECTION, SOLUTION INTRAVENOUS at 05:08

## 2020-08-29 RX ADMIN — METOROPROLOL TARTRATE 10 MG: 5 INJECTION, SOLUTION INTRAVENOUS at 12:08

## 2020-08-29 RX ADMIN — HEPARIN SODIUM 5000 UNITS: 5000 INJECTION INTRAVENOUS; SUBCUTANEOUS at 09:08

## 2020-08-29 NOTE — PROCEDURES
"Jaquan Farmer is a 39 y.o. female patient.    Temp: 100 °F (37.8 °C) (08/29/20 0800)  Pulse: (!) 148 (08/29/20 0800)  Resp: 17 (08/29/20 0800)  BP: (!) 159/115 (08/29/20 0800)  SpO2: 100 % (08/29/20 0819)  Weight: 69 kg (152 lb 1.9 oz) (08/29/20 0500)  Height: 5' 2" (157.5 cm) (08/29/20 0819)       Intubation    Date/Time: 8/29/2020 8:36 AM  Location procedure was performed: [facility] SUR[facility]ERY  Performed by: Jadon Tran DO  Authorized by: Jadon Tran DO   Consent Done: Emergent Situation  Indications: respiratory distress  Intubation method: video-assisted  Patient status: sedated  Preoxygenation: bag valve mask  Pretreatment medications: midazolam  Sedatives: propofol  Paralytic: rocuronium  Laryngoscope size: Glide 3  Tube size: 7.0 mm  Tube type: cuffed  Number of attempts: 1  Cricoid pressure: yes  Cords visualized: yes  Post-procedure assessment: CO2 detector and chest rise  Breath sounds: rales/crackles  Cuff inflated: yes  ETT to teeth: 21 cm  Tube secured with: ETT osborne  Chest x-ray interpreted by radiologist and me.  Chest x-ray findings: endotracheal tube in appropriate position  Patient tolerance: Patient tolerated the procedure well with no immediate complications  Complications: No  Specimens: No  Comments: ETT placed for bronch due to acute respiratory distress.          Jadon Tran  8/29/2020  "

## 2020-08-29 NOTE — PROGRESS NOTES
[facility]  Critical Care - Surgery  Progress Note    Patient Name: Jaquan Farmer  MRN: [id number]  Admission Date: 8/12/2020  Hospital Length of Stay: 17 days  Code Status: Full Code  Attending Provider: Donis Roa MD  Primary Care Provider: Primary Doctor No   Principal Problem: Duodenum disorder    Subjective:     Hospital/ICU Course:  8/14 Patient had a line replaced along with central line placed, with complication of pneumothorax. Rt pigtail was placed. Patient tolerated complications well. Decreasing vent settings.  8/15 NAEO. Patient was taken back to OR early AM for washout. Possible closure 8/18. ETT exchanged in OR 7.0 -> 7.5  8/16 - NAEON. HDS. Intubated, sedated.   8/17 - NAEON. OR tomorrow for closure.  8/18 - Washed out and closed in OR  8/22 - NAEO. Plan for Repeat CT AP today  8/23 - NAEO. Hbg lower. Giving blood x2 units.  8/25 - NAEO. Went to OR yesterday for Abdominal wall exploration and ligation of bleeding vessels. Wound vac exchanged  8/26 - NAEO. Hypertensive overnight. Hgb trending. Plan for CT today  8/27 - NAEO. Hypertensive overnight. CT yesterday showed no extravasation or acute changes  8/28 - NAEO.   8/29 - Patient in respiratory distress upon arrival. Chest XR w/ obvious left sided pulmonary occlusion. Patient intubated and bronchoscopy performed.    Interval History/Significant Events:   - Hypertension last night treated with labetalol and PRN Hydralazine  - In respiratory distress upon exam this morning. Chest xray w/ obvious left sided bronchial occlusion. Patient emergently intubated and bronchoscopy performed.  - WCTM for respiratory improvement    Follow-up For: Procedure(s) (LRB):  WASHOUT abdomen (N/A)    Post-Operative Day: 5 Days Post-Op    Objective:     Vital Signs (Most Recent):  Temp: 100 °F (37.8 °C) (08/29/20 0800)  Pulse: (!) 148 (08/29/20 0800)  Resp: 17 (08/29/20 0800)  BP: (!) 159/115 (08/29/20 0800)  SpO2: 100 % (08/29/20 0819)  Vital Signs (24h Range):  Temp:  [98.7 °F (37.1 °C)-100 °F (37.8 °C)] 100 °F (37.8 °C)  Pulse:  [104-148] 148  Resp:  [17-44] 17  SpO2:  [90 %-100 %] 100 %  BP: (153-190)/() 159/115     Weight: 69 kg (152 lb 1.9 oz)  Body mass index is 27.82 kg/m².      Intake/Output Summary (Last 24 hours) at 8/29/2020 0857  Last data filed at 8/29/2020 0600  Gross per 24 hour   Intake 4982.67 ml   Output 1635 ml   Net 3347.67 ml       Physical Exam  Vitals signs and nursing note reviewed.   Constitutional:       General: She is in acute distress.   HENT:      Head: Normocephalic.      Mouth/Throat:      Mouth: Mucous membranes are moist.   Eyes:      General: No scleral icterus.     Extraocular Movements: Extraocular movements intact.   Cardiovascular:      Rate and Rhythm: Regular rhythm. Tachycardia present.   Pulmonary:      Effort: Respiratory distress present.   Abdominal:      Comments: Wound vac in place, holding suction  Drains with bilious output  Gtube to gravity   Skin:     Coloration: Skin is not jaundiced or pale.   Neurological:      General: No focal deficit present.      Mental Status: She is alert.   Psychiatric:         Mood and Affect: Mood normal.         Behavior: Behavior normal.         Vents:  Vent Mode: A/C (08/29/20 0800)  Ventilator Initiated: Yes (08/29/20 0800)  Set Rate: 16 BPM (08/29/20 0800)  Vt Set: 380 mL (08/29/20 0800)  PEEP/CPAP: 5 cmH20 (08/29/20 0800)  Oxygen Concentration (%): 100 (08/29/20 0819)  Peak Airway Pressure: 34 cmH2O (08/29/20 0800)  Plateau Pressure: 0 cmH20 (08/29/20 0800)  Total Ve: 3.79 mL (08/29/20 0800)  Negative Inspiratory Force (cm H2O): -23 (08/20/20 1501)  F/VT Ratio<105 (RSBI): (!) 70.54 (08/29/20 0800)    Lines/Drains/Airways     Central Venous Catheter Line            Trialysis (Dialysis) Catheter 08/13/20 2230 right internal jugular 15 days          Drain                 Closed/Suction Drain 08/13/20 1659 Right;Inferior Abdomen Bulb 19 Fr. 15 days          Closed/Suction Drain 08/13/20 1659 Right;Superior Abdomen Bulb 19 Fr. 15 days         Gastrostomy/Enterostomy 08/15/20 0916 Gastrostomy tube w/ balloon LUQ decompression 13 days    Female External Urinary Catheter 08/27/20 1600 1 day          Airway                 Airway - Non-Surgical 08/29/20 0755 Endotracheal Tube less than 1 day          Peripheral Intravenous Line                 Midline Catheter Insertion/Assessment  - Single Lumen 08/26/20 1501 Right basilic vein (medial side of arm) 18g x 8cm 2 days                Significant Labs:    CBC/Anemia Profile:  Recent Labs   Lab 08/28/20  0400 08/29/20  0336 08/29/20  0715 08/29/20  0724   WBC 39.80* 33.12*  --  34.54*   HGB 7.3* 6.9*  --  9.3*   HCT 22.9* 22.3* 27* 29.0*    382*  --  397*   MCV 94 97  --  95   RDW 14.2 14.6*  --  14.5        Chemistries:  Recent Labs   Lab 08/27/20  2338 08/28/20  0400 08/29/20  0336    142  142 140   K 3.8 3.9  3.9 3.6    106  106 105   CO2 28 28  28 25   BUN 19 13  13 27*   CREATININE 1.4 1.1  1.1 2.1*   CALCIUM 8.2* 7.7*  7.7* 8.2*   ALBUMIN 1.4* 1.3*  1.3* 1.3*   PROT  --  6.1 6.5   BILITOT  --  0.7 0.6   ALKPHOS  --  111 110   ALT  --  12 10   AST  --  18 19   MG 1.9 2.4  2.4 2.3   PHOS 1.4* 3.2  3.2 3.5       All pertinent labs within the past 24 hours have been reviewed.    Significant Imaging:  I have reviewed all pertinent imaging results/findings within the past 24 hours.    Assessment/Plan:     Severe sepsis   40 yo F s/p antrectomy with BII reconstruction c/b duodenal necrosis requiring ex lap, washout, drainage c/b aspiration event. Now with severe sepsis and ARDS     .Neuro:  - AOx4  - PCA for pain control  - Clonidine patch for anxiety     Resp:  - Intubated 8/29.   - PRN breathing treatments     CV:  - MAP goal >65  - Systolic <160  - Levo and Vaso gtt - off  - Increase Metoprolol scheduled dose  - Hydralazine PRN  - Permacath scheduled for Monday     Heme:  - Hgb/Hct 6.9; 1U PRBC's  given  - S/p Abdominal washout on 8/21  - S/p Abdominal exploration w/ woundvac exhange 8/24     ID:  - WBC remains elevated 33.12  - Zosyn, Fluconazole  - Blood cultures and BAL taken 8/19 with NGTD and no abnormal findings     Renal:  - Oliguric on arrival; Cr 1.1 at that time   - SLED overnight  - Trend BUN/Cr;   - Monitor I/Os  - MIVF to 100cc/hr     FEN/GI:  - NPO  - TPN  - NGT to LIWS   - Maintain drains to bulb suction; LLQ with acute drop in drainage and accumulation in SQ tissue  - GI ppx  - Replace electrolytes as needed to keep K>4, Mg>2     Endo:  - Monitor BG     Dispo:  - Continue SICU care  -Permacath Monday         Critical care was time spent personally by me on the following activities: development of treatment plan with patient or surrogate and bedside caregivers, discussions with consultants, evaluation of patient's response to treatment, examination of patient, ordering and performing treatments and interventions, ordering and review of laboratory studies, ordering and review of radiographic studies, pulse oximetry, re-evaluation of patient's condition.  This critical care time did not overlap with that of any other provider or involve time for any procedures.     Avtar Kim MD  Critical Care - Surgery  [facility]

## 2020-08-29 NOTE — ASSESSMENT & PLAN NOTE
40 yo F s/p antrectomy with BII reconstruction c/b duodenal necrosis requiring ex lap, washout, drainage c/b aspiration event. S/p duodenal resection with d-j and g-j anastomosis.  - Will plan to take to OR for permcath placement on Monday (8/31), can also change wound vac at that time.  - Continue ICU care  - Transfuse PRBC as needed for Hgb <7  - Respiratory treatment q4h, CPT  - NPO  - G-tube to gravity  - Abx: diflucan, zosyn  - TPN   - Daily labs  - Trend CBC  - GI and DVT ppx  - PT / OT

## 2020-08-29 NOTE — PROGRESS NOTES
Pt vss, sats >90% on 1L NC vlad well, TPN, Lipids and PCA gtts infusing. Labetalol given x2 for SBP>180 per MD order. Accuchecks q6, no insulin coverage needed. Lab monitored, electrolytes replaced. GODFREY drain and wound vac output monitored, WNL. UO monitored. Frequent rounds made in order to ensure pain control, PCA pump in use. Plan for permacath Monday. POC reviewed with patient, no questions/concerns at this time. Will continue to monitor.     Skin assessed. Pt turned q2. Linens changed. Bruising to bilateral lower extremities, foam dressing in place. No other skin breakdown noted, will continue to monitor

## 2020-08-29 NOTE — ASSESSMENT & PLAN NOTE
- oliguric stage 3 sid with ischemic atn likely secondary to septic shock  - unknown bl cr  - muddy brown casts on microscopy    Plan/Recommendations:  - SLED x 12 hours for volume and metabolic clearance.   -Strict I/O's  -Transfuse for hgb < 7  -Renally dose meds/avoid nephrotoxic meds  -Keep MAP >65  -Will continue to follow closely

## 2020-08-29 NOTE — PROGRESS NOTES
[facility]  Nephrology  Progress Note    Patient Name: Jaquan Farmer  MRN: [id number]  Admission Date: 8/12/2020  Hospital Length of Stay: 17 days  Attending Provider: Donis Roa MD   Primary Care Physician: Primary Doctor No  Principal Problem:Duodenum disorder    Subjective:     HPI: Mrs. Farmer is a 39 year old female with antrectomy at [facility] complicated duodenal necrosis post op. She aspirated, was intubated, and became septic. She developed renal failure and required max vent settings and was transferred here for higher level of care. On arrival she was hypotensive on pressers, max vent setting, and had minimal urine output.  Overnight she was given 6L of fluid, 6g calcium, placed on vac, pip-tazo, and fluconazole. Nephro consulted for sid.    Interval History: Patient seen and examined at beside. I/O with positive fluid balance ~3L in the past 24 hrs. Oliguric. No acute electrolyte abnormalities on AM lab.     Review of patient's allergies indicates:   Allergen Reactions    Latex      Current Facility-Administered Medications   Medication Frequency    0.9%  NaCl infusion (for blood administration) Q24H PRN    albuterol-ipratropium 2.5 mg-0.5 mg/3 mL nebulizer solution 3 mL Q6H PRN    calcium gluconate 1g in dextrose 5% 100mL (ready to mix system) PRN    calcium gluconate 2 g in dextrose 5 % 100 mL IVPB PRN    calcium gluconate 3 g in dextrose 5 % 100 mL IVPB PRN    cloNIDine 0.2 mg/24 hr td ptwk 1 patch Q7 Days    dexmedetomidine (PRECEDEX) 400mcg/100mL 0.9% NaCL infusion Continuous    famotidine (PF) injection 20 mg Daily    fat emulsion 20 % infusion 250 mL Daily    fluconazole (DIFLUCAN) IVPB 200 mg 100 mL Q24H    heparin (porcine) injection 5,000 Units Q8H    hydrALAZINE injection 10 mg Q6H PRN    HYDROmorphone PCA syringe 6 mg/30 mL (0.2 mg/mL) NS Continuous    labetalol 20 mg/4 mL (5 mg/mL) IV syring Q4H PRN    lactated ringers infusion Continuous     lidocaine 5 % patch 1 patch Q24H    magnesium sulfate 2g in water 50mL IVPB (premix) PRN    magnesium sulfate 2g in water 50mL IVPB (premix) PRN    melatonin tablet 3 mg Nightly PRN    metoprolol injection 10 mg Q6H    metoprolol injection 5 mg Q4H PRN    miconazole NITRATE 2 % top powder BID    naloxone 0.4 mg/mL injection 0.02 mg PRN    naloxone 0.4 mg/mL injection 0.02 mg PRN    phenylephrine HCl in 0.9% NaCl 1 mg/10 mL (100 mcg/mL) syringe     piperacillin-tazobactam 4.5 g in sodium chloride 0.9% 100 mL IVPB (ready to mix system) Q12H    propofoL (DIPRIVAN) 10 mg/mL infusion     propofol (DIPRIVAN) 10 mg/mL infusion Continuous    propofol (DIPRIVAN) 10 mg/mL IVP Once    sodium phosphate 15 mmol in dextrose 5 % 250 mL IVPB PRN    sodium phosphate 20.01 mmol in dextrose 5 % 250 mL IVPB PRN    sodium phosphate 30 mmol in dextrose 5 % 250 mL IVPB PRN    TPN ADULT CENTRAL LINE CUSTOM Continuous       Objective:     Vital Signs (Most Recent):  Temp: 100 °F (37.8 °C) (08/29/20 0800)  Pulse: (!) 148 (08/29/20 0800)  Resp: 17 (08/29/20 0800)  BP: (!) 159/115 (08/29/20 0800)  SpO2: 100 % (08/29/20 0819)  O2 Device (Oxygen Therapy): ventilator (08/29/20 0819) Vital Signs (24h Range):  Temp:  [98.7 °F (37.1 °C)-100 °F (37.8 °C)] 100 °F (37.8 °C)  Pulse:  [104-148] 148  Resp:  [17-44] 17  SpO2:  [90 %-100 %] 100 %  BP: (153-190)/() 159/115     Weight: 69 kg (152 lb 1.9 oz) (08/29/20 0500)  Body mass index is 27.82 kg/m².  Body surface area is 1.74 meters squared.    I/O last 3 completed shifts:  In: 9170.7 [I.V.:6794; Blood:431.7]  Out: 5762 [Urine:420; Drains:2143; Other:3199]    Physical Exam  Vitals signs and nursing note reviewed.   Constitutional:       Appearance: She is not ill-appearing.      Interventions: She is sedated and intubated.   HENT:      Head: Normocephalic and atraumatic.      Mouth/Throat:      Mouth: Mucous membranes are moist.      Pharynx: Oropharynx is clear.   Eyes:       General: No scleral icterus.        Right eye: No discharge.         Left eye: No discharge.      Extraocular Movements: Extraocular movements intact.      Conjunctiva/sclera: Conjunctivae normal.   Neck:      Musculoskeletal: Normal range of motion and neck supple.   Cardiovascular:      Rate and Rhythm: Regular rhythm. Tachycardia present.      Pulses: Normal pulses.      Heart sounds: Normal heart sounds.   Pulmonary:      Effort: Pulmonary effort is normal. No respiratory distress. She is intubated.      Breath sounds: No rales.   Abdominal:      General: Bowel sounds are normal.      Palpations: Abdomen is soft.      Comments: woundvac   Musculoskeletal:      Right lower leg: Edema present.      Left lower leg: Edema present.   Skin:     General: Skin is warm and dry.      Findings: No bruising or rash.   Neurological:      Mental Status: She is alert.         Significant Labs:  ABGs:   Recent Labs   Lab 08/29/20  0715   PH 7.504*   PCO2 33.9*   HCO3 26.7   POCSATURATED 88*   BE 4     CBC:   Recent Labs   Lab 08/29/20  0724   WBC 34.54*   RBC 3.07*   HGB 9.3*   HCT 29.0*   *   MCV 95   MCH 30.3   MCHC 32.1     CMP:   Recent Labs   Lab 08/29/20  0336   *   CALCIUM 8.2*   ALBUMIN 1.3*   PROT 6.5      K 3.6   CO2 25      BUN 27*   CREATININE 2.1*   ALKPHOS 110   ALT 10   AST 19   BILITOT 0.6         Significant Imaging:  X-Ray: Reviewed  Personally reviewed    Assessment/Plan:     * Duodenum disorder  - Management per primary team     Acute renal failure with tubular necrosis  - oliguric stage 3 sid with ischemic atn likely secondary to septic shock  - unknown bl cr  - muddy brown casts on microscopy    Plan/Recommendations:  - SLED x 12 hours for volume and metabolic clearance.   -Strict I/O's  -Transfuse for hgb < 7  -Renally dose meds/avoid nephrotoxic meds  -Keep MAP >65  -Will continue to follow closely           Severe sepsis  - Management per primary team         Thank you for your  consult. I will follow-up with patient. Please contact us if you have any additional questions.    Efren Pacheco MD  Nephrology  [facility]

## 2020-08-29 NOTE — PROGRESS NOTES
Pt became hypertensive and tachypneic, DENNIS Kim MD called and present at bedside. ABG and chest xray obtained. Pt sats >90% on 6L high flow NC. Beside intubation and bronch performed by Tigre LERMA at beside. Charge nurse and respiratory therapy called and present at beside. Will continue to monitor.

## 2020-08-29 NOTE — SUBJECTIVE & OBJECTIVE
Interval History/Significant Events:   - Hypertension last night treated with labetalol and PRN Hydralazine  - In respiratory distress upon exam this morning. Chest xray w/ obvious left sided bronchial occlusion. Patient emergently intubated and bronchoscopy performed.  - WCTM for respiratory improvement    Follow-up For: Procedure(s) (LRB):  WASHOUT abdomen (N/A)    Post-Operative Day: 5 Days Post-Op    Objective:     Vital Signs (Most Recent):  Temp: 100 °F (37.8 °C) (08/29/20 0800)  Pulse: (!) 148 (08/29/20 0800)  Resp: 17 (08/29/20 0800)  BP: (!) 159/115 (08/29/20 0800)  SpO2: 100 % (08/29/20 0819) Vital Signs (24h Range):  Temp:  [98.7 °F (37.1 °C)-100 °F (37.8 °C)] 100 °F (37.8 °C)  Pulse:  [104-148] 148  Resp:  [17-44] 17  SpO2:  [90 %-100 %] 100 %  BP: (153-190)/() 159/115     Weight: 69 kg (152 lb 1.9 oz)  Body mass index is 27.82 kg/m².      Intake/Output Summary (Last 24 hours) at 8/29/2020 0857  Last data filed at 8/29/2020 0600  Gross per 24 hour   Intake 4982.67 ml   Output 1635 ml   Net 3347.67 ml       Physical Exam  Vitals signs and nursing note reviewed.   Constitutional:       General: She is in acute distress.   HENT:      Head: Normocephalic.      Mouth/Throat:      Mouth: Mucous membranes are moist.   Eyes:      General: No scleral icterus.     Extraocular Movements: Extraocular movements intact.   Cardiovascular:      Rate and Rhythm: Regular rhythm. Tachycardia present.   Pulmonary:      Effort: Respiratory distress present.   Abdominal:      Comments: Wound vac in place, holding suction  Drains with bilious output  Gtube to gravity   Skin:     Coloration: Skin is not jaundiced or pale.   Neurological:      General: No focal deficit present.      Mental Status: She is alert.   Psychiatric:         Mood and Affect: Mood normal.         Behavior: Behavior normal.         Vents:  Vent Mode: A/C (08/29/20 0800)  Ventilator Initiated: Yes (08/29/20 0800)  Set Rate: 16 BPM (08/29/20 0800)  Vt  Set: 380 mL (08/29/20 0800)  PEEP/CPAP: 5 cmH20 (08/29/20 0800)  Oxygen Concentration (%): 100 (08/29/20 0819)  Peak Airway Pressure: 34 cmH2O (08/29/20 0800)  Plateau Pressure: 0 cmH20 (08/29/20 0800)  Total Ve: 3.79 mL (08/29/20 0800)  Negative Inspiratory Force (cm H2O): -23 (08/20/20 1501)  F/VT Ratio<105 (RSBI): (!) 70.54 (08/29/20 0800)    Lines/Drains/Airways     Central Venous Catheter Line            Trialysis (Dialysis) Catheter 08/13/20 2230 right internal jugular 15 days          Drain                 Closed/Suction Drain 08/13/20 1659 Right;Inferior Abdomen Bulb 19 Fr. 15 days         Closed/Suction Drain 08/13/20 1659 Right;Superior Abdomen Bulb 19 Fr. 15 days         Gastrostomy/Enterostomy 08/15/20 0916 Gastrostomy tube w/ balloon LUQ decompression 13 days    Female External Urinary Catheter 08/27/20 1600 1 day          Airway                 Airway - Non-Surgical 08/29/20 0755 Endotracheal Tube less than 1 day          Peripheral Intravenous Line                 Midline Catheter Insertion/Assessment  - Single Lumen 08/26/20 1501 Right basilic vein (medial side of arm) 18g x 8cm 2 days                Significant Labs:    CBC/Anemia Profile:  Recent Labs   Lab 08/28/20  0400 08/29/20  0336 08/29/20  0715 08/29/20  0724   WBC 39.80* 33.12*  --  34.54*   HGB 7.3* 6.9*  --  9.3*   HCT 22.9* 22.3* 27* 29.0*    382*  --  397*   MCV 94 97  --  95   RDW 14.2 14.6*  --  14.5        Chemistries:  Recent Labs   Lab 08/27/20  2338 08/28/20  0400 08/29/20  0336    142  142 140   K 3.8 3.9  3.9 3.6    106  106 105   CO2 28 28  28 25   BUN 19 13  13 27*   CREATININE 1.4 1.1  1.1 2.1*   CALCIUM 8.2* 7.7*  7.7* 8.2*   ALBUMIN 1.4* 1.3*  1.3* 1.3*   PROT  --  6.1 6.5   BILITOT  --  0.7 0.6   ALKPHOS  --  111 110   ALT  --  12 10   AST  --  18 19   MG 1.9 2.4  2.4 2.3   PHOS 1.4* 3.2  3.2 3.5       All pertinent labs within the past 24 hours have been reviewed.    Significant Imaging:  I  have reviewed all pertinent imaging results/findings within the past 24 hours.

## 2020-08-29 NOTE — CARE UPDATE
Patient intubated @ bedside.  ETT is a size 7.0 placed 22 @ lip.  ETT is intact, patent, and secured with commercial tube osborne.  Patient placed on MV set on documented settings.  Will continue to monitor.

## 2020-08-29 NOTE — ASSESSMENT & PLAN NOTE
38 yo F s/p antrectomy with BII reconstruction c/b duodenal necrosis requiring ex lap, washout, drainage c/b aspiration event. Now with severe sepsis and ARDS     .Neuro:  - AOx4  - PCA for pain control  - Clonidine patch for anxiety     Resp:  - Intubated 8/29.   - PRN breathing treatments     CV:  - MAP goal >65  - Systolic <160  - Levo and Vaso gtt - off  - Increase Metoprolol scheduled dose  - Hydralazine PRN  - Permacath scheduled for Monday     Heme:  - Hgb/Hct 6.9; 1U PRBC's given  - S/p Abdominal washout on 8/21  - S/p Abdominal exploration w/ woundvac exhange 8/24     ID:  - WBC remains elevated 33.12  - Zosyn, Fluconazole  - Blood cultures and BAL taken 8/19 with NGTD and no abnormal findings     Renal:  - Oliguric on arrival; Cr 1.1 at that time   - SLED overnight  - Trend BUN/Cr;   - Monitor I/Os  - MIVF to 100cc/hr     FEN/GI:  - NPO  - TPN  - NGT to LIWS   - Maintain drains to bulb suction; LLQ with acute drop in drainage and accumulation in SQ tissue  - GI ppx  - Replace electrolytes as needed to keep K>4, Mg>2     Endo:  - Monitor BG     Dispo:  - Continue SICU care  -Permacath Monday

## 2020-08-29 NOTE — PROCEDURES
"Jaquan Farmer is a 39 y.o. female patient.    Temp: 100 °F (37.8 °C) (08/29/20 0800)  Pulse: (!) 148 (08/29/20 0800)  Resp: 17 (08/29/20 0800)  BP: (!) 159/115 (08/29/20 0800)  SpO2: 100 % (08/29/20 0819)  Weight: 69 kg (152 lb 1.9 oz) (08/29/20 0500)  Height: 5' 2" (157.5 cm) (08/29/20 0819)     [facility]  Bronchoscopy   Procedure Note    SUMMARY     Date of Procedure: 8/29/2020    Procedure: Bronchoscopy, Diagnostic    Provider: Avtar Kim MD    Assisting Provider: Tigre    Pre-Procedure Diagnosis: Respiratory distress    Post-Procedure Diagnosis: respiratory distress    Indication: respiratory distress and decompensation    Anesthesia: Moderate Sedation    Technical Procedures Used: Bronchoscopy    Description of the Findings of the Procedure:     Patient was consented for the procedure with all risk and benefit of the procedure explained in detail.  Patient was given the opportunity to ask questions and all concerns were answered.  The bronchocope was inserted into the main airway via the endotracheal tube. An anatomical survey was done of the main airways and the subsegmental bronchus.  The findings are reported above.  A bronchialalveolar lavage was performed using aliquots of normal saline instilled into the airways then aspirated back.    Significant Surgical Tasks Conducted by the Assistant(s), if Applicable: none    Complications: None; patient tolerated the procedure well.    Estimated Blood Loss (EBL): Minimal           Implants: none    Specimens: None       Condition: Good        Disposition: ICU - intubated and hemodynamically stable.    Attestation: I performed the procedure.    Procedures    Avtar Kim  8/29/2020  "

## 2020-08-29 NOTE — SUBJECTIVE & OBJECTIVE
Interval History:  H/H downtrended this AM  Drains with bilious output  Some hypertension, otherwise stable    Medications:  Continuous Infusions:   dexmedetomidine (PRECEDEX) infusion Stopped (08/27/20 0800)    hydromorphone in 0.9 % NaCl 6 mg/30 ml      lactated ringers 100 mL/hr at 08/29/20 0600    propofoL 20 mcg/kg/min (08/29/20 0815)    TPN ADULT CENTRAL LINE CUSTOM 45 mL/hr at 08/29/20 0600     Scheduled Meds:   cloNIDine 0.2 mg/24 hr td ptwk  1 patch Transdermal Q7 Days    famotidine (PF)  20 mg Intravenous Daily    fat emulsion  250 mL Intravenous Daily    fluconazole (DIFLUCAN) IVPB  200 mg Intravenous Q24H    heparin (porcine)  5,000 Units Subcutaneous Q8H    lidocaine  1 patch Transdermal Q24H    metoprolol  10 mg Intravenous Q6H    miconazole NITRATE 2 %   Topical (Top) BID    midazolam        phenylephrine HCl in 0.9% NaCl        piperacillin-tazobactam (ZOSYN) IVPB  4.5 g Intravenous Q12H    propofoL        propofol  100 mg Intravenous Once    rocuronium         PRN Meds:sodium chloride, albuterol-ipratropium, calcium gluconate IVPB, calcium gluconate IVPB, calcium gluconate IVPB, hydrALAZINE, labetaloL, magnesium sulfate IVPB, magnesium sulfate IVPB, melatonin, metoprolol, naloxone, naloxone, sodium phosphate IVPB, sodium phosphate IVPB, sodium phosphate IVPB     Review of patient's allergies indicates:   Allergen Reactions    Latex      Objective:     Vital Signs (Most Recent):  Temp: 98.7 °F (37.1 °C) (08/29/20 0530)  Pulse: (!) 148 (08/29/20 0800)  Resp: 17 (08/29/20 0800)  BP: (!) 159/115 (08/29/20 0800)  SpO2: 100 % (08/29/20 0800) Vital Signs (24h Range):  Temp:  [98.7 °F (37.1 °C)-99.6 °F (37.6 °C)] 98.7 °F (37.1 °C)  Pulse:  [104-148] 148  Resp:  [17-44] 17  SpO2:  [90 %-100 %] 100 %  BP: (153-190)/() 159/115     Weight: 69 kg (152 lb 1.9 oz)  Body mass index is 27.82 kg/m².    Intake/Output - Last 3 Shifts       08/27 0700 - 08/28 0659 08/28 0700 - 08/29 0659 08/29  0700 - 08/30 0659    I.V. (mL/kg) 5521 (85.3) 3230 (46.8)     Blood  431.7     NG/GT       TPN 1285 1321     Total Intake(mL/kg) 6806 (105.2) 4982.7 (72.2)     Urine (mL/kg/hr) 170 (0.1) 250 (0.2)     Drains 1563 1590     Other 4384 125     Total Output 6117 1965     Net +689 +3017.7            Urine Occurrence 2 x 1 x           Physical Exam  Constitutional:       General: She is not in acute distress.  Cardiovascular:      Rate and Rhythm: Regular rhythm. Tachycardia present.   Pulmonary:      Effort: Pulmonary effort is normal.   Abdominal:      Comments: Wound vac in place, holding suction  Drains with bilious output  Gtube to gravity   Neurological:      General: No focal deficit present.      Mental Status: She is alert.   Psychiatric:         Mood and Affect: Mood normal.         Behavior: Behavior normal.         Significant Labs:  CBC:   Recent Labs   Lab 08/29/20  0724   WBC 34.54*   RBC 3.07*   HGB 9.3*   HCT 29.0*   *   MCV 95   MCH 30.3   MCHC 32.1     CMP:   Recent Labs   Lab 08/29/20  0336   *   CALCIUM 8.2*   ALBUMIN 1.3*   PROT 6.5      K 3.6   CO2 25      BUN 27*   CREATININE 2.1*   ALKPHOS 110   ALT 10   AST 19   BILITOT 0.6       Significant Diagnostics:  I have reviewed all pertinent imaging results/findings within the past 24 hours.

## 2020-08-29 NOTE — NURSING
"      SICU PLAN OF CARE NOTE    Dx: Duodenum disorder    Shift Events: See earlier note for AM intubation/bronch. Pt weaned from 100%/8 to 50%/5 throughout day. SLED started. Electrolytes replaced. Lipids and PCA d/c'd. VSS. WCTM.    Goals of Care: Extubate 8/30 AM, MAP >65, SYS <180, O2 > 90    Neuro: Arouses to Voice, Follows Commands and Moves All Extremities    Vital Signs: BP (!) 180/105   Pulse 101   Temp 98.9 °F (37.2 °C) (Oral)   Resp (!) 27   Ht 5' 2" (1.575 m)   Wt 69 kg (152 lb 1.9 oz)   SpO2 100%   Breastfeeding No   BMI 27.82 kg/m²     Respiratory: Ventilator    Diet: NPO and TPN    Gtts: Propfol @ 40    Urine Output: oliguric, purewick in place    Drains: GODFREY Drain, total output #1: 210, #2: 55 cc / shift and G-tube/J-tube, total output 700 cc /  shift    CRRT SLED     Wound Vac ex-lap continuous sxn @ 125; 50cc/shift    Restraints B/L soft wrist     Labs/Accuchecks: RFP, Mag 10p,4a,2p. Q6 ACCU. Daily labs. ABG @ 4am (8/30)    Skin: Ex lap with wvac, 2 GODFREY drains, 1 gtube, q2h turns, foam to heels, ecchymosis to lower legs covered with foam, no new skin breakdown noted.     "

## 2020-08-29 NOTE — PROGRESS NOTES
[facility]  General Surgery  Progress Note    Subjective:     History of Present Illness:  Pt is a 38 yo F with recent history of antrectomy with BII reconstruction for ulcer disease at outside hospital. 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 Patient also reportedly aspirated on induction in the OR prior to this, resulting in severe pulmonary edema and hypoxia. Patient was transferred to [facility] urgently for higher level of care. She arrived as a direct SICU admit on near maximal vent settings and requiring low dose vasopressors with HR in the upper 140s. Her midline laparotomy is closed with 4 Navdeep drains in place draining bilious output.     Post-Op Info:  Procedure(s) (LRB):  WASHOUT abdomen (N/A)   5 Days Post-Op     Interval History:  H/H downtrended this AM  Drains with bilious output  Some hypertension, otherwise stable    Medications:  Continuous Infusions:   dexmedetomidine (PRECEDEX) infusion Stopped (08/27/20 0800)    hydromorphone in 0.9 % NaCl 6 mg/30 ml      lactated ringers 100 mL/hr at 08/29/20 0600    propofoL 20 mcg/kg/min (08/29/20 0815)    TPN ADULT CENTRAL LINE CUSTOM 45 mL/hr at 08/29/20 0600     Scheduled Meds:   cloNIDine 0.2 mg/24 hr td ptwk  1 patch Transdermal Q7 Days    famotidine (PF)  20 mg Intravenous Daily    fat emulsion  250 mL Intravenous Daily    fluconazole (DIFLUCAN) IVPB  200 mg Intravenous Q24H    heparin (porcine)  5,000 Units Subcutaneous Q8H    lidocaine  1 patch Transdermal Q24H    metoprolol  10 mg Intravenous Q6H    miconazole NITRATE 2 %   Topical (Top) BID    midazolam        phenylephrine HCl in 0.9% NaCl        piperacillin-tazobactam (ZOSYN) IVPB  4.5 g Intravenous Q12H    propofoL        propofol  100 mg Intravenous Once    rocuronium         PRN Meds:sodium chloride, albuterol-ipratropium, calcium gluconate IVPB, calcium gluconate IVPB, calcium gluconate IVPB, hydrALAZINE,  labetaloL, magnesium sulfate IVPB, magnesium sulfate IVPB, melatonin, metoprolol, naloxone, naloxone, sodium phosphate IVPB, sodium phosphate IVPB, sodium phosphate IVPB     Review of patient's allergies indicates:   Allergen Reactions    Latex      Objective:     Vital Signs (Most Recent):  Temp: 98.7 °F (37.1 °C) (08/29/20 0530)  Pulse: (!) 148 (08/29/20 0800)  Resp: 17 (08/29/20 0800)  BP: (!) 159/115 (08/29/20 0800)  SpO2: 100 % (08/29/20 0800) Vital Signs (24h Range):  Temp:  [98.7 °F (37.1 °C)-99.6 °F (37.6 °C)] 98.7 °F (37.1 °C)  Pulse:  [104-148] 148  Resp:  [17-44] 17  SpO2:  [90 %-100 %] 100 %  BP: (153-190)/() 159/115     Weight: 69 kg (152 lb 1.9 oz)  Body mass index is 27.82 kg/m².    Intake/Output - Last 3 Shifts       08/27 0700 - 08/28 0659 08/28 0700 - 08/29 0659 08/29 0700 - 08/30 0659    I.V. (mL/kg) 5521 (85.3) 3230 (46.8)     Blood  431.7     NG/GT       TPN 1285 1321     Total Intake(mL/kg) 6806 (105.2) 4982.7 (72.2)     Urine (mL/kg/hr) 170 (0.1) 250 (0.2)     Drains 1563 1590     Other 4384 125     Total Output 6117 1965     Net +689 +3017.7            Urine Occurrence 2 x 1 x           Physical Exam  Constitutional:       General: She is not in acute distress.  Cardiovascular:      Rate and Rhythm: Regular rhythm. Tachycardia present.   Pulmonary:      Effort: Pulmonary effort is normal.   Abdominal:      Comments: Wound vac in place, holding suction  Drains with bilious output  Gtube to gravity   Neurological:      General: No focal deficit present.      Mental Status: She is alert.   Psychiatric:         Mood and Affect: Mood normal.         Behavior: Behavior normal.         Significant Labs:  CBC:   Recent Labs   Lab 08/29/20  0724   WBC 34.54*   RBC 3.07*   HGB 9.3*   HCT 29.0*   *   MCV 95   MCH 30.3   MCHC 32.1     CMP:   Recent Labs   Lab 08/29/20  0336   *   CALCIUM 8.2*   ALBUMIN 1.3*   PROT 6.5      K 3.6   CO2 25      BUN 27*   CREATININE 2.1*    ALKPHOS 110   ALT 10   AST 19   BILITOT 0.6       Significant Diagnostics:  I have reviewed all pertinent imaging results/findings within the past 24 hours.    Assessment/Plan:     Severe sepsis  40 yo F s/p antrectomy with BII reconstruction c/b duodenal necrosis requiring ex lap, washout, drainage c/b aspiration event. S/p duodenal resection with d-j and g-j anastomosis.  - Will plan to take to OR for permcath placement on Monday (8/31), can also change wound vac at that time.  - Continue ICU care  - Transfuse PRBC as needed for Hgb <7  - Respiratory treatment q4h, CPT  - NPO  - G-tube to gravity  - Abx: diflucan, zosyn  - TPN   - Daily labs  - Trend CBC  - GI and DVT ppx  - PT / OT        Christy Nicole MD  General Surgery  [facility]

## 2020-08-29 NOTE — SUBJECTIVE & OBJECTIVE
Interval History: Patient seen and examined at [facility]. I/O with positive fluid balance ~3L in the past 24 hrs. Oliguric. No acute electrolyte abnormalities on AM lab.     Review of patient's allergies indicates:   Allergen Reactions    Latex      Current Facility-Administered Medications   Medication Frequency    0.9%  NaCl infusion (for blood administration) Q24H PRN    albuterol-ipratropium 2.5 mg-0.5 mg/3 mL nebulizer solution 3 mL Q6H PRN    calcium gluconate 1g in dextrose 5% 100mL (ready to mix system) PRN    calcium gluconate 2 g in dextrose 5 % 100 mL IVPB PRN    calcium gluconate 3 g in dextrose 5 % 100 mL IVPB PRN    cloNIDine 0.2 mg/24 hr td ptwk 1 patch Q7 Days    dexmedetomidine (PRECEDEX) 400mcg/100mL 0.9% NaCL infusion Continuous    famotidine (PF) injection 20 mg Daily    fat emulsion 20 % infusion 250 mL Daily    fluconazole (DIFLUCAN) IVPB 200 mg 100 mL Q24H    heparin (porcine) injection 5,000 Units Q8H    hydrALAZINE injection 10 mg Q6H PRN    HYDROmorphone PCA syringe 6 mg/30 mL (0.2 mg/mL) NS Continuous    labetalol 20 mg/4 mL (5 mg/mL) IV syring Q4H PRN    lactated ringers infusion Continuous    lidocaine 5 % patch 1 patch Q24H    magnesium sulfate 2g in water 50mL IVPB (premix) PRN    magnesium sulfate 2g in water 50mL IVPB (premix) PRN    melatonin tablet 3 mg Nightly PRN    metoprolol injection 10 mg Q6H    metoprolol injection 5 mg Q4H PRN    miconazole NITRATE 2 % top powder BID    naloxone 0.4 mg/mL injection 0.02 mg PRN    naloxone 0.4 mg/mL injection 0.02 mg PRN    phenylephrine HCl in 0.9% NaCl 1 mg/10 mL (100 mcg/mL) syringe     piperacillin-tazobactam 4.5 g in sodium chloride 0.9% 100 mL IVPB (ready to mix system) Q12H    propofoL (DIPRIVAN) 10 mg/mL infusion     propofol (DIPRIVAN) 10 mg/mL infusion Continuous    propofol (DIPRIVAN) 10 mg/mL IVP Once    sodium phosphate 15 mmol in dextrose 5 % 250 mL IVPB PRN    sodium phosphate 20.01 mmol in  dextrose 5 % 250 mL IVPB PRN    sodium phosphate 30 mmol in dextrose 5 % 250 mL IVPB PRN    TPN ADULT CENTRAL LINE CUSTOM Continuous       Objective:     Vital Signs (Most Recent):  Temp: 100 °F (37.8 °C) (08/29/20 0800)  Pulse: (!) 148 (08/29/20 0800)  Resp: 17 (08/29/20 0800)  BP: (!) 159/115 (08/29/20 0800)  SpO2: 100 % (08/29/20 0819)  O2 Device (Oxygen Therapy): ventilator (08/29/20 0819) Vital Signs (24h Range):  Temp:  [98.7 °F (37.1 °C)-100 °F (37.8 °C)] 100 °F (37.8 °C)  Pulse:  [104-148] 148  Resp:  [17-44] 17  SpO2:  [90 %-100 %] 100 %  BP: (153-190)/() 159/115     Weight: 69 kg (152 lb 1.9 oz) (08/29/20 0500)  Body mass index is 27.82 kg/m².  Body surface area is 1.74 meters squared.    I/O last 3 completed shifts:  In: 9170.7 [I.V.:6794; Blood:431.7]  Out: 5762 [Urine:420; Drains:2143; Other:3199]    Physical Exam  Vitals signs and nursing note reviewed.   Constitutional:       Appearance: She is not ill-appearing.      Interventions: She is sedated and intubated.   HENT:      Head: Normocephalic and atraumatic.      Mouth/Throat:      Mouth: Mucous membranes are moist.      Pharynx: Oropharynx is clear.   Eyes:      General: No scleral icterus.        Right eye: No discharge.         Left eye: No discharge.      Extraocular Movements: Extraocular movements intact.      Conjunctiva/sclera: Conjunctivae normal.   Neck:      Musculoskeletal: Normal range of motion and neck supple.   Cardiovascular:      Rate and Rhythm: Regular rhythm. Tachycardia present.      Pulses: Normal pulses.      Heart sounds: Normal heart sounds.   Pulmonary:      Effort: Pulmonary effort is normal. No respiratory distress. She is intubated.      Breath sounds: No rales.   Abdominal:      General: Bowel sounds are normal.      Palpations: Abdomen is soft.      Comments: woundvac   Musculoskeletal:      Right lower leg: Edema present.      Left lower leg: Edema present.   Skin:     General: Skin is warm and dry.       Findings: No bruising or rash.   Neurological:      Mental Status: She is alert.         Significant Labs:  ABGs:   Recent Labs   Lab 08/29/20  0715   PH 7.504*   PCO2 33.9*   HCO3 26.7   POCSATURATED 88*   BE 4     CBC:   Recent Labs   Lab 08/29/20 0724   WBC 34.54*   RBC 3.07*   HGB 9.3*   HCT 29.0*   *   MCV 95   MCH 30.3   MCHC 32.1     CMP:   Recent Labs   Lab 08/29/20  0336   *   CALCIUM 8.2*   ALBUMIN 1.3*   PROT 6.5      K 3.6   CO2 25      BUN 27*   CREATININE 2.1*   ALKPHOS 110   ALT 10   AST 19   BILITOT 0.6         Significant Imaging:  X-Ray: Reviewed  Personally reviewed

## 2020-08-30 LAB
ALBUMIN SERPL BCP-MCNC: 1.3 G/DL (ref 3.5–5.2)
ALBUMIN SERPL BCP-MCNC: 1.3 G/DL (ref 3.5–5.2)
ALBUMIN SERPL BCP-MCNC: 1.4 G/DL (ref 3.5–5.2)
ALBUMIN SERPL BCP-MCNC: 1.4 G/DL (ref 3.5–5.2)
ALLENS TEST: ABNORMAL
ALP SERPL-CCNC: 122 U/L (ref 55–135)
ALT SERPL W/O P-5'-P-CCNC: 10 U/L (ref 10–44)
ANION GAP SERPL CALC-SCNC: 10 MMOL/L (ref 8–16)
ANION GAP SERPL CALC-SCNC: 7 MMOL/L (ref 8–16)
ANION GAP SERPL CALC-SCNC: 7 MMOL/L (ref 8–16)
ANION GAP SERPL CALC-SCNC: 8 MMOL/L (ref 8–16)
AST SERPL-CCNC: 31 U/L (ref 10–40)
BASOPHILS # BLD AUTO: 0.15 K/UL (ref 0–0.2)
BASOPHILS NFR BLD: 0.6 % (ref 0–1.9)
BILIRUB SERPL-MCNC: 0.6 MG/DL (ref 0.1–1)
BUN SERPL-MCNC: 16 MG/DL (ref 6–20)
BUN SERPL-MCNC: 21 MG/DL (ref 6–20)
BUN SERPL-MCNC: 8 MG/DL (ref 6–20)
BUN SERPL-MCNC: 8 MG/DL (ref 6–20)
CA-I BLDV-SCNC: 1.01 MMOL/L (ref 1.06–1.42)
CALCIUM SERPL-MCNC: 8.2 MG/DL (ref 8.7–10.5)
CALCIUM SERPL-MCNC: 8.2 MG/DL (ref 8.7–10.5)
CALCIUM SERPL-MCNC: 8.4 MG/DL (ref 8.7–10.5)
CALCIUM SERPL-MCNC: 8.6 MG/DL (ref 8.7–10.5)
CHLORIDE SERPL-SCNC: 105 MMOL/L (ref 95–110)
CHLORIDE SERPL-SCNC: 106 MMOL/L (ref 95–110)
CHLORIDE SERPL-SCNC: 109 MMOL/L (ref 95–110)
CHLORIDE SERPL-SCNC: 109 MMOL/L (ref 95–110)
CO2 SERPL-SCNC: 24 MMOL/L (ref 23–29)
CO2 SERPL-SCNC: 26 MMOL/L (ref 23–29)
CREAT SERPL-MCNC: 0.8 MG/DL (ref 0.5–1.4)
CREAT SERPL-MCNC: 0.8 MG/DL (ref 0.5–1.4)
CREAT SERPL-MCNC: 1.1 MG/DL (ref 0.5–1.4)
CREAT SERPL-MCNC: 1.4 MG/DL (ref 0.5–1.4)
DIFFERENTIAL METHOD: ABNORMAL
EOSINOPHIL # BLD AUTO: 0.7 K/UL (ref 0–0.5)
EOSINOPHIL NFR BLD: 2.8 % (ref 0–8)
ERYTHROCYTE [DISTWIDTH] IN BLOOD BY AUTOMATED COUNT: 14.6 % (ref 11.5–14.5)
EST. GFR  (AFRICAN AMERICAN): 54.6 ML/MIN/1.73 M^2
EST. GFR  (AFRICAN AMERICAN): >60 ML/MIN/1.73 M^2
EST. GFR  (NON AFRICAN AMERICAN): 47.4 ML/MIN/1.73 M^2
EST. GFR  (NON AFRICAN AMERICAN): >60 ML/MIN/1.73 M^2
GLUCOSE SERPL-MCNC: 120 MG/DL (ref 70–110)
GLUCOSE SERPL-MCNC: 120 MG/DL (ref 70–110)
GLUCOSE SERPL-MCNC: 99 MG/DL (ref 70–110)
GLUCOSE SERPL-MCNC: 99 MG/DL (ref 70–110)
HCO3 UR-SCNC: 27.1 MMOL/L (ref 24–28)
HCT VFR BLD AUTO: 28.5 % (ref 37–48.5)
HGB BLD-MCNC: 8.7 G/DL (ref 12–16)
IMM GRANULOCYTES # BLD AUTO: 0.52 K/UL (ref 0–0.04)
IMM GRANULOCYTES NFR BLD AUTO: 2 % (ref 0–0.5)
LYMPHOCYTES # BLD AUTO: 1.7 K/UL (ref 1–4.8)
LYMPHOCYTES NFR BLD: 6.5 % (ref 18–48)
MAGNESIUM SERPL-MCNC: 1.8 MG/DL (ref 1.6–2.6)
MAGNESIUM SERPL-MCNC: 1.8 MG/DL (ref 1.6–2.6)
MAGNESIUM SERPL-MCNC: 2.2 MG/DL (ref 1.6–2.6)
MAGNESIUM SERPL-MCNC: 2.2 MG/DL (ref 1.6–2.6)
MCH RBC QN AUTO: 30.1 PG (ref 27–31)
MCHC RBC AUTO-ENTMCNC: 30.5 G/DL (ref 32–36)
MCV RBC AUTO: 99 FL (ref 82–98)
MONOCYTES # BLD AUTO: 1.4 K/UL (ref 0.3–1)
MONOCYTES NFR BLD: 5.4 % (ref 4–15)
NEUTROPHILS # BLD AUTO: 21.7 K/UL (ref 1.8–7.7)
NEUTROPHILS NFR BLD: 82.7 % (ref 38–73)
NRBC BLD-RTO: 0 /100 WBC
PCO2 BLDA: 38.1 MMHG (ref 35–45)
PH SMN: 7.46 [PH] (ref 7.35–7.45)
PHOSPHATE SERPL-MCNC: 3.3 MG/DL (ref 2.7–4.5)
PHOSPHATE SERPL-MCNC: 3.6 MG/DL (ref 2.7–4.5)
PHOSPHATE SERPL-MCNC: 4 MG/DL (ref 2.7–4.5)
PHOSPHATE SERPL-MCNC: 4 MG/DL (ref 2.7–4.5)
PLATELET # BLD AUTO: 342 K/UL (ref 150–350)
PMV BLD AUTO: 10.7 FL (ref 9.2–12.9)
PO2 BLDA: 149 MMHG (ref 80–100)
POC BE: 3 MMOL/L
POC SATURATED O2: 99 % (ref 95–100)
POC TCO2: 28 MMOL/L (ref 23–27)
POCT GLUCOSE: 106 MG/DL (ref 70–110)
POCT GLUCOSE: 118 MG/DL (ref 70–110)
POCT GLUCOSE: 122 MG/DL (ref 70–110)
POCT GLUCOSE: 122 MG/DL (ref 70–110)
POCT GLUCOSE: 136 MG/DL (ref 70–110)
POTASSIUM SERPL-SCNC: 3.7 MMOL/L (ref 3.5–5.1)
POTASSIUM SERPL-SCNC: 4 MMOL/L (ref 3.5–5.1)
POTASSIUM SERPL-SCNC: 4.4 MMOL/L (ref 3.5–5.1)
POTASSIUM SERPL-SCNC: 4.4 MMOL/L (ref 3.5–5.1)
PROT SERPL-MCNC: 6.8 G/DL (ref 6–8.4)
RBC # BLD AUTO: 2.89 M/UL (ref 4–5.4)
SAMPLE: ABNORMAL
SITE: ABNORMAL
SODIUM SERPL-SCNC: 139 MMOL/L (ref 136–145)
SODIUM SERPL-SCNC: 140 MMOL/L (ref 136–145)
WBC # BLD AUTO: 26.25 K/UL (ref 3.9–12.7)

## 2020-08-30 PROCEDURE — 83735 ASSAY OF MAGNESIUM: CPT | Mod: 91

## 2020-08-30 PROCEDURE — 83735 ASSAY OF MAGNESIUM: CPT

## 2020-08-30 PROCEDURE — 80100008 HC CRRT DAILY MAINTENANCE

## 2020-08-30 PROCEDURE — 80069 RENAL FUNCTION PANEL: CPT | Mod: 91

## 2020-08-30 PROCEDURE — 31622 DX BRONCHOSCOPE/WASH: CPT

## 2020-08-30 PROCEDURE — 63600175 PHARM REV CODE 636 W HCPCS: Performed by: STUDENT IN AN ORGANIZED HEALTH CARE EDUCATION/TRAINING PROGRAM

## 2020-08-30 PROCEDURE — 99900026 HC AIRWAY MAINTENANCE (STAT)

## 2020-08-30 PROCEDURE — 63600175 PHARM REV CODE 636 W HCPCS: Performed by: SURGERY

## 2020-08-30 PROCEDURE — 84100 ASSAY OF PHOSPHORUS: CPT

## 2020-08-30 PROCEDURE — 99233 PR SUBSEQUENT HOSPITAL CARE,LEVL III: ICD-10-PCS | Mod: 24,25,, | Performed by: SURGERY

## 2020-08-30 PROCEDURE — 82803 BLOOD GASES ANY COMBINATION: CPT

## 2020-08-30 PROCEDURE — 31624 DX BRONCHOSCOPE/LAVAGE: CPT | Mod: 79,,, | Performed by: SURGERY

## 2020-08-30 PROCEDURE — 25000003 PHARM REV CODE 250: Performed by: STUDENT IN AN ORGANIZED HEALTH CARE EDUCATION/TRAINING PROGRAM

## 2020-08-30 PROCEDURE — 36600 WITHDRAWAL OF ARTERIAL BLOOD: CPT

## 2020-08-30 PROCEDURE — A4217 STERILE WATER/SALINE, 500 ML: HCPCS | Performed by: SURGERY

## 2020-08-30 PROCEDURE — 99900017 HC EXTUBATION W/PARAMETERS (STAT)

## 2020-08-30 PROCEDURE — 99900035 HC TECH TIME PER 15 MIN (STAT)

## 2020-08-30 PROCEDURE — 99232 PR SUBSEQUENT HOSPITAL CARE,LEVL II: ICD-10-PCS | Mod: ,,, | Performed by: INTERNAL MEDICINE

## 2020-08-30 PROCEDURE — 99900025 HC BRONCHOSCOPY-ASST (STAT)

## 2020-08-30 PROCEDURE — 94761 N-INVAS EAR/PLS OXIMETRY MLT: CPT

## 2020-08-30 PROCEDURE — 31624 PR BRONCHOSCOPY,DIAG2STIC W LAVAGE: ICD-10-PCS | Mod: 79,,, | Performed by: SURGERY

## 2020-08-30 PROCEDURE — 99233 SBSQ HOSP IP/OBS HIGH 50: CPT | Mod: 24,25,, | Performed by: SURGERY

## 2020-08-30 PROCEDURE — S0028 INJECTION, FAMOTIDINE, 20 MG: HCPCS | Performed by: STUDENT IN AN ORGANIZED HEALTH CARE EDUCATION/TRAINING PROGRAM

## 2020-08-30 PROCEDURE — 25000003 PHARM REV CODE 250: Performed by: SURGERY

## 2020-08-30 PROCEDURE — 99232 SBSQ HOSP IP/OBS MODERATE 35: CPT | Mod: ,,, | Performed by: INTERNAL MEDICINE

## 2020-08-30 PROCEDURE — 80053 COMPREHEN METABOLIC PANEL: CPT

## 2020-08-30 PROCEDURE — 27202055 HC BRONCHOSCOPE, DISP

## 2020-08-30 PROCEDURE — 82330 ASSAY OF CALCIUM: CPT

## 2020-08-30 PROCEDURE — 20000000 HC ICU ROOM

## 2020-08-30 PROCEDURE — 85025 COMPLETE CBC W/AUTO DIFF WBC: CPT

## 2020-08-30 PROCEDURE — 27000221 HC OXYGEN, UP TO 24 HOURS

## 2020-08-30 PROCEDURE — 90945 DIALYSIS ONE EVALUATION: CPT

## 2020-08-30 RX ORDER — HYDROMORPHONE HCL IN 0.9% NACL 6 MG/30 ML
PATIENT CONTROLLED ANALGESIA SYRINGE INTRAVENOUS CONTINUOUS
Status: DISCONTINUED | OUTPATIENT
Start: 2020-08-30 | End: 2020-08-30

## 2020-08-30 RX ORDER — NALOXONE HCL 0.4 MG/ML
0.02 VIAL (ML) INJECTION
Status: DISCONTINUED | OUTPATIENT
Start: 2020-08-30 | End: 2020-08-30

## 2020-08-30 RX ORDER — NALOXONE HCL 0.4 MG/ML
0.02 VIAL (ML) INJECTION
Status: DISCONTINUED | OUTPATIENT
Start: 2020-08-30 | End: 2020-09-02

## 2020-08-30 RX ORDER — HYDROMORPHONE HYDROCHLORIDE 1 MG/ML
0.5 INJECTION, SOLUTION INTRAMUSCULAR; INTRAVENOUS; SUBCUTANEOUS
Status: DISCONTINUED | OUTPATIENT
Start: 2020-08-30 | End: 2020-08-31

## 2020-08-30 RX ORDER — HYDROMORPHONE HCL IN 0.9% NACL 6 MG/30 ML
PATIENT CONTROLLED ANALGESIA SYRINGE INTRAVENOUS CONTINUOUS
Status: DISCONTINUED | OUTPATIENT
Start: 2020-08-30 | End: 2020-09-02

## 2020-08-30 RX ADMIN — HEPARIN SODIUM 5000 UNITS: 5000 INJECTION INTRAVENOUS; SUBCUTANEOUS at 09:08

## 2020-08-30 RX ADMIN — HYDROMORPHONE HYDROCHLORIDE 0.5 MG: 1 INJECTION, SOLUTION INTRAMUSCULAR; INTRAVENOUS; SUBCUTANEOUS at 06:08

## 2020-08-30 RX ADMIN — METOROPROLOL TARTRATE 10 MG: 5 INJECTION, SOLUTION INTRAVENOUS at 11:08

## 2020-08-30 RX ADMIN — HYDRALAZINE HYDROCHLORIDE 10 MG: 20 INJECTION INTRAMUSCULAR; INTRAVENOUS at 02:08

## 2020-08-30 RX ADMIN — PIPERACILLIN SODIUM AND TAZOBACTAM SODIUM 4.5 G: 4; .5 INJECTION, POWDER, LYOPHILIZED, FOR SOLUTION INTRAVENOUS at 08:08

## 2020-08-30 RX ADMIN — HYDROMORPHONE HYDROCHLORIDE 0.5 MG: 1 INJECTION, SOLUTION INTRAMUSCULAR; INTRAVENOUS; SUBCUTANEOUS at 09:08

## 2020-08-30 RX ADMIN — HEPARIN SODIUM 5000 UNITS: 5000 INJECTION INTRAVENOUS; SUBCUTANEOUS at 05:08

## 2020-08-30 RX ADMIN — PIPERACILLIN SODIUM AND TAZOBACTAM SODIUM 4.5 G: 4; .5 INJECTION, POWDER, LYOPHILIZED, FOR SOLUTION INTRAVENOUS at 09:08

## 2020-08-30 RX ADMIN — MAGNESIUM SULFATE HEPTAHYDRATE: 500 INJECTION, SOLUTION INTRAMUSCULAR; INTRAVENOUS at 09:08

## 2020-08-30 RX ADMIN — METOROPROLOL TARTRATE 10 MG: 5 INJECTION, SOLUTION INTRAVENOUS at 05:08

## 2020-08-30 RX ADMIN — Medication: at 05:08

## 2020-08-30 RX ADMIN — CALCIUM GLUCONATE 2 G: 98 INJECTION, SOLUTION INTRAVENOUS at 05:08

## 2020-08-30 RX ADMIN — MAGNESIUM SULFATE 2 G: 2 INJECTION INTRAVENOUS at 05:08

## 2020-08-30 RX ADMIN — HEPARIN SODIUM 5000 UNITS: 5000 INJECTION INTRAVENOUS; SUBCUTANEOUS at 01:08

## 2020-08-30 RX ADMIN — POTASSIUM CHLORIDE 40 MEQ: 29.8 INJECTION, SOLUTION INTRAVENOUS at 12:08

## 2020-08-30 RX ADMIN — FLUCONAZOLE 200 MG: 2 INJECTION, SOLUTION INTRAVENOUS at 09:08

## 2020-08-30 RX ADMIN — FAMOTIDINE 20 MG: 10 INJECTION INTRAVENOUS at 08:08

## 2020-08-30 RX ADMIN — MICONAZOLE NITRATE: 20 POWDER TOPICAL at 09:08

## 2020-08-30 RX ADMIN — Medication: at 01:08

## 2020-08-30 RX ADMIN — HYDROMORPHONE HYDROCHLORIDE 0.5 MG: 1 INJECTION, SOLUTION INTRAMUSCULAR; INTRAVENOUS; SUBCUTANEOUS at 02:08

## 2020-08-30 RX ADMIN — MICONAZOLE NITRATE: 20 POWDER TOPICAL at 08:08

## 2020-08-30 RX ADMIN — HYDROMORPHONE HYDROCHLORIDE 0.5 MG: 1 INJECTION, SOLUTION INTRAMUSCULAR; INTRAVENOUS; SUBCUTANEOUS at 12:08

## 2020-08-30 NOTE — PROGRESS NOTES
[facility]  Critical Care - Surgery  Progress Note    Patient Name: Jaquan Farmer  MRN: [id number]  Admission Date: 8/12/2020  Hospital Length of Stay: 18 days  Code Status: Full Code  Attending Provider: Donis Roa MD  Primary Care Provider: Primary Doctor No   Principal Problem: Duodenum disorder    Subjective:     Hospital/ICU Course:  8/14 Patient had a line replaced along with central line placed, with complication of pneumothorax. Rt pigtail was placed. Patient tolerated complications well. Decreasing vent settings.  8/15 NAEO. Patient was taken back to OR early AM for washout. Possible closure 8/18. ETT exchanged in OR 7.0 -> 7.5  8/16 - NAEON. HDS. Intubated, sedated.   8/17 - NAEON. OR tomorrow for closure.  8/18 - Washed out and closed in OR  8/22 - NAEO. Plan for Repeat CT AP today  8/23 - NAEO. Hbg lower. Giving blood x2 units.  8/25 - NAEO. Went to OR yesterday for Abdominal wall exploration and ligation of bleeding vessels. Wound vac exchanged  8/26 - NAEO. Hypertensive overnight. Hgb trending. Plan for CT today  8/27 - NAEO. Hypertensive overnight. CT yesterday showed no extravasation or acute changes  8/28 - NAEO.   8/29 - Patient in respiratory distress upon arrival. Chest XR w/ obvious left sided pulmonary occlusion. Patient intubated and bronchoscopy performed.  8/30 - Repeat Bronch today. Possible extubation later today    Interval History/Significant Events: Repeat Bronch today. Plan for extubation later today.     Follow-up For: Procedure(s) (LRB):  WASHOUT abdomen (N/A)    Post-Operative Day: 6 Days Post-Op    Objective:     Vital Signs (Most Recent):  Temp: 98.6 °F (37 °C) (08/30/20 0700)  Pulse: 97 (08/30/20 1015)  Resp: (!) 25 (08/30/20 1015)  BP: (!) 172/93 (08/30/20 1015)  SpO2: 100 % (08/30/20 1015) Vital Signs (24h Range):  Temp:  [98.6 °F (37 °C)-99.7 °F (37.6 °C)] 98.6 °F (37 °C)  Pulse:  [] 97  Resp:  [16-30] 25  SpO2:  [100 %] 100 %  BP:  (119-191)/() 172/93     Weight: 69.5 kg (153 lb 3.5 oz)  Body mass index is 28.02 kg/m².      Intake/Output Summary (Last 24 hours) at 8/30/2020 1101  Last data filed at 8/30/2020 1000  Gross per 24 hour   Intake 6843.6 ml   Output 7599 ml   Net -755.4 ml       Physical Exam  Vitals signs and nursing note reviewed.   Constitutional:       Appearance: She is not ill-appearing.      Interventions: She is sedated and intubated.      Comments: Intubated and sedated but responds to stimulation   HENT:      Head: Normocephalic and atraumatic.      Mouth/Throat:      Mouth: Mucous membranes are moist.      Pharynx: Oropharynx is clear.   Eyes:      General: No scleral icterus.        Right eye: No discharge.         Left eye: No discharge.      Extraocular Movements: Extraocular movements intact.      Conjunctiva/sclera: Conjunctivae normal.   Neck:      Musculoskeletal: Normal range of motion and neck supple.   Cardiovascular:      Rate and Rhythm: Regular rhythm. Tachycardia present.      Pulses: Normal pulses.      Heart sounds: Normal heart sounds.   Pulmonary:      Effort: Pulmonary effort is normal. No respiratory distress. She is intubated.      Breath sounds: No rales.      Comments: Vent Mode: A/C  Oxygen Concentration (%):  (30-80) 40  Resp Rate Total:  (16 br/min-29 br/min) 21 br/min  Vt Set:  (380 mL) 380 mL  PEEP/CPAP:  (5 cmH20) 5 cmH20  Mean Airway Pressure:  (9.8 asK02-32 cmH20) 9.9 cmH20    Abdominal:      General: Bowel sounds are normal.      Palpations: Abdomen is soft.      Comments: Wound vac in place  Drains with bilious output  G tube to gravity with dark gastric contents   Musculoskeletal:      Right lower leg: Edema present.      Left lower leg: Edema present.   Skin:     General: Skin is warm and dry.      Findings: No bruising or rash.   Neurological:      Mental Status: She is alert.         Vents:  Vent Mode: A/C (08/30/20 0900)  Ventilator Initiated: Yes(chart correction) (08/29/20  0800)  Set Rate: 16 BPM (08/30/20 0900)  Vt Set: 380 mL (08/30/20 0900)  PEEP/CPAP: 5 cmH20 (08/30/20 0900)  Oxygen Concentration (%): 40 (08/30/20 1015)  Peak Airway Pressure: 21 cmH2O (08/30/20 0900)  Plateau Pressure: 0 cmH20 (08/30/20 0900)  Total Ve: 7.12 mL (08/30/20 0900)  Negative Inspiratory Force (cm H2O): -23 (08/20/20 1501)  F/VT Ratio<105 (RSBI): (!) 53.07 (08/30/20 0900)    Lines/Drains/Airways     Central Venous Catheter Line            Trialysis (Dialysis) Catheter 08/13/20 2230 right internal jugular 16 days          Drain                 Closed/Suction Drain 08/13/20 1659 Right;Inferior Abdomen Bulb 19 Fr. 16 days         Closed/Suction Drain 08/13/20 1659 Right;Superior Abdomen Bulb 19 Fr. 16 days         Gastrostomy/Enterostomy 08/15/20 0916 Gastrostomy tube w/ balloon LUQ decompression 15 days    Female External Urinary Catheter 08/27/20 1600 2 days          Airway                 Airway - Non-Surgical 08/29/20 0755 Endotracheal Tube 1 day          Peripheral Intravenous Line                 Midline Catheter Insertion/Assessment  - Single Lumen 08/26/20 1501 Right basilic vein (medial side of arm) 18g x 8cm 3 days                Significant Labs:    CBC/Anemia Profile:  Recent Labs   Lab 08/29/20  0336 08/29/20  0715 08/29/20  0724 08/30/20  0314   WBC 33.12*  --  34.54* 26.25*   HGB 6.9*  --  9.3* 8.7*   HCT 22.3* 27* 29.0* 28.5*   *  --  397* 342   MCV 97  --  95 99*   RDW 14.6*  --  14.5 14.6*        Chemistries:  Recent Labs   Lab 08/29/20  0336 08/29/20  1225 08/29/20  2209 08/30/20  0314    140 140 140  140   K 3.6 3.9 3.8 4.4  4.4    104 106 109  109   CO2 25 27 27 24  24   BUN 27* 33* 11 8  8   CREATININE 2.1* 2.4* 0.9 0.8  0.8   CALCIUM 8.2* 8.7 7.9* 8.2*  8.2*   ALBUMIN 1.3* 1.3* 1.3* 1.4*  1.4*   PROT 6.5  --   --  6.8   BILITOT 0.6  --   --  0.6   ALKPHOS 110  --   --  122   ALT 10  --   --  10   AST 19  --   --  31   MG 2.3 2.3 1.9 1.8  1.8   PHOS 3.5 3.7  1.6* 4.0  4.0       All pertinent labs within the past 24 hours have been reviewed.    Significant Imaging:  I have reviewed all pertinent imaging results/findings within the past 24 hours.    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 Now with severe sepsis and ARDS     .Neuro:  - AOx4  - PCA for pain control  - Clonidine patch for anxiety     Resp:  - Intubated 8/29.   - PRN breathing treatments  - Repeat Bronch today. CXR shows improvement of L upper Lobe. Plan for possible extubation later today     CV:  - MAP goal >65  - Systolic <160  - Levo and Vaso gtt - off  - Increase Metoprolol scheduled dose  - Hydralazine PRN  - Permacath scheduled for Monday     Heme:  - Hgb/Hct 6.9; 1U PRBC's given  - S/p Abdominal washout on 8/21  - S/p Abdominal exploration w/ woundvac exhange 8/24     ID:  - WBC remains elevated 33.12  - Zosyn, Fluconazole  - Blood cultures and BAL taken 8/19 with NGTD and no abnormal findings     Renal:  - Oliguric on arrival; Cr 1.1 at that time   - SLED overnight  - Trend BUN/Cr;   - Monitor I/Os  - MIVF to 100cc/hr     FEN/GI:  - NPO  - TPN  - NGT to LIWS   - Maintain drains to bulb suction; LLQ with acute drop in drainage and accumulation in SQ tissue  - GI ppx  - Replace electrolytes as needed to keep K>4, Mg>2     Endo:  - Monitor BG     Dispo:  - Continue SICU care  - Plan for extubation later today   - Permacath Monday           Critical care was time spent personally by me on the following activities: development of treatment plan with patient or surrogate and bedside caregivers, discussions with consultants, evaluation of patient's response to treatment, examination of patient, ordering and performing treatments and interventions, ordering and review of laboratory studies, ordering and review of radiographic studies, pulse oximetry, re-evaluation of patient's  condition.  This critical care time did not overlap with that of any other provider or involve time for any procedures.     Serjio Garcia MD  Critical Care - Surgery  [facility]

## 2020-08-30 NOTE — PROGRESS NOTES
12 hour SLED treatment complete. Blood rinsed back without resistance. Right IJ trialysis flushed with normal saline and saline locked. See flowsheet for details.

## 2020-08-30 NOTE — NURSING
"      SICU PLAN OF CARE NOTE    Dx: Duodenum disorder    Shift Events: Bronch in AM showed copious clear secretions. No cultures sent. Propofol gtt off. Spontaneous trial. Extubated with parameters to 4LNC. Tolerated well. PCA pump restarted.     Goals of Care: MAP > 65, SYS < 180, O2 Sat > 90%, OR tomorrow for permacath and WVAC exchange, nephro to reassess need for RRT tomorrow.    Neuro: AAO x4    Vital Signs: BP (!) 148/90   Pulse 109   Temp 99 °F (37.2 °C) (Oral)   Resp (!) 21   Ht 5' 2" (1.575 m)   Wt 69.5 kg (153 lb 3.5 oz)   SpO2 100%   Breastfeeding No   BMI 28.02 kg/m²     Respiratory: Nasal Cannula    Diet: NPO and TPN    Gtts: MIVF    Urine Output: Voids Spontaneously 400 cc/shift    Drains: GODFREY Drain, total output 55 cc / shift and G-tube/J-tube, total output 700 cc /  shift    Wound Vac 50cc/shift     Labs/Accuchecks: RFP, Mag 10p,4a,2p on CRRT. Q6 ACCU. Daily labs. ABG @ 4am (8/30)     Skin: Ex lap with wvac, 2 GODFREY drains, 1 gtube, q2h turns, foam to heels and sacrum, mycostatin powder to skin folds, ecchymosis to lower legs covered with foam, no new skin breakdown noted.        "

## 2020-08-30 NOTE — SUBJECTIVE & OBJECTIVE
Interval History: SLED x 12 hrs finished earlier this AM. Adequate clearance achieved, I/O with negative fluid balance ~369 ml. FiO2 30 %. Anuric.     Review of patient's allergies indicates:   Allergen Reactions    Latex      Current Facility-Administered Medications   Medication Frequency    0.9%  NaCl infusion (CRRT USE ONLY) Continuous    0.9%  NaCl infusion (for blood administration) Q24H PRN    albuterol-ipratropium 2.5 mg-0.5 mg/3 mL nebulizer solution 3 mL Q6H PRN    calcium gluconate 1g in dextrose 5% 100mL (ready to mix system) PRN    calcium gluconate 2 g in dextrose 5 % 100 mL IVPB PRN    calcium gluconate 3 g in dextrose 5 % 100 mL IVPB PRN    cloNIDine 0.2 mg/24 hr td ptwk 1 patch Q7 Days    dexmedetomidine (PRECEDEX) 400mcg/100mL 0.9% NaCL infusion Continuous    famotidine (PF) injection 20 mg Daily    fluconazole (DIFLUCAN) IVPB 200 mg 100 mL Q24H    heparin (porcine) injection 5,000 Units Q8H    hydrALAZINE injection 10 mg Q6H PRN    HYDROmorphone injection 0.5 mg Q2H PRN    labetalol 20 mg/4 mL (5 mg/mL) IV syring Q4H PRN    lactated ringers infusion Continuous    lidocaine 5 % patch 1 patch Q24H    magnesium sulfate 2g in water 50mL IVPB (premix) PRN    magnesium sulfate 2g in water 50mL IVPB (premix) PRN    magnesium sulfate 2g in water 50mL IVPB (premix) PRN    melatonin tablet 3 mg Nightly PRN    metoprolol injection 10 mg Q6H    metoprolol injection 5 mg Q4H PRN    miconazole NITRATE 2 % top powder BID    naloxone 0.4 mg/mL injection 0.02 mg PRN    naloxone 0.4 mg/mL injection 0.02 mg PRN    piperacillin-tazobactam 4.5 g in sodium chloride 0.9% 100 mL IVPB (ready to mix system) Q12H    propofol (DIPRIVAN) 10 mg/mL infusion Continuous    sodium phosphate 20.01 mmol in dextrose 5 % 250 mL IVPB PRN    sodium phosphate 30 mmol in dextrose 5 % 250 mL IVPB PRN    sodium phosphate 39.99 mmol in dextrose 5 % 250 mL IVPB PRN    TPN ADULT CENTRAL LINE CUSTOM Continuous        Objective:     Vital Signs (Most Recent):  Temp: 98.6 °F (37 °C) (08/30/20 0700)  Pulse: 99 (08/30/20 0915)  Resp: (!) 21 (08/30/20 0915)  BP: (!) 155/94 (08/30/20 0900)  SpO2: 100 % (08/30/20 0915)  O2 Device (Oxygen Therapy): ventilator (08/30/20 0900) Vital Signs (24h Range):  Temp:  [98.6 °F (37 °C)-99.7 °F (37.6 °C)] 98.6 °F (37 °C)  Pulse:  [] 99  Resp:  [16-30] 21  SpO2:  [100 %] 100 %  BP: (119-191)/() 155/94     Weight: 69.5 kg (153 lb 3.5 oz) (08/30/20 0500)  Body mass index is 28.02 kg/m².  Body surface area is 1.74 meters squared.    I/O last 3 completed shifts:  In: 49537.9 [I.V.:7528.2; Blood:431.7; IV Piggyback:650]  Out: 9039 [Urine:300; Drains:2660; Other:6079]    Physical Exam  Vitals signs and nursing note reviewed.   Constitutional:       Appearance: She is not ill-appearing.      Interventions: She is sedated and intubated.   HENT:      Head: Normocephalic and atraumatic.      Mouth/Throat:      Mouth: Mucous membranes are moist.      Pharynx: Oropharynx is clear.   Eyes:      General: No scleral icterus.        Right eye: No discharge.         Left eye: No discharge.      Extraocular Movements: Extraocular movements intact.      Conjunctiva/sclera: Conjunctivae normal.   Neck:      Musculoskeletal: Normal range of motion and neck supple.   Cardiovascular:      Rate and Rhythm: Regular rhythm. Tachycardia present.      Pulses: Normal pulses.      Heart sounds: Normal heart sounds.   Pulmonary:      Effort: Pulmonary effort is normal. No respiratory distress. She is intubated.      Breath sounds: No rales.   Abdominal:      General: Bowel sounds are normal.      Palpations: Abdomen is soft.      Comments: woundvac   Musculoskeletal:      Right lower leg: Edema present.      Left lower leg: Edema present.   Skin:     General: Skin is warm and dry.      Findings: No bruising or rash.   Neurological:      Mental Status: She is alert.         Significant Labs:  CBC:   Recent Labs    Lab 08/30/20 0314   WBC 26.25*   RBC 2.89*   HGB 8.7*   HCT 28.5*      MCV 99*   MCH 30.1   MCHC 30.5*     CMP:   Recent Labs   Lab 08/30/20 0314   *  120*   CALCIUM 8.2*  8.2*   ALBUMIN 1.4*  1.4*   PROT 6.8     140   K 4.4  4.4   CO2 24  24     109   BUN 8  8   CREATININE 0.8  0.8   ALKPHOS 122   ALT 10   AST 31   BILITOT 0.6     All labs within the past 24 hours have been reviewed.

## 2020-08-30 NOTE — EICU
Comments called to bedside for bronchoscopy Dr Landeros & Dr Garcia, consent in chart and timeout complete 50 mg of propofol given for sedation,tolerated well

## 2020-08-30 NOTE — PROGRESS NOTES
[facility]  General Surgery  History & Physical    Subjective:       History of Present Illness: Pt is a 40 yo F with recent history of antrectomy with BII reconstruction for ulcer disease at outside hospital. 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 Patient also reportedly aspirated on induction in the OR prior to this, resulting in severe pulmonary edema and hypoxia. Patient was transferred to [facility] urgently for higher level of care. She arrived as a direct SICU admit on near maximal vent settings and requiring low dose vasopressors with HR in the upper 140s. Her midline laparotomy is closed with 4 Navdeep drains in place draining bilious output.     Interval History:   Intubated yesterday morning for respiratory failure, CXR showed complete white out of left lung. Bronchoscopy removed mucus plug, chest xray significantly improved following. On minimal vent settings overnight.   Drains continue with bilious output.   Hemodynamically stable overnight.    Medications:  Continuous Infusions:   sodium chloride 0.9% 200 mL/hr at 08/30/20 0200    dexmedetomidine (PRECEDEX) infusion Stopped (08/27/20 0800)    lactated ringers 100 mL/hr at 08/30/20 1000    propofoL 20 mcg/kg/min (08/30/20 1000)    TPN ADULT CENTRAL LINE CUSTOM 45 mL/hr at 08/30/20 1000     Scheduled Meds:   cloNIDine 0.2 mg/24 hr td ptwk  1 patch Transdermal Q7 Days    famotidine (PF)  20 mg Intravenous Daily    fluconazole (DIFLUCAN) IVPB  200 mg Intravenous Q24H    heparin (porcine)  5,000 Units Subcutaneous Q8H    lidocaine  1 patch Transdermal Q24H    metoprolol  10 mg Intravenous Q6H    miconazole NITRATE 2 %   Topical (Top) BID    piperacillin-tazobactam (ZOSYN) IVPB  4.5 g Intravenous Q12H     PRN Meds:sodium chloride, albuterol-ipratropium, calcium gluconate IVPB, calcium gluconate IVPB, calcium gluconate IVPB, hydrALAZINE, HYDROmorphone, labetaloL, magnesium sulfate  IVPB, magnesium sulfate IVPB, melatonin, metoprolol, naloxone, naloxone     Review of patient's allergies indicates:   Allergen Reactions    Latex      Objective:     Vital Signs (Most Recent):  Temp: 98.6 °F (37 °C) (08/30/20 0700)  Pulse: 98 (08/30/20 1000)  Resp: (!) 27 (08/30/20 1000)  BP: (!) 153/89 (08/30/20 1000)  SpO2: 100 % (08/30/20 1000) Vital Signs (24h Range):  Temp:  [98.6 °F (37 °C)-99.7 °F (37.6 °C)] 98.6 °F (37 °C)  Pulse:  [] 98  Resp:  [16-30] 27  SpO2:  [100 %] 100 %  BP: (119-191)/() 153/89     Weight: 69.5 kg (153 lb 3.5 oz)  Body mass index is 28.02 kg/m².    Intake/Output - Last 3 Shifts       08/28 0700 - 08/29 0659 08/29 0700 - 08/30 0659 08/30 0700 - 08/31 0659    I.V. (mL/kg) 3230 (46.8) 5646.6 (81.2) 145.6 (2.1)    Blood 431.7      IV Piggyback  550 100    TPN 1321 1185 117    Total Intake(mL/kg) 4982.7 (72.2) 7381.6 (106.2) 362.6 (5.2)    Urine (mL/kg/hr) 250 (0.2) 150 (0.1) 0 (0)    Drains 1590 1860 225    Other 125 5979 0    Stool  0 0    Total Output 1965 7989 225    Net +3017.7 -607.4 +137.6           Urine Occurrence 1 x      Stool Occurrence  2 x 1 x          Physical Exam  Constitutional:       Comments: Intubated and sedated but responds to stimulation   Cardiovascular:      Rate and Rhythm: Tachycardia present.   Pulmonary:      Comments: Vent Mode: A/C  Oxygen Concentration (%):  (30-80) 40  Resp Rate Total:  (16 br/min-29 br/min) 21 br/min  Vt Set:  (380 mL) 380 mL  PEEP/CPAP:  (5 cmH20) 5 cmH20  Mean Airway Pressure:  (9.8 mrD00-38 cmH20) 9.9 cmH20    Abdominal:      Comments: Wound vac in place  Drains with bilious output  G tube to gravity with dark gastric contents         Significant Labs:  CBC:   Recent Labs   Lab 08/30/20 0314   WBC 26.25*   RBC 2.89*   HGB 8.7*   HCT 28.5*      MCV 99*   MCH 30.1   MCHC 30.5*     CMP:   Recent Labs   Lab 08/30/20 0314   *  120*   CALCIUM 8.2*  8.2*   ALBUMIN 1.4*  1.4*   PROT 6.8     140   K 4.4   4.4   CO2 24  24     109   BUN 8  8   CREATININE 0.8  0.8   ALKPHOS 122   ALT 10   AST 31   BILITOT 0.6       Significant Diagnostics:  I have reviewed all pertinent imaging results/findings within the past 24 hours.    Assessment/Plan:     Severe sepsis  38 yo F s/p antrectomy with BII reconstruction c/b duodenal necrosis requiring ex lap, washout, drainage c/b aspiration event. S/p duodenal resection with d-j and g-j anastomosis.  - Will plan to take to OR for permcath placement on Monday (8/31), can also change wound vac at that time.  - ICU team bronching patient then planning to extubate  - NPO  - G-tube to gravity  - Abx: diflucan, zosyn  - TPN   - Daily labs  - Trend CBC  - GI and DVT ppx  - PT / OT      VTE Risk Mitigation (From admission, onward)         Ordered     heparin (porcine) injection 5,000 Units  Every 8 hours      08/14/20 0903     Place sequential compression device  Until discontinued      08/12/20 4693                Christy Nicole MD  General Surgery  [facility]

## 2020-08-30 NOTE — SUBJECTIVE & OBJECTIVE
Interval History:   Intubated yesterday morning for respiratory failure, CXR showed complete white out of left lung. Bronchoscopy removed mucus plug, chest xray significantly improved following. On minimal vent settings overnight.   Drains continue with bilious output.   Hemodynamically stable overnight.    Medications:  Continuous Infusions:   sodium chloride 0.9% 200 mL/hr at 08/30/20 0200    dexmedetomidine (PRECEDEX) infusion Stopped (08/27/20 0800)    lactated ringers 100 mL/hr at 08/30/20 1000    propofoL 20 mcg/kg/min (08/30/20 1000)    TPN ADULT CENTRAL LINE CUSTOM 45 mL/hr at 08/30/20 1000     Scheduled Meds:   cloNIDine 0.2 mg/24 hr td ptwk  1 patch Transdermal Q7 Days    famotidine (PF)  20 mg Intravenous Daily    fluconazole (DIFLUCAN) IVPB  200 mg Intravenous Q24H    heparin (porcine)  5,000 Units Subcutaneous Q8H    lidocaine  1 patch Transdermal Q24H    metoprolol  10 mg Intravenous Q6H    miconazole NITRATE 2 %   Topical (Top) BID    piperacillin-tazobactam (ZOSYN) IVPB  4.5 g Intravenous Q12H     PRN Meds:sodium chloride, albuterol-ipratropium, calcium gluconate IVPB, calcium gluconate IVPB, calcium gluconate IVPB, hydrALAZINE, HYDROmorphone, labetaloL, magnesium sulfate IVPB, magnesium sulfate IVPB, melatonin, metoprolol, naloxone, naloxone     Review of patient's allergies indicates:   Allergen Reactions    Latex      Objective:     Vital Signs (Most Recent):  Temp: 98.6 °F (37 °C) (08/30/20 0700)  Pulse: 98 (08/30/20 1000)  Resp: (!) 27 (08/30/20 1000)  BP: (!) 153/89 (08/30/20 1000)  SpO2: 100 % (08/30/20 1000) Vital Signs (24h Range):  Temp:  [98.6 °F (37 °C)-99.7 °F (37.6 °C)] 98.6 °F (37 °C)  Pulse:  [] 98  Resp:  [16-30] 27  SpO2:  [100 %] 100 %  BP: (119-191)/() 153/89     Weight: 69.5 kg (153 lb 3.5 oz)  Body mass index is 28.02 kg/m².    Intake/Output - Last 3 Shifts       08/28 0700 - 08/29 0659 08/29 0700 - 08/30 0659 08/30 0700 - 08/31 0659    I.V. (mL/kg)  3230 (46.8) 5646.6 (81.2) 145.6 (2.1)    Blood 431.7      IV Piggyback  550 100    TPN 1321 1185 117    Total Intake(mL/kg) 4982.7 (72.2) 7381.6 (106.2) 362.6 (5.2)    Urine (mL/kg/hr) 250 (0.2) 150 (0.1) 0 (0)    Drains 1590 1860 225    Other 125 5979 0    Stool  0 0    Total Output 1965 7989 225    Net +3017.7 -607.4 +137.6           Urine Occurrence 1 x      Stool Occurrence  2 x 1 x          Physical Exam  Constitutional:       Comments: Intubated and sedated but responds to stimulation   Cardiovascular:      Rate and Rhythm: Tachycardia present.   Pulmonary:      Comments: Vent Mode: A/C  Oxygen Concentration (%):  (30-80) 40  Resp Rate Total:  (16 br/min-29 br/min) 21 br/min  Vt Set:  (380 mL) 380 mL  PEEP/CPAP:  (5 cmH20) 5 cmH20  Mean Airway Pressure:  (9.8 wzH00-18 cmH20) 9.9 cmH20    Abdominal:      Comments: Wound vac in place  Drains with bilious output  G tube to gravity with dark gastric contents         Significant Labs:  CBC:   Recent Labs   Lab 08/30/20 0314   WBC 26.25*   RBC 2.89*   HGB 8.7*   HCT 28.5*      MCV 99*   MCH 30.1   MCHC 30.5*     CMP:   Recent Labs   Lab 08/30/20 0314   *  120*   CALCIUM 8.2*  8.2*   ALBUMIN 1.4*  1.4*   PROT 6.8     140   K 4.4  4.4   CO2 24  24     109   BUN 8  8   CREATININE 0.8  0.8   ALKPHOS 122   ALT 10   AST 31   BILITOT 0.6       Significant Diagnostics:  I have reviewed all pertinent imaging results/findings within the past 24 hours.

## 2020-08-30 NOTE — ASSESSMENT & PLAN NOTE
40 yo F s/p antrectomy with BII reconstruction c/b duodenal necrosis requiring ex lap, washout, drainage c/b aspiration event. Now with severe sepsis and ARDS     .Neuro:  - AOx4  - PCA for pain control  - Clonidine patch for anxiety     Resp:  - Intubated 8/29.   - PRN breathing treatments  - Repeat Bronch today. CXR shows improvement of L upper Lobe. Plan for possible extubation later today     CV:  - MAP goal >65  - Systolic <160  - Levo and Vaso gtt - off  - Increase Metoprolol scheduled dose  - Hydralazine PRN  - Permacath scheduled for Monday     Heme:  - Hgb/Hct 6.9; 1U PRBC's given  - S/p Abdominal washout on 8/21  - S/p Abdominal exploration w/ woundvac exhange 8/24     ID:  - WBC remains elevated 33.12  - Zosyn, Fluconazole  - Blood cultures and BAL taken 8/19 with NGTD and no abnormal findings     Renal:  - Oliguric on arrival; Cr 1.1 at that time   - SLED overnight  - Trend BUN/Cr;   - Monitor I/Os  - MIVF to 100cc/hr     FEN/GI:  - NPO  - TPN  - NGT to LIWS   - Maintain drains to bulb suction; LLQ with acute drop in drainage and accumulation in SQ tissue  - GI ppx  - Replace electrolytes as needed to keep K>4, Mg>2     Endo:  - Monitor BG     Dispo:  - Continue SICU care  - Plan for extubation later today   - Permacath Monday

## 2020-08-30 NOTE — ASSESSMENT & PLAN NOTE
40 yo F s/p antrectomy with BII reconstruction c/b duodenal necrosis requiring ex lap, washout, drainage c/b aspiration event. S/p duodenal resection with d-j and g-j anastomosis.  - Will plan to take to OR for permcath placement on Monday (8/31), can also change wound vac at that time.  - ICU team bronching patient then planning to extubate  - NPO  - G-tube to gravity  - Abx: diflucan, zosyn  - TPN   - Daily labs  - Trend CBC  - GI and DVT ppx  - PT / OT

## 2020-08-30 NOTE — ASSESSMENT & PLAN NOTE
- oliguric stage 3 sid with ischemic atn likely secondary to septic shock  - unknown bl cr  - muddy brown casts on microscopy    Plan/Recommendations:  - No need for RRT today. Reevaluate tomorrow for CRRT vs HD depending hemodynamics.   -Strict I/O's  -Renally dose meds/avoid nephrotoxic meds  -Keep MAP >65  -Will continue to follow closely

## 2020-08-30 NOTE — SUBJECTIVE & OBJECTIVE
Interval History/Significant Events: Repeat Bronch today. Plan for extubation later today.     Follow-up For: Procedure(s) (LRB):  WASHOUT abdomen (N/A)    Post-Operative Day: 6 Days Post-Op    Objective:     Vital Signs (Most Recent):  Temp: 98.6 °F (37 °C) (08/30/20 0700)  Pulse: 97 (08/30/20 1015)  Resp: (!) 25 (08/30/20 1015)  BP: (!) 172/93 (08/30/20 1015)  SpO2: 100 % (08/30/20 1015) Vital Signs (24h Range):  Temp:  [98.6 °F (37 °C)-99.7 °F (37.6 °C)] 98.6 °F (37 °C)  Pulse:  [] 97  Resp:  [16-30] 25  SpO2:  [100 %] 100 %  BP: (119-191)/() 172/93     Weight: 69.5 kg (153 lb 3.5 oz)  Body mass index is 28.02 kg/m².      Intake/Output Summary (Last 24 hours) at 8/30/2020 1101  Last data filed at 8/30/2020 1000  Gross per 24 hour   Intake 6843.6 ml   Output 7599 ml   Net -755.4 ml       Physical Exam  Vitals signs and nursing note reviewed.   Constitutional:       Appearance: She is not ill-appearing.      Interventions: She is sedated and intubated.      Comments: Intubated and sedated but responds to stimulation   HENT:      Head: Normocephalic and atraumatic.      Mouth/Throat:      Mouth: Mucous membranes are moist.      Pharynx: Oropharynx is clear.   Eyes:      General: No scleral icterus.        Right eye: No discharge.         Left eye: No discharge.      Extraocular Movements: Extraocular movements intact.      Conjunctiva/sclera: Conjunctivae normal.   Neck:      Musculoskeletal: Normal range of motion and neck supple.   Cardiovascular:      Rate and Rhythm: Regular rhythm. Tachycardia present.      Pulses: Normal pulses.      Heart sounds: Normal heart sounds.   Pulmonary:      Effort: Pulmonary effort is normal. No respiratory distress. She is intubated.      Breath sounds: No rales.      Comments: Vent Mode: A/C  Oxygen Concentration (%):  (30-80) 40  Resp Rate Total:  (16 br/min-29 br/min) 21 br/min  Vt Set:  (380 mL) 380 mL  PEEP/CPAP:  (5 cmH20) 5 cmH20  Mean Airway Pressure:  (9.8  xcE23-51 cmH20) 9.9 cmH20    Abdominal:      General: Bowel sounds are normal.      Palpations: Abdomen is soft.      Comments: Wound vac in place  Drains with bilious output  G tube to gravity with dark gastric contents   Musculoskeletal:      Right lower leg: Edema present.      Left lower leg: Edema present.   Skin:     General: Skin is warm and dry.      Findings: No bruising or rash.   Neurological:      Mental Status: She is alert.         Vents:  Vent Mode: A/C (08/30/20 0900)  Ventilator Initiated: Yes(chart correction) (08/29/20 0800)  Set Rate: 16 BPM (08/30/20 0900)  Vt Set: 380 mL (08/30/20 0900)  PEEP/CPAP: 5 cmH20 (08/30/20 0900)  Oxygen Concentration (%): 40 (08/30/20 1015)  Peak Airway Pressure: 21 cmH2O (08/30/20 0900)  Plateau Pressure: 0 cmH20 (08/30/20 0900)  Total Ve: 7.12 mL (08/30/20 0900)  Negative Inspiratory Force (cm H2O): -23 (08/20/20 1501)  F/VT Ratio<105 (RSBI): (!) 53.07 (08/30/20 0900)    Lines/Drains/Airways     Central Venous Catheter Line            Trialysis (Dialysis) Catheter 08/13/20 2230 right internal jugular 16 days          Drain                 Closed/Suction Drain 08/13/20 1659 Right;Inferior Abdomen Bulb 19 Fr. 16 days         Closed/Suction Drain 08/13/20 1659 Right;Superior Abdomen Bulb 19 Fr. 16 days         Gastrostomy/Enterostomy 08/15/20 0916 Gastrostomy tube w/ balloon LUQ decompression 15 days    Female External Urinary Catheter 08/27/20 1600 2 days          Airway                 Airway - Non-Surgical 08/29/20 0755 Endotracheal Tube 1 day          Peripheral Intravenous Line                 Midline Catheter Insertion/Assessment  - Single Lumen 08/26/20 1501 Right basilic vein (medial side of arm) 18g x 8cm 3 days                Significant Labs:    CBC/Anemia Profile:  Recent Labs   Lab 08/29/20  0336 08/29/20  0715 08/29/20  0724 08/30/20  0314   WBC 33.12*  --  34.54* 26.25*   HGB 6.9*  --  9.3* 8.7*   HCT 22.3* 27* 29.0* 28.5*   *  --  397* 342   MCV  97  --  95 99*   RDW 14.6*  --  14.5 14.6*        Chemistries:  Recent Labs   Lab 08/29/20  0336 08/29/20  1225 08/29/20  2209 08/30/20  0314    140 140 140  140   K 3.6 3.9 3.8 4.4  4.4    104 106 109  109   CO2 25 27 27 24  24   BUN 27* 33* 11 8  8   CREATININE 2.1* 2.4* 0.9 0.8  0.8   CALCIUM 8.2* 8.7 7.9* 8.2*  8.2*   ALBUMIN 1.3* 1.3* 1.3* 1.4*  1.4*   PROT 6.5  --   --  6.8   BILITOT 0.6  --   --  0.6   ALKPHOS 110  --   --  122   ALT 10  --   --  10   AST 19  --   --  31   MG 2.3 2.3 1.9 1.8  1.8   PHOS 3.5 3.7 1.6* 4.0  4.0       All pertinent labs within the past 24 hours have been reviewed.    Significant Imaging:  I have reviewed all pertinent imaging results/findings within the past 24 hours.

## 2020-08-30 NOTE — PROCEDURES
[facility]  Bronchoscopy   Procedure Note    SUMMARY     Date of Procedure: 8/30/2020    Procedure: Bronchoscopy, Therapeutic    Provider: Serjio Garcia MD    Assisting Provider: n/a    Pre-Procedure Diagnosis: Respiratory distress     Post-Procedure Diagnosis: Respiratory distress     Indication: Whiteout of L lung    Anesthesia: Moderate sedation     Technical Procedures Used: n/a    Description of the Findings of the Procedure:     Patient was consented for the procedure with all risk and benefit of the procedure explained in detail.  Patient was given the opportunity to ask questions and all concerns were answered.  The bronchocope was inserted into the main airway via the endotracheal tube. An anatomical survey was done of the main airways and the subsegmental bronchus.  The findings are reported above.  A bronchialalveolar lavage was performed using aliquots of normal saline instilled into the airways then aspirated back.    Bronchoscopy of Left lobe showed copious amounts of clear secretions. Irrigated Left lung with adequate suctioning throughout. No cultures sent.      Significant Surgical Tasks Conducted by the Assistant(s), if Applicable: n/a    Complications: None; patient tolerated the procedure well.    Estimated Blood Loss (EBL): Minimal           Implants: n/a    Specimens: None       Condition: Stable        Disposition: ICU - intubated and hemodynamically stable.    Attestation: I performed the procedure.

## 2020-08-30 NOTE — PROGRESS NOTES
Pt vss, sats 100% on AC/VC 30%/5 vlad well, TPN and propofol gtts infusing. Labetalol given x1 for SBP>180 per MD order. Accuchecks q6, no insulin coverage needed. Labs monitored, electrolytes replaced. GODFREY drains and wound vac output monitored, WNL. UO monitored. Frequent rounds made in order to ensure pain control, PRN meds given. Plan for permacath Monday. POC reviewed with patient and family, no questions/concerns at this time. Will continue to monitor.      Skin assessed. Pt turned q2. Complete bath given and linen changed. Bruising to bilateral lower extremities, foam dressing in place. Altered skin integrity to sacrum, wound care consulted. Foam pad removed from buttocks due to urinary/stool incontinence.  No other skin breakdown noted, will continue to monitor

## 2020-08-31 ENCOUNTER — ANESTHESIA (OUTPATIENT)
Dept: SURGERY | Facility: HOSPITAL | Age: 40
DRG: 856 | End: 2020-08-31

## 2020-08-31 LAB
ABO + RH BLD: NORMAL
ALBUMIN SERPL BCP-MCNC: 1.3 G/DL (ref 3.5–5.2)
ALP SERPL-CCNC: 122 U/L (ref 55–135)
ALT SERPL W/O P-5'-P-CCNC: 9 U/L (ref 10–44)
ANION GAP SERPL CALC-SCNC: 7 MMOL/L (ref 8–16)
ANION GAP SERPL CALC-SCNC: 9 MMOL/L (ref 8–16)
ANION GAP SERPL CALC-SCNC: 9 MMOL/L (ref 8–16)
ANISOCYTOSIS BLD QL SMEAR: SLIGHT
AST SERPL-CCNC: 21 U/L (ref 10–40)
BACTERIA SPEC AEROBE CULT: NO GROWTH
BASOPHILS # BLD AUTO: 0.12 K/UL (ref 0–0.2)
BASOPHILS NFR BLD: 0.5 % (ref 0–1.9)
BILIRUB SERPL-MCNC: 0.5 MG/DL (ref 0.1–1)
BLD GP AB SCN CELLS X3 SERPL QL: NORMAL
BUN SERPL-MCNC: 25 MG/DL (ref 6–20)
BUN SERPL-MCNC: 25 MG/DL (ref 6–20)
BUN SERPL-MCNC: 31 MG/DL (ref 6–20)
CA-I BLDV-SCNC: 1.17 MMOL/L (ref 1.06–1.42)
CALCIUM SERPL-MCNC: 8.4 MG/DL (ref 8.7–10.5)
CALCIUM SERPL-MCNC: 8.4 MG/DL (ref 8.7–10.5)
CALCIUM SERPL-MCNC: 8.7 MG/DL (ref 8.7–10.5)
CHLORIDE SERPL-SCNC: 108 MMOL/L (ref 95–110)
CHLORIDE SERPL-SCNC: 108 MMOL/L (ref 95–110)
CHLORIDE SERPL-SCNC: 109 MMOL/L (ref 95–110)
CO2 SERPL-SCNC: 24 MMOL/L (ref 23–29)
CO2 SERPL-SCNC: 24 MMOL/L (ref 23–29)
CO2 SERPL-SCNC: 25 MMOL/L (ref 23–29)
CREAT SERPL-MCNC: 1.6 MG/DL (ref 0.5–1.4)
CREAT SERPL-MCNC: 1.6 MG/DL (ref 0.5–1.4)
CREAT SERPL-MCNC: 1.9 MG/DL (ref 0.5–1.4)
DIFFERENTIAL METHOD: ABNORMAL
EOSINOPHIL # BLD AUTO: 0.6 K/UL (ref 0–0.5)
EOSINOPHIL NFR BLD: 2.7 % (ref 0–8)
ERYTHROCYTE [DISTWIDTH] IN BLOOD BY AUTOMATED COUNT: 14.3 % (ref 11.5–14.5)
EST. GFR  (AFRICAN AMERICAN): 37.7 ML/MIN/1.73 M^2
EST. GFR  (AFRICAN AMERICAN): 46.5 ML/MIN/1.73 M^2
EST. GFR  (AFRICAN AMERICAN): 46.5 ML/MIN/1.73 M^2
EST. GFR  (NON AFRICAN AMERICAN): 32.7 ML/MIN/1.73 M^2
EST. GFR  (NON AFRICAN AMERICAN): 40.3 ML/MIN/1.73 M^2
EST. GFR  (NON AFRICAN AMERICAN): 40.3 ML/MIN/1.73 M^2
GLUCOSE SERPL-MCNC: 102 MG/DL (ref 70–110)
GLUCOSE SERPL-MCNC: 102 MG/DL (ref 70–110)
GLUCOSE SERPL-MCNC: 112 MG/DL (ref 70–110)
GRAM STN SPEC: NORMAL
HCT VFR BLD AUTO: 25.7 % (ref 37–48.5)
HGB BLD-MCNC: 8 G/DL (ref 12–16)
IMM GRANULOCYTES # BLD AUTO: 0.51 K/UL (ref 0–0.04)
IMM GRANULOCYTES NFR BLD AUTO: 2.2 % (ref 0–0.5)
LYMPHOCYTES # BLD AUTO: 1.8 K/UL (ref 1–4.8)
LYMPHOCYTES NFR BLD: 8 % (ref 18–48)
MAGNESIUM SERPL-MCNC: 2.2 MG/DL (ref 1.6–2.6)
MCH RBC QN AUTO: 30 PG (ref 27–31)
MCHC RBC AUTO-ENTMCNC: 31.1 G/DL (ref 32–36)
MCV RBC AUTO: 96 FL (ref 82–98)
MONOCYTES # BLD AUTO: 1.7 K/UL (ref 0.3–1)
MONOCYTES NFR BLD: 7.5 % (ref 4–15)
NEUTROPHILS # BLD AUTO: 18 K/UL (ref 1.8–7.7)
NEUTROPHILS NFR BLD: 79.1 % (ref 38–73)
NRBC BLD-RTO: 0 /100 WBC
PHOSPHATE SERPL-MCNC: 3.2 MG/DL (ref 2.7–4.5)
PHOSPHATE SERPL-MCNC: 3.6 MG/DL (ref 2.7–4.5)
PHOSPHATE SERPL-MCNC: 3.6 MG/DL (ref 2.7–4.5)
PLATELET # BLD AUTO: 411 K/UL (ref 150–350)
PLATELET BLD QL SMEAR: ABNORMAL
PMV BLD AUTO: 10 FL (ref 9.2–12.9)
POCT GLUCOSE: 108 MG/DL (ref 70–110)
POTASSIUM SERPL-SCNC: 3.8 MMOL/L (ref 3.5–5.1)
POTASSIUM SERPL-SCNC: 3.8 MMOL/L (ref 3.5–5.1)
POTASSIUM SERPL-SCNC: 4 MMOL/L (ref 3.5–5.1)
POTASSIUM SERPL-SCNC: 4.5 MMOL/L (ref 3.5–5.1)
PROT SERPL-MCNC: 6.7 G/DL (ref 6–8.4)
RBC # BLD AUTO: 2.67 M/UL (ref 4–5.4)
SARS-COV-2 RDRP RESP QL NAA+PROBE: NEGATIVE
SODIUM SERPL-SCNC: 141 MMOL/L (ref 136–145)
TRIGL SERPL-MCNC: 136 MG/DL (ref 30–150)
WBC # BLD AUTO: 22.76 K/UL (ref 3.9–12.7)

## 2020-08-31 PROCEDURE — 84132 ASSAY OF SERUM POTASSIUM: CPT

## 2020-08-31 PROCEDURE — 97605 NEG PRS WND THER DME<=50SQCM: CPT | Mod: ,,, | Performed by: SURGERY

## 2020-08-31 PROCEDURE — 99233 PR SUBSEQUENT HOSPITAL CARE,LEVL III: ICD-10-PCS | Mod: ,,, | Performed by: INTERNAL MEDICINE

## 2020-08-31 PROCEDURE — 36000707: Performed by: SURGERY

## 2020-08-31 PROCEDURE — 80069 RENAL FUNCTION PANEL: CPT

## 2020-08-31 PROCEDURE — 36558 PR INSERT TUNNELED CV CATH W/O PORT OR PUMP: ICD-10-PCS | Mod: 79,LT,, | Performed by: SURGERY

## 2020-08-31 PROCEDURE — 63600175 PHARM REV CODE 636 W HCPCS: Performed by: STUDENT IN AN ORGANIZED HEALTH CARE EDUCATION/TRAINING PROGRAM

## 2020-08-31 PROCEDURE — D9220A PRA ANESTHESIA: ICD-10-PCS | Mod: ,,, | Performed by: ANESTHESIOLOGY

## 2020-08-31 PROCEDURE — D9220A PRA ANESTHESIA: Mod: ,,, | Performed by: ANESTHESIOLOGY

## 2020-08-31 PROCEDURE — 99499 UNLISTED E&M SERVICE: CPT | Mod: ,,, | Performed by: SURGERY

## 2020-08-31 PROCEDURE — 84478 ASSAY OF TRIGLYCERIDES: CPT

## 2020-08-31 PROCEDURE — B4185 PARENTERAL SOL 10 GM LIPIDS: HCPCS | Performed by: STUDENT IN AN ORGANIZED HEALTH CARE EDUCATION/TRAINING PROGRAM

## 2020-08-31 PROCEDURE — 99233 SBSQ HOSP IP/OBS HIGH 50: CPT | Mod: ,,, | Performed by: INTERNAL MEDICINE

## 2020-08-31 PROCEDURE — 84100 ASSAY OF PHOSPHORUS: CPT

## 2020-08-31 PROCEDURE — 27000221 HC OXYGEN, UP TO 24 HOURS

## 2020-08-31 PROCEDURE — 25000003 PHARM REV CODE 250: Performed by: STUDENT IN AN ORGANIZED HEALTH CARE EDUCATION/TRAINING PROGRAM

## 2020-08-31 PROCEDURE — 32551 PR TUBE THORACOSTOMY INCLUDES WATER SEAL: ICD-10-PCS | Mod: 79,51,LT, | Performed by: SURGERY

## 2020-08-31 PROCEDURE — 63600175 PHARM REV CODE 636 W HCPCS: Performed by: SURGERY

## 2020-08-31 PROCEDURE — 94640 AIRWAY INHALATION TREATMENT: CPT

## 2020-08-31 PROCEDURE — 94761 N-INVAS EAR/PLS OXIMETRY MLT: CPT

## 2020-08-31 PROCEDURE — A4217 STERILE WATER/SALINE, 500 ML: HCPCS | Performed by: STUDENT IN AN ORGANIZED HEALTH CARE EDUCATION/TRAINING PROGRAM

## 2020-08-31 PROCEDURE — C1750 CATH, HEMODIALYSIS,LONG-TERM: HCPCS | Performed by: SURGERY

## 2020-08-31 PROCEDURE — 99291 CRITICAL CARE FIRST HOUR: CPT | Mod: 24,,, | Performed by: SURGERY

## 2020-08-31 PROCEDURE — 31622 DX BRONCHOSCOPE/WASH: CPT | Mod: 79,51,, | Performed by: SURGERY

## 2020-08-31 PROCEDURE — 83735 ASSAY OF MAGNESIUM: CPT

## 2020-08-31 PROCEDURE — 36000706: Performed by: SURGERY

## 2020-08-31 PROCEDURE — 85025 COMPLETE CBC W/AUTO DIFF WBC: CPT

## 2020-08-31 PROCEDURE — 20000000 HC ICU ROOM

## 2020-08-31 PROCEDURE — 80053 COMPREHEN METABOLIC PANEL: CPT

## 2020-08-31 PROCEDURE — C1729 CATH, DRAINAGE: HCPCS | Performed by: SURGERY

## 2020-08-31 PROCEDURE — 27000635 HC IPV DISPOSABLE BREATHING CIRCUIT

## 2020-08-31 PROCEDURE — U0002 COVID-19 LAB TEST NON-CDC: HCPCS

## 2020-08-31 PROCEDURE — 31622 PR BRONCHOSCOPY,DIAGNOSTIC: ICD-10-PCS | Mod: 79,51,, | Performed by: SURGERY

## 2020-08-31 PROCEDURE — 99499 NO LOS: ICD-10-PCS | Mod: ,,, | Performed by: SURGERY

## 2020-08-31 PROCEDURE — 25000242 PHARM REV CODE 250 ALT 637 W/ HCPCS: Performed by: STUDENT IN AN ORGANIZED HEALTH CARE EDUCATION/TRAINING PROGRAM

## 2020-08-31 PROCEDURE — 32551 INSERTION OF CHEST TUBE: CPT | Mod: 79,51,LT, | Performed by: SURGERY

## 2020-08-31 PROCEDURE — 97605 PR NEG PRESS WOUND THERAPY (NPWT) W/NON-DISPOSABLE WOUND VAC DEVICE (DME), <=50 CM: ICD-10-PCS | Mod: ,,, | Performed by: SURGERY

## 2020-08-31 PROCEDURE — 37000009 HC ANESTHESIA EA ADD 15 MINS: Performed by: SURGERY

## 2020-08-31 PROCEDURE — 99900035 HC TECH TIME PER 15 MIN (STAT)

## 2020-08-31 PROCEDURE — 83735 ASSAY OF MAGNESIUM: CPT | Mod: 91

## 2020-08-31 PROCEDURE — 99291 PR CRITICAL CARE, E/M 30-74 MINUTES: ICD-10-PCS | Mod: 24,,, | Performed by: SURGERY

## 2020-08-31 PROCEDURE — 36558 INSERT TUNNELED CV CATH: CPT | Mod: 79,LT,, | Performed by: SURGERY

## 2020-08-31 PROCEDURE — 82330 ASSAY OF CALCIUM: CPT

## 2020-08-31 PROCEDURE — 25000003 PHARM REV CODE 250: Performed by: SURGERY

## 2020-08-31 PROCEDURE — 37000008 HC ANESTHESIA 1ST 15 MINUTES: Performed by: SURGERY

## 2020-08-31 PROCEDURE — 86850 RBC ANTIBODY SCREEN: CPT

## 2020-08-31 PROCEDURE — S0028 INJECTION, FAMOTIDINE, 20 MG: HCPCS | Performed by: STUDENT IN AN ORGANIZED HEALTH CARE EDUCATION/TRAINING PROGRAM

## 2020-08-31 DEVICE — CATH HEMOSPLIT DL 14.5FR 24CM: Type: IMPLANTABLE DEVICE | Site: CHEST | Status: FUNCTIONAL

## 2020-08-31 RX ORDER — POTASSIUM CHLORIDE 29.8 MG/ML
40 INJECTION INTRAVENOUS ONCE
Status: COMPLETED | OUTPATIENT
Start: 2020-08-31 | End: 2020-08-31

## 2020-08-31 RX ORDER — IPRATROPIUM BROMIDE AND ALBUTEROL SULFATE 2.5; .5 MG/3ML; MG/3ML
3 SOLUTION RESPIRATORY (INHALATION) EVERY 4 HOURS
Status: DISCONTINUED | OUTPATIENT
Start: 2020-08-31 | End: 2020-09-02

## 2020-08-31 RX ORDER — PROPOFOL 10 MG/ML
VIAL (ML) INTRAVENOUS
Status: DISCONTINUED | OUTPATIENT
Start: 2020-08-31 | End: 2020-08-31

## 2020-08-31 RX ORDER — MIDAZOLAM HYDROCHLORIDE 1 MG/ML
INJECTION, SOLUTION INTRAMUSCULAR; INTRAVENOUS
Status: DISCONTINUED | OUTPATIENT
Start: 2020-08-31 | End: 2020-08-31

## 2020-08-31 RX ORDER — HYDROMORPHONE HYDROCHLORIDE 1 MG/ML
INJECTION, SOLUTION INTRAMUSCULAR; INTRAVENOUS; SUBCUTANEOUS
Status: COMPLETED
Start: 2020-08-31 | End: 2020-08-31

## 2020-08-31 RX ORDER — HEPARIN SODIUM 1000 [USP'U]/ML
INJECTION, SOLUTION INTRAVENOUS; SUBCUTANEOUS
Status: DISCONTINUED | OUTPATIENT
Start: 2020-08-31 | End: 2020-08-31 | Stop reason: HOSPADM

## 2020-08-31 RX ORDER — KETAMINE HCL IN 0.9 % NACL 50 MG/5 ML
SYRINGE (ML) INTRAVENOUS
Status: DISCONTINUED | OUTPATIENT
Start: 2020-08-31 | End: 2020-08-31

## 2020-08-31 RX ORDER — FENTANYL CITRATE 50 UG/ML
INJECTION, SOLUTION INTRAMUSCULAR; INTRAVENOUS
Status: DISCONTINUED | OUTPATIENT
Start: 2020-08-31 | End: 2020-08-31

## 2020-08-31 RX ORDER — NEOSTIGMINE METHYLSULFATE 1 MG/ML
INJECTION, SOLUTION INTRAVENOUS
Status: DISCONTINUED | OUTPATIENT
Start: 2020-08-31 | End: 2020-08-31

## 2020-08-31 RX ORDER — POTASSIUM CHLORIDE 14.9 MG/ML
20 INJECTION INTRAVENOUS ONCE
Status: COMPLETED | OUTPATIENT
Start: 2020-08-31 | End: 2020-08-31

## 2020-08-31 RX ORDER — ROCURONIUM BROMIDE 10 MG/ML
INJECTION, SOLUTION INTRAVENOUS
Status: DISCONTINUED | OUTPATIENT
Start: 2020-08-31 | End: 2020-08-31

## 2020-08-31 RX ORDER — GLYCOPYRROLATE 0.2 MG/ML
INJECTION INTRAMUSCULAR; INTRAVENOUS
Status: DISCONTINUED | OUTPATIENT
Start: 2020-08-31 | End: 2020-08-31

## 2020-08-31 RX ADMIN — HYDROMORPHONE HYDROCHLORIDE 0.2 MG: 1 INJECTION, SOLUTION INTRAMUSCULAR; INTRAVENOUS; SUBCUTANEOUS at 11:08

## 2020-08-31 RX ADMIN — METOROPROLOL TARTRATE 10 MG: 5 INJECTION, SOLUTION INTRAVENOUS at 12:08

## 2020-08-31 RX ADMIN — MIDAZOLAM HYDROCHLORIDE 2 MG: 1 INJECTION, SOLUTION INTRAMUSCULAR; INTRAVENOUS at 10:08

## 2020-08-31 RX ADMIN — NEOSTIGMINE METHYLSULFATE 3 MG: 1 INJECTION INTRAVENOUS at 10:08

## 2020-08-31 RX ADMIN — METOROPROLOL TARTRATE 10 MG: 5 INJECTION, SOLUTION INTRAVENOUS at 11:08

## 2020-08-31 RX ADMIN — HYDROMORPHONE HYDROCHLORIDE 0.2 MG: 1 INJECTION, SOLUTION INTRAMUSCULAR; INTRAVENOUS; SUBCUTANEOUS at 10:08

## 2020-08-31 RX ADMIN — PIPERACILLIN SODIUM AND TAZOBACTAM SODIUM 4.5 G: 4; .5 INJECTION, POWDER, LYOPHILIZED, FOR SOLUTION INTRAVENOUS at 09:08

## 2020-08-31 RX ADMIN — HYDRALAZINE HYDROCHLORIDE 10 MG: 20 INJECTION INTRAMUSCULAR; INTRAVENOUS at 04:08

## 2020-08-31 RX ADMIN — MICONAZOLE NITRATE: 20 POWDER TOPICAL at 09:08

## 2020-08-31 RX ADMIN — HEPARIN SODIUM 5000 UNITS: 5000 INJECTION INTRAVENOUS; SUBCUTANEOUS at 02:08

## 2020-08-31 RX ADMIN — FLUCONAZOLE 200 MG: 2 INJECTION, SOLUTION INTRAVENOUS at 08:08

## 2020-08-31 RX ADMIN — Medication: at 12:08

## 2020-08-31 RX ADMIN — PROPOFOL 50 MG: 10 INJECTION, EMULSION INTRAVENOUS at 10:08

## 2020-08-31 RX ADMIN — IPRATROPIUM BROMIDE AND ALBUTEROL SULFATE 3 ML: .5; 2.5 SOLUTION RESPIRATORY (INHALATION) at 05:08

## 2020-08-31 RX ADMIN — IPRATROPIUM BROMIDE AND ALBUTEROL SULFATE 3 ML: .5; 2.5 SOLUTION RESPIRATORY (INHALATION) at 08:08

## 2020-08-31 RX ADMIN — SODIUM CHLORIDE: 0.9 INJECTION, SOLUTION INTRAVENOUS at 09:08

## 2020-08-31 RX ADMIN — IPRATROPIUM BROMIDE AND ALBUTEROL SULFATE 3 ML: .5; 2.5 SOLUTION RESPIRATORY (INHALATION) at 01:08

## 2020-08-31 RX ADMIN — HEPARIN SODIUM 5000 UNITS: 5000 INJECTION INTRAVENOUS; SUBCUTANEOUS at 09:08

## 2020-08-31 RX ADMIN — Medication 30 MG: at 10:08

## 2020-08-31 RX ADMIN — CALCIUM GLUCONATE: 98 INJECTION, SOLUTION INTRAVENOUS at 09:08

## 2020-08-31 RX ADMIN — POTASSIUM CHLORIDE 20 MEQ: 14.9 INJECTION, SOLUTION INTRAVENOUS at 08:08

## 2020-08-31 RX ADMIN — METOROPROLOL TARTRATE 10 MG: 5 INJECTION, SOLUTION INTRAVENOUS at 05:08

## 2020-08-31 RX ADMIN — MICONAZOLE NITRATE: 20 POWDER TOPICAL at 08:08

## 2020-08-31 RX ADMIN — FENTANYL CITRATE 100 MCG: 50 INJECTION, SOLUTION INTRAMUSCULAR; INTRAVENOUS at 10:08

## 2020-08-31 RX ADMIN — POTASSIUM CHLORIDE 40 MEQ: 29.8 INJECTION, SOLUTION INTRAVENOUS at 01:08

## 2020-08-31 RX ADMIN — SMOFLIPID 250 ML: 6; 6; 5; 3 INJECTION, EMULSION INTRAVENOUS at 09:08

## 2020-08-31 RX ADMIN — HEPARIN SODIUM 5000 UNITS: 5000 INJECTION INTRAVENOUS; SUBCUTANEOUS at 05:08

## 2020-08-31 RX ADMIN — ROCURONIUM BROMIDE 40 MG: 10 INJECTION, SOLUTION INTRAVENOUS at 10:08

## 2020-08-31 RX ADMIN — GLYCOPYRROLATE 0.6 MG: 0.2 INJECTION, SOLUTION INTRAMUSCULAR; INTRAVENOUS at 10:08

## 2020-08-31 RX ADMIN — FAMOTIDINE 20 MG: 10 INJECTION INTRAVENOUS at 08:08

## 2020-08-31 RX ADMIN — PROPOFOL 150 MG: 10 INJECTION, EMULSION INTRAVENOUS at 10:08

## 2020-08-31 RX ADMIN — PIPERACILLIN SODIUM AND TAZOBACTAM SODIUM 4.5 G: 4; .5 INJECTION, POWDER, LYOPHILIZED, FOR SOLUTION INTRAVENOUS at 08:08

## 2020-08-31 RX ADMIN — METOROPROLOL TARTRATE 10 MG: 5 INJECTION, SOLUTION INTRAVENOUS at 06:08

## 2020-08-31 NOTE — CONSULTS
Patient seen for wound consult for sacrum and follow up for bilateral shin discoloration.  Sacrum intact. Patient in SICU on hay low air loss surface. Sacral foam being utilized by nursing for HAPI prevention. Purewick utilized for urinary incontinence.   Bilateral shin purple discoloration appears to be improving slightly, recommend continuing foam dressing changes twice a week and continue to monitor.  Nursing to continue care. Wound care to assist as needed.          Sacrum     Left shin    Right shin

## 2020-08-31 NOTE — PROGRESS NOTES
Pt vss, sats 100% on 4L NC vlad well, TPN and PCA gtts infusing. Accuchecks q6, no insulin coverage needed. Labs monitored, electrolytes replaced. GODFREY drains and wound vac output monitored, WNL. UO monitored. Plan for permacath and wound vac exchange today. POC reviewed with patient and family, no questions/concerns at this time. Will continue to monitor.      Skin assessed. Pt turned q2. Complete bath given and linen changed. Bruising to bilateral lower extremities, foam dressing in place. Altered skin integrity to sacrum, wound care consulted. Foam pad applied to sacrum.  No other skin breakdown noted, will continue to monitor

## 2020-08-31 NOTE — PT/OT/SLP PROGRESS
Occupational Therapy      Patient Name:  Jaquan Farmer   MRN:  [id number]    Patient not seen today secondary to pt in OR for [place] exchange and bronchoscopy. Will follow-up tomorrow.    CHRISTIANO Solo  8/31/2020

## 2020-08-31 NOTE — OP NOTE
Date of procedure - 08/31/2020  Preoperative diagnosis - renal failure, whiteout of the left long, an abdominal wound VAC to manage a rectus sheath hematoma  Postop diagnosis - same  Procedure - left internal jugular PermCath insertion  Left chest tube insertion  Wound VAC change the abdominal wall  And flexible bronchoscopy  Surgeon - Crispin  Assist - Jhon  Anesthesia - general  Blood loss - minimal  Indications - patient is already in managed effectively for rectus sheath hematoma this bleeding has been controlled the patient has renal failure and requires a PermCath for dialysis the patient waited out her left lung within 24 hours prior to this procedure bronchoscopy was done since the patient had had prior mucous plugging and a left chest tube was placed when plugs were not identified at bronchoscopy and so therefore chest tube was placed manage a pleural effusion  Operative report in detail - patient brought the operating placed in supine position prepped draped sterile fashion after satisfactory general anesthesia was induced  Wound VAC to the abdominal wall was changed there was no bleeding noted no evidence of infection wound was irrigated and a medium-sized sponge was placed in the abdominal wall to close this defect  Next the flexible bronchoscope was inserted through the endotracheal tube all bronchial segments were identified the and there were no significant mucus plugs to some thin secretions none of which were obstructing airways   A 24 Pakistani chest tube was inserted in the 5th interspace in the midaxillary line the secured to the skin with a nylon suture bowel L of of fluid was drained  With the secured and dressed a ultrasound was used to identify the left internal jugular vein 18 gauge needle was inserted into the vein Wire was advanced into the right heart and a 14-,1/2 Pakistani PermCath was tunneled from the left anterior chest wall up to the neck insertion site and the dilators were used to  enlarge this the tract from skin to did vein and ultimately a peel-away sheath was placed in the vein in the PermCath was inserted through the peel-away catheter in this position the catheter was noted aspirate blood freely was flushed with heparinized solution and secured to skin with a nylon Needle sponge counts were correct patient tolerated the procedure well was stable the completion of the operation

## 2020-08-31 NOTE — OP NOTE
Date of procedure - 08/21/2020  Preop diagnosis - rectus hematoma  Postop diagnosis - same  Procedure - evacuation rectus sheath hematoma and ligation of bleeding vessel with wound VAC application  Surgeon - Crispin  Assist - Morello  Anesthesia - general  Blood loss - 2 L of old clot no active bleeding  Indications - this 39-year-old patient is return to the operating room after bilateral component separation with myofascial flap advancement who developed bleeding from her rectus muscles at with transfusion requirements  Operative report in detail - patient brought the operating room placed in supine position prepped draped sterile fashion after satisfactory general anesthesia was induced  Midline skin incision was opened and 2 L of old clot were evacuated  The wound was vigorously irrigated several small areas of oozing were identified and controlled with cautery  A single visible non bleeding vessel was identified and controlled with a figure-of-eight suture ligature on the left side of the abdomen  Wound VAC was applied  Needle sponge instrument counts were correct patient tolerated the procedure was stable throughout wall

## 2020-08-31 NOTE — PROGRESS NOTES
[facility]  Nephrology  Progress Note    Patient Name: Jaquan Farmer  MRN: [id number]  Admission Date: 8/12/2020  Hospital Length of Stay: 19 days  Attending Provider: Donis Roa MD   Primary Care Physician: Primary Doctor No  Principal Problem:Duodenum disorder    Subjective:     Interval History:  RRT held yesterday, electrolytes stable this morning, net positive 1.6L/24h.  She remains on maintenance IVF.  Hypervolemic on examination.  No combatants on exam, oxygenating well on NC.  CXR this morning with complete opacification of the L lung, going to OR today for Permacath placement and Bronch.  UOP of 500 ml/24h.     Review of patient's allergies indicates:   Allergen Reactions    Latex      Current Facility-Administered Medications   Medication Frequency    0.9%  NaCl infusion (for blood administration) Q24H PRN    albuterol-ipratropium 2.5 mg-0.5 mg/3 mL nebulizer solution 3 mL Q6H PRN    calcium gluconate 1g in dextrose 5% 100mL (ready to mix system) PRN    calcium gluconate 2 g in dextrose 5 % 100 mL IVPB PRN    calcium gluconate 3 g in dextrose 5 % 100 mL IVPB PRN    cloNIDine 0.2 mg/24 hr td ptwk 1 patch Q7 Days    dexmedetomidine (PRECEDEX) 400mcg/100mL 0.9% NaCL infusion Continuous    famotidine (PF) injection 20 mg Daily    fluconazole (DIFLUCAN) IVPB 200 mg 100 mL Q24H    heparin (porcine) injection 5,000 Units Q8H    hydrALAZINE injection 10 mg Q6H PRN    HYDROmorphone injection 0.5 mg Q2H PRN    HYDROmorphone PCA syringe 6 mg/30 mL (0.2 mg/mL) NS Continuous    labetalol 20 mg/4 mL (5 mg/mL) IV syring Q4H PRN    lactated ringers infusion Continuous    lidocaine 5 % patch 1 patch Q24H    magnesium sulfate 2g in water 50mL IVPB (premix) PRN    magnesium sulfate 2g in water 50mL IVPB (premix) PRN    melatonin tablet 3 mg Nightly PRN    metoprolol injection 10 mg Q6H    metoprolol injection 5 mg Q4H PRN    miconazole NITRATE 2 % top powder BID     naloxone 0.4 mg/mL injection 0.02 mg PRN    piperacillin-tazobactam 4.5 g in sodium chloride 0.9% 100 mL IVPB (ready to mix system) Q12H    propofol (DIPRIVAN) 10 mg/mL infusion Continuous    TPN ADULT CENTRAL LINE CUSTOM Continuous       Objective:     Vital Signs (Most Recent):  Temp: 99.1 °F (37.3 °C) (08/31/20 0700)  Pulse: 100 (08/31/20 0900)  Resp: (!) 27 (08/31/20 0900)  BP: (!) 179/101 (08/31/20 0900)  SpO2: 98 % (08/31/20 0900)  O2 Device (Oxygen Therapy): nasal cannula (08/31/20 0900) Vital Signs (24h Range):  Temp:  [98.8 °F (37.1 °C)-99.4 °F (37.4 °C)] 99.1 °F (37.3 °C)  Pulse:  [] 100  Resp:  [14-43] 27  SpO2:  [97 %-100 %] 98 %  BP: (140-189)/() 179/101     Weight: 69.4 kg (153 lb) (08/31/20 0500)  Body mass index is 27.98 kg/m².  Body surface area is 1.74 meters squared.    I/O last 3 completed shifts:  In: 8638.2 [I.V.:6559.2; IV Piggyback:550]  Out: 6293 [Urine:550; Drains:2455; Other:3288]    Physical Exam  Vitals signs and nursing note reviewed.   Constitutional:       Appearance: She is not ill-appearing.      Interventions: She is sedated and intubated.   HENT:      Head: Normocephalic and atraumatic.      Mouth/Throat:      Mouth: Mucous membranes are moist.      Pharynx: Oropharynx is clear.   Eyes:      General: No scleral icterus.        Right eye: No discharge.         Left eye: No discharge.      Extraocular Movements: Extraocular movements intact.      Conjunctiva/sclera: Conjunctivae normal.   Neck:      Musculoskeletal: Normal range of motion and neck supple.   Cardiovascular:      Rate and Rhythm: Regular rhythm. Tachycardia present.      Pulses: Normal pulses.      Heart sounds: Normal heart sounds.   Pulmonary:      Effort: Pulmonary effort is normal. No respiratory distress. She is intubated.      Breath sounds: No rales.   Abdominal:      General: Bowel sounds are normal.      Palpations: Abdomen is soft.      Comments: woundvac   Musculoskeletal:      Right lower  leg: Edema present.      Left lower leg: Edema present.   Skin:     General: Skin is warm and dry.      Findings: No bruising or rash.   Neurological:      Mental Status: She is alert.         Significant Labs:  CBC:   Recent Labs   Lab 08/31/20  0300   WBC 22.76*   RBC 2.67*   HGB 8.0*   HCT 25.7*   *   MCV 96   MCH 30.0   MCHC 31.1*     CMP:   Recent Labs   Lab 08/31/20  0300 08/31/20  0552     102  --    CALCIUM 8.4*  8.4*  --    ALBUMIN 1.3*  1.3*  --    PROT 6.7  --      141  --    K 3.8  3.8 4.0   CO2 24  24  --      108  --    BUN 25*  25*  --    CREATININE 1.6*  1.6*  --    ALKPHOS 122  --    ALT 9*  --    AST 21  --    BILITOT 0.5  --             Assessment/Plan:     Acute renal failure with tubular necrosis  - oliguric stage 3 sid with ischemic atn likely secondary to septic shock  - unknown bl cr  - muddy brown casts on microscopy    Plan/Recommendations:  -no emergent need for RRT today  -recommend discontinuing maintenance IVF. She remains net positive daily and hypervolemic on examination.  -recommend 120 mg of IV lasix today.  -Strict I/O's  -Renally dose meds/avoid nephrotoxic meds  -Keep MAP >65  -Will continue to follow closely             Momo Iyer NP  Nephrology  [facility]

## 2020-08-31 NOTE — ANESTHESIA POSTPROCEDURE EVALUATION
Anesthesia Post Evaluation    Patient: Jaquan Farmer    Procedure(s) Performed: Procedure(s) (LRB):  INSERTION-CATHETER-RUDY (Left)  BRONCHOSCOPY (N/A)  INSERTION, CATHETER, INTERCOSTAL, FOR DRAINAGE (Left)  REPLACEMENT, WOUND VAC (N/A)    Final Anesthesia Type: general    Patient location during evaluation: ICU  Patient participation: Yes- Able to Participate  Level of consciousness: awake and alert  Post-procedure vital signs: reviewed and stable  Pain management: adequate  Airway patency: patent    PONV status at discharge: No PONV  Anesthetic complications: no      Cardiovascular status: hemodynamically stable, hypertensive and tachycardic  Respiratory status: unassisted, spontaneous ventilation and nasal cannula  Hydration status: euvolemic            Vitals Value Taken Time   /94 08/31/20 1152   Temp 36.9 °C (98.42 °F) 08/31/20 1129   Pulse 105 08/31/20 1152   Resp 33 08/31/20 1152   SpO2 100 % 08/31/20 1152   Vitals shown include unvalidated device data.      No case tracking events are documented in the log.      Pain/Rod Score: Pain Rating Prior to Med Admin: 4 (8/30/2020  1:36 PM)  Pain Rating Post Med Admin: 2 (8/30/2020 10:17 AM)

## 2020-08-31 NOTE — TRANSFER OF CARE
"Anesthesia Transfer of Care Note    Patient: Jaquan Farmer    Procedure(s) Performed: Procedure(s) (LRB):  INSERTION-CATHETER-RUDY (Left)  BRONCHOSCOPY (N/A)  INSERTION, CATHETER, INTERCOSTAL, FOR DRAINAGE (Left)  REPLACEMENT, WOUND VAC (N/A)    Patient location: ICU    Anesthesia Type: general    Transport from OR: Transported from OR on 2-3 L/min O2 by NC with adequate spontaneous ventilation    Post pain: adequate analgesia    Post assessment: no apparent anesthetic complications and tolerated procedure well    Post vital signs: stable    Level of consciousness: awake and alert    Nausea/Vomiting: no nausea/vomiting    Complications: none    Transfer of care protocol was followed      Last vitals:   Visit Vitals  BP (!) 179/101 (BP Location: Left arm, Patient Position: Lying)   Pulse 103   Temp 37.1 °C (98.7 °F) (Oral)   Resp (!) 24   Ht 5' 2" (1.575 m)   Wt 69.4 kg (153 lb)   SpO2 100%   Breastfeeding No   BMI 27.98 kg/m²     "

## 2020-08-31 NOTE — ASSESSMENT & PLAN NOTE
38 yo F s/p antrectomy with BII reconstruction c/b duodenal necrosis requiring ex lap, washout, drainage c/b aspiration event. Now with severe sepsis and ARDS     .Neuro:  - AOx4  - PCA for pain control  - Clonidine patch for anxiety     Resp:  - Intubated 8/29, extubated 8/30  - PRN breathing treatments  - Repeat Bronch today.      CV:  - MAP goal >65  - Systolic <170  - Levo and Vaso gtt - off  - Increase Metoprolol scheduled dose  - Hydralazine PRN  - Permacath scheduled for 8/31     Heme:  - Hgb/Hct 7.1  - S/p Abdominal washout on 8/21  - S/p Abdominal exploration w/ woundvac exhange 8/24; exchange 8/31     ID:  - WBC remains elevated but improving 16k  - Zosyn, Fluconazole  - Blood cultures and BAL taken 8/19 with NGTD and no abnormal findings     Renal:  - Oliguric on arrival; Cr 1.0 at that time   - SLED overnight  - Trend BUN/Cr;   - Monitor I/Os  - MIVF d/c     FEN/GI:  - NPO  - TPN, increasing protein to meet patient's needs  - NGT to LIWS   - Maintain drains to bulb suction; LLQ with acute drop in drainage and accumulation in SQ tissue  - GI ppx  - Replace electrolytes as needed to keep K>4, Mg>2     Endo:  - Monitor BG     Dispo:  - Continue SICU care   - Permacath today

## 2020-08-31 NOTE — ANESTHESIA PROCEDURE NOTES
Intubation  Performed by: Liam Ovalle MD  Authorized by: SONALI Deluna MD     Intubation:     Induction:  Intravenous    Intubated:  Postinduction    Mask Ventilation:  Easy mask    Attempts:  1    Attempted By:  Resident anesthesiologist    Method of Intubation:  Direct    Blade:  Mariscal 2    Laryngeal View Grade: Grade I - full view of chords      Difficult Airway Encountered?: No      Complications:  None    Airway Device:  Oral endotracheal tube    Airway Device Size:  8.0    Style/Cuff Inflation:  Cuffed    Inflation Amount (mL):  6    Tube secured:  21    Secured at:  The lips    Placement Verified By:  Capnometry    Complicating Factors:  None    Findings Post-Intubation:  BS equal bilateral and atraumatic/condition of teeth unchanged

## 2020-08-31 NOTE — SUBJECTIVE & OBJECTIVE
Interval History:   Extubated yesterday afternoon, on nasal canula  CXR with increased white out on left lung  Drains with >100ml of bilious output    Medications:  Continuous Infusions:   dexmedetomidine (PRECEDEX) infusion Stopped (08/27/20 0800)    hydromorphone in 0.9 % NaCl 6 mg/30 ml      lactated ringers 100 mL/hr at 08/31/20 0710    propofoL Stopped (08/30/20 1200)    TPN ADULT CENTRAL LINE CUSTOM 45 mL/hr at 08/31/20 0710     Scheduled Meds:   cloNIDine 0.2 mg/24 hr td ptwk  1 patch Transdermal Q7 Days    famotidine (PF)  20 mg Intravenous Daily    fluconazole (DIFLUCAN) IVPB  200 mg Intravenous Q24H    heparin (porcine)  5,000 Units Subcutaneous Q8H    lidocaine  1 patch Transdermal Q24H    metoprolol  10 mg Intravenous Q6H    miconazole NITRATE 2 %   Topical (Top) BID    piperacillin-tazobactam (ZOSYN) IVPB  4.5 g Intravenous Q12H    potassium chloride in water  20 mEq Intravenous Once     PRN Meds:sodium chloride, albuterol-ipratropium, calcium gluconate IVPB, calcium gluconate IVPB, calcium gluconate IVPB, hydrALAZINE, HYDROmorphone, labetaloL, magnesium sulfate IVPB, magnesium sulfate IVPB, melatonin, metoprolol, naloxone     Review of patient's allergies indicates:   Allergen Reactions    Latex      Objective:     Vital Signs (Most Recent):  Temp: 98.8 °F (37.1 °C) (08/31/20 0400)  Pulse: 101 (08/31/20 0715)  Resp: (!) 25 (08/31/20 0715)  BP: (!) 170/100 (08/31/20 0615)  SpO2: 99 % (08/31/20 0715) Vital Signs (24h Range):  Temp:  [98.8 °F (37.1 °C)-99.4 °F (37.4 °C)] 98.8 °F (37.1 °C)  Pulse:  [] 101  Resp:  [14-43] 25  SpO2:  [97 %-100 %] 99 %  BP: (140-189)/() 170/100     Weight: 69.4 kg (153 lb)  Body mass index is 27.98 kg/m².    Intake/Output - Last 3 Shifts       08/29 0700 - 08/30 0659 08/30 0700 - 08/31 0659 08/31 0700 - 09/01 0659    I.V. (mL/kg) 5646.6 (81.2) 2554.2 (36.8)     Blood       IV Piggyback 550 300     TPN 1185 1072     Total Intake(mL/kg) 7381.6 (106.2)  3926.2 (56.6)     Urine (mL/kg/hr) 150 (0.1) 500 (0.3)     Drains 1860 1570 175    Other 5979 100     Stool 0 0     Total Output 7989 2170 175    Net -607.4 +1756.2 -175           Urine Occurrence  1 x     Stool Occurrence 2 x 1 x           Physical Exam  Cardiovascular:      Rate and Rhythm: Normal rate.   Pulmonary:      Effort: No respiratory distress.      Comments: On supplemental oxygen  Abdominal:      General: Abdomen is flat. There is no distension.      Tenderness: There is no abdominal tenderness.      Comments: Drains and G tube with bilious output   Neurological:      General: No focal deficit present.      Mental Status: She is alert and oriented to person, place, and time.   Psychiatric:         Mood and Affect: Mood normal.         Behavior: Behavior normal.         Significant Labs:  CBC:   Recent Labs   Lab 08/31/20  0300   WBC 22.76*   RBC 2.67*   HGB 8.0*   HCT 25.7*   *   MCV 96   MCH 30.0   MCHC 31.1*     CMP:   Recent Labs   Lab 08/31/20  0300 08/31/20  0552     102  --    CALCIUM 8.4*  8.4*  --    ALBUMIN 1.3*  1.3*  --    PROT 6.7  --      141  --    K 3.8  3.8 4.0   CO2 24  24  --      108  --    BUN 25*  25*  --    CREATININE 1.6*  1.6*  --    ALKPHOS 122  --    ALT 9*  --    AST 21  --    BILITOT 0.5  --        Significant Diagnostics:  I have reviewed all pertinent imaging results/findings within the past 24 hours.

## 2020-08-31 NOTE — PLAN OF CARE
08/31/20 1456   Discharge Reassessment   Assessment Type Discharge Planning Reassessment   Provided patient/caregiver education on the expected discharge date and the discharge plan Yes   Do you have any problems affording any of your prescribed medications? No   Discharge Plan A Rehab   Discharge Plan B Other  (TBD)   DME Needed Upon Discharge  other (see comments)  (TBD)   Post-Acute Status   Post-Acute Placement Status Awaiting Internal Medical Clearance       Tangela Bradley MPH, RN, CM  Ext. [phone]

## 2020-08-31 NOTE — ASSESSMENT & PLAN NOTE
40 yo F s/p antrectomy with BII reconstruction c/b duodenal necrosis requiring ex lap, washout, drainage c/b aspiration event. S/p duodenal resection with d-j and g-j anastomosis.    - To OR today for permacath, bronch, left chest tube placement, and wound vac change  - NPO  - G-tube to gravity  - Continue abx  - TPN   - Daily labs  - Trend CBC  - GI and DVT ppx  - PT / OT

## 2020-08-31 NOTE — ASSESSMENT & PLAN NOTE
- oliguric stage 3 sid with ischemic atn likely secondary to septic shock  - unknown bl cr  - muddy brown casts on microscopy    Plan/Recommendations:  -no emergent need for RRT today  -recommend discontinuing maintenance IVF. She remains net positive daily and hypervolemic on examination.  -recommend 120 mg of IV lasix today.  -Strict I/O's  -Renally dose meds/avoid nephrotoxic meds  -Keep MAP >65  -Will continue to follow closely

## 2020-08-31 NOTE — PLAN OF CARE
Pt to OR today for for permacath placement, wound vac exchange, bronch, and chest tube pt. -130s. MAPs >65. SBP <180 with prn hydralazine. SpO2 >95% on 2LNC. Pain controlled with PCA. TPN infusing. Chest tube, GODFREY x 2, and g tube. See I/O flowsheet for details. POC reviewed with pt and pt's family over the phone, all questions and concerns addressed.     Skin: Sacrum intact. BL purple discoloration to shins. No new skin breakdown noted. Heel boots in place. Pt turned q2.

## 2020-08-31 NOTE — SUBJECTIVE & OBJECTIVE
Interval History:  RRT held yesterday, electrolytes stable this morning, net positive 1.6L/24h.  She remains on maintenance IVF.  Hypervolemic on examination.  No combatants on exam, oxygenating well on NC.  CXR this morning with complete opacification of the L lung, going to OR today for Permacath placement and Bronch.  UOP of 500 ml/24h.     Review of patient's allergies indicates:   Allergen Reactions    Latex      Current Facility-Administered Medications   Medication Frequency    0.9%  NaCl infusion (for blood administration) Q24H PRN    albuterol-ipratropium 2.5 mg-0.5 mg/3 mL nebulizer solution 3 mL Q6H PRN    calcium gluconate 1g in dextrose 5% 100mL (ready to mix system) PRN    calcium gluconate 2 g in dextrose 5 % 100 mL IVPB PRN    calcium gluconate 3 g in dextrose 5 % 100 mL IVPB PRN    cloNIDine 0.2 mg/24 hr td ptwk 1 patch Q7 Days    dexmedetomidine (PRECEDEX) 400mcg/100mL 0.9% NaCL infusion Continuous    famotidine (PF) injection 20 mg Daily    fluconazole (DIFLUCAN) IVPB 200 mg 100 mL Q24H    heparin (porcine) injection 5,000 Units Q8H    hydrALAZINE injection 10 mg Q6H PRN    HYDROmorphone injection 0.5 mg Q2H PRN    HYDROmorphone PCA syringe 6 mg/30 mL (0.2 mg/mL) NS Continuous    labetalol 20 mg/4 mL (5 mg/mL) IV syring Q4H PRN    lactated ringers infusion Continuous    lidocaine 5 % patch 1 patch Q24H    magnesium sulfate 2g in water 50mL IVPB (premix) PRN    magnesium sulfate 2g in water 50mL IVPB (premix) PRN    melatonin tablet 3 mg Nightly PRN    metoprolol injection 10 mg Q6H    metoprolol injection 5 mg Q4H PRN    miconazole NITRATE 2 % top powder BID    naloxone 0.4 mg/mL injection 0.02 mg PRN    piperacillin-tazobactam 4.5 g in sodium chloride 0.9% 100 mL IVPB (ready to mix system) Q12H    propofol (DIPRIVAN) 10 mg/mL infusion Continuous    TPN ADULT CENTRAL LINE CUSTOM Continuous       Objective:     Vital Signs (Most Recent):  Temp: 99.1 °F (37.3 °C) (08/31/20  0700)  Pulse: 100 (08/31/20 0900)  Resp: (!) 27 (08/31/20 0900)  BP: (!) 179/101 (08/31/20 0900)  SpO2: 98 % (08/31/20 0900)  O2 Device (Oxygen Therapy): nasal cannula (08/31/20 0900) Vital Signs (24h Range):  Temp:  [98.8 °F (37.1 °C)-99.4 °F (37.4 °C)] 99.1 °F (37.3 °C)  Pulse:  [] 100  Resp:  [14-43] 27  SpO2:  [97 %-100 %] 98 %  BP: (140-189)/() 179/101     Weight: 69.4 kg (153 lb) (08/31/20 0500)  Body mass index is 27.98 kg/m².  Body surface area is 1.74 meters squared.    I/O last 3 completed shifts:  In: 8638.2 [I.V.:6559.2; IV Piggyback:550]  Out: 6293 [Urine:550; Drains:2455; Other:3288]    Physical Exam  Vitals signs and nursing note reviewed.   Constitutional:       Appearance: She is not ill-appearing.      Interventions: She is sedated and intubated.   HENT:      Head: Normocephalic and atraumatic.      Mouth/Throat:      Mouth: Mucous membranes are moist.      Pharynx: Oropharynx is clear.   Eyes:      General: No scleral icterus.        Right eye: No discharge.         Left eye: No discharge.      Extraocular Movements: Extraocular movements intact.      Conjunctiva/sclera: Conjunctivae normal.   Neck:      Musculoskeletal: Normal range of motion and neck supple.   Cardiovascular:      Rate and Rhythm: Regular rhythm. Tachycardia present.      Pulses: Normal pulses.      Heart sounds: Normal heart sounds.   Pulmonary:      Effort: Pulmonary effort is normal. No respiratory distress. She is intubated.      Breath sounds: No rales.   Abdominal:      General: Bowel sounds are normal.      Palpations: Abdomen is soft.      Comments: woundvac   Musculoskeletal:      Right lower leg: Edema present.      Left lower leg: Edema present.   Skin:     General: Skin is warm and dry.      Findings: No bruising or rash.   Neurological:      Mental Status: She is alert.         Significant Labs:  CBC:   Recent Labs   Lab 08/31/20  0300   WBC 22.76*   RBC 2.67*   HGB 8.0*   HCT 25.7*   *   MCV 96    MCH 30.0   MCHC 31.1*     CMP:   Recent Labs   Lab 08/31/20  0300 08/31/20  0552     102  --    CALCIUM 8.4*  8.4*  --    ALBUMIN 1.3*  1.3*  --    PROT 6.7  --      141  --    K 3.8  3.8 4.0   CO2 24  24  --      108  --    BUN 25*  25*  --    CREATININE 1.6*  1.6*  --    ALKPHOS 122  --    ALT 9*  --    AST 21  --    BILITOT 0.5  --

## 2020-08-31 NOTE — SUBJECTIVE & OBJECTIVE
Interval History/Significant Events: Patient extubated. Planned to go to OR today for bronch and wound vac change.    Follow-up For: Procedure(s) (LRB):  WASHOUT abdomen (N/A)    Post-Operative Day: Day of Surgery    Objective:     Vital Signs (Most Recent):  Temp: 99.1 °F (37.3 °C) (08/31/20 0700)  Pulse: (!) 111 (08/31/20 0930)  Resp: (!) 28 (08/31/20 0930)  BP: (!) 185/103 (08/31/20 0930)  SpO2: 100 % (08/31/20 0930) Vital Signs (24h Range):  Temp:  [98.8 °F (37.1 °C)-99.4 °F (37.4 °C)] 99.1 °F (37.3 °C)  Pulse:  [] 111  Resp:  [14-43] 28  SpO2:  [97 %-100 %] 100 %  BP: (140-189)/() 185/103     Weight: 69.4 kg (153 lb)  Body mass index is 27.98 kg/m².      Intake/Output Summary (Last 24 hours) at 8/31/2020 1120  Last data filed at 8/31/2020 0710  Gross per 24 hour   Intake 2921.55 ml   Output 2160 ml   Net 761.55 ml       Physical Exam  Vitals signs and nursing note reviewed.   Constitutional:       General: She is not in acute distress.     Interventions: She is sedated.   HENT:      Head: Normocephalic and atraumatic.      Mouth/Throat:      Mouth: Mucous membranes are moist.      Pharynx: Oropharynx is clear.   Eyes:      General: No scleral icterus.        Right eye: No discharge.         Left eye: No discharge.      Extraocular Movements: Extraocular movements intact.      Conjunctiva/sclera: Conjunctivae normal.   Neck:      Musculoskeletal: Normal range of motion and neck supple.   Cardiovascular:      Rate and Rhythm: Regular rhythm. Tachycardia present.      Pulses: Normal pulses.      Heart sounds: Normal heart sounds.   Pulmonary:      Effort: Pulmonary effort is normal. No respiratory distress.      Breath sounds: No rales.   Abdominal:      General: Bowel sounds are normal.      Palpations: Abdomen is soft.      Comments: Wound vac in place  Drains with bilious output  G tube to gravity with dark gastric contents   Musculoskeletal: Normal range of motion.         General: No tenderness.    Skin:     General: Skin is warm and dry.      Findings: No bruising or rash.   Neurological:      General: No focal deficit present.      Mental Status: She is alert and oriented to person, place, and time.   Psychiatric:         Mood and Affect: Mood normal.         Behavior: Behavior normal.         Vents:  Vent Mode: A/C (08/30/20 1045)  Ventilator Initiated: Yes(chart correction) (08/29/20 0800)  Set Rate: 16 BPM (08/30/20 1045)  Vt Set: 380 mL (08/30/20 1045)  PEEP/CPAP: 5 cmH20 (08/30/20 1045)  Oxygen Concentration (%): 40 (08/30/20 1245)  Peak Airway Pressure: 21 cmH2O (08/30/20 1045)  Plateau Pressure: 0 cmH20 (08/30/20 0900)  Total Ve: 7.12 mL (08/30/20 0900)  Negative Inspiratory Force (cm H2O): -23 (08/20/20 1501)  F/VT Ratio<105 (RSBI): (!) 52.94 (08/30/20 1045)    Lines/Drains/Airways     Central Venous Catheter Line            Trialysis (Dialysis) Catheter 08/13/20 2230 right internal jugular 17 days    Port A Cath Double Lumen 08/31/20 1050 left internal jugular less than 1 day          Drain                 Closed/Suction Drain 08/13/20 1659 Right;Inferior Abdomen Bulb 19 Fr. 17 days         Closed/Suction Drain 08/13/20 1659 Right;Superior Abdomen Bulb 19 Fr. 17 days         Gastrostomy/Enterostomy 08/15/20 0916 Gastrostomy tube w/ balloon LUQ decompression 16 days    Female External Urinary Catheter 08/27/20 1600 3 days         Chest Tube 08/31/20 1033 1 Left 24 Fr. less than 1 day          Airway                 Airway - Non-Surgical 08/31/20 1018 less than 1 day          Peripheral Intravenous Line                 Midline Catheter Insertion/Assessment  - Single Lumen 08/26/20 1501 Right basilic vein (medial side of arm) 18g x 8cm 4 days                Significant Labs:    CBC/Anemia Profile:  Recent Labs   Lab 08/30/20  0314 08/31/20  0300   WBC 26.25* 22.76*   HGB 8.7* 8.0*   HCT 28.5* 25.7*    411*   MCV 99* 96   RDW 14.6* 14.3        Chemistries:  Recent Labs   Lab 08/30/20  9910  08/30/20  1350 08/30/20  2150 08/31/20  0300 08/31/20  0552     140 140 139 141  141  --    K 4.4  4.4 4.0 3.7 3.8  3.8 4.0     109 106 105 108  108  --    CO2 24  24 26 24 24  24  --    BUN 8  8 16 21* 25*  25*  --    CREATININE 0.8  0.8 1.1 1.4 1.6*  1.6*  --    CALCIUM 8.2*  8.2* 8.6* 8.4* 8.4*  8.4*  --    ALBUMIN 1.4*  1.4* 1.3* 1.3* 1.3*  1.3*  --    PROT 6.8  --   --  6.7  --    BILITOT 0.6  --   --  0.5  --    ALKPHOS 122  --   --  122  --    ALT 10  --   --  9*  --    AST 31  --   --  21  --    MG 1.8  1.8 2.2 2.2 2.2  2.2  --    PHOS 4.0  4.0 3.6 3.3 3.6  3.6  --        All pertinent labs within the past 24 hours have been reviewed.    Significant Imaging:  I have reviewed all pertinent imaging results/findings within the past 24 hours.

## 2020-08-31 NOTE — PROGRESS NOTES
[facility]  General Surgery  Progress Note    Subjective:     History of Present Illness:  Pt is a 38 yo F with recent history of antrectomy with BII reconstruction for ulcer disease at outside hospital. 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 Patient also reportedly aspirated on induction in the OR prior to this, resulting in severe pulmonary edema and hypoxia. Patient was transferred to [facility] urgently for higher level of care. She arrived as a direct SICU admit on near maximal vent settings and requiring low dose vasopressors with HR in the upper 140s. Her midline laparotomy is closed with 4 Navdeep drains in place draining bilious output.     Post-Op Info:  Procedure(s) (LRB):  WASHOUT abdomen (N/A)   7 Days Post-Op     Interval History:   Extubated yesterday afternoon, on nasal canula  CXR with increased white out on left lung  Drains with >100ml of bilious output    Medications:  Continuous Infusions:   dexmedetomidine (PRECEDEX) infusion Stopped (08/27/20 0800)    hydromorphone in 0.9 % NaCl 6 mg/30 ml      lactated ringers 100 mL/hr at 08/31/20 0710    propofoL Stopped (08/30/20 1200)    TPN ADULT CENTRAL LINE CUSTOM 45 mL/hr at 08/31/20 0710     Scheduled Meds:   cloNIDine 0.2 mg/24 hr td ptwk  1 patch Transdermal Q7 Days    famotidine (PF)  20 mg Intravenous Daily    fluconazole (DIFLUCAN) IVPB  200 mg Intravenous Q24H    heparin (porcine)  5,000 Units Subcutaneous Q8H    lidocaine  1 patch Transdermal Q24H    metoprolol  10 mg Intravenous Q6H    miconazole NITRATE 2 %   Topical (Top) BID    piperacillin-tazobactam (ZOSYN) IVPB  4.5 g Intravenous Q12H    potassium chloride in water  20 mEq Intravenous Once     PRN Meds:sodium chloride, albuterol-ipratropium, calcium gluconate IVPB, calcium gluconate IVPB, calcium gluconate IVPB, hydrALAZINE, HYDROmorphone, labetaloL, magnesium sulfate IVPB, magnesium sulfate IVPB, melatonin,  metoprolol, naloxone     Review of patient's allergies indicates:   Allergen Reactions    Latex      Objective:     Vital Signs (Most Recent):  Temp: 98.8 °F (37.1 °C) (08/31/20 0400)  Pulse: 101 (08/31/20 0715)  Resp: (!) 25 (08/31/20 0715)  BP: (!) 170/100 (08/31/20 0615)  SpO2: 99 % (08/31/20 0715) Vital Signs (24h Range):  Temp:  [98.8 °F (37.1 °C)-99.4 °F (37.4 °C)] 98.8 °F (37.1 °C)  Pulse:  [] 101  Resp:  [14-43] 25  SpO2:  [97 %-100 %] 99 %  BP: (140-189)/() 170/100     Weight: 69.4 kg (153 lb)  Body mass index is 27.98 kg/m².    Intake/Output - Last 3 Shifts       08/29 0700 - 08/30 0659 08/30 0700 - 08/31 0659 08/31 0700 - 09/01 0659    I.V. (mL/kg) 5646.6 (81.2) 2554.2 (36.8)     Blood       IV Piggyback 550 300     TPN 1185 1072     Total Intake(mL/kg) 7381.6 (106.2) 3926.2 (56.6)     Urine (mL/kg/hr) 150 (0.1) 500 (0.3)     Drains 1860 1570 175    Other 5979 100     Stool 0 0     Total Output 7989 2170 175    Net -607.4 +1756.2 -175           Urine Occurrence  1 x     Stool Occurrence 2 x 1 x           Physical Exam  Cardiovascular:      Rate and Rhythm: Normal rate.   Pulmonary:      Effort: No respiratory distress.      Comments: On supplemental oxygen  Abdominal:      General: Abdomen is flat. There is no distension.      Tenderness: There is no abdominal tenderness.      Comments: Drains and G tube with bilious output   Neurological:      General: No focal deficit present.      Mental Status: She is alert and oriented to person, place, and time.   Psychiatric:         Mood and Affect: Mood normal.         Behavior: Behavior normal.         Significant Labs:  CBC:   Recent Labs   Lab 08/31/20  0300   WBC 22.76*   RBC 2.67*   HGB 8.0*   HCT 25.7*   *   MCV 96   MCH 30.0   MCHC 31.1*     CMP:   Recent Labs   Lab 08/31/20  0300 08/31/20  0552     102  --    CALCIUM 8.4*  8.4*  --    ALBUMIN 1.3*  1.3*  --    PROT 6.7  --      141  --    K 3.8  3.8 4.0   CO2 24  24   --      108  --    BUN 25*  25*  --    CREATININE 1.6*  1.6*  --    ALKPHOS 122  --    ALT 9*  --    AST 21  --    BILITOT 0.5  --        Significant Diagnostics:  I have reviewed all pertinent imaging results/findings within the past 24 hours.    Assessment/Plan:     Severe sepsis  38 yo F s/p antrectomy with BII reconstruction c/b duodenal necrosis requiring ex lap, washout, drainage c/b aspiration event. S/p duodenal resection with d-j and g-j anastomosis.    - To OR today for permacath, bronch, left chest tube placement, and wound vac change  - NPO  - G-tube to gravity  - Continue abx  - TPN   - Daily labs  - Trend CBC  - GI and DVT ppx  - PT / OT        Christy Nicole MD  General Surgery  [facility]

## 2020-08-31 NOTE — PROGRESS NOTES
[facility]  Critical Care - Surgery  Progress Note    Patient Name: Jaquan Farmer  MRN: [id number]  Admission Date: 8/12/2020  Hospital Length of Stay: 19 days  Code Status: Full Code  Attending Provider: Donis Roa MD  Primary Care Provider: Primary Doctor No   Principal Problem: Duodenum disorder    Subjective:     Hospital/ICU Course:  8/14 Patient had a line replaced along with central line placed, with complication of pneumothorax. Rt pigtail was placed. Patient tolerated complications well. Decreasing vent settings.  8/15 NAEO. Patient was taken back to OR early AM for washout. Possible closure 8/18. ETT exchanged in OR 7.0 -> 7.5  8/16 - NAEON. HDS. Intubated, sedated.   8/17 - NAEON. OR tomorrow for closure.  8/18 - Washed out and closed in OR  8/22 - NAEO. Plan for Repeat CT AP today  8/23 - NAEO. Hbg lower. Giving blood x2 units.  8/25 - NAEO. Went to OR yesterday for Abdominal wall exploration and ligation of bleeding vessels. Wound vac exchanged  8/26 - NAEO. Hypertensive overnight. Hgb trending. Plan for CT today  8/27 - NAEO. Hypertensive overnight. CT yesterday showed no extravasation or acute changes  8/28 - NAEO.   8/29 - Patient in respiratory distress upon arrival. Chest XR w/ obvious left sided pulmonary occlusion. Patient intubated and bronchoscopy performed.  8/30 - Repeat Bronch today. Possible extubation later today  8/31 - Extubated yesterday. Wound vac change, bronch scheduled for OR today. Patient out of bed in chair on AM exam.    Interval History/Significant Events: Patient extubated. Planned to go to OR today for bronch and wound vac change.    Follow-up For: Procedure(s) (LRB):  WASHOUT abdomen (N/A)    Post-Operative Day: Day of Surgery    Objective:     Vital Signs (Most Recent):  Temp: 99.1 °F (37.3 °C) (08/31/20 0700)  Pulse: (!) 111 (08/31/20 0930)  Resp: (!) 28 (08/31/20 0930)  BP: (!) 185/103 (08/31/20 0930)  SpO2: 100 % (08/31/20 0930) Vital Signs  (24h Range):  Temp:  [98.8 °F (37.1 °C)-99.4 °F (37.4 °C)] 99.1 °F (37.3 °C)  Pulse:  [] 111  Resp:  [14-43] 28  SpO2:  [97 %-100 %] 100 %  BP: (140-189)/() 185/103     Weight: 69.4 kg (153 lb)  Body mass index is 27.98 kg/m².      Intake/Output Summary (Last 24 hours) at 8/31/2020 1120  Last data filed at 8/31/2020 0710  Gross per 24 hour   Intake 2921.55 ml   Output 2160 ml   Net 761.55 ml       Physical Exam  Vitals signs and nursing note reviewed.   Constitutional:       General: She is not in acute distress.     Interventions: She is sedated.   HENT:      Head: Normocephalic and atraumatic.      Mouth/Throat:      Mouth: Mucous membranes are moist.      Pharynx: Oropharynx is clear.   Eyes:      General: No scleral icterus.        Right eye: No discharge.         Left eye: No discharge.      Extraocular Movements: Extraocular movements intact.      Conjunctiva/sclera: Conjunctivae normal.   Neck:      Musculoskeletal: Normal range of motion and neck supple.   Cardiovascular:      Rate and Rhythm: Regular rhythm. Tachycardia present.      Pulses: Normal pulses.      Heart sounds: Normal heart sounds.   Pulmonary:      Effort: Pulmonary effort is normal. No respiratory distress.      Breath sounds: No rales.   Abdominal:      General: Bowel sounds are normal.      Palpations: Abdomen is soft.      Comments: Wound vac in place  Drains with bilious output  G tube to gravity with dark gastric contents   Musculoskeletal: Normal range of motion.         General: No tenderness.   Skin:     General: Skin is warm and dry.      Findings: No bruising or rash.   Neurological:      General: No focal deficit present.      Mental Status: She is alert and oriented to person, place, and time.   Psychiatric:         Mood and Affect: Mood normal.         Behavior: Behavior normal.         Vents:  Vent Mode: A/C (08/30/20 1045)  Ventilator Initiated: Yes(chart correction) (08/29/20 0800)  Set Rate: 16 BPM (08/30/20  1045)  Vt Set: 380 mL (08/30/20 1045)  PEEP/CPAP: 5 cmH20 (08/30/20 1045)  Oxygen Concentration (%): 40 (08/30/20 1245)  Peak Airway Pressure: 21 cmH2O (08/30/20 1045)  Plateau Pressure: 0 cmH20 (08/30/20 0900)  Total Ve: 7.12 mL (08/30/20 0900)  Negative Inspiratory Force (cm H2O): -23 (08/20/20 1501)  F/VT Ratio<105 (RSBI): (!) 52.94 (08/30/20 1045)    Lines/Drains/Airways     Central Venous Catheter Line            Trialysis (Dialysis) Catheter 08/13/20 2230 right internal jugular 17 days    Port A Cath Double Lumen 08/31/20 1050 left internal jugular less than 1 day          Drain                 Closed/Suction Drain 08/13/20 1659 Right;Inferior Abdomen Bulb 19 Fr. 17 days         Closed/Suction Drain 08/13/20 1659 Right;Superior Abdomen Bulb 19 Fr. 17 days         Gastrostomy/Enterostomy 08/15/20 0916 Gastrostomy tube w/ balloon LUQ decompression 16 days    Female External Urinary Catheter 08/27/20 1600 3 days         Chest Tube 08/31/20 1033 1 Left 24 Fr. less than 1 day          Airway                 Airway - Non-Surgical 08/31/20 1018 less than 1 day          Peripheral Intravenous Line                 Midline Catheter Insertion/Assessment  - Single Lumen 08/26/20 1501 Right basilic vein (medial side of arm) 18g x 8cm 4 days                Significant Labs:    CBC/Anemia Profile:  Recent Labs   Lab 08/30/20  0314 08/31/20  0300   WBC 26.25* 22.76*   HGB 8.7* 8.0*   HCT 28.5* 25.7*    411*   MCV 99* 96   RDW 14.6* 14.3        Chemistries:  Recent Labs   Lab 08/30/20  0314 08/30/20  1350 08/30/20  2150 08/31/20  0300 08/31/20  0552     140 140 139 141  141  --    K 4.4  4.4 4.0 3.7 3.8  3.8 4.0     109 106 105 108  108  --    CO2 24  24 26 24 24  24  --    BUN 8  8 16 21* 25*  25*  --    CREATININE 0.8  0.8 1.1 1.4 1.6*  1.6*  --    CALCIUM 8.2*  8.2* 8.6* 8.4* 8.4*  8.4*  --    ALBUMIN 1.4*  1.4* 1.3* 1.3* 1.3*  1.3*  --    PROT 6.8  --   --  6.7  --    BILITOT 0.6  --   --   0.5  --    ALKPHOS 122  --   --  122  --    ALT 10  --   --  9*  --    AST 31  --   --  21  --    MG 1.8  1.8 2.2 2.2 2.2  2.2  --    PHOS 4.0  4.0 3.6 3.3 3.6  3.6  --        All pertinent labs within the past 24 hours have been reviewed.    Significant Imaging:  I have reviewed all pertinent imaging results/findings within the past 24 hours.    Assessment/Plan:     Severe sepsis  40 yo F s/p antrectomy with BII reconstruction c/b duodenal necrosis requiring ex lap, washout, drainage c/b aspiration event. Now with severe sepsis and ARDS     .Neuro:  - AOx4  - PCA for pain control  - Clonidine patch for anxiety     Resp:  - Intubated 8/29, extubated 8/30  - PRN breathing treatments  - Repeat Bronch today.      CV:  - MAP goal >65  - Systolic <170  - Levo and Vaso gtt - off  - Increase Metoprolol scheduled dose  - Hydralazine PRN  - Permacath scheduled for 8/31     Heme:  - Hgb/Hct 7.1  - S/p Abdominal washout on 8/21  - S/p Abdominal exploration w/ woundvac exhange 8/24; exchange 8/31     ID:  - WBC remains elevated but improving 16k  - Zosyn, Fluconazole  - Blood cultures and BAL taken 8/19 with NGTD and no abnormal findings     Renal:  - Oliguric on arrival; Cr 1.0 at that time   - SLED overnight  - Trend BUN/Cr;   - Monitor I/Os  - MIVF d/c     FEN/GI:  - NPO  - TPN, increasing protein to meet patient's needs  - NGT to LIWS   - Maintain drains to bulb suction; LLQ with acute drop in drainage and accumulation in SQ tissue  - GI ppx  - Replace electrolytes as needed to keep K>4, Mg>2     Endo:  - Monitor BG     Dispo:  - Continue SICU care   - Permacath today           Jadon Tran, DO  Critical Care - Surgery  [facility]

## 2020-09-01 LAB
ALBUMIN SERPL BCP-MCNC: 1.3 G/DL (ref 3.5–5.2)
ALBUMIN SERPL BCP-MCNC: 1.3 G/DL (ref 3.5–5.2)
ALBUMIN SERPL BCP-MCNC: 1.4 G/DL (ref 3.5–5.2)
ALP SERPL-CCNC: 136 U/L (ref 55–135)
ALT SERPL W/O P-5'-P-CCNC: 9 U/L (ref 10–44)
ANION GAP SERPL CALC-SCNC: 11 MMOL/L (ref 8–16)
ANION GAP SERPL CALC-SCNC: 11 MMOL/L (ref 8–16)
ANION GAP SERPL CALC-SCNC: 9 MMOL/L (ref 8–16)
ANISOCYTOSIS BLD QL SMEAR: SLIGHT
AST SERPL-CCNC: 24 U/L (ref 10–40)
BASOPHILS # BLD AUTO: 0.16 K/UL (ref 0–0.2)
BASOPHILS NFR BLD: 0.6 % (ref 0–1.9)
BILIRUB SERPL-MCNC: 0.5 MG/DL (ref 0.1–1)
BUN SERPL-MCNC: 38 MG/DL (ref 6–20)
BUN SERPL-MCNC: 38 MG/DL (ref 6–20)
BUN SERPL-MCNC: 47 MG/DL (ref 6–20)
CA-I BLDV-SCNC: 1.15 MMOL/L (ref 1.06–1.42)
CALCIUM SERPL-MCNC: 8.7 MG/DL (ref 8.7–10.5)
CALCIUM SERPL-MCNC: 8.7 MG/DL (ref 8.7–10.5)
CALCIUM SERPL-MCNC: 8.9 MG/DL (ref 8.7–10.5)
CHLORIDE SERPL-SCNC: 109 MMOL/L (ref 95–110)
CO2 SERPL-SCNC: 21 MMOL/L (ref 23–29)
CO2 SERPL-SCNC: 21 MMOL/L (ref 23–29)
CO2 SERPL-SCNC: 24 MMOL/L (ref 23–29)
CREAT SERPL-MCNC: 2.4 MG/DL (ref 0.5–1.4)
CREAT SERPL-MCNC: 2.4 MG/DL (ref 0.5–1.4)
CREAT SERPL-MCNC: 2.7 MG/DL (ref 0.5–1.4)
DIFFERENTIAL METHOD: ABNORMAL
EOSINOPHIL # BLD AUTO: 0.6 K/UL (ref 0–0.5)
EOSINOPHIL NFR BLD: 2.5 % (ref 0–8)
ERYTHROCYTE [DISTWIDTH] IN BLOOD BY AUTOMATED COUNT: 14.6 % (ref 11.5–14.5)
EST. GFR  (AFRICAN AMERICAN): 24.7 ML/MIN/1.73 M^2
EST. GFR  (AFRICAN AMERICAN): 28.5 ML/MIN/1.73 M^2
EST. GFR  (AFRICAN AMERICAN): 28.5 ML/MIN/1.73 M^2
EST. GFR  (NON AFRICAN AMERICAN): 21.4 ML/MIN/1.73 M^2
EST. GFR  (NON AFRICAN AMERICAN): 24.7 ML/MIN/1.73 M^2
EST. GFR  (NON AFRICAN AMERICAN): 24.7 ML/MIN/1.73 M^2
GLUCOSE SERPL-MCNC: 113 MG/DL (ref 70–110)
GLUCOSE SERPL-MCNC: 113 MG/DL (ref 70–110)
GLUCOSE SERPL-MCNC: 135 MG/DL (ref 70–110)
HCT VFR BLD AUTO: 25.3 % (ref 37–48.5)
HGB BLD-MCNC: 7.7 G/DL (ref 12–16)
HYPOCHROMIA BLD QL SMEAR: ABNORMAL
IMM GRANULOCYTES # BLD AUTO: 0.64 K/UL (ref 0–0.04)
IMM GRANULOCYTES NFR BLD AUTO: 2.5 % (ref 0–0.5)
LYMPHOCYTES # BLD AUTO: 1.9 K/UL (ref 1–4.8)
LYMPHOCYTES NFR BLD: 7.2 % (ref 18–48)
MAGNESIUM SERPL-MCNC: 2.1 MG/DL (ref 1.6–2.6)
MAGNESIUM SERPL-MCNC: 2.1 MG/DL (ref 1.6–2.6)
MCH RBC QN AUTO: 29.5 PG (ref 27–31)
MCHC RBC AUTO-ENTMCNC: 30.4 G/DL (ref 32–36)
MCV RBC AUTO: 97 FL (ref 82–98)
MONOCYTES # BLD AUTO: 1.7 K/UL (ref 0.3–1)
MONOCYTES NFR BLD: 6.5 % (ref 4–15)
NEUTROPHILS # BLD AUTO: 21 K/UL (ref 1.8–7.7)
NEUTROPHILS NFR BLD: 80.7 % (ref 38–73)
NRBC BLD-RTO: 0 /100 WBC
PHOSPHATE SERPL-MCNC: 2.4 MG/DL (ref 2.7–4.5)
PHOSPHATE SERPL-MCNC: 3 MG/DL (ref 2.7–4.5)
PHOSPHATE SERPL-MCNC: 3 MG/DL (ref 2.7–4.5)
PLATELET # BLD AUTO: 415 K/UL (ref 150–350)
PLATELET BLD QL SMEAR: ABNORMAL
PMV BLD AUTO: 10.6 FL (ref 9.2–12.9)
POCT GLUCOSE: 109 MG/DL (ref 70–110)
POCT GLUCOSE: 124 MG/DL (ref 70–110)
POCT GLUCOSE: 132 MG/DL (ref 70–110)
POCT GLUCOSE: 168 MG/DL (ref 70–110)
POTASSIUM SERPL-SCNC: 3.7 MMOL/L (ref 3.5–5.1)
POTASSIUM SERPL-SCNC: 4.2 MMOL/L (ref 3.5–5.1)
POTASSIUM SERPL-SCNC: 4.2 MMOL/L (ref 3.5–5.1)
PROT SERPL-MCNC: 6.7 G/DL (ref 6–8.4)
RBC # BLD AUTO: 2.61 M/UL (ref 4–5.4)
SODIUM SERPL-SCNC: 141 MMOL/L (ref 136–145)
SODIUM SERPL-SCNC: 141 MMOL/L (ref 136–145)
SODIUM SERPL-SCNC: 142 MMOL/L (ref 136–145)
WBC # BLD AUTO: 26.01 K/UL (ref 3.9–12.7)

## 2020-09-01 PROCEDURE — 25000003 PHARM REV CODE 250: Performed by: STUDENT IN AN ORGANIZED HEALTH CARE EDUCATION/TRAINING PROGRAM

## 2020-09-01 PROCEDURE — 94761 N-INVAS EAR/PLS OXIMETRY MLT: CPT

## 2020-09-01 PROCEDURE — 97110 THERAPEUTIC EXERCISES: CPT

## 2020-09-01 PROCEDURE — 97530 THERAPEUTIC ACTIVITIES: CPT

## 2020-09-01 PROCEDURE — 94640 AIRWAY INHALATION TREATMENT: CPT

## 2020-09-01 PROCEDURE — 82330 ASSAY OF CALCIUM: CPT

## 2020-09-01 PROCEDURE — 99291 CRITICAL CARE FIRST HOUR: CPT | Mod: 24,,, | Performed by: SURGERY

## 2020-09-01 PROCEDURE — 25000242 PHARM REV CODE 250 ALT 637 W/ HCPCS: Performed by: STUDENT IN AN ORGANIZED HEALTH CARE EDUCATION/TRAINING PROGRAM

## 2020-09-01 PROCEDURE — 27000221 HC OXYGEN, UP TO 24 HOURS

## 2020-09-01 PROCEDURE — 20000000 HC ICU ROOM

## 2020-09-01 PROCEDURE — 85025 COMPLETE CBC W/AUTO DIFF WBC: CPT

## 2020-09-01 PROCEDURE — 63600175 PHARM REV CODE 636 W HCPCS: Performed by: STUDENT IN AN ORGANIZED HEALTH CARE EDUCATION/TRAINING PROGRAM

## 2020-09-01 PROCEDURE — 83735 ASSAY OF MAGNESIUM: CPT

## 2020-09-01 PROCEDURE — 97164 PT RE-EVAL EST PLAN CARE: CPT

## 2020-09-01 PROCEDURE — 80053 COMPREHEN METABOLIC PANEL: CPT

## 2020-09-01 PROCEDURE — 80069 RENAL FUNCTION PANEL: CPT

## 2020-09-01 PROCEDURE — B4185 PARENTERAL SOL 10 GM LIPIDS: HCPCS | Performed by: STUDENT IN AN ORGANIZED HEALTH CARE EDUCATION/TRAINING PROGRAM

## 2020-09-01 PROCEDURE — A4217 STERILE WATER/SALINE, 500 ML: HCPCS | Performed by: STUDENT IN AN ORGANIZED HEALTH CARE EDUCATION/TRAINING PROGRAM

## 2020-09-01 PROCEDURE — S0028 INJECTION, FAMOTIDINE, 20 MG: HCPCS | Performed by: STUDENT IN AN ORGANIZED HEALTH CARE EDUCATION/TRAINING PROGRAM

## 2020-09-01 PROCEDURE — 97168 OT RE-EVAL EST PLAN CARE: CPT

## 2020-09-01 PROCEDURE — 99900035 HC TECH TIME PER 15 MIN (STAT)

## 2020-09-01 PROCEDURE — 97535 SELF CARE MNGMENT TRAINING: CPT

## 2020-09-01 PROCEDURE — 99291 PR CRITICAL CARE, E/M 30-74 MINUTES: ICD-10-PCS | Mod: 24,,, | Performed by: SURGERY

## 2020-09-01 PROCEDURE — 63600175 PHARM REV CODE 636 W HCPCS: Performed by: SURGERY

## 2020-09-01 PROCEDURE — 25000003 PHARM REV CODE 250: Performed by: SURGERY

## 2020-09-01 RX ORDER — METOPROLOL TARTRATE 1 MG/ML
15 INJECTION, SOLUTION INTRAVENOUS EVERY 6 HOURS
Status: DISCONTINUED | OUTPATIENT
Start: 2020-09-01 | End: 2020-09-03

## 2020-09-01 RX ORDER — POTASSIUM CHLORIDE 14.9 MG/ML
20 INJECTION INTRAVENOUS ONCE
Status: COMPLETED | OUTPATIENT
Start: 2020-09-01 | End: 2020-09-01

## 2020-09-01 RX ORDER — NICARDIPINE HYDROCHLORIDE 0.2 MG/ML
2.5 INJECTION INTRAVENOUS CONTINUOUS
Status: DISCONTINUED | OUTPATIENT
Start: 2020-09-01 | End: 2020-09-02

## 2020-09-01 RX ADMIN — HEPARIN SODIUM 5000 UNITS: 5000 INJECTION INTRAVENOUS; SUBCUTANEOUS at 09:09

## 2020-09-01 RX ADMIN — METOROPROLOL TARTRATE 15 MG: 5 INJECTION, SOLUTION INTRAVENOUS at 06:09

## 2020-09-01 RX ADMIN — METOROPROLOL TARTRATE 10 MG: 5 INJECTION, SOLUTION INTRAVENOUS at 05:09

## 2020-09-01 RX ADMIN — Medication: at 05:09

## 2020-09-01 RX ADMIN — IPRATROPIUM BROMIDE AND ALBUTEROL SULFATE 3 ML: .5; 2.5 SOLUTION RESPIRATORY (INHALATION) at 12:09

## 2020-09-01 RX ADMIN — LIDOCAINE 1 PATCH: 50 PATCH CUTANEOUS at 04:09

## 2020-09-01 RX ADMIN — Medication 20 MG: at 04:09

## 2020-09-01 RX ADMIN — PIPERACILLIN SODIUM AND TAZOBACTAM SODIUM 4.5 G: 4; .5 INJECTION, POWDER, LYOPHILIZED, FOR SOLUTION INTRAVENOUS at 09:09

## 2020-09-01 RX ADMIN — METOROPROLOL TARTRATE 15 MG: 5 INJECTION, SOLUTION INTRAVENOUS at 11:09

## 2020-09-01 RX ADMIN — HYDRALAZINE HYDROCHLORIDE 10 MG: 20 INJECTION INTRAMUSCULAR; INTRAVENOUS at 06:09

## 2020-09-01 RX ADMIN — IPRATROPIUM BROMIDE AND ALBUTEROL SULFATE 3 ML: .5; 2.5 SOLUTION RESPIRATORY (INHALATION) at 04:09

## 2020-09-01 RX ADMIN — FAMOTIDINE 20 MG: 10 INJECTION INTRAVENOUS at 09:09

## 2020-09-01 RX ADMIN — POTASSIUM CHLORIDE 20 MEQ: 14.9 INJECTION, SOLUTION INTRAVENOUS at 04:09

## 2020-09-01 RX ADMIN — HEPARIN SODIUM 5000 UNITS: 5000 INJECTION INTRAVENOUS; SUBCUTANEOUS at 02:09

## 2020-09-01 RX ADMIN — NICARDIPINE HYDROCHLORIDE 2.5 MG/HR: 0.2 INJECTION, SOLUTION INTRAVENOUS at 10:09

## 2020-09-01 RX ADMIN — FLUCONAZOLE 200 MG: 2 INJECTION, SOLUTION INTRAVENOUS at 09:09

## 2020-09-01 RX ADMIN — SMOFLIPID 250 ML: 6; 6; 5; 3 INJECTION, EMULSION INTRAVENOUS at 09:09

## 2020-09-01 RX ADMIN — MICONAZOLE NITRATE: 20 POWDER TOPICAL at 09:09

## 2020-09-01 RX ADMIN — HYDRALAZINE HYDROCHLORIDE 10 MG: 20 INJECTION INTRAMUSCULAR; INTRAVENOUS at 01:09

## 2020-09-01 RX ADMIN — CALCIUM GLUCONATE: 98 INJECTION, SOLUTION INTRAVENOUS at 09:09

## 2020-09-01 RX ADMIN — HEPARIN SODIUM 5000 UNITS: 5000 INJECTION INTRAVENOUS; SUBCUTANEOUS at 05:09

## 2020-09-01 RX ADMIN — IPRATROPIUM BROMIDE AND ALBUTEROL SULFATE 3 ML: .5; 2.5 SOLUTION RESPIRATORY (INHALATION) at 08:09

## 2020-09-01 RX ADMIN — Medication 20 MG: at 09:09

## 2020-09-01 NOTE — PROGRESS NOTES
JOAN Skin Integrity Evaluation      Subjective    JOAN skin integrity champion evaluation of patient as part of the comprehensive skin care team .       Jaquan Farmer is being evaluated as per the Epic  pressure injury skin report.  She has been admitted to SICU for 19 days .   Skin injury was noted on 8/23/20      Limited Physical exam     Lesion 1:             Lesion 2:         Assessment :       Lesion 1:  Alteration in Skin Integrity    POA : no    Consults: Wound Care           Lesion 2:  Alteration in Skin Integrity    POA : no    Consults:Wound Care    Bilateral shin purple discoloration appears to be improving slightly,     Follow up:    Patient will be re evaluated weekly.

## 2020-09-01 NOTE — PT/OT/SLP RE-EVAL
Physical Therapy Re-Assessment    Patient Name:  Jaquan Farmer   MRN:  [id number]  Admit Date: 8/12/2020  Admitting Diagnosis:  Duodenum disorder   Length of Stay: 20 days  Recent Surgery: Procedure(s) (LRB):  INSERTION-CATHETER-RUDY (Left)  BRONCHOSCOPY (N/A)  INSERTION, CATHETER, INTERCOSTAL, FOR DRAINAGE (Left)  REPLACEMENT, WOUND VAC (N/A) 1 Day Post-Op    Hospital Course:  8/12/2020: Admit date  8/24/2020: PT initial evaluation    Please refer to PT initial evaluation for PLOF and social history.  Recommendations:     Discharge Recommendations:  rehabilitation facility   Discharge Equipment Recommendations: walker, rolling, bedside commode   Barriers to discharge: None    Assessment:     Jaquan Farmer is a 39 y.o. female admitted with a medical diagnosis of Duodenum disorder. Patient was found asleep in bed. Patient stated R shoulder was 6/10 on pain scale and encouraged pt to administer meds via PCA as needed. Patient required total assist for bed mobility 2nd to shoulder pain, generalize weakness, and pain. Patient was able to sit with CGA but had spells of nausea, verbal cuing was provided for deep breathing. Sit>stand t/f max x2 with 2 right side steps, allowed bedding to be changed. Patient requested to sit after being on feet for 20 sec. Patient left in chair position in bed to assist with bouts of productive coughing during session. Patient showed eagerness to transfer to chair, but chair transfer deferred 2nd to increasing fatigue from session.     Problem List: weakness, impaired endurance, impaired self care skills, impaired functional mobilty, decreased upper extremity function, decreased lower extremity function, pain, impaired cardiopulmonary response to activity  Rehab Prognosis: Good   Plan:     During this hospitalization, patient to be seen 4 x/week to address the above listed problems via gait training, therapeutic activities, therapeutic exercises, neuromuscular  "re-education  · Plan of Care Expires:  09/24/20   Plan of Care Reviewed with: patient    Subjective   Communicated with RN prior to session.  Patient found HOB elevated upon PT entry to room, agreeable to evaluation. Jaquan Farmer was alone present during session.    Chief Complaint: L shoulder pain  Patient/Family Comments/goals: to get better and return home  Pain/Comfort:  · Pain Rating 1: 6/10  · Location - Side 1: Left  · Location 1: shoulder  · Pain Addressed 1: Reposition, Distraction  · Pain Rating Post-Intervention 1: 6/10    Objective:   Patient found with: telemetry, pulse ox (continuous), blood pressure cuff, central line, peripheral IV, GODFREY drain, PureWick, chest tube, wound vac, oxygen, PCA(G Tube)   General Precautions: Standard, fall   Orthopedic Precautions:N/A   Braces: N/A   Body mass index is 27.98 kg/m².  Oxygen Device: Nasal Cannula  Vitals: BP (!) 196/105   Pulse 108   Temp 99 °F (37.2 °C) (Oral)   Resp (!) 35   Ht 5' 2" (1.575 m)   Wt 69.4 kg (153 lb)   SpO2 99%   Breastfeeding No   BMI 27.98 kg/m²     Exams:  · Cognition:   · Alert and Cooperative  · AxAx4  · Command following: Follows multistep  commands  · Fluency: clear/fluent  · Hearing: Intact  · Vision:  Intact visual fields    · RLE ROM: WFL  · RLE Strength: Grossly 4/5 except pt unable to actively demonstrate hip flexion   · LLE ROM: WFL  · LLE Strength: Grossly 4/5 except pt unable to actively demonstrate hip flexion     Outcome Measures:  AM-PAC 6 CLICK MOBILITY  Turning over in bed (including adjusting bedclothes, sheets and blankets)?: 2  Sitting down on and standing up from a chair with arms (e.g., wheelchair, bedside commode, etc.): 2  Moving from lying on back to sitting on the side of the bed?: 2  Moving to and from a bed to a chair (including a wheelchair)?: 2  Need to walk in hospital room?: 2  Climbing 3-5 steps with a railing?: 1  Basic Mobility Total Score: 11     Functional Mobility:  Additional staff " present: OT  Bed Mobility:   · Rolling/Turning to Right: total assistance   · Patient was educated on log rolling technique for bed mobility 2nd to abdominal surgery   · Scooting to HOB via drawsheet: total assistance  · Supine to Sit: total assistance x 2 persons   · Scooting anteriorly to EOB to have both feet planted on floor: total assistance  · Sit to Supine: total assistance x 2 persons     Sitting Balance at Edge of Bed:   Assistance Level Required: Contact Guard Assistance   Time: 15   Postural deviations noted: no deviations noted   Encouraged: deep breathing and ankle pumps to assist with initial drop of O2 saturation   o Encouraged deep breathing to assist with nausea    Comments: Patient had bouts of productive coughing while EOB sitting, suction device given to patient   Worked on activity tolerance sitting EOB      Transfers:   · Sit <> Stand Transfer: maximal assistance and of 2 persons with no assistive device from EOB x 2 trials   · Facilitation of hip and thoracic extension       Gait:  2 R lateral steps with max A x 2 persons   Cuing for advancement of B LEs   Physical assistance provided for external stability     Therapeutic Activities, Exercises, Education:   Educated patient on PT role /POC  Educated patient on importance of progressive mobility    Sit to stand x 2 reps to increase B LE strength and increase standing tolerance   Side steps perform to work on B weight shifting and activity tolerance in preparation for forward gait       Patient left with bed in chair position with all lines intact and call button in reach..    GOALS:   Multidisciplinary Problems     Physical Therapy Goals        Problem: Physical Therapy Goal    Goal Priority Disciplines Outcome Goal Variances Interventions   Physical Therapy Goal     PT, PT/OT Ongoing, Progressing     Description: Goals to be met by: 9/15/2020     Patient will increase functional independence with mobility by performin. Supine to  sit with MInimal Assistance  2. Sit to stand transfer with Minimal Assistance with LRAD  3. Gait  x 50 feet with Minimal Assistance using LRAD.   4. Stand for 3 minutes with Contact Guard Assistance using LRAD  5. Lower extremity exercise program x15 reps per handout, with independence                    Time Tracking:     PT Received On: 09/01/20  PT Start Time: 1033     PT Stop Time: 1101  PT Total Time (min): 28 min     Billable Minutes: Re-eval 5, Therapeutic Activity 15 and Therapeutic Exercise 8    Bonnie Tolbert, PT, DPT  9/1/2020  [phone]

## 2020-09-01 NOTE — SUBJECTIVE & OBJECTIVE
"Interval History/Significant Events: NAEO.  Pt reports some mild tenderness at permacath site.  Denies and additional new complaints.  States that she is feeling "better" today.      Follow-up For: Procedure(s) (LRB):  INSERTION-CATHETER-RUDY (Left)  BRONCHOSCOPY (N/A)  INSERTION, CATHETER, INTERCOSTAL, FOR DRAINAGE (Left)  REPLACEMENT, WOUND VAC (N/A)    Post-Operative Day: 1 Day Post-Op    Objective:     Vital Signs (Most Recent):  Temp: 99 °F (37.2 °C) (09/01/20 1100)  Pulse: 108 (09/01/20 1230)  Resp: (!) 35 (09/01/20 1230)  BP: (!) 196/105 (09/01/20 1230)  SpO2: 99 % (09/01/20 1230) Vital Signs (24h Range):  Temp:  [98 °F (36.7 °C)-99 °F (37.2 °C)] 99 °F (37.2 °C)  Pulse:  [] 108  Resp:  [18-45] 35  SpO2:  [97 %-100 %] 99 %  BP: (139-196)/() 196/105     Weight: 69.4 kg (153 lb)  Body mass index is 27.98 kg/m².      Intake/Output Summary (Last 24 hours) at 9/1/2020 1240  Last data filed at 9/1/2020 1100  Gross per 24 hour   Intake 2060 ml   Output 2475 ml   Net -415 ml       Physical Exam  Vitals signs and nursing note reviewed.   Constitutional:       General: She is not in acute distress.     Interventions: She is sedated.   HENT:      Head: Normocephalic and atraumatic.      Mouth/Throat:      Mouth: Mucous membranes are moist.      Pharynx: Oropharynx is clear.   Eyes:      General: No scleral icterus.        Right eye: No discharge.         Left eye: No discharge.      Extraocular Movements: Extraocular movements intact.      Conjunctiva/sclera: Conjunctivae normal.   Neck:      Musculoskeletal: Normal range of motion and neck supple.   Cardiovascular:      Rate and Rhythm: Regular rhythm. Tachycardia present.      Pulses: Normal pulses.      Heart sounds: Normal heart sounds.   Pulmonary:      Effort: Pulmonary effort is normal. No respiratory distress.      Breath sounds: No rales.   Abdominal:      General: Bowel sounds are normal.      Palpations: Abdomen is soft.      Comments: Wound vac " in place  Drains with bilious output  G tube to gravity with dark gastric contents   Musculoskeletal: Normal range of motion.         General: No tenderness.   Skin:     General: Skin is warm and dry.      Findings: No bruising or rash.      Comments: L sided permacath in place   Neurological:      General: No focal deficit present.      Mental Status: She is alert and oriented to person, place, and time.   Psychiatric:         Mood and Affect: Mood normal.         Behavior: Behavior normal.         Vents:  Vent Mode: A/C (08/30/20 1045)  Ventilator Initiated: Yes(chart correction) (08/29/20 0800)  Set Rate: 16 BPM (08/30/20 1045)  Vt Set: 380 mL (08/30/20 1045)  PEEP/CPAP: 5 cmH20 (08/30/20 1045)  Oxygen Concentration (%): 40 (08/30/20 1245)  Peak Airway Pressure: 21 cmH2O (08/30/20 1045)  Plateau Pressure: 0 cmH20 (08/30/20 0900)  Total Ve: 7.12 mL (08/30/20 0900)  Negative Inspiratory Force (cm H2O): -23 (08/20/20 1501)  F/VT Ratio<105 (RSBI): (!) 52.94 (08/30/20 1045)    Lines/Drains/Airways     Central Venous Catheter Line            Trialysis (Dialysis) Catheter 08/13/20 2230 right internal jugular 18 days    Permacath 08/31/20 1100 left subclavian 1 day          Drain                 Closed/Suction Drain 08/13/20 1659 Right;Inferior Abdomen Bulb 19 Fr. 18 days         Closed/Suction Drain 08/13/20 1659 Right;Superior Abdomen Bulb 19 Fr. 18 days         Gastrostomy/Enterostomy 08/15/20 0916 Gastrostomy tube w/ balloon LUQ decompression 17 days    Female External Urinary Catheter 08/27/20 1600 4 days         Chest Tube 08/31/20 1033 1 Left 24 Fr. 1 day          Peripheral Intravenous Line                 Midline Catheter Insertion/Assessment  - Single Lumen 08/26/20 1501 Right basilic vein (medial side of arm) 18g x 8cm 5 days                Significant Labs:    CBC/Anemia Profile:  Recent Labs   Lab 08/31/20  0300 09/01/20  0302   WBC 22.76* 26.01*   HGB 8.0* 7.7*   HCT 25.7* 25.3*   * 415*   MCV 96 97    RDW 14.3 14.6*        Chemistries:  Recent Labs   Lab 08/31/20  0300 08/31/20  0552 08/31/20  1447 09/01/20  0302     141  --  141 141  141   K 3.8  3.8 4.0 4.5 4.2  4.2     108  --  109 109  109   CO2 24  24  --  25 21*  21*   BUN 25*  25*  --  31* 38*  38*   CREATININE 1.6*  1.6*  --  1.9* 2.4*  2.4*   CALCIUM 8.4*  8.4*  --  8.7 8.7  8.7   ALBUMIN 1.3*  1.3*  --  1.3* 1.3*  1.3*   PROT 6.7  --   --  6.7   BILITOT 0.5  --   --  0.5   ALKPHOS 122  --   --  136*   ALT 9*  --   --  9*   AST 21  --   --  24   MG 2.2  2.2  --  2.2 2.1  2.1   PHOS 3.6  3.6  --  3.2 3.0  3.0       All pertinent labs within the past 24 hours have been reviewed.    Significant Imaging:  I have reviewed all pertinent imaging results/findings within the past 24 hours.

## 2020-09-01 NOTE — PLAN OF CARE
"  SICU PLAN OF CARE NOTE    Dx: Duodenum disorder    Goals of Care: MAP >65, SBP <180    Vital Signs: BP (!) 175/103   Pulse (!) 124   Temp 99 °F (37.2 °C) (Oral)   Resp (!) 32   Ht 5' 2" (1.575 m)   Wt 69.4 kg (153 lb)   SpO2 99%   Breastfeeding No   BMI 27.98 kg/m²     Exam: appears acutely ill, well developed, fatigued, no distress, mildly obese, pt appears fatigued, drowsy and states she feels "weak" throughout shift    Cardiac: sinus tachycardia    Resp: oxygen requirements decreased and patient tolerated O2 wean to 1L NC throughout shift    Neuro: AAO x4, Follows Commands, and Moves All Extremities    Gtts: TPN @ 50, PCA dilaudid    Urine Output: Voids spontaneously via external femal catheter, 1 unmeasured urine output/shift     Drains:   Wound vac: 55 cc/shift  GODFREY drain #1: 180 cc/ shift   GODFREY drain #2: 5 cc/shift  CT: 200 cc/shift  G tube: 575 cc/shift; brown thin drainage    Diet: NPO and TPN     Labs/Accuchecks: Accuchecks Q6h, daily labs, renal panel Q12    Skin: all dressings monitored for signs of bleeding and changed during shift, both GODFREY drain sites remains CDI with t slit dressing in place, wound vac to mid abdominal incision, L CT site has small amount of serosang drainage saturating gauze dressing, dressing changed and site remains CDI, G tube site cleansed and t slit gauze dressing changed, site remains CDI, heels elevated with foams in place, sacral foam in place and changed, pressure injuries to front of shins cleansed and foam dressings changed, mattress inflated, bed plugged into wall, patient turned Q2h as tolerated, patient free from injuries during shift.    Shift Events: Patient out of bed and standing with PT/OT, O2 weaned to 1L NC, pain controlled with PCA dilaudid, SBP goals were not obtained with scheduled Metoprolol, hydralazine 10mg given x1, Latabelol IVP given later in shift for SBP >180, pt still remains hypertensive and tachycardic, MD aware. Will continue to monitor at " this time.

## 2020-09-01 NOTE — PROGRESS NOTES
[facility]  Nephrology  Progress Note    Patient Name: Jaquan Farmer  MRN: [id number]  Admission Date: 8/12/2020  Hospital Length of Stay: 20 days  Attending Provider: Donis Roa MD   Primary Care Physician: Primary Doctor No  Principal Problem:Duodenum disorder    Subjective:     HPI: Mrs. Farmer is a 39 year old female with antrectomy at [facility] complicated duodenal necrosis post op. She aspirated, was intubated, and became septic. She developed renal failure and required max vent settings and was transferred here for higher level of care. On arrival she was hypotensive on pressers, max vent setting, and had minimal urine output.  Overnight she was given 6L of fluid, 6g calcium, placed on vac, pip-tazo, and fluconazole. Nephro consulted for sid.    Interval History: Patient seen and examined at bedside, no acute events overnight. Last CRRT on 08/29. UOP at 1410 over the past 24 hrs. On 1 liter NC.     Review of patient's allergies indicates:   Allergen Reactions    Latex      Current Facility-Administered Medications   Medication Frequency    0.9%  NaCl infusion (for blood administration) Q24H PRN    albuterol-ipratropium 2.5 mg-0.5 mg/3 mL nebulizer solution 3 mL Q4H    cloNIDine 0.2 mg/24 hr td ptwk 1 patch Q7 Days    famotidine (PF) injection 20 mg Daily    fluconazole (DIFLUCAN) IVPB 200 mg 100 mL Q24H    heparin (porcine) injection 5,000 Units Q8H    hydrALAZINE injection 10 mg Q6H PRN    HYDROmorphone PCA syringe 6 mg/30 mL (0.2 mg/mL) NS Continuous    labetalol 20 mg/4 mL (5 mg/mL) IV syring Q4H PRN    lidocaine 5 % patch 1 patch Q24H    lipid (SMOFLIPID) (SMOFLIPID) 20 % infusion 250 mL Daily    melatonin tablet 3 mg Nightly PRN    metoprolol injection 15 mg Q6H    metoprolol injection 5 mg Q4H PRN    miconazole NITRATE 2 % top powder BID    naloxone 0.4 mg/mL injection 0.02 mg PRN    piperacillin-tazobactam 4.5 g in sodium chloride 0.9% 100 mL IVPB (ready to  mix system) Q12H    TPN ADULT CENTRAL LINE CUSTOM Continuous    TPN ADULT CENTRAL LINE CUSTOM Continuous       Objective:     Vital Signs (Most Recent):  Temp: 99 °F (37.2 °C) (09/01/20 1100)  Pulse: 108 (09/01/20 1230)  Resp: (!) 35 (09/01/20 1230)  BP: (!) 196/105 (09/01/20 1230)  SpO2: 99 % (09/01/20 1230)  O2 Device (Oxygen Therapy): nasal cannula (09/01/20 1214) Vital Signs (24h Range):  Temp:  [98 °F (36.7 °C)-99 °F (37.2 °C)] 99 °F (37.2 °C)  Pulse:  [] 108  Resp:  [18-45] 35  SpO2:  [97 %-100 %] 99 %  BP: (139-196)/() 196/105     Weight: 69.4 kg (153 lb) (08/31/20 0500)  Body mass index is 27.98 kg/m².  Body surface area is 1.74 meters squared.    I/O last 3 completed shifts:  In: 3929 [I.V.:2938]  Out: 3785 [Urine:850; Drains:1975; Other:100; Chest Tube:860]    Physical Exam  Vitals signs and nursing note reviewed.   Constitutional:       Appearance: She is not ill-appearing.      Interventions: She is sedated and intubated.   HENT:      Head: Normocephalic and atraumatic.      Mouth/Throat:      Mouth: Mucous membranes are moist.      Pharynx: Oropharynx is clear.   Eyes:      General: No scleral icterus.        Right eye: No discharge.         Left eye: No discharge.      Extraocular Movements: Extraocular movements intact.      Conjunctiva/sclera: Conjunctivae normal.   Neck:      Musculoskeletal: Normal range of motion and neck supple.   Cardiovascular:      Rate and Rhythm: Regular rhythm. Tachycardia present.      Pulses: Normal pulses.      Heart sounds: Normal heart sounds.   Pulmonary:      Effort: Pulmonary effort is normal. No respiratory distress. She is intubated.      Breath sounds: No rales.   Abdominal:      General: Bowel sounds are normal.      Palpations: Abdomen is soft.      Comments: woundvac   Musculoskeletal:      Right lower leg: Edema present.      Left lower leg: Edema present.   Skin:     General: Skin is warm and dry.      Findings: No bruising or rash.    Neurological:      Mental Status: She is alert.         Significant Labs:  CBC:   Recent Labs   Lab 09/01/20  0302   WBC 26.01*   RBC 2.61*   HGB 7.7*   HCT 25.3*   *   MCV 97   MCH 29.5   MCHC 30.4*     CMP:   Recent Labs   Lab 09/01/20 0302   *  113*   CALCIUM 8.7  8.7   ALBUMIN 1.3*  1.3*   PROT 6.7     141   K 4.2  4.2   CO2 21*  21*     109   BUN 38*  38*   CREATININE 2.4*  2.4*   ALKPHOS 136*   ALT 9*   AST 24   BILITOT 0.5     All labs within the past 24 hours have been reviewed.     Significant Imaging:  Labs: Reviewed  X-Ray: Reviewed    Assessment/Plan:     * Duodenum disorder  - Management per primary team   - Duodenal necrosis s/p explolatory lap.     Hypertension  Management as per primary         Anemia, chronic disease  Hgb at 7.7    Metabolic acidosis  -secondary to renal failure, stable        Acute renal failure with tubular necrosis  - oliguric stage 3 sid with ischemic atn likely secondary to septic shock  - unknown bl cr  - muddy brown casts on microscopy    Plan/Recommendations:  -no emergent need for RRT today  -recommend discontinuing maintenance IVF. She remains net positive daily and hypervolemic on examination.  -if concern for volume overload, consider Lasix bolus  -Strict I/O's  -Renally dose meds/avoid nephrotoxic meds  -Keep MAP >65  -Will continue to follow closely           Severe sepsis  - Management per primary team         Thank you for your consult. I will follow-up with patient. Please contact us if you have any additional questions.    Nestor Urban MD  Nephrology  [facility]

## 2020-09-01 NOTE — PROGRESS NOTES
"[facility]  Critical Care - Surgery  Progress Note    Patient Name: Jaquan Farmer  MRN: [id number]  Admission Date: 8/12/2020  Hospital Length of Stay: 20 days  Code Status: Full Code  Attending Provider: Donis Roa MD  Primary Care Provider: Primary Doctor No   Principal Problem: Duodenum disorder    Subjective:     Hospital/ICU Course:  8/14 Patient had a line replaced along with central line placed, with complication of pneumothorax. Rt pigtail was placed. Patient tolerated complications well. Decreasing vent settings.  8/15 NAEO. Patient was taken back to OR early AM for washout. Possible closure 8/18. ETT exchanged in OR 7.0 -> 7.5  8/16 - NAEON. HDS. Intubated, sedated.   8/17 - NAEON. OR tomorrow for closure.  8/18 - Washed out and closed in OR  8/22 - NAEO. Plan for Repeat CT AP today  8/23 - NAEO. Hbg lower. Giving blood x2 units.  8/25 - NAEO. Went to OR yesterday for Abdominal wall exploration and ligation of bleeding vessels. Wound vac exchanged  8/26 - NAEO. Hypertensive overnight. Hgb trending. Plan for CT today  8/27 - NAEO. Hypertensive overnight. CT yesterday showed no extravasation or acute changes  8/28 - NAEO.   8/29 - Patient in respiratory distress upon arrival. Chest XR w/ obvious left sided pulmonary occlusion. Patient intubated and bronchoscopy performed.  8/30 - Repeat Bronch today. Possible extubation later today  8/31 - Extubated yesterday. Wound vac change, bronch scheduled for OR today. Patient out of bed in chair on AM exam.  9/1 - Doing well without complaints this morning.  L sided permacath placed yesterday    Interval History/Significant Events: NAEO.  Pt reports some mild tenderness at permacath site.  Denies and additional new complaints.  States that she is feeling "better" today.      Follow-up For: Procedure(s) (LRB):  INSERTION-CATHETER-RUDY (Left)  BRONCHOSCOPY (N/A)  INSERTION, CATHETER, INTERCOSTAL, FOR DRAINAGE (Left)  REPLACEMENT, WOUND " VAC (N/A)    Post-Operative Day: 1 Day Post-Op    Objective:     Vital Signs (Most Recent):  Temp: 99 °F (37.2 °C) (09/01/20 1100)  Pulse: 108 (09/01/20 1230)  Resp: (!) 35 (09/01/20 1230)  BP: (!) 196/105 (09/01/20 1230)  SpO2: 99 % (09/01/20 1230) Vital Signs (24h Range):  Temp:  [98 °F (36.7 °C)-99 °F (37.2 °C)] 99 °F (37.2 °C)  Pulse:  [] 108  Resp:  [18-45] 35  SpO2:  [97 %-100 %] 99 %  BP: (139-196)/() 196/105     Weight: 69.4 kg (153 lb)  Body mass index is 27.98 kg/m².      Intake/Output Summary (Last 24 hours) at 9/1/2020 1240  Last data filed at 9/1/2020 1100  Gross per 24 hour   Intake 2060 ml   Output 2475 ml   Net -415 ml       Physical Exam  Vitals signs and nursing note reviewed.   Constitutional:       General: She is not in acute distress.     Interventions: She is sedated.   HENT:      Head: Normocephalic and atraumatic.      Mouth/Throat:      Mouth: Mucous membranes are moist.      Pharynx: Oropharynx is clear.   Eyes:      General: No scleral icterus.        Right eye: No discharge.         Left eye: No discharge.      Extraocular Movements: Extraocular movements intact.      Conjunctiva/sclera: Conjunctivae normal.   Neck:      Musculoskeletal: Normal range of motion and neck supple.   Cardiovascular:      Rate and Rhythm: Regular rhythm. Tachycardia present.      Pulses: Normal pulses.      Heart sounds: Normal heart sounds.   Pulmonary:      Effort: Pulmonary effort is normal. No respiratory distress.      Breath sounds: No rales.   Abdominal:      General: Bowel sounds are normal.      Palpations: Abdomen is soft.      Comments: Wound vac in place  Drains with bilious output  G tube to gravity with dark gastric contents   Musculoskeletal: Normal range of motion.         General: No tenderness.   Skin:     General: Skin is warm and dry.      Findings: No bruising or rash.      Comments: L sided permacath in place   Neurological:      General: No focal deficit present.      Mental  Status: She is alert and oriented to person, place, and time.   Psychiatric:         Mood and Affect: Mood normal.         Behavior: Behavior normal.         Vents:  Vent Mode: A/C (08/30/20 1045)  Ventilator Initiated: Yes(chart correction) (08/29/20 0800)  Set Rate: 16 BPM (08/30/20 1045)  Vt Set: 380 mL (08/30/20 1045)  PEEP/CPAP: 5 cmH20 (08/30/20 1045)  Oxygen Concentration (%): 40 (08/30/20 1245)  Peak Airway Pressure: 21 cmH2O (08/30/20 1045)  Plateau Pressure: 0 cmH20 (08/30/20 0900)  Total Ve: 7.12 mL (08/30/20 0900)  Negative Inspiratory Force (cm H2O): -23 (08/20/20 1501)  F/VT Ratio<105 (RSBI): (!) 52.94 (08/30/20 1045)    Lines/Drains/Airways     Central Venous Catheter Line            Trialysis (Dialysis) Catheter 08/13/20 2230 right internal jugular 18 days    Permacath 08/31/20 1100 left subclavian 1 day          Drain                 Closed/Suction Drain 08/13/20 1659 Right;Inferior Abdomen Bulb 19 Fr. 18 days         Closed/Suction Drain 08/13/20 1659 Right;Superior Abdomen Bulb 19 Fr. 18 days         Gastrostomy/Enterostomy 08/15/20 0916 Gastrostomy tube w/ balloon LUQ decompression 17 days    Female External Urinary Catheter 08/27/20 1600 4 days         Chest Tube 08/31/20 1033 1 Left 24 Fr. 1 day          Peripheral Intravenous Line                 Midline Catheter Insertion/Assessment  - Single Lumen 08/26/20 1501 Right basilic vein (medial side of arm) 18g x 8cm 5 days                Significant Labs:    CBC/Anemia Profile:  Recent Labs   Lab 08/31/20  0300 09/01/20  0302   WBC 22.76* 26.01*   HGB 8.0* 7.7*   HCT 25.7* 25.3*   * 415*   MCV 96 97   RDW 14.3 14.6*        Chemistries:  Recent Labs   Lab 08/31/20  0300 08/31/20  0552 08/31/20  1447 09/01/20  0302     141  --  141 141  141   K 3.8  3.8 4.0 4.5 4.2  4.2     108  --  109 109  109   CO2 24  24  --  25 21*  21*   BUN 25*  25*  --  31* 38*  38*   CREATININE 1.6*  1.6*  --  1.9* 2.4*  2.4*   CALCIUM 8.4*   8.4*  --  8.7 8.7  8.7   ALBUMIN 1.3*  1.3*  --  1.3* 1.3*  1.3*   PROT 6.7  --   --  6.7   BILITOT 0.5  --   --  0.5   ALKPHOS 122  --   --  136*   ALT 9*  --   --  9*   AST 21  --   --  24   MG 2.2  2.2  --  2.2 2.1  2.1   PHOS 3.6  3.6  --  3.2 3.0  3.0       All pertinent labs within the past 24 hours have been reviewed.    Significant Imaging:  I have reviewed all pertinent imaging results/findings within the past 24 hours.    Assessment/Plan:     Severe sepsis  40 yo F s/p antrectomy with BII reconstruction c/b duodenal necrosis requiring ex lap, washout, drainage c/b aspiration event. Now improving in SICU.  Permacath installed 8/31 for possible dialysis needs.     .Neuro:  - AOx4  - PCA for pain control  - Clonidine patch for anxiety     Resp:  - Intubated 8/29, extubated 8/30  - PRN breathing treatments     CV:  - MAP goal >65  - Systolic <170  - Added Metoprolol 10mg Q6  - Hydralazine PRN  - Permacath  Placed 8/31     Heme:  - Hgb/Hct 7.7  - S/p Abdominal washout on 8/21  - S/p Abdominal exploration w/ woundvac exhange 8/24; exchange 8/31     ID:  - WBC remains elevated; 26 today  - Zosyn, Fluconazole     Renal:  - Oliguric on arrival; Cr 1.0 at that time   - Nephrology following; appreciate recs  - Trend BUN/Cr;   - Monitor I/Os     FEN/GI:  - NPO  - TPN  - NGT to LIWS   - Maintain drains to bulb suction; LLQ with acute drop in drainage and accumulation in SQ tissue  - GI ppx  - Replace electrolytes as needed to keep K>4, Mg>2     Endo:  - Monitor BG     Dispo:  - Continue SICU care            Critical care was time spent personally by me on the following activities: development of treatment plan with patient or surrogate and bedside caregivers, discussions with consultants, evaluation of patient's response to treatment, examination of patient, ordering and performing treatments and interventions, ordering and review of laboratory studies, ordering and review of radiographic studies, pulse  oximetry, re-evaluation of patient's condition.  This critical care time did not overlap with that of any other provider or involve time for any procedures.     Gary Bustillo MD  Critical Care - Surgery  [facility]

## 2020-09-01 NOTE — SUBJECTIVE & OBJECTIVE
Interval History:   NAEON  Some tenderness at permacath site  Bilious output from drains    Medications:  Continuous Infusions:   hydromorphone in 0.9 % NaCl 6 mg/30 ml      TPN ADULT CENTRAL LINE CUSTOM 50 mL/hr at 09/01/20 1600    TPN ADULT CENTRAL LINE CUSTOM       Scheduled Meds:   albuterol-ipratropium  3 mL Nebulization Q4H    cloNIDine 0.2 mg/24 hr td ptwk  1 patch Transdermal Q7 Days    famotidine (PF)  20 mg Intravenous Daily    fluconazole (DIFLUCAN) IVPB  200 mg Intravenous Q24H    heparin (porcine)  5,000 Units Subcutaneous Q8H    lidocaine  1 patch Transdermal Q24H    lipid (SMOFLIPID)  250 mL Intravenous Daily    metoprolol  15 mg Intravenous Q6H    miconazole NITRATE 2 %   Topical (Top) BID    piperacillin-tazobactam (ZOSYN) IVPB  4.5 g Intravenous Q12H     PRN Meds:sodium chloride, hydrALAZINE, labetaloL, melatonin, metoprolol, naloxone     Review of patient's allergies indicates:   Allergen Reactions    Latex      Objective:     Vital Signs (Most Recent):  Temp: 99 °F (37.2 °C) (09/01/20 1100)  Pulse: (!) 117 (09/01/20 1800)  Resp: (!) 34 (09/01/20 1800)  BP: (!) 181/110 (09/01/20 1800)  SpO2: 98 % (09/01/20 1800) Vital Signs (24h Range):  Temp:  [98 °F (36.7 °C)-99 °F (37.2 °C)] 99 °F (37.2 °C)  Pulse:  [103-130] 117  Resp:  [18-45] 34  SpO2:  [97 %-100 %] 98 %  BP: (139-196)/() 181/110     Weight: 69.4 kg (153 lb)  Body mass index is 27.98 kg/m².    Intake/Output - Last 3 Shifts       08/30 0700 - 08/31 0659 08/31 0700 - 09/01 0659 09/01 0700 - 09/02 0659    I.V. (mL/kg) 2554.2 (36.8) 1579 (22.8)     IV Piggyback 300      TPN 1072 481     Total Intake(mL/kg) 3926.2 (56.6) 2060 (29.7)     Urine (mL/kg/hr) 500 (0.3) 750 (0.5)     Drains 1570 1240 760    Other 100 50 50    Stool 0      Chest Tube  820 200    Total Output 2170 2860 1010    Net +1756.2 -800 -1010           Urine Occurrence 1 x      Stool Occurrence 1 x            Physical Exam  Constitutional:       General: She is  not in acute distress.  Cardiovascular:      Rate and Rhythm: Normal rate.   Pulmonary:      Effort: Pulmonary effort is normal.   Abdominal:      General: Abdomen is flat. There is no distension.      Tenderness: There is no abdominal tenderness.      Comments: Drains and G tube with bilious output  Wound vac in place   Skin:     General: Skin is warm and dry.   Neurological:      General: No focal deficit present.      Mental Status: She is alert and oriented to person, place, and time.   Psychiatric:         Mood and Affect: Mood normal.         Behavior: Behavior normal.         Significant Labs:  CBC:   Recent Labs   Lab 09/01/20 0302   WBC 26.01*   RBC 2.61*   HGB 7.7*   HCT 25.3*   *   MCV 97   MCH 29.5   MCHC 30.4*     CMP:   Recent Labs   Lab 09/01/20 0302 09/01/20  1427   *  113* 135*   CALCIUM 8.7  8.7 8.9   ALBUMIN 1.3*  1.3* 1.4*   PROT 6.7  --      141 142   K 4.2  4.2 3.7   CO2 21*  21* 24     109 109   BUN 38*  38* 47*   CREATININE 2.4*  2.4* 2.7*   ALKPHOS 136*  --    ALT 9*  --    AST 24  --    BILITOT 0.5  --        Significant Diagnostics:  I have reviewed all pertinent imaging results/findings within the past 24 hours.

## 2020-09-01 NOTE — PROGRESS NOTES
[facility]  General Surgery  Progress Note    Subjective:     History of Present Illness:  Pt is a 38 yo F with recent history of antrectomy with BII reconstruction for ulcer disease at outside hospital. 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 Patient also reportedly aspirated on induction in the OR prior to this, resulting in severe pulmonary edema and hypoxia. Patient was transferred to [facility] urgently for higher level of care. She arrived as a direct SICU admit on near maximal vent settings and requiring low dose vasopressors with HR in the upper 140s. Her midline laparotomy is closed with 4 Navdeep drains in place draining bilious output.     Post-Op Info:  Procedure(s) (LRB):  INSERTION-CATHETER-RUDY (Left)  BRONCHOSCOPY (N/A)  INSERTION, CATHETER, INTERCOSTAL, FOR DRAINAGE (Left)  REPLACEMENT, WOUND VAC (N/A)   1 Day Post-Op     Interval History:   NAEON  Some tenderness at permacath site  Bilious output from drains    Medications:  Continuous Infusions:   hydromorphone in 0.9 % NaCl 6 mg/30 ml      TPN ADULT CENTRAL LINE CUSTOM 50 mL/hr at 09/01/20 1600    TPN ADULT CENTRAL LINE CUSTOM       Scheduled Meds:   albuterol-ipratropium  3 mL Nebulization Q4H    cloNIDine 0.2 mg/24 hr td ptwk  1 patch Transdermal Q7 Days    famotidine (PF)  20 mg Intravenous Daily    fluconazole (DIFLUCAN) IVPB  200 mg Intravenous Q24H    heparin (porcine)  5,000 Units Subcutaneous Q8H    lidocaine  1 patch Transdermal Q24H    lipid (SMOFLIPID)  250 mL Intravenous Daily    metoprolol  15 mg Intravenous Q6H    miconazole NITRATE 2 %   Topical (Top) BID    piperacillin-tazobactam (ZOSYN) IVPB  4.5 g Intravenous Q12H     PRN Meds:sodium chloride, hydrALAZINE, labetaloL, melatonin, metoprolol, naloxone     Review of patient's allergies indicates:   Allergen Reactions    Latex      Objective:     Vital Signs (Most Recent):  Temp: 99 °F (37.2 °C)  (09/01/20 1100)  Pulse: (!) 117 (09/01/20 1800)  Resp: (!) 34 (09/01/20 1800)  BP: (!) 181/110 (09/01/20 1800)  SpO2: 98 % (09/01/20 1800) Vital Signs (24h Range):  Temp:  [98 °F (36.7 °C)-99 °F (37.2 °C)] 99 °F (37.2 °C)  Pulse:  [103-130] 117  Resp:  [18-45] 34  SpO2:  [97 %-100 %] 98 %  BP: (139-196)/() 181/110     Weight: 69.4 kg (153 lb)  Body mass index is 27.98 kg/m².    Intake/Output - Last 3 Shifts       08/30 0700 - 08/31 0659 08/31 0700 - 09/01 0659 09/01 0700 - 09/02 0659    I.V. (mL/kg) 2554.2 (36.8) 1579 (22.8)     IV Piggyback 300      TPN 1072 481     Total Intake(mL/kg) 3926.2 (56.6) 2060 (29.7)     Urine (mL/kg/hr) 500 (0.3) 750 (0.5)     Drains 1570 1240 760    Other 100 50 50    Stool 0      Chest Tube  820 200    Total Output 2170 2860 1010    Net +1756.2 -800 -1010           Urine Occurrence 1 x      Stool Occurrence 1 x            Physical Exam  Constitutional:       General: She is not in acute distress.  Cardiovascular:      Rate and Rhythm: Normal rate.   Pulmonary:      Effort: Pulmonary effort is normal.   Abdominal:      General: Abdomen is flat. There is no distension.      Tenderness: There is no abdominal tenderness.      Comments: Drains and G tube with bilious output  Wound vac in place   Skin:     General: Skin is warm and dry.   Neurological:      General: No focal deficit present.      Mental Status: She is alert and oriented to person, place, and time.   Psychiatric:         Mood and Affect: Mood normal.         Behavior: Behavior normal.         Significant Labs:  CBC:   Recent Labs   Lab 09/01/20  0302   WBC 26.01*   RBC 2.61*   HGB 7.7*   HCT 25.3*   *   MCV 97   MCH 29.5   MCHC 30.4*     CMP:   Recent Labs   Lab 09/01/20  0302 09/01/20  1427   *  113* 135*   CALCIUM 8.7  8.7 8.9   ALBUMIN 1.3*  1.3* 1.4*   PROT 6.7  --      141 142   K 4.2  4.2 3.7   CO2 21*  21* 24     109 109   BUN 38*  38* 47*   CREATININE 2.4*  2.4* 2.7*   ALKPHOS  136*  --    ALT 9*  --    AST 24  --    BILITOT 0.5  --        Significant Diagnostics:  I have reviewed all pertinent imaging results/findings within the past 24 hours.    Assessment/Plan:     Severe sepsis  40 yo F s/p antrectomy with BII reconstruction c/b duodenal necrosis requiring ex lap, washout, drainage c/b aspiration event. S/p duodenal resection with d-j and g-j anastomosis.    - NPO  - G-tube to gravity  - Continue abx  - TPN   - Daily labs  - GI and DVT ppx  - PT / OT        Christy Nicole MD  General Surgery  [facility]

## 2020-09-01 NOTE — PLAN OF CARE
SW sent updated documents and PT/OT notes to IRF following patient.     Jazzy Eid LMSW   - Case Management

## 2020-09-01 NOTE — ASSESSMENT & PLAN NOTE
38 yo F s/p antrectomy with BII reconstruction c/b duodenal necrosis requiring ex lap, washout, drainage c/b aspiration event. Now improving in SICU.  Permacath installed 8/31 for possible dialysis needs.     .Neuro:  - AOx4  - PCA for pain control  - Clonidine patch for anxiety     Resp:  - Intubated 8/29, extubated 8/30  - PRN breathing treatments     CV:  - MAP goal >65  - Systolic <170  - Added Metoprolol 10mg Q6  - Hydralazine PRN  - Permacath  Placed 8/31     Heme:  - Hgb/Hct 7.7  - S/p Abdominal washout on 8/21  - S/p Abdominal exploration w/ woundvac exhange 8/24; exchange 8/31     ID:  - WBC remains elevated; 26 today  - Zosyn, Fluconazole     Renal:  - Oliguric on arrival; Cr 1.0 at that time   - Consider scheduled HD  - Trend BUN/Cr;   - Monitor I/Os     FEN/GI:  - NPO  - TPN  - NGT to LIWS   - Maintain drains to bulb suction; LLQ with acute drop in drainage and accumulation in SQ tissue  - GI ppx  - Replace electrolytes as needed to keep K>4, Mg>2     Endo:  - Monitor BG     Dispo:  - Continue SICU care

## 2020-09-01 NOTE — ASSESSMENT & PLAN NOTE
38 yo F s/p antrectomy with BII reconstruction c/b duodenal necrosis requiring ex lap, washout, drainage c/b aspiration event. S/p duodenal resection with d-j and g-j anastomosis.    - NPO  - G-tube to gravity  - Continue abx  - TPN   - Daily labs  - GI and DVT ppx  - PT / OT

## 2020-09-01 NOTE — PT/OT/SLP RE-EVAL
Occupational Therapy   Re-evaluation    Name: Jaquan Farmer  MRN: [id number]  Admitting Diagnosis:  Duodenum disorder 1 Day Post-Op    Recommendations:     Discharge Recommendations: rehab  Discharge Equipment Recommendations:  bedside commode, walker, rolling  Barriers to discharge:  None    Assessment:     Jaquan Farmer is a 39 y.o. female with a medical diagnosis of Duodenum disorder.  She presents s/p evacuation of rectal sheath hematoma, ligation of bleeding and wound vac placement 8/31 Performance deficits affecting function are weakness, impaired functional mobilty, impaired endurance, gait instability, impaired balance, impaired self care skills, impaired cardiopulmonary response to activity, decreased safety awareness.      Rehab Prognosis:  Good; patient would benefit from acute skilled OT services to address these deficits and reach maximum level of function.       Plan:     Patient to be seen 4 x/week to address the above listed problems via self-care/home management, therapeutic activities, therapeutic exercises, neuromuscular re-education  · Plan of Care Expires: 09/24/20  · Plan of Care Reviewed with: patient    Subjective     Chief Complaint: L shoulder pain from Jj catheter site  Patient/Family stated goals: to get better and go home  Communicated with: RN prior to session.  Pain/Comfort:  · Pain Rating 1: 6/10  · Location - Side 1: Left  · Location - Orientation 1: upper  · Location 1: axilla  · Pain Addressed 1: Reposition, Distraction  · Pain Rating Post-Intervention 1: 6/10    Objective:     Communicated with: RN prior to session.  Patient found supine with: blood pressure cuff, telemetry, pulse ox (continuous), PCEA, wound vac, PureWick, GODFREY drain, chest tube(G-tube, Midline) upon OT entry to room.    General Precautions: Standard, fall   Orthopedic Precautions:N/A   Braces:       Occupational Performance:    Bed Mobility:    · Patient completed Supine to Sit with total  assistance  · Patient completed Sit to Supine with total assistance    Functional Mobility/Transfers:  · Patient completed Sit <> Stand Transfer with maximal assistance x 2 persons with B HHA and no assistive device  · Maximal A for scooting anteriorly to EOB   · Functional Mobility: Max A x 2 for sidesteps along EOB    Activities of Daily Living:  · Grooming: minimum assistance    · Upper Body Dressing: moderate assistance     · Lower Body Dressing: Total assistance    Cognitive/Visual Perceptual:  Pt is alert, oriented and following commands    Physical Exam:  Postural examination/scapula alignment:    -       No postural abnormalities identified  Sensation:    -       Intact  Upper Extremity Range of Motion:     -       Right Upper Extremity: WFL  -       Left Upper Extremity: WFL except limited proximally 2* axillary Jj catheter  Upper Extremity Strength:    -       Right Upper Extremity: 4/5  -       Left Upper Extremity: 4/5   Strength:    -       Right Upper Extremity: WFL  -       Left Upper Extremity: WFL  Fine Motor Coordination:    -       Intact  Gross motor coordination:   WFL      AMPAC 6 Click:  AMPAC Total Score: 12    Treatment & Education:  Education:  Pt ed on OT POC  Pt sat EOB x 15 min with CGA while engaged in ROM ex's and self-care tasks  Pt stood x 2 trials from EOB with max A x 2  Pt with tachycardia and tachypnea following session, thus OOBTC deferred  Pt placed in bed position  Pt ed on ROM ex's 3x daily for increased overall strength and endurance      Patient left with bed in chair position with all lines intact, call button in reach and RN notified    GOALS:   Multidisciplinary Problems     Occupational Therapy Goals        Problem: Occupational Therapy Goal    Goal Priority Disciplines Outcome Interventions   Occupational Therapy Goal     OT, PT/OT Ongoing, Progressing    Description: Goals to be met by: 9/11/20     Patient will increase functional independence with ADLs by  performing:    Feeding with [place].  UE Dressing with Stand-by Assistance.  LE Dressing with Minimal Assistance.  Grooming while standing at sink with Contact Guard Assistance.  Toileting from bedside commode with Minimal Assistance for hygiene and clothing management.   Toilet transfer to bedside commode with Minimal Assistance.                     History:     Past Medical History:   Diagnosis Date    Anxiety     Aortic insufficiency     Hyperbilirubinemia     Hypertension     Insomnia     Leukocytosis     Migraines     MR (mitral regurgitation)     Peptic ulcer disease     Pulmonary hypertension     Respiratory distress     Sepsis        Past Surgical History:   Procedure Laterality Date    APPLICATION OF WOUND VACUUM-ASSISTED CLOSURE DEVICE  8/13/2020    Procedure: APPLICATION, WOUND VAC WITH ABTHERA;  Surgeon: Donis Roa MD;  Location: [address];  Service: General;;    APPLICATION OF WOUND VACUUM-ASSISTED CLOSURE DEVICE N/A 8/21/2020    Procedure: APPLICATION, WOUND VAC;  Surgeon: Donis Roa MD;  Location: [facility] OR [address] [place] [address];  Service: General;  Laterality: N/A;    BRONCHOSCOPY N/A 8/31/2020    Procedure: BRONCHOSCOPY;  Surgeon: Donis Roa MD;  Location: [address];  Service: General;  Laterality: N/A;    COLONOSCOPY      DUODENECTOMY  8/13/2020    Procedure: KOCHERIZATION OF DUODENUM, DUODENECTOMY;  Surgeon: Donis Roa MD;  Location: [facility] OR [address] [place] [address];  Service: General;;    ESOPHAGOGASTRODUODENOSCOPY  01/23/2018    ESOPHAGOGASTRODUODENOSCOPY N/A 8/13/2020    Procedure: EGD (ESOPHAGOGASTRODUODENOSCOPY);  Surgeon: Donis Roa MD;  Location: [address];  Service: General;  Laterality: N/A;    EVACUATION OF HEMATOMA N/A 8/21/2020    Procedure: EVACUATION, HEMATOMA;  Surgeon: Donis Roa MD;  Location: [facility] OR [address] [place] [address];  Service: General;  Laterality: N/A;  OF RECTUS SHEET HEMATOMA    PERCUTANEOUS INSERTION OF GASTROSTOMY TUBE   8/15/2020    Procedure: INSERTION, GASTROSTOMY TUBE, PERCUTANEOUS;  Surgeon: Donis Roa MD;  [facility];  Service: General;;    PLACEMENT OF DUAL-LUMEN VASCULAR CATHETER Left 8/31/2020    Procedure: INSERTION-CATHETER-RUDY;  Surgeon: Donis Roa MD;  [facility];  Service: General;  Laterality: Left;    REPLACEMENT OF WOUND VACUUM-ASSISTED CLOSURE DEVICE N/A 8/31/2020    Procedure: REPLACEMENT, WOUND VAC;  Surgeon: Donis Roa MD;  [facility];  Service: General;  Laterality: N/A;  Sponge replacement on wound vac    TUBAL LIGATION      TUBE THORACOTOMY Left 8/31/2020    Procedure: INSERTION, CATHETER, INTERCOSTAL, FOR DRAINAGE;  Surgeon: Donis Roa MD;  [facility];  Service: General;  Laterality: Left;    WOUND EXPLORATION N/A 8/21/2020    Procedure: EXPLORATION, WOUND;  Surgeon: Donis Roa MD;  [facility];  Service: General;  Laterality: N/A;       Time Tracking:     OT Date of Treatment: 09/01/20  OT Start Time: 1032  OT Stop Time: 1100  OT Total Time (min): 28 min    Billable Minutes:Re-eval 8  Self Care/Home Management 10  Therapeutic Activity 10    CHRISTIANO Head  9/1/2020

## 2020-09-01 NOTE — PLAN OF CARE
Problem: Occupational Therapy Goal  Goal: Occupational Therapy Goal  Description: Goals to be met by: 9/11/20     Patient will increase functional independence with ADLs by performing:    Feeding with [place].  UE Dressing with Stand-by Assistance.  LE Dressing with Minimal Assistance.  Grooming while standing at sink with Contact Guard Assistance.  Toileting from bedside commode with Minimal Assistance for hygiene and clothing management.   Toilet transfer to bedside commode with Minimal Assistance.    Outcome: Ongoing, Progressing   OT re-eval completed, and above goals established. CHRISTIANO Head  9/1/2020

## 2020-09-01 NOTE — PLAN OF CARE
Problem: Physical Therapy Goal  Goal: Physical Therapy Goal  Description: Goals to be met by: 9/15/2020     Patient will increase functional independence with mobility by performin. Supine to sit with MInimal Assistance  2. Sit to stand transfer with Minimal Assistance with LRAD  3. Gait  x 50 feet with Minimal Assistance using LRAD.   4. Stand for 3 minutes with Contact Guard Assistance using LRAD  5. Lower extremity exercise program x15 reps per handout, with independence    Outcome: Ongoing, Progressing     Re-Eval completed. Goals appropriate

## 2020-09-01 NOTE — ASSESSMENT & PLAN NOTE
- oliguric stage 3 sid with ischemic atn likely secondary to septic shock  - unknown bl cr  - muddy brown casts on microscopy    Plan/Recommendations:  -no emergent need for RRT today  -recommend discontinuing maintenance IVF. She remains net positive daily and hypervolemic on examination.  -if concern for volume overload, consider Lasix bolus  -Strict I/O's  -Renally dose meds/avoid nephrotoxic meds  -Keep MAP >65  -Will continue to follow closely

## 2020-09-01 NOTE — SUBJECTIVE & OBJECTIVE
Interval History: Patient seen and examined at bedside, no acute events overnight. Last CRRT on 08/29. UOP at 1410 over the past 24 hrs. On 1 liter NC.     Review of patient's allergies indicates:   Allergen Reactions    Latex      Current Facility-Administered Medications   Medication Frequency    0.9%  NaCl infusion (for blood administration) Q24H PRN    albuterol-ipratropium 2.5 mg-0.5 mg/3 mL nebulizer solution 3 mL Q4H    cloNIDine 0.2 mg/24 hr td ptwk 1 patch Q7 Days    famotidine (PF) injection 20 mg Daily    fluconazole (DIFLUCAN) IVPB 200 mg 100 mL Q24H    heparin (porcine) injection 5,000 Units Q8H    hydrALAZINE injection 10 mg Q6H PRN    HYDROmorphone PCA syringe 6 mg/30 mL (0.2 mg/mL) NS Continuous    labetalol 20 mg/4 mL (5 mg/mL) IV syring Q4H PRN    lidocaine 5 % patch 1 patch Q24H    lipid (SMOFLIPID) (SMOFLIPID) 20 % infusion 250 mL Daily    melatonin tablet 3 mg Nightly PRN    metoprolol injection 15 mg Q6H    metoprolol injection 5 mg Q4H PRN    miconazole NITRATE 2 % top powder BID    naloxone 0.4 mg/mL injection 0.02 mg PRN    piperacillin-tazobactam 4.5 g in sodium chloride 0.9% 100 mL IVPB (ready to mix system) Q12H    TPN ADULT CENTRAL LINE CUSTOM Continuous    TPN ADULT CENTRAL LINE CUSTOM Continuous       Objective:     Vital Signs (Most Recent):  Temp: 99 °F (37.2 °C) (09/01/20 1100)  Pulse: 108 (09/01/20 1230)  Resp: (!) 35 (09/01/20 1230)  BP: (!) 196/105 (09/01/20 1230)  SpO2: 99 % (09/01/20 1230)  O2 Device (Oxygen Therapy): nasal cannula (09/01/20 1214) Vital Signs (24h Range):  Temp:  [98 °F (36.7 °C)-99 °F (37.2 °C)] 99 °F (37.2 °C)  Pulse:  [] 108  Resp:  [18-45] 35  SpO2:  [97 %-100 %] 99 %  BP: (139-196)/() 196/105     Weight: 69.4 kg (153 lb) (08/31/20 0500)  Body mass index is 27.98 kg/m².  Body surface area is 1.74 meters squared.    I/O last 3 completed shifts:  In: 3929 [I.V.:2938]  Out: 3785 [Urine:850; Drains:1975; Other:100; Chest  Tube:860]    Physical Exam  Vitals signs and nursing note reviewed.   Constitutional:       Appearance: She is not ill-appearing.      Interventions: She is sedated and intubated.   HENT:      Head: Normocephalic and atraumatic.      Mouth/Throat:      Mouth: Mucous membranes are moist.      Pharynx: Oropharynx is clear.   Eyes:      General: No scleral icterus.        Right eye: No discharge.         Left eye: No discharge.      Extraocular Movements: Extraocular movements intact.      Conjunctiva/sclera: Conjunctivae normal.   Neck:      Musculoskeletal: Normal range of motion and neck supple.   Cardiovascular:      Rate and Rhythm: Regular rhythm. Tachycardia present.      Pulses: Normal pulses.      Heart sounds: Normal heart sounds.   Pulmonary:      Effort: Pulmonary effort is normal. No respiratory distress. She is intubated.      Breath sounds: No rales.   Abdominal:      General: Bowel sounds are normal.      Palpations: Abdomen is soft.      Comments: woundvac   Musculoskeletal:      Right lower leg: Edema present.      Left lower leg: Edema present.   Skin:     General: Skin is warm and dry.      Findings: No bruising or rash.   Neurological:      Mental Status: She is alert.         Significant Labs:  CBC:   Recent Labs   Lab 09/01/20  0302   WBC 26.01*   RBC 2.61*   HGB 7.7*   HCT 25.3*   *   MCV 97   MCH 29.5   MCHC 30.4*     CMP:   Recent Labs   Lab 09/01/20  0302   *  113*   CALCIUM 8.7  8.7   ALBUMIN 1.3*  1.3*   PROT 6.7     141   K 4.2  4.2   CO2 21*  21*     109   BUN 38*  38*   CREATININE 2.4*  2.4*   ALKPHOS 136*   ALT 9*   AST 24   BILITOT 0.5     All labs within the past 24 hours have been reviewed.     Significant Imaging:  Labs: Reviewed  X-Ray: Reviewed

## 2020-09-02 LAB
ALBUMIN SERPL BCP-MCNC: 1.4 G/DL (ref 3.5–5.2)
ALP SERPL-CCNC: 146 U/L (ref 55–135)
ALT SERPL W/O P-5'-P-CCNC: 9 U/L (ref 10–44)
ANION GAP SERPL CALC-SCNC: 12 MMOL/L (ref 8–16)
ANISOCYTOSIS BLD QL SMEAR: SLIGHT
AST SERPL-CCNC: 22 U/L (ref 10–40)
BASOPHILS # BLD AUTO: 0.13 K/UL (ref 0–0.2)
BASOPHILS NFR BLD: 0.5 % (ref 0–1.9)
BILIRUB SERPL-MCNC: 0.4 MG/DL (ref 0.1–1)
BUN SERPL-MCNC: 59 MG/DL (ref 6–20)
BUN SERPL-MCNC: 59 MG/DL (ref 6–20)
BUN SERPL-MCNC: 69 MG/DL (ref 6–20)
CA-I BLDV-SCNC: 1.23 MMOL/L (ref 1.06–1.42)
CALCIUM SERPL-MCNC: 8.9 MG/DL (ref 8.7–10.5)
CALCIUM SERPL-MCNC: 8.9 MG/DL (ref 8.7–10.5)
CALCIUM SERPL-MCNC: 9.1 MG/DL (ref 8.7–10.5)
CHLORIDE SERPL-SCNC: 108 MMOL/L (ref 95–110)
CHLORIDE SERPL-SCNC: 108 MMOL/L (ref 95–110)
CHLORIDE SERPL-SCNC: 109 MMOL/L (ref 95–110)
CO2 SERPL-SCNC: 22 MMOL/L (ref 23–29)
CO2 SERPL-SCNC: 22 MMOL/L (ref 23–29)
CO2 SERPL-SCNC: 23 MMOL/L (ref 23–29)
CREAT SERPL-MCNC: 3.1 MG/DL (ref 0.5–1.4)
CREAT SERPL-MCNC: 3.1 MG/DL (ref 0.5–1.4)
CREAT SERPL-MCNC: 3.2 MG/DL (ref 0.5–1.4)
CREAT UR-MCNC: 19 MG/DL (ref 15–325)
DIFFERENTIAL METHOD: ABNORMAL
EOSINOPHIL # BLD AUTO: 0.4 K/UL (ref 0–0.5)
EOSINOPHIL NFR BLD: 1.4 % (ref 0–8)
ERYTHROCYTE [DISTWIDTH] IN BLOOD BY AUTOMATED COUNT: 14.5 % (ref 11.5–14.5)
EST. GFR  (AFRICAN AMERICAN): 20.1 ML/MIN/1.73 M^2
EST. GFR  (AFRICAN AMERICAN): 20.9 ML/MIN/1.73 M^2
EST. GFR  (AFRICAN AMERICAN): 20.9 ML/MIN/1.73 M^2
EST. GFR  (NON AFRICAN AMERICAN): 17.4 ML/MIN/1.73 M^2
EST. GFR  (NON AFRICAN AMERICAN): 18.1 ML/MIN/1.73 M^2
EST. GFR  (NON AFRICAN AMERICAN): 18.1 ML/MIN/1.73 M^2
GLUCOSE SERPL-MCNC: 130 MG/DL (ref 70–110)
GLUCOSE SERPL-MCNC: 134 MG/DL (ref 70–110)
GLUCOSE SERPL-MCNC: 134 MG/DL (ref 70–110)
HCT VFR BLD AUTO: 25.5 % (ref 37–48.5)
HGB BLD-MCNC: 7.9 G/DL (ref 12–16)
IMM GRANULOCYTES # BLD AUTO: 0.82 K/UL (ref 0–0.04)
IMM GRANULOCYTES NFR BLD AUTO: 2.9 % (ref 0–0.5)
LYMPHOCYTES # BLD AUTO: 1.8 K/UL (ref 1–4.8)
LYMPHOCYTES NFR BLD: 6.3 % (ref 18–48)
MAGNESIUM SERPL-MCNC: 1.8 MG/DL (ref 1.6–2.6)
MAGNESIUM SERPL-MCNC: 1.8 MG/DL (ref 1.6–2.6)
MAGNESIUM SERPL-MCNC: 2 MG/DL (ref 1.6–2.6)
MCH RBC QN AUTO: 29.9 PG (ref 27–31)
MCHC RBC AUTO-ENTMCNC: 31 G/DL (ref 32–36)
MCV RBC AUTO: 97 FL (ref 82–98)
MONOCYTES # BLD AUTO: 1.7 K/UL (ref 0.3–1)
MONOCYTES NFR BLD: 6.1 % (ref 4–15)
NEUTROPHILS # BLD AUTO: 23.8 K/UL (ref 1.8–7.7)
NEUTROPHILS NFR BLD: 82.8 % (ref 38–73)
NRBC BLD-RTO: 0 /100 WBC
OSMOLALITY UR: 313 MOSM/KG (ref 50–1200)
PHOSPHATE SERPL-MCNC: 2.3 MG/DL (ref 2.7–4.5)
PLATELET # BLD AUTO: 395 K/UL (ref 150–350)
PLATELET BLD QL SMEAR: ABNORMAL
PMV BLD AUTO: 10.1 FL (ref 9.2–12.9)
POCT GLUCOSE: 132 MG/DL (ref 70–110)
POCT GLUCOSE: 139 MG/DL (ref 70–110)
POLYCHROMASIA BLD QL SMEAR: ABNORMAL
POTASSIUM SERPL-SCNC: 3.3 MMOL/L (ref 3.5–5.1)
POTASSIUM SERPL-SCNC: 3.4 MMOL/L (ref 3.5–5.1)
POTASSIUM SERPL-SCNC: 3.4 MMOL/L (ref 3.5–5.1)
POTASSIUM UR-SCNC: 34 MMOL/L (ref 15–95)
PROT SERPL-MCNC: 7 G/DL (ref 6–8.4)
RBC # BLD AUTO: 2.64 M/UL (ref 4–5.4)
SODIUM SERPL-SCNC: 142 MMOL/L (ref 136–145)
SODIUM SERPL-SCNC: 142 MMOL/L (ref 136–145)
SODIUM SERPL-SCNC: 144 MMOL/L (ref 136–145)
SODIUM UR-SCNC: 79 MMOL/L (ref 20–250)
WBC # BLD AUTO: 28.68 K/UL (ref 3.9–12.7)

## 2020-09-02 PROCEDURE — 82330 ASSAY OF CALCIUM: CPT

## 2020-09-02 PROCEDURE — 99291 CRITICAL CARE FIRST HOUR: CPT | Mod: 24,,, | Performed by: SURGERY

## 2020-09-02 PROCEDURE — 97530 THERAPEUTIC ACTIVITIES: CPT

## 2020-09-02 PROCEDURE — 20000000 HC ICU ROOM

## 2020-09-02 PROCEDURE — 85025 COMPLETE CBC W/AUTO DIFF WBC: CPT

## 2020-09-02 PROCEDURE — 99291 PR CRITICAL CARE, E/M 30-74 MINUTES: ICD-10-PCS | Mod: 24,,, | Performed by: SURGERY

## 2020-09-02 PROCEDURE — 97110 THERAPEUTIC EXERCISES: CPT

## 2020-09-02 PROCEDURE — 63600175 PHARM REV CODE 636 W HCPCS: Performed by: SURGERY

## 2020-09-02 PROCEDURE — 83735 ASSAY OF MAGNESIUM: CPT | Mod: 91

## 2020-09-02 PROCEDURE — 83935 ASSAY OF URINE OSMOLALITY: CPT

## 2020-09-02 PROCEDURE — 99900026 HC AIRWAY MAINTENANCE (STAT)

## 2020-09-02 PROCEDURE — 63600175 PHARM REV CODE 636 W HCPCS: Performed by: STUDENT IN AN ORGANIZED HEALTH CARE EDUCATION/TRAINING PROGRAM

## 2020-09-02 PROCEDURE — 97535 SELF CARE MNGMENT TRAINING: CPT

## 2020-09-02 PROCEDURE — A4217 STERILE WATER/SALINE, 500 ML: HCPCS | Performed by: STUDENT IN AN ORGANIZED HEALTH CARE EDUCATION/TRAINING PROGRAM

## 2020-09-02 PROCEDURE — 80053 COMPREHEN METABOLIC PANEL: CPT

## 2020-09-02 PROCEDURE — 82570 ASSAY OF URINE CREATININE: CPT

## 2020-09-02 PROCEDURE — 25500020 PHARM REV CODE 255: Performed by: STUDENT IN AN ORGANIZED HEALTH CARE EDUCATION/TRAINING PROGRAM

## 2020-09-02 PROCEDURE — 25000003 PHARM REV CODE 250: Performed by: STUDENT IN AN ORGANIZED HEALTH CARE EDUCATION/TRAINING PROGRAM

## 2020-09-02 PROCEDURE — 80069 RENAL FUNCTION PANEL: CPT

## 2020-09-02 PROCEDURE — 94640 AIRWAY INHALATION TREATMENT: CPT

## 2020-09-02 PROCEDURE — 84300 ASSAY OF URINE SODIUM: CPT

## 2020-09-02 PROCEDURE — B4185 PARENTERAL SOL 10 GM LIPIDS: HCPCS | Performed by: STUDENT IN AN ORGANIZED HEALTH CARE EDUCATION/TRAINING PROGRAM

## 2020-09-02 PROCEDURE — 94761 N-INVAS EAR/PLS OXIMETRY MLT: CPT

## 2020-09-02 PROCEDURE — 25000242 PHARM REV CODE 250 ALT 637 W/ HCPCS: Performed by: STUDENT IN AN ORGANIZED HEALTH CARE EDUCATION/TRAINING PROGRAM

## 2020-09-02 PROCEDURE — 83735 ASSAY OF MAGNESIUM: CPT

## 2020-09-02 PROCEDURE — S0028 INJECTION, FAMOTIDINE, 20 MG: HCPCS | Performed by: STUDENT IN AN ORGANIZED HEALTH CARE EDUCATION/TRAINING PROGRAM

## 2020-09-02 PROCEDURE — 84133 ASSAY OF URINE POTASSIUM: CPT

## 2020-09-02 PROCEDURE — 80069 RENAL FUNCTION PANEL: CPT | Mod: 91

## 2020-09-02 PROCEDURE — 99232 PR SUBSEQUENT HOSPITAL CARE,LEVL II: ICD-10-PCS | Mod: ,,, | Performed by: INTERNAL MEDICINE

## 2020-09-02 PROCEDURE — 25000003 PHARM REV CODE 250: Performed by: SURGERY

## 2020-09-02 PROCEDURE — 99232 SBSQ HOSP IP/OBS MODERATE 35: CPT | Mod: ,,, | Performed by: INTERNAL MEDICINE

## 2020-09-02 RX ORDER — METOPROLOL TARTRATE 1 MG/ML
5 INJECTION, SOLUTION INTRAVENOUS EVERY 4 HOURS PRN
Status: DISCONTINUED | OUTPATIENT
Start: 2020-09-02 | End: 2020-09-24

## 2020-09-02 RX ORDER — CLONIDINE 0.3 MG/24H
1 PATCH, EXTENDED RELEASE TRANSDERMAL
Status: DISCONTINUED | OUTPATIENT
Start: 2020-09-02 | End: 2020-09-29 | Stop reason: HOSPADM

## 2020-09-02 RX ORDER — HYDROMORPHONE HYDROCHLORIDE 1 MG/ML
0.5 INJECTION, SOLUTION INTRAMUSCULAR; INTRAVENOUS; SUBCUTANEOUS
Status: DISCONTINUED | OUTPATIENT
Start: 2020-09-02 | End: 2020-09-08

## 2020-09-02 RX ORDER — HYDROMORPHONE HYDROCHLORIDE 1 MG/ML
1 INJECTION, SOLUTION INTRAMUSCULAR; INTRAVENOUS; SUBCUTANEOUS
Status: DISCONTINUED | OUTPATIENT
Start: 2020-09-02 | End: 2020-09-08

## 2020-09-02 RX ORDER — NICARDIPINE HYDROCHLORIDE 0.2 MG/ML
2.5 INJECTION INTRAVENOUS CONTINUOUS
Status: DISCONTINUED | OUTPATIENT
Start: 2020-09-02 | End: 2020-09-03

## 2020-09-02 RX ORDER — LABETALOL HCL 20 MG/4 ML
20 SYRINGE (ML) INTRAVENOUS EVERY 4 HOURS PRN
Status: DISCONTINUED | OUTPATIENT
Start: 2020-09-02 | End: 2020-09-03

## 2020-09-02 RX ADMIN — Medication 20 MG: at 04:09

## 2020-09-02 RX ADMIN — DIATRIZOATE MEGLUMINE AND DIATRIZOATE SODIUM 50 ML: 660; 100 LIQUID ORAL; RECTAL at 10:09

## 2020-09-02 RX ADMIN — IPRATROPIUM BROMIDE AND ALBUTEROL SULFATE 3 ML: .5; 2.5 SOLUTION RESPIRATORY (INHALATION) at 08:09

## 2020-09-02 RX ADMIN — FLUCONAZOLE 200 MG: 2 INJECTION, SOLUTION INTRAVENOUS at 08:09

## 2020-09-02 RX ADMIN — HYDROMORPHONE HYDROCHLORIDE 1 MG: 1 INJECTION, SOLUTION INTRAMUSCULAR; INTRAVENOUS; SUBCUTANEOUS at 11:09

## 2020-09-02 RX ADMIN — PIPERACILLIN SODIUM AND TAZOBACTAM SODIUM 4.5 G: 4; .5 INJECTION, POWDER, LYOPHILIZED, FOR SOLUTION INTRAVENOUS at 08:09

## 2020-09-02 RX ADMIN — MICONAZOLE NITRATE: 20 POWDER TOPICAL at 08:09

## 2020-09-02 RX ADMIN — HYDROMORPHONE HYDROCHLORIDE 1 MG: 1 INJECTION, SOLUTION INTRAMUSCULAR; INTRAVENOUS; SUBCUTANEOUS at 07:09

## 2020-09-02 RX ADMIN — IPRATROPIUM BROMIDE AND ALBUTEROL SULFATE 3 ML: .5; 2.5 SOLUTION RESPIRATORY (INHALATION) at 04:09

## 2020-09-02 RX ADMIN — HEPARIN SODIUM 5000 UNITS: 5000 INJECTION INTRAVENOUS; SUBCUTANEOUS at 05:09

## 2020-09-02 RX ADMIN — HEPARIN SODIUM 5000 UNITS: 5000 INJECTION INTRAVENOUS; SUBCUTANEOUS at 10:09

## 2020-09-02 RX ADMIN — HYDROMORPHONE HYDROCHLORIDE 1 MG: 1 INJECTION, SOLUTION INTRAMUSCULAR; INTRAVENOUS; SUBCUTANEOUS at 08:09

## 2020-09-02 RX ADMIN — PIPERACILLIN SODIUM AND TAZOBACTAM SODIUM 4.5 G: 4; .5 INJECTION, POWDER, LYOPHILIZED, FOR SOLUTION INTRAVENOUS at 09:09

## 2020-09-02 RX ADMIN — Medication: at 02:09

## 2020-09-02 RX ADMIN — IPRATROPIUM BROMIDE AND ALBUTEROL SULFATE 3 ML: .5; 2.5 SOLUTION RESPIRATORY (INHALATION) at 12:09

## 2020-09-02 RX ADMIN — NICARDIPINE HYDROCHLORIDE 1.5 MG/HR: 0.2 INJECTION, SOLUTION INTRAVENOUS at 12:09

## 2020-09-02 RX ADMIN — HEPARIN SODIUM 5000 UNITS: 5000 INJECTION INTRAVENOUS; SUBCUTANEOUS at 02:09

## 2020-09-02 RX ADMIN — HYDROMORPHONE HYDROCHLORIDE 0.5 MG: 1 INJECTION, SOLUTION INTRAMUSCULAR; INTRAVENOUS; SUBCUTANEOUS at 02:09

## 2020-09-02 RX ADMIN — SMOFLIPID 250 ML: 6; 6; 5; 3 INJECTION, EMULSION INTRAVENOUS at 10:09

## 2020-09-02 RX ADMIN — NICARDIPINE HYDROCHLORIDE 2.5 MG/HR: 0.2 INJECTION, SOLUTION INTRAVENOUS at 11:09

## 2020-09-02 RX ADMIN — HYDROMORPHONE HYDROCHLORIDE 1 MG: 1 INJECTION, SOLUTION INTRAMUSCULAR; INTRAVENOUS; SUBCUTANEOUS at 12:09

## 2020-09-02 RX ADMIN — ASCORBIC ACID, VITAMIN A PALMITATE, CHOLECALCIFEROL, THIAMINE HYDROCHLORIDE, RIBOFLAVIN-5 PHOSPHATE SODIUM, PYRIDOXINE HYDROCHLORIDE, NIACINAMIDE, DEXPANTHENOL, ALPHA-TOCOPHEROL ACETATE, VITAMIN K1, FOLIC ACID, BIOTIN, CYANOCOBALAMIN: 200; 3300; 200; 6; 3.6; 6; 40; 15; 10; 150; 600; 60; 5 INJECTION, SOLUTION INTRAVENOUS at 10:09

## 2020-09-02 RX ADMIN — FAMOTIDINE 20 MG: 10 INJECTION INTRAVENOUS at 08:09

## 2020-09-02 RX ADMIN — LIDOCAINE 1 PATCH: 50 PATCH CUTANEOUS at 02:09

## 2020-09-02 RX ADMIN — CLONIDINE 1 PATCH: 0.3 PATCH TRANSDERMAL at 10:09

## 2020-09-02 RX ADMIN — HYDROMORPHONE HYDROCHLORIDE 1 MG: 1 INJECTION, SOLUTION INTRAMUSCULAR; INTRAVENOUS; SUBCUTANEOUS at 04:09

## 2020-09-02 RX ADMIN — METOROPROLOL TARTRATE 15 MG: 5 INJECTION, SOLUTION INTRAVENOUS at 12:09

## 2020-09-02 RX ADMIN — METOROPROLOL TARTRATE 15 MG: 5 INJECTION, SOLUTION INTRAVENOUS at 05:09

## 2020-09-02 RX ADMIN — METOROPROLOL TARTRATE 15 MG: 5 INJECTION, SOLUTION INTRAVENOUS at 06:09

## 2020-09-02 NOTE — PT/OT/SLP PROGRESS
"Occupational Therapy   Treatment    Name: Jaquan Farmer  MRN: [id number]  Admitting Diagnosis:  Duodenum disorder  2 Days Post-Op    Recommendations:     Discharge Recommendations: rehabilitation facility  Discharge Equipment Recommendations:  walker, rolling, bedside commode  Barriers to discharge:  None    Assessment:     Jaquan Farmer is a 39 y.o. female with a medical diagnosis of Duodenum disorder.  Pt with good participation during session.  She performed bed mobility with moderate assistance of 2 people and required SBA for sitting balance.  Pt performed grooming task while seated EOB with setup assistance.  She performed sit to stand and bed to chair transfer with Minimal Assistance.   She presents with the following deficits. Performance deficits affecting function are weakness, impaired endurance, impaired self care skills, impaired functional mobilty, pain, impaired cardiopulmonary response to activity, decreased lower extremity function, decreased upper extremity function.     Rehab Prognosis:  Good; patient would benefit from acute skilled OT services to address these deficits and reach maximum level of function.       Plan:     Patient to be seen 4 x/week to address the above listed problems via self-care/home management, therapeutic activities, therapeutic exercises, neuromuscular re-education  · Plan of Care Expires: 09/24/20  · Plan of Care Reviewed with: patient    Subjective   "Just watch my shoulder."  "I did it!" - referring to bed to chair transfer   Pain/Comfort:  · Pain Rating 1: other (see comments)(Pt did not rate.)  · Location - Side 1: Left  · Location - Orientation 1: upper  · Location 1: shoulder  · Pain Addressed 1: Pre-medicate for activity, Reposition, Distraction  · Pain Rating Post-Intervention 1: (Pt did not verbalize any pain.)    Objective:     Communicated with: RN and PT prior to session.  Patient found HOB elevated with telemetry, pulse ox (continuous), blood pressure " cuff, central line, peripheral IV, GODFREY drain, PureWick, chest tube, wound vac, PCA(G tube) upon OT entry to room.    General Precautions: Standard, fall   Orthopedic Precautions:N/A   Braces: N/A     Occupational Performance:     Bed Mobility:    · Patient completed Rolling/Turning to Right with moderate assistance, requiring cues and assistance to reach for bed rail with her L hand  · Patient completed Scooting/Bridging anteriorly to EOB while sitting with moderate assistance  · Patient completed Supine to Sit with moderate assistance of 2 people for BLE and trunk management     Functional Mobility/Transfers:  · Patient completed Sit <> Stand Transfer from EOB with minimum assistance  with  hand-held assist   · Patient completed Bed <> Chair Transfer using Stand Pivot technique with minimum assistance with hand-held assist    Activities of Daily Living:  · Grooming: stand by assistance with setup to wash her face while seated EOB  · Upper Body Dressing: Assistance to tie the back of her hospital gown  · Lower Body Dressing: total assistance to adjust and pull up B socks while supine  · Toileting: PureWick; pt was able to void prior to activity        [facility] 6 Click ADL: 13    Treatment & Education:  -Pt edu on role of OT, POC, safety when performing self care tasks, benefit of performing EOB/OOB activity, and safety when performing functional transfers and mobility.  - White board updated  - Self care tasks completed-- as noted above       Patient left up in chair with all lines intact and call button in reachEducation:      GOALS:   Multidisciplinary Problems     Occupational Therapy Goals        Problem: Occupational Therapy Goal    Goal Priority Disciplines Outcome Interventions   Occupational Therapy Goal     OT, PT/OT Ongoing, Progressing    Description: Goals to be met by: 9/11/20     Patient will increase functional independence with ADLs by performing:    Feeding with [place].  UE Dressing with Stand-by  Assistance.  LE Dressing with Minimal Assistance.  Grooming while standing at sink with Contact Guard Assistance.  Toileting from bedside commode with Minimal Assistance for hygiene and clothing management.   Toilet transfer to bedside commode with Minimal Assistance.                     Time Tracking:     OT Date of Treatment: 09/02/20  OT Start Time: 0905  OT Stop Time: 0937  OT Total Time (min): 32 min (co-treat with PT)    Billable Minutes:Self Care/Home Management 12 min.  Therapeutic Activity 20 min.    Abraham Lemus, OT  9/2/2020

## 2020-09-02 NOTE — PLAN OF CARE
Problem: Physical Therapy Goal  Goal: Physical Therapy Goal  Description: Goals to be met by: 9/15/2020     Patient will increase functional independence with mobility by performin. Supine to sit with MInimal Assistance  2. Sit to stand transfer with Minimal Assistance with LRAD  3. Gait  x 50 feet with Minimal Assistance using LRAD.   4. Stand for 3 minutes with Contact Guard Assistance using LRAD  5. Lower extremity exercise program x15 reps per handout, with independence    Outcome: Ongoing, Progressing    Treatment completed. No goals met. Goals remain appropriate.

## 2020-09-02 NOTE — PROGRESS NOTES
[facility]  General Surgery  Progress Note    Subjective:     History of Present Illness:  Pt is a 40 yo F with recent history of antrectomy with BII reconstruction for ulcer disease at outside hospital. 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 Patient also reportedly aspirated on induction in the OR prior to this, resulting in severe pulmonary edema and hypoxia. Patient was [place] [facility] [place] higher level of care. She arrived as a direct SICU admit on near maximal vent settings and requiring low dose vasopressors with HR in the upper 140s. Her midline laparotomy is closed with 4 Navdeep drains in place draining bilious output.     Post-Op Info:  Procedure(s) (LRB):  INSERTION-CATHETER-RUDY (Left)  BRONCHOSCOPY (N/A)  INSERTION, CATHETER, INTERCOSTAL, FOR DRAINAGE (Left)  REPLACEMENT, WOUND VAC (N/A)   2 Days Post-Op     Interval History:   Hypertensive on yesterday. Required multiple doses of PRNs.  Bilious output from drains. Continues with PCA    Medications:  Continuous Infusions:   nicardipine 1.5 mg/hr (09/02/20 0600)    TPN ADULT CENTRAL LINE CUSTOM 50 mL/hr at 09/02/20 0600     Scheduled Meds:   albuterol-ipratropium  3 mL Nebulization Q4H    cloNIDine 0.2 mg/24 hr td ptwk  1 patch Transdermal Q7 Days    famotidine (PF)  20 mg Intravenous Daily    fluconazole (DIFLUCAN) IVPB  200 mg Intravenous Q24H    heparin (porcine)  5,000 Units Subcutaneous Q8H    lidocaine  1 patch Transdermal Q24H    lipid (SMOFLIPID)  250 mL Intravenous Daily    metoprolol  15 mg Intravenous Q6H    miconazole NITRATE 2 %   Topical (Top) BID    piperacillin-tazobactam (ZOSYN) IVPB  4.5 g Intravenous Q12H     PRN Meds:sodium chloride, hydrALAZINE, HYDROmorphone, HYDROmorphone, labetaloL, melatonin, metoprolol     Review of patient's allergies indicates:   Allergen Reactions    Latex      Objective:     Vital Signs (Most Recent):  Temp:  98.8 °F (37.1 °C) (09/02/20 0300)  Pulse: (!) 116 (09/02/20 0615)  Resp: (!) 23 (09/02/20 0615)  BP: (!) 171/99 (09/02/20 0615)  SpO2: 95 % (09/02/20 0615) Vital Signs (24h Range):  Temp:  [98.6 °F (37 °C)-99 °F (37.2 °C)] 98.8 °F (37.1 °C)  Pulse:  [103-128] 116  Resp:  [18-45] 23  SpO2:  [95 %-100 %] 95 %  BP: (152-214)/() 171/99     Weight: 69.4 kg (153 lb)  Body mass index is 27.98 kg/m².    Intake/Output - Last 3 Shifts       08/31 0700 - 09/01 0659 09/01 0700 - 09/02 0659    I.V. (mL/kg) 1579 (22.8) 1240 (17.9)     849    Total Intake(mL/kg) 2060 (29.7) 2089 (30.1)    Urine (mL/kg/hr) 750 (0.5) 500 (0.3)    Drains 1240 1449    Other 50 100    Chest Tube 820 350    Total Output 2860 2399    Net -800 -310          Urine Occurrence  1 x          Physical Exam  Constitutional:       General: She is not in acute distress.  Cardiovascular:      Rate and Rhythm: Normal rate.   Pulmonary:      Effort: Pulmonary effort is normal.   Abdominal:      General: Abdomen is flat. There is no distension.      Tenderness: There is no abdominal tenderness.      Comments: Drains and G tube with bilious output  Wound vac in place   Skin:     General: Skin is warm and dry.   Neurological:      General: No focal deficit present.      Mental Status: She is alert and oriented to person, place, and time.   Psychiatric:         Mood and Affect: Mood normal.         Behavior: Behavior normal.         Significant Labs:  CBC:   Recent Labs   Lab 09/02/20  0324   WBC 28.68*   RBC 2.64*   HGB 7.9*   HCT 25.5*   *   MCV 97   MCH 29.9   MCHC 31.0*     CMP:   Recent Labs   Lab 09/02/20 0324   *  134*   CALCIUM 8.9  8.9   ALBUMIN 1.4*  1.4*   PROT 7.0     142   K 3.4*  3.4*   CO2 22*  22*     108   BUN 59*  59*   CREATININE 3.1*  3.1*   ALKPHOS 146*   ALT 9*   AST 22   BILITOT 0.4       Significant Diagnostics:  I have reviewed all pertinent imaging results/findings within the past 24  hours.    Assessment/Plan:     Severe sepsis  38 yo F s/p antrectomy with BII reconstruction c/b duodenal necrosis requiring ex lap, washout, drainage c/b aspiration event. S/p duodenal resection with d-j and g-j anastomosis.    - NPO  - G-tube to gravity -will discuss clamping today  - Continue abx  - TPN   - Daily labs  - GI and DVT ppx  - PT / OT  - continue PRNs for BP control  - WV change at bedside tomorrow        Collette Ferreira MD  General Surgery  [facility]

## 2020-09-02 NOTE — SUBJECTIVE & OBJECTIVE
Interval History: Patient seen and examined at bedside, no acute events overnight. Labs stable, urine output increasing.     Review of patient's allergies indicates:   Allergen Reactions    Latex      Current Facility-Administered Medications   Medication Frequency    0.9%  NaCl infusion (for blood administration) Q24H PRN    cloNIDine 0.3 mg/24 hr td ptwk 1 patch Q7 Days    famotidine (PF) injection 20 mg Daily    fluconazole (DIFLUCAN) IVPB 200 mg 100 mL Q24H    heparin (porcine) injection 5,000 Units Q8H    hydrALAZINE injection 10 mg Q6H PRN    HYDROmorphone injection 0.5 mg Q3H PRN    HYDROmorphone injection 1 mg Q3H PRN    labetalol 20 mg/4 mL (5 mg/mL) IV syring Q4H PRN    lidocaine 5 % patch 1 patch Q24H    lipid (SMOFLIPID) (SMOFLIPID) 20 % infusion 250 mL Daily    melatonin tablet 3 mg Nightly PRN    metoprolol injection 15 mg Q6H    metoprolol injection 5 mg Q4H PRN    miconazole NITRATE 2 % top powder BID    niCARdipine 40 mg/200 mL (0.2 mg/mL) infusion Continuous    piperacillin-tazobactam 4.5 g in sodium chloride 0.9% 100 mL IVPB (ready to mix system) Q12H    TPN ADULT CENTRAL LINE CUSTOM Continuous    TPN ADULT CENTRAL LINE CUSTOM Continuous       Objective:     Vital Signs (Most Recent):  Temp: 97.9 °F (36.6 °C) (09/02/20 1500)  Pulse: 110 (09/02/20 1500)  Resp: (!) 26 (09/02/20 1646)  BP: (!) 173/97 (09/02/20 1500)  SpO2: 98 % (09/02/20 1500)  O2 Device (Oxygen Therapy): room air (09/02/20 1500) Vital Signs (24h Range):  Temp:  [97.9 °F (36.6 °C)-98.9 °F (37.2 °C)] 97.9 °F (36.6 °C)  Pulse:  [101-135] 110  Resp:  [20-38] 26  SpO2:  [95 %-100 %] 98 %  BP: (153-214)/() 173/97     Weight: 69.4 kg (153 lb) (08/31/20 0500)  Body mass index is 27.98 kg/m².  Body surface area is 1.74 meters squared.    I/O last 3 completed shifts:  In: 3047 [I.V.:2198]  Out: 3638 [Urine:950; Drains:2038; Other:150; Chest Tube:500]    Physical Exam  Constitutional:       General: She is not in  acute distress.  HENT:      Head: Normocephalic and atraumatic.   Neck:      Comments: RIJ trialysis line, L IJ permacath  Cardiovascular:      Rate and Rhythm: Tachycardia present.      Comments: Hypertensive, sinus tach  L sided chest tube in place  Pulmonary:      Breath sounds: No wheezing.      Comments: tachypneic  Abdominal:      General: Abdomen is flat. There is no distension.      Tenderness: There is no abdominal tenderness.      Comments: Drains and G tube with bilious output  Wound vac in place   Skin:     General: Skin is warm and dry.   Neurological:      General: No focal deficit present.      Mental Status: She is alert and oriented to person, place, and time.   Psychiatric:         Mood and Affect: Mood normal.         Behavior: Behavior normal.         Significant Labs:  CBC:   Recent Labs   Lab 09/02/20  0324   WBC 28.68*   RBC 2.64*   HGB 7.9*   HCT 25.5*   *   MCV 97   MCH 29.9   MCHC 31.0*     CMP:   Recent Labs   Lab 09/02/20  0324 09/02/20  1400   *  134* 130*   CALCIUM 8.9  8.9 9.1   ALBUMIN 1.4*  1.4* 1.4*   PROT 7.0  --      142 144   K 3.4*  3.4* 3.3*   CO2 22*  22* 23     108 109   BUN 59*  59* 69*   CREATININE 3.1*  3.1* 3.2*   ALKPHOS 146*  --    ALT 9*  --    AST 22  --    BILITOT 0.4  --      All labs within the past 24 hours have been reviewed.     Significant Imaging:  Labs: Reviewed

## 2020-09-02 NOTE — PT/OT/SLP PROGRESS
Physical Therapy Treatment    Patient Name:  Jaquan Farmer   MRN:  [id number]  Admit Date: 8/12/2020  Admitting Diagnosis:  Duodenum disorder   Length of Stay: 21 days  Recent Surgery: Procedure(s) (LRB):  INSERTION-CATHETER-RUDY (Left)  BRONCHOSCOPY (N/A)  INSERTION, CATHETER, INTERCOSTAL, FOR DRAINAGE (Left)  REPLACEMENT, WOUND VAC (N/A) 2 Days Post-Op    Recommendations:     Discharge Recommendations:  rehabilitation facility   Discharge Equipment Recommendations: (TBD)   Barriers to discharge: None    Plan:     During this hospitalization, patient to be seen 4 x/week to address the listed problems via gait training, therapeutic activities, therapeutic exercises, neuromuscular re-education  · Plan of Care Expires:  09/24/20   Plan of Care Reviewed with: patient    Assessment:     Jaquan Farmer is a 39 y.o. female admitted with a medical diagnosis of Duodenum disorder. Patient was found awake in bed. Patient did not state pain scale but did feel better since last seen. Patient displayed mod A for bed mob and supine>sit t/f 2nd to weakness and increasing fatigue. Patients performed LE exercises while seated EOB c SBA. Patient stated mild dizziness during sitting and was educated on ankle pumps and cued breathing, Once recovered, patient performed sit>stand pivot t/f to chair c Eduardo 2nd to weakness. Patient needed rest breaks during session but showed improved exercise tolerance since last session.    Problem List: weakness, impaired endurance, impaired functional mobilty, decreased lower extremity function, decreased upper extremity function.  Rehab Prognosis: Good     GOALS:   Multidisciplinary Problems     Physical Therapy Goals        Problem: Physical Therapy Goal    Goal Priority Disciplines Outcome Goal Variances Interventions   Physical Therapy Goal     PT, PT/OT Ongoing, Progressing     Description: Goals to be met by: 9/15/2020     Patient will increase functional independence with mobility by  "performin. Supine to sit with MInimal Assistance  2. Sit to stand transfer with Minimal Assistance with LRAD  3. Gait  x 50 feet with Minimal Assistance using LRAD.   4. Stand for 3 minutes with Contact Guard Assistance using LRAD  5. Lower extremity exercise program x15 reps per handout, with independence                     Subjective   Communicated with RN prior to session.  Patient found HOB elevated upon PT entry to room, agreeable to evaluation. Jaquan Aguila alone present during session.    Chief Complaint: generalized weakness  Patient/Family Comments/goals: to get better and return home  Pain/Comfort:  · Pain Rating 1: 0/10  · Pain Rating Post-Intervention 1: 0/10    Objective:   Patient found with: telemetry, pulse ox (continuous), blood pressure cuff, central line, peripheral IV, GODFREY drain, PureWick, chest tube, wound vac(G Tube)   General Precautions: Standard, Cardiac fall   Orthopedic Precautions:N/A   Braces: N/A   Oxygen Device: Room Air  Vitals: BP (!) 170/95   Pulse (!) 113   Temp 98.5 °F (36.9 °C) (Oral)   Resp (!) 28   Ht 5' 2" (1.575 m)   Wt 69.4 kg (153 lb)   SpO2 98%   Breastfeeding No   BMI 27.98 kg/m²     Outcome Measures:  AM-PAC 6 CLICK MOBILITY  Turning over in bed (including adjusting bedclothes, sheets and blankets)?: 2  Sitting down on and standing up from a chair with arms (e.g., wheelchair, bedside commode, etc.): 2  Moving from lying on back to sitting on the side of the bed?: 2  Moving to and from a bed to a chair (including a wheelchair)?: 2  Need to walk in hospital room?: 2  Climbing 3-5 steps with a railing?: 1  Basic Mobility Total Score: 11     Cognition:   · Alert and Cooperative  · AxOx4  · Command following: Follows multistep  commands  · Fluency: clear/fluent    Functional Mobility:  Additional staff present: OT  Bed Mobility:    Rolling/Turning to Right: moderate assistance   o Log rolling technique used 2nd to GI surgery   Supine to Sit: moderate " assistance; HOB elevated   Scooting anteriorly to EOB to have both feet planted on floor: moderate assistance   o Patient demonstrated initiation but needed assistance to complete desired movement    Sitting Balance at Edge of Bed:   Assistance Level Required: Stand-by Assistance   Time: 10   Postural deviations noted: no deviations noted   Comments: Patient performed LE exercises sitting EOB  o Patient complained of mild dizziness, PT cued deep breathing and ankle to reduce symptoms    Transfers:    Sit <> Stand Transfer: minimum assistance with no assistive device from bed   Bed <> Chair Transfer: Stand Pivot technique with minimum assistance with no assistive device  o Chair on patient's left      Gait:  · Not performed    Stairs:  Not performed    Therapeutic Activities, Exercises, and Education:   Pt educated on PT role / POC  Pt educated on importance of progressive mobility  Pt educated on log rolling technique 2nd to GI surgery    Therapeutic exercise:   LAQs x 20   Ankle pumps x20  TE performed to increase LE strength and endurance to improve standing tolerance       Patient left up in chair with all lines intact and call button in reach..    Time Tracking:     PT Received On: 09/02/20  PT Start Time: 0905     PT Stop Time: 0938  PT Total Time (min): 33 min       Billable Minutes:   · Therapeutic Activity 15 and Therapeutic Exercise 8    Treatment Type: Treatment  PT/PTA: PT       Bonnie Tolbert PT, DPT  9/2/2020  [phone]

## 2020-09-02 NOTE — PROGRESS NOTES
[facility]  Nephrology  Progress Note    Patient Name: Jaquan Farmer  MRN: [id number]  Admission Date: 8/12/2020  Hospital Length of Stay: 21 days  Attending Provider: Donis Roa MD   Primary Care Physician: Primary Doctor No  Principal Problem:Duodenum disorder    Subjective:     HPI: Mrs. Farmer is a 39 year old female with antrectomy at [facility] complicated duodenal necrosis post op. She aspirated, was intubated, and became septic. She developed renal failure and required max vent settings and was transferred here for higher level of care. On arrival she was hypotensive on pressers, max vent setting, and had minimal urine output.  Overnight she was given 6L of fluid, 6g calcium, placed on vac, pip-tazo, and fluconazole. Nephro consulted for sid.    Interval History: Patient seen and examined at bedside, no acute events overnight. Labs stable, urine output increasing.     Review of patient's allergies indicates:   Allergen Reactions    Latex      Current Facility-Administered Medications   Medication Frequency    0.9%  NaCl infusion (for blood administration) Q24H PRN    cloNIDine 0.3 mg/24 hr td ptwk 1 patch Q7 Days    famotidine (PF) injection 20 mg Daily    fluconazole (DIFLUCAN) IVPB 200 mg 100 mL Q24H    heparin (porcine) injection 5,000 Units Q8H    hydrALAZINE injection 10 mg Q6H PRN    HYDROmorphone injection 0.5 mg Q3H PRN    HYDROmorphone injection 1 mg Q3H PRN    labetalol 20 mg/4 mL (5 mg/mL) IV syring Q4H PRN    lidocaine 5 % patch 1 patch Q24H    lipid (SMOFLIPID) (SMOFLIPID) 20 % infusion 250 mL Daily    melatonin tablet 3 mg Nightly PRN    metoprolol injection 15 mg Q6H    metoprolol injection 5 mg Q4H PRN    miconazole NITRATE 2 % top powder BID    niCARdipine 40 mg/200 mL (0.2 mg/mL) infusion Continuous    piperacillin-tazobactam 4.5 g in sodium chloride 0.9% 100 mL IVPB (ready to mix system) Q12H    TPN ADULT CENTRAL LINE CUSTOM Continuous    TPN  ADULT CENTRAL LINE CUSTOM Continuous       Objective:     Vital Signs (Most Recent):  Temp: 97.9 °F (36.6 °C) (09/02/20 1500)  Pulse: 110 (09/02/20 1500)  Resp: (!) 26 (09/02/20 1646)  BP: (!) 173/97 (09/02/20 1500)  SpO2: 98 % (09/02/20 1500)  O2 Device (Oxygen Therapy): room air (09/02/20 1500) Vital Signs (24h Range):  Temp:  [97.9 °F (36.6 °C)-98.9 °F (37.2 °C)] 97.9 °F (36.6 °C)  Pulse:  [101-135] 110  Resp:  [20-38] 26  SpO2:  [95 %-100 %] 98 %  BP: (153-214)/() 173/97     Weight: 69.4 kg (153 lb) (08/31/20 0500)  Body mass index is 27.98 kg/m².  Body surface area is 1.74 meters squared.    I/O last 3 completed shifts:  In: 3047 [I.V.:2198]  Out: 3638 [Urine:950; Drains:2038; Other:150; Chest Tube:500]    Physical Exam  Constitutional:       General: She is not in acute distress.  HENT:      Head: Normocephalic and atraumatic.   Neck:      Comments: RIJ trialysis line, L IJ permacath  Cardiovascular:      Rate and Rhythm: Tachycardia present.      Comments: Hypertensive, sinus tach  L sided chest tube in place  Pulmonary:      Breath sounds: No wheezing.      Comments: tachypneic  Abdominal:      General: Abdomen is flat. There is no distension.      Tenderness: There is no abdominal tenderness.      Comments: Drains and G tube with bilious output  Wound vac in place   Skin:     General: Skin is warm and dry.   Neurological:      General: No focal deficit present.      Mental Status: She is alert and oriented to person, place, and time.   Psychiatric:         Mood and Affect: Mood normal.         Behavior: Behavior normal.         Significant Labs:  CBC:   Recent Labs   Lab 09/02/20 0324   WBC 28.68*   RBC 2.64*   HGB 7.9*   HCT 25.5*   *   MCV 97   MCH 29.9   MCHC 31.0*     CMP:   Recent Labs   Lab 09/02/20  0324 09/02/20  1400   *  134* 130*   CALCIUM 8.9  8.9 9.1   ALBUMIN 1.4*  1.4* 1.4*   PROT 7.0  --      142 144   K 3.4*  3.4* 3.3*   CO2 22*  22* 23     108 109    BUN 59*  59* 69*   CREATININE 3.1*  3.1* 3.2*   ALKPHOS 146*  --    ALT 9*  --    AST 22  --    BILITOT 0.4  --      All labs within the past 24 hours have been reviewed.     Significant Imaging:  Labs: Reviewed    Assessment/Plan:     * Duodenum disorder  - Management per primary team   - Duodenal necrosis s/p explolatory lap.     Hypertension  Management as per primary         Anemia, chronic disease  Hgb at 7.9    Metabolic acidosis  -secondary to renal failure, stable        Acute renal failure with tubular necrosis  - oliguric stage 3 sid with ischemic atn likely secondary to septic shock  - unknown bl cr  - muddy brown casts on microscopy    Plan/Recommendations:  -no emergent need for RRT today, labs stable and patient has non oliguric range urine output  -if concern for volume overload, consider Lasix bolus  -Strict I/O's  -Renally dose meds/avoid nephrotoxic meds  -Keep MAP >65  -Will continue to follow closely           Severe sepsis  - Management per primary team         Thank you for your consult. I will follow-up with patient. Please contact us if you have any additional questions.    Nestor Urban MD  Nephrology  [facility]

## 2020-09-02 NOTE — PLAN OF CARE
POC reviewed with pt. SBP maintained <180 on and off cardene gtt. PRN labetalol given x1 with moderate relief obtained. HR sinus tach 100s-120s, scheduled metoprolol given q 6hr. Afebrile. AAOx4. SpO2>98% on RA. Pain intermittently controlled with Dilaudid IV. Pt OOBTC for most of day today. G tube clamped. GODFREY drains with minimal dark green drainage. Pt voids spontaneously per external catheter 450 cc this shift. See assessment flowsheets for more details on drains and CT output. All questions and concerns addressed.

## 2020-09-02 NOTE — PROGRESS NOTES
[facility]  Critical Care - Surgery  Progress Note    Patient Name: Jaquan Farmer  MRN: [id number]  Admission Date: 8/12/2020  Hospital Length of Stay: 21 days  Code Status: Full Code  Attending Provider: Donis Roa MD  Primary Care Provider: Primary Doctor No   Principal Problem: Duodenum disorder    Subjective:     Hospital/ICU Course:  8/14 Patient had a line replaced along with central line placed, with complication of pneumothorax. Rt pigtail was placed. Patient tolerated complications well. Decreasing vent settings.  8/15 NAEO. Patient was taken back to OR early AM for washout. Possible closure 8/18. ETT exchanged in OR 7.0 -> 7.5  8/16 - NAEON. HDS. Intubated, sedated.   8/17 - NAEON. OR tomorrow for closure.  8/18 - Washed out and closed in OR  8/22 - NAEO. Plan for Repeat CT AP today  8/23 - NAEO. Hbg lower. Giving blood x2 units.  8/25 - NAEO. Went to OR yesterday for Abdominal wall exploration and ligation of bleeding vessels. Wound vac exchanged  8/26 - NAEO. Hypertensive overnight. Hgb trending. Plan for CT today  8/27 - NAEO. Hypertensive overnight. CT yesterday showed no extravasation or acute changes  8/28 - NAEO.   8/29 - Patient in respiratory distress upon arrival. Chest XR w/ obvious left sided pulmonary occlusion. Patient intubated and bronchoscopy performed.  8/30 - Repeat Bronch today. Possible extubation later today  8/31 - Extubated yesterday. Wound vac change, bronch scheduled for OR today. Patient out of bed in chair on AM exam.  9/1 - Doing well without complaints this morning.  L sided permacath placed yesterday  9/2 - NAEO.  Continues to be hypertensive/tachycardic.    Interval History/Significant Events: NAEO.  Pt states she feels better today than yesterday.  Denies new complaints.  Tachycardia/hypertension persists.  Metoprolol 15mg Q6 added.    Follow-up For: Procedure(s) (LRB):  INSERTION-CATHETER-RUDY (Left)  BRONCHOSCOPY (N/A)  INSERTION,  CATHETER, INTERCOSTAL, FOR DRAINAGE (Left)  REPLACEMENT, WOUND VAC (N/A)    Post-Operative Day: 2 Days Post-Op    Objective:     Vital Signs (Most Recent):  Temp: 98.8 °F (37.1 °C) (09/02/20 0300)  Pulse: (!) 116 (09/02/20 0615)  Resp: (!) 23 (09/02/20 0615)  BP: (!) 171/99 (09/02/20 0615)  SpO2: 95 % (09/02/20 0615) Vital Signs (24h Range):  Temp:  [98.6 °F (37 °C)-99 °F (37.2 °C)] 98.8 °F (37.1 °C)  Pulse:  [103-128] 116  Resp:  [18-45] 23  SpO2:  [95 %-100 %] 95 %  BP: (152-214)/() 171/99     Weight: 69.4 kg (153 lb)  Body mass index is 27.98 kg/m².      Intake/Output Summary (Last 24 hours) at 9/2/2020 0757  Last data filed at 9/2/2020 0600  Gross per 24 hour   Intake 2089 ml   Output 2189 ml   Net -100 ml       Physical Exam  Constitutional:       General: She is not in acute distress.  HENT:      Head: Normocephalic and atraumatic.   Neck:      Comments: RIJ trialysis line, L IJ permacath  Cardiovascular:      Rate and Rhythm: Tachycardia present.      Comments: Hypertensive, sinus tach  L sided chest tube in place  Pulmonary:      Breath sounds: No wheezing.      Comments: tachypneic  Abdominal:      General: Abdomen is flat. There is no distension.      Tenderness: There is no abdominal tenderness.      Comments: Drains and G tube with bilious output  Wound vac in place   Skin:     General: Skin is warm and dry.   Neurological:      General: No focal deficit present.      Mental Status: She is alert and oriented to person, place, and time.   Psychiatric:         Mood and Affect: Mood normal.         Behavior: Behavior normal.         Vents:  Vent Mode: A/C (08/30/20 1045)  Ventilator Initiated: Yes(chart correction) (08/29/20 0800)  Set Rate: 16 BPM (08/30/20 1045)  Vt Set: 380 mL (08/30/20 1045)  PEEP/CPAP: 5 cmH20 (08/30/20 1045)  Oxygen Concentration (%): 40 (08/30/20 1245)  Peak Airway Pressure: 21 cmH2O (08/30/20 1045)  Plateau Pressure: 0 cmH20 (08/30/20 0900)  Total Ve: 7.12 mL (08/30/20  0900)  Negative Inspiratory Force (cm H2O): -23 (08/20/20 1501)  F/VT Ratio<105 (RSBI): (!) 52.94 (08/30/20 1045)    Lines/Drains/Airways     Central Venous Catheter Line            Trialysis (Dialysis) Catheter 08/13/20 2230 right internal jugular 19 days    Permacath 08/31/20 1100 left subclavian 1 day          Drain                 Closed/Suction Drain 08/13/20 1659 Right;Inferior Abdomen Bulb 19 Fr. 19 days         Closed/Suction Drain 08/13/20 1659 Right;Superior Abdomen Bulb 19 Fr. 19 days         Gastrostomy/Enterostomy 08/15/20 0916 Gastrostomy tube w/ balloon LUQ decompression 17 days    Female External Urinary Catheter 08/27/20 1600 5 days         Chest Tube 08/31/20 1033 1 Left 24 Fr. 1 day          Peripheral Intravenous Line                 Midline Catheter Insertion/Assessment  - Single Lumen 08/26/20 1501 Right basilic vein (medial side of arm) 18g x 8cm 6 days                Significant Labs:    CBC/Anemia Profile:  Recent Labs   Lab 09/01/20  0302 09/02/20  0324   WBC 26.01* 28.68*   HGB 7.7* 7.9*   HCT 25.3* 25.5*   * 395*   MCV 97 97   RDW 14.6* 14.5        Chemistries:  Recent Labs   Lab 08/31/20  1447 09/01/20  0302 09/01/20  1427 09/02/20  0324    141  141 142 142  142   K 4.5 4.2  4.2 3.7 3.4*  3.4*    109  109 109 108  108   CO2 25 21*  21* 24 22*  22*   BUN 31* 38*  38* 47* 59*  59*   CREATININE 1.9* 2.4*  2.4* 2.7* 3.1*  3.1*   CALCIUM 8.7 8.7  8.7 8.9 8.9  8.9   ALBUMIN 1.3* 1.3*  1.3* 1.4* 1.4*  1.4*   PROT  --  6.7  --  7.0   BILITOT  --  0.5  --  0.4   ALKPHOS  --  136*  --  146*   ALT  --  9*  --  9*   AST  --  24  --  22   MG 2.2 2.1  2.1  --  2.0   PHOS 3.2 3.0  3.0 2.4* 2.3*  2.3*       All pertinent labs within the past 24 hours have been reviewed.    Significant Imaging:  I have reviewed all pertinent imaging results/findings within the past 24 hours.    Assessment/Plan:     Severe sepsis  38 yo F s/p antrectomy with BII reconstruction c/b  duodenal necrosis requiring ex lap, washout, drainage c/b aspiration event and need for intubation.  Extubated now and improving in SICU.  Permacath installed 8/31 for possible dialysis needs. Wound vac in place     .Neuro:  - AOx4  - PCA discontinued; IV dilaudid PRN  - Clonidine patch for anxiety     Resp:  - Intubated 8/29, extubated 8/30  - Satting well on room air     CV:  - MAP goal >65  - Metoprolol 15mg Q6  - Labetalol PRN  - Hydralazine PRN  - Permacath  Placed 8/31  - Cardene gtt initiated; systolic goal <180     Heme:  - Hgb/Hct 7.9/25.5  - S/p Abdominal washout on 8/21  - S/p Abdominal exploration w/ woundvac exhange 8/24; exchange 8/31     ID:  - WBC continues to uptrend; 28.7 today; pt afebrile  - Cont Zosyn, Fluconazole     Renal:  - Oliguric on arrival; Cr 1.0 at that time   - BUN/Cr cont to increase - 59/3.1 today; nephrology following  - Trend BUN/Cr daily   - Monitor I/Os     FEN/GI:   - wound vac in place.  Surgery to change tomorrow (9/3)  - NPO  - TPN  - PEG tube to gravity.    - Maintain drains to bulb suction;  - GI ppx; famotidine  - Replace electrolytes as needed to keep K>4, Mg>2     Endo:  - Monitor BG     Dispo:  - Continue SICU care           Critical care was time spent personally by me on the following activities: development of treatment plan with patient or surrogate and bedside caregivers, discussions with consultants, evaluation of patient's response to treatment, examination of patient, ordering and performing treatments and interventions, ordering and review of laboratory studies, ordering and review of radiographic studies, pulse oximetry, re-evaluation of patient's condition.  This critical care time did not overlap with that of any other provider or involve time for any procedures.     Gary Bustillo MD  Critical Care - Surgery  [facility]

## 2020-09-02 NOTE — SUBJECTIVE & OBJECTIVE
Interval History/Significant Events: NAEO.  Pt states she feels better today than yesterday.  Denies new complaints.  Tachycardia/hypertension persists.  Metoprolol 15mg Q6 added.    Follow-up For: Procedure(s) (LRB):  INSERTION-CATHETER-RUDY (Left)  BRONCHOSCOPY (N/A)  INSERTION, CATHETER, INTERCOSTAL, FOR DRAINAGE (Left)  REPLACEMENT, WOUND VAC (N/A)    Post-Operative Day: 2 Days Post-Op    Objective:     Vital Signs (Most Recent):  Temp: 98.8 °F (37.1 °C) (09/02/20 0300)  Pulse: (!) 116 (09/02/20 0615)  Resp: (!) 23 (09/02/20 0615)  BP: (!) 171/99 (09/02/20 0615)  SpO2: 95 % (09/02/20 0615) Vital Signs (24h Range):  Temp:  [98.6 °F (37 °C)-99 °F (37.2 °C)] 98.8 °F (37.1 °C)  Pulse:  [103-128] 116  Resp:  [18-45] 23  SpO2:  [95 %-100 %] 95 %  BP: (152-214)/() 171/99     Weight: 69.4 kg (153 lb)  Body mass index is 27.98 kg/m².      Intake/Output Summary (Last 24 hours) at 9/2/2020 0757  Last data filed at 9/2/2020 0600  Gross per 24 hour   Intake 2089 ml   Output 2189 ml   Net -100 ml       Physical Exam  Constitutional:       General: She is not in acute distress.  HENT:      Head: Normocephalic and atraumatic.   Neck:      Comments: RIJ trialysis line, L IJ permacath  Cardiovascular:      Rate and Rhythm: Tachycardia present.      Comments: Hypertensive, sinus tach  L sided chest tube in place  Pulmonary:      Breath sounds: No wheezing.      Comments: tachypneic  Abdominal:      General: Abdomen is flat. There is no distension.      Tenderness: There is no abdominal tenderness.      Comments: Drains and G tube with bilious output  Wound vac in place   Skin:     General: Skin is warm and dry.   Neurological:      General: No focal deficit present.      Mental Status: She is alert and oriented to person, place, and time.   Psychiatric:         Mood and Affect: Mood normal.         Behavior: Behavior normal.         Vents:  Vent Mode: A/C (08/30/20 1045)  Ventilator Initiated: Yes(chart correction)  (08/29/20 0800)  Set Rate: 16 BPM (08/30/20 1045)  Vt Set: 380 mL (08/30/20 1045)  PEEP/CPAP: 5 cmH20 (08/30/20 1045)  Oxygen Concentration (%): 40 (08/30/20 1245)  Peak Airway Pressure: 21 cmH2O (08/30/20 1045)  Plateau Pressure: 0 cmH20 (08/30/20 0900)  Total Ve: 7.12 mL (08/30/20 0900)  Negative Inspiratory Force (cm H2O): -23 (08/20/20 1501)  F/VT Ratio<105 (RSBI): (!) 52.94 (08/30/20 1045)    Lines/Drains/Airways     Central Venous Catheter Line            Trialysis (Dialysis) Catheter 08/13/20 2230 right internal jugular 19 days    Permacath 08/31/20 1100 left subclavian 1 day          Drain                 Closed/Suction Drain 08/13/20 1659 Right;Inferior Abdomen Bulb 19 Fr. 19 days         Closed/Suction Drain 08/13/20 1659 Right;Superior Abdomen Bulb 19 Fr. 19 days         Gastrostomy/Enterostomy 08/15/20 0916 Gastrostomy tube w/ balloon LUQ decompression 17 days    Female External Urinary Catheter 08/27/20 1600 5 days         Chest Tube 08/31/20 1033 1 Left 24 Fr. 1 day          Peripheral Intravenous Line                 Midline Catheter Insertion/Assessment  - Single Lumen 08/26/20 1501 Right basilic vein (medial side of arm) 18g x 8cm 6 days                Significant Labs:    CBC/Anemia Profile:  Recent Labs   Lab 09/01/20 0302 09/02/20  0324   WBC 26.01* 28.68*   HGB 7.7* 7.9*   HCT 25.3* 25.5*   * 395*   MCV 97 97   RDW 14.6* 14.5        Chemistries:  Recent Labs   Lab 08/31/20  1447 09/01/20  0302 09/01/20  1427 09/02/20  0324    141  141 142 142  142   K 4.5 4.2  4.2 3.7 3.4*  3.4*    109  109 109 108  108   CO2 25 21*  21* 24 22*  22*   BUN 31* 38*  38* 47* 59*  59*   CREATININE 1.9* 2.4*  2.4* 2.7* 3.1*  3.1*   CALCIUM 8.7 8.7  8.7 8.9 8.9  8.9   ALBUMIN 1.3* 1.3*  1.3* 1.4* 1.4*  1.4*   PROT  --  6.7  --  7.0   BILITOT  --  0.5  --  0.4   ALKPHOS  --  136*  --  146*   ALT  --  9*  --  9*   AST  --  24  --  22   MG 2.2 2.1  2.1  --  2.0   PHOS 3.2 3.0  3.0  2.4* 2.3*  2.3*       All pertinent labs within the past 24 hours have been reviewed.    Significant Imaging:  I have reviewed all pertinent imaging results/findings within the past 24 hours.

## 2020-09-02 NOTE — SUBJECTIVE & OBJECTIVE
Interval History:   Hypertensive on yesterday. Required multiple doses of PRNs.  Bilious output from drains. Continues with PCA    Medications:  Continuous Infusions:   nicardipine 1.5 mg/hr (09/02/20 0600)    TPN ADULT CENTRAL LINE CUSTOM 50 mL/hr at 09/02/20 0600     Scheduled Meds:   albuterol-ipratropium  3 mL Nebulization Q4H    cloNIDine 0.2 mg/24 hr td ptwk  1 patch Transdermal Q7 Days    famotidine (PF)  20 mg Intravenous Daily    fluconazole (DIFLUCAN) IVPB  200 mg Intravenous Q24H    heparin (porcine)  5,000 Units Subcutaneous Q8H    lidocaine  1 patch Transdermal Q24H    lipid (SMOFLIPID)  250 mL Intravenous Daily    metoprolol  15 mg Intravenous Q6H    miconazole NITRATE 2 %   Topical (Top) BID    piperacillin-tazobactam (ZOSYN) IVPB  4.5 g Intravenous Q12H     PRN Meds:sodium chloride, hydrALAZINE, HYDROmorphone, HYDROmorphone, labetaloL, melatonin, metoprolol     Review of patient's allergies indicates:   Allergen Reactions    Latex      Objective:     Vital Signs (Most Recent):  Temp: 98.8 °F (37.1 °C) (09/02/20 0300)  Pulse: (!) 116 (09/02/20 0615)  Resp: (!) 23 (09/02/20 0615)  BP: (!) 171/99 (09/02/20 0615)  SpO2: 95 % (09/02/20 0615) Vital Signs (24h Range):  Temp:  [98.6 °F (37 °C)-99 °F (37.2 °C)] 98.8 °F (37.1 °C)  Pulse:  [103-128] 116  Resp:  [18-45] 23  SpO2:  [95 %-100 %] 95 %  BP: (152-214)/() 171/99     Weight: 69.4 kg (153 lb)  Body mass index is 27.98 kg/m².    Intake/Output - Last 3 Shifts       08/31 0700 - 09/01 0659 09/01 0700 - 09/02 0659    I.V. (mL/kg) 1579 (22.8) 1240 (17.9)     849    Total Intake(mL/kg) 2060 (29.7) 2089 (30.1)    Urine (mL/kg/hr) 750 (0.5) 500 (0.3)    Drains 1240 1449    Other 50 100    Chest Tube 820 350    Total Output 2860 2399    Net -800 -310          Urine Occurrence  1 x          Physical Exam  Constitutional:       General: She is not in acute distress.  Cardiovascular:      Rate and Rhythm: Normal rate.   Pulmonary:       Effort: Pulmonary effort is normal.   Abdominal:      General: Abdomen is flat. There is no distension.      Tenderness: There is no abdominal tenderness.      Comments: Drains and G tube with bilious output  Wound vac in place   Skin:     General: Skin is warm and dry.   Neurological:      General: No focal deficit present.      Mental Status: She is alert and oriented to person, place, and time.   Psychiatric:         Mood and Affect: Mood normal.         Behavior: Behavior normal.         Significant Labs:  CBC:   Recent Labs   Lab 09/02/20  0324   WBC 28.68*   RBC 2.64*   HGB 7.9*   HCT 25.5*   *   MCV 97   MCH 29.9   MCHC 31.0*     CMP:   Recent Labs   Lab 09/02/20  0324   *  134*   CALCIUM 8.9  8.9   ALBUMIN 1.4*  1.4*   PROT 7.0     142   K 3.4*  3.4*   CO2 22*  22*     108   BUN 59*  59*   CREATININE 3.1*  3.1*   ALKPHOS 146*   ALT 9*   AST 22   BILITOT 0.4       Significant Diagnostics:  I have reviewed all pertinent imaging results/findings within the past 24 hours.

## 2020-09-02 NOTE — PLAN OF CARE
Problem: Occupational Therapy Goal  Goal: Occupational Therapy Goal  Description: Goals to be met by: 9/11/20     Patient will increase functional independence with ADLs by performing:    Feeding with [place].  UE Dressing with Stand-by Assistance.  LE Dressing with Minimal Assistance.  Grooming while standing at sink with Contact Guard Assistance.  Toileting from bedside commode with Minimal Assistance for hygiene and clothing management.   Toilet transfer to bedside commode with Minimal Assistance.    Outcome: Ongoing, Progressing     OT goals remain appropriate.    Abraham Lemus, OT   09/02/2020

## 2020-09-02 NOTE — ASSESSMENT & PLAN NOTE
- oliguric stage 3 sid with ischemic atn likely secondary to septic shock  - unknown bl cr  - muddy brown casts on microscopy    Plan/Recommendations:  -no emergent need for RRT today, labs stable and patient has non oliguric range urine output  -if concern for volume overload, consider Lasix bolus  -Strict I/O's  -Renally dose meds/avoid nephrotoxic meds  -Keep MAP >65  -Will continue to follow closely

## 2020-09-02 NOTE — PLAN OF CARE
XIMENA called to follow-up on IRF referrals     1. [facility] - informed I need to speak with Rachid (ph. [phone]) ---- No Answer, No Voicemail    2. [facility] - left message for Dorys (ph. [phone])     UPDATE 4:43 PM    Dorys returned XIMENA call and stated that Brittany would call XIMENA back. Brittany did not call. Ph. [phone]    Jazzy Eid LMSW   - Case Management

## 2020-09-02 NOTE — ASSESSMENT & PLAN NOTE
38 yo F s/p antrectomy with BII reconstruction c/b duodenal necrosis requiring ex lap, washout, drainage c/b aspiration event. S/p duodenal resection with d-j and g-j anastomosis.    - NPO  - G-tube to gravity -will discuss clamping today  - Continue abx  - TPN   - Daily labs  - GI and DVT ppx  - PT / OT  - continue PRNs for BP control  - WV change at bedside tomorrow

## 2020-09-02 NOTE — PROGRESS NOTES
"[facility]  Adult Nutrition  Progress Note    SUMMARY       Recommendations     1.) Continue TPN of 120g of AA, 225g of dextrose and 50g of lipids to provide 1745kcal. 2.) Monitor electrolytes and replace as needed.     Goals: 1.) Pt to meet >75% of estimated energy and protein needs over the course of 7 days.  Nutrition Goal Status: progressing towards goal  Communication of RD Recs: reviewed with RN(POC)    Reason for Assessment    Reason For Assessment: RD follow-up  Diagnosis: (Duodenum disorder)  Relevant Medical History: HTN, anxiety, respiratory distress, leukocytosis, sepsis, pulmonary HTN  Interdisciplinary Rounds: attended  General Information Comments: NFPE completed 8/17, pt meets ASPEN guidelines for severe malnutrition due to acute illness. TPN at 50mL/hr.  GI_ LBM 8/30. PEG tube with 200mL of output per nsg staff.   Nutrition Discharge Planning: Unable to determine at this time.    Nutrition Risk Screen    Nutrition Risk Screen: tube feeding or parenteral nutrition    Nutrition/Diet History    Spiritual, Cultural Beliefs, Spiritism Practices, Values that Affect Care: no  Food Allergies: NKFA  Factors Affecting Nutritional Intake: altered gastrointestinal function, NPO    Anthropometrics    Temp: 98.5 °F (36.9 °C)  Height Method: Stated(from outside records)  Height: 5' 2" (157.5 cm)  Height (inches): 62 in  Weight Method: Bed Scale  Weight: 69.4 kg (153 lb)  Weight (lb): 153 lb  Ideal Body Weight (IBW), Female: 110 lb  % Ideal Body Weight, Female (lb): 129.09 %  BMI (Calculated): 28  BMI Grade: 25 - 29.9 - overweight  Usual Body Weight (UBW), kg: (No wt history)  Weight Loss Since Admission: 30 lb 6.8 oz(17% weight loss since admission 8/12)       Lab/Procedures/Meds    Pertinent Labs Reviewed: reviewed  Pertinent Labs Comments: WBC 28.6, Hgb 7.9, Hct 25.5, K 3.4, CO2 22, BUN 59, Cr 3.1, GFR 20.9, Glucose 134, Phosphorus 2.3, Alb 1.4  Pertinent Medications Reviewed: " reviewed  Pertinent Medications Comments: Albuterol, famotidine, heparin, Metoprolol, piperacillin, KCl    Physical Findings/Assessment     Edema 1+ generalized  2+ dependent      Estimated/Assessed Needs    Weight Used For Calorie Calculations: 77 kg (169 lb 12.1 oz)  Energy Calorie Requirements (kcal): 1677  Energy Need Method: Meade-St Jeor(x1.2 PAL)  Protein Requirements:  gm (1.2-1.5gm/kg)  Weight Used For Protein Calculations: 77 kg (169 lb 12.1 oz)  Fluid Requirements (mL): 1mL/kcal or per MD recommendations  Estimated Fluid Requirement Method: RDA Method  RDA Method (mL): 1677         Nutrition Prescription Ordered    Current Diet Order: NPO  Current Nutrition Support Formula Ordered: (Custom TPN 15% Amino Acids; 70% dextrose)  Current Nutrition Support Rate Ordered: 50 (ml)  Current Nutrition Support Frequency Ordered: mL/hr    Evaluation of Received Nutrient/Fluid Intake    Parenteral Calories (kcal): 1745  Parenteral Protein (gm): 120  Parenteral Fluid (mL): 1450  Lipid Calories (kcals): 500 kcals  GIR (Glucose Infusion Rate) (mg/kg/min): 2.2 mg/kg/min  Other Calories (kcal): 0  Total Calories (kcal): 1745  Total Calories (kcal/kg): 25  % Kcal Needs: 104  % Protein Needs: 104  I/O: I: 2089; O: 2473; +7.5L since 8/19  Energy Calories Required: meeting needs  Protein Required: meeting needs  Fluid Required: meeting needs  Tolerance: tolerating  % Intake of Estimated Energy Needs: 100  % Meal Intake: 0    Nutrition Risk    Level of Risk/Frequency of Follow-up: high , 2x weekly    Assessment and Plan     Nutrition Problem  Malnutrition (severe) due to acute illness     Related to (etiology):   NPO     Signs and Symptoms (as evidenced by):   NPO (8/12) for 5 days.  Severe muscle wasting of temple, clavicle  Severe fat wasting of orbital     Interventions(treatment strategy):  1.) Collaboration with other providers  2.) Parenteral nutrition     Nutrition Diagnosis Status:   ongoing        Monitor and  Evaluation    Food and Nutrient Intake: parenteral nutrition intake  Food and Nutrient Adminstration: enteral and parenteral nutrition administration  Anthropometric Measurements: weight, weight change  Biochemical Data, Medical Tests and Procedures: electrolyte and renal panel, gastrointestinal profile  Nutrition-Focused Physical Findings: overall appearance, skin     Malnutrition Assessment  Malnutrition Type: acute illness or injury              Orbital Region (Subcutaneous Fat Loss): severe depletion   [place] Region (Muscle Loss): severe depletion  Clavicle Bone Region (Muscle Loss): severe depletion                 Nutrition Follow-Up    RD Follow-up?: Yes

## 2020-09-02 NOTE — PROGRESS NOTES
Called VERENICE Kim with pt chem profile. Orders to hold off on replacing potassium and phos. Will decided if replacement is needed during rounds.

## 2020-09-02 NOTE — PLAN OF CARE
Recommendations      1.) Continue TPN of 120g of AA, 225g of dextrose and 50g of lipids to provide 1745kcal. 2.) Monitor electrolytes and replace as needed.      Goals: 1.) Pt to meet >75% of estimated energy and protein needs over the course of 7 days.  Nutrition Goal Status: progressing towards goal  Communication of RD Recs: reviewed with RN(POC)

## 2020-09-03 ENCOUNTER — ANESTHESIA EVENT (OUTPATIENT)
Dept: INTENSIVE CARE | Facility: HOSPITAL | Age: 40
DRG: 856 | End: 2020-09-03

## 2020-09-03 ENCOUNTER — ANESTHESIA (OUTPATIENT)
Dept: INTENSIVE CARE | Facility: HOSPITAL | Age: 40
DRG: 856 | End: 2020-09-03

## 2020-09-03 LAB
ALBUMIN SERPL BCP-MCNC: 1.4 G/DL (ref 3.5–5.2)
ALBUMIN SERPL BCP-MCNC: 1.5 G/DL (ref 3.5–5.2)
ALBUMIN SERPL BCP-MCNC: 1.9 G/DL (ref 3.5–5.2)
ALLENS TEST: ABNORMAL
ALP SERPL-CCNC: 176 U/L (ref 55–135)
ALT SERPL W/O P-5'-P-CCNC: 9 U/L (ref 10–44)
ANION GAP SERPL CALC-SCNC: 11 MMOL/L (ref 8–16)
ANION GAP SERPL CALC-SCNC: 12 MMOL/L (ref 8–16)
ANION GAP SERPL CALC-SCNC: 13 MMOL/L (ref 8–16)
ANION GAP SERPL CALC-SCNC: 14 MMOL/L (ref 8–16)
ANION GAP SERPL CALC-SCNC: 14 MMOL/L (ref 8–16)
ANISOCYTOSIS BLD QL SMEAR: SLIGHT
AST SERPL-CCNC: 26 U/L (ref 10–40)
BASO STIPL BLD QL SMEAR: ABNORMAL
BASOPHILS # BLD AUTO: 0.14 K/UL (ref 0–0.2)
BASOPHILS NFR BLD: 0.5 % (ref 0–1.9)
BILIRUB SERPL-MCNC: 0.4 MG/DL (ref 0.1–1)
BUN SERPL-MCNC: 76 MG/DL (ref 6–20)
BUN SERPL-MCNC: 78 MG/DL (ref 6–20)
BUN SERPL-MCNC: 78 MG/DL (ref 6–20)
BUN SERPL-MCNC: 83 MG/DL (ref 6–20)
BUN SERPL-MCNC: 84 MG/DL (ref 6–20)
CA-I BLDV-SCNC: 1.19 MMOL/L (ref 1.06–1.42)
CALCIUM SERPL-MCNC: 8.7 MG/DL (ref 8.7–10.5)
CALCIUM SERPL-MCNC: 8.8 MG/DL (ref 8.7–10.5)
CALCIUM SERPL-MCNC: 8.9 MG/DL (ref 8.7–10.5)
CALCIUM SERPL-MCNC: 8.9 MG/DL (ref 8.7–10.5)
CALCIUM SERPL-MCNC: 9.1 MG/DL (ref 8.7–10.5)
CHLORIDE SERPL-SCNC: 108 MMOL/L (ref 95–110)
CHLORIDE SERPL-SCNC: 110 MMOL/L (ref 95–110)
CHLORIDE SERPL-SCNC: 110 MMOL/L (ref 95–110)
CO2 SERPL-SCNC: 21 MMOL/L (ref 23–29)
CO2 SERPL-SCNC: 23 MMOL/L (ref 23–29)
CO2 SERPL-SCNC: 24 MMOL/L (ref 23–29)
CREAT SERPL-MCNC: 3.4 MG/DL (ref 0.5–1.4)
CREAT SERPL-MCNC: 3.5 MG/DL (ref 0.5–1.4)
CREAT SERPL-MCNC: 3.6 MG/DL (ref 0.5–1.4)
DELSYS: ABNORMAL
DIFFERENTIAL METHOD: ABNORMAL
EOSINOPHIL # BLD AUTO: 0.4 K/UL (ref 0–0.5)
EOSINOPHIL NFR BLD: 1.4 % (ref 0–8)
ERYTHROCYTE [DISTWIDTH] IN BLOOD BY AUTOMATED COUNT: 14.6 % (ref 11.5–14.5)
EST. GFR  (AFRICAN AMERICAN): 17.4 ML/MIN/1.73 M^2
EST. GFR  (AFRICAN AMERICAN): 18 ML/MIN/1.73 M^2
EST. GFR  (AFRICAN AMERICAN): 18.7 ML/MIN/1.73 M^2
EST. GFR  (NON AFRICAN AMERICAN): 15.1 ML/MIN/1.73 M^2
EST. GFR  (NON AFRICAN AMERICAN): 15.6 ML/MIN/1.73 M^2
EST. GFR  (NON AFRICAN AMERICAN): 16.2 ML/MIN/1.73 M^2
FLOW: 4
GLUCOSE SERPL-MCNC: 126 MG/DL (ref 70–110)
GLUCOSE SERPL-MCNC: 126 MG/DL (ref 70–110)
GLUCOSE SERPL-MCNC: 131 MG/DL (ref 70–110)
GLUCOSE SERPL-MCNC: 131 MG/DL (ref 70–110)
GLUCOSE SERPL-MCNC: 148 MG/DL (ref 70–110)
HCO3 UR-SCNC: 23.7 MMOL/L (ref 24–28)
HCT VFR BLD AUTO: 27.2 % (ref 37–48.5)
HGB BLD-MCNC: 8.2 G/DL (ref 12–16)
IMM GRANULOCYTES # BLD AUTO: 0.59 K/UL (ref 0–0.04)
IMM GRANULOCYTES NFR BLD AUTO: 2.1 % (ref 0–0.5)
LACTATE SERPL-SCNC: 0.8 MMOL/L (ref 0.5–2.2)
LYMPHOCYTES # BLD AUTO: 1.6 K/UL (ref 1–4.8)
LYMPHOCYTES NFR BLD: 5.8 % (ref 18–48)
MAGNESIUM SERPL-MCNC: 1.6 MG/DL (ref 1.6–2.6)
MAGNESIUM SERPL-MCNC: 1.8 MG/DL (ref 1.6–2.6)
MAGNESIUM SERPL-MCNC: 1.9 MG/DL (ref 1.6–2.6)
MCH RBC QN AUTO: 29.7 PG (ref 27–31)
MCHC RBC AUTO-ENTMCNC: 30.1 G/DL (ref 32–36)
MCV RBC AUTO: 99 FL (ref 82–98)
MODE: ABNORMAL
MONOCYTES # BLD AUTO: 1.6 K/UL (ref 0.3–1)
MONOCYTES NFR BLD: 5.5 % (ref 4–15)
NEUTROPHILS # BLD AUTO: 23.7 K/UL (ref 1.8–7.7)
NEUTROPHILS NFR BLD: 84.7 % (ref 38–73)
NRBC BLD-RTO: 0 /100 WBC
PCO2 BLDA: 38.5 MMHG (ref 35–45)
PH SMN: 7.4 [PH] (ref 7.35–7.45)
PHOSPHATE SERPL-MCNC: 1.8 MG/DL (ref 2.7–4.5)
PHOSPHATE SERPL-MCNC: 1.8 MG/DL (ref 2.7–4.5)
PHOSPHATE SERPL-MCNC: 2.2 MG/DL (ref 2.7–4.5)
PHOSPHATE SERPL-MCNC: 2.3 MG/DL (ref 2.7–4.5)
PHOSPHATE SERPL-MCNC: 2.3 MG/DL (ref 2.7–4.5)
PLATELET # BLD AUTO: 409 K/UL (ref 150–350)
PLATELET BLD QL SMEAR: ABNORMAL
PMV BLD AUTO: 10.7 FL (ref 9.2–12.9)
PO2 BLDA: 39 MMHG (ref 40–60)
POC BE: -1 MMOL/L
POC SATURATED O2: 74 % (ref 95–100)
POC TCO2: 25 MMOL/L (ref 24–29)
POCT GLUCOSE: 122 MG/DL (ref 70–110)
POCT GLUCOSE: 137 MG/DL (ref 70–110)
POCT GLUCOSE: 152 MG/DL (ref 70–110)
POCT GLUCOSE: 156 MG/DL (ref 70–110)
POLYCHROMASIA BLD QL SMEAR: ABNORMAL
POTASSIUM SERPL-SCNC: 2.8 MMOL/L (ref 3.5–5.1)
POTASSIUM SERPL-SCNC: 2.9 MMOL/L (ref 3.5–5.1)
POTASSIUM SERPL-SCNC: 2.9 MMOL/L (ref 3.5–5.1)
POTASSIUM SERPL-SCNC: 3 MMOL/L (ref 3.5–5.1)
POTASSIUM SERPL-SCNC: 3.1 MMOL/L (ref 3.5–5.1)
PROT SERPL-MCNC: 7.4 G/DL (ref 6–8.4)
RBC # BLD AUTO: 2.76 M/UL (ref 4–5.4)
SAMPLE: ABNORMAL
SITE: ABNORMAL
SODIUM SERPL-SCNC: 142 MMOL/L (ref 136–145)
SODIUM SERPL-SCNC: 143 MMOL/L (ref 136–145)
SODIUM SERPL-SCNC: 143 MMOL/L (ref 136–145)
SODIUM SERPL-SCNC: 145 MMOL/L (ref 136–145)
SODIUM SERPL-SCNC: 145 MMOL/L (ref 136–145)
TOXIC GRANULES BLD QL SMEAR: PRESENT
WBC # BLD AUTO: 27.98 K/UL (ref 3.9–12.7)

## 2020-09-03 PROCEDURE — 25000003 PHARM REV CODE 250: Performed by: STUDENT IN AN ORGANIZED HEALTH CARE EDUCATION/TRAINING PROGRAM

## 2020-09-03 PROCEDURE — 25000003 PHARM REV CODE 250: Performed by: SURGERY

## 2020-09-03 PROCEDURE — 83735 ASSAY OF MAGNESIUM: CPT | Mod: 91

## 2020-09-03 PROCEDURE — 27000221 HC OXYGEN, UP TO 24 HOURS

## 2020-09-03 PROCEDURE — 80053 COMPREHEN METABOLIC PANEL: CPT

## 2020-09-03 PROCEDURE — 27100171 HC OXYGEN HIGH FLOW UP TO 24 HOURS

## 2020-09-03 PROCEDURE — 94640 AIRWAY INHALATION TREATMENT: CPT

## 2020-09-03 PROCEDURE — 63600175 PHARM REV CODE 636 W HCPCS: Performed by: STUDENT IN AN ORGANIZED HEALTH CARE EDUCATION/TRAINING PROGRAM

## 2020-09-03 PROCEDURE — 99231 PR SUBSEQUENT HOSPITAL CARE,LEVL I: ICD-10-PCS | Mod: ,,, | Performed by: ANESTHESIOLOGY

## 2020-09-03 PROCEDURE — A4217 STERILE WATER/SALINE, 500 ML: HCPCS | Performed by: STUDENT IN AN ORGANIZED HEALTH CARE EDUCATION/TRAINING PROGRAM

## 2020-09-03 PROCEDURE — 99231 SBSQ HOSP IP/OBS SF/LOW 25: CPT | Mod: ,,, | Performed by: ANESTHESIOLOGY

## 2020-09-03 PROCEDURE — B4185 PARENTERAL SOL 10 GM LIPIDS: HCPCS | Performed by: STUDENT IN AN ORGANIZED HEALTH CARE EDUCATION/TRAINING PROGRAM

## 2020-09-03 PROCEDURE — 63600175 PHARM REV CODE 636 W HCPCS: Mod: JG | Performed by: SURGERY

## 2020-09-03 PROCEDURE — 83605 ASSAY OF LACTIC ACID: CPT

## 2020-09-03 PROCEDURE — 94761 N-INVAS EAR/PLS OXIMETRY MLT: CPT

## 2020-09-03 PROCEDURE — 82330 ASSAY OF CALCIUM: CPT

## 2020-09-03 PROCEDURE — 99233 PR SUBSEQUENT HOSPITAL CARE,LEVL III: ICD-10-PCS | Mod: ,,, | Performed by: INTERNAL MEDICINE

## 2020-09-03 PROCEDURE — P9045 ALBUMIN (HUMAN), 5%, 250 ML: HCPCS | Mod: JG | Performed by: SURGERY

## 2020-09-03 PROCEDURE — 84100 ASSAY OF PHOSPHORUS: CPT

## 2020-09-03 PROCEDURE — 99233 SBSQ HOSP IP/OBS HIGH 50: CPT | Mod: ,,, | Performed by: INTERNAL MEDICINE

## 2020-09-03 PROCEDURE — 99291 CRITICAL CARE FIRST HOUR: CPT | Mod: 24,,, | Performed by: SURGERY

## 2020-09-03 PROCEDURE — 99291 PR CRITICAL CARE, E/M 30-74 MINUTES: ICD-10-PCS | Mod: 24,,, | Performed by: SURGERY

## 2020-09-03 PROCEDURE — 82803 BLOOD GASES ANY COMBINATION: CPT

## 2020-09-03 PROCEDURE — 85025 COMPLETE CBC W/AUTO DIFF WBC: CPT

## 2020-09-03 PROCEDURE — 25000242 PHARM REV CODE 250 ALT 637 W/ HCPCS: Performed by: STUDENT IN AN ORGANIZED HEALTH CARE EDUCATION/TRAINING PROGRAM

## 2020-09-03 PROCEDURE — 80069 RENAL FUNCTION PANEL: CPT | Mod: 91

## 2020-09-03 PROCEDURE — 99900035 HC TECH TIME PER 15 MIN (STAT)

## 2020-09-03 PROCEDURE — 20000000 HC ICU ROOM

## 2020-09-03 PROCEDURE — S0028 INJECTION, FAMOTIDINE, 20 MG: HCPCS | Performed by: STUDENT IN AN ORGANIZED HEALTH CARE EDUCATION/TRAINING PROGRAM

## 2020-09-03 PROCEDURE — 25000242 PHARM REV CODE 250 ALT 637 W/ HCPCS

## 2020-09-03 PROCEDURE — 63600175 PHARM REV CODE 636 W HCPCS: Performed by: SURGERY

## 2020-09-03 PROCEDURE — 83735 ASSAY OF MAGNESIUM: CPT

## 2020-09-03 RX ORDER — IPRATROPIUM BROMIDE AND ALBUTEROL SULFATE 2.5; .5 MG/3ML; MG/3ML
3 SOLUTION RESPIRATORY (INHALATION) EVERY 4 HOURS
Status: DISCONTINUED | OUTPATIENT
Start: 2020-09-03 | End: 2020-09-07

## 2020-09-03 RX ORDER — POTASSIUM CHLORIDE 14.9 MG/ML
60 INJECTION INTRAVENOUS
Status: DISCONTINUED | OUTPATIENT
Start: 2020-09-03 | End: 2020-09-04

## 2020-09-03 RX ORDER — POTASSIUM CHLORIDE 29.8 MG/ML
30 INJECTION INTRAVENOUS ONCE
Status: COMPLETED | OUTPATIENT
Start: 2020-09-03 | End: 2020-09-03

## 2020-09-03 RX ORDER — POTASSIUM CHLORIDE 29.8 MG/ML
80 INJECTION INTRAVENOUS
Status: DISCONTINUED | OUTPATIENT
Start: 2020-09-03 | End: 2020-09-04

## 2020-09-03 RX ORDER — MAGNESIUM SULFATE HEPTAHYDRATE 40 MG/ML
4 INJECTION, SOLUTION INTRAVENOUS
Status: DISCONTINUED | OUTPATIENT
Start: 2020-09-03 | End: 2020-09-04

## 2020-09-03 RX ORDER — POTASSIUM CHLORIDE 29.8 MG/ML
40 INJECTION INTRAVENOUS
Status: DISCONTINUED | OUTPATIENT
Start: 2020-09-03 | End: 2020-09-04

## 2020-09-03 RX ORDER — CARVEDILOL 25 MG/1
25 TABLET ORAL 2 TIMES DAILY
Status: DISCONTINUED | OUTPATIENT
Start: 2020-09-03 | End: 2020-09-03

## 2020-09-03 RX ORDER — VASOPRESSIN 20 [USP'U]/ML
INJECTION, SOLUTION INTRAMUSCULAR; SUBCUTANEOUS
Status: DISPENSED
Start: 2020-09-03 | End: 2020-09-04

## 2020-09-03 RX ORDER — HYDRALAZINE HYDROCHLORIDE 20 MG/ML
10 INJECTION INTRAMUSCULAR; INTRAVENOUS EVERY 6 HOURS PRN
Status: DISCONTINUED | OUTPATIENT
Start: 2020-09-03 | End: 2020-09-03

## 2020-09-03 RX ORDER — CARVEDILOL 6.25 MG/1
12.5 TABLET ORAL 2 TIMES DAILY
Status: DISCONTINUED | OUTPATIENT
Start: 2020-09-03 | End: 2020-09-03

## 2020-09-03 RX ORDER — INDOMETHACIN 25 MG/1
CAPSULE ORAL
Status: DISPENSED
Start: 2020-09-03 | End: 2020-09-04

## 2020-09-03 RX ORDER — MAGNESIUM SULFATE HEPTAHYDRATE 40 MG/ML
2 INJECTION, SOLUTION INTRAVENOUS
Status: DISCONTINUED | OUTPATIENT
Start: 2020-09-03 | End: 2020-09-04

## 2020-09-03 RX ORDER — POTASSIUM CHLORIDE 29.8 MG/ML
40 INJECTION INTRAVENOUS ONCE
Status: COMPLETED | OUTPATIENT
Start: 2020-09-03 | End: 2020-09-03

## 2020-09-03 RX ORDER — NIFEDIPINE 30 MG/1
60 TABLET, EXTENDED RELEASE ORAL DAILY
Status: DISCONTINUED | OUTPATIENT
Start: 2020-09-04 | End: 2020-09-03

## 2020-09-03 RX ORDER — HYDRALAZINE HYDROCHLORIDE 20 MG/ML
20 INJECTION INTRAMUSCULAR; INTRAVENOUS EVERY 6 HOURS PRN
Status: DISCONTINUED | OUTPATIENT
Start: 2020-09-03 | End: 2020-09-29 | Stop reason: HOSPADM

## 2020-09-03 RX ORDER — MAGNESIUM SULFATE HEPTAHYDRATE 40 MG/ML
2 INJECTION, SOLUTION INTRAVENOUS ONCE
Status: COMPLETED | OUTPATIENT
Start: 2020-09-03 | End: 2020-09-03

## 2020-09-03 RX ORDER — CARVEDILOL 6.25 MG/1
12.5 TABLET ORAL ONCE
Status: COMPLETED | OUTPATIENT
Start: 2020-09-03 | End: 2020-09-03

## 2020-09-03 RX ORDER — LABETALOL HCL 20 MG/4 ML
20 SYRINGE (ML) INTRAVENOUS EVERY 4 HOURS PRN
Status: DISCONTINUED | OUTPATIENT
Start: 2020-09-03 | End: 2020-09-29 | Stop reason: HOSPADM

## 2020-09-03 RX ORDER — NICARDIPINE HYDROCHLORIDE 0.2 MG/ML
1 INJECTION INTRAVENOUS CONTINUOUS
Status: DISCONTINUED | OUTPATIENT
Start: 2020-09-03 | End: 2020-09-04

## 2020-09-03 RX ORDER — NIFEDIPINE 30 MG/1
60 TABLET, EXTENDED RELEASE ORAL DAILY
Status: DISCONTINUED | OUTPATIENT
Start: 2020-09-03 | End: 2020-09-07

## 2020-09-03 RX ORDER — CARVEDILOL 6.25 MG/1
12.5 TABLET ORAL 2 TIMES DAILY
Status: DISCONTINUED | OUTPATIENT
Start: 2020-09-03 | End: 2020-09-04

## 2020-09-03 RX ORDER — IPRATROPIUM BROMIDE AND ALBUTEROL SULFATE 2.5; .5 MG/3ML; MG/3ML
SOLUTION RESPIRATORY (INHALATION)
Status: COMPLETED
Start: 2020-09-03 | End: 2020-09-03

## 2020-09-03 RX ORDER — ALBUMIN HUMAN 50 G/1000ML
25 SOLUTION INTRAVENOUS ONCE
Status: COMPLETED | OUTPATIENT
Start: 2020-09-03 | End: 2020-09-03

## 2020-09-03 RX ORDER — HYDROMORPHONE HYDROCHLORIDE 1 MG/ML
1 INJECTION, SOLUTION INTRAMUSCULAR; INTRAVENOUS; SUBCUTANEOUS ONCE
Status: COMPLETED | OUTPATIENT
Start: 2020-09-03 | End: 2020-09-03

## 2020-09-03 RX ADMIN — HYDROMORPHONE HYDROCHLORIDE 0.5 MG: 1 INJECTION, SOLUTION INTRAMUSCULAR; INTRAVENOUS; SUBCUTANEOUS at 01:09

## 2020-09-03 RX ADMIN — SMOFLIPID 250 ML: 6; 6; 5; 3 INJECTION, EMULSION INTRAVENOUS at 09:09

## 2020-09-03 RX ADMIN — HYDROMORPHONE HYDROCHLORIDE 1 MG: 1 INJECTION, SOLUTION INTRAMUSCULAR; INTRAVENOUS; SUBCUTANEOUS at 04:09

## 2020-09-03 RX ADMIN — METOROPROLOL TARTRATE 15 MG: 5 INJECTION, SOLUTION INTRAVENOUS at 11:09

## 2020-09-03 RX ADMIN — CARVEDILOL 12.5 MG: 6.25 TABLET, FILM COATED ORAL at 09:09

## 2020-09-03 RX ADMIN — HYDRALAZINE HYDROCHLORIDE 20 MG: 20 INJECTION INTRAMUSCULAR; INTRAVENOUS at 05:09

## 2020-09-03 RX ADMIN — LIDOCAINE 1 PATCH: 50 PATCH CUTANEOUS at 10:09

## 2020-09-03 RX ADMIN — Medication 20 MG: at 04:09

## 2020-09-03 RX ADMIN — POTASSIUM CHLORIDE 30 MEQ: 29.8 INJECTION, SOLUTION INTRAVENOUS at 04:09

## 2020-09-03 RX ADMIN — HEPARIN SODIUM 5000 UNITS: 5000 INJECTION INTRAVENOUS; SUBCUTANEOUS at 09:09

## 2020-09-03 RX ADMIN — POTASSIUM CHLORIDE 40 MEQ: 29.8 INJECTION, SOLUTION INTRAVENOUS at 09:09

## 2020-09-03 RX ADMIN — HEPARIN SODIUM 5000 UNITS: 5000 INJECTION INTRAVENOUS; SUBCUTANEOUS at 06:09

## 2020-09-03 RX ADMIN — MICONAZOLE NITRATE: 20 POWDER TOPICAL at 09:09

## 2020-09-03 RX ADMIN — HYDROMORPHONE HYDROCHLORIDE 1 MG: 1 INJECTION, SOLUTION INTRAMUSCULAR; INTRAVENOUS; SUBCUTANEOUS at 09:09

## 2020-09-03 RX ADMIN — FLUCONAZOLE 200 MG: 2 INJECTION, SOLUTION INTRAVENOUS at 09:09

## 2020-09-03 RX ADMIN — IPRATROPIUM BROMIDE AND ALBUTEROL SULFATE 3 ML: .5; 2.5 SOLUTION RESPIRATORY (INHALATION) at 06:09

## 2020-09-03 RX ADMIN — CARVEDILOL 12.5 MG: 6.25 TABLET, FILM COATED ORAL at 01:09

## 2020-09-03 RX ADMIN — CARVEDILOL 12.5 MG: 6.25 TABLET, FILM COATED ORAL at 05:09

## 2020-09-03 RX ADMIN — METOROPROLOL TARTRATE 15 MG: 5 INJECTION, SOLUTION INTRAVENOUS at 06:09

## 2020-09-03 RX ADMIN — HYDROMORPHONE HYDROCHLORIDE 1 MG: 1 INJECTION, SOLUTION INTRAMUSCULAR; INTRAVENOUS; SUBCUTANEOUS at 06:09

## 2020-09-03 RX ADMIN — PIPERACILLIN SODIUM AND TAZOBACTAM SODIUM 4.5 G: 4; .5 INJECTION, POWDER, LYOPHILIZED, FOR SOLUTION INTRAVENOUS at 09:09

## 2020-09-03 RX ADMIN — IPRATROPIUM BROMIDE AND ALBUTEROL SULFATE 3 ML: .5; 2.5 SOLUTION RESPIRATORY (INHALATION) at 08:09

## 2020-09-03 RX ADMIN — HYDROMORPHONE HYDROCHLORIDE 0.5 MG: 1 INJECTION, SOLUTION INTRAMUSCULAR; INTRAVENOUS; SUBCUTANEOUS at 10:09

## 2020-09-03 RX ADMIN — ALBUMIN (HUMAN) 25 G: 12.5 SOLUTION INTRAVENOUS at 01:09

## 2020-09-03 RX ADMIN — HEPARIN SODIUM 5000 UNITS: 5000 INJECTION INTRAVENOUS; SUBCUTANEOUS at 01:09

## 2020-09-03 RX ADMIN — FAMOTIDINE 20 MG: 10 INJECTION INTRAVENOUS at 09:09

## 2020-09-03 RX ADMIN — METOROPROLOL TARTRATE 15 MG: 5 INJECTION, SOLUTION INTRAVENOUS at 12:09

## 2020-09-03 RX ADMIN — Medication 20 MG: at 01:09

## 2020-09-03 RX ADMIN — ASCORBIC ACID, VITAMIN A PALMITATE, CHOLECALCIFEROL, THIAMINE HYDROCHLORIDE, RIBOFLAVIN-5 PHOSPHATE SODIUM, PYRIDOXINE HYDROCHLORIDE, NIACINAMIDE, DEXPANTHENOL, ALPHA-TOCOPHEROL ACETATE, VITAMIN K1, FOLIC ACID, BIOTIN, CYANOCOBALAMIN: 200; 3300; 200; 6; 3.6; 6; 40; 15; 10; 150; 600; 60; 5 INJECTION, SOLUTION INTRAVENOUS at 09:09

## 2020-09-03 RX ADMIN — HYDROMORPHONE HYDROCHLORIDE 0.5 MG: 1 INJECTION, SOLUTION INTRAMUSCULAR; INTRAVENOUS; SUBCUTANEOUS at 04:09

## 2020-09-03 RX ADMIN — HYDROMORPHONE HYDROCHLORIDE 1 MG: 1 INJECTION, SOLUTION INTRAMUSCULAR; INTRAVENOUS; SUBCUTANEOUS at 02:09

## 2020-09-03 RX ADMIN — MAGNESIUM SULFATE IN WATER 2 G: 40 INJECTION, SOLUTION INTRAVENOUS at 09:09

## 2020-09-03 NOTE — SUBJECTIVE & OBJECTIVE
Interval History:   G tube clamped without increased output from biliary drains  Contrast down to colon   Pain controlled    Medications:  Continuous Infusions:   TPN ADULT CENTRAL LINE CUSTOM 50 mL/hr at 09/03/20 1500    TPN ADULT CENTRAL LINE CUSTOM       Scheduled Meds:   carvediloL  12.5 mg Oral BID    cloNIDine 0.3 mg/24 hr td ptwk  1 patch Transdermal Q7 Days    famotidine (PF)  20 mg Intravenous Daily    fluconazole (DIFLUCAN) IVPB  200 mg Intravenous Q24H    heparin (porcine)  5,000 Units Subcutaneous Q8H    lidocaine  1 patch Transdermal Q24H    lipid (SMOFLIPID)  250 mL Intravenous Daily    miconazole NITRATE 2 %   Topical (Top) BID    piperacillin-tazobactam (ZOSYN) IVPB  4.5 g Intravenous Q12H     PRN Meds:sodium chloride, hydrALAZINE, HYDROmorphone, HYDROmorphone, labetaloL, melatonin, metoprolol     Review of patient's allergies indicates:   Allergen Reactions    Latex      Objective:     Vital Signs (Most Recent):  Temp: 98.6 °F (37 °C) (09/03/20 1500)  Pulse: (!) 112 (09/03/20 1530)  Resp: (!) 56 (09/03/20 1530)  BP: (!) 187/105 (09/03/20 1530)  SpO2: 99 % (09/03/20 1530) Vital Signs (24h Range):  Temp:  [98 °F (36.7 °C)-98.7 °F (37.1 °C)] 98.6 °F (37 °C)  Pulse:  [100-136] 112  Resp:  [20-56] 56  SpO2:  [94 %-100 %] 99 %  BP: (146-200)/() 187/105     Weight: 66 kg (145 lb 8.1 oz)  Body mass index is 26.61 kg/m².    Intake/Output - Last 3 Shifts       09/01 0700 - 09/02 0659 09/02 0700 - 09/03 0659 09/03 0700 - 09/04 0659    I.V. (mL/kg) 1240 (17.9) 582 (8.4)     Other  240      1437     Total Intake(mL/kg) 2089 (30.1) 2259 (32.6)     Urine (mL/kg/hr) 500 (0.3) 950 (0.6) 300 (0.5)    Drains 1449 1152 530    Other 100 50 50    Stool  0 0    Chest Tube 350 220 200    Total Output 2399 2112 0070    Net -310 -113 -1080           Urine Occurrence 1 x 1 x     Stool Occurrence  0 x 2 x          Physical Exam  Constitutional:       General: She is not in acute  distress.  Cardiovascular:      Rate and Rhythm: Tachycardia present.   Pulmonary:      Effort: Pulmonary effort is normal.   Abdominal:      General: There is no distension.      Tenderness: There is no abdominal tenderness.      Comments: G tube clamped  Drains with bilious output   Skin:     General: Skin is warm and dry.   Neurological:      Mental Status: She is alert.         Significant Labs:  CBC:   Recent Labs   Lab 09/03/20  0403   WBC 27.98*   RBC 2.76*   HGB 8.2*   HCT 27.2*   *   MCV 99*   MCH 29.7   MCHC 30.1*     CMP:   Recent Labs   Lab 09/03/20  0403 09/03/20  1327   *  131* 126*   CALCIUM 8.9  8.9 8.8   ALBUMIN 1.4*  1.4* 1.5*   PROT 7.4  --      143 145   K 2.9*  2.9* 3.0*   CO2 21*  21* 23     108 110   BUN 78*  78* 83*   CREATININE 3.5*  3.5* 3.5*   ALKPHOS 176*  --    ALT 9*  --    AST 26  --    BILITOT 0.4  --        Significant Diagnostics:  I have reviewed all pertinent imaging results/findings within the past 24 hours.

## 2020-09-03 NOTE — PLAN OF CARE
Pt. Remained free from falls or injury throughout shift. Sinus tachy throughout shift. MAP >65. SBP <180. Sat >96% RA. Gtts: Cardene 2.5, TPN 50. K 30mEq replaced. AAOx4. Pain management w/ PRN dilaudid. UO 950cc throughout shift. See flowsheet for drain output data. G tube clamed. X-ray w/ contrast performed today. Will continue to monitor.

## 2020-09-03 NOTE — PROGRESS NOTES
[facility]  Critical Care - Surgery  Progress Note    Patient Name: Jaquan Farmer  MRN: [id number]  Admission Date: 8/12/2020  Hospital Length of Stay: 22 days  Code Status: Full Code  Attending Provider: Donis Roa MD  Primary Care Provider: Primary Doctor No   Principal Problem: Duodenum disorder    Subjective:     Hospital/ICU Course:  8/14 Patient had a line replaced along with central line placed, with complication of pneumothorax. Rt pigtail was placed. Patient tolerated complications well. Decreasing vent settings.  8/15 NAEO. Patient was taken back to OR early AM for washout. Possible closure 8/18. ETT exchanged in OR 7.0 -> 7.5  8/16 - NAEON. HDS. Intubated, sedated.   8/17 - NAEON. OR tomorrow for closure.  8/18 - Washed out and closed in OR  8/22 - NAEO. Plan for Repeat CT AP today  8/23 - NAEO. Hbg lower. Giving blood x2 units.  8/25 - NAEO. Went to OR yesterday for Abdominal wall exploration and ligation of bleeding vessels. Wound vac exchanged  8/26 - NAEO. Hypertensive overnight. Hgb trending. Plan for CT today  8/27 - NAEO. Hypertensive overnight. CT yesterday showed no extravasation or acute changes  8/28 - NAEO.   8/29 - Patient in respiratory distress upon arrival. Chest XR w/ obvious left sided pulmonary occlusion. Patient intubated and bronchoscopy performed.  8/30 - Repeat Bronch today. Possible extubation later today  8/31 - Extubated yesterday. Wound vac change, bronch scheduled for OR today. Patient out of bed in chair on AM exam.  9/1 - Doing well without complaints this morning.  L sided permacath placed yesterday  9/2 - NAEO.  Continues to be hypertensive/tachycardic.  9/3 NAEO.  G-tube clamped by primary team.  Increased output from superior GODFREY    Interval History/Significant Events: NAEO.  Pt denies any new complaints.  Reports persistent pain that is temporarily relieved with PRN pain meds.      Follow-up For: Procedure(s)  (LRB):  INSERTION-CATHETER-RUDY (Left)  BRONCHOSCOPY (N/A)  INSERTION, CATHETER, INTERCOSTAL, FOR DRAINAGE (Left)  REPLACEMENT, WOUND VAC (N/A)    Post-Operative Day: 3 Days Post-Op    Objective:     Vital Signs (Most Recent):  Temp: 98.6 °F (37 °C) (09/03/20 0700)  Pulse: (!) 125 (09/03/20 0900)  Resp: (!) 42 (09/03/20 1003)  BP: (!) 167/97 (09/03/20 0900)  SpO2: 100 % (09/03/20 0900) Vital Signs (24h Range):  Temp:  [97.9 °F (36.6 °C)-98.7 °F (37.1 °C)] 98.6 °F (37 °C)  Pulse:  [100-136] 125  Resp:  [20-51] 42  SpO2:  [94 %-100 %] 100 %  BP: (146-200)/() 167/97     Weight: 66 kg (145 lb 8.1 oz)  Body mass index is 26.61 kg/m².      Intake/Output Summary (Last 24 hours) at 9/3/2020 1005  Last data filed at 9/3/2020 0800  Gross per 24 hour   Intake 2259 ml   Output 2438 ml   Net -179 ml       Physical Exam  Constitutional:       General: She is not in acute distress.  HENT:      Head: Normocephalic and atraumatic.   Neck:      Comments: RIJ trialysis line, L IJ permacath  Cardiovascular:      Rate and Rhythm: Tachycardia present.      Comments: Hypertensive, sinus tach  L sided chest tube in place  Pulmonary:      Breath sounds: No wheezing.      Comments: tachypneic  Maintaining adequate SpO2 on room air  Abdominal:      General: Abdomen is flat. There is no distension.      Tenderness: There is no abdominal tenderness.      Comments: Drains and G tube with bilious output; G-tube clamped  Wound vac in place   Skin:     General: Skin is warm and dry.   Neurological:      General: No focal deficit present.      Mental Status: She is alert and oriented to person, place, and time.   Psychiatric:         Mood and Affect: Mood normal.         Behavior: Behavior normal.         Vents:  Vent Mode: A/C (08/30/20 1045)  Ventilator Initiated: Yes(chart correction) (08/29/20 0800)  Set Rate: 16 BPM (08/30/20 1045)  Vt Set: 380 mL (08/30/20 1045)  PEEP/CPAP: 5 cmH20 (08/30/20 1045)  Oxygen Concentration (%): 40  (08/30/20 1245)  Peak Airway Pressure: 21 cmH2O (08/30/20 1045)  Plateau Pressure: 0 cmH20 (08/30/20 0900)  Total Ve: 7.12 mL (08/30/20 0900)  Negative Inspiratory Force (cm H2O): -23 (08/20/20 1501)  F/VT Ratio<105 (RSBI): (!) 52.94 (08/30/20 1045)    Lines/Drains/Airways     Central Venous Catheter Line            Trialysis (Dialysis) Catheter 08/13/20 2230 right internal jugular 20 days    Permacath 08/31/20 1100 left subclavian 2 days          Drain                 Closed/Suction Drain 08/13/20 1659 Right;Inferior Abdomen Bulb 19 Fr. 20 days         Closed/Suction Drain 08/13/20 1659 Right;Superior Abdomen Bulb 19 Fr. 20 days         Gastrostomy/Enterostomy 08/15/20 0916 Gastrostomy tube w/ balloon LUQ decompression 19 days    Female External Urinary Catheter 08/27/20 1600 6 days         Chest Tube 08/31/20 1033 1 Left 24 Fr. 2 days          Peripheral Intravenous Line                 Midline Catheter Insertion/Assessment  - Single Lumen 08/26/20 1501 Right basilic vein (medial side of arm) 18g x 8cm 7 days                Significant Labs:    CBC/Anemia Profile:  Recent Labs   Lab 09/02/20  0324 09/03/20  0403   WBC 28.68* 27.98*   HGB 7.9* 8.2*   HCT 25.5* 27.2*   * 409*   MCV 97 99*   RDW 14.5 14.6*        Chemistries:  Recent Labs   Lab 09/02/20  0324 09/02/20  1400 09/02/20  2254 09/03/20  0403     142 144 142 143  143   K 3.4*  3.4* 3.3* 3.1* 2.9*  2.9*     108 109 108 108  108   CO2 22*  22* 23 21* 21*  21*   BUN 59*  59* 69* 76* 78*  78*   CREATININE 3.1*  3.1* 3.2* 3.4* 3.5*  3.5*   CALCIUM 8.9  8.9 9.1 9.1 8.9  8.9   ALBUMIN 1.4*  1.4* 1.4* 1.4* 1.4*  1.4*   PROT 7.0  --   --  7.4   BILITOT 0.4  --   --  0.4   ALKPHOS 146*  --   --  176*   ALT 9*  --   --  9*   AST 22  --   --  26   MG 2.0 1.8  1.8 1.9 1.8   PHOS 2.3*  2.3* 2.3* 2.2* 2.3*  2.3*       All pertinent labs within the past 24 hours have been reviewed.    Significant Imaging:  I have reviewed all  pertinent imaging results/findings within the past 24 hours.    Assessment/Plan:     Severe sepsis  40 yo F s/p antrectomy with BII reconstruction c/b duodenal necrosis requiring ex lap, washout, drainage c/b aspiration event. Now improving in SICU.  Permacath installed 8/31 for possible dialysis needs.     .Neuro:  - AOx4  - PCA dicontinued; IV dilaudid PRN with increasing requirements today  - Pain management consult placed; appreciate assistance  - Clonidine patch for anxiety     Resp:  - Intubated 8/29, extubated 8/30  - Satting well on room air  - Chest tube placed 8/31; ~250cc output past 24hrs     CV: Persistent, refractory hypertension.  - MAP goal >65  - Metoprolol 15mg Q6  - Labetalol PRN  - Hydralazine PRN  - Permacath  Placed 8/31  - Cardene gtt initiated; systolic goal <180     Heme:  - Hgb/Hct 8.2/27.2  - S/p Abdominal washout on 8/21  - S/p Abdominal exploration w/ woundvac exhange 8/24; exchange 8/31     ID:  - WBC 27.98 today; pt afebrile  - Cont Zosyn, Fluconazole pending further discussion with primary team     Renal: Oliguric on arrival; Cr 1.0 at that time, now making adequate urine with increasing BUN/Cr. May represent non-oliguric phase of FLORIDALMA      - Monitor I/Os; UOP >0.5ml/kg/hr in past 24  - BUN/Cr cont to increase - 78/3.5 today; nephrology following. Holding on RRT for now  - Trend BUN/Cr daily     FEN/GI:  - NPO  - TPN + Lipid  - Maintain drains to bulb suction  - Replace electrolytes as needed to keep K>4, Mg>2; gentle repletion due to renal status     Endo:  - Monitor BG     DVT PPx: SQH 5kU Q8h  GI PPx: famotidine 20mg IV daily    Dispo:  - Continue SICU care       Critical care was time spent personally by me on the following activities: development of treatment plan with patient or surrogate and bedside caregivers, discussions with consultants, evaluation of patient's response to treatment, examination of patient, ordering and performing treatments and interventions, ordering and  review of laboratory studies, ordering and review of radiographic studies, pulse oximetry, re-evaluation of patient's condition.  This critical care time did not overlap with that of any other provider or involve time for any procedures.     Gary Bustillo MD  Critical Care - Surgery  [facility]

## 2020-09-03 NOTE — PLAN OF CARE
"                                    SICU PLAN OF CARE NOTE     Dx: Duodenum disorder     Goals of Care: MAP >65, SBP <180     Vital Signs: BP (!) 186/110   Pulse (!) 118   Temp 98.6 °F (37 °C) (Oral)   Resp (!) 52   Ht 5' 2" (1.575 m)   Wt 66 kg (145 lb 8.1 oz)   SpO2 100%   Breastfeeding No   BMI 26.61 kg/m²     Exam: appears acutely ill, well developed, fatigued, no distress, mildly obese     Cardiac: sinus tachycardia     Resp: room air, patient appears SOB when standing up and during transfer OOBTC     Neuro: AAO x4, Follows Commands, and Moves All Extremities     Gtts: TPN @ 50     Urine Output: Voids spontaneously via 300cc external femal catheter, 2 unmeasured urine output/shift     Drains:   Wound vac: 55 cc/shift  GODFREY drain #1:  450 cc/ shift; brown/green thin drainage   GODFREY drain #2:110  Cc/shift; brown/green thin drainage  CT: 200 cc/shift; serosang drainage, CT to water seal  G tube: CLAMPED, no signs of discomfort noted     Diet: NPO and TPN     Labs/Accuchecks: Accuchecks Q6h, daily labs, renal panel Q12     Skin: all dressings monitored for signs of bleeding and changed during shift, both GODFREY drain sites remains CDI with t slit dressing in place, wound vac to mid abdominal incision, L CT site has small amount of serosang drainage saturating gauze dressing, dressing changed and site remains CDI, G tube site cleansed and t slit gauze dressing changed, site remains CDI, heels elevated with foams in place, sacral foam in place and changed, pressure injuries to front of shins cleansed and foam dressings changed, pt remained in chair during shift,  patient free from injuries during shift.     Shift Events: Patient out of bed to chair, pain controlled with IVP dilaudid, cardene weaned off,  pt still remains hypertensive and tachycardic, MD aware. New orders implemented, 500 albumin given, 20 mg labetalol IVP x2, Will continue to monitor at this time. Diet advanced to clear liquid and pt started on PO BP " medication.    1830: pt appeared distressed, up in chair, leaned over in tripod position with pursed lip breathing, extremely tacypneic, pt moved from chair to bed and NC applied, SpO2 remained >96%, SBP increased and cardene gtt restarted @2.5 and increased to 10,  patient switched to non rebreather, STAT chest xray ordered, while placing xray board under patient, HR decreased to 70s, MD aware and at bedside.

## 2020-09-03 NOTE — ANESTHESIA POST-OP PAIN MANAGEMENT
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  Hospital course complicated by duodenal necrosis requiring an ex lap. Patient also developed aspiration PNA resulting in acute hypoxic RF and intubation.  She also developed ARF 2/2 to ATN requiring CRRT.  She has since been extubated and is RA.  She is s/p multiple abdominal surgeries including duodenal resection and anastomoses.  Patient has been on a PCA bump that has since been discontinued and has had increased frequency and dosing of IV narcotics.  APS service consulted to assist with pain management.     Patient describes posterior lumbar pain that radiates along her Bl flanks.  She denies any abdominal pain from her previous surgical incisions. She states that the PCA pump held better control of her pain two days ago.  She states that she frequently has to request for IV dilaudid due to the intense stabbing back pain.  She currently has a J tube which is clamped and is therefore NPO.     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       Subjective:  Review of Systems   Constitutional: Negative for chills and fever.   HENT: Negative.    Eyes: Negative.    Respiratory: Positive for cough and sputum production.    Cardiovascular: Negative for chest pain, palpitations and  orthopnea.   Gastrointestinal: Negative for nausea and vomiting.   Musculoskeletal: Positive for back pain. Negative for myalgias.   Skin: Negative.    Neurological: Negative for sensory change and headaches.   Psychiatric/Behavioral: Negative.      Objective:     Vitals   Vitals:    09/03/20 1200   BP: (!) 171/101   Pulse: (!) 117   Resp: (!) 48   Temp:      Physical Exam   Constitutional: She is oriented to person, place, and time.   Patient is a 40 yo F who appears older than stated age, weak and alert.    HENT:   Head: Normocephalic and atraumatic.   Eyes: Pupils are equal, round, and reactive to light. Conjunctivae are normal.   Neck: Normal range of motion. Neck supple.   Cardiovascular: Normal rate, regular rhythm and normal heart sounds.   Pulmonary/Chest: Effort normal and breath sounds normal.   Rhonchi   Copious sputum production.    Abdominal: Soft. Bowel sounds are normal. She exhibits no distension.   J tube   Musculoskeletal:         General: No edema.      Comments: Lower back pain   Neurological: She is alert and oriented to person, place, and time.   Skin: Skin is warm and dry.     Significant Labs: All pertinent labs within the past 24 hours have been reviewed.     Recent Labs   Lab 09/01/20  0302  09/02/20  0324 09/02/20  1400 09/02/20  2254 09/03/20  0403   WBC 26.01*  --  28.68*  --   --  27.98*   HGB 7.7*  --  7.9*  --   --  8.2*   HCT 25.3*  --  25.5*  --   --  27.2*   *  --  395*  --   --  409*   MCV 97  --  97  --   --  99*   RDW 14.6*  --  14.5  --   --  14.6*     141   < > 142  142 144 142 143  143   K 4.2  4.2   < > 3.4*  3.4* 3.3* 3.1* 2.9*  2.9*     109   < > 108  108 109 108 108  108   CO2 21*  21*   < > 22*  22* 23 21* 21*  21*   BUN 38*  38*   < > 59*  59* 69* 76* 78*  78*   CREATININE 2.4*  2.4*   < > 3.1*  3.1* 3.2* 3.4* 3.5*  3.5*   *  113*   < > 134*  134* 130* 126* 131*  131*   PROT 6.7  --  7.0  --   --  7.4   ALBUMIN 1.3*  1.3*    < > 1.4*  1.4* 1.4* 1.4* 1.4*  1.4*   BILITOT 0.5  --  0.4  --   --  0.4   AST 24  --  22  --   --  26   ALKPHOS 136*  --  146*  --   --  176*   ALT 9*  --  9*  --   --  9*    < > = values in this interval not displayed.       Significant Imaging: I have reviewed all pertinent imaging results/findings within the past 24 hours.      Meds   Current Facility-Administered Medications   Medication Dose Route Frequency Provider Last Rate Last Dose    0.9%  NaCl infusion (for blood administration)   Intravenous Q24H PRN Jadon Tran DO        cloNIDine 0.3 mg/24 hr td ptwk 1 patch  1 patch Transdermal Q7 Days Avtar Kim MD   1 patch at 09/02/20 1037    famotidine (PF) injection 20 mg  20 mg Intravenous Daily Thu Wilson MD   20 mg at 09/03/20 0925    fluconazole (DIFLUCAN) IVPB 200 mg 100 mL  200 mg Intravenous Q24H Thu Wilson  mL/hr at 09/03/20 0925 200 mg at 09/03/20 0925    heparin (porcine) injection 5,000 Units  5,000 Units Subcutaneous Q8H Nealwerner Singletary MD   5,000 Units at 09/03/20 0636    hydrALAZINE injection 10 mg  10 mg Intravenous Q6H PRN Avtar Kim MD   10 mg at 09/01/20 1837    HYDROmorphone injection 0.5 mg  0.5 mg Intravenous Q3H PRN Jadon Land MD   0.5 mg at 09/03/20 1003    HYDROmorphone injection 1 mg  1 mg Intravenous Q3H PRN Jadon Land MD   1 mg at 09/03/20 0649    labetalol 20 mg/4 mL (5 mg/mL) IV syring  20 mg Intravenous Q4H PRN Donis Roa MD   20 mg at 09/02/20 1646    lidocaine 5 % patch 1 patch  1 patch Transdermal Q24H Jadon Tran DO   1 patch at 09/03/20 1058    lipid (SMOFLIPID) (SMOFLIPID) 20 % infusion 250 mL  250 mL Intravenous Daily Rashaun Randall MD        melatonin tablet 3 mg  3 mg Per NG tube Nightly PRN Thu Wilson MD        metoprolol injection 15 mg  15 mg Intravenous Q6H Avtar Kim MD   15 mg at 09/03/20 1107    metoprolol injection 5 mg  5 mg Intravenous Q4H PRN  Donis Roa MD        miconazole NITRATE 2 % top powder   Topical (Top) BID Jadon Tran DO        niCARdipine 40 mg/200 mL (0.2 mg/mL) infusion  2.5 mg/hr Intravenous Continuous Gary Bustillo MD   Stopped at 09/03/20 1000    piperacillin-tazobactam 4.5 g in sodium chloride 0.9% 100 mL IVPB (ready to mix system)  4.5 g Intravenous Q12H Donis Roa MD 25 mL/hr at 09/03/20 0925 4.5 g at 09/03/20 0925    TPN ADULT CENTRAL LINE CUSTOM   Intravenous Continuous Avtar Kim MD 50 mL/hr at 09/03/20 1200      TPN ADULT CENTRAL LINE CUSTOM   Intravenous Continuous Rashaun Randall MD         Assessment:     Patient is a 40 yo F who is admitted for multiple abdominal surgeries in the past month complicated by duodenal necrosis requiring ex lap and washout.  Hospital course also complicated by aspiration event requiring intubation and mechanical ventilation and renal failure requiring CRRT.  She was extubated on 8/31.  Patient had been requiring increasing pain requirements since extubation.  She was initially on a PCA pump until it was discontinued due to oversedation, and she has since been receiving multiple doses of IV dilaudid throughout the day.     Recommendations:  - consider restarting the PCA pump.  If prior dosing was making her too sedated, consider using less of a dose, a longer lock out stretch or a lower maximum dose  For example, 0.2mg with an 8-10 min lockout and a 1-2mg/ hr MAX.    - Will access her requirements daily.  Once her enteral access is established and she is able to begin PO medications, we will be able to dose adjust her to an oral regimen.    - consider adding 1g IV tylenol q8 hours   - patient would also benefit from a muscle relaxer such as robaxin given her symptoms of lower back pain (non-sedating muscle relaxant); however, we are limited due to her renal impairment.  Please discuss with the clinical pharmacist to see if there are any dosing adjustments that  would allow safe dosing of robaxin. Unfortunately other muscle relaxants may contribute to sedation, which we are trying to avoid.   - consider small dose of IV valium for its muscle relaxant properties with the drawback that it may be also be sedating.   - Otherwise currently limited by no enteral access. Will continue to follow and will assist with converting to PO regimen when able to    Thank you for the consult.  We will continue to follow  Evaluator Brant Rosario

## 2020-09-03 NOTE — PLAN OF CARE
SW will continue to follow-up on IRF referrals to [facility].     SW will continue to coordinate with patient, family, team and insurance to complete patient's discharge plan.    Jazzy Eid LMSW   - Case Management

## 2020-09-03 NOTE — SUBJECTIVE & OBJECTIVE
Interval History: Patient seen and examined at bedside, no acute events overnight. Labs stable, urine output increasing.     Review of patient's allergies indicates:   Allergen Reactions    Latex      Current Facility-Administered Medications   Medication Frequency    sodium bicarbonate 1 mEq/mL (8.4 %) solution     vasopressin (PITRESSIN) 20 unit/mL injection     0.9%  NaCl infusion (for blood administration) Q24H PRN    carvediloL tablet 25 mg BID    cloNIDine 0.3 mg/24 hr td ptwk 1 patch Q7 Days    famotidine (PF) injection 20 mg Daily    fluconazole (DIFLUCAN) IVPB 200 mg 100 mL Q24H    heparin (porcine) injection 5,000 Units Q8H    hydrALAZINE injection 20 mg Q6H PRN    HYDROmorphone injection 0.5 mg Q3H PRN    HYDROmorphone injection 1 mg Q3H PRN    labetalol 20 mg/4 mL (5 mg/mL) IV syring Q4H PRN    lidocaine 5 % patch 1 patch Q24H    lipid (SMOFLIPID) (SMOFLIPID) 20 % infusion 250 mL Daily    melatonin tablet 3 mg Nightly PRN    metoprolol injection 5 mg Q4H PRN    miconazole NITRATE 2 % top powder BID    NIFEdipine 24 hr tablet 60 mg Daily    piperacillin-tazobactam 4.5 g in sodium chloride 0.9% 100 mL IVPB (ready to mix system) Q12H    TPN ADULT CENTRAL LINE CUSTOM Continuous    TPN ADULT CENTRAL LINE CUSTOM Continuous       Objective:     Vital Signs (Most Recent):  Temp: 98.6 °F (37 °C) (09/03/20 1500)  Pulse: 109 (09/03/20 1700)  Resp: (!) 33 (09/03/20 1700)  BP: (!) 183/107 (09/03/20 1700)  SpO2: 98 % (09/03/20 1700)  O2 Device (Oxygen Therapy): room air (09/03/20 1500) Vital Signs (24h Range):  Temp:  [98 °F (36.7 °C)-98.7 °F (37.1 °C)] 98.6 °F (37 °C)  Pulse:  [101-136] 109  Resp:  [20-56] 33  SpO2:  [94 %-100 %] 98 %  BP: (146-200)/() 183/107     Weight: 66 kg (145 lb 8.1 oz) (09/03/20 0700)  Body mass index is 26.61 kg/m².  Body surface area is 1.7 meters squared.    I/O last 3 completed shifts:  In: 3047 [I.V.:1370; Other:240]  Out: 3911 [Urine:1450; Drains:1951;  Other:100; Chest Tube:410]    Physical Exam  Constitutional:       General: She is not in acute distress.  HENT:      Head: Normocephalic and atraumatic.   Neck:      Comments: RIJ trialysis line, L IJ permacath  Cardiovascular:      Rate and Rhythm: Tachycardia present.      Comments: Hypertensive, sinus tach  L sided chest tube in place  Pulmonary:      Breath sounds: No wheezing.      Comments: tachypneic  Abdominal:      General: Abdomen is flat. There is no distension.      Tenderness: There is no abdominal tenderness.      Comments: Drains and G tube with bilious output  Wound vac in place   Skin:     General: Skin is warm and dry.   Neurological:      General: No focal deficit present.      Mental Status: She is alert and oriented to person, place, and time.   Psychiatric:         Mood and Affect: Mood normal.         Behavior: Behavior normal.         Significant Labs:  CBC:   Recent Labs   Lab 09/03/20  0403   WBC 27.98*   RBC 2.76*   HGB 8.2*   HCT 27.2*   *   MCV 99*   MCH 29.7   MCHC 30.1*     CMP:   Recent Labs   Lab 09/03/20  0403 09/03/20  1327   *  131* 126*   CALCIUM 8.9  8.9 8.8   ALBUMIN 1.4*  1.4* 1.5*   PROT 7.4  --      143 145   K 2.9*  2.9* 3.0*   CO2 21*  21* 23     108 110   BUN 78*  78* 83*   CREATININE 3.5*  3.5* 3.5*   ALKPHOS 176*  --    ALT 9*  --    AST 26  --    BILITOT 0.4  --      All labs within the past 24 hours have been reviewed.     Significant Imaging:  Labs: Reviewed

## 2020-09-03 NOTE — SUBJECTIVE & OBJECTIVE
Interval History/Significant Events: NAEO.  Pt denies any new complaints.  Reports persistent pain that is temporarily relieved with PRN pain meds.      Follow-up For: Procedure(s) (LRB):  INSERTION-CATHETER-RUDY (Left)  BRONCHOSCOPY (N/A)  INSERTION, CATHETER, INTERCOSTAL, FOR DRAINAGE (Left)  REPLACEMENT, WOUND VAC (N/A)    Post-Operative Day: 3 Days Post-Op    Objective:     Vital Signs (Most Recent):  Temp: 98.6 °F (37 °C) (09/03/20 0700)  Pulse: (!) 125 (09/03/20 0900)  Resp: (!) 42 (09/03/20 1003)  BP: (!) 167/97 (09/03/20 0900)  SpO2: 100 % (09/03/20 0900) Vital Signs (24h Range):  Temp:  [97.9 °F (36.6 °C)-98.7 °F (37.1 °C)] 98.6 °F (37 °C)  Pulse:  [100-136] 125  Resp:  [20-51] 42  SpO2:  [94 %-100 %] 100 %  BP: (146-200)/() 167/97     Weight: 66 kg (145 lb 8.1 oz)  Body mass index is 26.61 kg/m².      Intake/Output Summary (Last 24 hours) at 9/3/2020 1005  Last data filed at 9/3/2020 0800  Gross per 24 hour   Intake 2259 ml   Output 2438 ml   Net -179 ml       Physical Exam  Constitutional:       General: She is not in acute distress.  HENT:      Head: Normocephalic and atraumatic.   Neck:      Comments: RIJ trialysis line, L IJ permacath  Cardiovascular:      Rate and Rhythm: Tachycardia present.      Comments: Hypertensive, sinus tach  L sided chest tube in place  Pulmonary:      Breath sounds: No wheezing.      Comments: tachypneic  Maintaining adequate SpO2 on room air  Abdominal:      General: Abdomen is flat. There is no distension.      Tenderness: There is no abdominal tenderness.      Comments: Drains and G tube with bilious output; G-tube clamped  Wound vac in place   Skin:     General: Skin is warm and dry.   Neurological:      General: No focal deficit present.      Mental Status: She is alert and oriented to person, place, and time.   Psychiatric:         Mood and Affect: Mood normal.         Behavior: Behavior normal.         Vents:  Vent Mode: A/C (08/30/20 2429)  Ventilator  Initiated: Yes(chart correction) (08/29/20 0800)  Set Rate: 16 BPM (08/30/20 1045)  Vt Set: 380 mL (08/30/20 1045)  PEEP/CPAP: 5 cmH20 (08/30/20 1045)  Oxygen Concentration (%): 40 (08/30/20 1245)  Peak Airway Pressure: 21 cmH2O (08/30/20 1045)  Plateau Pressure: 0 cmH20 (08/30/20 0900)  Total Ve: 7.12 mL (08/30/20 0900)  Negative Inspiratory Force (cm H2O): -23 (08/20/20 1501)  F/VT Ratio<105 (RSBI): (!) 52.94 (08/30/20 1045)    Lines/Drains/Airways     Central Venous Catheter Line            Trialysis (Dialysis) Catheter 08/13/20 2230 right internal jugular 20 days    Permacath 08/31/20 1100 left subclavian 2 days          Drain                 Closed/Suction Drain 08/13/20 1659 Right;Inferior Abdomen Bulb 19 Fr. 20 days         Closed/Suction Drain 08/13/20 1659 Right;Superior Abdomen Bulb 19 Fr. 20 days         Gastrostomy/Enterostomy 08/15/20 0916 Gastrostomy tube w/ balloon LUQ decompression 19 days    Female External Urinary Catheter 08/27/20 1600 6 days         Chest Tube 08/31/20 1033 1 Left 24 Fr. 2 days          Peripheral Intravenous Line                 Midline Catheter Insertion/Assessment  - Single Lumen 08/26/20 1501 Right basilic vein (medial side of arm) 18g x 8cm 7 days                Significant Labs:    CBC/Anemia Profile:  Recent Labs   Lab 09/02/20  0324 09/03/20  0403   WBC 28.68* 27.98*   HGB 7.9* 8.2*   HCT 25.5* 27.2*   * 409*   MCV 97 99*   RDW 14.5 14.6*        Chemistries:  Recent Labs   Lab 09/02/20  0324 09/02/20  1400 09/02/20  2254 09/03/20  0403     142 144 142 143  143   K 3.4*  3.4* 3.3* 3.1* 2.9*  2.9*     108 109 108 108  108   CO2 22*  22* 23 21* 21*  21*   BUN 59*  59* 69* 76* 78*  78*   CREATININE 3.1*  3.1* 3.2* 3.4* 3.5*  3.5*   CALCIUM 8.9  8.9 9.1 9.1 8.9  8.9   ALBUMIN 1.4*  1.4* 1.4* 1.4* 1.4*  1.4*   PROT 7.0  --   --  7.4   BILITOT 0.4  --   --  0.4   ALKPHOS 146*  --   --  176*   ALT 9*  --   --  9*   AST 22  --   --  26   MG 2.0  1.8  1.8 1.9 1.8   PHOS 2.3*  2.3* 2.3* 2.2* 2.3*  2.3*       All pertinent labs within the past 24 hours have been reviewed.    Significant Imaging:  I have reviewed all pertinent imaging results/findings within the past 24 hours.

## 2020-09-03 NOTE — ASSESSMENT & PLAN NOTE
38 yo F s/p antrectomy with BII reconstruction c/b duodenal necrosis requiring ex lap, washout, drainage c/b aspiration event. Now improving in SICU.  Permacath installed 8/31 for possible dialysis needs.     .Neuro:  - AOx4  - PCA dicontinued; IV dilaudid PRN with increasing requirements today  - Pain management consult placed; appreciate assistance  - Clonidine patch for anxiety     Resp:  - Intubated 8/29, extubated 8/30  - Satting well on room air  - Chest tube placed 8/31; ~250cc output past 24hrs     CV: Persistent, refractory hypertension.  - MAP goal >65  - Metoprolol 15mg Q6  - Labetalol PRN  - Hydralazine PRN  - Permacath  Placed 8/31  - Cardene gtt initiated; systolic goal <180     Heme:  - Hgb/Hct 8.2/27.2  - S/p Abdominal washout on 8/21  - S/p Abdominal exploration w/ woundvac exhange 8/24; exchange 8/31     ID:  - WBC 27.98 today; pt afebrile  - Cont Zosyn, Fluconazole pending further discussion with primary team     Renal: Oliguric on arrival; Cr 1.0 at that time, now making adequate urine with increasing BUN/Cr. May represent non-oliguric phase of FLORIDALMA      - Monitor I/Os; UOP >0.5ml/kg/hr in past 24  - BUN/Cr cont to increase - 78/3.5 today; nephrology following. Holding on RRT for now  - Trend BUN/Cr daily     FEN/GI:  - NPO  - TPN + Lipid  - Maintain drains to bulb suction  - Replace electrolytes as needed to keep K>4, Mg>2; gentle repletion due to renal status     Endo:  - Monitor BG     DVT PPx: SQH 5kU Q8h  GI PPx: famotidine 20mg IV daily    Dispo:  - Continue SICU care

## 2020-09-03 NOTE — ASSESSMENT & PLAN NOTE
38 yo F s/p antrectomy with BII reconstruction c/b duodenal necrosis requiring ex lap, washout, drainage c/b aspiration event. S/p duodenal resection with d-j and g-j anastomosis.    - NPO  - Keep G tube clamped, unclamp for nausea/vomiting  - Continue abx  - TPN   - Daily labs  - GI and DVT ppx  - PT / OT  - continue PRNs for BP control  - WV change at bedside Friday

## 2020-09-03 NOTE — PROGRESS NOTES
[facility]  General Surgery  Progress Note    Subjective:     History of Present Illness:  Pt is a 38 yo F with recent history of antrectomy with BII reconstruction for ulcer disease at outside hospital. 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 Patient also reportedly aspirated on induction in the OR prior to this, resulting in severe pulmonary edema and hypoxia. Patient was transferred to [facility] urgently for higher level of care. She arrived as a direct SICU admit on near maximal vent settings and requiring low dose vasopressors with HR in the upper 140s. Her midline laparotomy is closed with 4 Navdeep drains in place draining bilious output.     Post-Op Info:  Procedure(s) (LRB):  INSERTION-CATHETER-RUDY (Left)  BRONCHOSCOPY (N/A)  INSERTION, CATHETER, INTERCOSTAL, FOR DRAINAGE (Left)  REPLACEMENT, WOUND VAC (N/A)   3 Days Post-Op     Interval History:   G tube clamped without increased output from biliary drains  Contrast down to colon   Pain controlled    Medications:  Continuous Infusions:   TPN ADULT CENTRAL LINE CUSTOM 50 mL/hr at 09/03/20 1500    TPN ADULT CENTRAL LINE CUSTOM       Scheduled Meds:   carvediloL  12.5 mg Oral BID    cloNIDine 0.3 mg/24 hr td ptwk  1 patch Transdermal Q7 Days    famotidine (PF)  20 mg Intravenous Daily    fluconazole (DIFLUCAN) IVPB  200 mg Intravenous Q24H    heparin (porcine)  5,000 Units Subcutaneous Q8H    lidocaine  1 patch Transdermal Q24H    lipid (SMOFLIPID)  250 mL Intravenous Daily    miconazole NITRATE 2 %   Topical (Top) BID    piperacillin-tazobactam (ZOSYN) IVPB  4.5 g Intravenous Q12H     PRN Meds:sodium chloride, hydrALAZINE, HYDROmorphone, HYDROmorphone, labetaloL, melatonin, metoprolol     Review of patient's allergies indicates:   Allergen Reactions    Latex      Objective:     Vital Signs (Most Recent):  Temp: 98.6 °F (37 °C) (09/03/20 1500)  Pulse: (!) 112 (09/03/20  1530)  Resp: (!) 56 (09/03/20 1530)  BP: (!) 187/105 (09/03/20 1530)  SpO2: 99 % (09/03/20 1530) Vital Signs (24h Range):  Temp:  [98 °F (36.7 °C)-98.7 °F (37.1 °C)] 98.6 °F (37 °C)  Pulse:  [100-136] 112  Resp:  [20-56] 56  SpO2:  [94 %-100 %] 99 %  BP: (146-200)/() 187/105     Weight: 66 kg (145 lb 8.1 oz)  Body mass index is 26.61 kg/m².    Intake/Output - Last 3 Shifts       09/01 0700 - 09/02 0659 09/02 0700 - 09/03 0659 09/03 0700 - 09/04 0659    I.V. (mL/kg) 1240 (17.9) 582 (8.4)     Other  240      1437     Total Intake(mL/kg) 2089 (30.1) 2259 (32.6)     Urine (mL/kg/hr) 500 (0.3) 950 (0.6) 300 (0.5)    Drains 1449 1152 530    Other 100 50 50    Stool  0 0    Chest Tube 350 220 200    Total Output 2399 2372 1080    Net -310 -113 -1080           Urine Occurrence 1 x 1 x     Stool Occurrence  0 x 2 x          Physical Exam  Constitutional:       General: She is not in acute distress.  Cardiovascular:      Rate and Rhythm: Tachycardia present.   Pulmonary:      Effort: Pulmonary effort is normal.   Abdominal:      General: There is no distension.      Tenderness: There is no abdominal tenderness.      Comments: G tube clamped  Drains with bilious output   Skin:     General: Skin is warm and dry.   Neurological:      Mental Status: She is alert.         Significant Labs:  CBC:   Recent Labs   Lab 09/03/20  0403   WBC 27.98*   RBC 2.76*   HGB 8.2*   HCT 27.2*   *   MCV 99*   MCH 29.7   MCHC 30.1*     CMP:   Recent Labs   Lab 09/03/20  0403 09/03/20  1327   *  131* 126*   CALCIUM 8.9  8.9 8.8   ALBUMIN 1.4*  1.4* 1.5*   PROT 7.4  --      143 145   K 2.9*  2.9* 3.0*   CO2 21*  21* 23     108 110   BUN 78*  78* 83*   CREATININE 3.5*  3.5* 3.5*   ALKPHOS 176*  --    ALT 9*  --    AST 26  --    BILITOT 0.4  --        Significant Diagnostics:  I have reviewed all pertinent imaging results/findings within the past 24 hours.    Assessment/Plan:     Severe sepsis  38 yo F  s/p antrectomy with BII reconstruction c/b duodenal necrosis requiring ex lap, washout, drainage c/b aspiration event. S/p duodenal resection with d-j and g-j anastomosis.    - NPO  - Keep G tube clamped, unclamp for nausea/vomiting  - Continue abx  - TPN   - Daily labs  - GI and DVT ppx  - PT / OT  - continue PRNs for BP control  - WV change at bedside Friday        Christy Nicole MD  General Surgery  [facility]

## 2020-09-03 NOTE — PROGRESS NOTES
[facility]  Nephrology  Progress Note    Patient Name: Jaquan Farmer  MRN: [id number]  Admission Date: 8/12/2020  Hospital Length of Stay: 22 days  Attending Provider: Donis Roa MD   Primary Care Physician: Primary Doctor No  Principal Problem:Duodenum disorder    Subjective:     HPI: Mrs. Farmer is a 39 year old female with antrectomy at [facility] complicated duodenal necrosis post op. She aspirated, was intubated, and became septic. She developed renal failure and required max vent settings and was transferred here for higher level of care. On arrival she was hypotensive on pressers, max vent setting, and had minimal urine output.  Overnight she was given 6L of fluid, 6g calcium, placed on vac, pip-tazo, and fluconazole. Nephro consulted for sid.    Interval History: Patient seen and examined at bedside, no acute events overnight. Labs stable, urine output increasing.     Review of patient's allergies indicates:   Allergen Reactions    Latex      Current Facility-Administered Medications   Medication Frequency    sodium bicarbonate 1 mEq/mL (8.4 %) solution     vasopressin (PITRESSIN) 20 unit/mL injection     0.9%  NaCl infusion (for blood administration) Q24H PRN    carvediloL tablet 25 mg BID    cloNIDine 0.3 mg/24 hr td ptwk 1 patch Q7 Days    famotidine (PF) injection 20 mg Daily    fluconazole (DIFLUCAN) IVPB 200 mg 100 mL Q24H    heparin (porcine) injection 5,000 Units Q8H    hydrALAZINE injection 20 mg Q6H PRN    HYDROmorphone injection 0.5 mg Q3H PRN    HYDROmorphone injection 1 mg Q3H PRN    labetalol 20 mg/4 mL (5 mg/mL) IV syring Q4H PRN    lidocaine 5 % patch 1 patch Q24H    lipid (SMOFLIPID) (SMOFLIPID) 20 % infusion 250 mL Daily    melatonin tablet 3 mg Nightly PRN    metoprolol injection 5 mg Q4H PRN    miconazole NITRATE 2 % top powder BID    NIFEdipine 24 hr tablet 60 mg Daily    piperacillin-tazobactam 4.5 g in sodium chloride 0.9% 100 mL IVPB  (ready to mix system) Q12H    TPN ADULT CENTRAL LINE CUSTOM Continuous    TPN ADULT CENTRAL LINE CUSTOM Continuous       Objective:     Vital Signs (Most Recent):  Temp: 98.6 °F (37 °C) (09/03/20 1500)  Pulse: 109 (09/03/20 1700)  Resp: (!) 33 (09/03/20 1700)  BP: (!) 183/107 (09/03/20 1700)  SpO2: 98 % (09/03/20 1700)  O2 Device (Oxygen Therapy): room air (09/03/20 1500) Vital Signs (24h Range):  Temp:  [98 °F (36.7 °C)-98.7 °F (37.1 °C)] 98.6 °F (37 °C)  Pulse:  [101-136] 109  Resp:  [20-56] 33  SpO2:  [94 %-100 %] 98 %  BP: (146-200)/() 183/107     Weight: 66 kg (145 lb 8.1 oz) (09/03/20 0700)  Body mass index is 26.61 kg/m².  Body surface area is 1.7 meters squared.    I/O last 3 completed shifts:  In: 3047 [I.V.:1370; Other:240]  Out: 3911 [Urine:1450; Drains:1951; Other:100; Chest Tube:410]    Physical Exam  Constitutional:       General: She is not in acute distress.  HENT:      Head: Normocephalic and atraumatic.   Neck:      Comments: RIJ trialysis line, L IJ permacath  Cardiovascular:      Rate and Rhythm: Tachycardia present.      Comments: Hypertensive, sinus tach  L sided chest tube in place  Pulmonary:      Breath sounds: No wheezing.      Comments: tachypneic  Abdominal:      General: Abdomen is flat. There is no distension.      Tenderness: There is no abdominal tenderness.      Comments: Drains and G tube with bilious output  Wound vac in place   Skin:     General: Skin is warm and dry.   Neurological:      General: No focal deficit present.      Mental Status: She is alert and oriented to person, place, and time.   Psychiatric:         Mood and Affect: Mood normal.         Behavior: Behavior normal.         Significant Labs:  CBC:   Recent Labs   Lab 09/03/20  0403   WBC 27.98*   RBC 2.76*   HGB 8.2*   HCT 27.2*   *   MCV 99*   MCH 29.7   MCHC 30.1*     CMP:   Recent Labs   Lab 09/03/20  0403 09/03/20  1327   *  131* 126*   CALCIUM 8.9  8.9 8.8   ALBUMIN 1.4*  1.4* 1.5*    PROT 7.4  --      143 145   K 2.9*  2.9* 3.0*   CO2 21*  21* 23     108 110   BUN 78*  78* 83*   CREATININE 3.5*  3.5* 3.5*   ALKPHOS 176*  --    ALT 9*  --    AST 26  --    BILITOT 0.4  --      All labs within the past 24 hours have been reviewed.     Significant Imaging:  Labs: Reviewed    Assessment/Plan:     * Duodenum disorder  - Management per primary team   - Duodenal necrosis s/p explolatory lap.     Hypertension  Management as per primary         Anemia, chronic disease  Hgb at 8.2    Metabolic acidosis  -secondary to renal failure, stable        Acute renal failure with tubular necrosis  - oliguric stage 3 sid with ischemic atn likely secondary to septic shock  - unknown bl cr  - muddy brown casts on microscopy    Plan/Recommendations:  -no emergent need for RRT today, labs stable and patient has non oliguric range urine output  -if concern for volume overload, consider Lasix bolus  -Strict I/O's  -Renally dose meds/avoid nephrotoxic meds  -Keep MAP >65  -Will continue to follow closely           Severe sepsis  - Management per primary team         Thank you for your consult. I will follow-up with patient. Please contact us if you have any additional questions.    Nestor Urban MD  Nephrology  [facility]

## 2020-09-03 NOTE — PLAN OF CARE
09/03/20 1223   Discharge Reassessment   Assessment Type Discharge Planning Reassessment   Provided patient/caregiver education on the expected discharge date and the discharge plan Yes   Do you have any problems affording any of your prescribed medications? No   Discharge Plan A Rehab   Discharge Plan B Other  (TBD)   DME Needed Upon Discharge  other (see comments)  (TBD)       Tangela Bradley MPH, RN, CM  Ext. [phone]

## 2020-09-04 LAB
ABO + RH BLD: NORMAL
ALBUMIN SERPL BCP-MCNC: 1.7 G/DL (ref 3.5–5.2)
ALBUMIN SERPL BCP-MCNC: 1.7 G/DL (ref 3.5–5.2)
ALBUMIN SERPL BCP-MCNC: 1.8 G/DL (ref 3.5–5.2)
ALBUMIN SERPL BCP-MCNC: 1.8 G/DL (ref 3.5–5.2)
ALLENS TEST: ABNORMAL
ALLENS TEST: ABNORMAL
ALP SERPL-CCNC: 182 U/L (ref 55–135)
ALT SERPL W/O P-5'-P-CCNC: 10 U/L (ref 10–44)
ANION GAP SERPL CALC-SCNC: 10 MMOL/L (ref 8–16)
ANION GAP SERPL CALC-SCNC: 12 MMOL/L (ref 8–16)
ANION GAP SERPL CALC-SCNC: 12 MMOL/L (ref 8–16)
ANION GAP SERPL CALC-SCNC: 9 MMOL/L (ref 8–16)
ANISOCYTOSIS BLD QL SMEAR: SLIGHT
AST SERPL-CCNC: 22 U/L (ref 10–40)
BASO STIPL BLD QL SMEAR: ABNORMAL
BASOPHILS # BLD AUTO: ABNORMAL K/UL (ref 0–0.2)
BASOPHILS NFR BLD: 1 % (ref 0–1.9)
BILIRUB SERPL-MCNC: 0.4 MG/DL (ref 0.1–1)
BLD GP AB SCN CELLS X3 SERPL QL: NORMAL
BUN SERPL-MCNC: 89 MG/DL (ref 6–20)
BUN SERPL-MCNC: 94 MG/DL (ref 6–20)
BURR CELLS BLD QL SMEAR: ABNORMAL
CA-I BLDV-SCNC: 1.16 MMOL/L (ref 1.06–1.42)
CALCIUM SERPL-MCNC: 8.4 MG/DL (ref 8.7–10.5)
CALCIUM SERPL-MCNC: 8.5 MG/DL (ref 8.7–10.5)
CHLORIDE SERPL-SCNC: 109 MMOL/L (ref 95–110)
CHLORIDE SERPL-SCNC: 109 MMOL/L (ref 95–110)
CHLORIDE SERPL-SCNC: 110 MMOL/L (ref 95–110)
CHLORIDE SERPL-SCNC: 110 MMOL/L (ref 95–110)
CO2 SERPL-SCNC: 22 MMOL/L (ref 23–29)
CO2 SERPL-SCNC: 22 MMOL/L (ref 23–29)
CO2 SERPL-SCNC: 23 MMOL/L (ref 23–29)
CO2 SERPL-SCNC: 24 MMOL/L (ref 23–29)
CREAT SERPL-MCNC: 3.5 MG/DL (ref 0.5–1.4)
CREAT SERPL-MCNC: 3.6 MG/DL (ref 0.5–1.4)
DACRYOCYTES BLD QL SMEAR: ABNORMAL
DIFFERENTIAL METHOD: ABNORMAL
EOSINOPHIL # BLD AUTO: ABNORMAL K/UL (ref 0–0.5)
EOSINOPHIL NFR BLD: 2 % (ref 0–8)
ERYTHROCYTE [DISTWIDTH] IN BLOOD BY AUTOMATED COUNT: 14.6 % (ref 11.5–14.5)
EST. GFR  (AFRICAN AMERICAN): 17.4 ML/MIN/1.73 M^2
EST. GFR  (AFRICAN AMERICAN): 18 ML/MIN/1.73 M^2
EST. GFR  (NON AFRICAN AMERICAN): 15.1 ML/MIN/1.73 M^2
EST. GFR  (NON AFRICAN AMERICAN): 15.6 ML/MIN/1.73 M^2
GIANT PLATELETS BLD QL SMEAR: PRESENT
GLUCOSE SERPL-MCNC: 130 MG/DL (ref 70–110)
GLUCOSE SERPL-MCNC: 133 MG/DL (ref 70–110)
GLUCOSE SERPL-MCNC: 148 MG/DL (ref 70–110)
GLUCOSE SERPL-MCNC: 148 MG/DL (ref 70–110)
HCO3 UR-SCNC: 23.4 MMOL/L (ref 24–28)
HCO3 UR-SCNC: 25.3 MMOL/L (ref 24–28)
HCT VFR BLD AUTO: 24.8 % (ref 37–48.5)
HGB BLD-MCNC: 7.7 G/DL (ref 12–16)
HYPOCHROMIA BLD QL SMEAR: ABNORMAL
IMM GRANULOCYTES # BLD AUTO: ABNORMAL K/UL (ref 0–0.04)
IMM GRANULOCYTES NFR BLD AUTO: ABNORMAL % (ref 0–0.5)
LYMPHOCYTES # BLD AUTO: ABNORMAL K/UL (ref 1–4.8)
LYMPHOCYTES NFR BLD: 5 % (ref 18–48)
MAGNESIUM SERPL-MCNC: 1.8 MG/DL (ref 1.6–2.6)
MAGNESIUM SERPL-MCNC: 1.9 MG/DL (ref 1.6–2.6)
MAGNESIUM SERPL-MCNC: 2.1 MG/DL (ref 1.6–2.6)
MAGNESIUM SERPL-MCNC: 2.1 MG/DL (ref 1.6–2.6)
MCH RBC QN AUTO: 30.2 PG (ref 27–31)
MCHC RBC AUTO-ENTMCNC: 31 G/DL (ref 32–36)
MCV RBC AUTO: 97 FL (ref 82–98)
METAMYELOCYTES NFR BLD MANUAL: 1 %
MONOCYTES # BLD AUTO: ABNORMAL K/UL (ref 0.3–1)
MONOCYTES NFR BLD: 5 % (ref 4–15)
MYELOCYTES NFR BLD MANUAL: 1 %
NEUTROPHILS NFR BLD: 83 % (ref 38–73)
NEUTS BAND NFR BLD MANUAL: 2 %
NRBC BLD-RTO: 0 /100 WBC
OVALOCYTES BLD QL SMEAR: ABNORMAL
PCO2 BLDA: 38.2 MMHG (ref 35–45)
PCO2 BLDA: 41.7 MMHG (ref 35–45)
PH SMN: 7.36 [PH] (ref 7.35–7.45)
PH SMN: 7.43 [PH] (ref 7.35–7.45)
PHOSPHATE SERPL-MCNC: 1.4 MG/DL (ref 2.7–4.5)
PHOSPHATE SERPL-MCNC: 1.6 MG/DL (ref 2.7–4.5)
PHOSPHATE SERPL-MCNC: 2 MG/DL (ref 2.7–4.5)
PHOSPHATE SERPL-MCNC: 2 MG/DL (ref 2.7–4.5)
PLATELET # BLD AUTO: 360 K/UL (ref 150–350)
PLATELET BLD QL SMEAR: ABNORMAL
PMV BLD AUTO: 10.7 FL (ref 9.2–12.9)
PO2 BLDA: 62 MMHG (ref 80–100)
PO2 BLDA: 75 MMHG (ref 80–100)
POC BE: -2 MMOL/L
POC BE: 1 MMOL/L
POC SATURATED O2: 90 % (ref 95–100)
POC SATURATED O2: 95 % (ref 95–100)
POC TCO2: 25 MMOL/L (ref 23–27)
POC TCO2: 26 MMOL/L (ref 23–27)
POCT GLUCOSE: 135 MG/DL (ref 70–110)
POCT GLUCOSE: 145 MG/DL (ref 70–110)
POCT GLUCOSE: 147 MG/DL (ref 70–110)
POIKILOCYTOSIS BLD QL SMEAR: SLIGHT
POLYCHROMASIA BLD QL SMEAR: ABNORMAL
POTASSIUM SERPL-SCNC: 3 MMOL/L (ref 3.5–5.1)
POTASSIUM SERPL-SCNC: 3 MMOL/L (ref 3.5–5.1)
POTASSIUM SERPL-SCNC: 3.1 MMOL/L (ref 3.5–5.1)
POTASSIUM SERPL-SCNC: 3.6 MMOL/L (ref 3.5–5.1)
PROT SERPL-MCNC: 7.3 G/DL (ref 6–8.4)
RBC # BLD AUTO: 2.55 M/UL (ref 4–5.4)
SAMPLE: ABNORMAL
SAMPLE: ABNORMAL
SCHISTOCYTES BLD QL SMEAR: ABNORMAL
SITE: ABNORMAL
SITE: ABNORMAL
SODIUM SERPL-SCNC: 143 MMOL/L (ref 136–145)
TOXIC GRANULES BLD QL SMEAR: PRESENT
WBC # BLD AUTO: 25.42 K/UL (ref 3.9–12.7)

## 2020-09-04 PROCEDURE — 97535 SELF CARE MNGMENT TRAINING: CPT

## 2020-09-04 PROCEDURE — 99232 PR SUBSEQUENT HOSPITAL CARE,LEVL II: ICD-10-PCS | Mod: ,,, | Performed by: INTERNAL MEDICINE

## 2020-09-04 PROCEDURE — 99900026 HC AIRWAY MAINTENANCE (STAT)

## 2020-09-04 PROCEDURE — 25000003 PHARM REV CODE 250: Performed by: STUDENT IN AN ORGANIZED HEALTH CARE EDUCATION/TRAINING PROGRAM

## 2020-09-04 PROCEDURE — 25000003 PHARM REV CODE 250: Performed by: SURGERY

## 2020-09-04 PROCEDURE — 85007 BL SMEAR W/DIFF WBC COUNT: CPT

## 2020-09-04 PROCEDURE — S0028 INJECTION, FAMOTIDINE, 20 MG: HCPCS | Performed by: STUDENT IN AN ORGANIZED HEALTH CARE EDUCATION/TRAINING PROGRAM

## 2020-09-04 PROCEDURE — 99231 SBSQ HOSP IP/OBS SF/LOW 25: CPT | Mod: ,,, | Performed by: ANESTHESIOLOGY

## 2020-09-04 PROCEDURE — 63600175 PHARM REV CODE 636 W HCPCS: Performed by: STUDENT IN AN ORGANIZED HEALTH CARE EDUCATION/TRAINING PROGRAM

## 2020-09-04 PROCEDURE — 97116 GAIT TRAINING THERAPY: CPT

## 2020-09-04 PROCEDURE — 99232 SBSQ HOSP IP/OBS MODERATE 35: CPT | Mod: ,,, | Performed by: INTERNAL MEDICINE

## 2020-09-04 PROCEDURE — 94640 AIRWAY INHALATION TREATMENT: CPT

## 2020-09-04 PROCEDURE — 82330 ASSAY OF CALCIUM: CPT

## 2020-09-04 PROCEDURE — 80053 COMPREHEN METABOLIC PANEL: CPT

## 2020-09-04 PROCEDURE — 83735 ASSAY OF MAGNESIUM: CPT | Mod: 91

## 2020-09-04 PROCEDURE — 25000242 PHARM REV CODE 250 ALT 637 W/ HCPCS: Performed by: STUDENT IN AN ORGANIZED HEALTH CARE EDUCATION/TRAINING PROGRAM

## 2020-09-04 PROCEDURE — 97110 THERAPEUTIC EXERCISES: CPT

## 2020-09-04 PROCEDURE — 80069 RENAL FUNCTION PANEL: CPT

## 2020-09-04 PROCEDURE — B4185 PARENTERAL SOL 10 GM LIPIDS: HCPCS | Performed by: SURGERY

## 2020-09-04 PROCEDURE — 99291 PR CRITICAL CARE, E/M 30-74 MINUTES: ICD-10-PCS | Mod: 24,,, | Performed by: SURGERY

## 2020-09-04 PROCEDURE — 36600 WITHDRAWAL OF ARTERIAL BLOOD: CPT

## 2020-09-04 PROCEDURE — 83735 ASSAY OF MAGNESIUM: CPT

## 2020-09-04 PROCEDURE — 84100 ASSAY OF PHOSPHORUS: CPT

## 2020-09-04 PROCEDURE — 94761 N-INVAS EAR/PLS OXIMETRY MLT: CPT

## 2020-09-04 PROCEDURE — 27100171 HC OXYGEN HIGH FLOW UP TO 24 HOURS

## 2020-09-04 PROCEDURE — 63600175 PHARM REV CODE 636 W HCPCS: Performed by: SURGERY

## 2020-09-04 PROCEDURE — 99231 PR SUBSEQUENT HOSPITAL CARE,LEVL I: ICD-10-PCS | Mod: ,,, | Performed by: ANESTHESIOLOGY

## 2020-09-04 PROCEDURE — 85027 COMPLETE CBC AUTOMATED: CPT

## 2020-09-04 PROCEDURE — 20000000 HC ICU ROOM

## 2020-09-04 PROCEDURE — 99291 CRITICAL CARE FIRST HOUR: CPT | Mod: 24,,, | Performed by: SURGERY

## 2020-09-04 PROCEDURE — 99900035 HC TECH TIME PER 15 MIN (STAT)

## 2020-09-04 PROCEDURE — 86850 RBC ANTIBODY SCREEN: CPT

## 2020-09-04 PROCEDURE — A4217 STERILE WATER/SALINE, 500 ML: HCPCS | Performed by: SURGERY

## 2020-09-04 PROCEDURE — 82803 BLOOD GASES ANY COMBINATION: CPT

## 2020-09-04 PROCEDURE — 97530 THERAPEUTIC ACTIVITIES: CPT

## 2020-09-04 RX ORDER — CARVEDILOL 25 MG/1
25 TABLET ORAL 2 TIMES DAILY
Status: DISCONTINUED | OUTPATIENT
Start: 2020-09-04 | End: 2020-09-07

## 2020-09-04 RX ORDER — POTASSIUM CHLORIDE 29.8 MG/ML
40 INJECTION INTRAVENOUS ONCE
Status: COMPLETED | OUTPATIENT
Start: 2020-09-04 | End: 2020-09-04

## 2020-09-04 RX ORDER — FUROSEMIDE 10 MG/ML
100 INJECTION INTRAMUSCULAR; INTRAVENOUS ONCE
Status: COMPLETED | OUTPATIENT
Start: 2020-09-04 | End: 2020-09-04

## 2020-09-04 RX ORDER — MAGNESIUM SULFATE HEPTAHYDRATE 40 MG/ML
2 INJECTION, SOLUTION INTRAVENOUS ONCE
Status: COMPLETED | OUTPATIENT
Start: 2020-09-05 | End: 2020-09-05

## 2020-09-04 RX ORDER — FAMOTIDINE 20 MG/1
20 TABLET, FILM COATED ORAL DAILY
Status: DISCONTINUED | OUTPATIENT
Start: 2020-09-05 | End: 2020-09-07

## 2020-09-04 RX ORDER — POTASSIUM CHLORIDE 29.8 MG/ML
40 INJECTION INTRAVENOUS EVERY 4 HOURS
Status: DISPENSED | OUTPATIENT
Start: 2020-09-04 | End: 2020-09-04

## 2020-09-04 RX ORDER — HYDROMORPHONE HYDROCHLORIDE 1 MG/ML
1 INJECTION, SOLUTION INTRAMUSCULAR; INTRAVENOUS; SUBCUTANEOUS ONCE
Status: COMPLETED | OUTPATIENT
Start: 2020-09-04 | End: 2020-09-04

## 2020-09-04 RX ADMIN — MICONAZOLE NITRATE: 20 POWDER TOPICAL at 09:09

## 2020-09-04 RX ADMIN — POTASSIUM CHLORIDE 60 MEQ: 14.9 INJECTION, SOLUTION INTRAVENOUS at 05:09

## 2020-09-04 RX ADMIN — HYDROMORPHONE HYDROCHLORIDE 1 MG: 1 INJECTION, SOLUTION INTRAMUSCULAR; INTRAVENOUS; SUBCUTANEOUS at 12:09

## 2020-09-04 RX ADMIN — LIDOCAINE 1 PATCH: 50 PATCH CUTANEOUS at 10:09

## 2020-09-04 RX ADMIN — SMOFLIPID 250 ML: 6; 6; 5; 3 INJECTION, EMULSION INTRAVENOUS at 09:09

## 2020-09-04 RX ADMIN — HEPARIN SODIUM 5000 UNITS: 5000 INJECTION INTRAVENOUS; SUBCUTANEOUS at 05:09

## 2020-09-04 RX ADMIN — FUROSEMIDE 100 MG: 10 INJECTION, SOLUTION INTRAMUSCULAR; INTRAVENOUS at 12:09

## 2020-09-04 RX ADMIN — MAGNESIUM SULFATE IN WATER 2 G: 40 INJECTION, SOLUTION INTRAVENOUS at 11:09

## 2020-09-04 RX ADMIN — HEPARIN SODIUM 5000 UNITS: 5000 INJECTION INTRAVENOUS; SUBCUTANEOUS at 09:09

## 2020-09-04 RX ADMIN — CARVEDILOL 25 MG: 25 TABLET, FILM COATED ORAL at 08:09

## 2020-09-04 RX ADMIN — HYDROMORPHONE HYDROCHLORIDE 1 MG: 1 INJECTION, SOLUTION INTRAMUSCULAR; INTRAVENOUS; SUBCUTANEOUS at 03:09

## 2020-09-04 RX ADMIN — IPRATROPIUM BROMIDE AND ALBUTEROL SULFATE 3 ML: .5; 2.5 SOLUTION RESPIRATORY (INHALATION) at 08:09

## 2020-09-04 RX ADMIN — IPRATROPIUM BROMIDE AND ALBUTEROL SULFATE 3 ML: .5; 2.5 SOLUTION RESPIRATORY (INHALATION) at 04:09

## 2020-09-04 RX ADMIN — POTASSIUM CHLORIDE 40 MEQ: 29.8 INJECTION, SOLUTION INTRAVENOUS at 11:09

## 2020-09-04 RX ADMIN — PIPERACILLIN SODIUM AND TAZOBACTAM SODIUM 4.5 G: 4; .5 INJECTION, POWDER, LYOPHILIZED, FOR SOLUTION INTRAVENOUS at 08:09

## 2020-09-04 RX ADMIN — HYDROMORPHONE HYDROCHLORIDE 0.5 MG: 1 INJECTION, SOLUTION INTRAMUSCULAR; INTRAVENOUS; SUBCUTANEOUS at 06:09

## 2020-09-04 RX ADMIN — FAMOTIDINE 20 MG: 10 INJECTION INTRAVENOUS at 08:09

## 2020-09-04 RX ADMIN — HYDROMORPHONE HYDROCHLORIDE 0.5 MG: 1 INJECTION, SOLUTION INTRAMUSCULAR; INTRAVENOUS; SUBCUTANEOUS at 11:09

## 2020-09-04 RX ADMIN — NICARDIPINE HYDROCHLORIDE 2.5 MG/HR: 0.2 INJECTION, SOLUTION INTRAVENOUS at 01:09

## 2020-09-04 RX ADMIN — IPRATROPIUM BROMIDE AND ALBUTEROL SULFATE 3 ML: .5; 2.5 SOLUTION RESPIRATORY (INHALATION) at 12:09

## 2020-09-04 RX ADMIN — HEPARIN SODIUM 5000 UNITS: 5000 INJECTION INTRAVENOUS; SUBCUTANEOUS at 01:09

## 2020-09-04 RX ADMIN — ASCORBIC ACID, VITAMIN A PALMITATE, CHOLECALCIFEROL, THIAMINE HYDROCHLORIDE, RIBOFLAVIN-5 PHOSPHATE SODIUM, PYRIDOXINE HYDROCHLORIDE, NIACINAMIDE, DEXPANTHENOL, ALPHA-TOCOPHEROL ACETATE, VITAMIN K1, FOLIC ACID, BIOTIN, CYANOCOBALAMIN: 200; 3300; 200; 6; 3.6; 6; 40; 15; 10; 150; 600; 60; 5 INJECTION, SOLUTION INTRAVENOUS at 09:09

## 2020-09-04 RX ADMIN — CARVEDILOL 25 MG: 25 TABLET, FILM COATED ORAL at 09:09

## 2020-09-04 RX ADMIN — HYDROMORPHONE HYDROCHLORIDE 0.5 MG: 1 INJECTION, SOLUTION INTRAMUSCULAR; INTRAVENOUS; SUBCUTANEOUS at 08:09

## 2020-09-04 RX ADMIN — POTASSIUM CHLORIDE 40 MEQ: 29.8 INJECTION, SOLUTION INTRAVENOUS at 01:09

## 2020-09-04 RX ADMIN — FLUCONAZOLE 200 MG: 2 INJECTION, SOLUTION INTRAVENOUS at 08:09

## 2020-09-04 RX ADMIN — IPRATROPIUM BROMIDE AND ALBUTEROL SULFATE 3 ML: .5; 2.5 SOLUTION RESPIRATORY (INHALATION) at 09:09

## 2020-09-04 RX ADMIN — HYDROMORPHONE HYDROCHLORIDE 0.5 MG: 1 INJECTION, SOLUTION INTRAMUSCULAR; INTRAVENOUS; SUBCUTANEOUS at 03:09

## 2020-09-04 RX ADMIN — NIFEDIPINE 60 MG: 30 TABLET, FILM COATED, EXTENDED RELEASE ORAL at 08:09

## 2020-09-04 RX ADMIN — HYDROMORPHONE HYDROCHLORIDE 0.5 MG: 1 INJECTION, SOLUTION INTRAMUSCULAR; INTRAVENOUS; SUBCUTANEOUS at 04:09

## 2020-09-04 RX ADMIN — HYDROMORPHONE HYDROCHLORIDE 0.5 MG: 1 INJECTION, SOLUTION INTRAMUSCULAR; INTRAVENOUS; SUBCUTANEOUS at 09:09

## 2020-09-04 NOTE — CONSULTS
Patient seen for wound consult for sacrum and follow up for bilateral shin discoloration.    Bilateral lower shin discoloration fading, appears to be improving well. Recommend continue foam dressing changes twice a week, monitor for any deterioration.     Partial thickness skin loss noted to patient's left lower buttocks, unknown etiology. Recommend BID/prn clean with bathing cloths, apply Triad barrier cream.  Patient currently on SICU on Johanne low airloss surface, Turn every 2hrs.    Nursing to continue care. Wound care to follow prn.      Left lower buttocks: partial thickness skin loss, moist pink wound bed, no drainage or odor noted, periwound intact. 1cm x 2.5cm x 0.1cm    Left shin: fading light pink/purple discoloration    Right shin: fading light pink/purple discoloration

## 2020-09-04 NOTE — PT/OT/SLP PROGRESS
Physical Therapy Treatment    Patient Name:  Jaquan Farmer   MRN:  [id number]  Admit Date: 2020  Admitting Diagnosis:  Duodenum disorder   Length of Stay: 23 days  Recent Surgery: Procedure(s) (LRB):  INSERTION-CATHETER-RUDY (Left)  BRONCHOSCOPY (N/A)  INSERTION, CATHETER, INTERCOSTAL, FOR DRAINAGE (Left)  REPLACEMENT, WOUND VAC (N/A) 4 Days Post-Op    Recommendations:     Discharge Recommendations:  rehabilitation facility   Discharge Equipment Recommendations: (TBD)   Barriers to discharge: None    Plan:     During this hospitalization, patient to be seen 4 x/week to address the listed problems via gait training, therapeutic activities, therapeutic exercises, neuromuscular re-education  · Plan of Care Expires:  10/01/20   Plan of Care Reviewed with: patient    Assessment:     Jaquan Farmer is a 39 y.o. female admitted with a medical diagnosis of Duodenum disorder. Patient was found awake in bed awaiting skin integrity assessment. Patient was participatory entire session and has shown improvement in exercise tolerance. Patient needed modA for bed mob and supine>sit. Patient needed Eduardo for sit>stand t/f and standing bal. Patient standing bal improved with use of RW and Eduardo for 3 min while performing ADLs. Plan to continue to work on exercise tolerance and balance next treatment session with more bouts of gait training pending Pt tolerance.     Problem List: weakness, impaired endurance, impaired self care skills, impaired functional mobilty, impaired balance, decreased lower extremity function.  Rehab Prognosis: Good     GOALS:   Multidisciplinary Problems     Physical Therapy Goals        Problem: Physical Therapy Goal    Goal Priority Disciplines Outcome Goal Variances Interventions   Physical Therapy Goal     PT, PT/OT Ongoing, Progressing     Description: Goals to be met by: 9/15/2020     Patient will increase functional independence with mobility by performin. Supine to sit with  "MInimal Assistance  2. Sit to stand transfer with Minimal Assistance with LRAD - met (9/4)  3. Gait  x 50 feet with Minimal Assistance using LRAD.   4. Stand for 3 minutes with Contact Guard Assistance using LRAD  5. Lower extremity exercise program x15 reps per handout, with independence                     Subjective   Communicated with RN prior to session.  Patient found HOB elevated upon PT entry to room, agreeable to evaluation. Jaquan Farmer was alone present during session.    Chief Complaint: generalized weakness  Patient/Family Comments/goals: to get better and return home  Pain/Comfort:  · Pain Rating 1: 0/10  · Pain Rating Post-Intervention 1: 0/10    Objective:   Patient found with: telemetry, pulse ox (continuous), blood pressure cuff, central line, peripheral IV, GODFREY drain, PureWick, chest tube, wound vac(G Tube)   General Precautions: Standard, Cardiac fall   Orthopedic Precautions:N/A   Braces: N/A   Oxygen Device: High Flow Nasal Cannula 8L  Vitals: /79   Pulse (!) 113   Temp 98.3 °F (36.8 °C) (Oral)   Resp (!) 26   Ht 5' 2" (1.575 m)   Wt 63.5 kg (139 lb 15.9 oz)   SpO2 100%   Breastfeeding No   BMI 25.60 kg/m²     Outcome Measures:  AM-PAC 6 CLICK MOBILITY  Turning over in bed (including adjusting bedclothes, sheets and blankets)?: 2  Sitting down on and standing up from a chair with arms (e.g., wheelchair, bedside commode, etc.): 2  Moving from lying on back to sitting on the side of the bed?: 2  Moving to and from a bed to a chair (including a wheelchair)?: 2  Need to walk in hospital room?: 2  Climbing 3-5 steps with a railing?: 1  Basic Mobility Total Score: 11     Cognition:   · Alert and Cooperative  · AxOx4  · Command following: Follows multistep  commands  · Fluency: clear/fluent    Functional Mobility:  Additional staff present: OT  Bed Mobility:    Rolling/Turning to Right: moderate assistance   Supine to Sit: moderate assistance; HOB elevated    Scooting anteriorly " "to EOB to have both feet planted on floor: moderate assistance   o PT provided cuing for increased use of UE during bed mob  o Cued weight shifting to assist with scooting to EOB    Sitting Balance at Edge of Bed:   Assistance Level Required: Stand-by Assistance   Time: 5 minutes   Postural deviations noted: no deviations noted   Facilitated: PT cued ankle pumps and deep breathing to address Pt dizziness when t/f supine>sit   Comments: Skin integrity assessment performed by WoundCare Nurse and RN while seated    Transfers:    Sit <> Stand Transfer: minimum assistance with no assistive device x4 trials  o 2 trials from bed; 2 trials from chair with RW  o PT facilitated hip extension to assist with forward advancement of hip and engage back ext  o PT provided education on RW management   Bed <> Chair Transfer: patient ambulated 5ft with minimum assistance with hand-held assist  o Chair 5ft in front of patient      Gait:  · Patient ambulated: 5ft + 5ft +5ft (bed>chair>table (perfromed standing ADLs>chair)   · Patient required: minimal assist  · Patient used: rolling walker   · RW used for trial 2 and 3  · Gait Pattern observed: 2-point gait  · Gait Deviation(s): occasional unsteady gait and decreased step length  · Impairments due to: impaired balance, decreased strength, decreased endurance and impaired postural control  · Comments: PT facilitated hip and back ext to improve gait stability and standing balance    Stairs:  Not performed    Therapeutic Activities, Exercises, and Education:   Pt educated on PT role / POC  Pt educated on importance of progressive mobility  Pt educated on LE exercises to perform daily  Pt educated on RW management    Therapeutic Activity:   Pt performed sit>stand x2 trails from bed (trial 1 used for skin assessment), and Pt demonstrated fair standing balance.     PT facilitated hip ext and patient was cognitively aware of weakness 2nd to saying Pt "thank you"   Pt performed " sit>stand x2 trials from chair with RW and assisted facilitation of hip ext from PT    1st trail Pt needed to sit for BM    2nd trial Pt able to ambulate 5ft forward +5ft backward to bed side table to perfome ADLs with OT          Patient left up in chair with all lines intact and call button in reach..    Time Tracking:     PT Received On: 09/04/20  PT Start Time: 0950     PT Stop Time: 1028  PT Total Time (min): 38 min     Billable Minutes:   · Gait Training 15 and Therapeutic Activity 23    Treatment Type: Treatment  PT/PTA: PT       Bonnie Tolbert, PT, DPT  9/4/2020  [phone]

## 2020-09-04 NOTE — PLAN OF CARE
Problem: Occupational Therapy Goal  Goal: Occupational Therapy Goal  Description: Goals to be met by: 9/11/20     Patient will increase functional independence with ADLs by performing:    Feeding with [place].  UE Dressing with Stand-by Assistance.  LE Dressing with Minimal Assistance.  Grooming while standing at sink with Contact Guard Assistance.  Toileting from bedside commode with Minimal Assistance for hygiene and clothing management.   Toilet transfer to bedside commode with Minimal Assistance.    Outcome: Ongoing, Progressing   The above goals remain appropriate. CHRISTIANO Head  9/4/2020

## 2020-09-04 NOTE — PROGRESS NOTES
[facility]  Critical Care - Surgery  Progress Note    Patient Name: Jaquan Farmer  MRN: [id number]  Admission Date: 8/12/2020  Hospital Length of Stay: 23 days  Code Status: Full Code  Attending Provider: Donis Roa MD  Primary Care Provider: Primary Doctor No   Principal Problem: Duodenum disorder    Subjective:     Hospital/ICU Course:  8/14 Patient had a line replaced along with central line placed, with complication of pneumothorax. Rt pigtail was placed. Patient tolerated complications well. Decreasing vent settings.  8/15 NAEO. Patient was taken back to OR early AM for washout. Possible closure 8/18. ETT exchanged in OR 7.0 -> 7.5  8/16 - NAEON. HDS. Intubated, sedated.   8/17 - NAEON. OR tomorrow for closure.  8/18 - Washed out and closed in OR  8/22 - NAEO. Plan for Repeat CT AP today  8/23 - NAEO. Hbg lower. Giving blood x2 units.  8/25 - NAEO. Went to OR yesterday for Abdominal wall exploration and ligation of bleeding vessels. Wound vac exchanged  8/26 - NAEO. Hypertensive overnight. Hgb trending. Plan for CT today  8/27 - NAEO. Hypertensive overnight. CT yesterday showed no extravasation or acute changes  8/28 - NAEO.   8/29 - Patient in respiratory distress upon arrival. Chest XR w/ obvious left sided pulmonary occlusion. Patient intubated and bronchoscopy performed.  8/30 - Repeat Bronch today. Possible extubation later today  8/31 - Extubated yesterday. Wound vac change, bronch scheduled for OR today. Patient out of bed in chair on AM exam.  9/1 - Doing well without complaints this morning.  L sided permacath placed yesterday  9/2 - NAEO.  Continues to be hypertensive/tachycardic.  9/3 - NAEO.  G-tube clamped by primary team.  Increased output from superior GODFREY  9/4 - Overnight 2 episodes of SOB and increased WOB. CXR stable and ABG without significant abnormality. Placed on comfort flow. Cardene titrated off and oral BP medications increased.     Interval  History/Significant Events: 2 episodes of shortness of breath and increased work of breathing overnight which resolved with the addition of supplemental oxygen.  Cardene titrated overnight and oral blood pressure medications increased.     Follow-up For: Procedure(s) (LRB):  INSERTION-CATHETER-RUDY (Left)  BRONCHOSCOPY (N/A)  INSERTION, CATHETER, INTERCOSTAL, FOR DRAINAGE (Left)  REPLACEMENT, WOUND VAC (N/A)    Post-Operative Day: 4 Days Post-Op    Objective:     Vital Signs (Most Recent):  Temp: 98.7 °F (37.1 °C) (09/03/20 2308)  Pulse: (!) 120 (09/04/20 0615)  Resp: (!) 23 (09/04/20 0615)  BP: (!) 140/87 (09/04/20 0615)  SpO2: 100 % (09/04/20 0615) Vital Signs (24h Range):  Temp:  [98.6 °F (37 °C)-99 °F (37.2 °C)] 98.7 °F (37.1 °C)  Pulse:  [] 120  Resp:  [20-59] 23  SpO2:  [92 %-100 %] 100 %  BP: (132-203)/() 140/87     Weight: 63.5 kg (139 lb 15.9 oz)  Body mass index is 25.6 kg/m².      Intake/Output Summary (Last 24 hours) at 9/4/2020 0655  Last data filed at 9/4/2020 0600  Gross per 24 hour   Intake 2707.42 ml   Output 3600 ml   Net -892.58 ml       Physical Exam  Constitutional:       General: She is not in acute distress.  HENT:      Head: Normocephalic and atraumatic.   Neck:      Comments: RIJ trialysis line, L IJ permacath  Cardiovascular:      Rate and Rhythm: Tachycardia present.      Comments: Hypertensive, sinus tach  L sided chest tube in place  Pulmonary:      Breath sounds: No wheezing.      Comments: tachypneic  Maintaining adequate SpO2 on room air  Abdominal:      General: Abdomen is flat. There is no distension.      Tenderness: There is no abdominal tenderness.      Comments: Drains and G tube with bilious output; G-tube clamped  Wound vac in place   Skin:     General: Skin is warm and dry.   Neurological:      General: No focal deficit present.      Mental Status: She is alert and oriented to person, place, and time.   Psychiatric:         Mood and Affect: Mood normal.          Behavior: Behavior normal.         Vents:  Vent Mode: A/C (08/30/20 1045)  Ventilator Initiated: Yes(chart correction) (08/29/20 0800)  Set Rate: 16 BPM (08/30/20 1045)  Vt Set: 380 mL (08/30/20 1045)  PEEP/CPAP: 5 cmH20 (08/30/20 1045)  Oxygen Concentration (%): 50 (09/04/20 0600)  Peak Airway Pressure: 21 cmH2O (08/30/20 1045)  Plateau Pressure: 0 cmH20 (08/30/20 0900)  Total Ve: 7.12 mL (08/30/20 0900)  Negative Inspiratory Force (cm H2O): -23 (08/20/20 1501)  F/VT Ratio<105 (RSBI): (!) 52.94 (08/30/20 1045)    Lines/Drains/Airways     Central Venous Catheter Line            Trialysis (Dialysis) Catheter 08/13/20 2230 right internal jugular 21 days    Permacath 08/31/20 1100 left subclavian 3 days          Drain                 Closed/Suction Drain 08/13/20 1659 Right;Inferior Abdomen Bulb 19 Fr. 21 days         Closed/Suction Drain 08/13/20 1659 Right;Superior Abdomen Bulb 19 Fr. 21 days         Gastrostomy/Enterostomy 08/15/20 0916 Gastrostomy tube w/ balloon LUQ decompression 19 days    Female External Urinary Catheter 08/27/20 1600 7 days         Chest Tube 08/31/20 1033 1 Left 24 Fr. 3 days          Peripheral Intravenous Line                 Midline Catheter Insertion/Assessment  - Single Lumen 08/26/20 1501 Right basilic vein (medial side of arm) 18g x 8cm 8 days                Significant Labs:    CBC/Anemia Profile:  Recent Labs   Lab 09/03/20  0403 09/04/20  0300   WBC 27.98* 25.42*   HGB 8.2* 7.7*   HCT 27.2* 24.8*   * 360*   MCV 99* 97   RDW 14.6* 14.6*        Chemistries:  Recent Labs   Lab 09/03/20  0403 09/03/20  1327 09/03/20  1916 09/04/20  0300     143 145 145 143  143   K 2.9*  2.9* 3.0* 2.8* 3.0*  3.0*     108 110 110 109  109   CO2 21*  21* 23 24 22*  22*   BUN 78*  78* 83* 84* 89*  89*   CREATININE 3.5*  3.5* 3.5* 3.6* 3.6*  3.6*   CALCIUM 8.9  8.9 8.8 8.7 8.5*  8.5*   ALBUMIN 1.4*  1.4* 1.5* 1.9* 1.8*  1.8*   PROT 7.4  --   --  7.3   BILITOT 0.4  --   --   0.4   ALKPHOS 176*  --   --  182*   ALT 9*  --   --  10   AST 26  --   --  22   MG 1.8  --  1.6 2.1  2.1   PHOS 2.3*  2.3* 1.8* 1.8* 2.0*  2.0*       All pertinent labs within the past 24 hours have been reviewed.    Significant Imaging:  I have reviewed all pertinent imaging results/findings within the past 24 hours.    Assessment/Plan:     Severe sepsis  40 yo F s/p antrectomy with BII reconstruction c/b duodenal necrosis requiring ex lap, washout, drainage c/b aspiration event. Now improving in SICU.  Permacath installed 8/31 for possible dialysis needs.     .Neuro:  - AOx4  - PCA dicontinued; IV dilaudid PRN with increasing requirements today  - Pain management consult placed; appreciate assistance  - Clonidine patch for anxiety     Resp:  - Intubated 8/29, extubated 8/30  - Satting well supplemental O2 (comfort flow 25L and 50%)  - Chest tube placed 8/31; CT currently to suction with 300cc output last 24 hrs     CV: Persistent, refractory hypertension.  - MAP goal >65  - Coreg 25mg BID  - Nifedipine 60 mg QD  - Labetalol PRN  - Hydralazine PRN  - Permacath  Placed 8/31  - Cardene gtt stopped     Heme:  - Hgb/Hct stable  - S/p Abdominal washout on 8/21   - S/p Abdominal exploration w/ woundvac exhange 8/24; exchange 8/31     ID:  - WBC 25.4 today; pt afebrile  - Cont Zosyn     Renal: Oliguric on arrival; Cr 1.0 at that time, now making adequate urine with increasing BUN/Cr. May represent non-oliguric phase of FLORIDALMA      -  I&Os inaccurate due to unmonitored PO intake.  - BUN/Cr cont to increase   -  Will discuss resuming dialysis with Nephrology as patient is fluid overloaded  - Trend BUN/Cr daily     FEN/GI:  - NPO  - G tube clamped  - TPN + Lipid  - Maintain drains to bulb suction  - Replace electrolytes as needed to keep K>4, Mg>2; gentle repletion due to renal status     Endo:  - Monitor BG     DVT PPx: SQH 5kU Q8h  GI PPx: famotidine 20mg IV daily    Dispo:  - Continue SICU care.          Critical care was  time spent personally by me on the following activities: development of treatment plan with patient or surrogate and bedside caregivers, discussions with consultants, evaluation of patient's response to treatment, examination of patient, ordering and performing treatments and interventions, ordering and review of laboratory studies, ordering and review of radiographic studies, pulse oximetry, re-evaluation of patient's condition.  This critical care time did not overlap with that of any other provider or involve time for any procedures.     Rashaun Randall MD  Critical Care - Surgery  [facility]

## 2020-09-04 NOTE — PLAN OF CARE
Problem: Physical Therapy Goal  Goal: Physical Therapy Goal  Description: Goals to be met by: 9/15/2020     Patient will increase functional independence with mobility by performin. Supine to sit with MInimal Assistance  2. Sit to stand transfer with Minimal Assistance with LRAD - met ()  3. Gait  x 50 feet with Minimal Assistance using LRAD.   4. Stand for 3 minutes with Contact Guard Assistance using LRAD  5. Lower extremity exercise program x15 reps per handout, with independence    Outcome: Ongoing, Progressing     Treatment completed. Goal #2 met. Goals remain appropriate.

## 2020-09-04 NOTE — PLAN OF CARE
"      SICU PLAN OF CARE NOTE    Dx: Duodenum disorder    Shift Events: one episode of labored tachypnea breathing. ABG/chest xray ordered. Started on Comfort flow    Goals of Care: blood pressure control    Neuro: AAO x4, Follows Commands, and Moves All Extremities    Vital Signs: BP (!) 140/87   Pulse (!) 120   Temp 98.7 °F (37.1 °C) (Oral)   Resp (!) 23   Ht 5' 2" (1.575 m)   Wt 66 kg (145 lb 8.1 oz)   SpO2 100%   Breastfeeding No   BMI 26.61 kg/m²     Respiratory: comfort flow 30L/50%    Diet: Clear Liquid and TPN      Urine Output: Urinary Catheter 800 cc/shift    Drains: Chest Tube, total output 100 cc /  shift  GODFREY 27  GODFREY 38  G tube: 950 cc shift    Wound Vac 100 cc output for shift     Labs/Accuchecks: Q6H accuchecks    Skin: patient turned Q2H. Contact moisture dermatitis on sacrum, cleansed and foam applied. Two blood blisters on shins bilaterally. Heels with heel foams on. No other skin breakdown noted. Johanne bed applied.        "

## 2020-09-04 NOTE — PLAN OF CARE
SW notified that [facility] declined patient. [facility] still following.     Jazzy Eid, SELENE   - Case Management

## 2020-09-04 NOTE — PROGRESS NOTES
[facility]  General Surgery  Progress Note    Subjective:     History of Present Illness:  Pt is a 38 yo F with recent history of antrectomy with BII reconstruction for ulcer disease at outside hospital. 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 Patient also reportedly aspirated on induction in the OR prior to this, resulting in severe pulmonary edema and hypoxia. Patient was transferred to [facility] urgently for higher level of care. She arrived as a direct SICU admit on near maximal vent settings and requiring low dose vasopressors with HR in the upper 140s. Her midline laparotomy is closed with 4 Navdeep drains in place draining bilious output.     Post-Op Info:  Procedure(s) (LRB):  INSERTION-CATHETER-RUDY (Left)  BRONCHOSCOPY (N/A)  INSERTION, CATHETER, INTERCOSTAL, FOR DRAINAGE (Left)  REPLACEMENT, WOUND VAC (N/A)   4 Days Post-Op     Interval History:   Episodes of shortness of breath overnight requiring increased supplemental O2. ABG significant for decreased pO2. CXR demonstrated no acute process.     Medications:  Continuous Infusions:   TPN ADULT CENTRAL LINE CUSTOM 50 mL/hr at 09/04/20 0814    TPN ADULT CENTRAL LINE CUSTOM       Scheduled Meds:   albuterol-ipratropium  3 mL Nebulization Q4H    carvediloL  25 mg Oral BID    cloNIDine 0.3 mg/24 hr td ptwk  1 patch Transdermal Q7 Days    [START ON 9/5/2020] famotidine  20 mg Per G Tube Daily    furosemide (LASIX) IV  100 mg Intravenous Once    heparin (porcine)  5,000 Units Subcutaneous Q8H    lidocaine  1 patch Transdermal Q24H    lipid (SMOFLIPID)  250 mL Intravenous Daily    miconazole NITRATE 2 %   Topical (Top) BID    NIFEdipine  60 mg Oral Daily    potassium chloride in water  40 mEq Intravenous Q4H     PRN Meds:sodium chloride, hydrALAZINE, HYDROmorphone, HYDROmorphone, labetaloL, melatonin, metoprolol     Review of patient's allergies indicates:   Allergen Reactions     Latex      Objective:     Vital Signs (Most Recent):  Temp: 98.3 °F (36.8 °C) (09/04/20 1100)  Pulse: 106 (09/04/20 1238)  Resp: (!) 28 (09/04/20 1238)  BP: 124/78 (09/04/20 1200)  SpO2: 100 % (09/04/20 1238) Vital Signs (24h Range):  Temp:  [98.3 °F (36.8 °C)-99 °F (37.2 °C)] 98.3 °F (36.8 °C)  Pulse:  [] 106  Resp:  [16-59] 28  SpO2:  [92 %-100 %] 100 %  BP: (116-203)/() 124/78     Weight: 63.5 kg (139 lb 15.9 oz)  Body mass index is 25.6 kg/m².    Intake/Output - Last 3 Shifts       09/02 0700 - 09/03 0659 09/03 0700 - 09/04 0659 09/04 0700 - 09/05 0659    P.O.  140 250    I.V. (mL/kg) 582 (8.4) 1233.4 (19.4)     Other 240      TPN 1437 1334     Total Intake(mL/kg) 2259 (32.6) 2707.4 (42.6) 250 (3.9)    Urine (mL/kg/hr) 950 (0.6) 1100 (0.7) 350 (1)    Drains 1152 1960 373    Other 50 200     Stool 0 0     Chest Tube 220 390 100    Total Output 2372 3650 823    Net -113 -942.6 -573           Urine Occurrence 1 x 1 x     Stool Occurrence 0 x 4 x           Physical Exam  Constitutional:       General: She is not in acute distress.  Neck:      Comments: RIJ trialysis line, L IJ permacath  Cardiovascular:      Rate and Rhythm: Tachycardia present.   Pulmonary:      Effort: Pulmonary effort is normal.      Comments: L sided chest tube in place  Abdominal:      General: There is no distension.      Tenderness: There is no abdominal tenderness.      Comments: G tube clamped  Drains with bilious output  Wound vac in place   Skin:     General: Skin is warm and dry.   Neurological:      Mental Status: She is alert.         Significant Labs:  CBC:   Recent Labs   Lab 09/04/20  0300   WBC 25.42*   RBC 2.55*   HGB 7.7*   HCT 24.8*   *   MCV 97   MCH 30.2   MCHC 31.0*     CMP:   Recent Labs   Lab 09/04/20  0300   *  148*   CALCIUM 8.5*  8.5*   ALBUMIN 1.8*  1.8*   PROT 7.3     143   K 3.0*  3.0*   CO2 22*  22*     109   BUN 89*  89*   CREATININE 3.6*  3.6*   ALKPHOS 182*   ALT 10    AST 22   BILITOT 0.4       Significant Diagnostics:  I have reviewed all pertinent imaging results/findings within the past 24 hours.        Assessment/Plan:     Severe sepsis  40 yo F s/p antrectomy with BII reconstruction c/b duodenal necrosis requiring ex lap, washout, drainage c/b aspiration event. S/p duodenal resection with d-j and g-j anastomosis.    - On 10L high flow NC. Continue to wean as tolerated  - NPO  - Keep G tube clamped, unclamp for nausea/vomiting  - Continue abx  - TPN   - Daily labs  - GI and DVT ppx  - PT / OT  - continue PRNs for BP control  - WV change at bedside today        Collette Ferreira MD  General Surgery  [facility]

## 2020-09-04 NOTE — PROGRESS NOTES
Letter printed and mailed to the address given.   Jeromy Bhardwaj, CMA     [facility]  Nephrology  Progress Note    Patient Name: Jaquan Farmer  MRN: [id number]  Admission Date: 8/12/2020  Hospital Length of Stay: 23 days  Attending Provider: Donis Roa MD   Primary Care Physician: Primary Doctor No  Principal Problem:Duodenum disorder    Subjective:     HPI: Mrs. Farmer is a 39 year old female with antrectomy at osh complicated duodenal necrosis post op. She aspirated, was intubated, and became septic. She developed renal failure and required max vent settings and was transferred here for higher level of care. On arrival she was hypotensive on pressers, max vent setting, and had minimal urine output.  Overnight she was given 6L of fluid, 6g calcium, placed on vac, pip-tazo, and fluconazole. Nephro consulted for sid.    Interval History: Patient seen and examined at bedside, no acute events overnight. UOP at 1.1 L over the past 24 hours.     Review of patient's allergies indicates:   Allergen Reactions    Latex      Current Facility-Administered Medications   Medication Frequency    0.9%  NaCl infusion (for blood administration) Q24H PRN    albuterol-ipratropium 2.5 mg-0.5 mg/3 mL nebulizer solution 3 mL Q4H    carvediloL tablet 25 mg BID    cloNIDine 0.3 mg/24 hr td ptwk 1 patch Q7 Days    [START ON 9/5/2020] famotidine tablet 20 mg Daily    heparin (porcine) injection 5,000 Units Q8H    hydrALAZINE injection 20 mg Q6H PRN    HYDROmorphone injection 0.5 mg Q3H PRN    HYDROmorphone injection 1 mg Q3H PRN    labetalol 20 mg/4 mL (5 mg/mL) IV syring Q4H PRN    lidocaine 5 % patch 1 patch Q24H    lipid (SMOFLIPID) (SMOFLIPID) 20 % infusion 250 mL Daily    melatonin tablet 3 mg Nightly PRN    metoprolol injection 5 mg Q4H PRN    miconazole NITRATE 2 % top powder BID    NIFEdipine 24 hr tablet 60 mg Daily    potassium chloride 40 mEq in 100 mL IVPB (FOR CENTRAL LINE ADMINISTRATION ONLY) Q4H    TPN ADULT CENTRAL LINE CUSTOM Continuous    TPN  ADULT CENTRAL LINE CUSTOM Continuous       Objective:     Vital Signs (Most Recent):  Temp: 98.3 °F (36.8 °C) (09/04/20 1100)  Pulse: 110 (09/04/20 1607)  Resp: (!) 24 (09/04/20 1607)  BP: 131/80 (09/04/20 1607)  SpO2: 100 % (09/04/20 1607)  O2 Device (Oxygen Therapy): High Flow nasal Cannula (09/04/20 1607) Vital Signs (24h Range):  Temp:  [98.3 °F (36.8 °C)-99 °F (37.2 °C)] 98.3 °F (36.8 °C)  Pulse:  [] 110  Resp:  [14-59] 24  SpO2:  [92 %-100 %] 100 %  BP: (116-198)/() 131/80     Weight: 63.5 kg (139 lb 15.9 oz) (09/04/20 0600)  Body mass index is 25.6 kg/m².  Body surface area is 1.67 meters squared.    I/O last 3 completed shifts:  In: 4055.4 [P.O.:140; I.V.:1538.4; Other:240]  Out: 4370 [Urine:1450; Drains:2180; Other:250; Chest Tube:490]    Physical Exam  Constitutional:       General: She is not in acute distress.  HENT:      Head: Normocephalic and atraumatic.   Neck:      Comments: RIJ trialysis line, L IJ permacath  Cardiovascular:      Rate and Rhythm: Tachycardia present.      Comments: Hypertensive, sinus tach  L sided chest tube in place  Pulmonary:      Breath sounds: No wheezing.      Comments: tachypneic  Abdominal:      General: Abdomen is flat. There is no distension.      Tenderness: There is no abdominal tenderness.      Comments: Drains and G tube with bilious output  Wound vac in place   Skin:     General: Skin is warm and dry.   Neurological:      General: No focal deficit present.      Mental Status: She is alert and oriented to person, place, and time.   Psychiatric:         Mood and Affect: Mood normal.         Behavior: Behavior normal.         Significant Labs:  CBC:   Recent Labs   Lab 09/04/20  0300   WBC 25.42*   RBC 2.55*   HGB 7.7*   HCT 24.8*   *   MCV 97   MCH 30.2   MCHC 31.0*     CMP:   Recent Labs   Lab 09/04/20  0300 09/04/20  1302   *  148* 130*   CALCIUM 8.5*  8.5* 8.5*   ALBUMIN 1.8*  1.8* 1.7*   PROT 7.3  --      143 143   K 3.0*  3.0*  3.6   CO2 22*  22* 23     109 110   BUN 89*  89* 89*   CREATININE 3.6*  3.6* 3.5*   ALKPHOS 182*  --    ALT 10  --    AST 22  --    BILITOT 0.4  --      All labs within the past 24 hours have been reviewed.     Significant Imaging:  Labs: Reviewed    Assessment/Plan:     * Duodenum disorder  - Management per primary team   - Duodenal necrosis s/p explolatory lap.     Hypertension  Management as per primary         Anemia, chronic disease  Hgb at 7.7    Metabolic acidosis  -secondary to renal failure, stable        Acute renal failure with tubular necrosis  - oliguric stage 3 sid with ischemic atn likely secondary to septic shock  - unknown bl cr  - muddy brown casts on microscopy    Plan/Recommendations:  -no emergent need for RRT today, labs stable and patient has non oliguric range urine output  -UOP at 1.1 L/24 hrs, consider Lasix challenge with 80mg X1 to increase urine output   -Strict I/O's  -Renally dose meds/avoid nephrotoxic meds  -Keep MAP >65   -Will continue to follow closely           Severe sepsis  - Management per primary team         Thank you for your consult. I will follow-up with patient. Please contact us if you have any additional questions.    Nestor Urban MD  Nephrology  [facility]

## 2020-09-04 NOTE — SUBJECTIVE & OBJECTIVE
Interval History: Patient seen and examined at bedside, no acute events overnight. UOP at 1.1 L over the past 24 hours.     Review of patient's allergies indicates:   Allergen Reactions    Latex      Current Facility-Administered Medications   Medication Frequency    0.9%  NaCl infusion (for blood administration) Q24H PRN    albuterol-ipratropium 2.5 mg-0.5 mg/3 mL nebulizer solution 3 mL Q4H    carvediloL tablet 25 mg BID    cloNIDine 0.3 mg/24 hr td ptwk 1 patch Q7 Days    [START ON 9/5/2020] famotidine tablet 20 mg Daily    heparin (porcine) injection 5,000 Units Q8H    hydrALAZINE injection 20 mg Q6H PRN    HYDROmorphone injection 0.5 mg Q3H PRN    HYDROmorphone injection 1 mg Q3H PRN    labetalol 20 mg/4 mL (5 mg/mL) IV syring Q4H PRN    lidocaine 5 % patch 1 patch Q24H    lipid (SMOFLIPID) (SMOFLIPID) 20 % infusion 250 mL Daily    melatonin tablet 3 mg Nightly PRN    metoprolol injection 5 mg Q4H PRN    miconazole NITRATE 2 % top powder BID    NIFEdipine 24 hr tablet 60 mg Daily    potassium chloride 40 mEq in 100 mL IVPB (FOR CENTRAL LINE ADMINISTRATION ONLY) Q4H    TPN ADULT CENTRAL LINE CUSTOM Continuous    TPN ADULT CENTRAL LINE CUSTOM Continuous       Objective:     Vital Signs (Most Recent):  Temp: 98.3 °F (36.8 °C) (09/04/20 1100)  Pulse: 110 (09/04/20 1607)  Resp: (!) 24 (09/04/20 1607)  BP: 131/80 (09/04/20 1607)  SpO2: 100 % (09/04/20 1607)  O2 Device (Oxygen Therapy): High Flow nasal Cannula (09/04/20 1607) Vital Signs (24h Range):  Temp:  [98.3 °F (36.8 °C)-99 °F (37.2 °C)] 98.3 °F (36.8 °C)  Pulse:  [] 110  Resp:  [14-59] 24  SpO2:  [92 %-100 %] 100 %  BP: (116-198)/() 131/80     Weight: 63.5 kg (139 lb 15.9 oz) (09/04/20 0600)  Body mass index is 25.6 kg/m².  Body surface area is 1.67 meters squared.    I/O last 3 completed shifts:  In: 4055.4 [P.O.:140; I.V.:1538.4; Other:240]  Out: 4370 [Urine:1450; Drains:2180; Other:250; Chest Tube:490]    Physical  Exam  Constitutional:       General: She is not in acute distress.  HENT:      Head: Normocephalic and atraumatic.   Neck:      Comments: RIJ trialysis line, L IJ permacath  Cardiovascular:      Rate and Rhythm: Tachycardia present.      Comments: Hypertensive, sinus tach  L sided chest tube in place  Pulmonary:      Breath sounds: No wheezing.      Comments: tachypneic  Abdominal:      General: Abdomen is flat. There is no distension.      Tenderness: There is no abdominal tenderness.      Comments: Drains and G tube with bilious output  Wound vac in place   Skin:     General: Skin is warm and dry.   Neurological:      General: No focal deficit present.      Mental Status: She is alert and oriented to person, place, and time.   Psychiatric:         Mood and Affect: Mood normal.         Behavior: Behavior normal.         Significant Labs:  CBC:   Recent Labs   Lab 09/04/20  0300   WBC 25.42*   RBC 2.55*   HGB 7.7*   HCT 24.8*   *   MCV 97   MCH 30.2   MCHC 31.0*     CMP:   Recent Labs   Lab 09/04/20  0300 09/04/20  1302   *  148* 130*   CALCIUM 8.5*  8.5* 8.5*   ALBUMIN 1.8*  1.8* 1.7*   PROT 7.3  --      143 143   K 3.0*  3.0* 3.6   CO2 22*  22* 23     109 110   BUN 89*  89* 89*   CREATININE 3.6*  3.6* 3.5*   ALKPHOS 182*  --    ALT 10  --    AST 22  --    BILITOT 0.4  --      All labs within the past 24 hours have been reviewed.     Significant Imaging:  Labs: Reviewed

## 2020-09-04 NOTE — ANESTHESIA POST-OP PAIN MANAGEMENT
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  Hospital course complicated by duodenal necrosis requiring an ex lap. Patient also developed aspiration PNA resulting in acute hypoxic RF and intubation.  She also developed ARF 2/2 to ATN requiring CRRT.  She has since been extubated and is RA.  She is s/p multiple abdominal surgeries including duodenal resection and anastomoses.  Patient has been on a PCA bump that has since been discontinued and has had increased frequency and dosing of IV narcotics.  APS service consulted to assist with pain management.      Patient describes posterior lumbar pain that radiates along her Bl flanks.  She denies any abdominal pain from her previous surgical incisions. She s tates thatthe PCA pump held better control of her pain two days ago.  She states that she frequently has to request for IV dilaudid due to the intense stabbing back pain.  She currently has a J tube which is clamped and is therefore NPO.     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       Subjective:  Patient initially started on PO medications; however overnight she developed worsening dyspnea and had increased oxygen requirements.  Her G tube has since been clamped again.  In regards to her pain, she states that is  currently well controlled from the use of IV PRN dilaudid.     Review of Systems   Constitutional: Negative for chills and fever.   HENT: Negative.    Eyes: Negative.    Respiratory: Positive for cough and sputum production.    Cardiovascular: Negative for chest pain, palpitations and orthopnea.   Gastrointestinal: Negative for nausea and vomiting.   Musculoskeletal: Positive for back pain. Negative for myalgias.   Skin: Negative.    Neurological: Negative for sensory change and headaches.   Psychiatric/Behavioral: Negative.      Objective:    Vitals   Vitals:    09/04/20 1300   BP: 129/79   Pulse: (!) 113   Resp: (!) 26   Temp:      Physical Exam   Constitutional: She is oriented to person, place, and time.   Patient is a 40 yo F who appears older than stated age, weak and alert.    HENT:   Head: Normocephalic and atraumatic.   Eyes: Pupils are equal, round, and reactive to light. Conjunctivae are normal.   Cardiovascular: Regular rhythm and normal heart sounds.   Tachycardia    Pulmonary/Chest: Effort normal.   Rhonchi     Chest tube   Abdominal: Soft. Bowel sounds are normal. She exhibits no distension.   J tube   Musculoskeletal:         General: No edema.   Neurological: She is alert and oriented to person, place, and time.   Skin: Skin is warm and dry.     Significant Labs: All pertinent labs within the past 24 hours have been reviewed.     Recent Labs   Lab 09/02/20  0324  09/03/20  0403  09/03/20  1916 09/04/20  0300 09/04/20  1302   WBC 28.68*  --  27.98*  --   --  25.42*  --    HGB 7.9*  --  8.2*  --   --  7.7*  --    HCT 25.5*  --  27.2*  --   --  24.8*  --    *  --  409*  --   --  360*  --    MCV 97  --  99*  --   --  97  --    RDW 14.5  --  14.6*  --   --  14.6*  --      142   < > 143  143   < > 145 143  143 143   K 3.4*  3.4*   < > 2.9*  2.9*   < > 2.8* 3.0*  3.0* 3.6     108   < > 108  108   < > 110 109  109 110   CO2 22*  22*   < > 21*  21*   < > 24 22*  22* 23   BUN  59*  59*   < > 78*  78*   < > 84* 89*  89* 89*   CREATININE 3.1*  3.1*   < > 3.5*  3.5*   < > 3.6* 3.6*  3.6* 3.5*   *  134*   < > 131*  131*   < > 148* 148*  148* 130*   PROT 7.0  --  7.4  --   --  7.3  --    ALBUMIN 1.4*  1.4*   < > 1.4*  1.4*   < > 1.9* 1.8*  1.8* 1.7*   BILITOT 0.4  --  0.4  --   --  0.4  --    AST 22  --  26  --   --  22  --    ALKPHOS 146*  --  176*  --   --  182*  --    ALT 9*  --  9*  --   --  10  --     < > = values in this interval not displayed.       Significant Imaging: I have reviewed all pertinent imaging results/findings within the past 24 hours.      Meds   Current Facility-Administered Medications   Medication Dose Route Frequency Provider Last Rate Last Dose    0.9%  NaCl infusion (for blood administration)   Intravenous Q24H PRN Jdaon Tran DO        albuterol-ipratropium 2.5 mg-0.5 mg/3 mL nebulizer solution 3 mL  3 mL Nebulization Q4H Rashaun Randall MD   3 mL at 09/04/20 1238    carvediloL tablet 25 mg  25 mg Oral BID Crow Izaguirre MD   25 mg at 09/04/20 0814    cloNIDine 0.3 mg/24 hr td ptwk 1 patch  1 patch Transdermal Q7 Days Avtar Kim MD   1 patch at 09/02/20 1037    [START ON 9/5/2020] famotidine tablet 20 mg  20 mg Per G Tube Daily Donis Roa MD        heparin (porcine) injection 5,000 Units  5,000 Units Subcutaneous Q8H Neal Singletary MD   5,000 Units at 09/04/20 1303    hydrALAZINE injection 20 mg  20 mg Intravenous Q6H PRN Rashaun Randall MD   20 mg at 09/03/20 1750    HYDROmorphone injection 0.5 mg  0.5 mg Intravenous Q3H PRN Jadon Land MD   0.5 mg at 09/04/20 1126    HYDROmorphone injection 1 mg  1 mg Intravenous Q3H PRN Jadon Land MD   1 mg at 09/04/20 0045    labetalol 20 mg/4 mL (5 mg/mL) IV syring  20 mg Intravenous Q4H PRN Gary Bustillo MD   20 mg at 09/03/20 1616    lidocaine 5 % patch 1 patch  1 patch Transdermal Q24H Jadon Tran DO   1 patch at 09/04/20 1023     lipid (SMOFLIPID) (SMOFLIPID) 20 % infusion 250 mL  250 mL Intravenous Daily Donis Roa MD        melatonin tablet 3 mg  3 mg Per NG tube Nightly PRN Thu Wilson MD        metoprolol injection 5 mg  5 mg Intravenous Q4H PRN Donis Roa MD        miconazole NITRATE 2 % top powder   Topical (Top) BID Jadon Tran DO        NIFEdipine 24 hr tablet 60 mg  60 mg Oral Daily Rashaun Randall MD   60 mg at 09/04/20 0814    potassium chloride 40 mEq in 100 mL IVPB (FOR CENTRAL LINE ADMINISTRATION ONLY)  40 mEq Intravenous Q4H Donis Roa MD 25 mL/hr at 09/04/20 1303 40 mEq at 09/04/20 1303    TPN ADULT CENTRAL LINE CUSTOM   Intravenous Continuous Rashaun Randall MD 50 mL/hr at 09/04/20 1303      TPN ADULT CENTRAL LINE CUSTOM   Intravenous Continuous Donis Roa MD         Assessment:  Patient is a 40 yo F who is admitted for multiple abdominal surgeries in the past month complicated by duodenal necrosis requiring ex lap and washout.  Hospital course also complicated by aspiration event requiring intubation and mechanical ventilation and renal failure requiring CRRT.  She was extubated on 8/31.  Patient had been requiring increasing pain requirements since extubation.  She was initially on a PCA pump until it was discontinued due to oversedation, and she has since been receiving multiple doses of IV dilaudid throughout the day. Currently NPO.      Plan:  - no changes since previous consult note.   - consider restarting the PCA pump if current PRN IV dilaudid therapy becomes unsatisfactory  If prior dosing was making her too sedated, consider using less of a dose, a longer lock out stretch or a lower maximum dose.  For example, 0.2mg with an 8-10 min lockout and a 1-2mg/ hr MAX.    - consider adding 1g IV tylenol q8 hours   - patient would also benefit from a muscle relaxer such as robaxin given her symptoms of lower back pain (non-sedating muscle relaxant); however, we are  limited due to her renal impairment.  Please discuss with the clinical pharmacist to see if there are any dosing adjustments that would allow safe dosing of robaxin. Unfortunately other muscle relaxants may contribute to sedation, which we are trying to avoid.   - consider small dose of IV valium for its muscle relaxant properties with the drawback that it may be also be sedating.   - Otherwise currently limited by no enteral access.  Once patient is able to tolerate enteral intake, consider 10mg of oxycodone q3-4 PRN.  Her current requirements of IV dilaudid have seemed to decrease over the last 24 hours.     Evaluator Brant Rosario

## 2020-09-04 NOTE — PT/OT/SLP PROGRESS
Occupational Therapy   Treatment    Name: Jaquan Farmer  MRN: [id number]  Admitting Diagnosis:  Duodenum disorder  4 Days Post-Op    Recommendations:     Discharge Recommendations: rehabilitation facility  Discharge Equipment Recommendations:  (TBD pending progress)  Barriers to discharge:  None    Assessment:     Jaquan Farmer is a 39 y.o. female with a medical diagnosis of Duodenum disorder.  Pt presents with improved mobility and activity tolerance, but not near PLOF.  Performance deficits affecting function are weakness, impaired functional mobilty, impaired cardiopulmonary response to activity, impaired endurance, gait instability, impaired balance, impaired self care skills, pain.     Rehab Prognosis:  Good; patient would benefit from acute skilled OT services to address these deficits and reach maximum level of function.       Plan:     Patient to be seen 4 x/week to address the above listed problems via self-care/home management, therapeutic activities, therapeutic exercises  · Plan of Care Expires: 09/24/20  · Plan of Care Reviewed with: patient    Subjective     Pain/Comfort:  · Pain Rating 1: (Endorses upper back pain, but does not rate)  · Pain Addressed 1: Reposition, Distraction, Nurse notified, Other (see comments)(heating packs provided)    Objective:     Communicated with: RN prior to session.  Patient found supine with blood pressure cuff, telemetry, pulse ox (continuous), central line, PEG Tube, oxygen, GODFREY drain, chest tube(G-tube clamped; HFNC) upon OT entry to room.    General Precautions: Standard, fall   Orthopedic Precautions:N/A   Braces:       Occupational Performance:     Bed Mobility:    · Patient completed Supine to Sit with moderate assistance     Functional Mobility/Transfers:  · Patient completed Sit <> Stand Transfer with minimum assistance  with  no assistive device from EOB and b/s chair with increased time  · Patient completed Bed <> Chair Transfer using Step Transfer  technique with minimum assistance with no assistive device  · Functional Mobility: Min A  X 5 feet forward and backwards using RW    Activities of Daily Living:  · Grooming: minimum assistance for grooming tasks while standing at b/s table   · Lower Body Dressing: moderate assistance    · Toileting: maximal assistance        AMPAC 6 Click ADL: 15    Treatment & Education:  Pt ed on OT POC  Pt stood at b/s table using RW with Min A for balance 2* posterior lean  Pt unable to perform bimanual activities 2* need for support on RW  Pt ed on ROM ex's 3x daily for increased overall strength and endurance    Patient left up in chair with all lines intact, call button in reach and RN notifiedEducation:      GOALS:   Multidisciplinary Problems     Occupational Therapy Goals        Problem: Occupational Therapy Goal    Goal Priority Disciplines Outcome Interventions   Occupational Therapy Goal     OT, PT/OT Ongoing, Progressing    Description: Goals to be met by: 9/11/20     Patient will increase functional independence with ADLs by performing:    Feeding with [place].  UE Dressing with Stand-by Assistance.  LE Dressing with Minimal Assistance.  Grooming while standing at sink with Contact Guard Assistance.  Toileting from bedside commode with Minimal Assistance for hygiene and clothing management.   Toilet transfer to bedside commode with Minimal Assistance.                     Time Tracking:     OT Date of Treatment: 09/04/20  OT Start Time: 0948  OT Stop Time: 1028  OT Total Time (min): 40 min    Billable Minutes:Self Care/Home Management 28   Therapeutic Exercise: 10    CHRISTIANO Head  9/4/2020

## 2020-09-04 NOTE — ASSESSMENT & PLAN NOTE
- oliguric stage 3 sid with ischemic atn likely secondary to septic shock  - unknown bl cr  - muddy brown casts on microscopy    Plan/Recommendations:  -no emergent need for RRT today, labs stable and patient has non oliguric range urine output  -UOP at 1.1 L/24 hrs, consider Lasix challenge with 80mg X1 to increase urine output   -Strict I/O's  -Renally dose meds/avoid nephrotoxic meds  -Keep MAP >65   -Will continue to follow closely

## 2020-09-04 NOTE — SUBJECTIVE & OBJECTIVE
Interval History:   Episodes of shortness of breath overnight requiring increased supplemental O2. ABG significant for decreased pO2. CXR demonstrated no acute process.     Medications:  Continuous Infusions:   TPN ADULT CENTRAL LINE CUSTOM 50 mL/hr at 09/04/20 0814    TPN ADULT CENTRAL LINE CUSTOM       Scheduled Meds:   albuterol-ipratropium  3 mL Nebulization Q4H    carvediloL  25 mg Oral BID    cloNIDine 0.3 mg/24 hr td ptwk  1 patch Transdermal Q7 Days    [START ON 9/5/2020] famotidine  20 mg Per G Tube Daily    furosemide (LASIX) IV  100 mg Intravenous Once    heparin (porcine)  5,000 Units Subcutaneous Q8H    lidocaine  1 patch Transdermal Q24H    lipid (SMOFLIPID)  250 mL Intravenous Daily    miconazole NITRATE 2 %   Topical (Top) BID    NIFEdipine  60 mg Oral Daily    potassium chloride in water  40 mEq Intravenous Q4H     PRN Meds:sodium chloride, hydrALAZINE, HYDROmorphone, HYDROmorphone, labetaloL, melatonin, metoprolol     Review of patient's allergies indicates:   Allergen Reactions    Latex      Objective:     Vital Signs (Most Recent):  Temp: 98.3 °F (36.8 °C) (09/04/20 1100)  Pulse: 106 (09/04/20 1238)  Resp: (!) 28 (09/04/20 1238)  BP: 124/78 (09/04/20 1200)  SpO2: 100 % (09/04/20 1238) Vital Signs (24h Range):  Temp:  [98.3 °F (36.8 °C)-99 °F (37.2 °C)] 98.3 °F (36.8 °C)  Pulse:  [] 106  Resp:  [16-59] 28  SpO2:  [92 %-100 %] 100 %  BP: (116-203)/() 124/78     Weight: 63.5 kg (139 lb 15.9 oz)  Body mass index is 25.6 kg/m².    Intake/Output - Last 3 Shifts       09/02 0700 - 09/03 0659 09/03 0700 - 09/04 0659 09/04 0700 - 09/05 0659    P.O.  140 250    I.V. (mL/kg) 582 (8.4) 1233.4 (19.4)     Other 240      TPN 1437 1334     Total Intake(mL/kg) 2259 (32.6) 2707.4 (42.6) 250 (3.9)    Urine (mL/kg/hr) 950 (0.6) 1100 (0.7) 350 (1)    Drains 1152 1960 373    Other 50 200     Stool 0 0     Chest Tube 220 390 100    Total Output 2372 3650 823    Net -113 -942.6 -573            Urine Occurrence 1 x 1 x     Stool Occurrence 0 x 4 x           Physical Exam  Constitutional:       General: She is not in acute distress.  Neck:      Comments: RIJ trialysis line, L IJ permacath  Cardiovascular:      Rate and Rhythm: Tachycardia present.   Pulmonary:      Effort: Pulmonary effort is normal.      Comments: L sided chest tube in place  Abdominal:      General: There is no distension.      Tenderness: There is no abdominal tenderness.      Comments: G tube clamped  Drains with bilious output  Wound vac in place   Skin:     General: Skin is warm and dry.   Neurological:      Mental Status: She is alert.         Significant Labs:  CBC:   Recent Labs   Lab 09/04/20  0300   WBC 25.42*   RBC 2.55*   HGB 7.7*   HCT 24.8*   *   MCV 97   MCH 30.2   MCHC 31.0*     CMP:   Recent Labs   Lab 09/04/20 0300   *  148*   CALCIUM 8.5*  8.5*   ALBUMIN 1.8*  1.8*   PROT 7.3     143   K 3.0*  3.0*   CO2 22*  22*     109   BUN 89*  89*   CREATININE 3.6*  3.6*   ALKPHOS 182*   ALT 10   AST 22   BILITOT 0.4       Significant Diagnostics:  I have reviewed all pertinent imaging results/findings within the past 24 hours.

## 2020-09-04 NOTE — ASSESSMENT & PLAN NOTE
38 yo F s/p antrectomy with BII reconstruction c/b duodenal necrosis requiring ex lap, washout, drainage c/b aspiration event. S/p duodenal resection with d-j and g-j anastomosis.    - On 10L high flow NC. Continue to wean as tolerated  - NPO  - Keep G tube clamped, unclamp for nausea/vomiting  - Continue abx  - TPN   - Daily labs  - GI and DVT ppx  - PT / OT  - continue PRNs for BP control  - WV change at bedside today

## 2020-09-04 NOTE — SUBJECTIVE & OBJECTIVE
Interval History/Significant Events: 2 episodes of shortness of breath and increased work of breathing overnight which resolved with the addition of supplemental oxygen.  Cardene titrated overnight and oral blood pressure medications increased.     Follow-up For: Procedure(s) (LRB):  INSERTION-CATHETER-RUDY (Left)  BRONCHOSCOPY (N/A)  INSERTION, CATHETER, INTERCOSTAL, FOR DRAINAGE (Left)  REPLACEMENT, WOUND VAC (N/A)    Post-Operative Day: 4 Days Post-Op    Objective:     Vital Signs (Most Recent):  Temp: 98.7 °F (37.1 °C) (09/03/20 2308)  Pulse: (!) 120 (09/04/20 0615)  Resp: (!) 23 (09/04/20 0615)  BP: (!) 140/87 (09/04/20 0615)  SpO2: 100 % (09/04/20 0615) Vital Signs (24h Range):  Temp:  [98.6 °F (37 °C)-99 °F (37.2 °C)] 98.7 °F (37.1 °C)  Pulse:  [] 120  Resp:  [20-59] 23  SpO2:  [92 %-100 %] 100 %  BP: (132-203)/() 140/87     Weight: 63.5 kg (139 lb 15.9 oz)  Body mass index is 25.6 kg/m².      Intake/Output Summary (Last 24 hours) at 9/4/2020 0655  Last data filed at 9/4/2020 0600  Gross per 24 hour   Intake 2707.42 ml   Output 3600 ml   Net -892.58 ml       Physical Exam  Constitutional:       General: She is not in acute distress.  HENT:      Head: Normocephalic and atraumatic.   Neck:      Comments: RIJ trialysis line, L IJ permacath  Cardiovascular:      Rate and Rhythm: Tachycardia present.      Comments: Hypertensive, sinus tach  L sided chest tube in place  Pulmonary:      Breath sounds: No wheezing.      Comments: tachypneic  Maintaining adequate SpO2 on room air  Abdominal:      General: Abdomen is flat. There is no distension.      Tenderness: There is no abdominal tenderness.      Comments: Drains and G tube with bilious output; G-tube clamped  Wound vac in place   Skin:     General: Skin is warm and dry.   Neurological:      General: No focal deficit present.      Mental Status: She is alert and oriented to person, place, and time.   Psychiatric:         Mood and Affect: Mood  normal.         Behavior: Behavior normal.         Vents:  Vent Mode: A/C (08/30/20 1045)  Ventilator Initiated: Yes(chart correction) (08/29/20 0800)  Set Rate: 16 BPM (08/30/20 1045)  Vt Set: 380 mL (08/30/20 1045)  PEEP/CPAP: 5 cmH20 (08/30/20 1045)  Oxygen Concentration (%): 50 (09/04/20 0600)  Peak Airway Pressure: 21 cmH2O (08/30/20 1045)  Plateau Pressure: 0 cmH20 (08/30/20 0900)  Total Ve: 7.12 mL (08/30/20 0900)  Negative Inspiratory Force (cm H2O): -23 (08/20/20 1501)  F/VT Ratio<105 (RSBI): (!) 52.94 (08/30/20 1045)    Lines/Drains/Airways     Central Venous Catheter Line            Trialysis (Dialysis) Catheter 08/13/20 2230 right internal jugular 21 days    Permacath 08/31/20 1100 left subclavian 3 days          Drain                 Closed/Suction Drain 08/13/20 1659 Right;Inferior Abdomen Bulb 19 Fr. 21 days         Closed/Suction Drain 08/13/20 1659 Right;Superior Abdomen Bulb 19 Fr. 21 days         Gastrostomy/Enterostomy 08/15/20 0916 Gastrostomy tube w/ balloon LUQ decompression 19 days    Female External Urinary Catheter 08/27/20 1600 7 days         Chest Tube 08/31/20 1033 1 Left 24 Fr. 3 days          Peripheral Intravenous Line                 Midline Catheter Insertion/Assessment  - Single Lumen 08/26/20 1501 Right basilic vein (medial side of arm) 18g x 8cm 8 days                Significant Labs:    CBC/Anemia Profile:  Recent Labs   Lab 09/03/20  0403 09/04/20  0300   WBC 27.98* 25.42*   HGB 8.2* 7.7*   HCT 27.2* 24.8*   * 360*   MCV 99* 97   RDW 14.6* 14.6*        Chemistries:  Recent Labs   Lab 09/03/20  0403 09/03/20  1327 09/03/20  1916 09/04/20  0300     143 145 145 143  143   K 2.9*  2.9* 3.0* 2.8* 3.0*  3.0*     108 110 110 109  109   CO2 21*  21* 23 24 22*  22*   BUN 78*  78* 83* 84* 89*  89*   CREATININE 3.5*  3.5* 3.5* 3.6* 3.6*  3.6*   CALCIUM 8.9  8.9 8.8 8.7 8.5*  8.5*   ALBUMIN 1.4*  1.4* 1.5* 1.9* 1.8*  1.8*   PROT 7.4  --   --  7.3    BILITOT 0.4  --   --  0.4   ALKPHOS 176*  --   --  182*   ALT 9*  --   --  10   AST 26  --   --  22   MG 1.8  --  1.6 2.1  2.1   PHOS 2.3*  2.3* 1.8* 1.8* 2.0*  2.0*       All pertinent labs within the past 24 hours have been reviewed.    Significant Imaging:  I have reviewed all pertinent imaging results/findings within the past 24 hours.

## 2020-09-04 NOTE — ASSESSMENT & PLAN NOTE
40 yo F s/p antrectomy with BII reconstruction c/b duodenal necrosis requiring ex lap, washout, drainage c/b aspiration event. Now improving in SICU.  Permacath installed 8/31 for possible dialysis needs.     .Neuro:  - AOx4  - PCA dicontinued; IV dilaudid PRN with increasing requirements today  - Pain management consult placed; appreciate assistance  - Clonidine patch for anxiety     Resp:  - Intubated 8/29, extubated 8/30  - Satting well supplemental O2 (comfort flow 25L and 50%)  - Chest tube placed 8/31; CT currently to suction with 300cc output last 24 hrs     CV: Persistent, refractory hypertension.  - MAP goal >65  - Coreg 25mg BID  - Nifedipine 60 mg QD  - Labetalol PRN  - Hydralazine PRN  - Permacath  Placed 8/31  - Cardene gtt stopped     Heme:  - Hgb/Hct stable  - S/p Abdominal washout on 8/21   - S/p Abdominal exploration w/ woundvac exhange 8/24; exchange 8/31     ID:  - WBC 25.4 today; pt afebrile  - Cont Zosyn     Renal: Oliguric on arrival; Cr 1.0 at that time, now making adequate urine with increasing BUN/Cr. May represent non-oliguric phase of FLORIDALMA      -  I&Os inaccurate due to unmonitored PO intake.  - BUN/Cr cont to increase   -  Will discuss resuming dialysis with Nephrology as patient is fluid overloaded  - Trend BUN/Cr daily     FEN/GI:  - NPO  - G tube clamped  - TPN + Lipid  - Maintain drains to bulb suction  - Replace electrolytes as needed to keep K>4, Mg>2; gentle repletion due to renal status     Endo:  - Monitor BG     DVT PPx: SQH 5kU Q8h  GI PPx: famotidine 20mg IV daily    Dispo:  - Continue SICU care.

## 2020-09-05 LAB
ALBUMIN SERPL BCP-MCNC: 1.7 G/DL (ref 3.5–5.2)
ALP SERPL-CCNC: 158 U/L (ref 55–135)
ALT SERPL W/O P-5'-P-CCNC: 11 U/L (ref 10–44)
ANION GAP SERPL CALC-SCNC: 11 MMOL/L (ref 8–16)
ANION GAP SERPL CALC-SCNC: 11 MMOL/L (ref 8–16)
ANION GAP SERPL CALC-SCNC: 14 MMOL/L (ref 8–16)
AST SERPL-CCNC: 20 U/L (ref 10–40)
BASOPHILS # BLD AUTO: 0.08 K/UL (ref 0–0.2)
BASOPHILS NFR BLD: 0.3 % (ref 0–1.9)
BILIRUB SERPL-MCNC: 0.4 MG/DL (ref 0.1–1)
BUN SERPL-MCNC: 92 MG/DL (ref 6–20)
BUN SERPL-MCNC: 92 MG/DL (ref 6–20)
BUN SERPL-MCNC: 93 MG/DL (ref 6–20)
CA-I BLDV-SCNC: 1.14 MMOL/L (ref 1.06–1.42)
CALCIUM SERPL-MCNC: 8.3 MG/DL (ref 8.7–10.5)
CALCIUM SERPL-MCNC: 8.5 MG/DL (ref 8.7–10.5)
CALCIUM SERPL-MCNC: 8.5 MG/DL (ref 8.7–10.5)
CHLORIDE SERPL-SCNC: 109 MMOL/L (ref 95–110)
CHLORIDE SERPL-SCNC: 111 MMOL/L (ref 95–110)
CHLORIDE SERPL-SCNC: 111 MMOL/L (ref 95–110)
CO2 SERPL-SCNC: 23 MMOL/L (ref 23–29)
CREAT SERPL-MCNC: 3.4 MG/DL (ref 0.5–1.4)
CREAT SERPL-MCNC: 3.5 MG/DL (ref 0.5–1.4)
CREAT SERPL-MCNC: 3.5 MG/DL (ref 0.5–1.4)
DIFFERENTIAL METHOD: ABNORMAL
EOSINOPHIL # BLD AUTO: 0.4 K/UL (ref 0–0.5)
EOSINOPHIL NFR BLD: 1.5 % (ref 0–8)
ERYTHROCYTE [DISTWIDTH] IN BLOOD BY AUTOMATED COUNT: 14.6 % (ref 11.5–14.5)
EST. GFR  (AFRICAN AMERICAN): 18 ML/MIN/1.73 M^2
EST. GFR  (AFRICAN AMERICAN): 18 ML/MIN/1.73 M^2
EST. GFR  (AFRICAN AMERICAN): 18.7 ML/MIN/1.73 M^2
EST. GFR  (NON AFRICAN AMERICAN): 15.6 ML/MIN/1.73 M^2
EST. GFR  (NON AFRICAN AMERICAN): 15.6 ML/MIN/1.73 M^2
EST. GFR  (NON AFRICAN AMERICAN): 16.2 ML/MIN/1.73 M^2
GLUCOSE SERPL-MCNC: 125 MG/DL (ref 70–110)
GLUCOSE SERPL-MCNC: 148 MG/DL (ref 70–110)
GLUCOSE SERPL-MCNC: 148 MG/DL (ref 70–110)
HCT VFR BLD AUTO: 24.5 % (ref 37–48.5)
HGB BLD-MCNC: 7.4 G/DL (ref 12–16)
IMM GRANULOCYTES # BLD AUTO: 0.26 K/UL (ref 0–0.04)
IMM GRANULOCYTES NFR BLD AUTO: 1.1 % (ref 0–0.5)
LYMPHOCYTES # BLD AUTO: 1.4 K/UL (ref 1–4.8)
LYMPHOCYTES NFR BLD: 6.1 % (ref 18–48)
MAGNESIUM SERPL-MCNC: 2.1 MG/DL (ref 1.6–2.6)
MAGNESIUM SERPL-MCNC: 2.4 MG/DL (ref 1.6–2.6)
MAGNESIUM SERPL-MCNC: 2.4 MG/DL (ref 1.6–2.6)
MCH RBC QN AUTO: 29.5 PG (ref 27–31)
MCHC RBC AUTO-ENTMCNC: 30.2 G/DL (ref 32–36)
MCV RBC AUTO: 98 FL (ref 82–98)
MONOCYTES # BLD AUTO: 1.2 K/UL (ref 0.3–1)
MONOCYTES NFR BLD: 5.1 % (ref 4–15)
NEUTROPHILS # BLD AUTO: 19.7 K/UL (ref 1.8–7.7)
NEUTROPHILS NFR BLD: 85.9 % (ref 38–73)
NRBC BLD-RTO: 0 /100 WBC
PHOSPHATE SERPL-MCNC: 3.3 MG/DL (ref 2.7–4.5)
PHOSPHATE SERPL-MCNC: 3.3 MG/DL (ref 2.7–4.5)
PHOSPHATE SERPL-MCNC: 3.8 MG/DL (ref 2.7–4.5)
PLATELET # BLD AUTO: 359 K/UL (ref 150–350)
PMV BLD AUTO: 11 FL (ref 9.2–12.9)
POCT GLUCOSE: 125 MG/DL (ref 70–110)
POCT GLUCOSE: 138 MG/DL (ref 70–110)
POCT GLUCOSE: 153 MG/DL (ref 70–110)
POTASSIUM SERPL-SCNC: 3.2 MMOL/L (ref 3.5–5.1)
POTASSIUM SERPL-SCNC: 3.7 MMOL/L (ref 3.5–5.1)
POTASSIUM SERPL-SCNC: 3.7 MMOL/L (ref 3.5–5.1)
PROT SERPL-MCNC: 7 G/DL (ref 6–8.4)
RBC # BLD AUTO: 2.51 M/UL (ref 4–5.4)
SODIUM SERPL-SCNC: 145 MMOL/L (ref 136–145)
SODIUM SERPL-SCNC: 145 MMOL/L (ref 136–145)
SODIUM SERPL-SCNC: 146 MMOL/L (ref 136–145)
WBC # BLD AUTO: 22.92 K/UL (ref 3.9–12.7)

## 2020-09-05 PROCEDURE — 63600175 PHARM REV CODE 636 W HCPCS: Performed by: STUDENT IN AN ORGANIZED HEALTH CARE EDUCATION/TRAINING PROGRAM

## 2020-09-05 PROCEDURE — 99900035 HC TECH TIME PER 15 MIN (STAT)

## 2020-09-05 PROCEDURE — 99291 PR CRITICAL CARE, E/M 30-74 MINUTES: ICD-10-PCS | Mod: 24,,, | Performed by: SURGERY

## 2020-09-05 PROCEDURE — 99291 CRITICAL CARE FIRST HOUR: CPT | Mod: 24,,, | Performed by: SURGERY

## 2020-09-05 PROCEDURE — 83735 ASSAY OF MAGNESIUM: CPT

## 2020-09-05 PROCEDURE — 80069 RENAL FUNCTION PANEL: CPT | Mod: 91

## 2020-09-05 PROCEDURE — 25000003 PHARM REV CODE 250: Performed by: STUDENT IN AN ORGANIZED HEALTH CARE EDUCATION/TRAINING PROGRAM

## 2020-09-05 PROCEDURE — 25000242 PHARM REV CODE 250 ALT 637 W/ HCPCS: Performed by: STUDENT IN AN ORGANIZED HEALTH CARE EDUCATION/TRAINING PROGRAM

## 2020-09-05 PROCEDURE — 20000000 HC ICU ROOM

## 2020-09-05 PROCEDURE — 25000003 PHARM REV CODE 250: Performed by: SURGERY

## 2020-09-05 PROCEDURE — 80053 COMPREHEN METABOLIC PANEL: CPT

## 2020-09-05 PROCEDURE — A4217 STERILE WATER/SALINE, 500 ML: HCPCS | Performed by: STUDENT IN AN ORGANIZED HEALTH CARE EDUCATION/TRAINING PROGRAM

## 2020-09-05 PROCEDURE — 82330 ASSAY OF CALCIUM: CPT

## 2020-09-05 PROCEDURE — 99232 PR SUBSEQUENT HOSPITAL CARE,LEVL II: ICD-10-PCS | Mod: ,,, | Performed by: INTERNAL MEDICINE

## 2020-09-05 PROCEDURE — 85025 COMPLETE CBC W/AUTO DIFF WBC: CPT

## 2020-09-05 PROCEDURE — B4185 PARENTERAL SOL 10 GM LIPIDS: HCPCS | Performed by: STUDENT IN AN ORGANIZED HEALTH CARE EDUCATION/TRAINING PROGRAM

## 2020-09-05 PROCEDURE — 94640 AIRWAY INHALATION TREATMENT: CPT

## 2020-09-05 PROCEDURE — 94761 N-INVAS EAR/PLS OXIMETRY MLT: CPT

## 2020-09-05 PROCEDURE — 25500020 PHARM REV CODE 255: Performed by: STUDENT IN AN ORGANIZED HEALTH CARE EDUCATION/TRAINING PROGRAM

## 2020-09-05 PROCEDURE — 99232 SBSQ HOSP IP/OBS MODERATE 35: CPT | Mod: ,,, | Performed by: INTERNAL MEDICINE

## 2020-09-05 RX ORDER — OXYCODONE AND ACETAMINOPHEN 10; 325 MG/1; MG/1
1 TABLET ORAL EVERY 4 HOURS PRN
Status: DISCONTINUED | OUTPATIENT
Start: 2020-09-05 | End: 2020-09-07

## 2020-09-05 RX ORDER — POTASSIUM CHLORIDE 29.8 MG/ML
40 INJECTION INTRAVENOUS ONCE
Status: COMPLETED | OUTPATIENT
Start: 2020-09-05 | End: 2020-09-05

## 2020-09-05 RX ORDER — ONDANSETRON 4 MG/1
4 TABLET, FILM COATED ORAL ONCE
Status: COMPLETED | OUTPATIENT
Start: 2020-09-05 | End: 2020-09-05

## 2020-09-05 RX ADMIN — HYDROMORPHONE HYDROCHLORIDE 0.5 MG: 1 INJECTION, SOLUTION INTRAMUSCULAR; INTRAVENOUS; SUBCUTANEOUS at 12:09

## 2020-09-05 RX ADMIN — CARVEDILOL 25 MG: 25 TABLET, FILM COATED ORAL at 08:09

## 2020-09-05 RX ADMIN — HYDROMORPHONE HYDROCHLORIDE 0.5 MG: 1 INJECTION, SOLUTION INTRAMUSCULAR; INTRAVENOUS; SUBCUTANEOUS at 11:09

## 2020-09-05 RX ADMIN — ONDANSETRON HYDROCHLORIDE 4 MG: 4 TABLET, FILM COATED ORAL at 01:09

## 2020-09-05 RX ADMIN — HEPARIN SODIUM 5000 UNITS: 5000 INJECTION INTRAVENOUS; SUBCUTANEOUS at 02:09

## 2020-09-05 RX ADMIN — HEPARIN SODIUM 5000 UNITS: 5000 INJECTION INTRAVENOUS; SUBCUTANEOUS at 05:09

## 2020-09-05 RX ADMIN — HYDROMORPHONE HYDROCHLORIDE 0.5 MG: 1 INJECTION, SOLUTION INTRAMUSCULAR; INTRAVENOUS; SUBCUTANEOUS at 05:09

## 2020-09-05 RX ADMIN — IPRATROPIUM BROMIDE AND ALBUTEROL SULFATE 3 ML: .5; 2.5 SOLUTION RESPIRATORY (INHALATION) at 08:09

## 2020-09-05 RX ADMIN — IOHEXOL 15 ML: 350 INJECTION, SOLUTION INTRAVENOUS at 12:09

## 2020-09-05 RX ADMIN — HYDROMORPHONE HYDROCHLORIDE 0.5 MG: 1 INJECTION, SOLUTION INTRAMUSCULAR; INTRAVENOUS; SUBCUTANEOUS at 03:09

## 2020-09-05 RX ADMIN — OXYCODONE HYDROCHLORIDE AND ACETAMINOPHEN 1 TABLET: 10; 325 TABLET ORAL at 08:09

## 2020-09-05 RX ADMIN — SODIUM PHOSPHATE, MONOBASIC, MONOHYDRATE 30 MMOL: 276; 142 INJECTION, SOLUTION INTRAVENOUS at 12:09

## 2020-09-05 RX ADMIN — ASCORBIC ACID, VITAMIN A PALMITATE, CHOLECALCIFEROL, THIAMINE HYDROCHLORIDE, RIBOFLAVIN-5 PHOSPHATE SODIUM, PYRIDOXINE HYDROCHLORIDE, NIACINAMIDE, DEXPANTHENOL, ALPHA-TOCOPHEROL ACETATE, VITAMIN K1, FOLIC ACID, BIOTIN, CYANOCOBALAMIN: 200; 3300; 200; 6; 3.6; 6; 40; 15; 10; 150; 600; 60; 5 INJECTION, SOLUTION INTRAVENOUS at 09:09

## 2020-09-05 RX ADMIN — MICONAZOLE NITRATE: 20 POWDER TOPICAL at 08:09

## 2020-09-05 RX ADMIN — IOHEXOL 15 ML: 350 INJECTION, SOLUTION INTRAVENOUS at 01:09

## 2020-09-05 RX ADMIN — IPRATROPIUM BROMIDE AND ALBUTEROL SULFATE 3 ML: .5; 2.5 SOLUTION RESPIRATORY (INHALATION) at 05:09

## 2020-09-05 RX ADMIN — HEPARIN SODIUM 5000 UNITS: 5000 INJECTION INTRAVENOUS; SUBCUTANEOUS at 09:09

## 2020-09-05 RX ADMIN — LIDOCAINE 1 PATCH: 50 PATCH CUTANEOUS at 10:09

## 2020-09-05 RX ADMIN — IOHEXOL 15 ML: 350 INJECTION, SOLUTION INTRAVENOUS at 02:09

## 2020-09-05 RX ADMIN — SMOFLIPID 20.8 ML: 6; 6; 5; 3 INJECTION, EMULSION INTRAVENOUS at 09:09

## 2020-09-05 RX ADMIN — IPRATROPIUM BROMIDE AND ALBUTEROL SULFATE 3 ML: .5; 2.5 SOLUTION RESPIRATORY (INHALATION) at 04:09

## 2020-09-05 RX ADMIN — MELATONIN TAB 3 MG 3 MG: 3 TAB at 08:09

## 2020-09-05 RX ADMIN — IPRATROPIUM BROMIDE AND ALBUTEROL SULFATE 3 ML: .5; 2.5 SOLUTION RESPIRATORY (INHALATION) at 12:09

## 2020-09-05 RX ADMIN — FAMOTIDINE 20 MG: 20 TABLET, FILM COATED ORAL at 08:09

## 2020-09-05 RX ADMIN — OXYCODONE HYDROCHLORIDE AND ACETAMINOPHEN 1 TABLET: 10; 325 TABLET ORAL at 01:09

## 2020-09-05 RX ADMIN — NIFEDIPINE 60 MG: 30 TABLET, FILM COATED, EXTENDED RELEASE ORAL at 08:09

## 2020-09-05 RX ADMIN — POTASSIUM CHLORIDE 40 MEQ: 29.8 INJECTION, SOLUTION INTRAVENOUS at 06:09

## 2020-09-05 RX ADMIN — IPRATROPIUM BROMIDE AND ALBUTEROL SULFATE 3 ML: .5; 2.5 SOLUTION RESPIRATORY (INHALATION) at 09:09

## 2020-09-05 NOTE — ASSESSMENT & PLAN NOTE
38 yo F s/p antrectomy with BII reconstruction c/b duodenal necrosis requiring ex lap, washout, drainage c/b aspiration event. Now improving in SICU.  Permacath installed 8/31 for possible dialysis needs.     .Neuro:  - AOx4  - PCA dicontinued; IV dilaudid PRN  - Percocet 10/325 PRN PO added  - Pain management consult placed; appreciate assistance  - Clonidine patch for anxiety     Resp:  - Intubated 8/29, extubated 8/30  - Satting well on room air  - Chest tube placed 8/31; CT currently to suction withminimal output last 12 hrs     CV: Persistent, refractory hypertension.  - MAP goal >65  - Coreg 25mg BID  - Nifedipine 60 mg QD  - Labetalol PRN  - Hydralazine PRN  - Permacath  Placed 8/31  - Cardene gtt stopped     Heme:  - Hgb/Hct stable  - S/p Abdominal washout on 8/21   - S/p Abdominal exploration w/ woundvac exhange 8/24; exchange 8/31     ID:  - pt afebrile  - daily CBC  - WBC downtrending     Renal: Oliguric on arrival; Cr 1.0 at that time, now making adequate urine with increasing BUN/Cr. May represent non-oliguric phase of FLORIDALMA      - BUN/Cr stable to improved last 24 hours  -  Will discuss resuming dialysis with Nephrology as patient is fluid overloaded  - Trend BUN/Cr daily     FEN/GI:  - NPO  - G tube clamped  - TPN + Lipid  - Maintain drains to bulb suction  - Replace electrolytes as needed to keep K>4, Mg>2; gentle repletion due to renal status     Endo:  - Monitor BG     DVT PPx: SQH 5kU Q8h  GI PPx: famotidine 20mg IV daily    Dispo:  - Continue SICU care.

## 2020-09-05 NOTE — PROGRESS NOTES
[facility]  Critical Care - Surgery  Progress Note    Patient Name: Jaquan Farmer  MRN: [id number]  Admission Date: 8/12/2020  Hospital Length of Stay: 24 days  Code Status: Full Code  Attending Provider: Donis Roa MD  Primary Care Provider: Primary Doctor No   Principal Problem: Duodenum disorder    Subjective:     Hospital/ICU Course:  8/14 Patient had a line replaced along with central line placed, with complication of pneumothorax. Rt pigtail was placed. Patient tolerated complications well. Decreasing vent settings.  8/15 NAEO. Patient was taken back to OR early AM for washout. Possible closure 8/18. ETT exchanged in OR 7.0 -> 7.5  8/16 - NAEON. HDS. Intubated, sedated.   8/17 - NAEON. OR tomorrow for closure.  8/18 - Washed out and closed in OR  8/22 - NAEO. Plan for Repeat CT AP today  8/23 - NAEO. Hbg lower. Giving blood x2 units.  8/25 - NAEO. Went to OR yesterday for Abdominal wall exploration and ligation of bleeding vessels. Wound vac exchanged  8/26 - NAEO. Hypertensive overnight. Hgb trending. Plan for CT today  8/27 - NAEO. Hypertensive overnight. CT yesterday showed no extravasation or acute changes  8/28 - NAEO.   8/29 - Patient in respiratory distress upon arrival. Chest XR w/ obvious left sided pulmonary occlusion. Patient intubated and bronchoscopy performed.  8/30 - Repeat Bronch today. Possible extubation later today  8/31 - Extubated yesterday. Wound vac change, bronch scheduled for OR today. Patient out of bed in chair on AM exam.  9/1 - Doing well without complaints this morning.  L sided permacath placed yesterday  9/2 - NAEO.  Continues to be hypertensive/tachycardic.  9/3 - NAEO.  G-tube clamped by primary team.  Increased output from superior GODFREY  9/4 - Overnight 2 episodes of SOB and increased WOB. CXR stable and ABG without significant abnormality. Placed on comfort flow. Cardene titrated off and oral BP medications increased.     Interval  History/Significant Events: NAEO.  Pt denies any new complaints.  States she is feeling better.      Follow-up For: Procedure(s) (LRB):  INSERTION-CATHETER-RUDY (Left)  BRONCHOSCOPY (N/A)  INSERTION, CATHETER, INTERCOSTAL, FOR DRAINAGE (Left)  REPLACEMENT, WOUND VAC (N/A)    Post-Operative Day: 5 Days Post-Op    Objective:     Vital Signs (Most Recent):  Temp: 97.9 °F (36.6 °C) (09/05/20 0700)  Pulse: 108 (09/05/20 0800)  Resp: (!) 28 (09/05/20 0807)  BP: (!) 163/90 (09/05/20 0800)  SpO2: 96 % (09/05/20 0800) Vital Signs (24h Range):  Temp:  [97.9 °F (36.6 °C)-99 °F (37.2 °C)] 97.9 °F (36.6 °C)  Pulse:  [] 108  Resp:  [14-44] 28  SpO2:  [93 %-100 %] 96 %  BP: (116-163)/(72-96) 163/90     Weight: 67.5 kg (148 lb 13 oz)  Body mass index is 27.22 kg/m².      Intake/Output Summary (Last 24 hours) at 9/5/2020 0832  Last data filed at 9/5/2020 0700  Gross per 24 hour   Intake 2643 ml   Output 4575 ml   Net -1932 ml       Physical Exam  Constitutional:       General: She is not in acute distress.  HENT:      Head: Normocephalic and atraumatic.   Neck:      Comments: RIJ trialysis line, L IJ permacath  Cardiovascular:      Rate and Rhythm: Regular rhythm. Tachycardia present.      Comments: Hypertensive, sinus tach  L sided chest tube in place  Pulmonary:      Breath sounds: No wheezing.      Comments: tachypneic  Abdominal:      General: Abdomen is flat. There is no distension.      Tenderness: There is no abdominal tenderness.      Comments: Drains and G tube with bilious output  Wound vac in place   Skin:     General: Skin is warm and dry.   Neurological:      General: No focal deficit present.      Mental Status: She is alert and oriented to person, place, and time.   Psychiatric:         Mood and Affect: Mood normal.         Behavior: Behavior normal.         Vents:  Vent Mode: A/C (08/30/20 1045)  Ventilator Initiated: Yes(chart correction) (08/29/20 0800)  Set Rate: 16 BPM (08/30/20 1045)  Vt Set: 380 mL  (08/30/20 1045)  PEEP/CPAP: 5 cmH20 (08/30/20 1045)  Oxygen Concentration (%): 50 (09/04/20 0600)  Peak Airway Pressure: 21 cmH2O (08/30/20 1045)  Plateau Pressure: 0 cmH20 (08/30/20 0900)  Total Ve: 7.12 mL (08/30/20 0900)  Negative Inspiratory Force (cm H2O): -23 (08/20/20 1501)  F/VT Ratio<105 (RSBI): (!) 52.94 (08/30/20 1045)    Lines/Drains/Airways     Central Venous Catheter Line            Trialysis (Dialysis) Catheter 08/13/20 2230 right internal jugular 22 days    Permacath 08/31/20 1100 left subclavian 4 days          Drain                 Closed/Suction Drain 08/13/20 1659 Right;Inferior Abdomen Bulb 19 Fr. 22 days         Closed/Suction Drain 08/13/20 1659 Right;Superior Abdomen Bulb 19 Fr. 22 days         Gastrostomy/Enterostomy 08/15/20 0916 Gastrostomy tube w/ balloon LUQ decompression 20 days    Female External Urinary Catheter 08/27/20 1600 8 days         Chest Tube 08/31/20 1033 1 Left 24 Fr. 4 days          Peripheral Intravenous Line                 Midline Catheter Insertion/Assessment  - Single Lumen 08/26/20 1501 Right basilic vein (medial side of arm) 18g x 8cm 9 days                Significant Labs:    CBC/Anemia Profile:  Recent Labs   Lab 09/04/20  0300 09/05/20  0345   WBC 25.42* 22.92*   HGB 7.7* 7.4*   HCT 24.8* 24.5*   * 359*   MCV 97 98   RDW 14.6* 14.6*        Chemistries:  Recent Labs   Lab 09/04/20  0300 09/04/20  1302 09/04/20  2155 09/05/20  0345     143 143 143 145  145   K 3.0*  3.0* 3.6 3.1* 3.7  3.7     109 110 110 111*  111*   CO2 22*  22* 23 24 23  23   BUN 89*  89* 89* 94* 92*  92*   CREATININE 3.6*  3.6* 3.5* 3.6* 3.5*  3.5*   CALCIUM 8.5*  8.5* 8.5* 8.4* 8.5*  8.5*   ALBUMIN 1.8*  1.8* 1.7* 1.7* 1.7*  1.7*   PROT 7.3  --   --  7.0   BILITOT 0.4  --   --  0.4   ALKPHOS 182*  --   --  158*   ALT 10  --   --  11   AST 22  --   --  20   MG 2.1  2.1 1.9 1.8 2.4  2.4   PHOS 2.0*  2.0* 1.6* 1.4* 3.3  3.3       All pertinent labs within the  past 24 hours have been reviewed.    Significant Imaging:  I have reviewed all pertinent imaging results/findings within the past 24 hours.    Assessment/Plan:     Severe sepsis  38 yo F s/p antrectomy with BII reconstruction c/b duodenal necrosis requiring ex lap, washout, drainage c/b aspiration event. Now improving in SICU.  Permacath installed 8/31 for possible dialysis needs.     .Neuro:  - AOx4  - PCA dicontinued; IV dilaudid PRN  - Percocet 10/325 PRN PO added  - Pain management consult placed; appreciate assistance  - Clonidine patch for anxiety     Resp:  - Intubated 8/29, extubated 8/30  - Satting well on room air  - Chest tube placed 8/31; CT currently to suction withminimal output last 12 hrs     CV: Persistent, refractory hypertension.  - MAP goal >65  - Coreg 25mg BID  - Nifedipine 60 mg QD  - Labetalol PRN  - Hydralazine PRN  - Permacath  Placed 8/31  - Cardene gtt stopped     Heme:  - Hgb/Hct stable  - S/p Abdominal washout on 8/21   - S/p Abdominal exploration w/ woundvac exhange 8/24; exchange 8/31     ID:  - pt afebrile  - daily CBC  - WBC downtrending     Renal: Oliguric on arrival; Cr 1.0 at that time, now making adequate urine with increasing BUN/Cr. May represent non-oliguric phase of FLORIDALMA      - BUN/Cr stable to improved last 24 hours  -  Will discuss resuming dialysis with Nephrology as patient is fluid overloaded  - Trend BUN/Cr daily     FEN/GI:  - NPO  - G tube clamped  - TPN + Lipid  - Maintain drains to bulb suction  - Replace electrolytes as needed to keep K>4, Mg>2; gentle repletion due to renal status     Endo:  - Monitor BG     DVT PPx: SQH 5kU Q8h  GI PPx: famotidine 20mg IV daily    Dispo:  - Continue SICU care.            Critical care was time spent personally by me on the following activities: development of treatment plan with patient or surrogate and bedside caregivers, discussions with consultants, evaluation of patient's response to treatment, examination of patient,  ordering and performing treatments and interventions, ordering and review of laboratory studies, ordering and review of radiographic studies, pulse oximetry, re-evaluation of patient's condition.  This critical care time did not overlap with that of any other provider or involve time for any procedures.     Gary Bustillo MD  Critical Care - Surgery  [facility]

## 2020-09-05 NOTE — PROGRESS NOTES
[facility]  Nephrology  Progress Note    Patient Name: Jaquan Farmer  MRN: [id number]  Admission Date: 8/12/2020  Hospital Length of Stay: 24 days  Attending Provider: Donis Roa MD   Primary Care Physician: Primary Doctor No  Principal Problem:Duodenum disorder    Subjective:     HPI: Mrs. Farmer is a 39 year old female with antrectomy at [facility] complicated duodenal necrosis post op. She aspirated, was intubated, and became septic. She developed renal failure and required max vent settings and was transferred here for higher level of care. On arrival she was hypotensive on pressers, max vent setting, and had minimal urine output.  Overnight she was given 6L of fluid, 6g calcium, placed on vac, pip-tazo, and fluconazole. Nephro consulted for sid.    Interval History  Status post one dose of lasix with good urine output. Reports improved breathing.    ROS: Negative    Vitals:    09/05/20 1115 09/05/20 1130 09/05/20 1145 09/05/20 1200   BP:  (!) 162/101     BP Location:       Patient Position:       Pulse: (!) 111 106 (!) 116 107   Resp: (!) 33 (!) 30 (!) 29 (!) 25   Temp:       TempSrc:       SpO2: 98% 97% 98% 97%   Weight:       Height:         Scheduled Meds:   albuterol-ipratropium  3 mL Nebulization Q4H    carvediloL  25 mg Oral BID    cloNIDine 0.3 mg/24 hr td ptwk  1 patch Transdermal Q7 Days    famotidine  20 mg Per G Tube Daily    heparin (porcine)  5,000 Units Subcutaneous Q8H    lidocaine  1 patch Transdermal Q24H    miconazole NITRATE 2 %   Topical (Top) BID    NIFEdipine  60 mg Oral Daily     Continuous Infusions:   TPN ADULT CENTRAL LINE CUSTOM 50 mL/hr at 09/05/20 1200     PRN Meds:.sodium chloride, hydrALAZINE, HYDROmorphone, HYDROmorphone, iohexol, labetaloL, melatonin, metoprolol, oxyCODONE-acetaminophen    Physical Exam   Constitutional: She is oriented to person, place, and time. No distress.   HENT:   Head: Normocephalic and atraumatic.   Eyes: Pupils are  equal, round, and reactive to light. Conjunctivae are normal. No scleral icterus.   Neck: Normal range of motion.   Cardiovascular: Normal rate, regular rhythm and normal heart sounds.   Pulmonary/Chest: Effort normal and breath sounds normal. No respiratory distress. She has no wheezes. She has no rales.   Abdominal: Soft.   Neurological: She is alert and oriented to person, place, and time. No cranial nerve deficit.   Skin: Skin is warm. She is not diaphoretic.         Assessment/Plan:     Acute renal failure with tubular necrosis  Stage 3 sid with ischemic atn likely secondary to septic shock, unknown bl cr, muddy brown casts on microscopy. Has been requiring dialyssi but has been urinating for past few days. Electrolytes and acid base are ok but BUN clearance is lagging. Will hold of dialysis for now.   -no emergent need for RRT today, labs stable and patient has non oliguric range urine output  -Strict I/O's  -Renally dose meds/avoid nephrotoxic meds  -Keep MAP >65   -Will continue to follow closely     Thank you for your consult. I will follow-up with patient. Please contact us if you have any additional questions.    Adriel Xavier MD  Nephrology  [facility]

## 2020-09-05 NOTE — ASSESSMENT & PLAN NOTE
40 yo F s/p antrectomy with BII reconstruction c/b duodenal necrosis requiring ex lap, washout, drainage c/b aspiration event. S/p duodenal resection with d-j and g-j anastomosis.    - On room air  - NPO  - Keep G tube clamped, unclamp for nausea/vomiting  - Continue abx  - TPN   - Daily labs  - GI and DVT ppx  - PT / OT  - continue PRNs for BP control  - WV change at bedside 9/4. Will change again in 2-3 days  - F/u CT A/P today

## 2020-09-05 NOTE — PLAN OF CARE
"      SICU PLAN OF CARE NOTE    Dx: Duodenum disorder    Shift Events: VSS throughout shift. No acute event occurred.      Goals of Care: Will continue to monitor and maintain SBP < 180.     Neuro: AAO x4, Arouses to Voice, Follows Commands, and Moves All Extremities    Vital Signs: /83 (BP Location: Left arm, Patient Position: Lying)   Pulse 107   Temp 99 °F (37.2 °C) (Oral)   Resp (!) 23   Ht 5' 2" (1.575 m)   Wt 67.5 kg (148 lb 13 oz)   SpO2 97%   Breastfeeding No   BMI 27.22 kg/m²     Respiratory: Room Air    Diet: Sips with Meds and TPN    Gtts:TPN and Lipids     Urine Output: External Urinary Catheter 1500 cc/shift    Drains: GODFREY Drain 1, total output 0 cc / shift  GODFREY Drain 2, total output 25 cc / shift   G tube, total output 590 cc / shift   Chest tube total output, 0 cc / shift     Wound Vac To continuous vacuum suction.  Total output 50 cc / shift.       Labs/Accuchecks: Labs monitored and replaced as needed. Accuchecks Q6H.     Skin: See flowsheets.  Pt repositioned frequently using pillows.  Heel foams in place.  Triad cream applied to sacral wound.  Shin bruises covered with foams.  Bed plugged in and working properly.  Tubing kept free of skin.        "

## 2020-09-05 NOTE — PROGRESS NOTES
[facility]  General Surgery  Progress Note    Subjective:     History of Present Illness:  Pt is a 40 yo F with recent history of antrectomy with BII reconstruction for ulcer disease at outside hospital. 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 Patient also reportedly aspirated on induction in the OR prior to this, resulting in severe pulmonary edema and hypoxia. Patient was transferred to [facility] urgently for higher level of care. She arrived as a direct SICU admit on near maximal vent settings and requiring low dose vasopressors with HR in the upper 140s. Her midline laparotomy is closed with 4 Navdeep drains in place draining bilious output.     Post-Op Info:  Procedure(s) (LRB):  INSERTION-CATHETER-RUDY (Left)  BRONCHOSCOPY (N/A)  INSERTION, CATHETER, INTERCOSTAL, FOR DRAINAGE (Left)  REPLACEMENT, WOUND VAC (N/A)   5 Days Post-Op     Interval History:   No acute events overnight. Supplemental O2 weaned. Will plan for CT A/P this AM    Medications:  Continuous Infusions:   TPN ADULT CENTRAL LINE CUSTOM 50 mL/hr at 09/05/20 0900     Scheduled Meds:   albuterol-ipratropium  3 mL Nebulization Q4H    carvediloL  25 mg Oral BID    cloNIDine 0.3 mg/24 hr td ptwk  1 patch Transdermal Q7 Days    famotidine  20 mg Per G Tube Daily    heparin (porcine)  5,000 Units Subcutaneous Q8H    lidocaine  1 patch Transdermal Q24H    miconazole NITRATE 2 %   Topical (Top) BID    NIFEdipine  60 mg Oral Daily     PRN Meds:sodium chloride, hydrALAZINE, HYDROmorphone, HYDROmorphone, labetaloL, melatonin, metoprolol, oxyCODONE-acetaminophen     Review of patient's allergies indicates:   Allergen Reactions    Latex      Objective:     Vital Signs (Most Recent):  Temp: 97.9 °F (36.6 °C) (09/05/20 0700)  Pulse: (!) 116 (09/05/20 0854)  Resp: (!) 24 (09/05/20 0854)  BP: (!) 163/90 (09/05/20 0800)  SpO2: 97 % (09/05/20 0854) Vital Signs (24h Range):  Temp:  [97.9 °F  (36.6 °C)-99 °F (37.2 °C)] 97.9 °F (36.6 °C)  Pulse:  [] 116  Resp:  [14-44] 24  SpO2:  [93 %-100 %] 97 %  BP: (116-163)/(72-96) 163/90     Weight: 67.5 kg (148 lb 13 oz)  Body mass index is 27.22 kg/m².    Intake/Output - Last 3 Shifts       09/03 0700 - 09/04 0659 09/04 0700 - 09/05 0659 09/05 0700 - 09/06 0659    P.O. 140 250     I.V. (mL/kg) 1233.4 (19.4) 978 (14.5)     Other       TPN 1334 1415     Total Intake(mL/kg) 2707.4 (42.6) 2643 (39.2)     Urine (mL/kg/hr) 1100 (0.7) 2750 (1.7) 550 (3)    Drains 1960 1233 359    Other 200 50     Stool 0      Chest Tube 390 120 13    Total Output 3650 4153 922    Net -942.6 -1510 -922           Urine Occurrence 1 x 2 x     Stool Occurrence 4 x            Physical Exam  Constitutional:       General: She is not in acute distress.  Neck:      Comments: RIJ trialysis line, L IJ permacath  Cardiovascular:      Rate and Rhythm: Tachycardia present.   Pulmonary:      Effort: Pulmonary effort is normal.      Comments: L sided chest tube in place  Abdominal:      General: There is no distension.      Tenderness: There is no abdominal tenderness.      Comments: G tube clamped  Drains with bilious output  Wound vac in place   Skin:     General: Skin is warm and dry.   Neurological:      Mental Status: She is alert.         Significant Labs:  CBC:   Recent Labs   Lab 09/05/20  0345   WBC 22.92*   RBC 2.51*   HGB 7.4*   HCT 24.5*   *   MCV 98   MCH 29.5   MCHC 30.2*     CMP:   Recent Labs   Lab 09/05/20  0345   *  148*   CALCIUM 8.5*  8.5*   ALBUMIN 1.7*  1.7*   PROT 7.0     145   K 3.7  3.7   CO2 23  23   *  111*   BUN 92*  92*   CREATININE 3.5*  3.5*   ALKPHOS 158*   ALT 11   AST 20   BILITOT 0.4       Significant Diagnostics:  I have reviewed all pertinent imaging results/findings within the past 24 hours.        Assessment/Plan:     Severe sepsis  40 yo F s/p antrectomy with BII reconstruction c/b duodenal necrosis requiring ex lap,  washout, drainage c/b aspiration event. S/p duodenal resection with d-j and g-j anastomosis.    - On room air  - NPO  - Keep G tube clamped, unclamp for nausea/vomiting  - Continue abx  - TPN   - Daily labs  - GI and DVT ppx  - PT / OT  - continue PRNs for BP control  - WV change at bedside 9/4. Will change again in 2-3 days  - F/u CT A/P today        Collette Ferreira MD  General Surgery  [facility]

## 2020-09-05 NOTE — SUBJECTIVE & OBJECTIVE
Interval History/Significant Events: NAEO.  Pt denies any new complaints.  States she is feeling better.      Follow-up For: Procedure(s) (LRB):  INSERTION-CATHETER-RUDY (Left)  BRONCHOSCOPY (N/A)  INSERTION, CATHETER, INTERCOSTAL, FOR DRAINAGE (Left)  REPLACEMENT, WOUND VAC (N/A)    Post-Operative Day: 5 Days Post-Op    Objective:     Vital Signs (Most Recent):  Temp: 97.9 °F (36.6 °C) (09/05/20 0700)  Pulse: 108 (09/05/20 0800)  Resp: (!) 28 (09/05/20 0807)  BP: (!) 163/90 (09/05/20 0800)  SpO2: 96 % (09/05/20 0800) Vital Signs (24h Range):  Temp:  [97.9 °F (36.6 °C)-99 °F (37.2 °C)] 97.9 °F (36.6 °C)  Pulse:  [] 108  Resp:  [14-44] 28  SpO2:  [93 %-100 %] 96 %  BP: (116-163)/(72-96) 163/90     Weight: 67.5 kg (148 lb 13 oz)  Body mass index is 27.22 kg/m².      Intake/Output Summary (Last 24 hours) at 9/5/2020 0832  Last data filed at 9/5/2020 0700  Gross per 24 hour   Intake 2643 ml   Output 4575 ml   Net -1932 ml       Physical Exam  Constitutional:       General: She is not in acute distress.  HENT:      Head: Normocephalic and atraumatic.   Neck:      Comments: RIJ trialysis line, L IJ permacath  Cardiovascular:      Rate and Rhythm: Regular rhythm. Tachycardia present.      Comments: Hypertensive, sinus tach  L sided chest tube in place  Pulmonary:      Breath sounds: No wheezing.      Comments: tachypneic  Abdominal:      General: Abdomen is flat. There is no distension.      Tenderness: There is no abdominal tenderness.      Comments: Drains and G tube with bilious output  Wound vac in place   Skin:     General: Skin is warm and dry.   Neurological:      General: No focal deficit present.      Mental Status: She is alert and oriented to person, place, and time.   Psychiatric:         Mood and Affect: Mood normal.         Behavior: Behavior normal.         Vents:  Vent Mode: A/C (08/30/20 1045)  Ventilator Initiated: Yes(chart correction) (08/29/20 0800)  Set Rate: 16 BPM (08/30/20 1045)  Vt Set:  380 mL (08/30/20 1045)  PEEP/CPAP: 5 cmH20 (08/30/20 1045)  Oxygen Concentration (%): 50 (09/04/20 0600)  Peak Airway Pressure: 21 cmH2O (08/30/20 1045)  Plateau Pressure: 0 cmH20 (08/30/20 0900)  Total Ve: 7.12 mL (08/30/20 0900)  Negative Inspiratory Force (cm H2O): -23 (08/20/20 1501)  F/VT Ratio<105 (RSBI): (!) 52.94 (08/30/20 1045)    Lines/Drains/Airways     Central Venous Catheter Line            Trialysis (Dialysis) Catheter 08/13/20 2230 right internal jugular 22 days    Permacath 08/31/20 1100 left subclavian 4 days          Drain                 Closed/Suction Drain 08/13/20 1659 Right;Inferior Abdomen Bulb 19 Fr. 22 days         Closed/Suction Drain 08/13/20 1659 Right;Superior Abdomen Bulb 19 Fr. 22 days         Gastrostomy/Enterostomy 08/15/20 0916 Gastrostomy tube w/ balloon LUQ decompression 20 days    Female External Urinary Catheter 08/27/20 1600 8 days         Chest Tube 08/31/20 1033 1 Left 24 Fr. 4 days          Peripheral Intravenous Line                 Midline Catheter Insertion/Assessment  - Single Lumen 08/26/20 1501 Right basilic vein (medial side of arm) 18g x 8cm 9 days                Significant Labs:    CBC/Anemia Profile:  Recent Labs   Lab 09/04/20  0300 09/05/20  0345   WBC 25.42* 22.92*   HGB 7.7* 7.4*   HCT 24.8* 24.5*   * 359*   MCV 97 98   RDW 14.6* 14.6*        Chemistries:  Recent Labs   Lab 09/04/20  0300 09/04/20  1302 09/04/20  2155 09/05/20  0345     143 143 143 145  145   K 3.0*  3.0* 3.6 3.1* 3.7  3.7     109 110 110 111*  111*   CO2 22*  22* 23 24 23  23   BUN 89*  89* 89* 94* 92*  92*   CREATININE 3.6*  3.6* 3.5* 3.6* 3.5*  3.5*   CALCIUM 8.5*  8.5* 8.5* 8.4* 8.5*  8.5*   ALBUMIN 1.8*  1.8* 1.7* 1.7* 1.7*  1.7*   PROT 7.3  --   --  7.0   BILITOT 0.4  --   --  0.4   ALKPHOS 182*  --   --  158*   ALT 10  --   --  11   AST 22  --   --  20   MG 2.1  2.1 1.9 1.8 2.4  2.4   PHOS 2.0*  2.0* 1.6* 1.4* 3.3  3.3       All pertinent labs  within the past 24 hours have been reviewed.    Significant Imaging:  I have reviewed all pertinent imaging results/findings within the past 24 hours.

## 2020-09-05 NOTE — SUBJECTIVE & OBJECTIVE
Interval History:   No acute events overnight. Supplemental O2 weaned. Will plan for CT A/P this AM    Medications:  Continuous Infusions:   TPN ADULT CENTRAL LINE CUSTOM 50 mL/hr at 09/05/20 0900     Scheduled Meds:   albuterol-ipratropium  3 mL Nebulization Q4H    carvediloL  25 mg Oral BID    cloNIDine 0.3 mg/24 hr td ptwk  1 patch Transdermal Q7 Days    famotidine  20 mg Per G Tube Daily    heparin (porcine)  5,000 Units Subcutaneous Q8H    lidocaine  1 patch Transdermal Q24H    miconazole NITRATE 2 %   Topical (Top) BID    NIFEdipine  60 mg Oral Daily     PRN Meds:sodium chloride, hydrALAZINE, HYDROmorphone, HYDROmorphone, labetaloL, melatonin, metoprolol, oxyCODONE-acetaminophen     Review of patient's allergies indicates:   Allergen Reactions    Latex      Objective:     Vital Signs (Most Recent):  Temp: 97.9 °F (36.6 °C) (09/05/20 0700)  Pulse: (!) 116 (09/05/20 0854)  Resp: (!) 24 (09/05/20 0854)  BP: (!) 163/90 (09/05/20 0800)  SpO2: 97 % (09/05/20 0854) Vital Signs (24h Range):  Temp:  [97.9 °F (36.6 °C)-99 °F (37.2 °C)] 97.9 °F (36.6 °C)  Pulse:  [] 116  Resp:  [14-44] 24  SpO2:  [93 %-100 %] 97 %  BP: (116-163)/(72-96) 163/90     Weight: 67.5 kg (148 lb 13 oz)  Body mass index is 27.22 kg/m².    Intake/Output - Last 3 Shifts       09/03 0700 - 09/04 0659 09/04 0700 - 09/05 0659 09/05 0700 - 09/06 0659    P.O. 140 250     I.V. (mL/kg) 1233.4 (19.4) 978 (14.5)     Other       TPN 1334 1415     Total Intake(mL/kg) 2707.4 (42.6) 2643 (39.2)     Urine (mL/kg/hr) 1100 (0.7) 2750 (1.7) 550 (3)    Drains 1960 1233 359    Other 200 50     Stool 0      Chest Tube 390 120 13    Total Output 3650 4153 922    Net -942.6 -1510 -922           Urine Occurrence 1 x 2 x     Stool Occurrence 4 x            Physical Exam  Constitutional:       General: She is not in acute distress.  Neck:      Comments: RIJ trialysis line, L IJ permacath  Cardiovascular:      Rate and Rhythm: Tachycardia present.    Pulmonary:      Effort: Pulmonary effort is normal.      Comments: L sided chest tube in place  Abdominal:      General: There is no distension.      Tenderness: There is no abdominal tenderness.      Comments: G tube clamped  Drains with bilious output  Wound vac in place   Skin:     General: Skin is warm and dry.   Neurological:      Mental Status: She is alert.         Significant Labs:  CBC:   Recent Labs   Lab 09/05/20  0345   WBC 22.92*   RBC 2.51*   HGB 7.4*   HCT 24.5*   *   MCV 98   MCH 29.5   MCHC 30.2*     CMP:   Recent Labs   Lab 09/05/20  0345   *  148*   CALCIUM 8.5*  8.5*   ALBUMIN 1.7*  1.7*   PROT 7.0     145   K 3.7  3.7   CO2 23  23   *  111*   BUN 92*  92*   CREATININE 3.5*  3.5*   ALKPHOS 158*   ALT 11   AST 20   BILITOT 0.4       Significant Diagnostics:  I have reviewed all pertinent imaging results/findings within the past 24 hours.

## 2020-09-06 LAB
ALBUMIN SERPL BCP-MCNC: 1.7 G/DL (ref 3.5–5.2)
ALP SERPL-CCNC: 173 U/L (ref 55–135)
ALT SERPL W/O P-5'-P-CCNC: 12 U/L (ref 10–44)
ANION GAP SERPL CALC-SCNC: 10 MMOL/L (ref 8–16)
ANION GAP SERPL CALC-SCNC: 10 MMOL/L (ref 8–16)
ANION GAP SERPL CALC-SCNC: 11 MMOL/L (ref 8–16)
ANISOCYTOSIS BLD QL SMEAR: SLIGHT
AST SERPL-CCNC: 24 U/L (ref 10–40)
BASOPHILS # BLD AUTO: 0.08 K/UL (ref 0–0.2)
BASOPHILS NFR BLD: 0.3 % (ref 0–1.9)
BILIRUB SERPL-MCNC: 0.4 MG/DL (ref 0.1–1)
BUN SERPL-MCNC: 85 MG/DL (ref 6–20)
BUN SERPL-MCNC: 85 MG/DL (ref 6–20)
BUN SERPL-MCNC: 88 MG/DL (ref 6–20)
CA-I BLDV-SCNC: 1.17 MMOL/L (ref 1.06–1.42)
CALCIUM SERPL-MCNC: 8.1 MG/DL (ref 8.7–10.5)
CALCIUM SERPL-MCNC: 8.1 MG/DL (ref 8.7–10.5)
CALCIUM SERPL-MCNC: 8.4 MG/DL (ref 8.7–10.5)
CHLORIDE SERPL-SCNC: 108 MMOL/L (ref 95–110)
CHLORIDE SERPL-SCNC: 108 MMOL/L (ref 95–110)
CHLORIDE SERPL-SCNC: 110 MMOL/L (ref 95–110)
CO2 SERPL-SCNC: 24 MMOL/L (ref 23–29)
CREAT SERPL-MCNC: 2.9 MG/DL (ref 0.5–1.4)
CREAT SERPL-MCNC: 2.9 MG/DL (ref 0.5–1.4)
CREAT SERPL-MCNC: 3.3 MG/DL (ref 0.5–1.4)
DIFFERENTIAL METHOD: ABNORMAL
EOSINOPHIL # BLD AUTO: 0.2 K/UL (ref 0–0.5)
EOSINOPHIL NFR BLD: 1 % (ref 0–8)
ERYTHROCYTE [DISTWIDTH] IN BLOOD BY AUTOMATED COUNT: 14.6 % (ref 11.5–14.5)
EST. GFR  (AFRICAN AMERICAN): 19.4 ML/MIN/1.73 M^2
EST. GFR  (AFRICAN AMERICAN): 22.6 ML/MIN/1.73 M^2
EST. GFR  (AFRICAN AMERICAN): 22.6 ML/MIN/1.73 M^2
EST. GFR  (NON AFRICAN AMERICAN): 16.8 ML/MIN/1.73 M^2
EST. GFR  (NON AFRICAN AMERICAN): 19.6 ML/MIN/1.73 M^2
EST. GFR  (NON AFRICAN AMERICAN): 19.6 ML/MIN/1.73 M^2
GLUCOSE SERPL-MCNC: 116 MG/DL (ref 70–110)
GLUCOSE SERPL-MCNC: 116 MG/DL (ref 70–110)
GLUCOSE SERPL-MCNC: 123 MG/DL (ref 70–110)
HCT VFR BLD AUTO: 24.5 % (ref 37–48.5)
HGB BLD-MCNC: 7.6 G/DL (ref 12–16)
HYPOCHROMIA BLD QL SMEAR: ABNORMAL
IMM GRANULOCYTES # BLD AUTO: 0.2 K/UL (ref 0–0.04)
IMM GRANULOCYTES NFR BLD AUTO: 0.8 % (ref 0–0.5)
LYMPHOCYTES # BLD AUTO: 1.6 K/UL (ref 1–4.8)
LYMPHOCYTES NFR BLD: 6.5 % (ref 18–48)
MAGNESIUM SERPL-MCNC: 1.7 MG/DL (ref 1.6–2.6)
MAGNESIUM SERPL-MCNC: 1.7 MG/DL (ref 1.6–2.6)
MAGNESIUM SERPL-MCNC: 1.9 MG/DL (ref 1.6–2.6)
MCH RBC QN AUTO: 30.5 PG (ref 27–31)
MCHC RBC AUTO-ENTMCNC: 31 G/DL (ref 32–36)
MCV RBC AUTO: 98 FL (ref 82–98)
MONOCYTES # BLD AUTO: 1.2 K/UL (ref 0.3–1)
MONOCYTES NFR BLD: 4.7 % (ref 4–15)
NEUTROPHILS # BLD AUTO: 21.5 K/UL (ref 1.8–7.7)
NEUTROPHILS NFR BLD: 86.7 % (ref 38–73)
NRBC BLD-RTO: 0 /100 WBC
PHOSPHATE SERPL-MCNC: 3.2 MG/DL (ref 2.7–4.5)
PHOSPHATE SERPL-MCNC: 3.2 MG/DL (ref 2.7–4.5)
PHOSPHATE SERPL-MCNC: 3.9 MG/DL (ref 2.7–4.5)
PLATELET # BLD AUTO: 361 K/UL (ref 150–350)
PLATELET BLD QL SMEAR: ABNORMAL
PMV BLD AUTO: 11.3 FL (ref 9.2–12.9)
POCT GLUCOSE: 123 MG/DL (ref 70–110)
POCT GLUCOSE: 130 MG/DL (ref 70–110)
POCT GLUCOSE: 140 MG/DL (ref 70–110)
POTASSIUM SERPL-SCNC: 3.1 MMOL/L (ref 3.5–5.1)
POTASSIUM SERPL-SCNC: 3.5 MMOL/L (ref 3.5–5.1)
POTASSIUM SERPL-SCNC: 3.5 MMOL/L (ref 3.5–5.1)
PROT SERPL-MCNC: 7.3 G/DL (ref 6–8.4)
RBC # BLD AUTO: 2.49 M/UL (ref 4–5.4)
SODIUM SERPL-SCNC: 142 MMOL/L (ref 136–145)
SODIUM SERPL-SCNC: 142 MMOL/L (ref 136–145)
SODIUM SERPL-SCNC: 145 MMOL/L (ref 136–145)
WBC # BLD AUTO: 24.76 K/UL (ref 3.9–12.7)

## 2020-09-06 PROCEDURE — 99232 SBSQ HOSP IP/OBS MODERATE 35: CPT | Mod: ,,, | Performed by: INTERNAL MEDICINE

## 2020-09-06 PROCEDURE — 20000000 HC ICU ROOM

## 2020-09-06 PROCEDURE — B4185 PARENTERAL SOL 10 GM LIPIDS: HCPCS | Performed by: STUDENT IN AN ORGANIZED HEALTH CARE EDUCATION/TRAINING PROGRAM

## 2020-09-06 PROCEDURE — 94640 AIRWAY INHALATION TREATMENT: CPT

## 2020-09-06 PROCEDURE — 80069 RENAL FUNCTION PANEL: CPT

## 2020-09-06 PROCEDURE — 99232 PR SUBSEQUENT HOSPITAL CARE,LEVL II: ICD-10-PCS | Mod: ,,, | Performed by: INTERNAL MEDICINE

## 2020-09-06 PROCEDURE — 25000003 PHARM REV CODE 250: Performed by: STUDENT IN AN ORGANIZED HEALTH CARE EDUCATION/TRAINING PROGRAM

## 2020-09-06 PROCEDURE — 25000242 PHARM REV CODE 250 ALT 637 W/ HCPCS: Performed by: STUDENT IN AN ORGANIZED HEALTH CARE EDUCATION/TRAINING PROGRAM

## 2020-09-06 PROCEDURE — A4217 STERILE WATER/SALINE, 500 ML: HCPCS | Performed by: STUDENT IN AN ORGANIZED HEALTH CARE EDUCATION/TRAINING PROGRAM

## 2020-09-06 PROCEDURE — 25000003 PHARM REV CODE 250: Performed by: SURGERY

## 2020-09-06 PROCEDURE — 99232 SBSQ HOSP IP/OBS MODERATE 35: CPT | Mod: GC,,, | Performed by: ANESTHESIOLOGY

## 2020-09-06 PROCEDURE — 80053 COMPREHEN METABOLIC PANEL: CPT

## 2020-09-06 PROCEDURE — 99900035 HC TECH TIME PER 15 MIN (STAT)

## 2020-09-06 PROCEDURE — 83735 ASSAY OF MAGNESIUM: CPT | Mod: 91

## 2020-09-06 PROCEDURE — 99232 PR SUBSEQUENT HOSPITAL CARE,LEVL II: ICD-10-PCS | Mod: GC,,, | Performed by: ANESTHESIOLOGY

## 2020-09-06 PROCEDURE — 63600175 PHARM REV CODE 636 W HCPCS: Performed by: STUDENT IN AN ORGANIZED HEALTH CARE EDUCATION/TRAINING PROGRAM

## 2020-09-06 PROCEDURE — 80069 RENAL FUNCTION PANEL: CPT | Mod: 91

## 2020-09-06 PROCEDURE — 85025 COMPLETE CBC W/AUTO DIFF WBC: CPT

## 2020-09-06 PROCEDURE — 83735 ASSAY OF MAGNESIUM: CPT

## 2020-09-06 PROCEDURE — 94761 N-INVAS EAR/PLS OXIMETRY MLT: CPT

## 2020-09-06 PROCEDURE — 94664 DEMO&/EVAL PT USE INHALER: CPT

## 2020-09-06 PROCEDURE — 82330 ASSAY OF CALCIUM: CPT

## 2020-09-06 RX ORDER — POTASSIUM CHLORIDE 1.5 G/1.58G
40 POWDER, FOR SOLUTION ORAL EVERY 4 HOURS
Status: COMPLETED | OUTPATIENT
Start: 2020-09-06 | End: 2020-09-06

## 2020-09-06 RX ORDER — POTASSIUM CHLORIDE 1.5 G/1.58G
40 POWDER, FOR SOLUTION ORAL ONCE
Status: DISCONTINUED | OUTPATIENT
Start: 2020-09-06 | End: 2020-09-06

## 2020-09-06 RX ADMIN — POTASSIUM CHLORIDE 40 MEQ: 1.5 POWDER, FOR SOLUTION ORAL at 04:09

## 2020-09-06 RX ADMIN — NIFEDIPINE 60 MG: 30 TABLET, FILM COATED, EXTENDED RELEASE ORAL at 09:09

## 2020-09-06 RX ADMIN — HEPARIN SODIUM 5000 UNITS: 5000 INJECTION INTRAVENOUS; SUBCUTANEOUS at 06:09

## 2020-09-06 RX ADMIN — POTASSIUM CHLORIDE 40 MEQ: 1.5 POWDER, FOR SOLUTION ORAL at 12:09

## 2020-09-06 RX ADMIN — OXYCODONE HYDROCHLORIDE AND ACETAMINOPHEN 1 TABLET: 10; 325 TABLET ORAL at 12:09

## 2020-09-06 RX ADMIN — OXYCODONE HYDROCHLORIDE AND ACETAMINOPHEN 1 TABLET: 10; 325 TABLET ORAL at 09:09

## 2020-09-06 RX ADMIN — IPRATROPIUM BROMIDE AND ALBUTEROL SULFATE 3 ML: .5; 2.5 SOLUTION RESPIRATORY (INHALATION) at 11:09

## 2020-09-06 RX ADMIN — IPRATROPIUM BROMIDE AND ALBUTEROL SULFATE 3 ML: .5; 2.5 SOLUTION RESPIRATORY (INHALATION) at 08:09

## 2020-09-06 RX ADMIN — HEPARIN SODIUM 5000 UNITS: 5000 INJECTION INTRAVENOUS; SUBCUTANEOUS at 09:09

## 2020-09-06 RX ADMIN — HYDROMORPHONE HYDROCHLORIDE 0.5 MG: 1 INJECTION, SOLUTION INTRAMUSCULAR; INTRAVENOUS; SUBCUTANEOUS at 02:09

## 2020-09-06 RX ADMIN — FAMOTIDINE 20 MG: 20 TABLET, FILM COATED ORAL at 11:09

## 2020-09-06 RX ADMIN — SMOFLIPID 250 ML: 6; 6; 5; 3 INJECTION, EMULSION INTRAVENOUS at 09:09

## 2020-09-06 RX ADMIN — MICONAZOLE NITRATE: 20 POWDER TOPICAL at 09:09

## 2020-09-06 RX ADMIN — OXYCODONE HYDROCHLORIDE AND ACETAMINOPHEN 1 TABLET: 10; 325 TABLET ORAL at 06:09

## 2020-09-06 RX ADMIN — ASCORBIC ACID, VITAMIN A PALMITATE, CHOLECALCIFEROL, THIAMINE HYDROCHLORIDE, RIBOFLAVIN-5 PHOSPHATE SODIUM, PYRIDOXINE HYDROCHLORIDE, NIACINAMIDE, DEXPANTHENOL, ALPHA-TOCOPHEROL ACETATE, VITAMIN K1, FOLIC ACID, BIOTIN, CYANOCOBALAMIN: 200; 3300; 200; 6; 3.6; 6; 40; 15; 10; 150; 600; 60; 5 INJECTION, SOLUTION INTRAVENOUS at 09:09

## 2020-09-06 RX ADMIN — CARVEDILOL 25 MG: 25 TABLET, FILM COATED ORAL at 09:09

## 2020-09-06 RX ADMIN — IPRATROPIUM BROMIDE AND ALBUTEROL SULFATE 3 ML: .5; 2.5 SOLUTION RESPIRATORY (INHALATION) at 09:09

## 2020-09-06 RX ADMIN — HEPARIN SODIUM 5000 UNITS: 5000 INJECTION INTRAVENOUS; SUBCUTANEOUS at 02:09

## 2020-09-06 RX ADMIN — IPRATROPIUM BROMIDE AND ALBUTEROL SULFATE 3 ML: .5; 2.5 SOLUTION RESPIRATORY (INHALATION) at 04:09

## 2020-09-06 RX ADMIN — OXYCODONE HYDROCHLORIDE AND ACETAMINOPHEN 1 TABLET: 10; 325 TABLET ORAL at 01:09

## 2020-09-06 RX ADMIN — IPRATROPIUM BROMIDE AND ALBUTEROL SULFATE 3 ML: .5; 2.5 SOLUTION RESPIRATORY (INHALATION) at 01:09

## 2020-09-06 RX ADMIN — IPRATROPIUM BROMIDE AND ALBUTEROL SULFATE 3 ML: .5; 2.5 SOLUTION RESPIRATORY (INHALATION) at 05:09

## 2020-09-06 RX ADMIN — MELATONIN TAB 3 MG 3 MG: 3 TAB at 09:09

## 2020-09-06 NOTE — PROGRESS NOTES
[facility]  Critical Care - Surgery  Progress Note    Patient Name: Jaquan Farmer  MRN: [id number]  Admission Date: 8/12/2020  Hospital Length of Stay: 25 days  Code Status: Full Code  Attending Provider: Donis Roa MD  Primary Care Provider: Primary Doctor No   Principal Problem: Duodenum disorder    Subjective:     Hospital/ICU Course:  8/14 Patient had a line replaced along with central line placed, with complication of pneumothorax. Rt pigtail was placed. Patient tolerated complications well. Decreasing vent settings.  8/15 NAEO. Patient was taken back to OR early AM for washout. Possible closure 8/18. ETT exchanged in OR 7.0 -> 7.5  8/16 - NAEON. HDS. Intubated, sedated.   8/17 - NAEON. OR tomorrow for closure.  8/18 - Washed out and closed in OR  8/22 - NAEO. Plan for Repeat CT AP today  8/23 - NAEO. Hbg lower. Giving blood x2 units.  8/25 - NAEO. Went to OR yesterday for Abdominal wall exploration and ligation of bleeding vessels. Wound vac exchanged  8/26 - NAEO. Hypertensive overnight. Hgb trending. Plan for CT today  8/27 - NAEO. Hypertensive overnight. CT yesterday showed no extravasation or acute changes  8/28 - NAEO.   8/29 - Patient in respiratory distress upon arrival. Chest XR w/ obvious left sided pulmonary occlusion. Patient intubated and bronchoscopy performed.  8/30 - Repeat Bronch today. Possible extubation later today  8/31 - Extubated yesterday. Wound vac change, bronch scheduled for OR today. Patient out of bed in chair on AM exam.  9/1 - Doing well without complaints this morning.  L sided permacath placed yesterday  9/2 - NAEO.  Continues to be hypertensive/tachycardic.  9/3 - NAEO.  G-tube clamped by primary team.  Increased output from superior GODFREY  9/4 - Overnight 2 episodes of SOB and increased WOB. CXR stable and ABG without significant abnormality. Placed on comfort flow. Cardene titrated off and oral BP medications increased.     Interval  History/Significant Events: NAEO. Possibly remove L chest tube today by primary.     Follow-up For: Procedure(s) (LRB):  INSERTION-CATHETER-RUDY (Left)  BRONCHOSCOPY (N/A)  INSERTION, CATHETER, INTERCOSTAL, FOR DRAINAGE (Left)  REPLACEMENT, WOUND VAC (N/A)    Post-Operative Day: 6 Days Post-Op    Objective:     Vital Signs (Most Recent):  Temp: 98.6 °F (37 °C) (09/06/20 0715)  Pulse: (!) 113 (09/06/20 0835)  Resp: 18 (09/06/20 0936)  BP: (!) 142/88 (09/06/20 0730)  SpO2: 97 % (09/06/20 0835) Vital Signs (24h Range):  Temp:  [97.6 °F (36.4 °C)-99.8 °F (37.7 °C)] 98.6 °F (37 °C)  Pulse:  [106-149] 113  Resp:  [18-49] 18  SpO2:  [93 %-100 %] 97 %  BP: (142-193)/() 142/88     Weight: 67.5 kg (148 lb 13 oz)  Body mass index is 27.22 kg/m².      Intake/Output Summary (Last 24 hours) at 9/6/2020 1022  Last data filed at 9/6/2020 1000  Gross per 24 hour   Intake 815 ml   Output 3778 ml   Net -2963 ml       Physical Exam  Constitutional:       General: She is not in acute distress.  Neck:      Comments: RIJ trialysis line, L IJ permacath  Cardiovascular:      Rate and Rhythm: Tachycardia present.   Pulmonary:      Effort: Pulmonary effort is normal.      Comments: L sided chest tube in place  Abdominal:      General: There is no distension.      Tenderness: There is no abdominal tenderness.      Comments: G tube clamped  Drains with bilious output  Wound vac in place   Skin:     General: Skin is warm and dry.   Neurological:      Mental Status: She is alert.         Vents:  Vent Mode: A/C (08/30/20 1045)  Ventilator Initiated: Yes(chart correction) (08/29/20 0800)  Set Rate: 16 BPM (08/30/20 1045)  Vt Set: 380 mL (08/30/20 1045)  PEEP/CPAP: 5 cmH20 (08/30/20 1045)  Oxygen Concentration (%): 50 (09/04/20 0600)  Peak Airway Pressure: 21 cmH2O (08/30/20 1045)  Plateau Pressure: 0 cmH20 (08/30/20 0900)  Total Ve: 7.12 mL (08/30/20 0900)  Negative Inspiratory Force (cm H2O): -23 (08/20/20 1501)  F/VT Ratio<105 (RSBI):  (!) 52.94 (08/30/20 1045)    Lines/Drains/Airways     Central Venous Catheter Line            Trialysis (Dialysis) Catheter 08/13/20 2230 right internal jugular 23 days    Permacath 08/31/20 1100 left subclavian 5 days          Drain                 Closed/Suction Drain 08/13/20 1659 Right;Inferior Abdomen Bulb 19 Fr. 23 days         Closed/Suction Drain 08/13/20 1659 Right;Superior Abdomen Bulb 19 Fr. 23 days         Gastrostomy/Enterostomy 08/15/20 0916 Gastrostomy tube w/ balloon LUQ decompression 22 days    Female External Urinary Catheter 08/27/20 1600 9 days         Chest Tube 08/31/20 1033 1 Left 24 Fr. 5 days          Peripheral Intravenous Line                 Midline Catheter Insertion/Assessment  - Single Lumen 08/26/20 1501 Right basilic vein (medial side of arm) 18g x 8cm 10 days                Significant Labs:    CBC/Anemia Profile:  Recent Labs   Lab 09/05/20  0345 09/06/20  0442   WBC 22.92* 24.76*   HGB 7.4* 7.6*   HCT 24.5* 24.5*   * 361*   MCV 98 98   RDW 14.6* 14.6*        Chemistries:  Recent Labs   Lab 09/05/20  0345 09/05/20  1358 09/06/20  0442     145 146* 145  145  145  145  145   K 3.7  3.7 3.2* 3.1*  3.1*  3.1*  3.1*  3.1*  3.1*   *  111* 109 110  110  110  110  110   CO2 23  23 23 24  24  24  24  24   BUN 92*  92* 93* 88*  88*  88*  88*  88*   CREATININE 3.5*  3.5* 3.4* 3.3*  3.3*  3.3*  3.3*  3.3*   CALCIUM 8.5*  8.5* 8.3* 8.4*  8.4*  8.4*  8.4*  8.4*   ALBUMIN 1.7*  1.7* 1.7* 1.7*  1.7*  1.7*  1.7*  1.7*   PROT 7.0  --  7.3   BILITOT 0.4  --  0.4   ALKPHOS 158*  --  173*   ALT 11  --  12   AST 20  --  24   MG 2.4  2.4 2.1 1.9  1.9  1.9  1.9  1.9  1.9  1.9   PHOS 3.3  3.3 3.8 3.9  3.9  3.9  3.9  3.9       All pertinent labs within the past 24 hours have been reviewed.    Significant Imaging:  I have reviewed and interpreted all pertinent imaging results/findings within the past 24 hours.    Assessment/Plan:      Severe sepsis  40 yo F s/p antrectomy with BII reconstruction c/b duodenal necrosis requiring ex lap, washout, drainage c/b aspiration event. Now improving in SICU.  Permacath installed 8/31 for possible dialysis needs.     .Neuro:  - AOx4  - PCA dicontinued; IV dilaudid PRN  - Percocet 10/325 PRN PO added  - Pain management consult placed; appreciate assistance  - Clonidine patch for anxiety     Resp:  - Intubated 8/29, extubated 8/30  - Satting well on room air  - Chest tube placed 8/31; CT currently to suction withminimal output last 12 hrs     CV: Persistent, refractory hypertension.  - MAP goal >65  - Coreg 25mg BID  - Nifedipine 60 mg QD  - Labetalol PRN  - Hydralazine PRN  - Permacath  Placed 8/31  - Cardene gtt stopped     Heme:  - Hgb/Hct stable  - S/p Abdominal washout on 8/21   - S/p Abdominal exploration w/ woundvac exhange 8/24; exchange 8/31     ID:  - pt afebrile  - daily CBC  - persisting leukocytosis      Renal:  -FLORIDALMA - Cr stable ~3.3. Continues to have adequate UOP.  - responded to diuretic yesterday   - appreciate nephro recs     FEN/GI:  - NPO  - G tube clamped  - TPN + Lipid  - Maintain drains to bulb suction  - Replace electrolytes as needed to keep K>4, Mg>2; gentle repletion due to renal status     Endo:  - Monitor BG     DVT PPx: SQH 5kU Q8h  GI PPx: famotidine 20mg IV daily    Dispo:  - Continue SICU care.         Crow Izaguirre MD  Critical Care - Surgery  [facility]

## 2020-09-06 NOTE — ASSESSMENT & PLAN NOTE
38 yo F s/p antrectomy with BII reconstruction c/b duodenal necrosis requiring ex lap, washout, drainage c/b aspiration event. S/p duodenal resection with d-j and g-j anastomosis.    - On room air  - NPO, TPN  - Keep G tube clamped, unclamp for nausea/vomiting. Ok to use G tube for meds  - Will obtained a CXR this morning and if stable, will discontinue left CT  - Continue abx  - Daily labs  - GI and DVT ppx  - PT / OT  - continue PRNs for BP control  - WV change tomorrow 9/6 at bedside

## 2020-09-06 NOTE — ASSESSMENT & PLAN NOTE
40 yo F s/p antrectomy with BII reconstruction c/b duodenal necrosis requiring ex lap, washout, drainage c/b aspiration event. Now improving in SICU.  Permacath installed 8/31 for possible dialysis needs.     .Neuro:  - AOx4  - PCA dicontinued; IV dilaudid PRN  - Percocet 10/325 PRN PO added  - Pain management consult placed; appreciate assistance  - Clonidine patch for anxiety     Resp:  - Intubated 8/29, extubated 8/30  - Satting well on room air  - Chest tube placed 8/31; CT currently to suction withminimal output last 12 hrs     CV: Persistent, refractory hypertension.  - MAP goal >65  - Coreg 25mg BID  - Nifedipine 60 mg QD  - Labetalol PRN  - Hydralazine PRN  - Permacath  Placed 8/31  - Cardene gtt stopped     Heme:  - Hgb/Hct stable  - S/p Abdominal washout on 8/21   - S/p Abdominal exploration w/ woundvac exhange 8/24; exchange 8/31     ID:  - pt afebrile  - daily CBC  - persisting leukocytosis      Renal:  -FLORIDALMA - Cr stable ~3.3. Continues to have adequate UOP.  - responded to diuretic yesterday   - appreciate nephro recs     FEN/GI:  - NPO  - G tube clamped  - TPN + Lipid  - Maintain drains to bulb suction  - Replace electrolytes as needed to keep K>4, Mg>2; gentle repletion due to renal status     Endo:  - Monitor BG     DVT PPx: SQH 5kU Q8h  GI PPx: famotidine 20mg IV daily    Dispo:  - Continue SICU care.

## 2020-09-06 NOTE — PLAN OF CARE
Pt. Remained free from falls or injury throughout shift. Sinus tachy. HR . SBP <180. MAP >65. Sat >95% RA. AAOx4. Pain managed throughout shift. All dressings CDI. UO 1L during shift. TPN @ 50. 40mEq K replaced. CT w/ contrast done today of abdomen. Will continue to monitor.

## 2020-09-06 NOTE — SUBJECTIVE & OBJECTIVE
Interval History/Significant Events: NAEO. Possibly remove L chest tube today by primary.     Follow-up For: Procedure(s) (LRB):  INSERTION-CATHETER-RUDY (Left)  BRONCHOSCOPY (N/A)  INSERTION, CATHETER, INTERCOSTAL, FOR DRAINAGE (Left)  REPLACEMENT, WOUND VAC (N/A)    Post-Operative Day: 6 Days Post-Op    Objective:     Vital Signs (Most Recent):  Temp: 98.6 °F (37 °C) (09/06/20 0715)  Pulse: (!) 113 (09/06/20 0835)  Resp: 18 (09/06/20 0936)  BP: (!) 142/88 (09/06/20 0730)  SpO2: 97 % (09/06/20 0835) Vital Signs (24h Range):  Temp:  [97.6 °F (36.4 °C)-99.8 °F (37.7 °C)] 98.6 °F (37 °C)  Pulse:  [106-149] 113  Resp:  [18-49] 18  SpO2:  [93 %-100 %] 97 %  BP: (142-193)/() 142/88     Weight: 67.5 kg (148 lb 13 oz)  Body mass index is 27.22 kg/m².      Intake/Output Summary (Last 24 hours) at 9/6/2020 1022  Last data filed at 9/6/2020 1000  Gross per 24 hour   Intake 815 ml   Output 3778 ml   Net -2963 ml       Physical Exam  Constitutional:       General: She is not in acute distress.  Neck:      Comments: RIJ trialysis line, L IJ permacath  Cardiovascular:      Rate and Rhythm: Tachycardia present.   Pulmonary:      Effort: Pulmonary effort is normal.      Comments: L sided chest tube in place  Abdominal:      General: There is no distension.      Tenderness: There is no abdominal tenderness.      Comments: G tube clamped  Drains with bilious output  Wound vac in place   Skin:     General: Skin is warm and dry.   Neurological:      Mental Status: She is alert.         Vents:  Vent Mode: A/C (08/30/20 1045)  Ventilator Initiated: Yes(chart correction) (08/29/20 0800)  Set Rate: 16 BPM (08/30/20 1045)  Vt Set: 380 mL (08/30/20 1045)  PEEP/CPAP: 5 cmH20 (08/30/20 1045)  Oxygen Concentration (%): 50 (09/04/20 0600)  Peak Airway Pressure: 21 cmH2O (08/30/20 1045)  Plateau Pressure: 0 cmH20 (08/30/20 0900)  Total Ve: 7.12 mL (08/30/20 0900)  Negative Inspiratory Force (cm H2O): -23 (08/20/20 1501)  F/VT Ratio<105  (RSBI): (!) 52.94 (08/30/20 1045)    Lines/Drains/Airways     Central Venous Catheter Line            Trialysis (Dialysis) Catheter 08/13/20 2230 right internal jugular 23 days    Permacath 08/31/20 1100 left subclavian 5 days          Drain                 Closed/Suction Drain 08/13/20 1659 Right;Inferior Abdomen Bulb 19 Fr. 23 days         Closed/Suction Drain 08/13/20 1659 Right;Superior Abdomen Bulb 19 Fr. 23 days         Gastrostomy/Enterostomy 08/15/20 0916 Gastrostomy tube w/ balloon LUQ decompression 22 days    Female External Urinary Catheter 08/27/20 1600 9 days         Chest Tube 08/31/20 1033 1 Left 24 Fr. 5 days          Peripheral Intravenous Line                 Midline Catheter Insertion/Assessment  - Single Lumen 08/26/20 1501 Right basilic vein (medial side of arm) 18g x 8cm 10 days                Significant Labs:    CBC/Anemia Profile:  Recent Labs   Lab 09/05/20  0345 09/06/20  0442   WBC 22.92* 24.76*   HGB 7.4* 7.6*   HCT 24.5* 24.5*   * 361*   MCV 98 98   RDW 14.6* 14.6*        Chemistries:  Recent Labs   Lab 09/05/20  0345 09/05/20  1358 09/06/20  0442     145 146* 145  145  145  145  145   K 3.7  3.7 3.2* 3.1*  3.1*  3.1*  3.1*  3.1*  3.1*   *  111* 109 110  110  110  110  110   CO2 23  23 23 24  24  24  24  24   BUN 92*  92* 93* 88*  88*  88*  88*  88*   CREATININE 3.5*  3.5* 3.4* 3.3*  3.3*  3.3*  3.3*  3.3*   CALCIUM 8.5*  8.5* 8.3* 8.4*  8.4*  8.4*  8.4*  8.4*   ALBUMIN 1.7*  1.7* 1.7* 1.7*  1.7*  1.7*  1.7*  1.7*   PROT 7.0  --  7.3   BILITOT 0.4  --  0.4   ALKPHOS 158*  --  173*   ALT 11  --  12   AST 20  --  24   MG 2.4  2.4 2.1 1.9  1.9  1.9  1.9  1.9  1.9  1.9   PHOS 3.3  3.3 3.8 3.9  3.9  3.9  3.9  3.9       All pertinent labs within the past 24 hours have been reviewed.    Significant Imaging:  I have reviewed and interpreted all pertinent imaging results/findings within the past 24 hours.

## 2020-09-06 NOTE — PROGRESS NOTES
[facility]  Nephrology  Progress Note    Patient Name: Jaquan Farmer  MRN: [id number]  Admission Date: 8/12/2020  Hospital Length of Stay: 25 days  Attending Provider: Donis Roa MD   Primary Care Physician: Primary Doctor No  Principal Problem:Duodenum disorder    Subjective:     HPI: Mrs. Farmer is a 39 year old female with antrectomy at [facility] complicated duodenal necrosis post op. She aspirated, was intubated, and became septic. She developed renal failure and required max vent settings and was transferred here for higher level of care. On arrival she was hypotensive on pressers, max vent setting, and had minimal urine output.  Overnight she was given 6L of fluid, 6g calcium, placed on vac, pip-tazo, and fluconazole. Nephro consulted for sid.    Interval History  Good urine output without any lasixs. Reports improved breathing. No other complains.    ROS: Negative    Vitals:    09/06/20 1145 09/06/20 1147 09/06/20 1200 09/06/20 1215   BP:   (!) 149/88    BP Location:   Left arm    Patient Position:   Sitting    Pulse: (!) 114 110 110 103   Resp: (!) 28 20 (!) 28 20   Temp:       TempSrc:       SpO2: 99% 99% 98% 97%   Weight:       Height:         Scheduled Meds:   albuterol-ipratropium  3 mL Nebulization Q4H    carvediloL  25 mg Oral BID    cloNIDine 0.3 mg/24 hr td ptwk  1 patch Transdermal Q7 Days    famotidine  20 mg Per G Tube Daily    heparin (porcine)  5,000 Units Subcutaneous Q8H    lidocaine  1 patch Transdermal Q24H    miconazole NITRATE 2 %   Topical (Top) BID    NIFEdipine  60 mg Oral Daily    potassium chloride  40 mEq Per G Tube Q4H     Continuous Infusions:   TPN ADULT CENTRAL LINE CUSTOM 50 mL/hr at 09/06/20 1200     PRN Meds:.sodium chloride, hydrALAZINE, HYDROmorphone, HYDROmorphone, labetaloL, melatonin, metoprolol, oxyCODONE-acetaminophen    Physical Exam   Constitutional: She is oriented to person, place, and time. No distress.   HENT:   Head:  Normocephalic and atraumatic.   Eyes: Pupils are equal, round, and reactive to light. Conjunctivae are normal. No scleral icterus.   Neck: Normal range of motion.   Cardiovascular: Normal rate, regular rhythm and normal heart sounds.   Pulmonary/Chest: Effort normal and breath sounds normal. No respiratory distress. She has no wheezes. She has no rales.   Abdominal: Soft.   Neurological: She is alert and oriented to person, place, and time. No cranial nerve deficit.   Skin: Skin is warm. She is not diaphoretic.         Assessment/Plan:     Acute renal failure with tubular necrosis  Stage 3 sid with ischemic atn likely secondary to septic shock, unknown bl cr, muddy brown casts on microscopy. Has been requiring dialyssi but has been urinating for past few days. Electrolytes and acid base are ok but BUN clearance is lagging. Will hold of dialysis for now.   - Renal recovery in progress   -no need for RRT today  -Strict I/O's  -Renally dose meds/avoid nephrotoxic meds  -Keep MAP >65   -Will continue to follow closely     Thank you for your consult. I will follow-up with patient. Please contact us if you have any additional questions.    Adriel Xavier MD  Nephrology  [facility]

## 2020-09-06 NOTE — SUBJECTIVE & OBJECTIVE
Interval History:   No acute events overnight. CT A/P obtained shows adequate management of fluid collections.     Medications:  Continuous Infusions:   TPN ADULT CENTRAL LINE CUSTOM 50 mL/hr at 09/06/20 0700     Scheduled Meds:   albuterol-ipratropium  3 mL Nebulization Q4H    carvediloL  25 mg Oral BID    cloNIDine 0.3 mg/24 hr td ptwk  1 patch Transdermal Q7 Days    famotidine  20 mg Per G Tube Daily    heparin (porcine)  5,000 Units Subcutaneous Q8H    lidocaine  1 patch Transdermal Q24H    lipid (SMOFLIPID)  250 mL Intravenous Daily    miconazole NITRATE 2 %   Topical (Top) BID    NIFEdipine  60 mg Oral Daily     PRN Meds:sodium chloride, hydrALAZINE, HYDROmorphone, HYDROmorphone, labetaloL, melatonin, metoprolol, oxyCODONE-acetaminophen     Review of patient's allergies indicates:   Allergen Reactions    Latex      Objective:     Vital Signs (Most Recent):  Temp: 98.6 °F (37 °C) (09/06/20 0715)  Pulse: (!) 113 (09/06/20 0835)  Resp: 20 (09/06/20 0835)  BP: (!) 142/88 (09/06/20 0730)  SpO2: 97 % (09/06/20 0835) Vital Signs (24h Range):  Temp:  [97.6 °F (36.4 °C)-99.8 °F (37.7 °C)] 98.6 °F (37 °C)  Pulse:  [106-149] 113  Resp:  [20-49] 20  SpO2:  [93 %-100 %] 97 %  BP: (142-193)/() 142/88     Weight: 67.5 kg (148 lb 13 oz)  Body mass index is 27.22 kg/m².    Intake/Output - Last 3 Shifts       09/04 0700 - 09/05 0659 09/05 0700 - 09/06 0659 09/06 0700 - 09/07 0659    P.O. 250      I.V. (mL/kg) 978 (14.5) 184 (2.7)     IV Piggyback  1     TPN 1415 630     Total Intake(mL/kg) 2643 (39.2) 815 (12.1)     Urine (mL/kg/hr) 2750 (1.7) 2560 (1.6) 150 (0.9)    Drains 1233 1803 31    Other 50 178 0    Stool  0     Chest Tube 120 13 40    Total Output 4153 5074 221    Net -1510 -3739 -221           Urine Occurrence 2 x 1 x     Stool Occurrence  0 x           Physical Exam  Constitutional:       General: She is not in acute distress.  Neck:      Comments: RIJ trialysis line, L IJ permacath  Cardiovascular:       Rate and Rhythm: Tachycardia present.   Pulmonary:      Effort: Pulmonary effort is normal.      Comments: L sided chest tube in place  Abdominal:      General: There is no distension.      Tenderness: There is no abdominal tenderness.      Comments: G tube clamped  Drains with bilious output  Wound vac in place   Skin:     General: Skin is warm and dry.   Neurological:      Mental Status: She is alert.         Significant Labs:  CBC:   Recent Labs   Lab 09/06/20  0442   WBC 24.76*   RBC 2.49*   HGB 7.6*   HCT 24.5*   *   MCV 98   MCH 30.5   MCHC 31.0*     CMP:   Recent Labs   Lab 09/06/20  0442   *  123*  123*  123*  123*   CALCIUM 8.4*  8.4*  8.4*  8.4*  8.4*   ALBUMIN 1.7*  1.7*  1.7*  1.7*  1.7*   PROT 7.3     145  145  145  145   K 3.1*  3.1*  3.1*  3.1*  3.1*  3.1*   CO2 24  24  24  24  24     110  110  110  110   BUN 88*  88*  88*  88*  88*   CREATININE 3.3*  3.3*  3.3*  3.3*  3.3*   ALKPHOS 173*   ALT 12   AST 24   BILITOT 0.4       Significant Diagnostics:  I have reviewed all pertinent imaging results/findings within the past 24 hours.

## 2020-09-06 NOTE — PROGRESS NOTES
[facility]  General Surgery  Progress Note    Subjective:     History of Present Illness:  Pt is a 38 yo F with recent history of antrectomy with BII reconstruction for ulcer disease at outside hospital. 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 Patient also reportedly aspirated on induction in the OR prior to this, resulting in severe pulmonary edema and hypoxia. Patient was transferred to [facility] urgently for higher level of care. She arrived as a direct SICU admit on near maximal vent settings and requiring low dose vasopressors with HR in the upper 140s. Her midline laparotomy is closed with 4 Navdeep drains in place draining bilious output.     Post-Op Info:  Procedure(s) (LRB):  INSERTION-CATHETER-RUDY (Left)  BRONCHOSCOPY (N/A)  INSERTION, CATHETER, INTERCOSTAL, FOR DRAINAGE (Left)  REPLACEMENT, WOUND VAC (N/A)   6 Days Post-Op     Interval History:   No acute events overnight. CT A/P obtained shows adequate management of fluid collections.     Medications:  Continuous Infusions:   TPN ADULT CENTRAL LINE CUSTOM 50 mL/hr at 09/06/20 0700     Scheduled Meds:   albuterol-ipratropium  3 mL Nebulization Q4H    carvediloL  25 mg Oral BID    cloNIDine 0.3 mg/24 hr td ptwk  1 patch Transdermal Q7 Days    famotidine  20 mg Per G Tube Daily    heparin (porcine)  5,000 Units Subcutaneous Q8H    lidocaine  1 patch Transdermal Q24H    lipid (SMOFLIPID)  250 mL Intravenous Daily    miconazole NITRATE 2 %   Topical (Top) BID    NIFEdipine  60 mg Oral Daily     PRN Meds:sodium chloride, hydrALAZINE, HYDROmorphone, HYDROmorphone, labetaloL, melatonin, metoprolol, oxyCODONE-acetaminophen     Review of patient's allergies indicates:   Allergen Reactions    Latex      Objective:     Vital Signs (Most Recent):  Temp: 98.6 °F (37 °C) (09/06/20 0715)  Pulse: (!) 113 (09/06/20 0835)  Resp: 20 (09/06/20 0835)  BP: (!) 142/88 (09/06/20 0730)  SpO2: 97 %  (09/06/20 0835) Vital Signs (24h Range):  Temp:  [97.6 °F (36.4 °C)-99.8 °F (37.7 °C)] 98.6 °F (37 °C)  Pulse:  [106-149] 113  Resp:  [20-49] 20  SpO2:  [93 %-100 %] 97 %  BP: (142-193)/() 142/88     Weight: 67.5 kg (148 lb 13 oz)  Body mass index is 27.22 kg/m².    Intake/Output - Last 3 Shifts       09/04 0700 - 09/05 0659 09/05 0700 - 09/06 0659 09/06 0700 - 09/07 0659    P.O. 250      I.V. (mL/kg) 978 (14.5) 184 (2.7)     IV Piggyback  1     TPN 1415 630     Total Intake(mL/kg) 2643 (39.2) 815 (12.1)     Urine (mL/kg/hr) 2750 (1.7) 2560 (1.6) 150 (0.9)    Drains 1233 1803 31    Other 50 178 0    Stool  0     Chest Tube 120 13 40    Total Output 4153 4554 221    Net -1510 -3739 -221           Urine Occurrence 2 x 1 x     Stool Occurrence  0 x           Physical Exam  Constitutional:       General: She is not in acute distress.  Neck:      Comments: RIJ trialysis line, L IJ permacath  Cardiovascular:      Rate and Rhythm: Tachycardia present.   Pulmonary:      Effort: Pulmonary effort is normal.      Comments: L sided chest tube in place  Abdominal:      General: There is no distension.      Tenderness: There is no abdominal tenderness.      Comments: G tube clamped  Drains with bilious output  Wound vac in place   Skin:     General: Skin is warm and dry.   Neurological:      Mental Status: She is alert.         Significant Labs:  CBC:   Recent Labs   Lab 09/06/20  0442   WBC 24.76*   RBC 2.49*   HGB 7.6*   HCT 24.5*   *   MCV 98   MCH 30.5   MCHC 31.0*     CMP:   Recent Labs   Lab 09/06/20  0442   *  123*  123*  123*  123*   CALCIUM 8.4*  8.4*  8.4*  8.4*  8.4*   ALBUMIN 1.7*  1.7*  1.7*  1.7*  1.7*   PROT 7.3     145  145  145  145   K 3.1*  3.1*  3.1*  3.1*  3.1*  3.1*   CO2 24  24  24  24  24     110  110  110  110   BUN 88*  88*  88*  88*  88*   CREATININE 3.3*  3.3*  3.3*  3.3*  3.3*   ALKPHOS 173*   ALT 12   AST 24   BILITOT 0.4        Significant Diagnostics:  I have reviewed all pertinent imaging results/findings within the past 24 hours.        Assessment/Plan:     Severe sepsis  40 yo F s/p antrectomy with BII reconstruction c/b duodenal necrosis requiring ex lap, washout, drainage c/b aspiration event. S/p duodenal resection with d-j and g-j anastomosis.    - On room air  - NPO, TPN  - Keep G tube clamped, unclamp for nausea/vomiting. Ok to use G tube for meds  - Will obtained a CXR this morning and if stable, will discontinue left CT  - Continue abx  - Daily labs  - GI and DVT ppx  - PT / OT  - continue PRNs for BP control  - WV change tomorrow 9/6 at bedside        Collette Ferreira MD  General Surgery  [facility]

## 2020-09-07 LAB
ALBUMIN SERPL BCP-MCNC: 1.6 G/DL (ref 3.5–5.2)
ALBUMIN SERPL BCP-MCNC: 1.8 G/DL (ref 3.5–5.2)
ALP SERPL-CCNC: 197 U/L (ref 55–135)
ALT SERPL W/O P-5'-P-CCNC: 16 U/L (ref 10–44)
ANION GAP SERPL CALC-SCNC: 10 MMOL/L (ref 8–16)
ANION GAP SERPL CALC-SCNC: 12 MMOL/L (ref 8–16)
ANION GAP SERPL CALC-SCNC: 12 MMOL/L (ref 8–16)
ANION GAP SERPL CALC-SCNC: 13 MMOL/L (ref 8–16)
ANISOCYTOSIS BLD QL SMEAR: SLIGHT
AST SERPL-CCNC: 28 U/L (ref 10–40)
BASOPHILS # BLD AUTO: 0.06 K/UL (ref 0–0.2)
BASOPHILS NFR BLD: 0.3 % (ref 0–1.9)
BILIRUB SERPL-MCNC: 0.4 MG/DL (ref 0.1–1)
BUN SERPL-MCNC: 80 MG/DL (ref 6–20)
BUN SERPL-MCNC: 83 MG/DL (ref 6–20)
BUN SERPL-MCNC: 84 MG/DL (ref 6–20)
BUN SERPL-MCNC: 84 MG/DL (ref 6–20)
CA-I BLDV-SCNC: 1.18 MMOL/L (ref 1.06–1.42)
CALCIUM SERPL-MCNC: 8.1 MG/DL (ref 8.7–10.5)
CALCIUM SERPL-MCNC: 8.2 MG/DL (ref 8.7–10.5)
CALCIUM SERPL-MCNC: 8.4 MG/DL (ref 8.7–10.5)
CALCIUM SERPL-MCNC: 8.4 MG/DL (ref 8.7–10.5)
CHLORIDE SERPL-SCNC: 106 MMOL/L (ref 95–110)
CHLORIDE SERPL-SCNC: 107 MMOL/L (ref 95–110)
CO2 SERPL-SCNC: 21 MMOL/L (ref 23–29)
CO2 SERPL-SCNC: 23 MMOL/L (ref 23–29)
CREAT SERPL-MCNC: 2.6 MG/DL (ref 0.5–1.4)
CREAT SERPL-MCNC: 2.8 MG/DL (ref 0.5–1.4)
CREAT SERPL-MCNC: 2.9 MG/DL (ref 0.5–1.4)
CREAT SERPL-MCNC: 2.9 MG/DL (ref 0.5–1.4)
DIFFERENTIAL METHOD: ABNORMAL
EOSINOPHIL # BLD AUTO: 0.3 K/UL (ref 0–0.5)
EOSINOPHIL NFR BLD: 1.1 % (ref 0–8)
ERYTHROCYTE [DISTWIDTH] IN BLOOD BY AUTOMATED COUNT: 14.6 % (ref 11.5–14.5)
EST. GFR  (AFRICAN AMERICAN): 22.6 ML/MIN/1.73 M^2
EST. GFR  (AFRICAN AMERICAN): 22.6 ML/MIN/1.73 M^2
EST. GFR  (AFRICAN AMERICAN): 23.6 ML/MIN/1.73 M^2
EST. GFR  (AFRICAN AMERICAN): 25.8 ML/MIN/1.73 M^2
EST. GFR  (NON AFRICAN AMERICAN): 19.6 ML/MIN/1.73 M^2
EST. GFR  (NON AFRICAN AMERICAN): 19.6 ML/MIN/1.73 M^2
EST. GFR  (NON AFRICAN AMERICAN): 20.5 ML/MIN/1.73 M^2
EST. GFR  (NON AFRICAN AMERICAN): 22.4 ML/MIN/1.73 M^2
GLUCOSE SERPL-MCNC: 114 MG/DL (ref 70–110)
GLUCOSE SERPL-MCNC: 114 MG/DL (ref 70–110)
GLUCOSE SERPL-MCNC: 134 MG/DL (ref 70–110)
GLUCOSE SERPL-MCNC: 135 MG/DL (ref 70–110)
HCT VFR BLD AUTO: 25.2 % (ref 37–48.5)
HGB BLD-MCNC: 7.7 G/DL (ref 12–16)
HYPOCHROMIA BLD QL SMEAR: ABNORMAL
IMM GRANULOCYTES # BLD AUTO: 0.19 K/UL (ref 0–0.04)
IMM GRANULOCYTES NFR BLD AUTO: 0.9 % (ref 0–0.5)
LYMPHOCYTES # BLD AUTO: 1.8 K/UL (ref 1–4.8)
LYMPHOCYTES NFR BLD: 8.3 % (ref 18–48)
MAGNESIUM SERPL-MCNC: 1.3 MG/DL (ref 1.6–2.6)
MAGNESIUM SERPL-MCNC: 1.4 MG/DL (ref 1.6–2.6)
MAGNESIUM SERPL-MCNC: 1.6 MG/DL (ref 1.6–2.6)
MAGNESIUM SERPL-MCNC: 1.6 MG/DL (ref 1.6–2.6)
MCH RBC QN AUTO: 30 PG (ref 27–31)
MCHC RBC AUTO-ENTMCNC: 30.6 G/DL (ref 32–36)
MCV RBC AUTO: 98 FL (ref 82–98)
MONOCYTES # BLD AUTO: 1 K/UL (ref 0.3–1)
MONOCYTES NFR BLD: 4.4 % (ref 4–15)
NEUTROPHILS # BLD AUTO: 18.8 K/UL (ref 1.8–7.7)
NEUTROPHILS NFR BLD: 85 % (ref 38–73)
NRBC BLD-RTO: 0 /100 WBC
PHOSPHATE SERPL-MCNC: 2.8 MG/DL (ref 2.7–4.5)
PHOSPHATE SERPL-MCNC: 2.9 MG/DL (ref 2.7–4.5)
PHOSPHATE SERPL-MCNC: 3.4 MG/DL (ref 2.7–4.5)
PHOSPHATE SERPL-MCNC: 3.4 MG/DL (ref 2.7–4.5)
PLATELET # BLD AUTO: 342 K/UL (ref 150–350)
PLATELET BLD QL SMEAR: ABNORMAL
PMV BLD AUTO: 10.9 FL (ref 9.2–12.9)
POCT GLUCOSE: 114 MG/DL (ref 70–110)
POCT GLUCOSE: 117 MG/DL (ref 70–110)
POCT GLUCOSE: 140 MG/DL (ref 70–110)
POTASSIUM SERPL-SCNC: 2.7 MMOL/L (ref 3.5–5.1)
POTASSIUM SERPL-SCNC: 3.2 MMOL/L (ref 3.5–5.1)
POTASSIUM SERPL-SCNC: 3.2 MMOL/L (ref 3.5–5.1)
POTASSIUM SERPL-SCNC: 3.3 MMOL/L (ref 3.5–5.1)
PREALB SERPL-MCNC: 20 MG/DL (ref 20–43)
PROT SERPL-MCNC: 7.4 G/DL (ref 6–8.4)
RBC # BLD AUTO: 2.57 M/UL (ref 4–5.4)
SODIUM SERPL-SCNC: 139 MMOL/L (ref 136–145)
SODIUM SERPL-SCNC: 141 MMOL/L (ref 136–145)
SODIUM SERPL-SCNC: 142 MMOL/L (ref 136–145)
SODIUM SERPL-SCNC: 142 MMOL/L (ref 136–145)
TRIGL SERPL-MCNC: 199 MG/DL (ref 30–150)
WBC # BLD AUTO: 22.06 K/UL (ref 3.9–12.7)

## 2020-09-07 PROCEDURE — 63600175 PHARM REV CODE 636 W HCPCS: Performed by: STUDENT IN AN ORGANIZED HEALTH CARE EDUCATION/TRAINING PROGRAM

## 2020-09-07 PROCEDURE — 84134 ASSAY OF PREALBUMIN: CPT

## 2020-09-07 PROCEDURE — 80069 RENAL FUNCTION PANEL: CPT | Mod: 91

## 2020-09-07 PROCEDURE — 83735 ASSAY OF MAGNESIUM: CPT | Mod: 91

## 2020-09-07 PROCEDURE — 94640 AIRWAY INHALATION TREATMENT: CPT

## 2020-09-07 PROCEDURE — A4217 STERILE WATER/SALINE, 500 ML: HCPCS | Performed by: STUDENT IN AN ORGANIZED HEALTH CARE EDUCATION/TRAINING PROGRAM

## 2020-09-07 PROCEDURE — 25000003 PHARM REV CODE 250: Performed by: STUDENT IN AN ORGANIZED HEALTH CARE EDUCATION/TRAINING PROGRAM

## 2020-09-07 PROCEDURE — 99233 SBSQ HOSP IP/OBS HIGH 50: CPT | Mod: 24,,, | Performed by: SURGERY

## 2020-09-07 PROCEDURE — 20000000 HC ICU ROOM

## 2020-09-07 PROCEDURE — 85025 COMPLETE CBC W/AUTO DIFF WBC: CPT

## 2020-09-07 PROCEDURE — 84478 ASSAY OF TRIGLYCERIDES: CPT

## 2020-09-07 PROCEDURE — 25000242 PHARM REV CODE 250 ALT 637 W/ HCPCS: Performed by: STUDENT IN AN ORGANIZED HEALTH CARE EDUCATION/TRAINING PROGRAM

## 2020-09-07 PROCEDURE — 82330 ASSAY OF CALCIUM: CPT

## 2020-09-07 PROCEDURE — 80053 COMPREHEN METABOLIC PANEL: CPT

## 2020-09-07 PROCEDURE — 94761 N-INVAS EAR/PLS OXIMETRY MLT: CPT

## 2020-09-07 PROCEDURE — 25000003 PHARM REV CODE 250: Performed by: SURGERY

## 2020-09-07 PROCEDURE — 83735 ASSAY OF MAGNESIUM: CPT

## 2020-09-07 PROCEDURE — 84100 ASSAY OF PHOSPHORUS: CPT

## 2020-09-07 PROCEDURE — B4185 PARENTERAL SOL 10 GM LIPIDS: HCPCS | Performed by: STUDENT IN AN ORGANIZED HEALTH CARE EDUCATION/TRAINING PROGRAM

## 2020-09-07 PROCEDURE — 99900035 HC TECH TIME PER 15 MIN (STAT)

## 2020-09-07 PROCEDURE — 99233 PR SUBSEQUENT HOSPITAL CARE,LEVL III: ICD-10-PCS | Mod: 24,,, | Performed by: SURGERY

## 2020-09-07 RX ORDER — HYDROMORPHONE HYDROCHLORIDE 1 MG/ML
1 INJECTION, SOLUTION INTRAMUSCULAR; INTRAVENOUS; SUBCUTANEOUS ONCE
Status: COMPLETED | OUTPATIENT
Start: 2020-09-07 | End: 2020-09-07

## 2020-09-07 RX ORDER — POTASSIUM CHLORIDE 29.8 MG/ML
40 INJECTION INTRAVENOUS ONCE
Status: COMPLETED | OUTPATIENT
Start: 2020-09-07 | End: 2020-09-07

## 2020-09-07 RX ORDER — IPRATROPIUM BROMIDE AND ALBUTEROL SULFATE 2.5; .5 MG/3ML; MG/3ML
3 SOLUTION RESPIRATORY (INHALATION) EVERY 4 HOURS PRN
Status: DISCONTINUED | OUTPATIENT
Start: 2020-09-07 | End: 2020-09-29 | Stop reason: HOSPADM

## 2020-09-07 RX ORDER — ACETAMINOPHEN 650 MG/20.3ML
650 LIQUID ORAL EVERY 6 HOURS
Status: DISCONTINUED | OUTPATIENT
Start: 2020-09-07 | End: 2020-09-22

## 2020-09-07 RX ORDER — CARVEDILOL 25 MG/1
25 TABLET ORAL 2 TIMES DAILY
Status: DISCONTINUED | OUTPATIENT
Start: 2020-09-07 | End: 2020-09-07

## 2020-09-07 RX ADMIN — HYDROMORPHONE HYDROCHLORIDE 0.5 MG: 1 INJECTION, SOLUTION INTRAMUSCULAR; INTRAVENOUS; SUBCUTANEOUS at 01:09

## 2020-09-07 RX ADMIN — ASCORBIC ACID, VITAMIN A PALMITATE, CHOLECALCIFEROL, THIAMINE HYDROCHLORIDE, RIBOFLAVIN-5 PHOSPHATE SODIUM, PYRIDOXINE HYDROCHLORIDE, NIACINAMIDE, DEXPANTHENOL, ALPHA-TOCOPHEROL ACETATE, VITAMIN K1, FOLIC ACID, BIOTIN, CYANOCOBALAMIN: 200; 3300; 200; 6; 3.6; 6; 40; 15; 10; 150; 600; 60; 5 INJECTION, SOLUTION INTRAVENOUS at 09:09

## 2020-09-07 RX ADMIN — HYDROMORPHONE HYDROCHLORIDE 0.5 MG: 1 INJECTION, SOLUTION INTRAMUSCULAR; INTRAVENOUS; SUBCUTANEOUS at 07:09

## 2020-09-07 RX ADMIN — POTASSIUM CHLORIDE 40 MEQ: 29.8 INJECTION, SOLUTION INTRAVENOUS at 11:09

## 2020-09-07 RX ADMIN — METOROPROLOL TARTRATE 5 MG: 5 INJECTION, SOLUTION INTRAVENOUS at 12:09

## 2020-09-07 RX ADMIN — HYDROMORPHONE HYDROCHLORIDE 0.5 MG: 1 INJECTION, SOLUTION INTRAMUSCULAR; INTRAVENOUS; SUBCUTANEOUS at 10:09

## 2020-09-07 RX ADMIN — METOPROLOL TARTRATE 25 MG: 25 TABLET ORAL at 09:09

## 2020-09-07 RX ADMIN — ACETAMINOPHEN 650 MG: 160 SOLUTION ORAL at 06:09

## 2020-09-07 RX ADMIN — MICONAZOLE NITRATE: 20 POWDER TOPICAL at 09:09

## 2020-09-07 RX ADMIN — HEPARIN SODIUM 5000 UNITS: 5000 INJECTION INTRAVENOUS; SUBCUTANEOUS at 01:09

## 2020-09-07 RX ADMIN — HEPARIN SODIUM 5000 UNITS: 5000 INJECTION INTRAVENOUS; SUBCUTANEOUS at 09:09

## 2020-09-07 RX ADMIN — HEPARIN SODIUM 5000 UNITS: 5000 INJECTION INTRAVENOUS; SUBCUTANEOUS at 05:09

## 2020-09-07 RX ADMIN — IPRATROPIUM BROMIDE AND ALBUTEROL SULFATE 3 ML: .5; 2.5 SOLUTION RESPIRATORY (INHALATION) at 12:09

## 2020-09-07 RX ADMIN — IPRATROPIUM BROMIDE AND ALBUTEROL SULFATE 3 ML: .5; 2.5 SOLUTION RESPIRATORY (INHALATION) at 05:09

## 2020-09-07 RX ADMIN — IPRATROPIUM BROMIDE AND ALBUTEROL SULFATE 3 ML: .5; 2.5 SOLUTION RESPIRATORY (INHALATION) at 08:09

## 2020-09-07 RX ADMIN — OXYCODONE HYDROCHLORIDE AND ACETAMINOPHEN 1 TABLET: 10; 325 TABLET ORAL at 12:09

## 2020-09-07 RX ADMIN — SMOFLIPID 250 ML: 6; 6; 5; 3 INJECTION, EMULSION INTRAVENOUS at 09:09

## 2020-09-07 RX ADMIN — HYDROMORPHONE HYDROCHLORIDE 1 MG: 1 INJECTION, SOLUTION INTRAMUSCULAR; INTRAVENOUS; SUBCUTANEOUS at 08:09

## 2020-09-07 RX ADMIN — FAMOTIDINE 20 MG: 20 TABLET, FILM COATED ORAL at 08:09

## 2020-09-07 RX ADMIN — HYDRALAZINE HYDROCHLORIDE 20 MG: 20 INJECTION INTRAMUSCULAR; INTRAVENOUS at 01:09

## 2020-09-07 RX ADMIN — MICONAZOLE NITRATE: 20 POWDER TOPICAL at 08:09

## 2020-09-07 RX ADMIN — NIFEDIPINE 60 MG: 30 TABLET, FILM COATED, EXTENDED RELEASE ORAL at 08:09

## 2020-09-07 RX ADMIN — METOPROLOL TARTRATE 25 MG: 25 TABLET ORAL at 01:09

## 2020-09-07 RX ADMIN — CARVEDILOL 25 MG: 25 TABLET, FILM COATED ORAL at 08:09

## 2020-09-07 RX ADMIN — POTASSIUM CHLORIDE 40 MEQ: 29.8 INJECTION, SOLUTION INTRAVENOUS at 08:09

## 2020-09-07 RX ADMIN — ACETAMINOPHEN 650 MG: 160 SOLUTION ORAL at 11:09

## 2020-09-07 NOTE — PROGRESS NOTES
[facility]  Critical Care - Surgery  Progress Note    Patient Name: Jaquan Farmer  MRN: [id number]  Admission Date: 8/12/2020  Hospital Length of Stay: 26 days  Code Status: Full Code  Attending Provider: Donis Roa MD  Primary Care Provider: Primary Doctor No   Principal Problem: Duodenum disorder    Subjective:     Hospital/ICU Course:  8/14 Patient had a line replaced along with central line placed, with complication of pneumothorax. Rt pigtail was placed. Patient tolerated complications well. Decreasing vent settings.  8/15 NAEO. Patient was taken back to OR early AM for washout. Possible closure 8/18. ETT exchanged in OR 7.0 -> 7.5  8/16 - NAEON. HDS. Intubated, sedated.   8/17 - NAEON. OR tomorrow for closure.  8/18 - Washed out and closed in OR  8/22 - NAEO. Plan for Repeat CT AP today  8/23 - NAEO. Hbg lower. Giving blood x2 units.  8/25 - NAEO. Went to OR yesterday for Abdominal wall exploration and ligation of bleeding vessels. Wound vac exchanged  8/26 - NAEO. Hypertensive overnight. Hgb trending. Plan for CT today  8/27 - NAEO. Hypertensive overnight. CT yesterday showed no extravasation or acute changes  8/28 - NAEO.   8/29 - Patient in respiratory distress upon arrival. Chest XR w/ obvious left sided pulmonary occlusion. Patient intubated and bronchoscopy performed.  8/30 - Repeat Bronch today. Possible extubation later today  8/31 - Extubated yesterday. Wound vac change, bronch scheduled for OR today. Patient out of bed in chair on AM exam.  9/1 - Doing well without complaints this morning.  L sided permacath placed yesterday  9/2 - NAEO.  Continues to be hypertensive/tachycardic.  9/3 - NAEO.  G-tube clamped by primary team.  Increased output from superior GODFREY  9/4 - Overnight 2 episodes of SOB and increased WOB. CXR stable and ABG without significant abnormality. Placed on comfort flow. Cardene titrated off and oral BP medications increased.     Interval  History/Significant Events: no acute events overnight.     Follow-up For: Procedure(s) (LRB):  INSERTION-CATHETER-RUDY (Left)  BRONCHOSCOPY (N/A)  INSERTION, CATHETER, INTERCOSTAL, FOR DRAINAGE (Left)  REPLACEMENT, WOUND VAC (N/A)    Post-Operative Day: 7 Days Post-Op    Objective:     Vital Signs (Most Recent):  Temp: 99.5 °F (37.5 °C) (09/07/20 0400)  Pulse: (!) 119 (09/07/20 0615)  Resp: (!) 22 (09/07/20 0615)  BP: (!) 141/83 (09/07/20 0600)  SpO2: 97 % (09/07/20 0615) Vital Signs (24h Range):  Temp:  [98.1 °F (36.7 °C)-99.5 °F (37.5 °C)] 99.5 °F (37.5 °C)  Pulse:  [102-149] 119  Resp:  [16-49] 22  SpO2:  [96 %-100 %] 97 %  BP: (134-178)/() 141/83     Weight: 67.5 kg (148 lb 13 oz)  Body mass index is 27.22 kg/m².      Intake/Output Summary (Last 24 hours) at 9/7/2020 0733  Last data filed at 9/7/2020 0600  Gross per 24 hour   Intake 250 ml   Output 2175 ml   Net -1925 ml       Physical Exam  Vitals signs and nursing note reviewed.   Constitutional:       General: She is not in acute distress.  Neck:      Comments: RIJ trialysis line, L IJ permacath  Cardiovascular:      Rate and Rhythm: Regular rhythm. Tachycardia present.   Pulmonary:      Effort: Pulmonary effort is normal.      Comments: L sided chest tube in place, no air leak.   Abdominal:      General: There is no distension.      Tenderness: There is no abdominal tenderness.      Comments: G tube with bilious output  Drains with bilious output  Wound vac in place   Skin:     General: Skin is warm and dry.   Neurological:      Mental Status: She is alert.         Lines/Drains/Airways     Central Venous Catheter Line            Trialysis (Dialysis) Catheter 08/13/20 2230 right internal jugular 24 days    Permacath 08/31/20 1100 left subclavian 6 days          Drain                 Closed/Suction Drain 08/13/20 1659 Right;Inferior Abdomen Bulb 19 Fr. 24 days         Closed/Suction Drain 08/13/20 0052 Right;Superior Abdomen Bulb 19 Fr. 24 days          Gastrostomy/Enterostomy 08/15/20 0916 Gastrostomy tube w/ balloon LUQ decompression 22 days    Female External Urinary Catheter 08/27/20 1600 10 days         Chest Tube 08/31/20 1033 1 Left 24 Fr. 6 days          Peripheral Intravenous Line                 Midline Catheter Insertion/Assessment  - Single Lumen 08/26/20 1501 Right basilic vein (medial side of arm) 18g x 8cm 11 days                Significant Labs:    CBC/Anemia Profile:  Recent Labs   Lab 09/06/20 0442 09/07/20  0427   WBC 24.76* 22.06*   HGB 7.6* 7.7*   HCT 24.5* 25.2*   * 342   MCV 98 98   RDW 14.6* 14.6*        Chemistries:  Recent Labs   Lab 09/06/20 0442 09/06/20 2257 09/07/20 0427     145  145  145  145 142  142 142  142   K 3.1*  3.1*  3.1*  3.1*  3.1*  3.1* 3.5  3.5 3.2*  3.2*     110  110  110  110 108  108 107  107   CO2 24  24  24  24  24 24  24 23  23   BUN 88*  88*  88*  88*  88* 85*  85* 84*  84*   CREATININE 3.3*  3.3*  3.3*  3.3*  3.3* 2.9*  2.9* 2.9*  2.9*   CALCIUM 8.4*  8.4*  8.4*  8.4*  8.4* 8.1*  8.1* 8.4*  8.4*   ALBUMIN 1.7*  1.7*  1.7*  1.7*  1.7* 1.7*  1.7* 1.8*  1.8*   PROT 7.3  --  7.4   BILITOT 0.4  --  0.4   ALKPHOS 173*  --  197*   ALT 12  --  16   AST 24  --  28   MG 1.9  1.9  1.9  1.9  1.9  1.9  1.9 1.7  1.7 1.6  1.6   PHOS 3.9  3.9  3.9  3.9  3.9 3.2  3.2 3.4  3.4       Significant Imaging:  I have reviewed all pertinent imaging results/findings within the past 24 hours.    Assessment/Plan:     Severe sepsis  38 yo F s/p antrectomy with BII reconstruction c/b duodenal necrosis requiring ex lap and multiple washouts. Course complicated by FLORIDALMA, rectus sheath hematoma, pneumothorax and pleural effusion now recovering nicely in ICU.      .Neuro:  - AOx4  - PRN PO/IV pain meds  - Lidocaine patch  - Clonidine patch for anxiety     Resp:  - Satting well on room air  - Chest tube placed 8/31 for pleural effusion  - remove chest tube today      CV:   - persistent tachycardia in setting of white count, this is unchanged  - MAP goal >65  - Coreg 25mg BID, discontinue  - start scheduled metop IV  - Nifedipine 60 mg QD  - Labetalol PRN  - Hydralazine PRN     Heme:  - Hgb/Hct stable  - S/p Abdominal washout on 8/21   - S/p Abdominal exploration w/ woundvac exhange 8/24; exchange 8/31  - sub q heparin for DVT ppx    ID:  - pt afebrile  - daily CBC  - persisting leukocytosis   - no abx for now     Renal:  - continues to make good urine and clear despite FLORIDALMA  - appreciate nephro recs     FEN/GI:  - NPO  - try clamping G tube today  - TPN + Lipid  - Maintain drains to bulb suction  - Replace electrolytes as needed to keep K>4, Mg>2; gentle repletion due to renal status  - follow up prealbumin  - famotidine for GI ppx    Endo:  - Monitor BG, no ISS needed    Dispo:  - Continue SICU care, possible step down.         Critical care was time spent personally by me on the following activities: development of treatment plan with patient or surrogate and bedside caregivers, discussions with consultants, evaluation of patient's response to treatment, examination of patient, ordering and performing treatments and interventions, ordering and review of laboratory studies, ordering and review of radiographic studies, pulse oximetry, re-evaluation of patient's condition.  This critical care time did not overlap with that of any other provider or involve time for any procedures.     Ac Romero MD  Critical Care - Surgery  [facility]

## 2020-09-07 NOTE — ASSESSMENT & PLAN NOTE
38 yo F s/p antrectomy with BII reconstruction c/b duodenal necrosis requiring ex lap and multiple washouts. Course complicated by FLORIDALMA, rectus sheath hematoma, pneumothorax and pleural effusion now recovering nicely in ICU.      .Neuro:  - AOx4  - PRN PO/IV pain meds  - Lidocaine patch  - Clonidine patch for anxiety     Resp:  - Satting well on room air  - Chest tube placed 8/31 for pleural effusion  - remove chest tube today     CV:   - persistent tachycardia in setting of white count, this is unchanged  - MAP goal >65  - Coreg 25mg BID, discontinue  - start scheduled metop IV  - Nifedipine 60 mg QD  - Labetalol PRN  - Hydralazine PRN     Heme:  - Hgb/Hct stable  - S/p Abdominal washout on 8/21   - S/p Abdominal exploration w/ woundvac exhange 8/24; exchange 8/31  - sub q heparin for DVT ppx    ID:  - pt afebrile  - daily CBC  - persisting leukocytosis   - no abx for now     Renal:  - continues to make good urine and clear despite FLORIDALMA  - appreciate nephro recs     FEN/GI:  - NPO  - try clamping G tube today  - TPN + Lipid  - Maintain drains to bulb suction  - Replace electrolytes as needed to keep K>4, Mg>2; gentle repletion due to renal status  - follow up prealbumin  - famotidine for GI ppx    Endo:  - Monitor BG, no ISS needed    Dispo:  - Continue SICU care, possible step down.

## 2020-09-07 NOTE — PLAN OF CARE
Pt. Remained free from falls or injury throughout shift. Sinus tachy. HR . SBP <180. MAP >65. Sat >95% RA. AAOx4. Pain managed throughout shift. All dressings CDI. UO 850mL during shift. TPN @ 50. 80mEq K replaced. Plan on removing chest tubes tomorrow & adding step down orders. Will continue to monitor.

## 2020-09-07 NOTE — SUBJECTIVE & OBJECTIVE
Interval History/Significant Events: no acute events overnight.     Follow-up For: Procedure(s) (LRB):  INSERTION-CATHETER-RUDY (Left)  BRONCHOSCOPY (N/A)  INSERTION, CATHETER, INTERCOSTAL, FOR DRAINAGE (Left)  REPLACEMENT, WOUND VAC (N/A)    Post-Operative Day: 7 Days Post-Op    Objective:     Vital Signs (Most Recent):  Temp: 99.5 °F (37.5 °C) (09/07/20 0400)  Pulse: (!) 119 (09/07/20 0615)  Resp: (!) 22 (09/07/20 0615)  BP: (!) 141/83 (09/07/20 0600)  SpO2: 97 % (09/07/20 0615) Vital Signs (24h Range):  Temp:  [98.1 °F (36.7 °C)-99.5 °F (37.5 °C)] 99.5 °F (37.5 °C)  Pulse:  [102-149] 119  Resp:  [16-49] 22  SpO2:  [96 %-100 %] 97 %  BP: (134-178)/() 141/83     Weight: 67.5 kg (148 lb 13 oz)  Body mass index is 27.22 kg/m².      Intake/Output Summary (Last 24 hours) at 9/7/2020 0733  Last data filed at 9/7/2020 0600  Gross per 24 hour   Intake 250 ml   Output 2175 ml   Net -1925 ml       Physical Exam  Vitals signs and nursing note reviewed.   Constitutional:       General: She is not in acute distress.  Neck:      Comments: RIJ trialysis line, L IJ permacath  Cardiovascular:      Rate and Rhythm: Regular rhythm. Tachycardia present.   Pulmonary:      Effort: Pulmonary effort is normal.      Comments: L sided chest tube in place, no air leak.   Abdominal:      General: There is no distension.      Tenderness: There is no abdominal tenderness.      Comments: G tube with bilious output  Drains with bilious output  Wound vac in place   Skin:     General: Skin is warm and dry.   Neurological:      Mental Status: She is alert.         Lines/Drains/Airways     Central Venous Catheter Line            Trialysis (Dialysis) Catheter 08/13/20 2230 right internal jugular 24 days    Permacath 08/31/20 1100 left subclavian 6 days          Drain                 Closed/Suction Drain 08/13/20 1659 Right;Inferior Abdomen Bulb 19 Fr. 24 days         Closed/Suction Drain 08/13/20 1659 Right;Superior Abdomen Bulb 19 Fr. 24  days         Gastrostomy/Enterostomy 08/15/20 0916 Gastrostomy tube w/ balloon LUQ decompression 22 days    Female External Urinary Catheter 08/27/20 1600 10 days         Chest Tube 08/31/20 1033 1 Left 24 Fr. 6 days          Peripheral Intravenous Line                 Midline Catheter Insertion/Assessment  - Single Lumen 08/26/20 1501 Right basilic vein (medial side of arm) 18g x 8cm 11 days                Significant Labs:    CBC/Anemia Profile:  Recent Labs   Lab 09/06/20 0442 09/07/20  0427   WBC 24.76* 22.06*   HGB 7.6* 7.7*   HCT 24.5* 25.2*   * 342   MCV 98 98   RDW 14.6* 14.6*        Chemistries:  Recent Labs   Lab 09/06/20 0442 09/06/20 2257 09/07/20 0427     145  145  145  145 142  142 142  142   K 3.1*  3.1*  3.1*  3.1*  3.1*  3.1* 3.5  3.5 3.2*  3.2*     110  110  110  110 108  108 107  107   CO2 24  24  24  24  24 24  24 23  23   BUN 88*  88*  88*  88*  88* 85*  85* 84*  84*   CREATININE 3.3*  3.3*  3.3*  3.3*  3.3* 2.9*  2.9* 2.9*  2.9*   CALCIUM 8.4*  8.4*  8.4*  8.4*  8.4* 8.1*  8.1* 8.4*  8.4*   ALBUMIN 1.7*  1.7*  1.7*  1.7*  1.7* 1.7*  1.7* 1.8*  1.8*   PROT 7.3  --  7.4   BILITOT 0.4  --  0.4   ALKPHOS 173*  --  197*   ALT 12  --  16   AST 24  --  28   MG 1.9  1.9  1.9  1.9  1.9  1.9  1.9 1.7  1.7 1.6  1.6   PHOS 3.9  3.9  3.9  3.9  3.9 3.2  3.2 3.4  3.4       Significant Imaging:  I have reviewed all pertinent imaging results/findings within the past 24 hours.

## 2020-09-07 NOTE — SUBJECTIVE & OBJECTIVE
Interval History: No acute events overnight.  Much more alert this morning. On room air. Pain well controlled.     Medications:  Continuous Infusions:   TPN ADULT CENTRAL LINE CUSTOM 50 mL/hr at 09/07/20 0600     Scheduled Meds:   albuterol-ipratropium  3 mL Nebulization Q4H    carvediloL  25 mg Oral BID    cloNIDine 0.3 mg/24 hr td ptwk  1 patch Transdermal Q7 Days    famotidine  20 mg Per G Tube Daily    heparin (porcine)  5,000 Units Subcutaneous Q8H    lidocaine  1 patch Transdermal Q24H    lipid (SMOFLIPID)  250 mL Intravenous Daily    miconazole NITRATE 2 %   Topical (Top) BID    NIFEdipine  60 mg Oral Daily    potassium chloride in water  40 mEq Intravenous Once     PRN Meds:sodium chloride, hydrALAZINE, HYDROmorphone, HYDROmorphone, labetaloL, melatonin, metoprolol, oxyCODONE-acetaminophen     Review of patient's allergies indicates:   Allergen Reactions    Latex      Objective:     Vital Signs (Most Recent):  Temp: 99.5 °F (37.5 °C) (09/07/20 0400)  Pulse: (!) 119 (09/07/20 0615)  Resp: (!) 22 (09/07/20 0615)  BP: (!) 141/83 (09/07/20 0600)  SpO2: 97 % (09/07/20 0615) Vital Signs (24h Range):  Temp:  [98.1 °F (36.7 °C)-99.5 °F (37.5 °C)] 99.5 °F (37.5 °C)  Pulse:  [102-149] 119  Resp:  [16-49] 22  SpO2:  [95 %-100 %] 97 %  BP: (134-178)/() 141/83     Weight: 67.5 kg (148 lb 13 oz)  Body mass index is 27.22 kg/m².    Intake/Output - Last 3 Shifts       09/05 0700 - 09/06 0659 09/06 0700 - 09/07 0659 09/07 0700 - 09/08 0659    P.O.       I.V. (mL/kg) 184 (2.7)      NG/GT  250     IV Piggyback 1            Total Intake(mL/kg) 815 (12.1) 250 (3.7)     Urine (mL/kg/hr) 2560 (1.6) 2050 (1.3)     Drains 1803 303     Other 178 0     Stool 0      Chest Tube 13 43     Total Output 6604 5521     Net -4377 -0579            Urine Occurrence 1 x 3 x     Stool Occurrence 0 x            Physical Exam  Constitutional:       General: She is not in acute distress.  Neck:      Comments: RIJ trialysis  line, L IJ permacath  Cardiovascular:      Rate and Rhythm: Tachycardia present.   Pulmonary:      Effort: Pulmonary effort is normal.      Comments: L sided chest tube in place  Abdominal:      General: There is no distension.      Tenderness: There is no abdominal tenderness.      Comments: G tube clamped  Drains with bilious output  Wound vac in place   Skin:     General: Skin is warm and dry.   Neurological:      Mental Status: She is alert.         Significant Labs:  CBC:   Recent Labs   Lab 09/07/20 0427   WBC 22.06*   RBC 2.57*   HGB 7.7*   HCT 25.2*      MCV 98   MCH 30.0   MCHC 30.6*     CMP:   Recent Labs   Lab 09/07/20 0427   *  114*   CALCIUM 8.4*  8.4*   ALBUMIN 1.8*  1.8*   PROT 7.4     142   K 3.2*  3.2*   CO2 23  23     107   BUN 84*  84*   CREATININE 2.9*  2.9*   ALKPHOS 197*   ALT 16   AST 28   BILITOT 0.4       Significant Diagnostics:  I have reviewed all pertinent imaging results/findings within the past 24 hours.

## 2020-09-07 NOTE — PROGRESS NOTES
[facility]  General Surgery  Progress Note    Subjective:     History of Present Illness:  Pt is a 40 yo F with recent history of antrectomy with BII reconstruction for ulcer disease at outside hospital. 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 Patient also reportedly aspirated on induction in the OR prior to this, resulting in severe pulmonary edema and hypoxia. Patient was transferred to [facility] urgently for higher level of care. She arrived as a direct SICU admit on near maximal vent settings and requiring low dose vasopressors with HR in the upper 140s. Her midline laparotomy is closed with 4 Navdeep drains in place draining bilious output.     Post-Op Info:  Procedure(s) (LRB):  INSERTION-CATHETER-RUDY (Left)  BRONCHOSCOPY (N/A)  INSERTION, CATHETER, INTERCOSTAL, FOR DRAINAGE (Left)  REPLACEMENT, WOUND VAC (N/A)   7 Days Post-Op     Interval History: No acute events overnight.  Much more alert this morning. On room air. Pain well controlled.     Medications:  Continuous Infusions:   TPN ADULT CENTRAL LINE CUSTOM 50 mL/hr at 09/07/20 0600     Scheduled Meds:   albuterol-ipratropium  3 mL Nebulization Q4H    carvediloL  25 mg Oral BID    cloNIDine 0.3 mg/24 hr td ptwk  1 patch Transdermal Q7 Days    famotidine  20 mg Per G Tube Daily    heparin (porcine)  5,000 Units Subcutaneous Q8H    lidocaine  1 patch Transdermal Q24H    lipid (SMOFLIPID)  250 mL Intravenous Daily    miconazole NITRATE 2 %   Topical (Top) BID    NIFEdipine  60 mg Oral Daily    potassium chloride in water  40 mEq Intravenous Once     PRN Meds:sodium chloride, hydrALAZINE, HYDROmorphone, HYDROmorphone, labetaloL, melatonin, metoprolol, oxyCODONE-acetaminophen     Review of patient's allergies indicates:   Allergen Reactions    Latex      Objective:     Vital Signs (Most Recent):  Temp: 99.5 °F (37.5 °C) (09/07/20 0400)  Pulse: (!) 119 (09/07/20 0615)  Resp: (!) 22  (09/07/20 0615)  BP: (!) 141/83 (09/07/20 0600)  SpO2: 97 % (09/07/20 0615) Vital Signs (24h Range):  Temp:  [98.1 °F (36.7 °C)-99.5 °F (37.5 °C)] 99.5 °F (37.5 °C)  Pulse:  [102-149] 119  Resp:  [16-49] 22  SpO2:  [95 %-100 %] 97 %  BP: (134-178)/() 141/83     Weight: 67.5 kg (148 lb 13 oz)  Body mass index is 27.22 kg/m².    Intake/Output - Last 3 Shifts       09/05 0700 - 09/06 0659 09/06 0700 - 09/07 0659 09/07 0700 - 09/08 0659    P.O.       I.V. (mL/kg) 184 (2.7)      NG/GT  250     IV Piggyback 1            Total Intake(mL/kg) 815 (12.1) 250 (3.7)     Urine (mL/kg/hr) 2560 (1.6) 2050 (1.3)     Drains 1803 303     Other 178 0     Stool 0      Chest Tube 13 43     Total Output 4554 2396     Net -3731 -6194            Urine Occurrence 1 x 3 x     Stool Occurrence 0 x            Physical Exam  Constitutional:       General: She is not in acute distress.  Neck:      Comments: RIJ trialysis line, L IJ permacath  Cardiovascular:      Rate and Rhythm: Tachycardia present.   Pulmonary:      Effort: Pulmonary effort is normal.      Comments: L sided chest tube in place  Abdominal:      General: There is no distension.      Tenderness: There is no abdominal tenderness.      Comments: G tube clamped  Drains with bilious output  Wound vac in place   Skin:     General: Skin is warm and dry.   Neurological:      Mental Status: She is alert.         Significant Labs:  CBC:   Recent Labs   Lab 09/07/20 0427   WBC 22.06*   RBC 2.57*   HGB 7.7*   HCT 25.2*      MCV 98   MCH 30.0   MCHC 30.6*     CMP:   Recent Labs   Lab 09/07/20 0427   *  114*   CALCIUM 8.4*  8.4*   ALBUMIN 1.8*  1.8*   PROT 7.4     142   K 3.2*  3.2*   CO2 23  23     107   BUN 84*  84*   CREATININE 2.9*  2.9*   ALKPHOS 197*   ALT 16   AST 28   BILITOT 0.4       Significant Diagnostics:  I have reviewed all pertinent imaging results/findings within the past 24 hours.    Assessment/Plan:     Severe sepsis  38 yo  F s/p antrectomy with BII reconstruction c/b duodenal necrosis requiring ex lap, washout, drainage c/b aspiration event. S/p duodenal resection with d-j and g-j anastomosis.    - On room air  - NPO, TPN  - Keep G tube clamped, unclamp for nausea/vomiting. Ok to use G tube for meds  - Remove L chest tube today  - Continue abx  - Daily labs  - GI and DVT ppx  - PT / OT  - continue PRNs for BP control  - WV change today 9/7 at bedside  - Will need to discuss timing of Rojas placement for long term TPN        Verna Burris MD  General Surgery  [facility]

## 2020-09-07 NOTE — ASSESSMENT & PLAN NOTE
38 yo F s/p antrectomy with BII reconstruction c/b duodenal necrosis requiring ex lap, washout, drainage c/b aspiration event. S/p duodenal resection with d-j and g-j anastomosis.    - On room air  - NPO, TPN  - Keep G tube clamped, unclamp for nausea/vomiting. Ok to use G tube for meds  - Remove L chest tube today  - Continue abx  - Daily labs  - GI and DVT ppx  - PT / OT  - continue PRNs for BP control  - WV change today 9/7 at bedside  - Will need to discuss timing of Rojas placement for long term TPN

## 2020-09-08 LAB
ALBUMIN SERPL BCP-MCNC: 1.7 G/DL (ref 3.5–5.2)
ALP SERPL-CCNC: 198 U/L (ref 55–135)
ALT SERPL W/O P-5'-P-CCNC: 16 U/L (ref 10–44)
ANION GAP SERPL CALC-SCNC: 11 MMOL/L (ref 8–16)
ANISOCYTOSIS BLD QL SMEAR: SLIGHT
AST SERPL-CCNC: 25 U/L (ref 10–40)
BASOPHILS # BLD AUTO: 0.06 K/UL (ref 0–0.2)
BASOPHILS NFR BLD: 0.3 % (ref 0–1.9)
BILIRUB SERPL-MCNC: 0.4 MG/DL (ref 0.1–1)
BUN SERPL-MCNC: 77 MG/DL (ref 6–20)
BUN SERPL-MCNC: 78 MG/DL (ref 6–20)
BUN SERPL-MCNC: 78 MG/DL (ref 6–20)
CA-I BLDV-SCNC: 1.14 MMOL/L (ref 1.06–1.42)
CALCIUM SERPL-MCNC: 8 MG/DL (ref 8.7–10.5)
CALCIUM SERPL-MCNC: 8 MG/DL (ref 8.7–10.5)
CALCIUM SERPL-MCNC: 8.2 MG/DL (ref 8.7–10.5)
CHLORIDE SERPL-SCNC: 107 MMOL/L (ref 95–110)
CHLORIDE SERPL-SCNC: 108 MMOL/L (ref 95–110)
CHLORIDE SERPL-SCNC: 108 MMOL/L (ref 95–110)
CO2 SERPL-SCNC: 22 MMOL/L (ref 23–29)
CO2 SERPL-SCNC: 22 MMOL/L (ref 23–29)
CO2 SERPL-SCNC: 23 MMOL/L (ref 23–29)
CREAT SERPL-MCNC: 2.4 MG/DL (ref 0.5–1.4)
CREAT SERPL-MCNC: 2.5 MG/DL (ref 0.5–1.4)
CREAT SERPL-MCNC: 2.5 MG/DL (ref 0.5–1.4)
DIFFERENTIAL METHOD: ABNORMAL
EOSINOPHIL # BLD AUTO: 0.2 K/UL (ref 0–0.5)
EOSINOPHIL NFR BLD: 1 % (ref 0–8)
ERYTHROCYTE [DISTWIDTH] IN BLOOD BY AUTOMATED COUNT: 14.6 % (ref 11.5–14.5)
EST. GFR  (AFRICAN AMERICAN): 27.1 ML/MIN/1.73 M^2
EST. GFR  (AFRICAN AMERICAN): 27.1 ML/MIN/1.73 M^2
EST. GFR  (AFRICAN AMERICAN): 28.5 ML/MIN/1.73 M^2
EST. GFR  (NON AFRICAN AMERICAN): 23.5 ML/MIN/1.73 M^2
EST. GFR  (NON AFRICAN AMERICAN): 23.5 ML/MIN/1.73 M^2
EST. GFR  (NON AFRICAN AMERICAN): 24.7 ML/MIN/1.73 M^2
GLUCOSE SERPL-MCNC: 106 MG/DL (ref 70–110)
GLUCOSE SERPL-MCNC: 106 MG/DL (ref 70–110)
GLUCOSE SERPL-MCNC: 126 MG/DL (ref 70–110)
HCT VFR BLD AUTO: 23.8 % (ref 37–48.5)
HGB BLD-MCNC: 7.5 G/DL (ref 12–16)
IMM GRANULOCYTES # BLD AUTO: 0.27 K/UL (ref 0–0.04)
IMM GRANULOCYTES NFR BLD AUTO: 1.2 % (ref 0–0.5)
LYMPHOCYTES # BLD AUTO: 2.1 K/UL (ref 1–4.8)
LYMPHOCYTES NFR BLD: 9.2 % (ref 18–48)
MAGNESIUM SERPL-MCNC: 1.3 MG/DL (ref 1.6–2.6)
MAGNESIUM SERPL-MCNC: 1.3 MG/DL (ref 1.6–2.6)
MAGNESIUM SERPL-MCNC: 1.9 MG/DL (ref 1.6–2.6)
MCH RBC QN AUTO: 30.1 PG (ref 27–31)
MCHC RBC AUTO-ENTMCNC: 31.5 G/DL (ref 32–36)
MCV RBC AUTO: 96 FL (ref 82–98)
MONOCYTES # BLD AUTO: 1 K/UL (ref 0.3–1)
MONOCYTES NFR BLD: 4.4 % (ref 4–15)
NEUTROPHILS # BLD AUTO: 19 K/UL (ref 1.8–7.7)
NEUTROPHILS NFR BLD: 83.9 % (ref 38–73)
NRBC BLD-RTO: 0 /100 WBC
PHOSPHATE SERPL-MCNC: 3.2 MG/DL (ref 2.7–4.5)
PHOSPHATE SERPL-MCNC: 3.3 MG/DL (ref 2.7–4.5)
PHOSPHATE SERPL-MCNC: 3.3 MG/DL (ref 2.7–4.5)
PLATELET # BLD AUTO: 331 K/UL (ref 150–350)
PLATELET BLD QL SMEAR: ABNORMAL
PMV BLD AUTO: 11.1 FL (ref 9.2–12.9)
POCT GLUCOSE: 136 MG/DL (ref 70–110)
POTASSIUM SERPL-SCNC: 3.2 MMOL/L (ref 3.5–5.1)
POTASSIUM SERPL-SCNC: 3.5 MMOL/L (ref 3.5–5.1)
POTASSIUM SERPL-SCNC: 3.5 MMOL/L (ref 3.5–5.1)
PROT SERPL-MCNC: 7.3 G/DL (ref 6–8.4)
RBC # BLD AUTO: 2.49 M/UL (ref 4–5.4)
SARS-COV-2 RDRP RESP QL NAA+PROBE: NEGATIVE
SODIUM SERPL-SCNC: 141 MMOL/L (ref 136–145)
WBC # BLD AUTO: 22.62 K/UL (ref 3.9–12.7)

## 2020-09-08 PROCEDURE — 25000003 PHARM REV CODE 250: Performed by: STUDENT IN AN ORGANIZED HEALTH CARE EDUCATION/TRAINING PROGRAM

## 2020-09-08 PROCEDURE — 63600175 PHARM REV CODE 636 W HCPCS: Performed by: STUDENT IN AN ORGANIZED HEALTH CARE EDUCATION/TRAINING PROGRAM

## 2020-09-08 PROCEDURE — B4185 PARENTERAL SOL 10 GM LIPIDS: HCPCS | Performed by: SURGERY

## 2020-09-08 PROCEDURE — 25000242 PHARM REV CODE 250 ALT 637 W/ HCPCS: Performed by: STUDENT IN AN ORGANIZED HEALTH CARE EDUCATION/TRAINING PROGRAM

## 2020-09-08 PROCEDURE — 84100 ASSAY OF PHOSPHORUS: CPT

## 2020-09-08 PROCEDURE — 94761 N-INVAS EAR/PLS OXIMETRY MLT: CPT

## 2020-09-08 PROCEDURE — 80069 RENAL FUNCTION PANEL: CPT

## 2020-09-08 PROCEDURE — 25000003 PHARM REV CODE 250: Performed by: SURGERY

## 2020-09-08 PROCEDURE — C9113 INJ PANTOPRAZOLE SODIUM, VIA: HCPCS | Performed by: STUDENT IN AN ORGANIZED HEALTH CARE EDUCATION/TRAINING PROGRAM

## 2020-09-08 PROCEDURE — 97535 SELF CARE MNGMENT TRAINING: CPT

## 2020-09-08 PROCEDURE — 80053 COMPREHEN METABOLIC PANEL: CPT

## 2020-09-08 PROCEDURE — 85025 COMPLETE CBC W/AUTO DIFF WBC: CPT

## 2020-09-08 PROCEDURE — 99233 SBSQ HOSP IP/OBS HIGH 50: CPT | Mod: 24,,, | Performed by: SURGERY

## 2020-09-08 PROCEDURE — 20600001 HC STEP DOWN PRIVATE ROOM

## 2020-09-08 PROCEDURE — 83735 ASSAY OF MAGNESIUM: CPT | Mod: 91

## 2020-09-08 PROCEDURE — 99232 SBSQ HOSP IP/OBS MODERATE 35: CPT | Mod: ,,, | Performed by: INTERNAL MEDICINE

## 2020-09-08 PROCEDURE — 99233 PR SUBSEQUENT HOSPITAL CARE,LEVL III: ICD-10-PCS | Mod: 24,,, | Performed by: SURGERY

## 2020-09-08 PROCEDURE — 82330 ASSAY OF CALCIUM: CPT

## 2020-09-08 PROCEDURE — 94640 AIRWAY INHALATION TREATMENT: CPT

## 2020-09-08 PROCEDURE — 99232 PR SUBSEQUENT HOSPITAL CARE,LEVL II: ICD-10-PCS | Mod: ,,, | Performed by: INTERNAL MEDICINE

## 2020-09-08 PROCEDURE — 83735 ASSAY OF MAGNESIUM: CPT

## 2020-09-08 PROCEDURE — A4217 STERILE WATER/SALINE, 500 ML: HCPCS | Performed by: STUDENT IN AN ORGANIZED HEALTH CARE EDUCATION/TRAINING PROGRAM

## 2020-09-08 PROCEDURE — 97116 GAIT TRAINING THERAPY: CPT

## 2020-09-08 PROCEDURE — U0002 COVID-19 LAB TEST NON-CDC: HCPCS

## 2020-09-08 RX ORDER — OXYCODONE HCL 5 MG/5 ML
15 SOLUTION, ORAL ORAL EVERY 4 HOURS PRN
Status: DISCONTINUED | OUTPATIENT
Start: 2020-09-08 | End: 2020-09-22

## 2020-09-08 RX ORDER — MAGNESIUM SULFATE HEPTAHYDRATE 40 MG/ML
2 INJECTION, SOLUTION INTRAVENOUS ONCE
Status: COMPLETED | OUTPATIENT
Start: 2020-09-08 | End: 2020-09-08

## 2020-09-08 RX ORDER — HYDROMORPHONE HYDROCHLORIDE 1 MG/ML
1 INJECTION, SOLUTION INTRAMUSCULAR; INTRAVENOUS; SUBCUTANEOUS
Status: DISCONTINUED | OUTPATIENT
Start: 2020-09-08 | End: 2020-09-24

## 2020-09-08 RX ORDER — PANTOPRAZOLE SODIUM 40 MG/10ML
40 INJECTION, POWDER, LYOPHILIZED, FOR SOLUTION INTRAVENOUS DAILY
Status: DISCONTINUED | OUTPATIENT
Start: 2020-09-08 | End: 2020-09-26

## 2020-09-08 RX ORDER — OXYCODONE HCL 5 MG/5 ML
10 SOLUTION, ORAL ORAL EVERY 4 HOURS PRN
Status: DISCONTINUED | OUTPATIENT
Start: 2020-09-08 | End: 2020-09-22

## 2020-09-08 RX ORDER — MELATONIN 1 MG/ML
3 LIQUID (ML) ORAL NIGHTLY PRN
Status: DISCONTINUED | OUTPATIENT
Start: 2020-09-08 | End: 2020-09-22

## 2020-09-08 RX ORDER — POTASSIUM CHLORIDE 29.8 MG/ML
40 INJECTION INTRAVENOUS ONCE
Status: COMPLETED | OUTPATIENT
Start: 2020-09-08 | End: 2020-09-08

## 2020-09-08 RX ADMIN — HYDROMORPHONE HYDROCHLORIDE 0.5 MG: 1 INJECTION, SOLUTION INTRAMUSCULAR; INTRAVENOUS; SUBCUTANEOUS at 06:09

## 2020-09-08 RX ADMIN — POTASSIUM CHLORIDE 40 MEQ: 29.8 INJECTION, SOLUTION INTRAVENOUS at 03:09

## 2020-09-08 RX ADMIN — AMLODIPINE 5 MG: 1 SUSPENSION ORAL at 09:09

## 2020-09-08 RX ADMIN — HYDROMORPHONE HYDROCHLORIDE 0.5 MG: 1 INJECTION, SOLUTION INTRAMUSCULAR; INTRAVENOUS; SUBCUTANEOUS at 10:09

## 2020-09-08 RX ADMIN — HYDROMORPHONE HYDROCHLORIDE 1 MG: 1 INJECTION, SOLUTION INTRAMUSCULAR; INTRAVENOUS; SUBCUTANEOUS at 03:09

## 2020-09-08 RX ADMIN — OXYCODONE HYDROCHLORIDE 10 MG: 5 SOLUTION ORAL at 12:09

## 2020-09-08 RX ADMIN — LIDOCAINE 1 PATCH: 50 PATCH CUTANEOUS at 09:09

## 2020-09-08 RX ADMIN — MAGNESIUM SULFATE HEPTAHYDRATE: 500 INJECTION, SOLUTION INTRAMUSCULAR; INTRAVENOUS at 09:09

## 2020-09-08 RX ADMIN — SMOFLIPID 250 ML: 6; 6; 5; 3 INJECTION, EMULSION INTRAVENOUS at 09:09

## 2020-09-08 RX ADMIN — OXYCODONE HYDROCHLORIDE 15 MG: 5 SOLUTION ORAL at 09:09

## 2020-09-08 RX ADMIN — MICONAZOLE NITRATE: 20 POWDER TOPICAL at 08:09

## 2020-09-08 RX ADMIN — ACETAMINOPHEN 650 MG: 160 SOLUTION ORAL at 06:09

## 2020-09-08 RX ADMIN — PANTOPRAZOLE SODIUM 40 MG: 40 INJECTION, POWDER, LYOPHILIZED, FOR SOLUTION INTRAVENOUS at 08:09

## 2020-09-08 RX ADMIN — HEPARIN SODIUM 5000 UNITS: 5000 INJECTION INTRAVENOUS; SUBCUTANEOUS at 06:09

## 2020-09-08 RX ADMIN — MAGNESIUM SULFATE IN WATER 2 G: 40 INJECTION, SOLUTION INTRAVENOUS at 03:09

## 2020-09-08 RX ADMIN — HEPARIN SODIUM 5000 UNITS: 5000 INJECTION INTRAVENOUS; SUBCUTANEOUS at 02:09

## 2020-09-08 RX ADMIN — HEPARIN SODIUM 5000 UNITS: 5000 INJECTION INTRAVENOUS; SUBCUTANEOUS at 09:09

## 2020-09-08 RX ADMIN — ACETAMINOPHEN 650 MG: 160 SOLUTION ORAL at 12:09

## 2020-09-08 RX ADMIN — MAGNESIUM SULFATE IN WATER 2 G: 40 INJECTION, SOLUTION INTRAVENOUS at 08:09

## 2020-09-08 RX ADMIN — MICONAZOLE NITRATE: 20 POWDER TOPICAL at 09:09

## 2020-09-08 RX ADMIN — HYDROMORPHONE HYDROCHLORIDE 1 MG: 1 INJECTION, SOLUTION INTRAMUSCULAR; INTRAVENOUS; SUBCUTANEOUS at 01:09

## 2020-09-08 RX ADMIN — IPRATROPIUM BROMIDE AND ALBUTEROL SULFATE 3 ML: .5; 2.5 SOLUTION RESPIRATORY (INHALATION) at 08:09

## 2020-09-08 RX ADMIN — METOPROLOL TARTRATE 25 MG: 25 TABLET ORAL at 08:09

## 2020-09-08 RX ADMIN — METOPROLOL TARTRATE 25 MG: 25 TABLET ORAL at 09:09

## 2020-09-08 NOTE — PLAN OF CARE
Problem: Physical Therapy Goal  Goal: Physical Therapy Goal  Description: Goals to be met by: 9/15/2020     Patient will increase functional independence with mobility by performin. Supine to sit with MInimal Assistance  2. Sit to stand transfer with Minimal Assistance with LRAD - met ()  3. Gait  x 50 feet with Minimal Assistance using LRAD.   4. Stand for 3 minutes with Contact Guard Assistance using LRAD - met ()  5. Lower extremity exercise program x15 reps per handout, with independence    Outcome: Ongoing, Progressing    Treatment completed. Goals #4 met. Goals remain appropriate.

## 2020-09-08 NOTE — ASSESSMENT & PLAN NOTE
40 yo F s/p antrectomy with BII reconstruction c/b duodenal necrosis requiring ex lap, washout, drainage c/b aspiration event. S/p duodenal resection with d-j and g-j anastomosis.    - On room air  - TPN  - Keep G tube clamped, unclamp for nausea/vomiting. Ok to use G tube for meds  - Continue abx  - Daily labs  - GI and DVT ppx  - PT / OT  - continue PRNs for BP control  - Plan for Rojas placement on 9/11    Dispo: will need placement in LTAC, likely from ICU

## 2020-09-08 NOTE — PLAN OF CARE
Pt. Remained free from falls or injury throughout shift. Sinus tachy. HR . SBP reached 180-190. Metoprolol & Hydralazine given. Pt. Responded well. MAP maintained >65. Sat >95% RA. AAOx4. Pain managed throughout shift. All dressings CDI. Chest tube removed today. Wound vac dressing changed at bedside. UO 2.1L during shift. TPN @ 50. K replaced. Will continue to monitor.

## 2020-09-08 NOTE — PLAN OF CARE
[facility] sent IRF referrals to:  1. [facility] (to re-eval as patient is improving)   2. [facility], IRF  3. [facility]  4. [facility]    Patient's options are limited due to pending [place] Medicaid coverage. --- No SNF/LTAC    Jazzy Eid LMSW   - Case Management

## 2020-09-08 NOTE — PROGRESS NOTES
[facility]  Critical Care - Surgery  Progress Note    Patient Name: Jaquan Farmer  MRN: [id number]  Admission Date: 8/12/2020  Hospital Length of Stay: 27 days  Code Status: Full Code  Attending Provider: Donis Roa MD  Primary Care Provider: Primary Doctor No   Principal Problem: Duodenum disorder    Subjective:     Hospital/ICU Course:  8/14 Patient had a line replaced along with central line placed, with complication of pneumothorax. Rt pigtail was placed. Patient tolerated complications well. Decreasing vent settings.  8/15 NAEO. Patient was taken back to OR early AM for washout. Possible closure 8/18. ETT exchanged in OR 7.0 -> 7.5  8/16 - NAEON. HDS. Intubated, sedated.   8/17 - NAEON. OR tomorrow for closure.  8/18 - Washed out and closed in OR  8/22 - NAEO. Plan for Repeat CT AP today  8/23 - NAEO. Hbg lower. Giving blood x2 units.  8/25 - NAEO. Went to OR yesterday for Abdominal wall exploration and ligation of bleeding vessels. Wound vac exchanged  8/26 - NAEO. Hypertensive overnight. Hgb trending. Plan for CT today  8/27 - NAEO. Hypertensive overnight. CT yesterday showed no extravasation or acute changes  8/28 - NAEO.   8/29 - Patient in respiratory distress upon arrival. Chest XR w/ obvious left sided pulmonary occlusion. Patient intubated and bronchoscopy performed.  8/30 - Repeat Bronch today. Possible extubation later today  8/31 - Extubated yesterday. Wound vac change, bronch scheduled for OR today. Patient out of bed in chair on AM exam.  9/1 - Doing well without complaints this morning.  L sided permacath placed yesterday  9/2 - NAEO.  Continues to be hypertensive/tachycardic.  9/3 - NAEO.  G-tube clamped by primary team.  Increased output from superior GODFREY  9/4 - Overnight 2 episodes of SOB and increased WOB. CXR stable and ABG without significant abnormality. Placed on comfort flow. Cardene titrated off and oral BP medications increased.     Interval  History/Significant Events: no acute events overnight.     Follow-up For: Procedure(s) (LRB):  INSERTION-CATHETER-RUDY (Left)  BRONCHOSCOPY (N/A)  INSERTION, CATHETER, INTERCOSTAL, FOR DRAINAGE (Left)  REPLACEMENT, WOUND VAC (N/A)    Post-Operative Day: 8 Days Post-Op    Objective:     Vital Signs (Most Recent):  Temp: 98.8 °F (37.1 °C) (09/08/20 0715)  Pulse: 103 (09/08/20 0930)  Resp: 16 (09/08/20 1020)  BP: (!) 157/87 (09/08/20 0900)  SpO2: 99 % (09/08/20 0930) Vital Signs (24h Range):  Temp:  [98.8 °F (37.1 °C)-99.7 °F (37.6 °C)] 98.8 °F (37.1 °C)  Pulse:  [100-140] 103  Resp:  [15-39] 16  SpO2:  [90 %-100 %] 99 %  BP: (118-198)/() 157/87     Weight: 67.5 kg (148 lb 13 oz)  Body mass index is 27.22 kg/m².      Intake/Output Summary (Last 24 hours) at 9/8/2020 1054  Last data filed at 9/8/2020 1000  Gross per 24 hour   Intake 1879.23 ml   Output 3765 ml   Net -1885.77 ml       Physical Exam  Vitals signs and nursing note reviewed.   Constitutional:       General: She is not in acute distress.  Neck:      Comments: RIJ trialysis line, L IJ permacath  Cardiovascular:      Rate and Rhythm: Regular rhythm. Tachycardia present.   Pulmonary:      Effort: Pulmonary effort is normal.   Abdominal:      General: There is no distension.      Tenderness: There is no abdominal tenderness.      Comments: G tube with bilious output  Drains with bilious output  Wound vac in place   Skin:     General: Skin is warm and dry.   Neurological:      Mental Status: She is alert.       Vents:  Vent Mode: A/C (08/30/20 1045)  Ventilator Initiated: Yes(chart correction) (08/29/20 0800)  Set Rate: 16 BPM (08/30/20 1045)  Vt Set: 380 mL (08/30/20 1045)  PEEP/CPAP: 5 cmH20 (08/30/20 1045)  Oxygen Concentration (%): 50 (09/04/20 0600)  Peak Airway Pressure: 21 cmH2O (08/30/20 1045)  Plateau Pressure: 0 cmH20 (08/30/20 0900)  Total Ve: 7.12 mL (08/30/20 0900)  Negative Inspiratory Force (cm H2O): -23 (08/20/20 1501)  F/VT Ratio<105  (RSBI): (!) 52.94 (08/30/20 1045)    Lines/Drains/Airways     Central Venous Catheter Line            Trialysis (Dialysis) Catheter 08/13/20 2230 right internal jugular 25 days    Permacath 08/31/20 1100 left subclavian 7 days          Drain                 Closed/Suction Drain 08/13/20 1659 Right;Inferior Abdomen Bulb 19 Fr. 25 days         Closed/Suction Drain 08/13/20 1659 Right;Superior Abdomen Bulb 19 Fr. 25 days         Gastrostomy/Enterostomy 08/15/20 0916 Gastrostomy tube w/ balloon LUQ decompression 24 days    Female External Urinary Catheter 08/27/20 1600 11 days          Peripheral Intravenous Line                 Midline Catheter Insertion/Assessment  - Single Lumen 08/26/20 1501 Right basilic vein (medial side of arm) 18g x 8cm 12 days                Significant Labs:    CBC/Anemia Profile:  Recent Labs   Lab 09/07/20  0427 09/08/20  0335   WBC 22.06* 22.62*   HGB 7.7* 7.5*   HCT 25.2* 23.8*    331   MCV 98 96   RDW 14.6* 14.6*        Chemistries:  Recent Labs   Lab 09/07/20  0427 09/07/20  1351 09/07/20  2149 09/08/20  0335     142 141 139 141  141   K 3.2*  3.2* 3.3* 2.7* 3.5  3.5     107 107 106 108  108   CO2 23  23 21* 23 22*  22*   BUN 84*  84* 83* 80* 78*  78*   CREATININE 2.9*  2.9* 2.8* 2.6* 2.5*  2.5*   CALCIUM 8.4*  8.4* 8.1* 8.2* 8.0*  8.0*   ALBUMIN 1.8*  1.8* 1.8* 1.6* 1.7*  1.7*   PROT 7.4  --   --  7.3   BILITOT 0.4  --   --  0.4   ALKPHOS 197*  --   --  198*   ALT 16  --   --  16   AST 28  --   --  25   MG 1.6  1.6 1.4* 1.3* 1.3*  1.3*   PHOS 3.4  3.4 2.9 2.8 3.3  3.3       Significant Imaging:  I have reviewed all pertinent imaging results/findings within the past 24 hours.    Assessment/Plan:     Severe sepsis  38 yo F s/p antrectomy with BII reconstruction c/b duodenal necrosis requiring ex lap and multiple washouts. Course complicated by FLORIDALMA, rectus sheath hematoma, pneumothorax and pleural effusion now recovering nicely in ICU.      .Neuro:  -  AOx4  - PRN PO/IV pain meds  - Lidocaine patch  - Clonidine patch for anxiety     Resp:  - Satting well on room air     CV:   - persistent tachycardia in setting of white count, this is unchanged  - MAP goal >65  - Scheduled metop  - Nifedipine 60 mg QD  - Labetalol PRN  - Hydralazine PRN     Heme:  - Hgb/Hct stable  - sub q heparin for DVT ppx    ID:  - pt afebrile  - daily CBC  - persisting leukocytosis   - no abx for now     Renal:  - continues to make good urine and clear despite FLORIDALMA  - appreciate nephro recs     FEN/GI:  - NPO  - keep G tube clamped  - TPN + Lipid  - Maintain drains to bulb suction  - Replace electrolytes as needed to keep K>4, Mg>2; gentle repletion due to renal status  - protonix     Endo:  - Monitor BG, no ISS needed    Dispo:  -  step down. Will need villatoro.         Critical care was time spent personally by me on the following activities: development of treatment plan with patient or surrogate and bedside caregivers, discussions with consultants, evaluation of patient's response to treatment, examination of patient, ordering and performing treatments and interventions, ordering and review of laboratory studies, ordering and review of radiographic studies, pulse oximetry, re-evaluation of patient's condition.  This critical care time did not overlap with that of any other provider or involve time for any procedures.     Ac Romero MD  Critical Care - Surgery  [facility]

## 2020-09-08 NOTE — PLAN OF CARE
"      SICU PLAN OF CARE NOTE    Dx: Duodenum disorder    Vital Signs: BP (!) 145/90   Pulse (!) 112   Temp 98.7 °F (37.1 °C) (Oral)   Resp 16   Ht 5' 2" (1.575 m)   Wt 67.5 kg (148 lb 13 oz)   SpO2 100%   Breastfeeding No   BMI 27.22 kg/m²     Neuro: AAO x4, Follows Commands, and Moves All Extremities    Respiratory: Room Air    Cardiac: Sinus Tachycardia    Diet: NPO and Sips with Meds    Urine Output: Voids Spontaneously with [place] 950 cc/shift    Drains:     GODFREY Drain, total output 5 cc / shift    GODFREY Drain, total output 20 cc / shift    G-tube/J-tube, total output 160 cc /  shift    Wound Vac 10 cc      Labs/Accuchecks: Accuchecks q6h    SKIN NOTE:  Skin: No breakdown on sacrum or heels. Foam in place. Up in chair with wheelchair pad for the     Skin precautions maintained including:  Sacrum and heels with foam dressing in place for pressure protection. Frequent weight shift encouraged; Patient turned Q2 hr to prevent breakdown. Bed plugged in and mattress inflated. Adhesive use limited. Heels elevated off bed. Pressure points protected and positioning supports utilized.  Skin-to-device areas padded. Skin-to-skin areas padded    SHIFT EVENTS:  VSS. Patient afebrile throughout shift. Patient up in chair the entire shift. Pain managed. Orders to be transferred. Plan of care reviewed with patient. All questions and concerns addressed.   "

## 2020-09-08 NOTE — ASSESSMENT & PLAN NOTE
- oliguric stage 3 sid with ischemic atn likely secondary to septic shock  - unknown bl cr  - muddy brown casts on microscopy    Plan/Recommendations:  -UOP at 2.5 L/24 hrs     - may remove perm cath as patient's last HD Session was on 08/30, is making urine and has a downtrending Cr  -Strict I/O's  -Renally dose meds/avoid nephrotoxic meds  -Keep MAP >65   -Will continue to follow closely

## 2020-09-08 NOTE — PLAN OF CARE
09/08/20 1122   Discharge Reassessment   Assessment Type Discharge Planning Reassessment   Provided patient/caregiver education on the expected discharge date and the discharge plan Yes   Do you have any problems affording any of your prescribed medications? No   Discharge Plan A Rehab   Discharge Plan B Other  (TBD)   DME Needed Upon Discharge  other (see comments)  (TBD)       Tangela Bradley MPH, RN, CM  Ext. [phone]

## 2020-09-08 NOTE — PLAN OF CARE
Problem: Occupational Therapy Goal  Goal: Occupational Therapy Goal  Description: Goals to be met by: 9/11/20     Patient will increase functional independence with ADLs by performing:    Feeding with [place].  UE Dressing with Stand-by Assistance.  LE Dressing with Minimal Assistance.  Grooming while standing at sink with Contact Guard Assistance.  Toileting from bedside commode with Minimal Assistance for hygiene and clothing management.   Toilet transfer to bedside commode with Minimal Assistance.    Outcome: Ongoing, Progressing   The above goals remain appropriate. CHRISTIANO Head  9/8/2020

## 2020-09-08 NOTE — PROGRESS NOTES
[facility]  General Surgery  Progress Note    Subjective:     History of Present Illness:  Pt is a 40 yo F with recent history of antrectomy with BII reconstruction for ulcer disease at outside hospital. 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 Patient also reportedly aspirated on induction in the OR prior to this, resulting in severe pulmonary edema and hypoxia. Patient was transferred to [facility] urgently for higher level of care. She arrived as a direct SICU admit on near maximal vent settings and requiring low dose vasopressors with HR in the upper 140s. Her midline laparotomy is closed with 4 Navdeep drains in place draining bilious output.     Post-Op Info:  Procedure(s) (LRB):  INSERTION-CATHETER-RUDY (Left)  BRONCHOSCOPY (N/A)  INSERTION, CATHETER, INTERCOSTAL, FOR DRAINAGE (Left)  REPLACEMENT, WOUND VAC (N/A)   8 Days Post-Op     Interval History:   Alert and sitting in a chair this AM  Chest tube removed yesterday, on room air without respiratory distress   Drains with minimal bilious output    Medications:  Continuous Infusions:   TPN ADULT CENTRAL LINE CUSTOM 50 mL/hr at 09/08/20 0600     Scheduled Meds:   acetaminophen  650 mg Per J Tube Q6H    amLODIPine benzoate  5 mg Per J Tube Daily    cloNIDine 0.3 mg/24 hr td ptwk  1 patch Transdermal Q7 Days    heparin (porcine)  5,000 Units Subcutaneous Q8H    lidocaine  1 patch Transdermal Q24H    lipid (SMOFLIPID)  250 mL Intravenous Daily    metoprolol  25 mg Per J Tube BID    miconazole NITRATE 2 %   Topical (Top) BID    pantoprazole  40 mg Intravenous Daily     PRN Meds:sodium chloride, albuterol-ipratropium, hydrALAZINE, HYDROmorphone, HYDROmorphone, labetaloL, melatonin, metoprolol     Review of patient's allergies indicates:   Allergen Reactions    Latex      Objective:     Vital Signs (Most Recent):  Temp: 98.9 °F (37.2 °C) (09/08/20 0301)  Pulse: (!) 119 (09/08/20  0601)  Resp: 18 (09/08/20 0626)  BP: (!) 142/87 (09/08/20 0601)  SpO2: 99 % (09/08/20 0601) Vital Signs (24h Range):  Temp:  [98.9 °F (37.2 °C)-99.7 °F (37.6 °C)] 98.9 °F (37.2 °C)  Pulse:  [105-140] 119  Resp:  [15-39] 18  SpO2:  [90 %-100 %] 99 %  BP: (118-198)/() 142/87     Weight: 67.5 kg (148 lb 13 oz)  Body mass index is 27.22 kg/m².    Intake/Output - Last 3 Shifts       09/06 0700 - 09/07 0659 09/07 0700 - 09/08 0659 09/08 0700 - 09/09 0659    P.O.  240     I.V. (mL/kg)       NG/ 400     IV Piggyback       TPN  1379.2     Total Intake(mL/kg) 250 (3.7) 2019.2 (29.9)     Urine (mL/kg/hr) 2050 (1.3) 2700 (1.7)     Drains 303 743     Other 0 75     Stool       Chest Tube 43 4     Total Output 2396 3522     Net -2146 -1502.8            Urine Occurrence 3 x            Physical Exam  Constitutional:       General: She is not in acute distress.  Cardiovascular:      Rate and Rhythm: Regular rhythm. Tachycardia present.   Pulmonary:      Effort: Pulmonary effort is normal. No respiratory distress.   Abdominal:      General: There is no distension.      Palpations: Abdomen is soft.      Tenderness: There is no abdominal tenderness.      Comments: Drains with bilious output   Skin:     General: Skin is warm and dry.   Neurological:      General: No focal deficit present.      Mental Status: She is alert.   Psychiatric:         Mood and Affect: Mood normal.         Behavior: Behavior normal.         Significant Labs:  CBC:   Recent Labs   Lab 09/08/20  0335   WBC 22.62*   RBC 2.49*   HGB 7.5*   HCT 23.8*      MCV 96   MCH 30.1   MCHC 31.5*     CMP:   Recent Labs   Lab 09/08/20 0335     106   CALCIUM 8.0*  8.0*   ALBUMIN 1.7*  1.7*   PROT 7.3     141   K 3.5  3.5   CO2 22*  22*     108   BUN 78*  78*   CREATININE 2.5*  2.5*   ALKPHOS 198*   ALT 16   AST 25   BILITOT 0.4       Significant Diagnostics:  I have reviewed all pertinent imaging results/findings within the past 24  hours.    Assessment/Plan:     Severe sepsis  40 yo F s/p antrectomy with BII reconstruction c/b duodenal necrosis requiring ex lap, washout, drainage c/b aspiration event. S/p duodenal resection with d-j and g-j anastomosis.    - On room air  - TPN  - Keep G tube clamped, unclamp for nausea/vomiting. Ok to use G tube for meds  - Continue abx  - Daily labs  - GI and DVT ppx  - PT / OT  - continue PRNs for BP control  - Plan for [facility]jas placement on 9/11    Dispo: will need placement in LTAC, likely from ICU        Christy Nicole MD  General Surgery  [facility]

## 2020-09-08 NOTE — SUBJECTIVE & OBJECTIVE
Interval History:   Alert and sitting in a chair this AM  Chest tube removed yesterday, on room air without respiratory distress   Drains with minimal bilious output    Medications:  Continuous Infusions:   TPN ADULT CENTRAL LINE CUSTOM 50 mL/hr at 09/08/20 0600     Scheduled Meds:   acetaminophen  650 mg Per J Tube Q6H    amLODIPine benzoate  5 mg Per J Tube Daily    cloNIDine 0.3 mg/24 hr td ptwk  1 patch Transdermal Q7 Days    heparin (porcine)  5,000 Units Subcutaneous Q8H    lidocaine  1 patch Transdermal Q24H    lipid (SMOFLIPID)  250 mL Intravenous Daily    metoprolol  25 mg Per J Tube BID    miconazole NITRATE 2 %   Topical (Top) BID    pantoprazole  40 mg Intravenous Daily     PRN Meds:sodium chloride, albuterol-ipratropium, hydrALAZINE, HYDROmorphone, HYDROmorphone, labetaloL, melatonin, metoprolol     Review of patient's allergies indicates:   Allergen Reactions    Latex      Objective:     Vital Signs (Most Recent):  Temp: 98.9 °F (37.2 °C) (09/08/20 0301)  Pulse: (!) 119 (09/08/20 0601)  Resp: 18 (09/08/20 0626)  BP: (!) 142/87 (09/08/20 0601)  SpO2: 99 % (09/08/20 0601) Vital Signs (24h Range):  Temp:  [98.9 °F (37.2 °C)-99.7 °F (37.6 °C)] 98.9 °F (37.2 °C)  Pulse:  [105-140] 119  Resp:  [15-39] 18  SpO2:  [90 %-100 %] 99 %  BP: (118-198)/() 142/87     Weight: 67.5 kg (148 lb 13 oz)  Body mass index is 27.22 kg/m².    Intake/Output - Last 3 Shifts       09/06 0700 - 09/07 0659 09/07 0700 - 09/08 0659 09/08 0700 - 09/09 0659    P.O.  240     I.V. (mL/kg)       NG/ 400     IV Piggyback       TPN  1379.2     Total Intake(mL/kg) 250 (3.7) 2019.2 (29.9)     Urine (mL/kg/hr) 2050 (1.3) 2700 (1.7)     Drains 303 743     Other 0 75     Stool       Chest Tube 43 4     Total Output 2396 3522     Net -2146 -1502.8            Urine Occurrence 3 x            Physical Exam  Constitutional:       General: She is not in acute distress.  Cardiovascular:      Rate and Rhythm: Regular rhythm.  Tachycardia present.   Pulmonary:      Effort: Pulmonary effort is normal. No respiratory distress.   Abdominal:      General: There is no distension.      Palpations: Abdomen is soft.      Tenderness: There is no abdominal tenderness.      Comments: Drains with bilious output   Skin:     General: Skin is warm and dry.   Neurological:      General: No focal deficit present.      Mental Status: She is alert.   Psychiatric:         Mood and Affect: Mood normal.         Behavior: Behavior normal.         Significant Labs:  CBC:   Recent Labs   Lab 09/08/20  0335   WBC 22.62*   RBC 2.49*   HGB 7.5*   HCT 23.8*      MCV 96   MCH 30.1   MCHC 31.5*     CMP:   Recent Labs   Lab 09/08/20  0335     106   CALCIUM 8.0*  8.0*   ALBUMIN 1.7*  1.7*   PROT 7.3     141   K 3.5  3.5   CO2 22*  22*     108   BUN 78*  78*   CREATININE 2.5*  2.5*   ALKPHOS 198*   ALT 16   AST 25   BILITOT 0.4       Significant Diagnostics:  I have reviewed all pertinent imaging results/findings within the past 24 hours.

## 2020-09-08 NOTE — PROGRESS NOTES
[facility]  Nephrology  Progress Note    Patient Name: Jaquan Farmer  MRN: [id number]  Admission Date: 8/12/2020  Hospital Length of Stay: 27 days  Attending Provider: Donis Roa MD   Primary Care Physician: Primary Doctor No  Principal Problem:Duodenum disorder    Subjective:     HPI: Mrs. Farmer is a 39 year old female with antrectomy at [facility] complicated duodenal necrosis post op. She aspirated, was intubated, and became septic. She developed renal failure and required max vent settings and was transferred here for higher level of care. On arrival she was hypotensive on pressers, max vent setting, and had minimal urine output.  Overnight she was given 6L of fluid, 6g calcium, placed on vac, pip-tazo, and fluconazole. Nephro consulted for sid.    Interval History: Patient seen and examined at bedside, no acute events overnight. UOP at 2.5 L over the past 24 hours.     Review of patient's allergies indicates:   Allergen Reactions    Latex      Current Facility-Administered Medications   Medication Frequency    0.9%  NaCl infusion (for blood administration) Q24H PRN    acetaminophen oral solution 650 mg Q6H    albuterol-ipratropium 2.5 mg-0.5 mg/3 mL nebulizer solution 3 mL Q4H PRN    amLODIPine benzoate 1 mg/mL oral suspension 5 mg Daily    cloNIDine 0.3 mg/24 hr td ptwk 1 patch Q7 Days    heparin (porcine) injection 5,000 Units Q8H    hydrALAZINE injection 20 mg Q6H PRN    HYDROmorphone injection 1 mg Q3H PRN    labetalol 20 mg/4 mL (5 mg/mL) IV syring Q4H PRN    lidocaine 5 % patch 1 patch Q24H    lipid (SMOFLIPID) (SMOFLIPID) 20 % infusion 250 mL Daily    magnesium sulfate 2g in water 50mL IVPB (premix) Once    melatonin oral solution 3 mg Nightly PRN    metoprolol 12.5mg/1.25mL oral solution 25 mg BID    metoprolol injection 5 mg Q4H PRN    miconazole NITRATE 2 % top powder BID    oxyCODONE 5 mg/5 mL solution 10 mg Q4H PRN    oxyCODONE 5 mg/5 mL solution 15 mg  Q4H PRN    pantoprazole injection 40 mg Daily    TPN ADULT CENTRAL LINE CUSTOM Continuous    TPN ADULT CENTRAL LINE CUSTOM Continuous       Objective:     Vital Signs (Most Recent):  Temp: 98.7 °F (37.1 °C) (09/08/20 1545)  Pulse: (!) 112 (09/08/20 1230)  Resp: 16 (09/08/20 1200)  BP: (!) 145/90 (09/08/20 1200)  SpO2: 100 % (09/08/20 1230)  O2 Device (Oxygen Therapy): room air (09/08/20 1545) Vital Signs (24h Range):  Temp:  [98.7 °F (37.1 °C)-99.3 °F (37.4 °C)] 98.7 °F (37.1 °C)  Pulse:  [100-137] 112  Resp:  [15-31] 16  SpO2:  [90 %-100 %] 100 %  BP: (118-157)/(74-92) 145/90     Weight: 67.5 kg (148 lb 13 oz) (09/05/20 0500)  Body mass index is 27.22 kg/m².  Body surface area is 1.72 meters squared.    I/O last 3 completed shifts:  In: 2269.2 [P.O.:240; NG/GT:650]  Out: 4737 [Urine:3900; Drains:758; Other:75; Chest Tube:4]    Physical Exam  Constitutional:       General: She is not in acute distress.  HENT:      Head: Normocephalic and atraumatic.   Neck:      Comments: RIJ trialysis line, L IJ permacath  Cardiovascular:      Rate and Rhythm: Tachycardia present.      Comments: Hypertensive, sinus tach  L sided chest tube in place  Pulmonary:      Breath sounds: No wheezing.      Comments: tachypneic  Abdominal:      General: Abdomen is flat. There is no distension.      Tenderness: There is no abdominal tenderness.      Comments: Drains and G tube with bilious output  Wound vac in place   Skin:     General: Skin is warm and dry.   Neurological:      General: No focal deficit present.      Mental Status: She is alert and oriented to person, place, and time.   Psychiatric:         Mood and Affect: Mood normal.         Behavior: Behavior normal.         Significant Labs:  CBC:   Recent Labs   Lab 09/08/20  0335   WBC 22.62*   RBC 2.49*   HGB 7.5*   HCT 23.8*      MCV 96   MCH 30.1   MCHC 31.5*     CMP:   Recent Labs   Lab 09/08/20  0335 09/08/20  1357     106 126*   CALCIUM 8.0*  8.0* 8.2*    ALBUMIN 1.7*  1.7* 1.7*   PROT 7.3  --      141 141   K 3.5  3.5 3.2*   CO2 22*  22* 23     108 107   BUN 78*  78* 77*   CREATININE 2.5*  2.5* 2.4*   ALKPHOS 198*  --    ALT 16  --    AST 25  --    BILITOT 0.4  --      All labs within the past 24 hours have been reviewed.     Significant Imaging:  Labs: Reviewed    Assessment/Plan:     * Duodenum disorder  - Management per primary team   - Duodenal necrosis s/p explolatory lap.     Hypertension  Management as per primary         Anemia, chronic disease  Hgb at 7.5    Metabolic acidosis  -secondary to renal failure, stable        Acute renal failure with tubular necrosis  - oliguric stage 3 sid with ischemic atn likely secondary to septic shock  - unknown bl cr  - muddy brown casts on microscopy    Plan/Recommendations:  -UOP at 2.5 L/24 hrs     - may remove perm cath as patient's last HD Session was on 08/30, is making urine and has a downtrending Cr  -Strict I/O's  -Renally dose meds/avoid nephrotoxic meds  -Keep MAP >65   -Will continue to follow closely           Severe sepsis  - Management per primary team         Thank you for your consult. I will sign off. Please contact us if you have any additional questions.    Nestor Urban MD  Nephrology  [facility]

## 2020-09-08 NOTE — PT/OT/SLP PROGRESS
Occupational Therapy   Treatment    Name: Jaquan Farmer  MRN: [id number]  Admitting Diagnosis:  Duodenum disorder  8 Days Post-Op    Recommendations:     Discharge Recommendations: rehabilitation facility  Discharge Equipment Recommendations:  (TBD pending progress)  Barriers to discharge:  None    Assessment:     Jaquan Farmer is a 39 y.o. female with a medical diagnosis of Duodenum disorder.  She presents with significantly improved activity tolerance AEB increased standing time during ADL and increased mobiliyt distance. Performance deficits affecting function are weakness, impaired functional mobilty, gait instability, impaired endurance, impaired balance, impaired self care skills, impaired cardiopulmonary response to activity.     Rehab Prognosis:  Good; patient would benefit from acute skilled OT services to address these deficits and reach maximum level of function.       Plan:     Patient to be seen 4 x/week to address the above listed problems via self-care/home management, therapeutic activities, therapeutic exercises  · Plan of Care Expires: 09/24/20  · Plan of Care Reviewed with: patient    Subjective     Pain/Comfort:  · Pain Rating 1: 2/10  · Location - Side 1: Bilateral  · Location - Orientation 1: generalized  · Location 1: abdomen  · Pain Addressed 1: Distraction, Reposition  · Pain Rating Post-Intervention 1: 2/10    Objective:     Communicated with: RN prior to session.  Patient found up in chair with blood pressure cuff, telemetry, pulse ox (continuous), GODFREY drain, PureWick, central line, wound vac(G-tube to gravity; midline, SC Jj) upon OT entry to room.    General Precautions: Standard, fall   Orthopedic Precautions:N/A   Braces:       Occupational Performance:     Bed Mobility:    · NT     Functional Mobility/Transfers:  · Patient completed Sit <> Stand Transfer with contact guard assistance  With RW   · Patient completed Toilet Transfer sit<>stand technique with minimum  assistance with  rolling walker and grab bars  · Functional Mobility: CGA to/from bathroom and around room using RW    Activities of Daily Living:  · Grooming: supervision for tasks  · Upper Body Dressing: minimum assistance    · Lower Body Dressing: minimum assistance    · Toileting: minimum assistance        [facility] 6 Click ADL: 19    Treatment & Education:  Pt ed on OT POC  Pt stood at sink with CGA while engaged in self-care  Pt ed on ROM ex's 3x daily for increased overall strength and endurance    Patient left up in chair with all lines intact, call button in reach and RN notifiedEducation:      GOALS:   Multidisciplinary Problems     Occupational Therapy Goals        Problem: Occupational Therapy Goal    Goal Priority Disciplines Outcome Interventions   Occupational Therapy Goal     OT, PT/OT Ongoing, Progressing    Description: Goals to be met by: 9/11/20     Patient will increase functional independence with ADLs by performing:    Feeding with [place].  UE Dressing with Stand-by Assistance.  LE Dressing with Minimal Assistance.  Grooming while standing at sink with Contact Guard Assistance.  Toileting from bedside commode with Minimal Assistance for hygiene and clothing management.   Toilet transfer to bedside commode with Minimal Assistance.                     Time Tracking:     OT Date of Treatment: 09/08/20  OT Start Time: 1051  OT Stop Time: 1115  OT Total Time (min): 24 min    Billable Minutes:Self Care/Home Management 24    CHRISTIANO Head  9/8/2020

## 2020-09-08 NOTE — SUBJECTIVE & OBJECTIVE
Interval History/Significant Events: no acute events overnight.     Follow-up For: Procedure(s) (LRB):  INSERTION-CATHETER-RUDY (Left)  BRONCHOSCOPY (N/A)  INSERTION, CATHETER, INTERCOSTAL, FOR DRAINAGE (Left)  REPLACEMENT, WOUND VAC (N/A)    Post-Operative Day: 8 Days Post-Op    Objective:     Vital Signs (Most Recent):  Temp: 98.8 °F (37.1 °C) (09/08/20 0715)  Pulse: 103 (09/08/20 0930)  Resp: 16 (09/08/20 1020)  BP: (!) 157/87 (09/08/20 0900)  SpO2: 99 % (09/08/20 0930) Vital Signs (24h Range):  Temp:  [98.8 °F (37.1 °C)-99.7 °F (37.6 °C)] 98.8 °F (37.1 °C)  Pulse:  [100-140] 103  Resp:  [15-39] 16  SpO2:  [90 %-100 %] 99 %  BP: (118-198)/() 157/87     Weight: 67.5 kg (148 lb 13 oz)  Body mass index is 27.22 kg/m².      Intake/Output Summary (Last 24 hours) at 9/8/2020 1054  Last data filed at 9/8/2020 1000  Gross per 24 hour   Intake 1879.23 ml   Output 3765 ml   Net -1885.77 ml       Physical Exam  Vitals signs and nursing note reviewed.   Constitutional:       General: She is not in acute distress.  Neck:      Comments: RIJ trialysis line, L IJ permacath  Cardiovascular:      Rate and Rhythm: Regular rhythm. Tachycardia present.   Pulmonary:      Effort: Pulmonary effort is normal.   Abdominal:      General: There is no distension.      Tenderness: There is no abdominal tenderness.      Comments: G tube with bilious output  Drains with bilious output  Wound vac in place   Skin:     General: Skin is warm and dry.   Neurological:      Mental Status: She is alert.       Vents:  Vent Mode: A/C (08/30/20 1045)  Ventilator Initiated: Yes(chart correction) (08/29/20 0800)  Set Rate: 16 BPM (08/30/20 1045)  Vt Set: 380 mL (08/30/20 1045)  PEEP/CPAP: 5 cmH20 (08/30/20 1045)  Oxygen Concentration (%): 50 (09/04/20 0600)  Peak Airway Pressure: 21 cmH2O (08/30/20 1045)  Plateau Pressure: 0 cmH20 (08/30/20 0900)  Total Ve: 7.12 mL (08/30/20 0900)  Negative Inspiratory Force (cm H2O): -23 (08/20/20 1501)  F/VT  Ratio<105 (RSBI): (!) 52.94 (08/30/20 1045)    Lines/Drains/Airways     Central Venous Catheter Line            Trialysis (Dialysis) Catheter 08/13/20 2230 right internal jugular 25 days    Permacath 08/31/20 1100 left subclavian 7 days          Drain                 Closed/Suction Drain 08/13/20 1659 Right;Inferior Abdomen Bulb 19 Fr. 25 days         Closed/Suction Drain 08/13/20 1659 Right;Superior Abdomen Bulb 19 Fr. 25 days         Gastrostomy/Enterostomy 08/15/20 0916 Gastrostomy tube w/ balloon LUQ decompression 24 days    Female External Urinary Catheter 08/27/20 1600 11 days          Peripheral Intravenous Line                 Midline Catheter Insertion/Assessment  - Single Lumen 08/26/20 1501 Right basilic vein (medial side of arm) 18g x 8cm 12 days                Significant Labs:    CBC/Anemia Profile:  Recent Labs   Lab 09/07/20  0427 09/08/20  0335   WBC 22.06* 22.62*   HGB 7.7* 7.5*   HCT 25.2* 23.8*    331   MCV 98 96   RDW 14.6* 14.6*        Chemistries:  Recent Labs   Lab 09/07/20  0427 09/07/20  1351 09/07/20  2149 09/08/20  0335     142 141 139 141  141   K 3.2*  3.2* 3.3* 2.7* 3.5  3.5     107 107 106 108  108   CO2 23  23 21* 23 22*  22*   BUN 84*  84* 83* 80* 78*  78*   CREATININE 2.9*  2.9* 2.8* 2.6* 2.5*  2.5*   CALCIUM 8.4*  8.4* 8.1* 8.2* 8.0*  8.0*   ALBUMIN 1.8*  1.8* 1.8* 1.6* 1.7*  1.7*   PROT 7.4  --   --  7.3   BILITOT 0.4  --   --  0.4   ALKPHOS 197*  --   --  198*   ALT 16  --   --  16   AST 28  --   --  25   MG 1.6  1.6 1.4* 1.3* 1.3*  1.3*   PHOS 3.4  3.4 2.9 2.8 3.3  3.3       Significant Imaging:  I have reviewed all pertinent imaging results/findings within the past 24 hours.

## 2020-09-08 NOTE — PT/OT/SLP PROGRESS
Physical Therapy Treatment    Patient Name:  Jaquan Farmer   MRN:  [id number]  Admit Date: 2020  Admitting Diagnosis:  Duodenum disorder   Length of Stay: 27 days  Recent Surgery: Procedure(s) (LRB):  INSERTION-CATHETER-RUDY (Left)  BRONCHOSCOPY (N/A)  INSERTION, CATHETER, INTERCOSTAL, FOR DRAINAGE (Left)  REPLACEMENT, WOUND VAC (N/A) 8 Days Post-Op    Recommendations:     Discharge Recommendations:  rehabilitation facility   Discharge Equipment Recommendations: (TBD)   Barriers to discharge: None    Plan:     During this hospitalization, patient to be seen 4 x/week to address the listed problems via gait training, therapeutic activities, therapeutic exercises, neuromuscular re-education  · Plan of Care Expires:  10/01/20   Plan of Care Reviewed with: patient    Assessment:     Jaquan Farmer is a 39 y.o. female admitted with a medical diagnosis of Duodenum disorder. Pt was found awake in the chair and on the phone. Pt demonstrated improvement in morale and exercise tolerance. Pt able to sit>stand from chair with CGA using RW and sit>stand from toilet with Eduardo using RW. Pt standing bal increased to 3 min with CGA at sink while performing ADLs. Pt ambulated 10ft+20ft with CGA using RW. Plan to increased gait distance and dynamic standing balance next treatment with ambulation trial without RW.    Problem List: weakness, impaired endurance, impaired functional mobilty, decreased lower extremity function, impaired cardiopulmonary response to activity.  Rehab Prognosis: Good     GOALS:   Multidisciplinary Problems     Physical Therapy Goals        Problem: Physical Therapy Goal    Goal Priority Disciplines Outcome Goal Variances Interventions   Physical Therapy Goal     PT, PT/OT Ongoing, Progressing     Description: Goals to be met by: 9/15/2020     Patient will increase functional independence with mobility by performin. Supine to sit with MInimal Assistance  2. Sit to stand transfer with  "Minimal Assistance with LRAD - met (9/4)  3. Gait  x 50 feet with Minimal Assistance using LRAD.   4. Stand for 3 minutes with Contact Guard Assistance using LRAD - met (9/8)  5. Lower extremity exercise program x15 reps per handout, with independence                     Subjective   Communicated with RN prior to session.  Patient found up in chair upon PT entry to room, agreeable to evaluation. Jaquan Farmer was alone present during session.    Chief Complaint: Been through a lot  Patient/Family Comments/goals: to get better and return home  Pain/Comfort:  · Pain Rating 1: 2/10  · Location 1: (Pt did not specify)  · Pain Addressed 1: Reposition, Distraction, Pre-medicate for activity  · Pain Rating Post-Intervention 1: 0/10    Objective:   Patient found with: telemetry, pulse ox (continuous), blood pressure cuff, central line, peripheral IV, GODFREY drain, PureWick, wound vac(G Tube)   General Precautions: Standard, Cardiac fall   Orthopedic Precautions:N/A   Braces: N/A   Oxygen Device: Room Air  Vitals: BP (!) 145/90   Pulse (!) 112   Temp 98.8 °F (37.1 °C) (Oral)   Resp 16   Ht 5' 2" (1.575 m)   Wt 67.5 kg (148 lb 13 oz)   SpO2 100%   Breastfeeding No   BMI 27.22 kg/m²     Outcome Measures:  AM-PAC 6 CLICK MOBILITY  Turning over in bed (including adjusting bedclothes, sheets and blankets)?: 2  Sitting down on and standing up from a chair with arms (e.g., wheelchair, bedside commode, etc.): 3  Moving from lying on back to sitting on the side of the bed?: 2  Moving to and from a bed to a chair (including a wheelchair)?: 3  Need to walk in hospital room?: 3  Climbing 3-5 steps with a railing?: 2  Basic Mobility Total Score: 15     Cognition:   · Alert and Cooperative  · AxOx4  · Command following: Follows multistep  commands  · Fluency: clear/fluent    Functional Mobility:  Additional staff present: OT    Transfers:    Sit <> Stand Transfer: contact guard assistance with rolling walker from chair x1 " trail  o Kermit with rolling walker from toilet x1 trial  o Pt educated on RW management       Gait:  · Patient ambulated: 10ft+20ft (Chair>toilet>Sink (Standing ADLs)>chair)   · Patient required: contact guard  · Patient used: rolling walker  · Gait Pattern observed: 2-point gait and full weightbearing  · Gait Deviation(s): decreased step length, flexed posture and decreased gait speed  · Impairments due to: pain, decreased strength and decreased endurance  · Comments: PT providing cuing on RW management during ambulation top keep walker closer to reduce fall risk.     Stairs:  Not performed    Therapeutic Activities, Exercises, and Education:   Pt educated on PT role and POC  Pt educated on importance of progressive mobiliy  Pt educated on RW management for sit>stand and ambulation    Therapeutic Activity:   Pt performed gait training 10ft+20ft with RW to increase exercise tolerance and improve LE weight shifting and endurance    PT provided verbal cuing on RW management during ambulation to reduce fall risk   Pt performed standing ADLs with CGA at sink with OT and demonstrated good dynamic standing balance and good fine motor skills          Patient left up in chair with all lines intact, call button in reach and RN notified..    Time Tracking:     PT Received On: 09/08/20  PT Start Time: 1052     PT Stop Time: 1119  PT Total Time (min): 27 min     Billable Minutes:   · Gait Training 23    Treatment Type: Treatment  PT/PTA: PT       Bonnie Tolbert PT, DPT  9/8/2020  [phone]

## 2020-09-08 NOTE — SUBJECTIVE & OBJECTIVE
Interval History: Patient seen and examined at bedside, no acute events overnight. UOP at 2.5 L over the past 24 hours.     Review of patient's allergies indicates:   Allergen Reactions    Latex      Current Facility-Administered Medications   Medication Frequency    0.9%  NaCl infusion (for blood administration) Q24H PRN    acetaminophen oral solution 650 mg Q6H    albuterol-ipratropium 2.5 mg-0.5 mg/3 mL nebulizer solution 3 mL Q4H PRN    amLODIPine benzoate 1 mg/mL oral suspension 5 mg Daily    cloNIDine 0.3 mg/24 hr td ptwk 1 patch Q7 Days    heparin (porcine) injection 5,000 Units Q8H    hydrALAZINE injection 20 mg Q6H PRN    HYDROmorphone injection 1 mg Q3H PRN    labetalol 20 mg/4 mL (5 mg/mL) IV syring Q4H PRN    lidocaine 5 % patch 1 patch Q24H    lipid (SMOFLIPID) (SMOFLIPID) 20 % infusion 250 mL Daily    magnesium sulfate 2g in water 50mL IVPB (premix) Once    melatonin oral solution 3 mg Nightly PRN    metoprolol 12.5mg/1.25mL oral solution 25 mg BID    metoprolol injection 5 mg Q4H PRN    miconazole NITRATE 2 % top powder BID    oxyCODONE 5 mg/5 mL solution 10 mg Q4H PRN    oxyCODONE 5 mg/5 mL solution 15 mg Q4H PRN    pantoprazole injection 40 mg Daily    TPN ADULT CENTRAL LINE CUSTOM Continuous    TPN ADULT CENTRAL LINE CUSTOM Continuous       Objective:     Vital Signs (Most Recent):  Temp: 98.7 °F (37.1 °C) (09/08/20 1545)  Pulse: (!) 112 (09/08/20 1230)  Resp: 16 (09/08/20 1200)  BP: (!) 145/90 (09/08/20 1200)  SpO2: 100 % (09/08/20 1230)  O2 Device (Oxygen Therapy): room air (09/08/20 1545) Vital Signs (24h Range):  Temp:  [98.7 °F (37.1 °C)-99.3 °F (37.4 °C)] 98.7 °F (37.1 °C)  Pulse:  [100-137] 112  Resp:  [15-31] 16  SpO2:  [90 %-100 %] 100 %  BP: (118-157)/(74-92) 145/90     Weight: 67.5 kg (148 lb 13 oz) (09/05/20 0500)  Body mass index is 27.22 kg/m².  Body surface area is 1.72 meters squared.    I/O last 3 completed shifts:  In: 2269.2 [P.O.:240; NG/GT:650]  Out: 1145  [Urine:3900; Drains:758; Other:75; Chest Tube:4]    Physical Exam  Constitutional:       General: She is not in acute distress.  HENT:      Head: Normocephalic and atraumatic.   Neck:      Comments: RIJ trialysis line, L IJ permacath  Cardiovascular:      Rate and Rhythm: Tachycardia present.      Comments: Hypertensive, sinus tach  L sided chest tube in place  Pulmonary:      Breath sounds: No wheezing.      Comments: tachypneic  Abdominal:      General: Abdomen is flat. There is no distension.      Tenderness: There is no abdominal tenderness.      Comments: Drains and G tube with bilious output  Wound vac in place   Skin:     General: Skin is warm and dry.   Neurological:      General: No focal deficit present.      Mental Status: She is alert and oriented to person, place, and time.   Psychiatric:         Mood and Affect: Mood normal.         Behavior: Behavior normal.         Significant Labs:  CBC:   Recent Labs   Lab 09/08/20  0335   WBC 22.62*   RBC 2.49*   HGB 7.5*   HCT 23.8*      MCV 96   MCH 30.1   MCHC 31.5*     CMP:   Recent Labs   Lab 09/08/20  0335 09/08/20  1357     106 126*   CALCIUM 8.0*  8.0* 8.2*   ALBUMIN 1.7*  1.7* 1.7*   PROT 7.3  --      141 141   K 3.5  3.5 3.2*   CO2 22*  22* 23     108 107   BUN 78*  78* 77*   CREATININE 2.5*  2.5* 2.4*   ALKPHOS 198*  --    ALT 16  --    AST 25  --    BILITOT 0.4  --      All labs within the past 24 hours have been reviewed.     Significant Imaging:  Labs: Reviewed

## 2020-09-08 NOTE — PLAN OF CARE
09/08/20 1122   Discharge Reassessment   Assessment Type Discharge Planning Reassessment   Provided patient/caregiver education on the expected discharge date and the discharge plan Yes   Do you have any problems affording any of your prescribed medications? No   Discharge Plan A Rehab   Discharge Plan B Other  (TBD)   DME Needed Upon Discharge  other (see comments)  (TBD)       Tangela Bradley MPH, RN, CM  Ext. [phone]

## 2020-09-08 NOTE — ASSESSMENT & PLAN NOTE
38 yo F s/p antrectomy with BII reconstruction c/b duodenal necrosis requiring ex lap and multiple washouts. Course complicated by FLORIDALMA, rectus sheath hematoma, pneumothorax and pleural effusion now recovering nicely in ICU.      .Neuro:  - AOx4  - PRN PO/IV pain meds  - Lidocaine patch  - Clonidine patch for anxiety     Resp:  - Satting well on room air     CV:   - persistent tachycardia in setting of white count, this is unchanged  - MAP goal >65  - Scheduled metop  - Nifedipine 60 mg QD  - Labetalol PRN  - Hydralazine PRN     Heme:  - Hgb/Hct stable  - sub q heparin for DVT ppx    ID:  - pt afebrile  - daily CBC  - persisting leukocytosis   - no abx for now     Renal:  - continues to make good urine and clear despite FLORIDALMA  - appreciate nephro recs     FEN/GI:  - NPO  - keep G tube clamped  - TPN + Lipid  - Maintain drains to bulb suction  - Replace electrolytes as needed to keep K>4, Mg>2; gentle repletion due to renal status  - protonix     Endo:  - Monitor BG, no ISS needed    Dispo:  - Continue SICU care, possible step down. Will need villatoro.

## 2020-09-09 LAB
ALBUMIN SERPL BCP-MCNC: 1.9 G/DL (ref 3.5–5.2)
ALP SERPL-CCNC: 226 U/L (ref 55–135)
ALT SERPL W/O P-5'-P-CCNC: 19 U/L (ref 10–44)
ANION GAP SERPL CALC-SCNC: 12 MMOL/L (ref 8–16)
ANISOCYTOSIS BLD QL SMEAR: SLIGHT
AST SERPL-CCNC: 28 U/L (ref 10–40)
BASOPHILS # BLD AUTO: 0.05 K/UL (ref 0–0.2)
BASOPHILS NFR BLD: 0.2 % (ref 0–1.9)
BILIRUB SERPL-MCNC: 0.3 MG/DL (ref 0.1–1)
BUN SERPL-MCNC: 71 MG/DL (ref 6–20)
CALCIUM SERPL-MCNC: 8.4 MG/DL (ref 8.7–10.5)
CHLORIDE SERPL-SCNC: 108 MMOL/L (ref 95–110)
CO2 SERPL-SCNC: 21 MMOL/L (ref 23–29)
CREAT SERPL-MCNC: 2.2 MG/DL (ref 0.5–1.4)
DIFFERENTIAL METHOD: ABNORMAL
EOSINOPHIL # BLD AUTO: 0.2 K/UL (ref 0–0.5)
EOSINOPHIL NFR BLD: 0.7 % (ref 0–8)
ERYTHROCYTE [DISTWIDTH] IN BLOOD BY AUTOMATED COUNT: 14.6 % (ref 11.5–14.5)
EST. GFR  (AFRICAN AMERICAN): 31.6 ML/MIN/1.73 M^2
EST. GFR  (NON AFRICAN AMERICAN): 27.4 ML/MIN/1.73 M^2
GLUCOSE SERPL-MCNC: 108 MG/DL (ref 70–110)
HCT VFR BLD AUTO: 24 % (ref 37–48.5)
HGB BLD-MCNC: 7.5 G/DL (ref 12–16)
HYPOCHROMIA BLD QL SMEAR: ABNORMAL
IMM GRANULOCYTES # BLD AUTO: 0.16 K/UL (ref 0–0.04)
IMM GRANULOCYTES NFR BLD AUTO: 0.7 % (ref 0–0.5)
LYMPHOCYTES # BLD AUTO: 1.6 K/UL (ref 1–4.8)
LYMPHOCYTES NFR BLD: 6.9 % (ref 18–48)
MAGNESIUM SERPL-MCNC: 1.8 MG/DL (ref 1.6–2.6)
MCH RBC QN AUTO: 30 PG (ref 27–31)
MCHC RBC AUTO-ENTMCNC: 31.3 G/DL (ref 32–36)
MCV RBC AUTO: 96 FL (ref 82–98)
MONOCYTES # BLD AUTO: 1.1 K/UL (ref 0.3–1)
MONOCYTES NFR BLD: 4.5 % (ref 4–15)
NEUTROPHILS # BLD AUTO: 20.2 K/UL (ref 1.8–7.7)
NEUTROPHILS NFR BLD: 87 % (ref 38–73)
NRBC BLD-RTO: 0 /100 WBC
OVALOCYTES BLD QL SMEAR: ABNORMAL
PHOSPHATE SERPL-MCNC: 3.1 MG/DL (ref 2.7–4.5)
PLATELET # BLD AUTO: 347 K/UL (ref 150–350)
PMV BLD AUTO: 10.9 FL (ref 9.2–12.9)
POCT GLUCOSE: 102 MG/DL (ref 70–110)
POCT GLUCOSE: 117 MG/DL (ref 70–110)
POCT GLUCOSE: 128 MG/DL (ref 70–110)
POCT GLUCOSE: 96 MG/DL (ref 70–110)
POIKILOCYTOSIS BLD QL SMEAR: SLIGHT
POLYCHROMASIA BLD QL SMEAR: ABNORMAL
POTASSIUM SERPL-SCNC: 3.1 MMOL/L (ref 3.5–5.1)
PROT SERPL-MCNC: 7.8 G/DL (ref 6–8.4)
RBC # BLD AUTO: 2.5 M/UL (ref 4–5.4)
SODIUM SERPL-SCNC: 141 MMOL/L (ref 136–145)
WBC # BLD AUTO: 23.23 K/UL (ref 3.9–12.7)

## 2020-09-09 PROCEDURE — 80053 COMPREHEN METABOLIC PANEL: CPT

## 2020-09-09 PROCEDURE — 25000242 PHARM REV CODE 250 ALT 637 W/ HCPCS: Performed by: STUDENT IN AN ORGANIZED HEALTH CARE EDUCATION/TRAINING PROGRAM

## 2020-09-09 PROCEDURE — 25000003 PHARM REV CODE 250: Performed by: STUDENT IN AN ORGANIZED HEALTH CARE EDUCATION/TRAINING PROGRAM

## 2020-09-09 PROCEDURE — B4185 PARENTERAL SOL 10 GM LIPIDS: HCPCS | Performed by: STUDENT IN AN ORGANIZED HEALTH CARE EDUCATION/TRAINING PROGRAM

## 2020-09-09 PROCEDURE — 84100 ASSAY OF PHOSPHORUS: CPT

## 2020-09-09 PROCEDURE — 97530 THERAPEUTIC ACTIVITIES: CPT | Mod: CQ

## 2020-09-09 PROCEDURE — 85025 COMPLETE CBC W/AUTO DIFF WBC: CPT

## 2020-09-09 PROCEDURE — 63600175 PHARM REV CODE 636 W HCPCS: Performed by: STUDENT IN AN ORGANIZED HEALTH CARE EDUCATION/TRAINING PROGRAM

## 2020-09-09 PROCEDURE — 20600001 HC STEP DOWN PRIVATE ROOM

## 2020-09-09 PROCEDURE — 83735 ASSAY OF MAGNESIUM: CPT

## 2020-09-09 PROCEDURE — A4217 STERILE WATER/SALINE, 500 ML: HCPCS | Performed by: STUDENT IN AN ORGANIZED HEALTH CARE EDUCATION/TRAINING PROGRAM

## 2020-09-09 PROCEDURE — C9113 INJ PANTOPRAZOLE SODIUM, VIA: HCPCS | Performed by: STUDENT IN AN ORGANIZED HEALTH CARE EDUCATION/TRAINING PROGRAM

## 2020-09-09 PROCEDURE — 97116 GAIT TRAINING THERAPY: CPT | Mod: CQ

## 2020-09-09 RX ORDER — POTASSIUM CHLORIDE 14.9 MG/ML
20 INJECTION INTRAVENOUS
Status: COMPLETED | OUTPATIENT
Start: 2020-09-09 | End: 2020-09-09

## 2020-09-09 RX ADMIN — HYDROMORPHONE HYDROCHLORIDE 1 MG: 1 INJECTION, SOLUTION INTRAMUSCULAR; INTRAVENOUS; SUBCUTANEOUS at 10:09

## 2020-09-09 RX ADMIN — LIDOCAINE 1 PATCH: 50 PATCH CUTANEOUS at 09:09

## 2020-09-09 RX ADMIN — SMOFLIPID 250 ML: 6; 6; 5; 3 INJECTION, EMULSION INTRAVENOUS at 09:09

## 2020-09-09 RX ADMIN — MICONAZOLE NITRATE: 20 POWDER TOPICAL at 12:09

## 2020-09-09 RX ADMIN — CLONIDINE 1 PATCH: 0.3 PATCH TRANSDERMAL at 02:09

## 2020-09-09 RX ADMIN — ACETAMINOPHEN 650 MG: 160 SOLUTION ORAL at 12:09

## 2020-09-09 RX ADMIN — OXYCODONE HYDROCHLORIDE 10 MG: 5 SOLUTION ORAL at 05:09

## 2020-09-09 RX ADMIN — METOPROLOL TARTRATE 25 MG: 25 TABLET ORAL at 09:09

## 2020-09-09 RX ADMIN — OXYCODONE HYDROCHLORIDE 15 MG: 5 SOLUTION ORAL at 04:09

## 2020-09-09 RX ADMIN — POTASSIUM CHLORIDE 20 MEQ: 14.9 INJECTION, SOLUTION INTRAVENOUS at 06:09

## 2020-09-09 RX ADMIN — HEPARIN SODIUM 5000 UNITS: 5000 INJECTION INTRAVENOUS; SUBCUTANEOUS at 05:09

## 2020-09-09 RX ADMIN — HYDROMORPHONE HYDROCHLORIDE 1 MG: 1 INJECTION, SOLUTION INTRAMUSCULAR; INTRAVENOUS; SUBCUTANEOUS at 12:09

## 2020-09-09 RX ADMIN — ACETAMINOPHEN 650 MG: 160 SOLUTION ORAL at 05:09

## 2020-09-09 RX ADMIN — POTASSIUM CHLORIDE 20 MEQ: 14.9 INJECTION, SOLUTION INTRAVENOUS at 04:09

## 2020-09-09 RX ADMIN — OXYCODONE HYDROCHLORIDE 15 MG: 5 SOLUTION ORAL at 12:09

## 2020-09-09 RX ADMIN — HEPARIN SODIUM 5000 UNITS: 5000 INJECTION INTRAVENOUS; SUBCUTANEOUS at 10:09

## 2020-09-09 RX ADMIN — PANTOPRAZOLE SODIUM 40 MG: 40 INJECTION, POWDER, LYOPHILIZED, FOR SOLUTION INTRAVENOUS at 09:09

## 2020-09-09 RX ADMIN — MAGNESIUM SULFATE HEPTAHYDRATE: 500 INJECTION, SOLUTION INTRAMUSCULAR; INTRAVENOUS at 09:09

## 2020-09-09 RX ADMIN — MICONAZOLE NITRATE: 20 POWDER TOPICAL at 09:09

## 2020-09-09 RX ADMIN — Medication 3 MG: at 12:09

## 2020-09-09 RX ADMIN — ACETAMINOPHEN 650 MG: 160 SOLUTION ORAL at 06:09

## 2020-09-09 RX ADMIN — POTASSIUM CHLORIDE 20 MEQ: 14.9 INJECTION, SOLUTION INTRAVENOUS at 02:09

## 2020-09-09 RX ADMIN — AMLODIPINE 5 MG: 1 SUSPENSION ORAL at 10:09

## 2020-09-09 RX ADMIN — METOPROLOL TARTRATE 25 MG: 25 TABLET ORAL at 10:09

## 2020-09-09 RX ADMIN — HYDROMORPHONE HYDROCHLORIDE 1 MG: 1 INJECTION, SOLUTION INTRAMUSCULAR; INTRAVENOUS; SUBCUTANEOUS at 06:09

## 2020-09-09 RX ADMIN — HEPARIN SODIUM 5000 UNITS: 5000 INJECTION INTRAVENOUS; SUBCUTANEOUS at 01:09

## 2020-09-09 RX ADMIN — OXYCODONE HYDROCHLORIDE 10 MG: 5 SOLUTION ORAL at 09:09

## 2020-09-09 RX ADMIN — HYDROMORPHONE HYDROCHLORIDE 1 MG: 1 INJECTION, SOLUTION INTRAMUSCULAR; INTRAVENOUS; SUBCUTANEOUS at 01:09

## 2020-09-09 NOTE — PLAN OF CARE
Patient admitted 8/12/2020 in transfer from [facility] to ICU, intubated, on pressors.  Patient to OR 8/13, 8/15, 8/18, 8/21, 8/31.  Patient now with TPN, Clamped G tube, Wound Vac (tolerating bedside changes now).  Patient will need TPN and WV for discharge with recs from PT/OT for Rehab.      Patient is [place] and does not have other insurance to pay for TPN, wound vac.  Option would be to transfer patient back to [facility] when medically stable.  Spoke with patient, agrees with transfer back to [facility].    Update: Spoke with Dr Roa, stated patient should be ready and agreed with transfer back to [facility] 9/24/2020.      Will continue to follow for needs.       09/09/20 7697   Discharge Reassessment   Assessment Type Discharge Planning Reassessment   Discharge Plan A Rehab   Discharge Plan B Home Health   DME Needed Upon Discharge  medication pump;wound care supplies   Anticipated Discharge Disposition ANOTHER [facility]   Post-Acute Status   Post-Acute Authorization Other  ([facility])

## 2020-09-09 NOTE — PROGRESS NOTES
[facility]  General Surgery  Progress Note    Subjective:     History of Present Illness:  Pt is a 40 yo F with recent history of antrectomy with BII reconstruction for ulcer disease at outside hospital. 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 Patient also reportedly aspirated on induction in the OR prior to this, resulting in severe pulmonary edema and hypoxia. Patient was transferred to [facility] urgently for higher level of care. She arrived as a direct SICU admit on near maximal vent settings and requiring low dose vasopressors with HR in the upper 140s. Her midline laparotomy is closed with 4 Navdeep drains in place draining bilious output.     Post-Op Info:  Procedure(s) (LRB):  INSERTION-CATHETER-RUDY (Left)  BRONCHOSCOPY (N/A)  INSERTION, CATHETER, INTERCOSTAL, FOR DRAINAGE (Left)  REPLACEMENT, WOUND VAC (N/A)   9 Days Post-Op     Interval History: No acute events overnight. Stepped down to floor 9/8. Sitting in chair on eval this morning, on room air. G tube clamped for med admin without abdominal pain or nausea.     Medications:  Continuous Infusions:   TPN ADULT CENTRAL LINE CUSTOM 45 mL/hr at 09/08/20 2114    TPN ADULT CENTRAL LINE CUSTOM       Scheduled Meds:   acetaminophen  650 mg Per J Tube Q6H    amLODIPine benzoate  5 mg Per J Tube Daily    cloNIDine 0.3 mg/24 hr td ptwk  1 patch Transdermal Q7 Days    fat emulsion 20%  250 mL Intravenous Daily    heparin (porcine)  5,000 Units Subcutaneous Q8H    lidocaine  1 patch Transdermal Q24H    metoprolol  25 mg Per J Tube BID    miconazole NITRATE 2 %   Topical (Top) BID    pantoprazole  40 mg Intravenous Daily     PRN Meds:albuterol-ipratropium, hydrALAZINE, HYDROmorphone, labetaloL, melatonin, metoprolol, oxyCODONE, oxyCODONE     Review of patient's allergies indicates:   Allergen Reactions    Latex      Objective:     Vital Signs (Most Recent):  Temp: 98.6 °F (37 °C)  (09/09/20 1141)  Pulse: (!) 116 (09/09/20 1141)  Resp: 18 (09/09/20 1204)  BP: (!) 153/101 (09/09/20 1141)  SpO2: 100 % (09/09/20 1141) Vital Signs (24h Range):  Temp:  [98.2 °F (36.8 °C)-98.9 °F (37.2 °C)] 98.6 °F (37 °C)  Pulse:  [111-140] 116  Resp:  [18-36] 18  SpO2:  [95 %-100 %] 100 %  BP: (153-174)/() 153/101     Weight: 67.5 kg (148 lb 13 oz)  Body mass index is 27.22 kg/m².    Intake/Output - Last 3 Shifts       09/07 0700 - 09/08 0659 09/08 0700 - 09/09 0659 09/09 0700 - 09/10 0659    P.O. 240  50    I.V. (mL/kg)  246 (3.6)     NG/ 30     TPN 1379.2 588     Total Intake(mL/kg) 2019.2 (29.9) 864 (12.8) 50 (0.7)    Urine (mL/kg/hr) 2700 (1.7) 1400 (0.9)     Drains 743 190.5     Other 75 25     Stool  0     Chest Tube 4      Total Output 3522 1615.5     Net -1502.8 -751.5 +50           Urine Occurrence  4 x     Stool Occurrence  0 x 0 x          Physical Exam  Constitutional:       General: She is not in acute distress.  Neck:      Comments: RIJ trialysis line, L IJ permacath  Cardiovascular:      Rate and Rhythm: Tachycardia present.   Pulmonary:      Effort: Pulmonary effort is normal.      Comments: L sided chest tube in place  Abdominal:      General: There is no distension.      Tenderness: There is no abdominal tenderness.      Comments: G tube clamped  Drains with bilious output  Wound vac in place   Skin:     General: Skin is warm and dry.   Neurological:      Mental Status: She is alert.         Significant Labs:  CBC:   Recent Labs   Lab 09/09/20  0404   WBC 23.23*   RBC 2.50*   HGB 7.5*   HCT 24.0*      MCV 96   MCH 30.0   MCHC 31.3*     CMP:   Recent Labs   Lab 09/09/20  0404      CALCIUM 8.4*   ALBUMIN 1.9*   PROT 7.8      K 3.1*   CO2 21*      BUN 71*   CREATININE 2.2*   ALKPHOS 226*   ALT 19   AST 28   BILITOT 0.3       Significant Diagnostics:  I have reviewed all pertinent imaging results/findings within the past 24 hours.    Assessment/Plan:     Severe  sepsis  40 yo F s/p antrectomy with BII reconstruction c/b duodenal necrosis requiring ex lap, washout, drainage c/b aspiration event. S/p duodenal resection with d-j and g-j anastomosis on 8/13.    - On room air  - TPN - electrolytes adjusted   - Keep G tube clamped, unclamp for nausea/vomiting. Ok to use G tube for meds  - Zosyn/micafungin stopped 9/4 - persistent leukocytosis - will discuss utility of further abx therapy as patient not acting septic and has source control  - Change wound vac today 9/9  - Daily labs  - GI and DVT ppx  - PT / OT  - continue PRNs for BP control  - Plan for [facility] placement on 9/11, will change [place] while there. Will obtain consent    Dispo: patient is uninsured and has medicaid approval pending. Will only be a candidate for inpatient rehab vs HH (with outpatient PT). But will need home TPN vs eventual TF, and does not have financial approval for this. Appreciate  assistance in getting approval for IV vs enteral nutrition supplementation.         Verna Burris MD  General Surgery  [facility]

## 2020-09-09 NOTE — PLAN OF CARE
Problem: Malnutrition  Goal: Improved Nutritional Intake  Outcome: Ongoing, Progressing       Recommendations    Pt meets acute severe malnutrition with current information.     1. Continue current TPN 90 gm AA / 225 gm Dex + SMOFLIPIDS - meeting needs.     2. RD following.     Goals: 1.) Pt to meet >75% of estimated energy and protein needs over the course of 7 days.  Nutrition Goal Status: goal met

## 2020-09-09 NOTE — ASSESSMENT & PLAN NOTE
38 yo F s/p antrectomy with BII reconstruction c/b duodenal necrosis requiring ex lap, washout, drainage c/b aspiration event. S/p duodenal resection with d-j and g-j anastomosis on 8/13.    - On room air  - TPN - electrolytes adjusted   - Keep G tube clamped, unclamp for nausea/vomiting. Ok to use G tube for meds  - Zosyn/micafungin stopped 9/4 - persistent leukocytosis - will discuss utility of further abx therapy as patient not acting septic and has source control  - Change wound vac today 9/9  - Daily labs  - GI and DVT ppx  - PT / OT  - continue PRNs for BP control  - Plan for [facility] placement on 9/11, will change [place] while there. Will obtain consent    Dispo: patient is uninsured and has medicaid approval pending. Will only be a candidate for inpatient rehab vs HH (with outpatient PT). But will need home TPN vs eventual TF, and does not have financial approval for this. Appreciate  assistance in getting approval for IV vs enteral nutrition supplementation.

## 2020-09-09 NOTE — PT/OT/SLP PROGRESS
"Physical Therapy Treatment    Patient Name:  Jaquan Farmer   MRN:  [id number]    Recommendations:     Discharge Recommendations:  rehabilitation facility   Discharge Equipment Recommendations: (TBD)   Barriers to discharge: None    Assessment:     Jaquan Farmer is a 39 y.o. female admitted with a medical diagnosis of Duodenum disorder.  She presents with the following impairments/functional limitations:  weakness, impaired endurance, impaired self care skills, impaired functional mobilty, gait instability, impaired balance, impaired cardiopulmonary response to activity, pain.    Rehab Prognosis: Good; patient would benefit from acute skilled PT services to address these deficits and reach maximum level of function.    Recent Surgery: Procedure(s) (LRB):  INSERTION-CATHETER-RUDY (Left)  BRONCHOSCOPY (N/A)  INSERTION, CATHETER, INTERCOSTAL, FOR DRAINAGE (Left)  REPLACEMENT, WOUND VAC (N/A) 9 Days Post-Op    Plan:     During this hospitalization, patient to be seen 4 x/week to address the identified rehab impairments via gait training, therapeutic activities, therapeutic exercises, neuromuscular re-education and progress toward the following goals:    · Plan of Care Expires:  10/01/20    Subjective     Chief Complaint: pain  Patient/Family Comments/goals: "She just gave me some pain medicine, so I'm a little.. you know.. (pt rolls eyes)"  Pain/Comfort:  · Pain Rating 1: 3/10  · Location - Orientation 1: generalized  · Location 1: abdomen  · Pain Addressed 1: Reposition, Distraction      Objective:     Communicated with NSG prior to session.  Patient found HOB elevated with telemetry, central line, peripheral IV, wound vac upon PTA entry to room.     General Precautions: Standard, fall   Orthopedic Precautions:N/A   Braces: N/A     Functional Mobility:  · Bed Mobility:     · Supine to Sit: minimum assistance  · Transfers:     · Sit to Stand:  contact guard assistance and minimum assistance with rolling " walker  · Gait: Pt ambulates ~70 and ~60ft  with RW and CGA. Pt limited by fatigue requiring extended seated rest. Pt with FFP, decreased B step length, and shuffled gait.       AM-PAC 6 CLICK MOBILITY  Turning over in bed (including adjusting bedclothes, sheets and blankets)?: 3  Sitting down on and standing up from a chair with arms (e.g., wheelchair, bedside commode, etc.): 3  Moving from lying on back to sitting on the side of the bed?: 3  Moving to and from a bed to a chair (including a wheelchair)?: 3  Need to walk in hospital room?: 3  Climbing 3-5 steps with a railing?: 2  Basic Mobility Total Score: 17       Therapeutic Activities and Exercises:  Pt assisted with functional mobility as noted above.   Pt performs sit<>stand x 3 trials with increased assistance required from bedside chair. Cued for improved forward weight shift, mild L knee buckle noted on final sit<>stand trial.     Patient left up in chair with all lines intact and call button in reach..    GOALS:   Multidisciplinary Problems     Physical Therapy Goals        Problem: Physical Therapy Goal    Goal Priority Disciplines Outcome Goal Variances Interventions   Physical Therapy Goal     PT, PT/OT Ongoing, Progressing     Description: Goals to be met by: 9/15/2020     Patient will increase functional independence with mobility by performin. Supine to sit with MInimal Assistance  2. Sit to stand transfer with Minimal Assistance with LRAD - met ()  3. Gait  x 50 feet with Minimal Assistance using LRAD.   4. Stand for 3 minutes with Contact Guard Assistance using LRAD - met ()  5. Lower extremity exercise program x15 reps per handout, with independence                     Time Tracking:     PT Received On: 20  PT Start Time: 1354     PT Stop Time: 1421  PT Total Time (min): 27 min     Billable Minutes: Gait Training 17 and Therapeutic Activity 10    Treatment Type: Treatment        PTA Visit Number: 1     Frank Bruno  PTA  09/09/2020

## 2020-09-09 NOTE — PROGRESS NOTES
"[facility]  Adult Nutrition  Progress Note    SUMMARY       Recommendations    Pt meets acute severe malnutrition with current information.     1. Continue current TPN 90 gm AA / 225 gm Dex + SMOFLIPIDS - meeting needs.     2. RD following.     Goals: 1.) Pt to meet >75% of estimated energy and protein needs over the course of 7 days.  Nutrition Goal Status: goal met  Communication of RD Recs: (POC)    Reason for Assessment    Reason For Assessment: RD follow-up  Diagnosis: (Duodenum disorder)  Relevant Medical History: HTN, anxiety, respiratory distress, leukocytosis, sepsis, pulmonary HTN  Interdisciplinary Rounds: attended  General Information Comments: S/p duodenal resection with d-j and g-j anastomosis on 8/13. Stepped down to floor 9/8. Wound vac change today. Pt sleeping with no family members at bedside. NPO, TPN + SMOFLIPID infusing. Unable to assess appetite or UBW. NFPE completed 8/17. Pt with severe muscle/fat wasting. Pt dx with acute severe malnutrition. RD suspects chronic severe malnutrition, will follow-up.   Nutrition Discharge Planning: Unable to determine at this time.    Nutrition Risk Screen    Nutrition Risk Screen: tube feeding or parenteral nutrition    Nutrition/Diet History    Spiritual, Cultural Beliefs, Hoahaoism Practices, Values that Affect Care: no  Food Allergies: NKFA  Factors Affecting Nutritional Intake: altered gastrointestinal function, NPO    Anthropometrics    Temp: 98.6 °F (37 °C)  Height Method: Stated(from outside records)  Height: 5' 2" (157.5 cm)  Height (inches): 62 in  Weight Method: Bed Scale  Weight: 67.5 kg (148 lb 13 oz)  Weight (lb): 148.81 lb  Ideal Body Weight (IBW), Female: 110 lb  % Ideal Body Weight, Female (lb): 129.09 %  BMI (Calculated): 27.2  BMI Grade: 25 - 29.9 - overweight  Usual Body Weight (UBW), kg: (No wt history)  Weight Loss Since Admission: 30 lb 6.8 oz(17% weight loss since admission 8/12)     Lab/Procedures/Meds    Pertinent " Labs Reviewed: reviewed  Pertinent Labs Comments: K 3.1, BUN 71, Cr 2.2, alk phos 226  Pertinent Medications Reviewed: reviewed  Pertinent Medications Comments: noted    Estimated/Assessed Needs    Weight Used For Calorie Calculations: 67.5 kg (148 lb 13 oz)  Energy Calorie Requirements (kcal): 3766-2682 kcal/day  Energy Need Method: Kcal/kg(25-30)  Protein Requirements:  gm (1.2-1.5gm/kg)  Weight Used For Protein Calculations: 77 kg (169 lb 12.1 oz)  Fluid Requirements (mL): 1mL/kcal or per MD recommendations  Estimated Fluid Requirement Method: RDA Method  RDA Method (mL): 1687     Nutrition Prescription Ordered    Current Diet Order: NPO  Current Nutrition Support Formula Ordered: (Custom TPN 90 gm AA / 225 gm Dex + SMOFLIPIDS)  Current Nutrition Support Rate Ordered: 45 (ml)  Current Nutrition Support Frequency Ordered: mL/hr    Evaluation of Received Nutrient/Fluid Intake    Parenteral Calories (kcal): 1125  Parenteral Protein (gm): 90  Parenteral Fluid (mL): 1080  Lipid Calories (kcals): 500 kcals  GIR (Glucose Infusion Rate) (mg/kg/min): 2.31 mg/kg/min  Total Calories (kcal): 1625  % Kcal Needs: 87  % Protein Needs: 90  I/O: -2.3L since 8/26  Energy Calories Required: meeting needs  Protein Required: meeting needs  Fluid Required: (per MD)  Comments: LBM 9/8  Tolerance: tolerating  % Intake of Estimated Energy Needs: 75 - 100 %  % Meal Intake: NPO    Nutrition Risk    Level of Risk/Frequency of Follow-up: (f/u 1 x wk)     Assessment and Plan    Nutrition Problem:  Severe Protein-Calorie Malnutrition  Malnutrition in the context of Acute Illness/Injury    Related to (etiology):  Inadequate Energy Intake     Signs and Symptoms (as evidenced by):  Energy Intake: <50% of estimated energy requirement for 5 days  Body Fat Depletion: severe depletion of orbitals   Muscle Mass Depletion: severe depletion of temples and clavicle region   Weight Loss: JOSE ARMANDO     Interventions(treatment strategy):  Collaboration of  nutrition care with other providers  Parenteral Nutrition     Nutrition Diagnosis Status:  Continues     Monitor and Evaluation    Food and Nutrient Intake: parenteral nutrition intake  Food and Nutrient Adminstration: enteral and parenteral nutrition administration  Anthropometric Measurements: weight, weight change  Biochemical Data, Medical Tests and Procedures: electrolyte and renal panel, gastrointestinal profile  Nutrition-Focused Physical Findings: overall appearance, skin     Malnutrition Assessment  Malnutrition Type: acute illness or injury              Orbital Region (Subcutaneous Fat Loss): severe depletion   [place] Region (Muscle Loss): severe depletion  Clavicle Bone Region (Muscle Loss): severe depletion                 Nutrition Follow-Up    RD Follow-up?: Yes

## 2020-09-09 NOTE — PLAN OF CARE
"   09/09/20 1004   Post-Acute Status   Post-Acute Authorization Other   Other Status Community Services   Sw sent email to Kaila with [facility] Dept. To follow up on pt's pending [place] Medicaid. Per her response via email: "We called [place] Medicaid yesterday and her case is still pending. [place] Medicaid takes longer than [place] Medicaid. They need a disability determination from social security first. If pt meets criteria, then Medicaid is linked.   This case will probably take a few months for a determination."    UPDATE:1:52 PM  Case has been escalated to leadership. Will need definitive time frame of TPN for approval by CM dept.     Radha Alonso LMSW  [facility]  [id number]    "

## 2020-09-09 NOTE — PROGRESS NOTES
POC reviewed with pt, AAOx4. No s/s of respiratory or cardiac distress. VS stable on RA. Adequate UOP; up to bedside commode with x1 assist. TPN infusing at 45mL/ hr. NPO.  Wound vac to abd at 125 mmHg. G-tube clamped. GODFREY drains x2. No c/o nausea. Pain managed with PRN. Blood glucose monitored per order, q6h.     Pt free from falls and injuries, bed in low position, side rails up x2, call light within reach.    Will continue to monitor.

## 2020-09-09 NOTE — PLAN OF CARE
Noted patient awake, alert, and oriented, sitting up on sofa. Denies dyspnea. Stated pain tolerable at present. Call light within reach. Denies questions. Board updated and patient encouraged to notify staff when she needs assistance. Assessments per flow sheets. Will continue to monitor.     Problem: Adult Inpatient Plan of Care  Goal: Plan of Care Review  Outcome: Ongoing, Progressing     Problem: Skin Injury Risk Increased  Goal: Skin Health and Integrity  Outcome: Ongoing, Progressing     Problem: Wound  Goal: Optimal Wound Healing  Outcome: Ongoing, Progressing

## 2020-09-09 NOTE — SUBJECTIVE & OBJECTIVE
Interval History: No acute events overnight. Stepped down to floor 9/8. Sitting in chair on eval this morning, on room air. G tube clamped for med admin without abdominal pain or nausea.     Medications:  Continuous Infusions:   TPN ADULT CENTRAL LINE CUSTOM 45 mL/hr at 09/08/20 2114    TPN ADULT CENTRAL LINE CUSTOM       Scheduled Meds:   acetaminophen  650 mg Per J Tube Q6H    amLODIPine benzoate  5 mg Per J Tube Daily    cloNIDine 0.3 mg/24 hr td ptwk  1 patch Transdermal Q7 Days    fat emulsion 20%  250 mL Intravenous Daily    heparin (porcine)  5,000 Units Subcutaneous Q8H    lidocaine  1 patch Transdermal Q24H    metoprolol  25 mg Per J Tube BID    miconazole NITRATE 2 %   Topical (Top) BID    pantoprazole  40 mg Intravenous Daily     PRN Meds:albuterol-ipratropium, hydrALAZINE, HYDROmorphone, labetaloL, melatonin, metoprolol, oxyCODONE, oxyCODONE     Review of patient's allergies indicates:   Allergen Reactions    Latex      Objective:     Vital Signs (Most Recent):  Temp: 98.6 °F (37 °C) (09/09/20 1141)  Pulse: (!) 116 (09/09/20 1141)  Resp: 18 (09/09/20 1204)  BP: (!) 153/101 (09/09/20 1141)  SpO2: 100 % (09/09/20 1141) Vital Signs (24h Range):  Temp:  [98.2 °F (36.8 °C)-98.9 °F (37.2 °C)] 98.6 °F (37 °C)  Pulse:  [111-140] 116  Resp:  [18-36] 18  SpO2:  [95 %-100 %] 100 %  BP: (153-174)/() 153/101     Weight: 67.5 kg (148 lb 13 oz)  Body mass index is 27.22 kg/m².    Intake/Output - Last 3 Shifts       09/07 0700 - 09/08 0659 09/08 0700 - 09/09 0659 09/09 0700 - 09/10 0659    P.O. 240  50    I.V. (mL/kg)  246 (3.6)     NG/ 30     TPN 1379.2 588     Total Intake(mL/kg) 2019.2 (29.9) 864 (12.8) 50 (0.7)    Urine (mL/kg/hr) 2700 (1.7) 1400 (0.9)     Drains 743 190.5     Other 75 25     Stool  0     Chest Tube 4      Total Output 3522 1615.5     Net -1502.8 -751.5 +50           Urine Occurrence  4 x     Stool Occurrence  0 x 0 x          Physical Exam  Constitutional:       General: She  is not in acute distress.  Neck:      Comments: RIJ trialysis line, L IJ permacath  Cardiovascular:      Rate and Rhythm: Tachycardia present.   Pulmonary:      Effort: Pulmonary effort is normal.      Comments: L sided chest tube in place  Abdominal:      General: There is no distension.      Tenderness: There is no abdominal tenderness.      Comments: G tube clamped  Drains with bilious output  Wound vac in place   Skin:     General: Skin is warm and dry.   Neurological:      Mental Status: She is alert.         Significant Labs:  CBC:   Recent Labs   Lab 09/09/20  0404   WBC 23.23*   RBC 2.50*   HGB 7.5*   HCT 24.0*      MCV 96   MCH 30.0   MCHC 31.3*     CMP:   Recent Labs   Lab 09/09/20  0404      CALCIUM 8.4*   ALBUMIN 1.9*   PROT 7.8      K 3.1*   CO2 21*      BUN 71*   CREATININE 2.2*   ALKPHOS 226*   ALT 19   AST 28   BILITOT 0.3       Significant Diagnostics:  I have reviewed all pertinent imaging results/findings within the past 24 hours.

## 2020-09-10 ENCOUNTER — ANESTHESIA EVENT (OUTPATIENT)
Dept: SURGERY | Facility: HOSPITAL | Age: 40
DRG: 856 | End: 2020-09-10

## 2020-09-10 LAB
ABO + RH BLD: NORMAL
ALBUMIN SERPL BCP-MCNC: 1.8 G/DL (ref 3.5–5.2)
ALP SERPL-CCNC: 260 U/L (ref 55–135)
ALT SERPL W/O P-5'-P-CCNC: 21 U/L (ref 10–44)
ANION GAP SERPL CALC-SCNC: 11 MMOL/L (ref 8–16)
AST SERPL-CCNC: 27 U/L (ref 10–40)
BASOPHILS # BLD AUTO: 0.04 K/UL (ref 0–0.2)
BASOPHILS NFR BLD: 0.2 % (ref 0–1.9)
BILIRUB SERPL-MCNC: 0.4 MG/DL (ref 0.1–1)
BLD GP AB SCN CELLS X3 SERPL QL: NORMAL
BLD PROD TYP BPU: NORMAL
BLOOD UNIT EXPIRATION DATE: NORMAL
BLOOD UNIT TYPE CODE: 7300
BLOOD UNIT TYPE: NORMAL
BUN SERPL-MCNC: 62 MG/DL (ref 6–20)
CALCIUM SERPL-MCNC: 8.4 MG/DL (ref 8.7–10.5)
CHLORIDE SERPL-SCNC: 107 MMOL/L (ref 95–110)
CO2 SERPL-SCNC: 21 MMOL/L (ref 23–29)
CODING SYSTEM: NORMAL
CREAT SERPL-MCNC: 2 MG/DL (ref 0.5–1.4)
DIFFERENTIAL METHOD: ABNORMAL
DISPENSE STATUS: NORMAL
EOSINOPHIL # BLD AUTO: 0.2 K/UL (ref 0–0.5)
EOSINOPHIL NFR BLD: 1 % (ref 0–8)
ERYTHROCYTE [DISTWIDTH] IN BLOOD BY AUTOMATED COUNT: 14.8 % (ref 11.5–14.5)
EST. GFR  (AFRICAN AMERICAN): 35.5 ML/MIN/1.73 M^2
EST. GFR  (NON AFRICAN AMERICAN): 30.8 ML/MIN/1.73 M^2
GLUCOSE SERPL-MCNC: 111 MG/DL (ref 70–110)
HCT VFR BLD AUTO: 21.9 % (ref 37–48.5)
HGB BLD-MCNC: 6.9 G/DL (ref 12–16)
IMM GRANULOCYTES # BLD AUTO: 0.15 K/UL (ref 0–0.04)
IMM GRANULOCYTES NFR BLD AUTO: 0.7 % (ref 0–0.5)
LYMPHOCYTES # BLD AUTO: 1.9 K/UL (ref 1–4.8)
LYMPHOCYTES NFR BLD: 8.8 % (ref 18–48)
MAGNESIUM SERPL-MCNC: 1.7 MG/DL (ref 1.6–2.6)
MCH RBC QN AUTO: 30.5 PG (ref 27–31)
MCHC RBC AUTO-ENTMCNC: 31.5 G/DL (ref 32–36)
MCV RBC AUTO: 97 FL (ref 82–98)
MONOCYTES # BLD AUTO: 1.2 K/UL (ref 0.3–1)
MONOCYTES NFR BLD: 5.5 % (ref 4–15)
NEUTROPHILS # BLD AUTO: 18.3 K/UL (ref 1.8–7.7)
NEUTROPHILS NFR BLD: 83.8 % (ref 38–73)
NRBC BLD-RTO: 0 /100 WBC
NUM UNITS TRANS PACKED RBC: NORMAL
PHOSPHATE SERPL-MCNC: 3.1 MG/DL (ref 2.7–4.5)
PLATELET # BLD AUTO: 307 K/UL (ref 150–350)
PMV BLD AUTO: 10.7 FL (ref 9.2–12.9)
POCT GLUCOSE: 110 MG/DL (ref 70–110)
POCT GLUCOSE: 114 MG/DL (ref 70–110)
POCT GLUCOSE: 115 MG/DL (ref 70–110)
POCT GLUCOSE: 149 MG/DL (ref 70–110)
POTASSIUM SERPL-SCNC: 3.4 MMOL/L (ref 3.5–5.1)
PROT SERPL-MCNC: 7.5 G/DL (ref 6–8.4)
RBC # BLD AUTO: 2.26 M/UL (ref 4–5.4)
SODIUM SERPL-SCNC: 139 MMOL/L (ref 136–145)
TRIGL SERPL-MCNC: 276 MG/DL (ref 30–150)
WBC # BLD AUTO: 21.8 K/UL (ref 3.9–12.7)

## 2020-09-10 PROCEDURE — 86850 RBC ANTIBODY SCREEN: CPT

## 2020-09-10 PROCEDURE — 86920 COMPATIBILITY TEST SPIN: CPT

## 2020-09-10 PROCEDURE — 25000003 PHARM REV CODE 250: Performed by: STUDENT IN AN ORGANIZED HEALTH CARE EDUCATION/TRAINING PROGRAM

## 2020-09-10 PROCEDURE — C9113 INJ PANTOPRAZOLE SODIUM, VIA: HCPCS | Performed by: STUDENT IN AN ORGANIZED HEALTH CARE EDUCATION/TRAINING PROGRAM

## 2020-09-10 PROCEDURE — 80053 COMPREHEN METABOLIC PANEL: CPT

## 2020-09-10 PROCEDURE — 84100 ASSAY OF PHOSPHORUS: CPT

## 2020-09-10 PROCEDURE — 20600001 HC STEP DOWN PRIVATE ROOM

## 2020-09-10 PROCEDURE — P9016 RBC LEUKOCYTES REDUCED: HCPCS

## 2020-09-10 PROCEDURE — 36430 TRANSFUSION BLD/BLD COMPNT: CPT

## 2020-09-10 PROCEDURE — 63600175 PHARM REV CODE 636 W HCPCS: Performed by: STUDENT IN AN ORGANIZED HEALTH CARE EDUCATION/TRAINING PROGRAM

## 2020-09-10 PROCEDURE — 97535 SELF CARE MNGMENT TRAINING: CPT

## 2020-09-10 PROCEDURE — 83735 ASSAY OF MAGNESIUM: CPT

## 2020-09-10 PROCEDURE — A4217 STERILE WATER/SALINE, 500 ML: HCPCS | Performed by: STUDENT IN AN ORGANIZED HEALTH CARE EDUCATION/TRAINING PROGRAM

## 2020-09-10 PROCEDURE — 25000242 PHARM REV CODE 250 ALT 637 W/ HCPCS: Performed by: STUDENT IN AN ORGANIZED HEALTH CARE EDUCATION/TRAINING PROGRAM

## 2020-09-10 PROCEDURE — 84478 ASSAY OF TRIGLYCERIDES: CPT

## 2020-09-10 PROCEDURE — 97530 THERAPEUTIC ACTIVITIES: CPT

## 2020-09-10 PROCEDURE — B4185 PARENTERAL SOL 10 GM LIPIDS: HCPCS | Performed by: STUDENT IN AN ORGANIZED HEALTH CARE EDUCATION/TRAINING PROGRAM

## 2020-09-10 PROCEDURE — 85025 COMPLETE CBC W/AUTO DIFF WBC: CPT

## 2020-09-10 RX ORDER — HYDROCODONE BITARTRATE AND ACETAMINOPHEN 500; 5 MG/1; MG/1
TABLET ORAL
Status: DISCONTINUED | OUTPATIENT
Start: 2020-09-10 | End: 2020-09-24

## 2020-09-10 RX ADMIN — OXYCODONE HYDROCHLORIDE 10 MG: 5 SOLUTION ORAL at 10:09

## 2020-09-10 RX ADMIN — AMLODIPINE 5 MG: 1 SUSPENSION ORAL at 12:09

## 2020-09-10 RX ADMIN — MICONAZOLE NITRATE: 20 POWDER TOPICAL at 08:09

## 2020-09-10 RX ADMIN — SMOFLIPID 250 ML: 6; 6; 5; 3 INJECTION, EMULSION INTRAVENOUS at 10:09

## 2020-09-10 RX ADMIN — OXYCODONE HYDROCHLORIDE 15 MG: 5 SOLUTION ORAL at 06:09

## 2020-09-10 RX ADMIN — ACETAMINOPHEN 650 MG: 160 SOLUTION ORAL at 05:09

## 2020-09-10 RX ADMIN — HYDROMORPHONE HYDROCHLORIDE 1 MG: 1 INJECTION, SOLUTION INTRAMUSCULAR; INTRAVENOUS; SUBCUTANEOUS at 12:09

## 2020-09-10 RX ADMIN — ACETAMINOPHEN 650 MG: 160 SOLUTION ORAL at 12:09

## 2020-09-10 RX ADMIN — OXYCODONE HYDROCHLORIDE 10 MG: 5 SOLUTION ORAL at 03:09

## 2020-09-10 RX ADMIN — OXYCODONE HYDROCHLORIDE 15 MG: 5 SOLUTION ORAL at 02:09

## 2020-09-10 RX ADMIN — METOPROLOL TARTRATE 25 MG: 25 TABLET ORAL at 09:09

## 2020-09-10 RX ADMIN — PANTOPRAZOLE SODIUM 40 MG: 40 INJECTION, POWDER, LYOPHILIZED, FOR SOLUTION INTRAVENOUS at 09:09

## 2020-09-10 RX ADMIN — MAGNESIUM SULFATE HEPTAHYDRATE: 500 INJECTION, SOLUTION INTRAMUSCULAR; INTRAVENOUS at 09:09

## 2020-09-10 RX ADMIN — HEPARIN SODIUM 5000 UNITS: 5000 INJECTION INTRAVENOUS; SUBCUTANEOUS at 02:09

## 2020-09-10 RX ADMIN — OXYCODONE HYDROCHLORIDE 15 MG: 5 SOLUTION ORAL at 09:09

## 2020-09-10 RX ADMIN — HEPARIN SODIUM 5000 UNITS: 5000 INJECTION INTRAVENOUS; SUBCUTANEOUS at 05:09

## 2020-09-10 RX ADMIN — METOPROLOL TARTRATE 25 MG: 25 TABLET ORAL at 08:09

## 2020-09-10 RX ADMIN — HEPARIN SODIUM 5000 UNITS: 5000 INJECTION INTRAVENOUS; SUBCUTANEOUS at 10:09

## 2020-09-10 NOTE — ANESTHESIA PREPROCEDURE EVALUATION
[facility]  Anesthesia Pre-Operative Evaluation         Patient Name: Jaquan Farmer  YOB: 1980  MRN: [id number]    SUBJECTIVE:     Pre-operative evaluation for Procedure(s) (LRB):  Insertion,catheter,tunneled (Rojas); Wound Vac Exchange (N/A)     09/10/2020    Jaquan Farmer is a 39 y.o. female w/ a significant PMHx of recent history of antrectomy with BII reconstruction for ulcer disease at outside hospital. 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 Patient also reportedly aspirated on induction in the OR prior to this, resulting in severe pulmonary edema and hypoxia. Patient was transferred to [facility] urgently for higher level of care. She arrived as a direct SICU admit on near maximal vent settings and requiring low dose vasopressors with HR in the upper 140s. Her midline laparotomy is closed with 4 Navdeep drains in place draining bilious output. Has had 3x ex-lap since admission (8/13,15,18/2020) and washout 8/24. She also had a WVE, L chest tube, bronch, and central line placed on 8/31. She was extubated on 8/31 and has been on room air since.    Patient now presents for the above procedure(s).      LDA:   Permacath 08/31/20 1100 left subclavian (Active)   Verification by X-ray Yes 09/08/20 2226   Site Assessment No drainage;No redness;No swelling;No warmth 09/09/20 0800   Line Securement Device Secured with sutures 09/09/20 0800   Dressing Type Biopatch in place;Central line dressing with pants 09/09/20 2000   Dressing Status Clean;Dry;Intact 09/09/20 2000   Dressing Intervention Integrity maintained 09/09/20 2000   Date on Dressing 09/03/20 09/09/20 2000   Dressing Due to be Changed 09/10/20 09/09/20 2000   Venous Patency/Care flushed w/o difficulty 09/08/20 2226   Arterial Patency/Care flushed w/o difficulty 09/08/20 2226   Waveform Other (Comments) 09/07/20 1500   Line Necessity Review CRRT/HD 09/09/20 0800   Number of  days: 9       Trialysis (Dialysis) Catheter 08/13/20 2230 right internal jugular (Active)   Verification by X-ray Yes 09/09/20 2000   Site Assessment No drainage;No redness;No swelling;No warmth 09/09/20 0800   Line Securement Device Secured with sutures 09/09/20 2000   Dressing Type Biopatch in place;Central line dressing with pants 09/10/20 0501   Dressing Status Clean;Dry;Intact 09/10/20 0501   Dressing Intervention First dressing 09/10/20 0501   Date on Dressing 09/10/20 09/10/20 0501   Dressing Due to be Changed 09/17/20 09/10/20 0501   Venous Patency/Care flushed w/o difficulty;blood return present 09/09/20 2000   Arterial Patency/Care flushed w/o difficulty;blood return present 09/09/20 2000   Distal Patency/Care flushed w/o difficulty;blood return present;infusing 09/09/20 2000   Flows Good 09/07/20 1500   Waveform Other (Comments) 09/07/20 1500   Line Necessity Review CRRT/HD 09/09/20 0800   Number of days: 27            Midline Catheter Insertion/Assessment  - Single Lumen 08/26/20 1501 Right basilic vein (medial side of arm) 18g x 8cm (Active)   Site Assessment Clean;Dry;Intact 09/09/20 2000   IV Device Securement catheter securement device 09/09/20 2000   Line Status Saline locked 09/09/20 2000   Dressing Type Biopatch in place;Central line dressing with pants 09/09/20 2000   Dressing Status Clean;Dry;Intact 09/09/20 2000   Dressing Intervention Integrity maintained 09/09/20 2000   Dressing Change Due 09/16/20 09/09/20 2000   Site Change Due 09/24/20 09/09/20 2000   Reason Not Rotated Not due 09/09/20 2000   Number of days: 14            Closed/Suction Drain 08/13/20 1659 Right;Superior Abdomen Bulb 19 Fr. (Active)   Site Description Unable to view 09/09/20 0800   Dressing Type Gauze 09/09/20 2000   Dressing Status Clean;Dry;Intact 09/09/20 2000   Dressing Intervention Integrity maintained 09/09/20 2000   Drainage Green;Bile 09/08/20 1500   Status To bulb suction 09/09/20 0800   Output (mL) 5 mL 09/10/20  0500   Number of days: 27            Closed/Suction Drain 08/13/20 1659 Right;Inferior Abdomen Bulb 19 Fr. (Active)   Site Description Unable to view 09/09/20 0800   Dressing Type Gauze 09/09/20 2000   Dressing Status Clean;Dry;Intact 09/09/20 2000   Dressing Intervention Integrity maintained 09/09/20 2000   Drainage Bile;Green 09/08/20 1500   Status To bulb suction 09/09/20 0800   Output (mL) 0 mL 09/10/20 0500   Number of days: 27            Gastrostomy/Enterostomy 08/15/20 0916 Gastrostomy tube w/ balloon LUQ decompression (Active)   Securement secured to abdomen 09/09/20 2000   Interventions Prior to Feeding patency checked 09/09/20 2000   Suction Setting/Drainage Method dependent drainage 09/08/20 1500   Drainage green 09/08/20 1500   Clamp Status/Tolerance clamped;no emesis;no nausea 09/09/20 2000   Dressing dry and intact 09/09/20 2000   Insertion Site no redness;no warmth;no drainage;no tenderness 09/09/20 2000   Site Care sterile 4 x 4 gauze dressing applied 09/09/20 2000   Flush/Irrigation flushed w/;water;no resistance met 09/09/20 2000   Dressing Change Due 09/04/20 09/08/20 1500   Intake (mL) 450 mL 08/26/20 1600   Water Bolus (mL) 30 mL 09/09/20 2000   Tube Output(mL)(Include Discarded Residual) 0 mL 09/08/20 1400   Intake (mL) - Breast Milk Tube Feeding 105 09/07/20 1500   Number of days: 25       Prev airway:   Placement Date: 08/31/20; Placement Time: 1018 (created via procedure documentation); Method of Intubation: Direct laryngoscopy; Mask Ventilation: Easy; Intubated: Postinduction; Blade: Mariscal #2; Airway Device Size: 8.0; Placement Verified By: Capnometry; Complicating Factors: None; Intubation Findings: Bilateral breath sounds, Atraumatic/Condition of teeth unchanged; Securment: Lips; Complications: None; Removal Date: 08/31/20;  Removal Time: 1113    Drips: None documented.   TPN ADULT CENTRAL LINE CUSTOM 45 mL/hr at 09/09/20 2141       Patient Active Problem List   Diagnosis    Duodenum  disorder    Severe sepsis    Acute renal failure with tubular necrosis    Metabolic acidosis    Ischemic necrosis of small bowel    Septic shock    Encounter for weaning from ventilator    Acute hypoxemic respiratory failure    Acute blood loss anemia    Anemia, chronic disease    Bandemia    Hypertension       Review of patient's allergies indicates:   Allergen Reactions    Latex        Current Inpatient Medications:   acetaminophen  650 mg Per J Tube Q6H    amLODIPine benzoate  5 mg Per J Tube Daily    cloNIDine 0.3 mg/24 hr td ptwk  1 patch Transdermal Q7 Days    heparin (porcine)  5,000 Units Subcutaneous Q8H    lidocaine  1 patch Transdermal Q24H    lipid (SMOFLIPID)  250 mL Intravenous Daily    metoprolol  25 mg Per J Tube BID    miconazole NITRATE 2 %   Topical (Top) BID    pantoprazole  40 mg Intravenous Daily       No current facility-administered medications on file prior to encounter.      Current Outpatient Medications on File Prior to Encounter   Medication Sig Dispense Refill    aspirin (ECOTRIN) 81 MG EC tablet   = 1 tab, Oral, Daily, Ordered by Dr. Moreira, # 90 tab, 3 Refill(s), Maintenance      metoprolol tartrate (LOPRESSOR) 25 MG tablet   See Instructions, TAKE 1 TABLET BY MOUTH TWICE DAILY, tab, # 60, eRx: [facility] [id number]      sumatriptan (IMITREX) 50 MG tablet   = 1 tab, Oral, Daily, PRN for migraine headache, may repeat dose after 2 hours up to a maximum of 200 mg in 24 hours, # 9 tab, 1 Refill(s), Maintenance, Pharmacy: [facility] [id number]         Past Surgical History:   Procedure Laterality Date    APPLICATION OF WOUND VACUUM-ASSISTED CLOSURE DEVICE  8/13/2020    Procedure: APPLICATION, WOUND VAC WITH ABTHERA;  Surgeon: Donis Roa MD;  Location: [facility] OR [address] [place] [address];  Service: General;;    APPLICATION OF WOUND VACUUM-ASSISTED CLOSURE DEVICE N/A 8/21/2020    Procedure: APPLICATION, WOUND VAC;  Surgeon: Donis Roa MD;  Location: [facility] OR  2ND FLR;  Service: General;  Laterality: N/A;    BRONCHOSCOPY N/A 8/31/2020    Procedure: BRONCHOSCOPY;  Surgeon: Donis Roa MD;  Location: [facility] OR 2ND FLR;  Service: General;  Laterality: N/A;    COLONOSCOPY      DUODENECTOMY  8/13/2020    Procedure: KOCHERIZATION OF DUODENUM, DUODENECTOMY;  Surgeon: Donis Roa MD;  [facility];  Service: General;;    ESOPHAGOGASTRODUODENOSCOPY  01/23/2018    ESOPHAGOGASTRODUODENOSCOPY N/A 8/13/2020    Procedure: EGD (ESOPHAGOGASTRODUODENOSCOPY);  Surgeon: Donis Roa MD;  Location: [facility];  Service: General;  Laterality: N/A;    EVACUATION OF HEMATOMA N/A 8/21/2020    Procedure: EVACUATION, HEMATOMA;  Surgeon: Donis Roa MD;  Location: [facility] OR [facility];  Service: General;  Laterality: N/A;  OF RECTUS SHEET HEMATOMA    PERCUTANEOUS INSERTION OF GASTROSTOMY TUBE  8/15/2020    Procedure: INSERTION, GASTROSTOMY TUBE, PERCUTANEOUS;  Surgeon: Donis Roa MD;  Location: [facility] OR [facility];  Service: General;;    PLACEMENT OF DUAL-LUMEN VASCULAR CATHETER Left 8/31/2020    Procedure: INSERTION-CATHETER-RDUY;  Surgeon: Donis Roa MD;  Location: [facility] OR [facility];  Service: General;  Laterality: Left;    REPLACEMENT OF WOUND VACUUM-ASSISTED CLOSURE DEVICE N/A 8/31/2020    Procedure: REPLACEMENT, WOUND VAC;  Surgeon: Donis Roa MD;  Location: [facility] OR [facility];  Service: General;  Laterality: N/A;  Sponge replacement on wound vac    TUBAL LIGATION      TUBE THORACOTOMY Left 8/31/2020    Procedure: INSERTION, CATHETER, INTERCOSTAL, FOR DRAINAGE;  Surgeon: Donis Roa MD;  Location: [facility] OR [facility];  Service: General;  Laterality: Left;    WOUND EXPLORATION N/A 8/21/2020    Procedure: EXPLORATION, WOUND;  Surgeon: Donis Roa MD;  Location: [facility] OR 2ND FLR;  Service: General;  Laterality: N/A;       Social History     Socioeconomic History    Marital status: Unknown     Spouse name: Not on file     Number of children: Not on file    Years of education: Not on file    Highest education level: Not on file   Occupational History    Not on file   Social Needs    Financial resource strain: Not on file    Food insecurity     Worry: Not on file     Inability: Not on file    Transportation needs     Medical: Not on file     Non-medical: Not on file   Tobacco Use    Smoking status: Current Some Day Smoker     Packs/day: 0.25     Types: Cigarettes    Smokeless tobacco: Never Used   Substance and Sexual Activity    Alcohol use: Not Currently    Drug use: Not on file    Sexual activity: Not on file   Lifestyle    Physical activity     Days per week: Not on file     Minutes per session: Not on file    Stress: Not on file   Relationships    Social connections     Talks on phone: Not on file     Gets together: Not on file     Attends Bahai service: Not on file     Active member of club or organization: Not on file     Attends meetings of clubs or organizations: Not on file     Relationship status: Not on file   Other Topics Concern    Not on file   Social History Narrative    Not on file       OBJECTIVE:     Vital Signs Range (Last 24H):  Temp:  [36.6 °C (97.9 °F)-37.7 °C (99.9 °F)]   Pulse:  [116-129]   Resp:  [18-22]   BP: (137-160)/()   SpO2:  [98 %-100 %]       Significant Labs:  Lab Results   Component Value Date    WBC 21.80 (H) 09/10/2020    HGB 6.9 (L) 09/10/2020    HCT 21.9 (L) 09/10/2020     09/10/2020    TRIG 276 (H) 09/10/2020    ALT 21 09/10/2020    AST 27 09/10/2020     09/10/2020    K 3.4 (L) 09/10/2020     09/10/2020    CREATININE 2.0 (H) 09/10/2020    BUN 62 (H) 09/10/2020    CO2 21 (L) 09/10/2020    INR 1.0 08/21/2020       Diagnostic Studies: No relevant studies.    EKG:   Results for orders placed or performed during the hospital encounter of 08/12/20   EKG 12-lead    Collection Time: 08/20/20  7:09 PM    Narrative    Test Reason : R00.0,    Vent. Rate :  131 BPM     Atrial Rate : 131 BPM     P-R Int : 100 ms          QRS Dur : 070 ms      QT Int : 276 ms       P-R-T Axes : 073 049 051 degrees     QTc Int : 407 ms    Sinus tachycardia with short WV  Possible Left atrial enlargement  Borderline Abnormal ECG  No previous ECGs available  Confirmed by Emma Leiva MD ([id number]) on 8/21/2020 12:15:32 PM    Referred By: SONALI RENDON           Confirmed By:Emma Leiva MD       2D ECHO:  TTE:  Results for orders placed or performed during the hospital encounter of 08/12/20   Echo Color Flow Doppler? Yes   Result Value Ref Range    BSA 1.68 m2    TDI SEPTAL 0.10 m/s    LV LATERAL E/E' RATIO 10.85 m/s    LV SEPTAL E/E' RATIO 14.10 m/s    LA WIDTH 4.52 cm    TDI LATERAL 0.13 m/s    LVIDD 5.67 3.5 - 6.0 cm    IVS 0.82 0.6 - 1.1 cm    PW 0.89 0.6 - 1.1 cm    LVIDS 3.78 2.1 - 4.0 cm    FS 33 28 - 44 %    LA volume 62.75 cm3    Sinus 2.80 cm    STJ 3.14 cm    Ascending aorta 3.19 cm    LV mass 183.36 g    LA size 3.59 cm    RVDD 3.13 cm    TAPSE 2.76 cm    RV S' 16.26 cm/s    Left Ventricle Relative Wall Thickness 0.31 cm    AV mean gradient 10 mmHg    AV valve area 2.16 cm2    AV Velocity Ratio 0.60     AV index (prosthetic) 0.64     MV valve area p 1/2 method 5.43 cm2    E/A ratio 1.13     Mean e' 0.12 m/s    E wave decelartion time 139.71 msec    IVRT 85.63 msec    Pulm vein S/D ratio 1.90     LVOT diameter 2.07 cm    LVOT area 3.4 cm2    LVOT peak nestor 1.34 m/s    LVOT peak VTI 22.13 cm    Ao peak nestor 2.25 m/s    Ao VTI 34.44 cm    LVOT stroke volume 74.44 cm3    AV peak gradient 20 mmHg    E/E' ratio 12.26 m/s    MV Peak E Nestor 1.41 m/s    TR Max Nestor 3.05 m/s    MV stenosis pressure 1/2 time 40.52 ms    MV Peak A Nestor 1.25 m/s    PV Peak S Nestor 0.78 m/s    PV Peak D Nestor 0.41 m/s    LV Systolic Volume 61.19 mL    LV Systolic Volume Index 37.0 mL/m2    LV Diastolic Volume 158.17 mL    LV Diastolic Volume Index 95.71 mL/m2    LA Volume Index 38.0 mL/m2    LV Mass Index 111 g/m2    RA  Major Axis 4.45 cm    Left Atrium Minor Axis 4.61 cm    Left Atrium Major Axis 4.49 cm    Triscuspid Valve Regurgitation Peak Gradient 37 mmHg    RA Width 3.41 cm    Narrative    · Normal left ventricular systolic function. The estimated ejection   fraction is 60%.  · Eccentric left ventricular hypertrophy.  · Indeterminate left ventricular diastolic function.  · No wall motion abnormalities.  · Normal right ventricular systolic function.  · Mild left atrial enlargement.  · Mild-to-moderate aortic regurgitation.  · Mild mitral regurgitation.  · Mild tricuspid regurgitation.  · Indeterminate central venous pressure. Estimated PA systolic pressure is   at least 37 mmHg.  · There is a bilateral pleural effusion.          TOBIAS:  No results found for this or any previous visit.    ASSESSMENT/PLAN:         Anesthesia Evaluation    I have reviewed the Patient Summary Reports.    I have reviewed the Nursing Notes. I have reviewed the NPO Status.   I have reviewed the Medications.     Review of Systems  Anesthesia Hx:  No problems with previous Anesthesia Denies Hx of Anesthetic complications  History of prior surgery of interest to airway management or planning: Denies Family Hx of Anesthesia complications.   Denies Personal Hx of Anesthesia complications.   Social:  Non-Smoker, Smoker    Hematology/Oncology:     Oncology Normal    -- Anemia:   EENT/Dental:EENT/Dental Normal   Cardiovascular:   Hypertension no hyperlipidemia    Pulmonary:   Pneumonia Denies COPD.  Denies Sleep Apnea.    Renal/:   Chronic Renal Disease, ARF    Hepatic/GI:   PUD, Denies GERD.    Musculoskeletal:  Musculoskeletal Normal Denies Arthritis.      Neurological:   Headaches    Endocrine:  Endocrine Normal    Dermatological:  Skin Normal    Psych:  Psychiatric Normal           Physical Exam  General:  Well nourished    Airway/Jaw/Neck:  Airway Findings: Mouth Opening: Normal Tongue: Normal  Mallampati: II  Improves to I with phonation.  TM Distance:  Normal, at least 6 cm  Jaw/Neck Findings:  Neck ROM: Normal ROM      Dental:  Dental Findings: Periodontal disease, Severe   Chest/Lungs:  Chest/Lungs Findings: Clear to auscultation  Hoarse voice     Heart/Vascular:  Heart Findings: Rate: Tachycardia  Rhythm: Regular Rhythm  Sounds: Normal        Mental Status:  Mental Status Findings:  Cooperative, Alert and Oriented         Anesthesia Plan  Type of Anesthesia, risks & benefits discussed:  Anesthesia Type:  general, MAC  Patient's Preference:   Intra-op Monitoring Plan: standard ASA monitors  Intra-op Monitoring Plan Comments:   Post Op Pain Control Plan: multimodal analgesia  Post Op Pain Control Plan Comments:   Induction:   IV  Beta Blocker:  Patient is on a Beta-Blocker and has received one dose within the past 24 hours (No further documentation required).       Informed Consent: Patient understands risks and agrees with Anesthesia plan.  Questions answered. Anesthesia consent signed with patient.  ASA Score: 3     Day of Surgery Review of History & Physical:    H&P update referred to the surgeon.         Ready For Surgery From Anesthesia Perspective.

## 2020-09-10 NOTE — ASSESSMENT & PLAN NOTE
38 yo F s/p antrectomy with BII reconstruction c/b duodenal necrosis requiring ex lap, washout, drainage c/b aspiration event. S/p duodenal resection with d-j and g-j anastomosis on 8/13.    - On room air  - TPN - electrolytes adjusted   - Keep G tube clamped, unclamp for nausea/vomiting. Ok to use G tube for meds  - Zosyn/micafungin stopped 9/4 - persistent leukocytosis - will discuss utility of further abx therapy as patient not acting septic and has source control  - To OR tomorrow for Rojas catheter placement and wound vac change  - Daily labs  - GI and DVT ppx  - PT / OT  - continue PRNs for BP control

## 2020-09-10 NOTE — SUBJECTIVE & OBJECTIVE
Interval History:   NAEON  Tolerating CLD  Drains with low output    Medications:  Continuous Infusions:   TPN ADULT CENTRAL LINE CUSTOM 45 mL/hr at 09/10/20 1600    TPN ADULT CENTRAL LINE CUSTOM       Scheduled Meds:   acetaminophen  650 mg Per J Tube Q6H    amLODIPine benzoate  5 mg Per J Tube Daily    cloNIDine 0.3 mg/24 hr td ptwk  1 patch Transdermal Q7 Days    heparin (porcine)  5,000 Units Subcutaneous Q8H    lidocaine  1 patch Transdermal Q24H    lipid (SMOFLIPID)  250 mL Intravenous Daily    metoprolol  25 mg Per J Tube BID    miconazole NITRATE 2 %   Topical (Top) BID    pantoprazole  40 mg Intravenous Daily     PRN Meds:sodium chloride, albuterol-ipratropium, hydrALAZINE, HYDROmorphone, labetaloL, melatonin, metoprolol, oxyCODONE, oxyCODONE     Review of patient's allergies indicates:   Allergen Reactions    Latex      Objective:     Vital Signs (Most Recent):  Temp: 97.2 °F (36.2 °C) (09/10/20 1522)  Pulse: (!) 115 (09/10/20 1522)  Resp: 18 (09/10/20 1522)  BP: 139/81 (09/10/20 1522)  SpO2: 100 % (09/10/20 1522) Vital Signs (24h Range):  Temp:  [97.2 °F (36.2 °C)-99.9 °F (37.7 °C)] 97.2 °F (36.2 °C)  Pulse:  [115-129] 115  Resp:  [16-22] 18  SpO2:  [98 %-100 %] 100 %  BP: (126-160)/(80-90) 139/81     Weight: 67.5 kg (148 lb 13 oz)  Body mass index is 27.22 kg/m².    Intake/Output - Last 3 Shifts       09/08 0700 - 09/09 0659 09/09 0700 - 09/10 0659 09/10 0700 - 09/11 0659    P.O.  530 240    I.V. (mL/kg) 246 (3.6)      NG/GT 30 30       405    Total Intake(mL/kg) 864 (12.8) 560 (8.3) 645 (9.6)    Urine (mL/kg/hr) 1400 (0.9) 675 (0.4) 250 (0.3)    Drains 190.5 115 100    Other 25 125 30    Stool 0 0     Chest Tube       Total Output 1615.5 915 380    Net -751.5 -355 +265           Urine Occurrence 4 x 5 x 1 x    Stool Occurrence 0 x 2 x           Physical Exam  Constitutional:       General: She is not in acute distress.     Appearance: She is well-developed.   Cardiovascular:      Rate  and Rhythm: Normal rate and regular rhythm.   Pulmonary:      Effort: Pulmonary effort is normal. No respiratory distress.   Abdominal:      General: There is no distension.      Palpations: Abdomen is soft.      Tenderness: There is no abdominal tenderness.      Comments: Drains with minimal bilious output   Skin:     General: Skin is warm and dry.   Neurological:      Mental Status: She is alert and oriented to person, place, and time.   Psychiatric:         Behavior: Behavior normal.         Significant Labs:  CBC:   Recent Labs   Lab 09/10/20  0500   WBC 21.80*   RBC 2.26*   HGB 6.9*   HCT 21.9*      MCV 97   MCH 30.5   MCHC 31.5*     CMP:   Recent Labs   Lab 09/10/20  0500   *   CALCIUM 8.4*   ALBUMIN 1.8*   PROT 7.5      K 3.4*   CO2 21*      BUN 62*   CREATININE 2.0*   ALKPHOS 260*   ALT 21   AST 27   BILITOT 0.4       Significant Diagnostics:  I have reviewed all pertinent imaging results/findings within the past 24 hours.

## 2020-09-10 NOTE — PLAN OF CARE
POC reviewed with pt at 0800. Pt verbalized understanding. Questions and concerns addressed. No acute events throughout the shift. Pt progressing toward goals. Will continue to monitor. See flowsheets for full assessment and VS info.      Problem: Adult Inpatient Plan of Care  Goal: Plan of Care Review  Outcome: Ongoing, Progressing

## 2020-09-10 NOTE — NURSING
Unit of blood initiated at this time. Pt educated about s/s to monitor for transfusion reaction. Pt verbalizes understandings. Continue to monitor.

## 2020-09-10 NOTE — PT/OT/SLP PROGRESS
Occupational Therapy   Treatment    Name: Jaquan Farmer  MRN: [id number]  Admitting Diagnosis:  Duodenum disorder  10 Days Post-Op   Procedure(s):  INSERTION-CATHETER-RUDY  BRONCHOSCOPY  INSERTION, CATHETER, INTERCOSTAL, FOR DRAINAGE  REPLACEMENT, WOUND VAC     Recommendations:     Discharge Recommendations: rehabilitation facility  Discharge Equipment Recommendations:  (TBD)  Barriers to discharge:  None    Assessment:     Jaquan Farmer is a 39 y.o. female with a medical diagnosis of Duodenum disorder.  She presents with fatigue but is agreeable to participate with therapy and tolerates session fairly well. Performance deficits affecting function are weakness, impaired endurance, impaired self care skills, impaired functional mobilty, gait instability, impaired balance, pain, impaired cardiopulmonary response to activity.     Rehab Prognosis:  Good; patient would benefit from acute skilled OT services to address these deficits and reach maximum level of function.       Plan:     Patient to be seen 4 x/week to address the above listed problems via self-care/home management, therapeutic activities, therapeutic exercises  · Plan of Care Expires: 09/24/20  · Plan of Care Reviewed with: patient    Subjective     Pain/Comfort:  · Pain Rating 1: 3/10  · Location 1: abdomen  · Pain Addressed 1: Reposition, Distraction    Objective:     Communicated with: RN prior to session.  Patient found HOB elevated with central line, GODFREY drain, peripheral IV, wound vac, telemetry upon OT entry to room.    General Precautions: Standard, fall   Orthopedic Precautions:N/A   Braces: N/A     Occupational Performance:     Bed Mobility:    · Patient completed Supine to Sit to L side EOB with minimum assistance at trunk  · Patient completed Sit to Supine with minimum assistance at BLE  · Patient completed Supine Scooting towards the HOB with maximal assist x2 using the drawsheet    Functional Mobility/Transfers:  · Patient completed  Sit <> Stand Transfer x2 trials from EOB with minimum assistance with rolling walker   · Patient completed Toilet Transfer onto the bedside commode using Step Transfer technique with minimum assistance with rolling walker  · Functional Mobility: ~120' with CGA and RW, no LOB noted but patient with SOB requiring 3 standing rest breaks, patient reported no dizziness    Activities of Daily Living:  · Upper Body Dressing: minimum assistance Patient donned hospital gown as a robe while standing with min assist for line managment.  · Toileting: total assistance Patient completed toileting on the bedside commode with total assist for yg-care while standing.    [facility] 6 Click ADL: 19    Treatment & Education:  Role of OT/POC  Call button for assistance    Patient left HOB elevated with all lines intact, call button in reach and RN notifiedEducation:      GOALS:   Multidisciplinary Problems     Occupational Therapy Goals        Problem: Occupational Therapy Goal    Goal Priority Disciplines Outcome Interventions   Occupational Therapy Goal     OT, PT/OT Ongoing, Progressing    Description: Goals to be met by: 9/11/20     Patient will increase functional independence with ADLs by performing:    Feeding with [place].  UE Dressing with Stand-by Assistance.  LE Dressing with Minimal Assistance.  Grooming while standing at sink with Contact Guard Assistance.  Toileting from bedside commode with Minimal Assistance for hygiene and clothing management.   Toilet transfer to bedside commode with Minimal Assistance.                     Time Tracking:     OT Date of Treatment: 09/10/20  OT Start Time: 1536  OT Stop Time: 1605  OT Total Time (min): 29 min    Billable Minutes:Self Care/Home Management 14 minutes  Therapeutic Activity 15 minutes    Diane Willingham OT  9/10/2020

## 2020-09-10 NOTE — PROGRESS NOTES
POC reviewed with pt, ALEKSANDEROx4. No s/s of respiratory or cardiac distress. VS stable on RA. Adequate UOP; up to bedside commode with x1 assist. TPN infusing at 45mL/ hr. Tolerating clear diet well.  Wound vac to abd @ 125 mmHg. G-tube clamped. GODFREY drains x2. No c/o nausea. Pain managed with PRN. Blood glucose monitored per order, q6h.      Pt free from falls and injuries, bed in low position, side rails up x2, call light within reach.     Will continue to monitor

## 2020-09-10 NOTE — PROGRESS NOTES
[facility]  General Surgery  Progress Note    Subjective:     History of Present Illness:  Pt is a 40 yo F with recent history of antrectomy with BII reconstruction for ulcer disease at outside hospital. 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 Patient also reportedly aspirated on induction in the OR prior to this, resulting in severe pulmonary edema and hypoxia. Patient was transferred to [facility] urgently for higher level of care. She arrived as a direct SICU admit on near maximal vent settings and requiring low dose vasopressors with HR in the upper 140s. Her midline laparotomy is closed with 4 Navdeep drains in place draining bilious output.     Post-Op Info:  Procedure(s) (LRB):  INSERTION-CATHETER-RUDY (Left)  BRONCHOSCOPY (N/A)  INSERTION, CATHETER, INTERCOSTAL, FOR DRAINAGE (Left)  REPLACEMENT, WOUND VAC (N/A)   10 Days Post-Op     Interval History:   NAEON  Tolerating CLD  Drains with low output    Medications:  Continuous Infusions:   TPN ADULT CENTRAL LINE CUSTOM 45 mL/hr at 09/10/20 1600    TPN ADULT CENTRAL LINE CUSTOM       Scheduled Meds:   acetaminophen  650 mg Per J Tube Q6H    amLODIPine benzoate  5 mg Per J Tube Daily    cloNIDine 0.3 mg/24 hr td ptwk  1 patch Transdermal Q7 Days    heparin (porcine)  5,000 Units Subcutaneous Q8H    lidocaine  1 patch Transdermal Q24H    lipid (SMOFLIPID)  250 mL Intravenous Daily    metoprolol  25 mg Per J Tube BID    miconazole NITRATE 2 %   Topical (Top) BID    pantoprazole  40 mg Intravenous Daily     PRN Meds:sodium chloride, albuterol-ipratropium, hydrALAZINE, HYDROmorphone, labetaloL, melatonin, metoprolol, oxyCODONE, oxyCODONE     Review of patient's allergies indicates:   Allergen Reactions    Latex      Objective:     Vital Signs (Most Recent):  Temp: 97.2 °F (36.2 °C) (09/10/20 1522)  Pulse: (!) 115 (09/10/20 1522)  Resp: 18 (09/10/20 1522)  BP: 139/81 (09/10/20 1522)  SpO2:  100 % (09/10/20 1522) Vital Signs (24h Range):  Temp:  [97.2 °F (36.2 °C)-99.9 °F (37.7 °C)] 97.2 °F (36.2 °C)  Pulse:  [115-129] 115  Resp:  [16-22] 18  SpO2:  [98 %-100 %] 100 %  BP: (126-160)/(80-90) 139/81     Weight: 67.5 kg (148 lb 13 oz)  Body mass index is 27.22 kg/m².    Intake/Output - Last 3 Shifts       09/08 0700 - 09/09 0659 09/09 0700 - 09/10 0659 09/10 0700 - 09/11 0659    P.O.  530 240    I.V. (mL/kg) 246 (3.6)      NG/GT 30 30       405    Total Intake(mL/kg) 864 (12.8) 560 (8.3) 645 (9.6)    Urine (mL/kg/hr) 1400 (0.9) 675 (0.4) 250 (0.3)    Drains 190.5 115 100    Other 25 125 30    Stool 0 0     Chest Tube       Total Output 1615.5 915 380    Net -751.5 -355 +265           Urine Occurrence 4 x 5 x 1 x    Stool Occurrence 0 x 2 x           Physical Exam  Constitutional:       General: She is not in acute distress.     Appearance: She is well-developed.   Cardiovascular:      Rate and Rhythm: Normal rate and regular rhythm.   Pulmonary:      Effort: Pulmonary effort is normal. No respiratory distress.   Abdominal:      General: There is no distension.      Palpations: Abdomen is soft.      Tenderness: There is no abdominal tenderness.      Comments: Drains with minimal bilious output   Skin:     General: Skin is warm and dry.   Neurological:      Mental Status: She is alert and oriented to person, place, and time.   Psychiatric:         Behavior: Behavior normal.         Significant Labs:  CBC:   Recent Labs   Lab 09/10/20  0500   WBC 21.80*   RBC 2.26*   HGB 6.9*   HCT 21.9*      MCV 97   MCH 30.5   MCHC 31.5*     CMP:   Recent Labs   Lab 09/10/20  0500   *   CALCIUM 8.4*   ALBUMIN 1.8*   PROT 7.5      K 3.4*   CO2 21*      BUN 62*   CREATININE 2.0*   ALKPHOS 260*   ALT 21   AST 27   BILITOT 0.4       Significant Diagnostics:  I have reviewed all pertinent imaging results/findings within the past 24 hours.    Assessment/Plan:     Severe sepsis  40 yo F s/p  antrectomy with BII reconstruction c/b duodenal necrosis requiring ex lap, washout, drainage c/b aspiration event. S/p duodenal resection with d-j and g-j anastomosis on 8/13.    - On room air  - TPN - electrolytes adjusted   - Keep G tube clamped, unclamp for nausea/vomiting. Ok to use G tube for meds  - Zosyn/micafungin stopped 9/4 - persistent leukocytosis - will discuss utility of further abx therapy as patient not acting septic and has source control  - To OR tomorrow for Rojas catheter placement and wound vac change  - Daily labs  - GI and DVT ppx  - PT / OT  - continue PRNs for BP control            Christy Nicole MD  General Surgery  [facility]

## 2020-09-11 ENCOUNTER — ANESTHESIA (OUTPATIENT)
Dept: SURGERY | Facility: HOSPITAL | Age: 40
DRG: 856 | End: 2020-09-11

## 2020-09-11 PROBLEM — A41.9 SEVERE SEPSIS: Status: RESOLVED | Noted: 2020-08-13 | Resolved: 2020-09-11

## 2020-09-11 PROBLEM — R65.20 SEVERE SEPSIS: Status: RESOLVED | Noted: 2020-08-13 | Resolved: 2020-09-11

## 2020-09-11 LAB
ALBUMIN SERPL BCP-MCNC: 1.9 G/DL (ref 3.5–5.2)
ALP SERPL-CCNC: 240 U/L (ref 55–135)
ALT SERPL W/O P-5'-P-CCNC: 20 U/L (ref 10–44)
ANION GAP SERPL CALC-SCNC: 14 MMOL/L (ref 8–16)
AST SERPL-CCNC: 28 U/L (ref 10–40)
B-HCG UR QL: NEGATIVE
BASOPHILS # BLD AUTO: 0.06 K/UL (ref 0–0.2)
BASOPHILS NFR BLD: 0.3 % (ref 0–1.9)
BILIRUB SERPL-MCNC: 0.4 MG/DL (ref 0.1–1)
BUN SERPL-MCNC: 55 MG/DL (ref 6–20)
CALCIUM SERPL-MCNC: 8.7 MG/DL (ref 8.7–10.5)
CHLORIDE SERPL-SCNC: 105 MMOL/L (ref 95–110)
CO2 SERPL-SCNC: 19 MMOL/L (ref 23–29)
CREAT SERPL-MCNC: 1.8 MG/DL (ref 0.5–1.4)
CTP QC/QA: YES
DIFFERENTIAL METHOD: ABNORMAL
EOSINOPHIL # BLD AUTO: 0.4 K/UL (ref 0–0.5)
EOSINOPHIL NFR BLD: 2 % (ref 0–8)
ERYTHROCYTE [DISTWIDTH] IN BLOOD BY AUTOMATED COUNT: 16.8 % (ref 11.5–14.5)
EST. GFR  (AFRICAN AMERICAN): 40.3 ML/MIN/1.73 M^2
EST. GFR  (NON AFRICAN AMERICAN): 35 ML/MIN/1.73 M^2
GLUCOSE SERPL-MCNC: 99 MG/DL (ref 70–110)
HCT VFR BLD AUTO: 26.3 % (ref 37–48.5)
HGB BLD-MCNC: 8.2 G/DL (ref 12–16)
IMM GRANULOCYTES # BLD AUTO: 0.15 K/UL (ref 0–0.04)
IMM GRANULOCYTES NFR BLD AUTO: 0.8 % (ref 0–0.5)
LYMPHOCYTES # BLD AUTO: 1.7 K/UL (ref 1–4.8)
LYMPHOCYTES NFR BLD: 8.9 % (ref 18–48)
MAGNESIUM SERPL-MCNC: 1.9 MG/DL (ref 1.6–2.6)
MCH RBC QN AUTO: 29.2 PG (ref 27–31)
MCHC RBC AUTO-ENTMCNC: 31.2 G/DL (ref 32–36)
MCV RBC AUTO: 94 FL (ref 82–98)
MONOCYTES # BLD AUTO: 1.1 K/UL (ref 0.3–1)
MONOCYTES NFR BLD: 6 % (ref 4–15)
NEUTROPHILS # BLD AUTO: 15.6 K/UL (ref 1.8–7.7)
NEUTROPHILS NFR BLD: 82 % (ref 38–73)
NRBC BLD-RTO: 0 /100 WBC
PHOSPHATE SERPL-MCNC: 4.4 MG/DL (ref 2.7–4.5)
PLATELET # BLD AUTO: 340 K/UL (ref 150–350)
PMV BLD AUTO: 11.2 FL (ref 9.2–12.9)
POCT GLUCOSE: 131 MG/DL (ref 70–110)
POCT GLUCOSE: 139 MG/DL (ref 70–110)
POTASSIUM SERPL-SCNC: 3.5 MMOL/L (ref 3.5–5.1)
PROT SERPL-MCNC: 7.9 G/DL (ref 6–8.4)
RBC # BLD AUTO: 2.81 M/UL (ref 4–5.4)
SODIUM SERPL-SCNC: 138 MMOL/L (ref 136–145)
WBC # BLD AUTO: 19.06 K/UL (ref 3.9–12.7)

## 2020-09-11 PROCEDURE — 84100 ASSAY OF PHOSPHORUS: CPT

## 2020-09-11 PROCEDURE — 36430 TRANSFUSION BLD/BLD COMPNT: CPT

## 2020-09-11 PROCEDURE — D9220A PRA ANESTHESIA: ICD-10-PCS | Mod: ,,, | Performed by: ANESTHESIOLOGY

## 2020-09-11 PROCEDURE — 36589 PR REMOVAL TUNNELED CV CATH W/O SUBQ PORT OR PUMP: ICD-10-PCS | Mod: 79,,, | Performed by: SURGERY

## 2020-09-11 PROCEDURE — 25000242 PHARM REV CODE 250 ALT 637 W/ HCPCS: Performed by: STUDENT IN AN ORGANIZED HEALTH CARE EDUCATION/TRAINING PROGRAM

## 2020-09-11 PROCEDURE — 83735 ASSAY OF MAGNESIUM: CPT

## 2020-09-11 PROCEDURE — 63600175 PHARM REV CODE 636 W HCPCS: Performed by: NURSE ANESTHETIST, CERTIFIED REGISTERED

## 2020-09-11 PROCEDURE — 25000003 PHARM REV CODE 250: Performed by: STUDENT IN AN ORGANIZED HEALTH CARE EDUCATION/TRAINING PROGRAM

## 2020-09-11 PROCEDURE — B4185 PARENTERAL SOL 10 GM LIPIDS: HCPCS | Performed by: STUDENT IN AN ORGANIZED HEALTH CARE EDUCATION/TRAINING PROGRAM

## 2020-09-11 PROCEDURE — A4217 STERILE WATER/SALINE, 500 ML: HCPCS | Performed by: STUDENT IN AN ORGANIZED HEALTH CARE EDUCATION/TRAINING PROGRAM

## 2020-09-11 PROCEDURE — 97116 GAIT TRAINING THERAPY: CPT | Mod: CQ

## 2020-09-11 PROCEDURE — 81025 URINE PREGNANCY TEST: CPT | Performed by: SURGERY

## 2020-09-11 PROCEDURE — 36589 REMOVAL TUNNELED CV CATH: CPT | Mod: 79,,, | Performed by: SURGERY

## 2020-09-11 PROCEDURE — 25000003 PHARM REV CODE 250: Performed by: NURSE ANESTHETIST, CERTIFIED REGISTERED

## 2020-09-11 PROCEDURE — 63600175 PHARM REV CODE 636 W HCPCS: Performed by: STUDENT IN AN ORGANIZED HEALTH CARE EDUCATION/TRAINING PROGRAM

## 2020-09-11 PROCEDURE — 37000008 HC ANESTHESIA 1ST 15 MINUTES: Performed by: SURGERY

## 2020-09-11 PROCEDURE — 97530 THERAPEUTIC ACTIVITIES: CPT

## 2020-09-11 PROCEDURE — 20600001 HC STEP DOWN PRIVATE ROOM

## 2020-09-11 PROCEDURE — 82962 GLUCOSE BLOOD TEST: CPT | Performed by: SURGERY

## 2020-09-11 PROCEDURE — 80053 COMPREHEN METABOLIC PANEL: CPT

## 2020-09-11 PROCEDURE — 36000706: Performed by: SURGERY

## 2020-09-11 PROCEDURE — 71000033 HC RECOVERY, INTIAL HOUR: Performed by: SURGERY

## 2020-09-11 PROCEDURE — 85025 COMPLETE CBC W/AUTO DIFF WBC: CPT

## 2020-09-11 PROCEDURE — 36000707: Performed by: SURGERY

## 2020-09-11 PROCEDURE — 97530 THERAPEUTIC ACTIVITIES: CPT | Mod: CQ

## 2020-09-11 PROCEDURE — 94761 N-INVAS EAR/PLS OXIMETRY MLT: CPT

## 2020-09-11 PROCEDURE — D9220A PRA ANESTHESIA: Mod: ,,, | Performed by: ANESTHESIOLOGY

## 2020-09-11 PROCEDURE — 37000009 HC ANESTHESIA EA ADD 15 MINS: Performed by: SURGERY

## 2020-09-11 RX ORDER — DEXAMETHASONE SODIUM PHOSPHATE 4 MG/ML
INJECTION, SOLUTION INTRA-ARTICULAR; INTRALESIONAL; INTRAMUSCULAR; INTRAVENOUS; SOFT TISSUE
Status: DISCONTINUED | OUTPATIENT
Start: 2020-09-11 | End: 2020-09-11

## 2020-09-11 RX ORDER — KETAMINE HCL IN 0.9 % NACL 50 MG/5 ML
SYRINGE (ML) INTRAVENOUS
Status: DISCONTINUED | OUTPATIENT
Start: 2020-09-11 | End: 2020-09-11

## 2020-09-11 RX ORDER — FENTANYL CITRATE 50 UG/ML
25 INJECTION, SOLUTION INTRAMUSCULAR; INTRAVENOUS EVERY 5 MIN PRN
Status: DISCONTINUED | OUTPATIENT
Start: 2020-09-11 | End: 2020-09-11 | Stop reason: HOSPADM

## 2020-09-11 RX ORDER — SODIUM CHLORIDE 9 MG/ML
INJECTION, SOLUTION INTRAVENOUS CONTINUOUS PRN
Status: DISCONTINUED | OUTPATIENT
Start: 2020-09-11 | End: 2020-09-11

## 2020-09-11 RX ORDER — FENTANYL CITRATE 50 UG/ML
INJECTION, SOLUTION INTRAMUSCULAR; INTRAVENOUS
Status: DISCONTINUED | OUTPATIENT
Start: 2020-09-11 | End: 2020-09-11

## 2020-09-11 RX ORDER — PROPOFOL 10 MG/ML
VIAL (ML) INTRAVENOUS
Status: DISCONTINUED | OUTPATIENT
Start: 2020-09-11 | End: 2020-09-11

## 2020-09-11 RX ORDER — LIDOCAINE HYDROCHLORIDE 20 MG/ML
INJECTION INTRAVENOUS
Status: DISCONTINUED | OUTPATIENT
Start: 2020-09-11 | End: 2020-09-11

## 2020-09-11 RX ORDER — ROCURONIUM BROMIDE 10 MG/ML
INJECTION, SOLUTION INTRAVENOUS
Status: DISCONTINUED | OUTPATIENT
Start: 2020-09-11 | End: 2020-09-11

## 2020-09-11 RX ORDER — MIDAZOLAM HYDROCHLORIDE 1 MG/ML
INJECTION, SOLUTION INTRAMUSCULAR; INTRAVENOUS
Status: DISCONTINUED | OUTPATIENT
Start: 2020-09-11 | End: 2020-09-11

## 2020-09-11 RX ORDER — ONDANSETRON 2 MG/ML
INJECTION INTRAMUSCULAR; INTRAVENOUS
Status: DISCONTINUED | OUTPATIENT
Start: 2020-09-11 | End: 2020-09-11

## 2020-09-11 RX ORDER — HYDROMORPHONE HYDROCHLORIDE 1 MG/ML
0.2 INJECTION, SOLUTION INTRAMUSCULAR; INTRAVENOUS; SUBCUTANEOUS EVERY 5 MIN PRN
Status: DISCONTINUED | OUTPATIENT
Start: 2020-09-11 | End: 2020-09-11 | Stop reason: HOSPADM

## 2020-09-11 RX ORDER — ONDANSETRON 2 MG/ML
4 INJECTION INTRAMUSCULAR; INTRAVENOUS ONCE AS NEEDED
Status: DISCONTINUED | OUTPATIENT
Start: 2020-09-11 | End: 2020-09-11 | Stop reason: HOSPADM

## 2020-09-11 RX ADMIN — ONDANSETRON 4 MG: 2 INJECTION, SOLUTION INTRAMUSCULAR; INTRAVENOUS at 09:09

## 2020-09-11 RX ADMIN — ACETAMINOPHEN 650 MG: 160 SOLUTION ORAL at 01:09

## 2020-09-11 RX ADMIN — Medication 30 MG: at 09:09

## 2020-09-11 RX ADMIN — SODIUM CHLORIDE: 0.9 INJECTION, SOLUTION INTRAVENOUS at 09:09

## 2020-09-11 RX ADMIN — DEXTROSE 2 G: 50 INJECTION, SOLUTION INTRAVENOUS at 09:09

## 2020-09-11 RX ADMIN — HYDROMORPHONE HYDROCHLORIDE 1 MG: 1 INJECTION, SOLUTION INTRAMUSCULAR; INTRAVENOUS; SUBCUTANEOUS at 07:09

## 2020-09-11 RX ADMIN — ACETAMINOPHEN 650 MG: 160 SOLUTION ORAL at 11:09

## 2020-09-11 RX ADMIN — MAGNESIUM SULFATE HEPTAHYDRATE: 500 INJECTION, SOLUTION INTRAMUSCULAR; INTRAVENOUS at 09:09

## 2020-09-11 RX ADMIN — SUGAMMADEX 200 MG: 100 INJECTION, SOLUTION INTRAVENOUS at 09:09

## 2020-09-11 RX ADMIN — HYDROMORPHONE HYDROCHLORIDE 1 MG: 1 INJECTION, SOLUTION INTRAMUSCULAR; INTRAVENOUS; SUBCUTANEOUS at 03:09

## 2020-09-11 RX ADMIN — ROCURONIUM BROMIDE 40 MG: 10 INJECTION, SOLUTION INTRAVENOUS at 09:09

## 2020-09-11 RX ADMIN — HYDROMORPHONE HYDROCHLORIDE 1 MG: 1 INJECTION, SOLUTION INTRAMUSCULAR; INTRAVENOUS; SUBCUTANEOUS at 10:09

## 2020-09-11 RX ADMIN — HYDROMORPHONE HYDROCHLORIDE 1 MG: 1 INJECTION, SOLUTION INTRAMUSCULAR; INTRAVENOUS; SUBCUTANEOUS at 12:09

## 2020-09-11 RX ADMIN — MICONAZOLE NITRATE: 20 POWDER TOPICAL at 08:09

## 2020-09-11 RX ADMIN — HEPARIN SODIUM 5000 UNITS: 5000 INJECTION INTRAVENOUS; SUBCUTANEOUS at 05:09

## 2020-09-11 RX ADMIN — METOPROLOL TARTRATE 25 MG: 25 TABLET ORAL at 01:09

## 2020-09-11 RX ADMIN — ACETAMINOPHEN 650 MG: 160 SOLUTION ORAL at 06:09

## 2020-09-11 RX ADMIN — MIDAZOLAM HYDROCHLORIDE 2 MG: 1 INJECTION, SOLUTION INTRAMUSCULAR; INTRAVENOUS at 09:09

## 2020-09-11 RX ADMIN — LIDOCAINE HYDROCHLORIDE 100 MG: 20 INJECTION, SOLUTION INTRAVENOUS at 09:09

## 2020-09-11 RX ADMIN — DEXAMETHASONE SODIUM PHOSPHATE 4 MG: 4 INJECTION, SOLUTION INTRAMUSCULAR; INTRAVENOUS at 09:09

## 2020-09-11 RX ADMIN — METOPROLOL TARTRATE 25 MG: 25 TABLET ORAL at 08:09

## 2020-09-11 RX ADMIN — HYDROMORPHONE HYDROCHLORIDE 1 MG: 1 INJECTION, SOLUTION INTRAMUSCULAR; INTRAVENOUS; SUBCUTANEOUS at 06:09

## 2020-09-11 RX ADMIN — SODIUM CHLORIDE, SODIUM GLUCONATE, SODIUM ACETATE, POTASSIUM CHLORIDE, MAGNESIUM CHLORIDE, SODIUM PHOSPHATE, DIBASIC, AND POTASSIUM PHOSPHATE: .53; .5; .37; .037; .03; .012; .00082 INJECTION, SOLUTION INTRAVENOUS at 09:09

## 2020-09-11 RX ADMIN — ACETAMINOPHEN 650 MG: 160 SOLUTION ORAL at 05:09

## 2020-09-11 RX ADMIN — FENTANYL CITRATE 100 MCG: 50 INJECTION, SOLUTION INTRAMUSCULAR; INTRAVENOUS at 09:09

## 2020-09-11 RX ADMIN — SMOFLIPID 250 ML: 6; 6; 5; 3 INJECTION, EMULSION INTRAVENOUS at 09:09

## 2020-09-11 RX ADMIN — FENTANYL CITRATE 50 MCG: 50 INJECTION, SOLUTION INTRAMUSCULAR; INTRAVENOUS at 09:09

## 2020-09-11 RX ADMIN — HEPARIN SODIUM 5000 UNITS: 5000 INJECTION INTRAVENOUS; SUBCUTANEOUS at 09:09

## 2020-09-11 RX ADMIN — PROPOFOL 100 MG: 10 INJECTION, EMULSION INTRAVENOUS at 09:09

## 2020-09-11 RX ADMIN — OXYCODONE HYDROCHLORIDE 15 MG: 5 SOLUTION ORAL at 08:09

## 2020-09-11 RX ADMIN — ACETAMINOPHEN 650 MG: 160 SOLUTION ORAL at 12:09

## 2020-09-11 RX ADMIN — OXYCODONE HYDROCHLORIDE 15 MG: 5 SOLUTION ORAL at 05:09

## 2020-09-11 NOTE — PROGRESS NOTES
Report given to Mary in Day of Surgery. Transport requested by Mary. Will get patient ready, check consents, and verify Pre-op checklist.

## 2020-09-11 NOTE — PROGRESS NOTES
Consents in chart and verified. SCDs and TEDS applied to patient. New arm band placed. Patient in gown and sitting up in chair. Assessment completed. Pre-op checklist verified. Awaiting transport at this time.

## 2020-09-11 NOTE — PLAN OF CARE
Plan of care reviewed with patient at 1600. Patient verbalized understanding. All questions and concerns addressed today. Patient remains free from injury and falls. No acute events today. Patient is progressing towards goals. Will continue to monitor. See flowsheets for full assessments and vitals throughout shift.    -Patient traveled to OR this morning for removal of wound vac from abdomen and placement of Rojas catheter  -R IJ Brevia remains in place  -Patient has 3 GODFREY drains  -Pain controlled today  -TPN infusion continues today

## 2020-09-11 NOTE — PROGRESS NOTES
[facility]  General Surgery  Progress Note    Subjective:     History of Present Illness:  Pt is a 40 yo F with recent history of antrectomy with BII reconstruction for ulcer disease at outside hospital. 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 Patient also reportedly aspirated on induction in the OR prior to this, resulting in severe pulmonary edema and hypoxia. Patient was transferred to [facility] urgently for higher level of care. She arrived as a direct SICU admit on near maximal vent settings and requiring low dose vasopressors with HR in the upper 140s. Her midline laparotomy is closed with 4 Navdeep drains in place draining bilious output.     Post-Op Info:  Procedure(s) (LRB):  REMOVAL, CATHETER, CENTRAL VENOUS, TUNNELED (Left)  CLOSURE, WOUND (N/A)   Day of Surgery     Interval History:   NAEON  Tolerating clear liquids  100ml output from one drain, 0ml output from second drain    Medications:  Continuous Infusions:   TPN ADULT CENTRAL LINE CUSTOM 45 mL/hr at 09/10/20 2154     Scheduled Meds:   acetaminophen  650 mg Per J Tube Q6H    amLODIPine benzoate  5 mg Per J Tube Daily    cloNIDine 0.3 mg/24 hr td ptwk  1 patch Transdermal Q7 Days    heparin (porcine)  5,000 Units Subcutaneous Q8H    lidocaine  1 patch Transdermal Q24H    metoprolol  25 mg Per J Tube BID    miconazole NITRATE 2 %   Topical (Top) BID    pantoprazole  40 mg Intravenous Daily     PRN Meds:sodium chloride, albuterol-ipratropium, hydrALAZINE, HYDROmorphone, labetaloL, melatonin, metoprolol, oxyCODONE, oxyCODONE     Review of patient's allergies indicates:   Allergen Reactions    Latex      Objective:     Vital Signs (Most Recent):  Temp: 98.4 °F (36.9 °C) (09/11/20 0830)  Pulse: 107 (09/11/20 0830)  Resp: 17 (09/11/20 0830)  BP: 133/81 (09/11/20 0830)  SpO2: 100 % (09/11/20 0830) Vital Signs (24h Range):  Temp:  [97.2 °F (36.2 °C)-99.6 °F (37.6 °C)] 98.4 °F (36.9  °C)  Pulse:  [106-124] 107  Resp:  [16-18] 17  SpO2:  [99 %-100 %] 100 %  BP: (126-147)/(75-90) 133/81     Weight: 67.5 kg (148 lb 13 oz)  Body mass index is 27.22 kg/m².    Intake/Output - Last 3 Shifts       09/09 0700 - 09/10 0659 09/10 0700 - 09/11 0659 09/11 0700 - 09/12 0659    P.O. 530 240     I.V. (mL/kg)       Blood  310     NG/GT 30      TPN  405     Total Intake(mL/kg) 560 (8.3) 955 (14.1)     Urine (mL/kg/hr) 675 (0.4) 250 (0.2)     Emesis/NG output  250     Drains 115 120     Other 125 30     Stool 0 0     Total Output 915 650     Net -355 +305            Urine Occurrence 5 x 3 x     Stool Occurrence 2 x 1 x           Physical Exam  Constitutional:       General: She is not in acute distress.  Cardiovascular:      Rate and Rhythm: Regular rhythm. Tachycardia present.   Pulmonary:      Effort: Pulmonary effort is normal. No respiratory distress.   Abdominal:      General: There is no distension.      Tenderness: There is no abdominal tenderness.      Comments: Wound vac removed in OR and skin primarily closed  Drains with bilious output   Skin:     General: Skin is warm.   Neurological:      General: No focal deficit present.      Mental Status: She is alert and oriented to person, place, and time.   Psychiatric:         Mood and Affect: Mood normal.         Behavior: Behavior normal.         Significant Labs:  CBC:   Recent Labs   Lab 09/11/20  0425   WBC 19.06*   RBC 2.81*   HGB 8.2*   HCT 26.3*      MCV 94   MCH 29.2   MCHC 31.2*     CMP:   Recent Labs   Lab 09/11/20  0425   GLU 99   CALCIUM 8.7   ALBUMIN 1.9*   PROT 7.9      K 3.5   CO2 19*      BUN 55*   CREATININE 1.8*   ALKPHOS 240*   ALT 20   AST 28   BILITOT 0.4       Significant Diagnostics:  I have reviewed all pertinent imaging results/findings within the past 24 hours.    Assessment/Plan:     * Duodenum disorder  40 yo F s/p antrectomy with BII reconstruction c/b duodenal necrosis requiring ex lap, washout, drainage c/b  aspiration event. S/p duodenal resection with d-j and g-j anastomosis on 8/13.    - On room air  - TPN   - Keep G tube clamped, unclamp for nausea/vomiting. Ok to use G tube for meds  - Zosyn/micafungin stopped 9/4   - Daily labs  - FLORIDALMA has resolved, permacath no longer needed  - GI and DVT ppx  - PT / OT  - continue PRNs for BP control    Dispo: Stable for transfer to facility        Christy Nicole MD  General Surgery  [facility]

## 2020-09-11 NOTE — ANESTHESIA PROCEDURE NOTES
Intubation  Performed by: Liam Huffman CRNA  Authorized by: Azar Desai MD     Intubation:     Induction:  Intravenous    Intubated:  Postinduction    Mask Ventilation:  Easy mask    Attempts:  1    Attempted By:  CRNA    Method of Intubation:  Direct    Blade:  Mariscal 2    Laryngeal View Grade: Grade I - full view of chords      Difficult Airway Encountered?: No      Complications:  None    Airway Device:  Oral endotracheal tube    Airway Device Size:  7.5    Style/Cuff Inflation:  Cuffed    Inflation Amount (mL):  8    Tube secured:  23    Secured at:  The lips    Placement Verified By:  Capnometry    Complicating Factors:  None

## 2020-09-11 NOTE — PT/OT/SLP PROGRESS
"Physical Therapy Treatment    Patient Name:  Jaquan Farmer   MRN:  [id number]    Recommendations:     Discharge Recommendations:  rehabilitation facility   Discharge Equipment Recommendations: (TBD)   Barriers to discharge: None    Assessment:     Jaquan Farmer is a 39 y.o. female admitted with a medical diagnosis of Duodenum disorder.  She presents with the following impairments/functional limitations:  weakness, impaired endurance, impaired self care skills, impaired functional mobilty, gait instability, impaired balance, pain, impaired cardiopulmonary response to activity. Pt limited by pain with wound closure and catheter removal earlier in day, but highly motivated to participate, requesting gait training. Pt ambulatory this day but with EDUARDO and high level of pain. Pt ambulates ~60 and ~86 ft with RW and CGA/Min A. Mild instability with pt reacting to pains in abdomen. Pt with decreased pain in sitting. NSG alerted. Pt will continue to benefit from therapy services to address impairments listed above.     Rehab Prognosis: Good; patient would benefit from acute skilled PT services to address these deficits and reach maximum level of function.    Recent Surgery: Procedure(s) (LRB):  REMOVAL, CATHETER, CENTRAL VENOUS, TUNNELED (Left)  CLOSURE, WOUND (N/A) Day of Surgery    Plan:     During this hospitalization, patient to be seen 4 x/week to address the identified rehab impairments via gait training, therapeutic activities, therapeutic exercises, neuromuscular re-education and progress toward the following goals:    · Plan of Care Expires:  10/01/20    Subjective     Chief Complaint: pain  Patient/Family Comments/goals: "Really other than the pain, I'm excited, this is better than it was."  Pain/Comfort:  · Pain Rating 1: 7/10  · Location - Orientation 1: generalized  · Location 1: abdomen  · Pain Addressed 1: Reposition, Distraction, Cessation of Activity  · Pain Rating Post-Intervention 1: " 7/10      Objective:     Communicated with NSG prior to session.  Patient found seated on bedside sofa with GODFREY drain, peripheral IV, telemetry upon PTA entry to room.     General Precautions: Standard, fall   Orthopedic Precautions:N/A   Braces: N/A     Functional Mobility:  · Transfers:     · Sit to Stand:  minimum assistance with rolling walker  · Gait: Pt ambulates ~60 and ~86 ft with RW and CGA/Min A. Pt with antalgic posturing and slow randell. Assistance for balance and safety with pt reacting to abdominal pain resulting in mild instability. Pt requires seated rest d/t EDUARDO. Breathing cues provided with mild relief.       AM-PAC 6 CLICK MOBILITY  Turning over in bed (including adjusting bedclothes, sheets and blankets)?: 3  Sitting down on and standing up from a chair with arms (e.g., wheelchair, bedside commode, etc.): 3  Moving from lying on back to sitting on the side of the bed?: 3  Moving to and from a bed to a chair (including a wheelchair)?: 3  Need to walk in hospital room?: 3  Climbing 3-5 steps with a railing?: 2  Basic Mobility Total Score: 17     Patient left sitting on bedside sofa with all lines intact, call button in reach and NSG notified.    GOALS:   Multidisciplinary Problems     Physical Therapy Goals        Problem: Physical Therapy Goal    Goal Priority Disciplines Outcome Goal Variances Interventions   Physical Therapy Goal     PT, PT/OT Ongoing, Progressing     Description: Goals to be met by: 9/15/2020     Patient will increase functional independence with mobility by performin. Supine to sit with MInimal Assistance  2. Sit to stand transfer with Minimal Assistance with LRAD - met ()  3. Gait  x 50 feet with Minimal Assistance using LRAD.   4. Stand for 3 minutes with Contact Guard Assistance using LRAD - met ()  5. Lower extremity exercise program x15 reps per handout, with independence                     Time Tracking:     PT Received On: 20  PT Start Time: 1457      PT Stop Time: 1520  PT Total Time (min): 23 min     Billable Minutes: Gait Training 15 and Therapeutic Activity 8    Treatment Type: Treatment  PT/PTA: PTA     PTA Visit Number: 2     Frank Bruno, EDEN  09/11/2020

## 2020-09-11 NOTE — ASSESSMENT & PLAN NOTE
38 yo F s/p antrectomy with BII reconstruction c/b duodenal necrosis requiring ex lap, washout, drainage c/b aspiration event. S/p duodenal resection with d-j and g-j anastomosis on 8/13.    - On room air  - TPN   - Keep G tube clamped, unclamp for nausea/vomiting. Ok to use G tube for meds  - Zosyn/micafungin stopped 9/4 - persistent leukocytosis - will discuss utility of further abx therapy as patient not acting septic and has source control  - Daily labs  - FLORIDALMA has resolved, permacath no longer needed  - GI and DVT ppx  - PT / OT  - continue PRNs for BP control    Dispo: Stable for transfer to facility

## 2020-09-11 NOTE — OP NOTE
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   Patient's preexisting permacath in her left internal jugular was removed, as it was no longer required, and pressure was held for 10 minutes.   Her abdominal wound vac was removed and the surgical site was prepped and draped in sterile fashion. Skin flaps were raised with electrocautery until the abdominal incision was able to come together without tension. Hemostasis was ensured. A 19Fr christine drain was placed above the fascia.  Interrupted vicryl sutures were placed in the deep subcutaneous layer to help distribute tension. The skin was closed with vertical mattress stitches using nylon suture. This came together without excessive tension.   The patient was extubated in the operating room and taken to the PACU in stable condition.     Complications: No    Estimated Blood Loss (EBL): 3ml           Implants: * No implants in log *    Specimens:   Specimen (12h ago, onward)    None                  Condition: Good    Disposition: PACU - hemodynamically stable.    Christy Nicole MD, PGY-5  General Surgery  [phone]

## 2020-09-11 NOTE — NURSING
Pt did not tolerate tylenol and liquid oxycodone this morning. Vomited 250cc of clear, brown liquid. No blood noted. Gave pt IV dilaudid for c/o pain instead. Nausea subsided after vomiting episode.

## 2020-09-11 NOTE — PROGRESS NOTES
Patient assigned to this writer by charge nurse. The patient was  transferred to [facility] room 8. Safety maintained bed locked and in low position, side rails up x 2, call bell within reach. This writer is completing a chart review and reviewing MD orders.

## 2020-09-11 NOTE — PT/OT/SLP PROGRESS
Occupational Therapy   Co-Treatment    Name: Jaquan Farmer  MRN: [id number]  Admitting Diagnosis:  Duodenum disorder  Day of Surgery   Procedure(s):  REMOVAL, CATHETER, CENTRAL VENOUS, TUNNELED  CLOSURE, WOUND     Recommendations:     Discharge Recommendations: rehabilitation facility  Discharge Equipment Recommendations:  (TBD)  Barriers to discharge:  None    Assessment:     Jaquan Farmer is a 39 y.o. female with a medical diagnosis of Duodenum disorder.  She presents with pain and fatigue but is agreeable to participate with therapy and tolerates session fairly well. Patient reports SOB during functional mobility requiring a seated rest break but overall is moving well with CGA-min A with a RW. Performance deficits affecting function are weakness, impaired endurance, impaired self care skills, impaired functional mobilty, gait instability, impaired balance, pain, impaired cardiopulmonary response to activity, impaired skin.     Rehab Prognosis:  Good; patient would benefit from acute skilled OT services to address these deficits and reach maximum level of function.       Plan:     Patient to be seen 4 x/week to address the above listed problems via self-care/home management, therapeutic activities, therapeutic exercises  · Plan of Care Expires: 09/24/20  · Plan of Care Reviewed with: patient    Subjective     Pain/Comfort:  · Pain Rating 1: 7/10  · Location 1: abdomen  · Pain Addressed 1: Reposition, Distraction, Nurse notified  · Pain Rating Post-Intervention 1: 7/10    Objective:     Communicated with: RN prior to session.  Patient found standing in the room with PTA with GODFREY drain, peripheral IV, telemetry upon OT entry to room.    General Precautions: Standard, fall   Orthopedic Precautions:N/A   Braces: N/A     Occupational Performance:     Bed Mobility:    · Did not observe    Functional Mobility/Transfers:  · Patient completed Sit <> Stand Transfer with minimum assistance with rolling walker    · Functional Mobility: ~60' and ~86' with CGA-min A and RW, 1 seated rest break, educated on pursed lip breathing technique    Activities of Daily Living:  · Feeding:  minimum assistance Patient requested apple juice and required min assist for opening packaging.   · Denied need for further ADLs at this time    [facility] 6 Click ADL: 19    Treatment & Education:  Role of OT/POC  Call button for assistance  Participated in BUE therex during seated rest break     Patient left sitting up on the sofa with all lines intact, call button in reach and RN notifiedEducation:      GOALS:   Multidisciplinary Problems     Occupational Therapy Goals        Problem: Occupational Therapy Goal    Goal Priority Disciplines Outcome Interventions   Occupational Therapy Goal     OT, PT/OT Ongoing, Progressing    Description: Goals to be met by: 9/11/20     Patient will increase functional independence with ADLs by performing:    Feeding with [place].  UE Dressing with Stand-by Assistance.  LE Dressing with Minimal Assistance.  Grooming while standing at sink with Contact Guard Assistance.  Toileting from bedside commode with Minimal Assistance for hygiene and clothing management.   Toilet transfer to bedside commode with Minimal Assistance.                     Time Tracking:     OT Date of Treatment: 09/11/20  OT Start Time: 1508  OT Stop Time: 1520  OT Total Time (min): 12 min    Billable Minutes:Therapeutic Activity 12 minutes    Diane Willingham OT  9/11/2020

## 2020-09-11 NOTE — PROGRESS NOTES
This writer spoke to Marybeth , floor nurse, and informed her she needed to send the patient's Metoprolol to tube system 75.

## 2020-09-11 NOTE — TRANSFER OF CARE
"Anesthesia Transfer of Care Note    Patient: Jaquan Farmer    Procedure(s) Performed: Procedure(s) (LRB):  REMOVAL, CATHETER, CENTRAL VENOUS, TUNNELED (Left)  CLOSURE, WOUND (N/A)    Patient location: PACU    Anesthesia Type: general    Transport from OR: Transported from OR on 6-10 L/min O2 by face mask with adequate spontaneous ventilation    Post pain: adequate analgesia    Post assessment: no apparent anesthetic complications    Post vital signs: stable    Level of consciousness: awake, alert and oriented    Nausea/Vomiting: no nausea/vomiting    Complications: none    Transfer of care protocol was followed      Last vitals:   Visit Vitals  /81 (BP Location: Right arm, Patient Position: Lying)   Pulse 107   Temp 36.9 °C (98.4 °F) (Oral)   Resp 17   Ht 5' 2" (1.575 m)   Wt 67.5 kg (148 lb 13 oz)   LMP 08/12/2020   SpO2 100%   Breastfeeding No   BMI 27.22 kg/m²     "

## 2020-09-11 NOTE — ANESTHESIA POSTPROCEDURE EVALUATION
Anesthesia Post Evaluation    Patient: Jaquan Farmer    Procedure(s) Performed: Procedure(s) (LRB):  REMOVAL, CATHETER, CENTRAL VENOUS, TUNNELED (Left)  CLOSURE, WOUND (N/A)    Final Anesthesia Type: general    Patient location during evaluation: PACU  Patient participation: Yes- Able to Participate  Level of consciousness: awake  Post-procedure vital signs: reviewed and stable  Pain management: adequate  Airway patency: patent    PONV status at discharge: No PONV  Anesthetic complications: no      Cardiovascular status: blood pressure returned to baseline  Respiratory status: unassisted  Hydration status: euvolemic  Follow-up not needed.          Vitals Value Taken Time   BP  09/11/20 1346   Temp  09/11/20 1346   Pulse  09/11/20 1346   Resp 18 09/11/20 1004   SpO2 98 % 09/11/20 1004         Event Time   Out of Recovery 09/11/2020 10:36:21         Pain/Rod Score: Pain Rating Prior to Med Admin: 7 (9/11/2020  1:00 PM)  Pain Rating Post Med Admin: 2 (9/11/2020 12:37 AM)  Rod Score: 10 (9/11/2020 10:00 AM)

## 2020-09-11 NOTE — PROGRESS NOTES
Patient escorted back to room from OR by transport. Bed locked, wheels in lowest position, call light in reach. Will continue to monitor.

## 2020-09-11 NOTE — NURSING
Pt received 1 unit of pRBC last night. Pt had uneventful night. C/o abdominal pain here and there which was treated w/ medication and rest. Pt has TPN@45cc/hr & lipids @20.8cc/hr. Pt's incisions are clean, dry, and intact. Pt's G-tube is currently clamped. Pt has a L chest wall permacath. Pt has 2 GODFREY drains to the R side. GODFREY#1 put out 15cc & GODFREY#2 put out 5cc this shift. Pt is on room air and can get up w/ SB assist to BSC.

## 2020-09-11 NOTE — SUBJECTIVE & OBJECTIVE
Interval History:   NAEON  Tolerating clear liquids  100ml output from one drain, 0ml output from second drain    Medications:  Continuous Infusions:   TPN ADULT CENTRAL LINE CUSTOM 45 mL/hr at 09/10/20 2154     Scheduled Meds:   acetaminophen  650 mg Per J Tube Q6H    amLODIPine benzoate  5 mg Per J Tube Daily    cloNIDine 0.3 mg/24 hr td ptwk  1 patch Transdermal Q7 Days    heparin (porcine)  5,000 Units Subcutaneous Q8H    lidocaine  1 patch Transdermal Q24H    metoprolol  25 mg Per J Tube BID    miconazole NITRATE 2 %   Topical (Top) BID    pantoprazole  40 mg Intravenous Daily     PRN Meds:sodium chloride, albuterol-ipratropium, hydrALAZINE, HYDROmorphone, labetaloL, melatonin, metoprolol, oxyCODONE, oxyCODONE     Review of patient's allergies indicates:   Allergen Reactions    Latex      Objective:     Vital Signs (Most Recent):  Temp: 98.4 °F (36.9 °C) (09/11/20 0830)  Pulse: 107 (09/11/20 0830)  Resp: 17 (09/11/20 0830)  BP: 133/81 (09/11/20 0830)  SpO2: 100 % (09/11/20 0830) Vital Signs (24h Range):  Temp:  [97.2 °F (36.2 °C)-99.6 °F (37.6 °C)] 98.4 °F (36.9 °C)  Pulse:  [106-124] 107  Resp:  [16-18] 17  SpO2:  [99 %-100 %] 100 %  BP: (126-147)/(75-90) 133/81     Weight: 67.5 kg (148 lb 13 oz)  Body mass index is 27.22 kg/m².    Intake/Output - Last 3 Shifts       09/09 0700 - 09/10 0659 09/10 0700 - 09/11 0659 09/11 0700 - 09/12 0659    P.O. 530 240     I.V. (mL/kg)       Blood  310     NG/GT 30      TPN  405     Total Intake(mL/kg) 560 (8.3) 955 (14.1)     Urine (mL/kg/hr) 675 (0.4) 250 (0.2)     Emesis/NG output  250     Drains 115 120     Other 125 30     Stool 0 0     Total Output 915 650     Net -355 +305            Urine Occurrence 5 x 3 x     Stool Occurrence 2 x 1 x           Physical Exam  Constitutional:       General: She is not in acute distress.  Cardiovascular:      Rate and Rhythm: Regular rhythm. Tachycardia present.   Pulmonary:      Effort: Pulmonary effort is normal. No  respiratory distress.   Abdominal:      General: There is no distension.      Tenderness: There is no abdominal tenderness.      Comments: Wound vac removed in OR and skin primarily closed  Drains with bilious output   Skin:     General: Skin is warm.   Neurological:      General: No focal deficit present.      Mental Status: She is alert and oriented to person, place, and time.   Psychiatric:         Mood and Affect: Mood normal.         Behavior: Behavior normal.         Significant Labs:  CBC:   Recent Labs   Lab 09/11/20  0425   WBC 19.06*   RBC 2.81*   HGB 8.2*   HCT 26.3*      MCV 94   MCH 29.2   MCHC 31.2*     CMP:   Recent Labs   Lab 09/11/20  0425   GLU 99   CALCIUM 8.7   ALBUMIN 1.9*   PROT 7.9      K 3.5   CO2 19*      BUN 55*   CREATININE 1.8*   ALKPHOS 240*   ALT 20   AST 28   BILITOT 0.4       Significant Diagnostics:  I have reviewed all pertinent imaging results/findings within the past 24 hours.

## 2020-09-12 LAB
ALBUMIN SERPL BCP-MCNC: 1.8 G/DL (ref 3.5–5.2)
ALBUMIN SERPL BCP-MCNC: 1.9 G/DL (ref 3.5–5.2)
ALP SERPL-CCNC: 211 U/L (ref 55–135)
ALP SERPL-CCNC: 218 U/L (ref 55–135)
ALT SERPL W/O P-5'-P-CCNC: 12 U/L (ref 10–44)
ALT SERPL W/O P-5'-P-CCNC: 14 U/L (ref 10–44)
ANION GAP SERPL CALC-SCNC: 11 MMOL/L (ref 8–16)
ANION GAP SERPL CALC-SCNC: 12 MMOL/L (ref 8–16)
ANISOCYTOSIS BLD QL SMEAR: SLIGHT
ANISOCYTOSIS BLD QL SMEAR: SLIGHT
AST SERPL-CCNC: 19 U/L (ref 10–40)
AST SERPL-CCNC: 23 U/L (ref 10–40)
BASO STIPL BLD QL SMEAR: ABNORMAL
BASOPHILS # BLD AUTO: 0.06 K/UL (ref 0–0.2)
BASOPHILS # BLD AUTO: 0.07 K/UL (ref 0–0.2)
BASOPHILS NFR BLD: 0.3 % (ref 0–1.9)
BASOPHILS NFR BLD: 0.3 % (ref 0–1.9)
BILIRUB SERPL-MCNC: 0.3 MG/DL (ref 0.1–1)
BILIRUB SERPL-MCNC: 0.3 MG/DL (ref 0.1–1)
BUN SERPL-MCNC: 47 MG/DL (ref 6–20)
BUN SERPL-MCNC: 48 MG/DL (ref 6–20)
CALCIUM SERPL-MCNC: 8.6 MG/DL (ref 8.7–10.5)
CALCIUM SERPL-MCNC: 8.7 MG/DL (ref 8.7–10.5)
CHLORIDE SERPL-SCNC: 103 MMOL/L (ref 95–110)
CHLORIDE SERPL-SCNC: 103 MMOL/L (ref 95–110)
CO2 SERPL-SCNC: 21 MMOL/L (ref 23–29)
CO2 SERPL-SCNC: 22 MMOL/L (ref 23–29)
CREAT SERPL-MCNC: 1.5 MG/DL (ref 0.5–1.4)
CREAT SERPL-MCNC: 1.6 MG/DL (ref 0.5–1.4)
DIFFERENTIAL METHOD: ABNORMAL
DIFFERENTIAL METHOD: ABNORMAL
EOSINOPHIL # BLD AUTO: 0.2 K/UL (ref 0–0.5)
EOSINOPHIL # BLD AUTO: 0.3 K/UL (ref 0–0.5)
EOSINOPHIL NFR BLD: 1.2 % (ref 0–8)
EOSINOPHIL NFR BLD: 1.4 % (ref 0–8)
ERYTHROCYTE [DISTWIDTH] IN BLOOD BY AUTOMATED COUNT: 16.3 % (ref 11.5–14.5)
ERYTHROCYTE [DISTWIDTH] IN BLOOD BY AUTOMATED COUNT: 16.3 % (ref 11.5–14.5)
EST. GFR  (AFRICAN AMERICAN): 46.5 ML/MIN/1.73 M^2
EST. GFR  (AFRICAN AMERICAN): 50.2 ML/MIN/1.73 M^2
EST. GFR  (NON AFRICAN AMERICAN): 40.3 ML/MIN/1.73 M^2
EST. GFR  (NON AFRICAN AMERICAN): 43.6 ML/MIN/1.73 M^2
GIANT PLATELETS BLD QL SMEAR: PRESENT
GLUCOSE SERPL-MCNC: 119 MG/DL (ref 70–110)
GLUCOSE SERPL-MCNC: 123 MG/DL (ref 70–110)
HCT VFR BLD AUTO: 25.3 % (ref 37–48.5)
HCT VFR BLD AUTO: 26.5 % (ref 37–48.5)
HGB BLD-MCNC: 7.8 G/DL (ref 12–16)
HGB BLD-MCNC: 8.1 G/DL (ref 12–16)
HYPOCHROMIA BLD QL SMEAR: ABNORMAL
IMM GRANULOCYTES # BLD AUTO: 0.13 K/UL (ref 0–0.04)
IMM GRANULOCYTES # BLD AUTO: 0.14 K/UL (ref 0–0.04)
IMM GRANULOCYTES NFR BLD AUTO: 0.7 % (ref 0–0.5)
IMM GRANULOCYTES NFR BLD AUTO: 0.7 % (ref 0–0.5)
LYMPHOCYTES # BLD AUTO: 1.9 K/UL (ref 1–4.8)
LYMPHOCYTES # BLD AUTO: 2.1 K/UL (ref 1–4.8)
LYMPHOCYTES NFR BLD: 10.6 % (ref 18–48)
LYMPHOCYTES NFR BLD: 9.2 % (ref 18–48)
MAGNESIUM SERPL-MCNC: 1.8 MG/DL (ref 1.6–2.6)
MAGNESIUM SERPL-MCNC: 1.8 MG/DL (ref 1.6–2.6)
MCH RBC QN AUTO: 28.8 PG (ref 27–31)
MCH RBC QN AUTO: 29.1 PG (ref 27–31)
MCHC RBC AUTO-ENTMCNC: 30.6 G/DL (ref 32–36)
MCHC RBC AUTO-ENTMCNC: 30.8 G/DL (ref 32–36)
MCV RBC AUTO: 94 FL (ref 82–98)
MCV RBC AUTO: 94 FL (ref 82–98)
MONOCYTES # BLD AUTO: 1.2 K/UL (ref 0.3–1)
MONOCYTES # BLD AUTO: 1.5 K/UL (ref 0.3–1)
MONOCYTES NFR BLD: 6 % (ref 4–15)
MONOCYTES NFR BLD: 7.7 % (ref 4–15)
NEUTROPHILS # BLD AUTO: 15.9 K/UL (ref 1.8–7.7)
NEUTROPHILS # BLD AUTO: 16.9 K/UL (ref 1.8–7.7)
NEUTROPHILS NFR BLD: 79.5 % (ref 38–73)
NEUTROPHILS NFR BLD: 82.4 % (ref 38–73)
NRBC BLD-RTO: 0 /100 WBC
NRBC BLD-RTO: 0 /100 WBC
PHOSPHATE SERPL-MCNC: 3.7 MG/DL (ref 2.7–4.5)
PHOSPHATE SERPL-MCNC: 4.2 MG/DL (ref 2.7–4.5)
PLATELET # BLD AUTO: 366 K/UL (ref 150–350)
PLATELET # BLD AUTO: 371 K/UL (ref 150–350)
PLATELET BLD QL SMEAR: ABNORMAL
PLATELET BLD QL SMEAR: ABNORMAL
PMV BLD AUTO: 10.5 FL (ref 9.2–12.9)
PMV BLD AUTO: 10.5 FL (ref 9.2–12.9)
POCT GLUCOSE: 122 MG/DL (ref 70–110)
POCT GLUCOSE: 143 MG/DL (ref 70–110)
POLYCHROMASIA BLD QL SMEAR: ABNORMAL
POTASSIUM SERPL-SCNC: 3.4 MMOL/L (ref 3.5–5.1)
POTASSIUM SERPL-SCNC: 3.5 MMOL/L (ref 3.5–5.1)
PROT SERPL-MCNC: 7.8 G/DL (ref 6–8.4)
PROT SERPL-MCNC: 8 G/DL (ref 6–8.4)
RBC # BLD AUTO: 2.68 M/UL (ref 4–5.4)
RBC # BLD AUTO: 2.81 M/UL (ref 4–5.4)
SODIUM SERPL-SCNC: 136 MMOL/L (ref 136–145)
SODIUM SERPL-SCNC: 136 MMOL/L (ref 136–145)
WBC # BLD AUTO: 19.94 K/UL (ref 3.9–12.7)
WBC # BLD AUTO: 20.53 K/UL (ref 3.9–12.7)

## 2020-09-12 PROCEDURE — 80053 COMPREHEN METABOLIC PANEL: CPT

## 2020-09-12 PROCEDURE — 25000003 PHARM REV CODE 250: Performed by: STUDENT IN AN ORGANIZED HEALTH CARE EDUCATION/TRAINING PROGRAM

## 2020-09-12 PROCEDURE — 20600001 HC STEP DOWN PRIVATE ROOM

## 2020-09-12 PROCEDURE — C9113 INJ PANTOPRAZOLE SODIUM, VIA: HCPCS | Performed by: STUDENT IN AN ORGANIZED HEALTH CARE EDUCATION/TRAINING PROGRAM

## 2020-09-12 PROCEDURE — 63600175 PHARM REV CODE 636 W HCPCS: Performed by: STUDENT IN AN ORGANIZED HEALTH CARE EDUCATION/TRAINING PROGRAM

## 2020-09-12 PROCEDURE — 83735 ASSAY OF MAGNESIUM: CPT | Mod: 91

## 2020-09-12 PROCEDURE — A4217 STERILE WATER/SALINE, 500 ML: HCPCS | Performed by: STUDENT IN AN ORGANIZED HEALTH CARE EDUCATION/TRAINING PROGRAM

## 2020-09-12 PROCEDURE — 83735 ASSAY OF MAGNESIUM: CPT

## 2020-09-12 PROCEDURE — 84100 ASSAY OF PHOSPHORUS: CPT

## 2020-09-12 PROCEDURE — 36573 INSJ PICC RS&I 5 YR+: CPT

## 2020-09-12 PROCEDURE — 25000242 PHARM REV CODE 250 ALT 637 W/ HCPCS: Performed by: STUDENT IN AN ORGANIZED HEALTH CARE EDUCATION/TRAINING PROGRAM

## 2020-09-12 PROCEDURE — C1751 CATH, INF, PER/CENT/MIDLINE: HCPCS

## 2020-09-12 PROCEDURE — 85025 COMPLETE CBC W/AUTO DIFF WBC: CPT | Mod: 91

## 2020-09-12 PROCEDURE — 84100 ASSAY OF PHOSPHORUS: CPT | Mod: 91

## 2020-09-12 PROCEDURE — 94761 N-INVAS EAR/PLS OXIMETRY MLT: CPT

## 2020-09-12 PROCEDURE — 25000003 PHARM REV CODE 250: Performed by: SURGERY

## 2020-09-12 PROCEDURE — 76937 US GUIDE VASCULAR ACCESS: CPT

## 2020-09-12 PROCEDURE — 80053 COMPREHEN METABOLIC PANEL: CPT | Mod: 91

## 2020-09-12 PROCEDURE — A4216 STERILE WATER/SALINE, 10 ML: HCPCS | Performed by: SURGERY

## 2020-09-12 PROCEDURE — B4185 PARENTERAL SOL 10 GM LIPIDS: HCPCS | Performed by: STUDENT IN AN ORGANIZED HEALTH CARE EDUCATION/TRAINING PROGRAM

## 2020-09-12 RX ORDER — SODIUM CHLORIDE 0.9 % (FLUSH) 0.9 %
10 SYRINGE (ML) INJECTION EVERY 6 HOURS
Status: DISCONTINUED | OUTPATIENT
Start: 2020-09-12 | End: 2020-09-29 | Stop reason: HOSPADM

## 2020-09-12 RX ORDER — SODIUM CHLORIDE 0.9 % (FLUSH) 0.9 %
10 SYRINGE (ML) INJECTION
Status: DISCONTINUED | OUTPATIENT
Start: 2020-09-12 | End: 2020-09-29 | Stop reason: HOSPADM

## 2020-09-12 RX ADMIN — AMLODIPINE 5 MG: 1 SUSPENSION ORAL at 11:09

## 2020-09-12 RX ADMIN — HEPARIN SODIUM 5000 UNITS: 5000 INJECTION INTRAVENOUS; SUBCUTANEOUS at 11:09

## 2020-09-12 RX ADMIN — HYDROMORPHONE HYDROCHLORIDE 1 MG: 1 INJECTION, SOLUTION INTRAMUSCULAR; INTRAVENOUS; SUBCUTANEOUS at 09:09

## 2020-09-12 RX ADMIN — OXYCODONE HYDROCHLORIDE 15 MG: 5 SOLUTION ORAL at 12:09

## 2020-09-12 RX ADMIN — ACETAMINOPHEN 650 MG: 160 SOLUTION ORAL at 05:09

## 2020-09-12 RX ADMIN — HEPARIN SODIUM 5000 UNITS: 5000 INJECTION INTRAVENOUS; SUBCUTANEOUS at 05:09

## 2020-09-12 RX ADMIN — METOPROLOL TARTRATE 25 MG: 25 TABLET ORAL at 09:09

## 2020-09-12 RX ADMIN — HYDROMORPHONE HYDROCHLORIDE 1 MG: 1 INJECTION, SOLUTION INTRAMUSCULAR; INTRAVENOUS; SUBCUTANEOUS at 04:09

## 2020-09-12 RX ADMIN — METOPROLOL TARTRATE 25 MG: 25 TABLET ORAL at 11:09

## 2020-09-12 RX ADMIN — OXYCODONE HYDROCHLORIDE 10 MG: 5 SOLUTION ORAL at 05:09

## 2020-09-12 RX ADMIN — HYDROMORPHONE HYDROCHLORIDE 1 MG: 1 INJECTION, SOLUTION INTRAMUSCULAR; INTRAVENOUS; SUBCUTANEOUS at 10:09

## 2020-09-12 RX ADMIN — MICONAZOLE NITRATE: 20 POWDER TOPICAL at 11:09

## 2020-09-12 RX ADMIN — ACETAMINOPHEN 650 MG: 160 SOLUTION ORAL at 01:09

## 2020-09-12 RX ADMIN — PANTOPRAZOLE SODIUM 40 MG: 40 INJECTION, POWDER, LYOPHILIZED, FOR SOLUTION INTRAVENOUS at 09:09

## 2020-09-12 RX ADMIN — SMOFLIPID 250 ML: 6; 6; 5; 3 INJECTION, EMULSION INTRAVENOUS at 11:09

## 2020-09-12 RX ADMIN — HEPARIN SODIUM 5000 UNITS: 5000 INJECTION INTRAVENOUS; SUBCUTANEOUS at 01:09

## 2020-09-12 RX ADMIN — HYDROMORPHONE HYDROCHLORIDE 1 MG: 1 INJECTION, SOLUTION INTRAMUSCULAR; INTRAVENOUS; SUBCUTANEOUS at 01:09

## 2020-09-12 RX ADMIN — MICONAZOLE NITRATE: 20 POWDER TOPICAL at 09:09

## 2020-09-12 RX ADMIN — MAGNESIUM SULFATE HEPTAHYDRATE: 500 INJECTION, SOLUTION INTRAMUSCULAR; INTRAVENOUS at 11:09

## 2020-09-12 RX ADMIN — HYDROMORPHONE HYDROCHLORIDE 1 MG: 1 INJECTION, SOLUTION INTRAMUSCULAR; INTRAVENOUS; SUBCUTANEOUS at 05:09

## 2020-09-12 RX ADMIN — OXYCODONE HYDROCHLORIDE 15 MG: 5 SOLUTION ORAL at 01:09

## 2020-09-12 RX ADMIN — Medication 10 ML: at 05:09

## 2020-09-12 RX ADMIN — OXYCODONE HYDROCHLORIDE 10 MG: 5 SOLUTION ORAL at 08:09

## 2020-09-12 NOTE — PROCEDURES
"Jaquan Farmer is a 39 y.o. female patient.    Temp: 100.1 °F (37.8 °C) (09/12/20 1313)  Pulse: (!) 123 (09/12/20 1313)  Resp: 14 (09/12/20 1337)  BP: 123/77 (09/12/20 1313)  SpO2: 98 % (09/12/20 1313)  Weight: 67.5 kg (148 lb 13 oz) (09/05/20 0500)  Height: 5' 2" (157.5 cm) (08/30/20 0900)    PICC  Date/Time: 9/12/2020 4:31 PM  Performed by: Mike Felix RN  Assisting provider: Maria Antonia French LPN  Consent Done: Yes  Time out: Immediately prior to procedure a time out was called to verify the correct patient, procedure, equipment, support staff and site/side marked as required  Indications: med administration and vascular access  Anesthesia: local infiltration  Local anesthetic: lidocaine 1% without epinephrine  Anesthetic Total (mL): 3  Preparation: skin prepped with ChloraPrep  Skin prep agent dried: skin prep agent completely dried prior to procedure  Sterile barriers: all five maximum sterile barriers used - cap, mask, sterile gown, sterile gloves, and large sterile sheet  Hand hygiene: hand hygiene performed prior to central venous catheter insertion  Location details: right brachial  Catheter type: double lumen  Catheter size: 5 Fr  Catheter Length: 30cm    Ultrasound guidance: yes  Vessel Caliber: medium and patent, compressibility normal  Vascular Doppler: not done  Needle advanced into vessel with real time Ultrasound guidance.  Guidewire confirmed in vessel.  Image recorded and saved.  Sterile sheath used.  Number of attempts: 1  Post-procedure: blood return through all ports, chlorhexidine patch and sterile dressing applied  Technical procedures used: 3cg  Specimens: No  Implants: No  Assessment: placement verified by x-ray  Complications: none          Maria Antonia French  9/12/2020  "

## 2020-09-12 NOTE — PROGRESS NOTES
MD notified about temp of 102.1 and . Scheduled tylenol given. Other VS stable on room air. WCTM.

## 2020-09-12 NOTE — ASSESSMENT & PLAN NOTE
40 yo F s/p antrectomy with BII reconstruction c/b duodenal necrosis requiring ex lap, washout, drainage c/b aspiration event. S/p duodenal resection with d-j and g-j anastomosis on 8/13.    - On room air  - TPN   - Clear liquids with Boost  - Keep G tube clamped, unclamp for nausea/vomiting. Ok to use G tube for meds  - Will get central line out  - If spikes another fever, culture and give line holiday  - Zosyn/micafungin stopped 9/4 - persistent leukocytosis - will discuss utility of further abx therapy as patient not acting septic and has source control  - Daily labs  - FLORIDALMA has resolved, permacath no longer needed  - GI and DVT ppx  - PT / OT  - continue PRNs for BP control     Please have her come in for repeat CXR in 1-2 weeks. To urgent care or ER if worsening symptoms, shortness of breath, or fever >100.4. Otherwise, extra rest and fluids.

## 2020-09-12 NOTE — PLAN OF CARE
POC reviewed with patient, states understanding. AOx4. VS WDL, increased temp noted, scheduled tylenol given. Clear liquid diet, tolerated well. No complaints of nausea. Pain effectively managed per MAR. ML dressing c/d/i. Oz drains x 3 to sulb suction. TPN infusing per order. Up to BSC with stand by assistance. Will continue to manage POC.

## 2020-09-12 NOTE — PROGRESS NOTES
[facility]  General Surgery  Progress Note    Subjective:     History of Present Illness:  Pt is a 40 yo F with recent history of antrectomy with BII reconstruction for ulcer disease at outside hospital. 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 Patient also reportedly aspirated on induction in the OR prior to this, resulting in severe pulmonary edema and hypoxia. Patient was transferred to [facility] [place] for higher level of care. She arrived as a direct SICU admit on near maximal vent settings and requiring low dose vasopressors with HR in the upper 140s. Her midline laparotomy is closed with 4 Navdeep drains in place draining bilious output.     Post-Op Info:  Procedure(s) (LRB):  REMOVAL, CATHETER, CENTRAL VENOUS, TUNNELED (Left)  CLOSURE, WOUND (N/A)   1 Day Post-Op     Interval History:   Febrile overnight.  WBC elevated but stable  Drains with 100ml output    Medications:  Continuous Infusions:   TPN ADULT CENTRAL LINE CUSTOM 45 mL/hr at 09/11/20 2102    TPN ADULT CENTRAL LINE CUSTOM       Scheduled Meds:   acetaminophen  650 mg Per J Tube Q6H    amLODIPine benzoate  5 mg Per J Tube Daily    cloNIDine 0.3 mg/24 hr td ptwk  1 patch Transdermal Q7 Days    heparin (porcine)  5,000 Units Subcutaneous Q8H    lidocaine  1 patch Transdermal Q24H    lipid (SMOFLIPID)  250 mL Intravenous Daily    metoprolol  25 mg Per J Tube BID    miconazole NITRATE 2 %   Topical (Top) BID    pantoprazole  40 mg Intravenous Daily     PRN Meds:sodium chloride, albuterol-ipratropium, hydrALAZINE, HYDROmorphone, labetaloL, melatonin, metoprolol, oxyCODONE, oxyCODONE     Review of patient's allergies indicates:   Allergen Reactions    Latex      Objective:     Vital Signs (Most Recent):  Temp: 100.1 °F (37.8 °C) (09/12/20 0900)  Pulse: (!) 118 (09/12/20 0900)  Resp: 14 (09/12/20 0915)  BP: (!) 157/99 (09/12/20 0900)  SpO2: 98 % (09/12/20 0900) Vital Signs (24h  Range):  Temp:  [98.2 °F (36.8 °C)-102.1 °F (38.9 °C)] 100.1 °F (37.8 °C)  Pulse:  [116-123] 118  Resp:  [14-20] 14  SpO2:  [96 %-99 %] 98 %  BP: (129-157)/(75-99) 157/99     Weight: 67.5 kg (148 lb 13 oz)  Body mass index is 27.22 kg/m².    Intake/Output - Last 3 Shifts       09/10 0700 - 09/11 0659 09/11 0700 - 09/12 0659 09/12 0700 - 09/13 0659    P.O. 240      I.V. (mL/kg)  1500 (22.2)     Blood 310      NG/GT  110      1760 225    Total Intake(mL/kg) 955 (14.1) 3370 (49.9) 225 (3.3)    Urine (mL/kg/hr) 250 (0.2) 800 (0.5) 0 (0)    Emesis/NG output 250      Drains 120 240 45    Other 30 0     Stool 0 0     Total Output 650 1040 45    Net +305 +2330 +180           Urine Occurrence 3 x 6 x 2 x    Stool Occurrence 1 x 0 x           Physical Exam  Constitutional:       General: She is not in acute distress.  Cardiovascular:      Rate and Rhythm: Regular rhythm. Tachycardia present.   Pulmonary:      Effort: Pulmonary effort is normal. No respiratory distress.   Abdominal:      General: There is no distension.      Palpations: Abdomen is soft.      Tenderness: There is no abdominal tenderness.      Comments: Right sided drains with thick bile tinged output  Left drain serosang   Neurological:      Mental Status: She is alert.         Significant Labs:  CBC:   Recent Labs   Lab 09/12/20  1107   WBC 19.94*   RBC 2.68*   HGB 7.8*   HCT 25.3*   *   MCV 94   MCH 29.1   MCHC 30.8*     CMP:   Recent Labs   Lab 09/12/20  1107   *   CALCIUM 8.6*   ALBUMIN 1.8*   PROT 7.8      K 3.5   CO2 21*      BUN 47*   CREATININE 1.5*   ALKPHOS 218*   ALT 12   AST 23   BILITOT 0.3       Significant Diagnostics:  I have reviewed all pertinent imaging results/findings within the past 24 hours.    Assessment/Plan:     * Duodenum disorder  38 yo F s/p antrectomy with BII reconstruction c/b duodenal necrosis requiring ex lap, washout, drainage c/b aspiration event. S/p duodenal resection with d-j and g-j  anastomosis on 8/13.    - On room air  - TPN   - Clear liquids with Boost  - Keep G tube clamped, unclamp for nausea/vomiting. Ok to use G tube for meds  - Will get central line out  - If spikes another fever, culture and give line holiday  - Zosyn/micafungin stopped 9/4 - persistent leukocytosis - will discuss utility of further abx therapy as patient not acting septic and has source control  - Daily labs  - FLORIDALMA has resolved, permacath no longer needed  - GI and DVT ppx  - PT / OT  - continue PRNs for BP control          Christy Nicole MD  General Surgery  [facility]

## 2020-09-12 NOTE — CONSULTS
Double lumen PICC placed in right brachial vein of DIVYA, 30cm in length with 0cm exposed and 23cm arm circumference. Lot#[id number].    Line placed by: JUAN Felix RN

## 2020-09-12 NOTE — SUBJECTIVE & OBJECTIVE
Interval History:   Febrile overnight.  WBC elevated but stable  Drains with 100ml output    Medications:  Continuous Infusions:   TPN ADULT CENTRAL LINE CUSTOM 45 mL/hr at 09/11/20 2102    TPN ADULT CENTRAL LINE CUSTOM       Scheduled Meds:   acetaminophen  650 mg Per J Tube Q6H    amLODIPine benzoate  5 mg Per J Tube Daily    cloNIDine 0.3 mg/24 hr td ptwk  1 patch Transdermal Q7 Days    heparin (porcine)  5,000 Units Subcutaneous Q8H    lidocaine  1 patch Transdermal Q24H    lipid (SMOFLIPID)  250 mL Intravenous Daily    metoprolol  25 mg Per J Tube BID    miconazole NITRATE 2 %   Topical (Top) BID    pantoprazole  40 mg Intravenous Daily     PRN Meds:sodium chloride, albuterol-ipratropium, hydrALAZINE, HYDROmorphone, labetaloL, melatonin, metoprolol, oxyCODONE, oxyCODONE     Review of patient's allergies indicates:   Allergen Reactions    Latex      Objective:     Vital Signs (Most Recent):  Temp: 100.1 °F (37.8 °C) (09/12/20 0900)  Pulse: (!) 118 (09/12/20 0900)  Resp: 14 (09/12/20 0915)  BP: (!) 157/99 (09/12/20 0900)  SpO2: 98 % (09/12/20 0900) Vital Signs (24h Range):  Temp:  [98.2 °F (36.8 °C)-102.1 °F (38.9 °C)] 100.1 °F (37.8 °C)  Pulse:  [116-123] 118  Resp:  [14-20] 14  SpO2:  [96 %-99 %] 98 %  BP: (129-157)/(75-99) 157/99     Weight: 67.5 kg (148 lb 13 oz)  Body mass index is 27.22 kg/m².    Intake/Output - Last 3 Shifts       09/10 0700 - 09/11 0659 09/11 0700 - 09/12 0659 09/12 0700 - 09/13 0659    P.O. 240      I.V. (mL/kg)  1500 (22.2)     Blood 310      NG/GT  110      1760 225    Total Intake(mL/kg) 955 (14.1) 3370 (49.9) 225 (3.3)    Urine (mL/kg/hr) 250 (0.2) 800 (0.5) 0 (0)    Emesis/NG output 250      Drains 120 240 45    Other 30 0     Stool 0 0     Total Output 650 1040 45    Net +305 +2330 +180           Urine Occurrence 3 x 6 x 2 x    Stool Occurrence 1 x 0 x           Physical Exam  Constitutional:       General: She is not in acute distress.  Cardiovascular:      Rate  and Rhythm: Regular rhythm. Tachycardia present.   Pulmonary:      Effort: Pulmonary effort is normal. No respiratory distress.   Abdominal:      General: There is no distension.      Palpations: Abdomen is soft.      Tenderness: There is no abdominal tenderness.      Comments: Right sided drains with thick bile tinged output  Left drain serosang   Neurological:      Mental Status: She is alert.         Significant Labs:  CBC:   Recent Labs   Lab 09/12/20  1107   WBC 19.94*   RBC 2.68*   HGB 7.8*   HCT 25.3*   *   MCV 94   MCH 29.1   MCHC 30.8*     CMP:   Recent Labs   Lab 09/12/20  1107   *   CALCIUM 8.6*   ALBUMIN 1.8*   PROT 7.8      K 3.5   CO2 21*      BUN 47*   CREATININE 1.5*   ALKPHOS 218*   ALT 12   AST 23   BILITOT 0.3       Significant Diagnostics:  I have reviewed all pertinent imaging results/findings within the past 24 hours.

## 2020-09-12 NOTE — CARE UPDATE
Rapid Response Nurse Chart Check     Chart check completed, abnormal VS noted. Please call [phone] for further concerns or assistance.        s

## 2020-09-12 NOTE — NURSING
0715: report from Nataly SAMAYOA. Pt A&O. Reports pain 7/10. Has prn dilaudid IVP and oxycodone prn PO. Pt assisted to bedside commode and then to the chair. Pt had oral temperature of 102f overnight. Most recent temperature 100.1f oral. Continue to monitor.   0525-2724: MD wants central line d/c'd and PICC placed for TPN. Pt currently has trialysis and midline.   1540: consent for PICC line signed and witnessed. Waiting for PICC team to arrive. Continue to monitor.   1600: PICC team at bedside.   Midline d/c'd. Double lumen placed to ELIUD  1910: updates given to oncoming nurse

## 2020-09-12 NOTE — CARE UPDATE
Rapid Response Nurse Chart Check     Chart check completed, abnormal VS noted.  Instructed to call [phone] for further concerns or assistance.

## 2020-09-13 LAB
ALBUMIN SERPL BCP-MCNC: 1.9 G/DL (ref 3.5–5.2)
ALP SERPL-CCNC: 205 U/L (ref 55–135)
ALT SERPL W/O P-5'-P-CCNC: 9 U/L (ref 10–44)
ANION GAP SERPL CALC-SCNC: 13 MMOL/L (ref 8–16)
ANISOCYTOSIS BLD QL SMEAR: SLIGHT
AST SERPL-CCNC: 17 U/L (ref 10–40)
BASO STIPL BLD QL SMEAR: ABNORMAL
BASOPHILS # BLD AUTO: 0.06 K/UL (ref 0–0.2)
BASOPHILS NFR BLD: 0.2 % (ref 0–1.9)
BILIRUB SERPL-MCNC: 0.4 MG/DL (ref 0.1–1)
BUN SERPL-MCNC: 43 MG/DL (ref 6–20)
CALCIUM SERPL-MCNC: 9.1 MG/DL (ref 8.7–10.5)
CHLORIDE SERPL-SCNC: 104 MMOL/L (ref 95–110)
CO2 SERPL-SCNC: 22 MMOL/L (ref 23–29)
CREAT SERPL-MCNC: 1.4 MG/DL (ref 0.5–1.4)
DIFFERENTIAL METHOD: ABNORMAL
EOSINOPHIL # BLD AUTO: 0.2 K/UL (ref 0–0.5)
EOSINOPHIL NFR BLD: 0.8 % (ref 0–8)
ERYTHROCYTE [DISTWIDTH] IN BLOOD BY AUTOMATED COUNT: 16.2 % (ref 11.5–14.5)
EST. GFR  (AFRICAN AMERICAN): 54.6 ML/MIN/1.73 M^2
EST. GFR  (NON AFRICAN AMERICAN): 47.4 ML/MIN/1.73 M^2
GIANT PLATELETS BLD QL SMEAR: PRESENT
GLUCOSE SERPL-MCNC: 105 MG/DL (ref 70–110)
HCT VFR BLD AUTO: 26 % (ref 37–48.5)
HGB BLD-MCNC: 8.1 G/DL (ref 12–16)
IMM GRANULOCYTES # BLD AUTO: 0.14 K/UL (ref 0–0.04)
IMM GRANULOCYTES NFR BLD AUTO: 0.6 % (ref 0–0.5)
LYMPHOCYTES # BLD AUTO: 2.7 K/UL (ref 1–4.8)
LYMPHOCYTES NFR BLD: 11.1 % (ref 18–48)
MAGNESIUM SERPL-MCNC: 1.7 MG/DL (ref 1.6–2.6)
MCH RBC QN AUTO: 29.1 PG (ref 27–31)
MCHC RBC AUTO-ENTMCNC: 31.2 G/DL (ref 32–36)
MCV RBC AUTO: 94 FL (ref 82–98)
MONOCYTES # BLD AUTO: 1.8 K/UL (ref 0.3–1)
MONOCYTES NFR BLD: 7.4 % (ref 4–15)
NEUTROPHILS # BLD AUTO: 19.3 K/UL (ref 1.8–7.7)
NEUTROPHILS NFR BLD: 79.9 % (ref 38–73)
NRBC BLD-RTO: 0 /100 WBC
PHOSPHATE SERPL-MCNC: 3.4 MG/DL (ref 2.7–4.5)
PLATELET # BLD AUTO: 399 K/UL (ref 150–350)
PLATELET BLD QL SMEAR: ABNORMAL
PMV BLD AUTO: 10.8 FL (ref 9.2–12.9)
POCT GLUCOSE: 117 MG/DL (ref 70–110)
POCT GLUCOSE: 153 MG/DL (ref 70–110)
POCT GLUCOSE: 93 MG/DL (ref 70–110)
POLYCHROMASIA BLD QL SMEAR: ABNORMAL
POTASSIUM SERPL-SCNC: 3.6 MMOL/L (ref 3.5–5.1)
PROT SERPL-MCNC: 8 G/DL (ref 6–8.4)
RBC # BLD AUTO: 2.78 M/UL (ref 4–5.4)
SODIUM SERPL-SCNC: 139 MMOL/L (ref 136–145)
WBC # BLD AUTO: 24.22 K/UL (ref 3.9–12.7)

## 2020-09-13 PROCEDURE — A4216 STERILE WATER/SALINE, 10 ML: HCPCS | Performed by: SURGERY

## 2020-09-13 PROCEDURE — 20600001 HC STEP DOWN PRIVATE ROOM

## 2020-09-13 PROCEDURE — 63600175 PHARM REV CODE 636 W HCPCS: Performed by: STUDENT IN AN ORGANIZED HEALTH CARE EDUCATION/TRAINING PROGRAM

## 2020-09-13 PROCEDURE — 25000003 PHARM REV CODE 250: Performed by: STUDENT IN AN ORGANIZED HEALTH CARE EDUCATION/TRAINING PROGRAM

## 2020-09-13 PROCEDURE — 85025 COMPLETE CBC W/AUTO DIFF WBC: CPT

## 2020-09-13 PROCEDURE — 25000242 PHARM REV CODE 250 ALT 637 W/ HCPCS: Performed by: STUDENT IN AN ORGANIZED HEALTH CARE EDUCATION/TRAINING PROGRAM

## 2020-09-13 PROCEDURE — 84100 ASSAY OF PHOSPHORUS: CPT

## 2020-09-13 PROCEDURE — A4217 STERILE WATER/SALINE, 500 ML: HCPCS | Performed by: STUDENT IN AN ORGANIZED HEALTH CARE EDUCATION/TRAINING PROGRAM

## 2020-09-13 PROCEDURE — 83735 ASSAY OF MAGNESIUM: CPT

## 2020-09-13 PROCEDURE — C9113 INJ PANTOPRAZOLE SODIUM, VIA: HCPCS | Performed by: STUDENT IN AN ORGANIZED HEALTH CARE EDUCATION/TRAINING PROGRAM

## 2020-09-13 PROCEDURE — B4185 PARENTERAL SOL 10 GM LIPIDS: HCPCS | Performed by: SURGERY

## 2020-09-13 PROCEDURE — 25000003 PHARM REV CODE 250: Performed by: SURGERY

## 2020-09-13 PROCEDURE — 87040 BLOOD CULTURE FOR BACTERIA: CPT

## 2020-09-13 PROCEDURE — 80053 COMPREHEN METABOLIC PANEL: CPT

## 2020-09-13 RX ORDER — VANCOMYCIN HCL IN 5 % DEXTROSE 1G/250ML
1000 PLASTIC BAG, INJECTION (ML) INTRAVENOUS
Status: DISCONTINUED | OUTPATIENT
Start: 2020-09-15 | End: 2020-09-16

## 2020-09-13 RX ADMIN — Medication 10 ML: at 05:09

## 2020-09-13 RX ADMIN — Medication 10 ML: at 12:09

## 2020-09-13 RX ADMIN — OXYCODONE HYDROCHLORIDE 10 MG: 5 SOLUTION ORAL at 07:09

## 2020-09-13 RX ADMIN — HEPARIN SODIUM 5000 UNITS: 5000 INJECTION INTRAVENOUS; SUBCUTANEOUS at 05:09

## 2020-09-13 RX ADMIN — OXYCODONE HYDROCHLORIDE 10 MG: 5 SOLUTION ORAL at 02:09

## 2020-09-13 RX ADMIN — ACETAMINOPHEN 650 MG: 160 SOLUTION ORAL at 02:09

## 2020-09-13 RX ADMIN — MICONAZOLE NITRATE: 20 POWDER TOPICAL at 10:09

## 2020-09-13 RX ADMIN — HYDROMORPHONE HYDROCHLORIDE 1 MG: 1 INJECTION, SOLUTION INTRAMUSCULAR; INTRAVENOUS; SUBCUTANEOUS at 12:09

## 2020-09-13 RX ADMIN — PIPERACILLIN SODIUM AND TAZOBACTAM SODIUM 4.5 G: 4; .5 INJECTION, POWDER, LYOPHILIZED, FOR SOLUTION INTRAVENOUS at 01:09

## 2020-09-13 RX ADMIN — HEPARIN SODIUM 5000 UNITS: 5000 INJECTION INTRAVENOUS; SUBCUTANEOUS at 01:09

## 2020-09-13 RX ADMIN — PANTOPRAZOLE SODIUM 40 MG: 40 INJECTION, POWDER, LYOPHILIZED, FOR SOLUTION INTRAVENOUS at 09:09

## 2020-09-13 RX ADMIN — HYDROMORPHONE HYDROCHLORIDE 1 MG: 1 INJECTION, SOLUTION INTRAMUSCULAR; INTRAVENOUS; SUBCUTANEOUS at 03:09

## 2020-09-13 RX ADMIN — AMLODIPINE 5 MG: 1 SUSPENSION ORAL at 10:09

## 2020-09-13 RX ADMIN — SMOFLIPID 250 ML: 6; 6; 5; 3 INJECTION, EMULSION INTRAVENOUS at 10:09

## 2020-09-13 RX ADMIN — ACETAMINOPHEN 650 MG: 160 SOLUTION ORAL at 05:09

## 2020-09-13 RX ADMIN — OXYCODONE HYDROCHLORIDE 10 MG: 5 SOLUTION ORAL at 01:09

## 2020-09-13 RX ADMIN — ACETAMINOPHEN 650 MG: 160 SOLUTION ORAL at 01:09

## 2020-09-13 RX ADMIN — Medication 10 ML: at 06:09

## 2020-09-13 RX ADMIN — OXYCODONE HYDROCHLORIDE 10 MG: 5 SOLUTION ORAL at 05:09

## 2020-09-13 RX ADMIN — METOPROLOL TARTRATE 25 MG: 25 TABLET ORAL at 09:09

## 2020-09-13 RX ADMIN — HYDROMORPHONE HYDROCHLORIDE 1 MG: 1 INJECTION, SOLUTION INTRAMUSCULAR; INTRAVENOUS; SUBCUTANEOUS at 10:09

## 2020-09-13 RX ADMIN — OXYCODONE HYDROCHLORIDE 10 MG: 5 SOLUTION ORAL at 10:09

## 2020-09-13 RX ADMIN — PIPERACILLIN SODIUM AND TAZOBACTAM SODIUM 4.5 G: 4; .5 INJECTION, POWDER, LYOPHILIZED, FOR SOLUTION INTRAVENOUS at 07:09

## 2020-09-13 RX ADMIN — HEPARIN SODIUM 5000 UNITS: 5000 INJECTION INTRAVENOUS; SUBCUTANEOUS at 10:09

## 2020-09-13 RX ADMIN — MAGNESIUM SULFATE HEPTAHYDRATE: 500 INJECTION, SOLUTION INTRAMUSCULAR; INTRAVENOUS at 10:09

## 2020-09-13 RX ADMIN — METOPROLOL TARTRATE 25 MG: 25 TABLET ORAL at 10:09

## 2020-09-13 RX ADMIN — HYDROMORPHONE HYDROCHLORIDE 1 MG: 1 INJECTION, SOLUTION INTRAMUSCULAR; INTRAVENOUS; SUBCUTANEOUS at 07:09

## 2020-09-13 RX ADMIN — HYDROMORPHONE HYDROCHLORIDE 1 MG: 1 INJECTION, SOLUTION INTRAMUSCULAR; INTRAVENOUS; SUBCUTANEOUS at 05:09

## 2020-09-13 NOTE — ASSESSMENT & PLAN NOTE
40 yo F s/p antrectomy with BII reconstruction c/b duodenal necrosis requiring ex lap, washout, drainage c/b aspiration event. S/p duodenal resection with d-j and g-j anastomosis on 8/13.    - On room air  - TPN   - Clear liquids with Boost  - Keep G tube clamped, unclamp for nausea/vomiting. Ok to use G tube for meds  - Trialysis line removed. Blood cultures ordered 9/13  - Zosyn/micafungin stopped 9/4 - restart zosyn 9/13 for fever and leukocytosis  - Daily labs  - FLORIDALMA has resolved, permacath no longer needed  - GI and DVT ppx  - PT / OT  - continue PRNs for BP control

## 2020-09-13 NOTE — SUBJECTIVE & OBJECTIVE
Interval History: febrile overnight. trialysis removed today. Has picc in place for tpn. Old blood drainage from inferior poriton of incision. Does not appear to be infected.     Medications:  Continuous Infusions:   TPN ADULT CENTRAL LINE CUSTOM 45 mL/hr at 09/12/20 2302    TPN ADULT CENTRAL LINE CUSTOM       Scheduled Meds:   acetaminophen  650 mg Per J Tube Q6H    amLODIPine benzoate  5 mg Per J Tube Daily    cloNIDine 0.3 mg/24 hr td ptwk  1 patch Transdermal Q7 Days    heparin (porcine)  5,000 Units Subcutaneous Q8H    lidocaine  1 patch Transdermal Q24H    metoprolol  25 mg Per J Tube BID    miconazole NITRATE 2 %   Topical (Top) BID    pantoprazole  40 mg Intravenous Daily    piperacillin-tazobactam (ZOSYN) IVPB  4.5 g Intravenous Q8H    sodium chloride 0.9%  10 mL Intravenous Q6H     PRN Meds:sodium chloride, albuterol-ipratropium, hydrALAZINE, HYDROmorphone, labetaloL, melatonin, metoprolol, oxyCODONE, oxyCODONE, Flushing PICC Protocol **AND** sodium chloride 0.9% **AND** sodium chloride 0.9%     Review of patient's allergies indicates:   Allergen Reactions    Latex      Objective:     Vital Signs (Most Recent):  Temp: 99.2 °F (37.3 °C) (09/13/20 0756)  Pulse: (!) 118 (09/13/20 0756)  Resp: 18 (09/13/20 1045)  BP: 118/71 (09/13/20 0756)  SpO2: 99 % (09/13/20 0756) Vital Signs (24h Range):  Temp:  [98.4 °F (36.9 °C)-101.6 °F (38.7 °C)] 99.2 °F (37.3 °C)  Pulse:  [118-125] 118  Resp:  [14-20] 18  SpO2:  [98 %-100 %] 99 %  BP: (118-138)/(70-84) 118/71     Weight: 67.5 kg (148 lb 13 oz)  Body mass index is 27.22 kg/m².    Intake/Output - Last 3 Shifts       09/11 0700 - 09/12 0659 09/12 0700 - 09/13 0659 09/13 0700 - 09/14 0659    P.O.  480     I.V. (mL/kg) 1500 (22.2)      Blood       NG/      TPN 1760 225     Total Intake(mL/kg) 3370 (49.9) 705 (10.4)     Urine (mL/kg/hr) 800 (0.5) 0 (0)     Emesis/NG output       Drains 240 45 40    Other 0      Stool 0      Total Output 1040 45 40    Net  +2330 +660 -40           Urine Occurrence 6 x 7 x     Stool Occurrence 0 x            Physical Exam  Constitutional:       General: She is not in acute distress.  Cardiovascular:      Rate and Rhythm: Regular rhythm. Tachycardia present.   Pulmonary:      Effort: Pulmonary effort is normal. No respiratory distress.   Abdominal:      General: There is no distension.      Palpations: Abdomen is soft.      Tenderness: There is no abdominal tenderness.      Comments: Right sided drains with thick bile tinged output  Left drain serosang  Staple closure of midline laparotomy, old bloody drainage from inferior portion  G tube clamped   Musculoskeletal:      Comments: RUE picc   Neurological:      Mental Status: She is alert.         Significant Labs:  CBC:   Recent Labs   Lab 09/13/20  0600   WBC 24.22*   RBC 2.78*   HGB 8.1*   HCT 26.0*   *   MCV 94   MCH 29.1   MCHC 31.2*     CMP:   Recent Labs   Lab 09/13/20  0600      CALCIUM 9.1   ALBUMIN 1.9*   PROT 8.0      K 3.6   CO2 22*      BUN 43*   CREATININE 1.4   ALKPHOS 205*   ALT 9*   AST 17   BILITOT 0.4       Significant Diagnostics:  I have reviewed all pertinent imaging results/findings within the past 24 hours.

## 2020-09-13 NOTE — PROGRESS NOTES
[facility]  General Surgery  Progress Note    Subjective:     History of Present Illness:  Pt is a 38 yo F with recent history of antrectomy with BII reconstruction for ulcer disease at outside hospital. 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 Patient also reportedly aspirated on induction in the OR prior to this, resulting in severe pulmonary edema and hypoxia. [place] [facility] [place]. She arrived as a direct SICU admit on near maximal vent settings and requiring low dose vasopressors with HR in the upper 140s. Her midline laparotomy is closed with 4 Navdeep drains in place draining bilious output.     Post-Op Info:  Procedure(s) (LRB):  REMOVAL, CATHETER, CENTRAL VENOUS, TUNNELED (Left)  CLOSURE, WOUND (N/A)   2 Days Post-Op     Interval History: febrile overnight. trialysis removed today. Has picc in place for tpn. Old blood drainage from inferior poriton of incision. Does not appear to be infected.     Medications:  Continuous Infusions:   TPN ADULT CENTRAL LINE CUSTOM 45 mL/hr at 09/12/20 2302    TPN ADULT CENTRAL LINE CUSTOM       Scheduled Meds:   acetaminophen  650 mg Per J Tube Q6H    amLODIPine benzoate  5 mg Per J Tube Daily    cloNIDine 0.3 mg/24 hr td ptwk  1 patch Transdermal Q7 Days    heparin (porcine)  5,000 Units Subcutaneous Q8H    lidocaine  1 patch Transdermal Q24H    metoprolol  25 mg Per J Tube BID    miconazole NITRATE 2 %   Topical (Top) BID    pantoprazole  40 mg Intravenous Daily    piperacillin-tazobactam (ZOSYN) IVPB  4.5 g Intravenous Q8H    sodium chloride 0.9%  10 mL Intravenous Q6H     PRN Meds:sodium chloride, albuterol-ipratropium, hydrALAZINE, HYDROmorphone, labetaloL, melatonin, metoprolol, oxyCODONE, oxyCODONE, Flushing PICC Protocol **AND** sodium chloride 0.9% **AND** sodium chloride 0.9%     Review of patient's allergies indicates:   Allergen Reactions     Latex      Objective:     Vital Signs (Most Recent):  Temp: 99.2 °F (37.3 °C) (09/13/20 0756)  Pulse: (!) 118 (09/13/20 0756)  Resp: 18 (09/13/20 1045)  BP: 118/71 (09/13/20 0756)  SpO2: 99 % (09/13/20 0756) Vital Signs (24h Range):  Temp:  [98.4 °F (36.9 °C)-101.6 °F (38.7 °C)] 99.2 °F (37.3 °C)  Pulse:  [118-125] 118  Resp:  [14-20] 18  SpO2:  [98 %-100 %] 99 %  BP: (118-138)/(70-84) 118/71     Weight: 67.5 kg (148 lb 13 oz)  Body mass index is 27.22 kg/m².    Intake/Output - Last 3 Shifts       09/11 0700 - 09/12 0659 09/12 0700 - 09/13 0659 09/13 0700 - 09/14 0659    P.O.  480     I.V. (mL/kg) 1500 (22.2)      Blood       NG/      TPN 1760 225     Total Intake(mL/kg) 3370 (49.9) 705 (10.4)     Urine (mL/kg/hr) 800 (0.5) 0 (0)     Emesis/NG output       Drains 240 45 40    Other 0      Stool 0      Total Output 1040 45 40    Net +2330 +660 -40           Urine Occurrence 6 x 7 x     Stool Occurrence 0 x            Physical Exam  Constitutional:       General: She is not in acute distress.  Cardiovascular:      Rate and Rhythm: Regular rhythm. Tachycardia present.   Pulmonary:      Effort: Pulmonary effort is normal. No respiratory distress.   Abdominal:      General: There is no distension.      Palpations: Abdomen is soft.      Tenderness: There is no abdominal tenderness.      Comments: Right sided drains with thick bile tinged output  Left drain serosang  Staple closure of midline laparotomy, old bloody drainage from inferior portion  G tube clamped   Musculoskeletal:      Comments: RUE picc   Neurological:      Mental Status: She is alert.         Significant Labs:  CBC:   Recent Labs   Lab 09/13/20  0600   WBC 24.22*   RBC 2.78*   HGB 8.1*   HCT 26.0*   *   MCV 94   MCH 29.1   MCHC 31.2*     CMP:   Recent Labs   Lab 09/13/20  0600      CALCIUM 9.1   ALBUMIN 1.9*   PROT 8.0      K 3.6   CO2 22*      BUN 43*   CREATININE 1.4   ALKPHOS 205*   ALT 9*   AST 17   BILITOT 0.4        Significant Diagnostics:  I have reviewed all pertinent imaging results/findings within the past 24 hours.    Assessment/Plan:     * Duodenum disorder  38 yo F s/p antrectomy with BII reconstruction c/b duodenal necrosis requiring ex lap, washout, drainage c/b aspiration event. S/p duodenal resection with d-j and g-j anastomosis on 8/13.    - On room air  - TPN   - Clear liquids with Boost  - Keep G tube clamped, unclamp for nausea/vomiting. Ok to use G tube for meds  - Trialysis line removed. Blood cultures ordered 9/13  - Zosyn/micafungin stopped 9/4 - restart zosyn 9/13 for fever and leukocytosis  - Daily labs  - FLORIDALMA has resolved, permacath no longer needed  - GI and DVT ppx  - PT / OT  - continue PRNs for BP control          Verna Burris MD  General Surgery  [facility]

## 2020-09-14 LAB
ALBUMIN SERPL BCP-MCNC: 1.8 G/DL (ref 3.5–5.2)
ALP SERPL-CCNC: 213 U/L (ref 55–135)
ALT SERPL W/O P-5'-P-CCNC: 11 U/L (ref 10–44)
ANION GAP SERPL CALC-SCNC: 13 MMOL/L (ref 8–16)
ANISOCYTOSIS BLD QL SMEAR: SLIGHT
AST SERPL-CCNC: 19 U/L (ref 10–40)
BASOPHILS # BLD AUTO: 0.06 K/UL (ref 0–0.2)
BASOPHILS NFR BLD: 0.3 % (ref 0–1.9)
BILIRUB SERPL-MCNC: 0.4 MG/DL (ref 0.1–1)
BUN SERPL-MCNC: 38 MG/DL (ref 6–20)
CALCIUM SERPL-MCNC: 8.7 MG/DL (ref 8.7–10.5)
CHLORIDE SERPL-SCNC: 101 MMOL/L (ref 95–110)
CO2 SERPL-SCNC: 23 MMOL/L (ref 23–29)
CREAT SERPL-MCNC: 1.2 MG/DL (ref 0.5–1.4)
DIFFERENTIAL METHOD: ABNORMAL
EOSINOPHIL # BLD AUTO: 0.3 K/UL (ref 0–0.5)
EOSINOPHIL NFR BLD: 1.8 % (ref 0–8)
ERYTHROCYTE [DISTWIDTH] IN BLOOD BY AUTOMATED COUNT: 15.9 % (ref 11.5–14.5)
EST. GFR  (AFRICAN AMERICAN): >60 ML/MIN/1.73 M^2
EST. GFR  (NON AFRICAN AMERICAN): 57.1 ML/MIN/1.73 M^2
GLUCOSE SERPL-MCNC: 114 MG/DL (ref 70–110)
HCT VFR BLD AUTO: 24.3 % (ref 37–48.5)
HGB BLD-MCNC: 7.5 G/DL (ref 12–16)
HYPOCHROMIA BLD QL SMEAR: ABNORMAL
IMM GRANULOCYTES # BLD AUTO: 0.14 K/UL (ref 0–0.04)
IMM GRANULOCYTES NFR BLD AUTO: 0.7 % (ref 0–0.5)
LYMPHOCYTES # BLD AUTO: 2.1 K/UL (ref 1–4.8)
LYMPHOCYTES NFR BLD: 10.7 % (ref 18–48)
MAGNESIUM SERPL-MCNC: 1.7 MG/DL (ref 1.6–2.6)
MCH RBC QN AUTO: 29.1 PG (ref 27–31)
MCHC RBC AUTO-ENTMCNC: 30.9 G/DL (ref 32–36)
MCV RBC AUTO: 94 FL (ref 82–98)
MONOCYTES # BLD AUTO: 1.6 K/UL (ref 0.3–1)
MONOCYTES NFR BLD: 8.1 % (ref 4–15)
NEUTROPHILS # BLD AUTO: 15.1 K/UL (ref 1.8–7.7)
NEUTROPHILS NFR BLD: 78.4 % (ref 38–73)
NRBC BLD-RTO: 0 /100 WBC
OVALOCYTES BLD QL SMEAR: ABNORMAL
PHOSPHATE SERPL-MCNC: 2.9 MG/DL (ref 2.7–4.5)
PLATELET # BLD AUTO: 387 K/UL (ref 150–350)
PMV BLD AUTO: 10.7 FL (ref 9.2–12.9)
POCT GLUCOSE: 121 MG/DL (ref 70–110)
POCT GLUCOSE: 145 MG/DL (ref 70–110)
POIKILOCYTOSIS BLD QL SMEAR: SLIGHT
POLYCHROMASIA BLD QL SMEAR: ABNORMAL
POTASSIUM SERPL-SCNC: 3.4 MMOL/L (ref 3.5–5.1)
PROT SERPL-MCNC: 7.8 G/DL (ref 6–8.4)
RBC # BLD AUTO: 2.58 M/UL (ref 4–5.4)
SODIUM SERPL-SCNC: 137 MMOL/L (ref 136–145)
TRIGL SERPL-MCNC: 193 MG/DL (ref 30–150)
WBC # BLD AUTO: 19.26 K/UL (ref 3.9–12.7)

## 2020-09-14 PROCEDURE — 25000003 PHARM REV CODE 250: Performed by: STUDENT IN AN ORGANIZED HEALTH CARE EDUCATION/TRAINING PROGRAM

## 2020-09-14 PROCEDURE — 97535 SELF CARE MNGMENT TRAINING: CPT

## 2020-09-14 PROCEDURE — 25000003 PHARM REV CODE 250: Performed by: SURGERY

## 2020-09-14 PROCEDURE — 97530 THERAPEUTIC ACTIVITIES: CPT

## 2020-09-14 PROCEDURE — 20600001 HC STEP DOWN PRIVATE ROOM

## 2020-09-14 PROCEDURE — 84100 ASSAY OF PHOSPHORUS: CPT

## 2020-09-14 PROCEDURE — 94761 N-INVAS EAR/PLS OXIMETRY MLT: CPT

## 2020-09-14 PROCEDURE — 25000242 PHARM REV CODE 250 ALT 637 W/ HCPCS: Performed by: STUDENT IN AN ORGANIZED HEALTH CARE EDUCATION/TRAINING PROGRAM

## 2020-09-14 PROCEDURE — 80053 COMPREHEN METABOLIC PANEL: CPT

## 2020-09-14 PROCEDURE — 85025 COMPLETE CBC W/AUTO DIFF WBC: CPT

## 2020-09-14 PROCEDURE — 63600175 PHARM REV CODE 636 W HCPCS: Performed by: STUDENT IN AN ORGANIZED HEALTH CARE EDUCATION/TRAINING PROGRAM

## 2020-09-14 PROCEDURE — B4185 PARENTERAL SOL 10 GM LIPIDS: HCPCS | Performed by: STUDENT IN AN ORGANIZED HEALTH CARE EDUCATION/TRAINING PROGRAM

## 2020-09-14 PROCEDURE — 84478 ASSAY OF TRIGLYCERIDES: CPT

## 2020-09-14 PROCEDURE — A4217 STERILE WATER/SALINE, 500 ML: HCPCS | Performed by: STUDENT IN AN ORGANIZED HEALTH CARE EDUCATION/TRAINING PROGRAM

## 2020-09-14 PROCEDURE — A4216 STERILE WATER/SALINE, 10 ML: HCPCS | Performed by: SURGERY

## 2020-09-14 PROCEDURE — 97116 GAIT TRAINING THERAPY: CPT | Mod: CQ

## 2020-09-14 PROCEDURE — C9113 INJ PANTOPRAZOLE SODIUM, VIA: HCPCS | Performed by: STUDENT IN AN ORGANIZED HEALTH CARE EDUCATION/TRAINING PROGRAM

## 2020-09-14 PROCEDURE — 83735 ASSAY OF MAGNESIUM: CPT

## 2020-09-14 PROCEDURE — 97530 THERAPEUTIC ACTIVITIES: CPT | Mod: CQ

## 2020-09-14 RX ORDER — POTASSIUM CHLORIDE 7.45 MG/ML
10 INJECTION INTRAVENOUS
Status: COMPLETED | OUTPATIENT
Start: 2020-09-14 | End: 2020-09-14

## 2020-09-14 RX ADMIN — OXYCODONE HYDROCHLORIDE 10 MG: 5 SOLUTION ORAL at 12:09

## 2020-09-14 RX ADMIN — OXYCODONE HYDROCHLORIDE 10 MG: 5 SOLUTION ORAL at 04:09

## 2020-09-14 RX ADMIN — MICONAZOLE NITRATE: 20 POWDER TOPICAL at 09:09

## 2020-09-14 RX ADMIN — HYDROMORPHONE HYDROCHLORIDE 1 MG: 1 INJECTION, SOLUTION INTRAMUSCULAR; INTRAVENOUS; SUBCUTANEOUS at 11:09

## 2020-09-14 RX ADMIN — POTASSIUM CHLORIDE 10 MEQ: 10 INJECTION, SOLUTION INTRAVENOUS at 01:09

## 2020-09-14 RX ADMIN — PANTOPRAZOLE SODIUM 40 MG: 40 INJECTION, POWDER, LYOPHILIZED, FOR SOLUTION INTRAVENOUS at 09:09

## 2020-09-14 RX ADMIN — HYDROMORPHONE HYDROCHLORIDE 1 MG: 1 INJECTION, SOLUTION INTRAMUSCULAR; INTRAVENOUS; SUBCUTANEOUS at 09:09

## 2020-09-14 RX ADMIN — POTASSIUM CHLORIDE 10 MEQ: 10 INJECTION, SOLUTION INTRAVENOUS at 02:09

## 2020-09-14 RX ADMIN — OXYCODONE HYDROCHLORIDE 10 MG: 5 SOLUTION ORAL at 06:09

## 2020-09-14 RX ADMIN — OXYCODONE HYDROCHLORIDE 10 MG: 5 SOLUTION ORAL at 01:09

## 2020-09-14 RX ADMIN — ACETAMINOPHEN 650 MG: 160 SOLUTION ORAL at 06:09

## 2020-09-14 RX ADMIN — HEPARIN SODIUM 5000 UNITS: 5000 INJECTION INTRAVENOUS; SUBCUTANEOUS at 06:09

## 2020-09-14 RX ADMIN — POTASSIUM CHLORIDE 10 MEQ: 10 INJECTION, SOLUTION INTRAVENOUS at 09:09

## 2020-09-14 RX ADMIN — Medication 10 ML: at 12:09

## 2020-09-14 RX ADMIN — METOPROLOL TARTRATE 25 MG: 25 TABLET ORAL at 09:09

## 2020-09-14 RX ADMIN — HYDROMORPHONE HYDROCHLORIDE 1 MG: 1 INJECTION, SOLUTION INTRAMUSCULAR; INTRAVENOUS; SUBCUTANEOUS at 06:09

## 2020-09-14 RX ADMIN — ACETAMINOPHEN 650 MG: 160 SOLUTION ORAL at 12:09

## 2020-09-14 RX ADMIN — PIPERACILLIN SODIUM AND TAZOBACTAM SODIUM 4.5 G: 4; .5 INJECTION, POWDER, LYOPHILIZED, FOR SOLUTION INTRAVENOUS at 11:09

## 2020-09-14 RX ADMIN — HEPARIN SODIUM 5000 UNITS: 5000 INJECTION INTRAVENOUS; SUBCUTANEOUS at 01:09

## 2020-09-14 RX ADMIN — HYDROMORPHONE HYDROCHLORIDE 1 MG: 1 INJECTION, SOLUTION INTRAMUSCULAR; INTRAVENOUS; SUBCUTANEOUS at 05:09

## 2020-09-14 RX ADMIN — VANCOMYCIN HYDROCHLORIDE 1250 MG: 1.25 INJECTION, POWDER, LYOPHILIZED, FOR SOLUTION INTRAVENOUS at 12:09

## 2020-09-14 RX ADMIN — OXYCODONE HYDROCHLORIDE 10 MG: 5 SOLUTION ORAL at 09:09

## 2020-09-14 RX ADMIN — Medication 10 ML: at 06:09

## 2020-09-14 RX ADMIN — OXYCODONE HYDROCHLORIDE 10 MG: 5 SOLUTION ORAL at 11:09

## 2020-09-14 RX ADMIN — AMLODIPINE 5 MG: 1 SUSPENSION ORAL at 09:09

## 2020-09-14 RX ADMIN — SMOFLIPID 250 ML: 6; 6; 5; 3 INJECTION, EMULSION INTRAVENOUS at 09:09

## 2020-09-14 RX ADMIN — ACETAMINOPHEN 650 MG: 160 SOLUTION ORAL at 05:09

## 2020-09-14 RX ADMIN — PIPERACILLIN SODIUM AND TAZOBACTAM SODIUM 4.5 G: 4; .5 INJECTION, POWDER, LYOPHILIZED, FOR SOLUTION INTRAVENOUS at 04:09

## 2020-09-14 RX ADMIN — HEPARIN SODIUM 5000 UNITS: 5000 INJECTION INTRAVENOUS; SUBCUTANEOUS at 09:09

## 2020-09-14 RX ADMIN — HYDROMORPHONE HYDROCHLORIDE 1 MG: 1 INJECTION, SOLUTION INTRAMUSCULAR; INTRAVENOUS; SUBCUTANEOUS at 02:09

## 2020-09-14 RX ADMIN — LIDOCAINE 1 PATCH: 50 PATCH CUTANEOUS at 10:09

## 2020-09-14 RX ADMIN — MAGNESIUM SULFATE HEPTAHYDRATE: 500 INJECTION, SOLUTION INTRAMUSCULAR; INTRAVENOUS at 09:09

## 2020-09-14 RX ADMIN — POTASSIUM CHLORIDE 10 MEQ: 10 INJECTION, SOLUTION INTRAVENOUS at 11:09

## 2020-09-14 RX ADMIN — PIPERACILLIN SODIUM AND TAZOBACTAM SODIUM 4.5 G: 4; .5 INJECTION, POWDER, LYOPHILIZED, FOR SOLUTION INTRAVENOUS at 09:09

## 2020-09-14 RX ADMIN — ACETAMINOPHEN 650 MG: 160 SOLUTION ORAL at 11:09

## 2020-09-14 NOTE — PT/OT/SLP PROGRESS
"Physical Therapy Treatment    Patient Name:  Jaquan Farmer   MRN:  [id number]    Recommendations:     Discharge Recommendations:  rehabilitation facility   Discharge Equipment Recommendations: (TBD)   Barriers to discharge: None    Assessment:     Jaquan Farmer is a 39 y.o. female admitted with a medical diagnosis of Duodenum disorder.  She presents with the following impairments/functional limitations:  weakness, impaired endurance, impaired self care skills, impaired functional mobilty, gait instability, impaired balance, pain, impaired cardiopulmonary response to activity, impaired skin.    Rehab Prognosis: Good; patient would benefit from acute skilled PT services to address these deficits and reach maximum level of function.    Recent Surgery: Procedure(s) (LRB):  REMOVAL, CATHETER, CENTRAL VENOUS, TUNNELED (Left)  CLOSURE, WOUND (N/A) 3 Days Post-Op    Plan:     During this hospitalization, patient to be seen 4 x/week to address the identified rehab impairments via gait training, therapeutic activities, therapeutic exercises, neuromuscular re-education and progress toward the following goals:    · Plan of Care Expires:  10/01/20    Subjective     Chief Complaint: pain and fatigue  Patient/Family Comments/goals: "I just passed my first bowel movement in a couple days and I feel awful."  Pain/Comfort:  · Pain Rating 1: 7/10  · Location - Orientation 1: generalized  · Location 1: abdomen  · Pain Addressed 1: Reposition, Distraction, Cessation of Activity, Nurse notified  · Pain Rating Post-Intervention 1: 8/10      Objective:     Communicated with NSG prior to session.  Patient found on bedside commode requesting assistance with pericare and transfer from commode with peripheral IV, telemetry, GODFREY drain upon PTA entry to room.     General Precautions: Standard, fall   Orthopedic Precautions:N/A   Braces: N/A     Functional Mobility:  · Transfers:     · Sit to Stand:  contact guard assistance and minimum " assistance with rolling walker  · Bed to Chair: contact guard assistance and minimum assistance with  hand-held assist and rolling walker  using  Stand Pivot  · Gait: Pt ambulates ~38 ft, ~48, and ~10 ft with RW and Min A. Pt requires assistance for RW mgmt and safety with decreased stability. Pt motivated to participate and push through pain but pt clearly in pain and with EDUARDO, requiring therapist to request pt to sit for rest breaks. Seated rests between all trials.       AM-PAC 6 CLICK MOBILITY  Turning over in bed (including adjusting bedclothes, sheets and blankets)?: 3  Sitting down on and standing up from a chair with arms (e.g., wheelchair, bedside commode, etc.): 3  Moving from lying on back to sitting on the side of the bed?: 3  Moving to and from a bed to a chair (including a wheelchair)?: 3  Need to walk in hospital room?: 3  Climbing 3-5 steps with a railing?: 2  Basic Mobility Total Score: 17       Therapeutic Activities and Exercises:   Pt assisted with functional mobility as noted above.   Pt requires Max A for pericare in standing.   Pt assisted with transfer back to commode at sessions end for further toileting with pt reporting pain. NSG informed and aware.     Patient left on bedside commode with all lines intact, call button in reach and NSG notified..    GOALS:   Multidisciplinary Problems     Physical Therapy Goals        Problem: Physical Therapy Goal    Goal Priority Disciplines Outcome Goal Variances Interventions   Physical Therapy Goal     PT, PT/OT Ongoing, Progressing     Description: Goals to be met by: 9/15/2020     Patient will increase functional independence with mobility by performin. Supine to sit with MInimal Assistance  2. Sit to stand transfer with Minimal Assistance with LRAD - met ()  3. Gait  x 50 feet with Minimal Assistance using LRAD.   4. Stand for 3 minutes with Contact Guard Assistance using LRAD - met ()  5. Lower extremity exercise program x15 reps per  handout, with independence                     Time Tracking:     PT Received On: 09/14/20  PT Start Time: 0910     PT Stop Time: 0949  PT Total Time (min): 39 min     Billable Minutes: Gait Training 16 and Therapeutic Activity 23    Treatment Type: Treatment  PT/PTA: PTA     PTA Visit Number: 3     Frank Bruno, PTA  09/14/2020

## 2020-09-14 NOTE — SUBJECTIVE & OBJECTIVE
Interval History: WBC improving.  T max 100.6 overnight.  Tolerating liquids    Medications:  Continuous Infusions:   TPN ADULT CENTRAL LINE CUSTOM 45 mL/hr at 09/13/20 2233    TPN ADULT CENTRAL LINE CUSTOM       Scheduled Meds:   acetaminophen  650 mg Per J Tube Q6H    amLODIPine benzoate  5 mg Per J Tube Daily    cloNIDine 0.3 mg/24 hr td ptwk  1 patch Transdermal Q7 Days    heparin (porcine)  5,000 Units Subcutaneous Q8H    lidocaine  1 patch Transdermal Q24H    lipid (SMOFLIPID)  250 mL Intravenous Daily    metoprolol  25 mg Per J Tube BID    miconazole NITRATE 2 %   Topical (Top) BID    pantoprazole  40 mg Intravenous Daily    piperacillin-tazobactam (ZOSYN) IVPB  4.5 g Intravenous Q8H    sodium chloride 0.9%  10 mL Intravenous Q6H    vancomycin (VANCOCIN) IVPB  1,000 mg Intravenous Q24H     PRN Meds:sodium chloride, albuterol-ipratropium, hydrALAZINE, HYDROmorphone, labetaloL, melatonin, metoprolol, oxyCODONE, oxyCODONE, Flushing PICC Protocol **AND** sodium chloride 0.9% **AND** sodium chloride 0.9%, Pharmacy to dose Vancomycin consult **AND** vancomycin - pharmacy to dose     Review of patient's allergies indicates:   Allergen Reactions    Latex      Objective:     Vital Signs (Most Recent):  Temp: 98.4 °F (36.9 °C) (09/14/20 0748)  Pulse: (!) 123 (09/14/20 0748)  Resp: 18 (09/14/20 0748)  BP: 114/76 (09/14/20 0748)  SpO2: 100 % (09/14/20 0748) Vital Signs (24h Range):  Temp:  [97.2 °F (36.2 °C)-100.6 °F (38.1 °C)] 98.4 °F (36.9 °C)  Pulse:  [123-127] 123  Resp:  [16-22] 18  SpO2:  [98 %-100 %] 100 %  BP: (114-150)/(71-83) 114/76     Weight: 67.5 kg (148 lb 13 oz)  Body mass index is 27.22 kg/m².    Intake/Output - Last 3 Shifts       09/12 0700 - 09/13 0659 09/13 0700 - 09/14 0659 09/14 0700 - 09/15 0659    P.O. 480 650     I.V. (mL/kg)       NG/GT       IV Piggyback  100      1148.5     Total Intake(mL/kg) 705 (10.4) 1898.5 (28.1)     Urine (mL/kg/hr) 0 (0) 700 (0.4)     Drains 45  127.5     Other       Stool  0     Total Output 45 827.5     Net +660 +1071            Urine Occurrence 7 x      Stool Occurrence  0 x           Physical Exam  Constitutional:       General: She is not in acute distress.  Cardiovascular:      Rate and Rhythm: Regular rhythm. Tachycardia present.   Pulmonary:      Effort: Pulmonary effort is normal. No respiratory distress.   Abdominal:      General: There is no distension.      Palpations: Abdomen is soft.      Tenderness: There is no abdominal tenderness.      Comments: Right sided drains with thick bile tinged output  Left drain serosang  Staple closure of midline laparotomy, old bloody drainage from inferior portion  G tube clamped   Musculoskeletal:      Comments: RUE picc   Neurological:      Mental Status: She is alert.         Significant Labs:  CBC:   Recent Labs   Lab 09/14/20  0440   WBC 19.26*   RBC 2.58*   HGB 7.5*   HCT 24.3*   *   MCV 94   MCH 29.1   MCHC 30.9*     CMP:   Recent Labs   Lab 09/14/20  0440   *   CALCIUM 8.7   ALBUMIN 1.8*   PROT 7.8      K 3.4*   CO2 23      BUN 38*   CREATININE 1.2   ALKPHOS 213*   ALT 11   AST 19   BILITOT 0.4       Significant Diagnostics:  I have reviewed all pertinent imaging results/findings within the past 24 hours.

## 2020-09-14 NOTE — ASSESSMENT & PLAN NOTE
38 yo F s/p antrectomy with BII reconstruction c/b duodenal necrosis requiring ex lap, washout, drainage c/b aspiration event. S/p duodenal resection with d-j and g-j anastomosis on 8/13.    - On room air  - TPN   - Clear liquids with Boost  - Keep G tube clamped, unclamp for nausea/vomiting. Ok to use G tube for meds  - Trialysis line removed. Blood cultures ordered 9/13  - Zosyn/micafungin stopped 9/4 - restart zosyn 9/13 for fever and leukocytosis  - Daily labs  - FLORIDALMA has resolved, permacath no longer needed  - GI and DVT ppx  - PT / OT  - continue PRNs for BP control

## 2020-09-14 NOTE — PROGRESS NOTES
Pharmacokinetic Initial Assessment: IV Vancomycin    Assessment/Plan:    Initiate intravenous vancomycin with loading dose of 1250 mg once followed by a maintenance dose of vancomycin 1000mg IV every 24 hours  Desired empiric serum trough concentration is 10 to 20 mcg/mL  Draw vancomycin trough level 60 min prior to third dose on 9/15/20 at approximately 1930.  Pharmacy will continue to follow and monitor vancomycin.      Please contact pharmacy at extension [phone] with any questions regarding this assessment.     Thank you for the consult,   Jensen Chahal       Patient brief summary:  Jaquan Farmer is a 39 y.o. female initiated on antimicrobial therapy with IV Vancomycin for treatment of suspected intra-abdominal infection    Drug Allergies:   Review of patient's allergies indicates:   Allergen Reactions    Latex        Actual Body Weight:   67.5 kg    Renal Function:   Estimated Creatinine Clearance: 48.6 mL/min (based on SCr of 1.4 mg/dL).,     Dialysis Method (if applicable):  N/A    CBC (last 72 hours):  Recent Labs   Lab Result Units 09/11/20 0425 09/12/20 0305 09/12/20  1107 09/13/20  0600   WBC K/uL 19.06* 20.53* 19.94* 24.22*   Hemoglobin g/dL 8.2* 8.1* 7.8* 8.1*   Hematocrit % 26.3* 26.5* 25.3* 26.0*   Platelets K/uL 340 371* 366* 399*   Gran% % 82.0* 82.4* 79.5* 79.9*   Lymph% % 8.9* 9.2* 10.6* 11.1*   Mono% % 6.0 6.0 7.7 7.4   Eosinophil% % 2.0 1.4 1.2 0.8   Basophil% % 0.3 0.3 0.3 0.2   Differential Method  Automated Automated Automated Automated       Metabolic Panel (last 72 hours):  Recent Labs   Lab Result Units 09/11/20 0425 09/12/20  0305 09/12/20  1107 09/13/20  0600   Sodium mmol/L 138 136 136 139   Potassium mmol/L 3.5 3.4* 3.5 3.6   Chloride mmol/L 105 103 103 104   CO2 mmol/L 19* 22* 21* 22*   Glucose mg/dL 99 119* 123* 105   BUN, Bld mg/dL 55* 48* 47* 43*   Creatinine mg/dL 1.8* 1.6* 1.5* 1.4   Albumin g/dL 1.9* 1.9* 1.8* 1.9*   Total Bilirubin mg/dL 0.4 0.3 0.3 0.4   Alkaline  Phosphatase U/L 240* 211* 218* 205*   AST U/L 28 19 23 17   ALT U/L 20 14 12 9*   Magnesium mg/dL 1.9 1.8 1.8 1.7   Phosphorus mg/dL 4.4 4.2 3.7 3.4       Drug levels (last 3 results):  No results for input(s): VANCOMYCINRA, VANCOMYCINPE, VANCOMYCINTR in the last 72 hours.    Microbiologic Results:  Microbiology Results (last 7 days)       Procedure Component Value Units Date/Time    Blood culture [[id number]] Collected: 09/13/20 1239    Order Status: Sent Specimen: Blood Updated: 09/13/20 1258    Blood culture [[id number]] Collected: 09/13/20 1239    Order Status: Sent Specimen: Blood Updated: 09/13/20 1258

## 2020-09-14 NOTE — PLAN OF CARE
Plan of care reviewed with pt, who verbalized understanding. Abd ML incision w/ sutures intact, gauze/abd pad dressing changed x2 for serosanguinous drainage distal portion of incision. Left flank and upper right flank GODFREY drains w/ minimal output, right inferior GODFREY emptied frequently for brownish-green drainage. TPN @ 45, tolerating clear liquids. X2 loose, liquid BM's per shift, AUO. Pt ambulating w/ assistance, spend a few hours in the chair, frequent weight shifting encouraged. Pain managed well w/ PRN meds. Call bell in reach, bed locked in lowest position. Frequent rounds made for pt safety. AAOx4, VSS, will continue to monitor.

## 2020-09-14 NOTE — PLAN OF CARE
Problem: Occupational Therapy Goal  Goal: Occupational Therapy Goal  Description: Goals to be met by: 9/11/20     Patient will increase functional independence with ADLs by performing:    Feeding with [place].  UE Dressing with Stand-by Assistance.  LE Dressing with Minimal Assistance.  Grooming while standing at sink with Contact Guard Assistance.- MET 9/14  Updated 9/14:  Toileting from toilet with stand-by Assistance for hygiene and clothing management.   Toilet transfer to toilet with stand-by Assistance.    Outcome: Ongoing, Progressing     Goals reviewed and updated. Continue POC    CHRISTIANO Salgado  9/14/2020   Pager #: [phone]

## 2020-09-14 NOTE — PT/OT/SLP PROGRESS
"Occupational Therapy   Treatment    Name: Jaquan Farmer  MRN: [id number]  Admitting Diagnosis:  Duodenum disorder  3 Days Post-Op    Recommendations:     Discharge Recommendations: rehabilitation facility  Discharge Equipment Recommendations:  (tbd)  Barriers to discharge:  None    Assessment:     Jaquan Farmer is a 39 y.o. female with a medical diagnosis of Duodenum disorder.  She presents with HOB elevated after receiving pain medication. Session focused on ADLs and functional mobility. Performance deficits affecting function are weakness, impaired endurance, impaired self care skills, impaired functional mobilty, gait instability, impaired balance, pain, impaired cardiopulmonary response to activity. Pt would benefit from skilled OT services in order to maximize independence with ADLs and facilitate safe discharge. Because of patient's significant decline from PLOF, patient would benefit from Inpatient Rehab to maximize return to PLOF. Pt is an excellent candidate for inpatient rehabilitation, as he has a qualifying diagnosis, displays good activity tolerance, has experienced decline from PLOF, has good family support, and is motivated to participate in therapy.       Rehab Prognosis:  Good; patient would benefit from acute skilled OT services to address these deficits and reach maximum level of function.       Plan:     Patient to be seen 4 x/week to address the above listed problems via self-care/home management, therapeutic activities, therapeutic exercises  · Plan of Care Expires: 09/24/20  · Plan of Care Reviewed with: patient    Subjective   "can we go for a walk since I am up?"  Pain/Comfort:  · Pain Rating 1: (reports abominal pain)  · Pain Addressed 1: Pre-medicate for activity    Objective:     Communicated with: RN prior to session.  Patient found HOB elevated with peripheral IV, telemetry, GODFREY drain upon OT entry to room.    General Precautions: Standard, fall   Orthopedic Precautions:N/A "   Braces: N/A     Occupational Performance:     Bed Mobility:    · Patient completed Rolling/Turning to Right with contact guard assistance  · Patient completed Scooting/Bridging with contact guard assistance  · Patient completed Supine to Sit with moderate assistance for trunk elevation  · Patient completed Sit to Supine with moderate assistance for BLE management    Functional Mobility/Transfers:  · Patient completed Sit <> Stand Transfer with contact guard assistance  with  rolling walker x2 trials from EOB  · Patient completed Toilet Transfer Step Transfer technique with moderate assistance with  rolling walker  · Functional Mobility: Pt ambulated ~10ft x2, ~30 ftx2 with Eduardo and RW in order to maximize functional activity tolerance and standing balance required for engagement in occupations of choice.  Pt with EDUARDO and increased pain during mobility.    Activities of Daily Living:  · Grooming: contact guard assistance standing balance to perform oral and facial hygiene standing at sink  · Upper Body Dressing: moderate assistance to don gown simulating jacket  · Toileting: moderate assistance clothing managment from toilet      [facility] 6 Click ADL: 19    Treatment & Education:  -Therapist provided facilitation and instruction of proper body mechanics, energy conservation, and fall prevention strategies during tasks listed above.  -Pt educated on role of OT, POC and goals for therapy  -Pt educated on importance of OOB activities with staff member assistance and sitting OOB majority of the day.   -Pt verbalized understanding. Pt expressed no further concerns/questions  -Whiteboard updated    Patient left HOB elevated with all lines intact, call button in reach and RN notifiedEducation:      GOALS:   Multidisciplinary Problems     Occupational Therapy Goals        Problem: Occupational Therapy Goal    Goal Priority Disciplines Outcome Interventions   Occupational Therapy Goal     OT, PT/OT Ongoing, Progressing     Description: Goals to be met by: 9/11/20     Patient will increase functional independence with ADLs by performing:    Feeding with [place].  UE Dressing with Stand-by Assistance.  LE Dressing with Minimal Assistance.  Grooming while standing at sink with Contact Guard Assistance.- MET 9/14  Updated 9/14:  Toileting from toilet with stand-by Assistance for hygiene and clothing management.   Toilet transfer to toilet with stand-by Assistance.                     Time Tracking:     OT Date of Treatment: 09/14/20  OT Start Time: 1452  OT Stop Time: 1515  OT Total Time (min): 23 min    Billable Minutes:Self Care/Home Management 13  Therapeutic Activity 10    Angelica Cruz OT  9/14/2020

## 2020-09-14 NOTE — PROGRESS NOTES
POC reviewed with pt, Gayatri4. No s/s of respiratory or cardiac distress. -120s. VS stable on RA.  ML incision with sutures draining serosanguineous output, ABD pad changed once during shift. G tube LUQ clamped w/out nausea or emesis. GODFREY drain left. GODFREY drains x2 to right. Adequate UOP; up to bedside commode with x1 assist. TPN infusing at 45 mL/hr. Tolerating diet well. No c/o nausea. Pain managed with PRN.     Pt free from falls and injuries, bed in low position, side rails up x2, call light within reach.    Will continue to monitor.

## 2020-09-14 NOTE — PROGRESS NOTES
[facility]  General Surgery  Progress Note    Subjective:     History of Present Illness:  Pt is a 40 yo F with recent history of antrectomy with BII reconstruction for ulcer disease at outside hospital. 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 Patient also reportedly aspirated on induction in the OR prior to this, resulting in severe pulmonary edema and hypoxia. Patient was transferred to [facility] urgently for higher level of care. She arrived as a direct SICU admit on near maximal vent settings and requiring low dose vasopressors with HR in the upper 140s. Her midline laparotomy is closed with 4 Navdeep drains in place draining bilious output.     Post-Op Info:  Procedure(s) (LRB):  REMOVAL, CATHETER, CENTRAL VENOUS, TUNNELED (Left)  CLOSURE, WOUND (N/A)   3 Days Post-Op     Interval History: WBC improving.  T max 100.6 overnight.  Tolerating liquids    Medications:  Continuous Infusions:   TPN ADULT CENTRAL LINE CUSTOM 45 mL/hr at 09/13/20 2233    TPN ADULT CENTRAL LINE CUSTOM       Scheduled Meds:   acetaminophen  650 mg Per J Tube Q6H    amLODIPine benzoate  5 mg Per J Tube Daily    cloNIDine 0.3 mg/24 hr td ptwk  1 patch Transdermal Q7 Days    heparin (porcine)  5,000 Units Subcutaneous Q8H    lidocaine  1 patch Transdermal Q24H    lipid (SMOFLIPID)  250 mL Intravenous Daily    metoprolol  25 mg Per J Tube BID    miconazole NITRATE 2 %   Topical (Top) BID    pantoprazole  40 mg Intravenous Daily    piperacillin-tazobactam (ZOSYN) IVPB  4.5 g Intravenous Q8H    sodium chloride 0.9%  10 mL Intravenous Q6H    vancomycin (VANCOCIN) IVPB  1,000 mg Intravenous Q24H     PRN Meds:sodium chloride, albuterol-ipratropium, hydrALAZINE, HYDROmorphone, labetaloL, melatonin, metoprolol, oxyCODONE, oxyCODONE, Flushing PICC Protocol **AND** sodium chloride 0.9% **AND** sodium chloride 0.9%, Pharmacy to dose Vancomycin consult **AND** vancomycin -  pharmacy to dose     Review of patient's allergies indicates:   Allergen Reactions    Latex      Objective:     Vital Signs (Most Recent):  Temp: 98.4 °F (36.9 °C) (09/14/20 0748)  Pulse: (!) 123 (09/14/20 0748)  Resp: 18 (09/14/20 0748)  BP: 114/76 (09/14/20 0748)  SpO2: 100 % (09/14/20 0748) Vital Signs (24h Range):  Temp:  [97.2 °F (36.2 °C)-100.6 °F (38.1 °C)] 98.4 °F (36.9 °C)  Pulse:  [123-127] 123  Resp:  [16-22] 18  SpO2:  [98 %-100 %] 100 %  BP: (114-150)/(71-83) 114/76     Weight: 67.5 kg (148 lb 13 oz)  Body mass index is 27.22 kg/m².    Intake/Output - Last 3 Shifts       09/12 0700 - 09/13 0659 09/13 0700 - 09/14 0659 09/14 0700 - 09/15 0659    P.O. 480 650     I.V. (mL/kg)       NG/GT       IV Piggyback  100      1148.5     Total Intake(mL/kg) 705 (10.4) 1898.5 (28.1)     Urine (mL/kg/hr) 0 (0) 700 (0.4)     Drains 45 127.5     Other       Stool  0     Total Output 45 827.5     Net +660 +1071            Urine Occurrence 7 x      Stool Occurrence  0 x           Physical Exam  Constitutional:       General: She is not in acute distress.  Cardiovascular:      Rate and Rhythm: Regular rhythm. Tachycardia present.   Pulmonary:      Effort: Pulmonary effort is normal. No respiratory distress.   Abdominal:      General: There is no distension.      Palpations: Abdomen is soft.      Tenderness: There is no abdominal tenderness.      Comments: Right sided drains with thick bile tinged output  Left drain serosang  Staple closure of midline laparotomy, old bloody drainage from inferior portion  G tube clamped   Musculoskeletal:      Comments: RUE picc   Neurological:      Mental Status: She is alert.         Significant Labs:  CBC:   Recent Labs   Lab 09/14/20  0440   WBC 19.26*   RBC 2.58*   HGB 7.5*   HCT 24.3*   *   MCV 94   MCH 29.1   MCHC 30.9*     CMP:   Recent Labs   Lab 09/14/20  0440   *   CALCIUM 8.7   ALBUMIN 1.8*   PROT 7.8      K 3.4*   CO2 23      BUN 38*    CREATININE 1.2   ALKPHOS 213*   ALT 11   AST 19   BILITOT 0.4       Significant Diagnostics:  I have reviewed all pertinent imaging results/findings within the past 24 hours.    Assessment/Plan:     * Duodenum disorder  40 yo F s/p antrectomy with BII reconstruction c/b duodenal necrosis requiring ex lap, washout, drainage c/b aspiration event. S/p duodenal resection with d-j and g-j anastomosis on 8/13.    - On room air  - TPN   - Clear liquids with Boost  - Keep G tube clamped, unclamp for nausea/vomiting. Ok to use G tube for meds  - Trialysis line removed. Blood cultures ordered 9/13  - Zosyn/micafungin stopped 9/4 - restart zosyn 9/13 for fever and leukocytosis  - Daily labs  - FLORIDALMA has resolved, permacath no longer needed  - GI and DVT ppx  - PT / OT  - continue PRNs for BP control          Jadon Land MD  General Surgery  [facility]

## 2020-09-14 NOTE — PLAN OF CARE
POC reviewed with patient. AAOX4. T-max of 100.6, notified MD Torres and scheduled tylenol given. Blood cultures, IV Zosyn antibiotic given. Temp decreased to 97. HR -120's. No complaints of chest pain or dizziness. ML incision with staples draining serosanguineous output, ABD pad changed once during shift. GODFREY drains on RABD with scant brown/green output. GODFREY drain to L. ABD with sanguineous output. G-tube remained clamped. No complaints of nausea.     TPN infusing to R. Upper arm PICC. CLD maintained, Pt. consumed 25% of meals. Frequent abdominal pain occurring, around the clock pain medications given. Pt. Did shower today. Up to BC with assist. Frequent turning encouraged. Sat in chair. BG checks completed Q6. Neuro checks Q4. Call light remained in reach with frequent monitoring from nurse.

## 2020-09-15 PROBLEM — E43 SEVERE MALNUTRITION: Status: ACTIVE | Noted: 2020-09-15

## 2020-09-15 LAB
ABO + RH BLD: NORMAL
ALBUMIN SERPL BCP-MCNC: 1.6 G/DL (ref 3.5–5.2)
ALP SERPL-CCNC: 173 U/L (ref 55–135)
ALT SERPL W/O P-5'-P-CCNC: 10 U/L (ref 10–44)
ANION GAP SERPL CALC-SCNC: 10 MMOL/L (ref 8–16)
ANISOCYTOSIS BLD QL SMEAR: SLIGHT
AST SERPL-CCNC: 19 U/L (ref 10–40)
BASOPHILS # BLD AUTO: 0.04 K/UL (ref 0–0.2)
BASOPHILS # BLD AUTO: 0.06 K/UL (ref 0–0.2)
BASOPHILS NFR BLD: 0.3 % (ref 0–1.9)
BASOPHILS NFR BLD: 0.3 % (ref 0–1.9)
BILIRUB SERPL-MCNC: 0.3 MG/DL (ref 0.1–1)
BLD GP AB SCN CELLS X3 SERPL QL: NORMAL
BLD PROD TYP BPU: NORMAL
BLOOD UNIT EXPIRATION DATE: NORMAL
BLOOD UNIT TYPE CODE: 7300
BLOOD UNIT TYPE: NORMAL
BUN SERPL-MCNC: 35 MG/DL (ref 6–20)
CALCIUM SERPL-MCNC: 8.4 MG/DL (ref 8.7–10.5)
CHLORIDE SERPL-SCNC: 102 MMOL/L (ref 95–110)
CO2 SERPL-SCNC: 25 MMOL/L (ref 23–29)
CODING SYSTEM: NORMAL
CREAT SERPL-MCNC: 1.3 MG/DL (ref 0.5–1.4)
DIFFERENTIAL METHOD: ABNORMAL
DIFFERENTIAL METHOD: ABNORMAL
DISPENSE STATUS: NORMAL
EOSINOPHIL # BLD AUTO: 0.1 K/UL (ref 0–0.5)
EOSINOPHIL # BLD AUTO: 0.6 K/UL (ref 0–0.5)
EOSINOPHIL NFR BLD: 0.5 % (ref 0–8)
EOSINOPHIL NFR BLD: 4.1 % (ref 0–8)
ERYTHROCYTE [DISTWIDTH] IN BLOOD BY AUTOMATED COUNT: 15.6 % (ref 11.5–14.5)
ERYTHROCYTE [DISTWIDTH] IN BLOOD BY AUTOMATED COUNT: 15.9 % (ref 11.5–14.5)
EST. GFR  (AFRICAN AMERICAN): 59.7 ML/MIN/1.73 M^2
EST. GFR  (NON AFRICAN AMERICAN): 51.8 ML/MIN/1.73 M^2
GIANT PLATELETS BLD QL SMEAR: PRESENT
GLUCOSE SERPL-MCNC: 145 MG/DL (ref 70–110)
HCT VFR BLD AUTO: 22.2 % (ref 37–48.5)
HCT VFR BLD AUTO: 29.7 % (ref 37–48.5)
HGB BLD-MCNC: 6.8 G/DL (ref 12–16)
HGB BLD-MCNC: 9.3 G/DL (ref 12–16)
HYPOCHROMIA BLD QL SMEAR: ABNORMAL
IMM GRANULOCYTES # BLD AUTO: 0.16 K/UL (ref 0–0.04)
IMM GRANULOCYTES # BLD AUTO: 0.23 K/UL (ref 0–0.04)
IMM GRANULOCYTES NFR BLD AUTO: 1 % (ref 0–0.5)
IMM GRANULOCYTES NFR BLD AUTO: 1.3 % (ref 0–0.5)
LYMPHOCYTES # BLD AUTO: 1.8 K/UL (ref 1–4.8)
LYMPHOCYTES # BLD AUTO: 3 K/UL (ref 1–4.8)
LYMPHOCYTES NFR BLD: 11.9 % (ref 18–48)
LYMPHOCYTES NFR BLD: 17.5 % (ref 18–48)
MAGNESIUM SERPL-MCNC: 1.6 MG/DL (ref 1.6–2.6)
MCH RBC QN AUTO: 29.2 PG (ref 27–31)
MCH RBC QN AUTO: 29.3 PG (ref 27–31)
MCHC RBC AUTO-ENTMCNC: 30.6 G/DL (ref 32–36)
MCHC RBC AUTO-ENTMCNC: 31.3 G/DL (ref 32–36)
MCV RBC AUTO: 93 FL (ref 82–98)
MCV RBC AUTO: 96 FL (ref 82–98)
MONOCYTES # BLD AUTO: 1.2 K/UL (ref 0.3–1)
MONOCYTES # BLD AUTO: 1.3 K/UL (ref 0.3–1)
MONOCYTES NFR BLD: 6.7 % (ref 4–15)
MONOCYTES NFR BLD: 8.3 % (ref 4–15)
NEUTROPHILS # BLD AUTO: 11.3 K/UL (ref 1.8–7.7)
NEUTROPHILS # BLD AUTO: 12.6 K/UL (ref 1.8–7.7)
NEUTROPHILS NFR BLD: 73.7 % (ref 38–73)
NEUTROPHILS NFR BLD: 74.4 % (ref 38–73)
NRBC BLD-RTO: 0 /100 WBC
NRBC BLD-RTO: 0 /100 WBC
NUM UNITS TRANS PACKED RBC: NORMAL
PHOSPHATE SERPL-MCNC: 2.6 MG/DL (ref 2.7–4.5)
PLATELET # BLD AUTO: 385 K/UL (ref 150–350)
PLATELET # BLD AUTO: 428 K/UL (ref 150–350)
PLATELET BLD QL SMEAR: ABNORMAL
PMV BLD AUTO: 10.1 FL (ref 9.2–12.9)
PMV BLD AUTO: 11 FL (ref 9.2–12.9)
POCT GLUCOSE: 107 MG/DL (ref 70–110)
POCT GLUCOSE: 112 MG/DL (ref 70–110)
POCT GLUCOSE: 118 MG/DL (ref 70–110)
POCT GLUCOSE: 121 MG/DL (ref 70–110)
POCT GLUCOSE: 122 MG/DL (ref 70–110)
POTASSIUM SERPL-SCNC: 3.4 MMOL/L (ref 3.5–5.1)
PROT SERPL-MCNC: 6.9 G/DL (ref 6–8.4)
RBC # BLD AUTO: 2.32 M/UL (ref 4–5.4)
RBC # BLD AUTO: 3.18 M/UL (ref 4–5.4)
SODIUM SERPL-SCNC: 137 MMOL/L (ref 136–145)
WBC # BLD AUTO: 15.24 K/UL (ref 3.9–12.7)
WBC # BLD AUTO: 17.17 K/UL (ref 3.9–12.7)

## 2020-09-15 PROCEDURE — 20600001 HC STEP DOWN PRIVATE ROOM

## 2020-09-15 PROCEDURE — 97803 MED NUTRITION INDIV SUBSEQ: CPT

## 2020-09-15 PROCEDURE — 80053 COMPREHEN METABOLIC PANEL: CPT

## 2020-09-15 PROCEDURE — 63600175 PHARM REV CODE 636 W HCPCS: Performed by: STUDENT IN AN ORGANIZED HEALTH CARE EDUCATION/TRAINING PROGRAM

## 2020-09-15 PROCEDURE — 85025 COMPLETE CBC W/AUTO DIFF WBC: CPT | Mod: 91

## 2020-09-15 PROCEDURE — 36430 TRANSFUSION BLD/BLD COMPNT: CPT

## 2020-09-15 PROCEDURE — 86920 COMPATIBILITY TEST SPIN: CPT

## 2020-09-15 PROCEDURE — 97535 SELF CARE MNGMENT TRAINING: CPT

## 2020-09-15 PROCEDURE — C9113 INJ PANTOPRAZOLE SODIUM, VIA: HCPCS | Performed by: STUDENT IN AN ORGANIZED HEALTH CARE EDUCATION/TRAINING PROGRAM

## 2020-09-15 PROCEDURE — 86850 RBC ANTIBODY SCREEN: CPT

## 2020-09-15 PROCEDURE — 97116 GAIT TRAINING THERAPY: CPT | Mod: CQ

## 2020-09-15 PROCEDURE — A4216 STERILE WATER/SALINE, 10 ML: HCPCS | Performed by: SURGERY

## 2020-09-15 PROCEDURE — 25000003 PHARM REV CODE 250: Performed by: STUDENT IN AN ORGANIZED HEALTH CARE EDUCATION/TRAINING PROGRAM

## 2020-09-15 PROCEDURE — P9016 RBC LEUKOCYTES REDUCED: HCPCS

## 2020-09-15 PROCEDURE — B4185 PARENTERAL SOL 10 GM LIPIDS: HCPCS | Performed by: STUDENT IN AN ORGANIZED HEALTH CARE EDUCATION/TRAINING PROGRAM

## 2020-09-15 PROCEDURE — 25000003 PHARM REV CODE 250: Performed by: SURGERY

## 2020-09-15 PROCEDURE — A4217 STERILE WATER/SALINE, 500 ML: HCPCS | Performed by: STUDENT IN AN ORGANIZED HEALTH CARE EDUCATION/TRAINING PROGRAM

## 2020-09-15 PROCEDURE — 84100 ASSAY OF PHOSPHORUS: CPT

## 2020-09-15 PROCEDURE — 25000242 PHARM REV CODE 250 ALT 637 W/ HCPCS: Performed by: STUDENT IN AN ORGANIZED HEALTH CARE EDUCATION/TRAINING PROGRAM

## 2020-09-15 PROCEDURE — 83735 ASSAY OF MAGNESIUM: CPT

## 2020-09-15 RX ORDER — DIPHENHYDRAMINE HCL 25 MG
25 CAPSULE ORAL EVERY 4 HOURS PRN
Status: DISCONTINUED | OUTPATIENT
Start: 2020-09-15 | End: 2020-09-22

## 2020-09-15 RX ORDER — HYDROCODONE BITARTRATE AND ACETAMINOPHEN 500; 5 MG/1; MG/1
TABLET ORAL
Status: DISCONTINUED | OUTPATIENT
Start: 2020-09-15 | End: 2020-09-24

## 2020-09-15 RX ORDER — FUROSEMIDE 10 MG/ML
20 INJECTION INTRAMUSCULAR; INTRAVENOUS ONCE
Status: COMPLETED | OUTPATIENT
Start: 2020-09-15 | End: 2020-09-15

## 2020-09-15 RX ORDER — DIPHENHYDRAMINE HCL 25 MG
25 CAPSULE ORAL EVERY 4 HOURS PRN
Status: DISCONTINUED | OUTPATIENT
Start: 2020-09-15 | End: 2020-09-15

## 2020-09-15 RX ADMIN — HEPARIN SODIUM 5000 UNITS: 5000 INJECTION INTRAVENOUS; SUBCUTANEOUS at 09:09

## 2020-09-15 RX ADMIN — OXYCODONE HYDROCHLORIDE 10 MG: 5 SOLUTION ORAL at 03:09

## 2020-09-15 RX ADMIN — FUROSEMIDE 20 MG: 10 INJECTION, SOLUTION INTRAMUSCULAR; INTRAVENOUS at 10:09

## 2020-09-15 RX ADMIN — OXYCODONE HYDROCHLORIDE 15 MG: 5 SOLUTION ORAL at 09:09

## 2020-09-15 RX ADMIN — ACETAMINOPHEN 650 MG: 160 SOLUTION ORAL at 05:09

## 2020-09-15 RX ADMIN — MAGNESIUM SULFATE HEPTAHYDRATE: 500 INJECTION, SOLUTION INTRAMUSCULAR; INTRAVENOUS at 11:09

## 2020-09-15 RX ADMIN — PANTOPRAZOLE SODIUM 40 MG: 40 INJECTION, POWDER, LYOPHILIZED, FOR SOLUTION INTRAVENOUS at 09:09

## 2020-09-15 RX ADMIN — DIPHENHYDRAMINE HYDROCHLORIDE 25 MG: 25 CAPSULE ORAL at 02:09

## 2020-09-15 RX ADMIN — PIPERACILLIN SODIUM AND TAZOBACTAM SODIUM 4.5 G: 4; .5 INJECTION, POWDER, LYOPHILIZED, FOR SOLUTION INTRAVENOUS at 04:09

## 2020-09-15 RX ADMIN — METOPROLOL TARTRATE 25 MG: 25 TABLET ORAL at 10:09

## 2020-09-15 RX ADMIN — PIPERACILLIN SODIUM AND TAZOBACTAM SODIUM 4.5 G: 4; .5 INJECTION, POWDER, LYOPHILIZED, FOR SOLUTION INTRAVENOUS at 09:09

## 2020-09-15 RX ADMIN — METOPROLOL TARTRATE 25 MG: 25 TABLET ORAL at 09:09

## 2020-09-15 RX ADMIN — HYDROMORPHONE HYDROCHLORIDE 1 MG: 1 INJECTION, SOLUTION INTRAMUSCULAR; INTRAVENOUS; SUBCUTANEOUS at 12:09

## 2020-09-15 RX ADMIN — VANCOMYCIN HYDROCHLORIDE 1000 MG: 1 INJECTION, POWDER, LYOPHILIZED, FOR SOLUTION INTRAVENOUS at 12:09

## 2020-09-15 RX ADMIN — AMLODIPINE 5 MG: 1 SUSPENSION ORAL at 01:09

## 2020-09-15 RX ADMIN — HEPARIN SODIUM 5000 UNITS: 5000 INJECTION INTRAVENOUS; SUBCUTANEOUS at 02:09

## 2020-09-15 RX ADMIN — HYDROMORPHONE HYDROCHLORIDE 1 MG: 1 INJECTION, SOLUTION INTRAMUSCULAR; INTRAVENOUS; SUBCUTANEOUS at 04:09

## 2020-09-15 RX ADMIN — MICONAZOLE NITRATE: 20 POWDER TOPICAL at 09:09

## 2020-09-15 RX ADMIN — Medication 10 ML: at 12:09

## 2020-09-15 RX ADMIN — Medication 10 ML: at 06:09

## 2020-09-15 RX ADMIN — ACETAMINOPHEN 650 MG: 160 SOLUTION ORAL at 12:09

## 2020-09-15 RX ADMIN — HEPARIN SODIUM 5000 UNITS: 5000 INJECTION INTRAVENOUS; SUBCUTANEOUS at 05:09

## 2020-09-15 RX ADMIN — HYDROMORPHONE HYDROCHLORIDE 1 MG: 1 INJECTION, SOLUTION INTRAMUSCULAR; INTRAVENOUS; SUBCUTANEOUS at 07:09

## 2020-09-15 RX ADMIN — Medication 10 ML: at 05:09

## 2020-09-15 RX ADMIN — PIPERACILLIN SODIUM AND TAZOBACTAM SODIUM 4.5 G: 4; .5 INJECTION, POWDER, LYOPHILIZED, FOR SOLUTION INTRAVENOUS at 02:09

## 2020-09-15 RX ADMIN — DIPHENHYDRAMINE HYDROCHLORIDE 25 MG: 25 CAPSULE ORAL at 10:09

## 2020-09-15 RX ADMIN — SMOFLIPID 250 ML: 6; 6; 5; 3 INJECTION, EMULSION INTRAVENOUS at 11:09

## 2020-09-15 RX ADMIN — ACETAMINOPHEN 650 MG: 160 SOLUTION ORAL at 01:09

## 2020-09-15 NOTE — PLAN OF CARE
Problem: Malnutrition  Goal: Improved Nutritional Intake  Outcome: Ongoing, Progressing       Recommendations    Pt meets chronic severe malnutrition..      1. Continue current TPN 90 gm AA / 225 gm Dex + SMOFLIPIDS - meeting needs.      2. As medically able, ADAT to regular + Boost Plus with texture per SLP.     3. RD following.     Goals: 1.) Pt to meet >75% of estimated energy and protein needs over the course of 7 days.  Nutrition Goal Status: goal met

## 2020-09-15 NOTE — PROGRESS NOTES
[facility]  General Surgery  Progress Note    Subjective:     History of Present Illness:  Pt is a 40 yo F with recent history of antrectomy with BII reconstruction for ulcer disease at outside hospital. 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 Patient also reportedly aspirated on induction in the OR prior to this, resulting in severe pulmonary edema and hypoxia. Patient was transferred to [facility] urgently for higher level of care. She arrived as a direct SICU admit on near maximal vent settings and requiring low dose vasopressors with HR in the upper 140s. Her midline laparotomy is closed with 4 Navdeep drains in place draining bilious output.     Post-Op Info:  Procedure(s) (LRB):  REMOVAL, CATHETER, CENTRAL VENOUS, TUNNELED (Left)  CLOSURE, WOUND (N/A)   4 Days Post-Op     Interval History: WBC improving. 15.24 from 19.26. Afebrile ON. Tolerating CLD.    Medications:  Continuous Infusions:   TPN ADULT CENTRAL LINE CUSTOM 45 mL/hr at 09/14/20 2114     Scheduled Meds:   acetaminophen  650 mg Per J Tube Q6H    amLODIPine benzoate  5 mg Per J Tube Daily    cloNIDine 0.3 mg/24 hr td ptwk  1 patch Transdermal Q7 Days    furosemide (LASIX) IV  20 mg Intravenous Once    heparin (porcine)  5,000 Units Subcutaneous Q8H    lidocaine  1 patch Transdermal Q24H    lipid (SMOFLIPID)  250 mL Intravenous Daily    metoprolol  25 mg Per J Tube BID    miconazole NITRATE 2 %   Topical (Top) BID    pantoprazole  40 mg Intravenous Daily    piperacillin-tazobactam (ZOSYN) IVPB  4.5 g Intravenous Q8H    sodium chloride 0.9%  10 mL Intravenous Q6H    vancomycin (VANCOCIN) IVPB  1,000 mg Intravenous Q24H     PRN Meds:sodium chloride, sodium chloride, albuterol-ipratropium, hydrALAZINE, HYDROmorphone, labetaloL, melatonin, metoprolol, oxyCODONE, oxyCODONE, Flushing PICC Protocol **AND** sodium chloride 0.9% **AND** sodium chloride 0.9%, Pharmacy to dose  Vancomycin consult **AND** vancomycin - pharmacy to dose     Review of patient's allergies indicates:   Allergen Reactions    Latex      Objective:     Vital Signs (Most Recent):  Temp: 98.8 °F (37.1 °C) (09/15/20 0314)  Pulse: 106 (09/15/20 0314)  Resp: 18 (09/15/20 0418)  BP: 115/71 (09/15/20 0314)  SpO2: 99 % (09/15/20 0314) Vital Signs (24h Range):  Temp:  [97.6 °F (36.4 °C)-99.1 °F (37.3 °C)] 98.8 °F (37.1 °C)  Pulse:  [103-137] 106  Resp:  [16-22] 18  SpO2:  [95 %-100 %] 99 %  BP: (101-123)/(59-76) 115/71     Weight: 67.5 kg (148 lb 13 oz)  Body mass index is 27.22 kg/m².    Intake/Output - Last 3 Shifts       09/13 0700 - 09/14 0659 09/14 0700 - 09/15 0659 09/15 0700 - 09/16 0659    P.O. 650 1320     NG/GT  60     IV Piggyback 100 1150     TPN 1148.5 1597.9     Total Intake(mL/kg) 1898.5 (28.1) 4127.9 (61.2)     Urine (mL/kg/hr) 700 (0.4) 900 (0.6)     Drains 127.5 130     Stool 0 0     Total Output 827.5 1030     Net +1071 +3097.9            Urine Occurrence  9 x     Stool Occurrence 0 x 2 x           Physical Exam  Constitutional:       General: She is not in acute distress.  Cardiovascular:      Rate and Rhythm: Regular rhythm. Tachycardia present.   Pulmonary:      Effort: Pulmonary effort is normal. No respiratory distress.   Abdominal:      General: There is no distension.      Palpations: Abdomen is soft.      Tenderness: There is no abdominal tenderness.      Comments: Right sided drains with thick bile tinged output  Left drain serosang  Staple closure of midline laparotomy, old bloody drainage from inferior portion  G tube clamped   Musculoskeletal:      Comments: RUE picc   Neurological:      Mental Status: She is alert.         Significant Labs:  CBC:   Recent Labs   Lab 09/15/20  0315   WBC 15.24*   RBC 2.32*   HGB 6.8*   HCT 22.2*   *   MCV 96   MCH 29.3   MCHC 30.6*     CMP:   Recent Labs   Lab 09/15/20  0315   *   CALCIUM 8.4*   ALBUMIN 1.6*   PROT 6.9      K 3.4*   CO2 25       BUN 35*   CREATININE 1.3   ALKPHOS 173*   ALT 10   AST 19   BILITOT 0.3       Significant Diagnostics:  I have reviewed all pertinent imaging results/findings within the past 24 hours.    Assessment/Plan:     * Duodenum disorder  40 yo F s/p antrectomy with BII reconstruction c/b duodenal necrosis requiring ex lap, washout, drainage c/b aspiration event. S/p duodenal resection with d-j and g-j anastomosis on 8/13.    - On room air  - TPN   - Clear liquids with Boost  - Keep G tube clamped, unclamp for nausea/vomiting. Ok to use G tube for meds  - Trialysis line removed. Blood cultures ordered 9/13 - NGTD  - Zosyn/micafungin stopped 9/4 - restart zosyn 9/13 for fever and leukocytosis  - Vanc added on  - FLORIDALMA has resolved, permacath no longer needed  - Lasix today  - 1u pRBCs  - GI and DVT ppx  - PT / OT  - continue PRNs for BP control        Collette Ferreira MD  General Surgery  [facility]

## 2020-09-15 NOTE — SUBJECTIVE & OBJECTIVE
Interval History: WBC improving. 13.66 from 15.24. Received 1U pRBCs on yesterday. Tolerating CLD. Decreased drainage from midline incision.    Medications:  Continuous Infusions:   TPN ADULT CENTRAL LINE CUSTOM 45 mL/hr at 09/14/20 2114     Scheduled Meds:   acetaminophen  650 mg Per J Tube Q6H    amLODIPine benzoate  5 mg Per J Tube Daily    cloNIDine 0.3 mg/24 hr td ptwk  1 patch Transdermal Q7 Days    furosemide (LASIX) IV  20 mg Intravenous Once    heparin (porcine)  5,000 Units Subcutaneous Q8H    lidocaine  1 patch Transdermal Q24H    lipid (SMOFLIPID)  250 mL Intravenous Daily    metoprolol  25 mg Per J Tube BID    miconazole NITRATE 2 %   Topical (Top) BID    pantoprazole  40 mg Intravenous Daily    piperacillin-tazobactam (ZOSYN) IVPB  4.5 g Intravenous Q8H    sodium chloride 0.9%  10 mL Intravenous Q6H    vancomycin (VANCOCIN) IVPB  1,000 mg Intravenous Q24H     PRN Meds:sodium chloride, sodium chloride, albuterol-ipratropium, hydrALAZINE, HYDROmorphone, labetaloL, melatonin, metoprolol, oxyCODONE, oxyCODONE, Flushing PICC Protocol **AND** sodium chloride 0.9% **AND** sodium chloride 0.9%, Pharmacy to dose Vancomycin consult **AND** vancomycin - pharmacy to dose     Review of patient's allergies indicates:   Allergen Reactions    Latex      Objective:     Vital Signs (Most Recent):  Temp: 98.8 °F (37.1 °C) (09/15/20 0314)  Pulse: 106 (09/15/20 0314)  Resp: 18 (09/15/20 0418)  BP: 115/71 (09/15/20 0314)  SpO2: 99 % (09/15/20 0314) Vital Signs (24h Range):  Temp:  [97.6 °F (36.4 °C)-99.1 °F (37.3 °C)] 98.8 °F (37.1 °C)  Pulse:  [103-137] 106  Resp:  [16-22] 18  SpO2:  [95 %-100 %] 99 %  BP: (101-123)/(59-76) 115/71     Weight: 67.5 kg (148 lb 13 oz)  Body mass index is 27.22 kg/m².    Intake/Output - Last 3 Shifts       09/13 0700 - 09/14 0659 09/14 0700 - 09/15 0659 09/15 0700 - 09/16 0659    P.O. 650 1320     NG/GT  60     IV Piggyback 100 1150     TPN 1148.5 1597.9     Total Intake(mL/kg)  1898.5 (28.1) 4127.9 (61.2)     Urine (mL/kg/hr) 700 (0.4) 900 (0.6)     Drains 127.5 130     Stool 0 0     Total Output 827.5 1030     Net +1071 +3097.9            Urine Occurrence  9 x     Stool Occurrence 0 x 2 x           Physical Exam  Constitutional:       General: She is not in acute distress.  Cardiovascular:      Rate and Rhythm: Regular rhythm. Tachycardia present.   Pulmonary:      Effort: Pulmonary effort is normal. No respiratory distress.   Abdominal:      General: There is no distension.      Palpations: Abdomen is soft.      Tenderness: There is no abdominal tenderness.      Comments: Right sided drains with thick bile tinged output  Left drain serosang  Staple closure of midline laparotomy, old bloody drainage from inferior portion  G tube clamped   Musculoskeletal:      Comments: RUE picc   Neurological:      Mental Status: She is alert.         Significant Labs:  CBC:   Recent Labs   Lab 09/15/20  0315   WBC 15.24*   RBC 2.32*   HGB 6.8*   HCT 22.2*   *   MCV 96   MCH 29.3   MCHC 30.6*     CMP:   Recent Labs   Lab 09/15/20  0315   *   CALCIUM 8.4*   ALBUMIN 1.6*   PROT 6.9      K 3.4*   CO2 25      BUN 35*   CREATININE 1.3   ALKPHOS 173*   ALT 10   AST 19   BILITOT 0.3       Significant Diagnostics:  I have reviewed all pertinent imaging results/findings within the past 24 hours.

## 2020-09-15 NOTE — ASSESSMENT & PLAN NOTE
Nutrition Problem:  Severe Protein-Calorie Malnutrition  Malnutrition in the context of Chronic Illness/Injury     Related to (etiology):  Inadequate Energy Intake      Signs and Symptoms (as evidenced by):  Energy Intake: <50% of estimated energy requirement for 1 month; <75% of estimated energy requirements for > 3 months   Body Fat Depletion: severe depletion of orbitals   Muscle Mass Depletion: severe depletion of temples and clavicle region   Weight Loss: JOSE ARMANDO      Interventions (treatment strategy):  Collaboration of nutrition care with other providers  Modified diet - clear liquid  Commercial Bever[employer] - Boost Breeze   Parenteral Nutrition      Nutrition Diagnosis Status:  Continues

## 2020-09-15 NOTE — PLAN OF CARE
Patient not yet medically stable for discharge.  Patient is expected to be on TPN for the foreseeable future and is pending [place] Medicaid.  Will set up transfer for patient to go back to [facility] when medically stable.    Marguerite Bernal RN, [facility] ([phone])       09/15/20 1414   Discharge Reassessment   Assessment Type Discharge Planning Reassessment   Discharge Plan A Other  (Return to [facility])   Discharge Plan B Long-term acute care facility (LTAC)   Post-Acute Status   Post-Acute Authorization Other  (Return to [facility])   Post-Acute Placement Status Awaiting Internal Medical Clearance

## 2020-09-15 NOTE — PROGRESS NOTES
"[facility]  Adult Nutrition  Progress Note    SUMMARY       Recommendations    Pt meets chronic severe malnutrition..      1. Continue current TPN 90 gm AA / 225 gm Dex + SMOFLIPIDS - meeting needs.      2. As medically able, ADAT to regular + Boost Plus with texture per SLP.     3. RD following.     Goals: 1.) Pt to meet >75% of estimated energy and protein needs over the course of 7 days.  Nutrition Goal Status: goal met  Communication of RD Recs: (POC)    Reason for Assessment    Reason For Assessment: RD follow-up  Diagnosis: (Duodenum disorder)  Relevant Medical History: HTN, anxiety, respiratory distress, leukocytosis, sepsis, pulmonary HTN  Interdisciplinary Rounds: attended  General Information Comments: S/p duodenal resection with d-j and g-j anastomosis 8/13. POD4 wound closure. Pt sitting in chair with no family members at bedside. Clear liquids, TPN + SMOFLIPID infusing. Pt reports good appetite at this time and drinks 1 Boost Breeze/day. Denies N/V/D/C. Pt reports very poor appetite 2/2 nausea for several months PTA. Pt reports wt fluctuates between 115-130lbs. NFPE completed 8/17. Pt with severe muscle/fat wasting. Pt meets chronic severe malnutrition.  Nutrition Discharge Planning: Unable to determine at this time.    Nutrition Risk Screen    Nutrition Risk Screen: tube feeding or parenteral nutrition    Nutrition/Diet History    Spiritual, Cultural Beliefs, [employer]ism Practices, Values that Affect Care: no  Food Allergies: NKFA  Factors Affecting Nutritional Intake: altered gastrointestinal function, NPO    Anthropometrics    Temp: 98.2 °F (36.8 °C)  Height Method: Stated(from outside records)  Height: 5' 2" (157.5 cm)  Height (inches): 62 in  Weight Method: Bed Scale  Weight: 67.5 kg (148 lb 13 oz)  Weight (lb): 148.81 lb  Ideal Body Weight (IBW), Female: 110 lb  % Ideal Body Weight, Female (lb): 129.09 %  BMI (Calculated): 27.2  BMI Grade: 25 - 29.9 - overweight  Usual Body " Weight (UBW), kg: (No wt history)  Weight Loss Since Admission: 30 lb 6.8 oz(17% weight loss since admission 8/12)     Lab/Procedures/Meds    Pertinent Labs Reviewed: reviewed  Pertinent Labs Comments: K 3.4, BUN 35, GFR 59.7, phos 2.6, alk phos 173, glucose 145  Pertinent Medications Reviewed: reviewed  Pertinent Medications Comments: lasix    Estimated/Assessed Needs    Weight Used For Calorie Calculations: 67.5 kg (148 lb 13 oz)  Energy Calorie Requirements (kcal): 4971-5435 kcal/day  Energy Need Method: Kcal/kg(25-30)  Protein Requirements:  gm (1.2-1.5gm/kg)  Weight Used For Protein Calculations: 77 kg (169 lb 12.1 oz)  Fluid Requirements (mL): 1mL/kcal or per MD recommendations  Estimated Fluid Requirement Method: RDA Method  RDA Method (mL): 1687     Nutrition Prescription Ordered    Current Diet Order: Clear Liquids  Current Nutrition Support Formula Ordered: (Custom TPN 90 gm AA / 225 gm Dex + SMOFLIPIDS)  Current Nutrition Support Rate Ordered: 45 (ml)  Current Nutrition Support Frequency Ordered: mL/hr  Oral Nutrition Supplement: Boost Breeze    Evaluation of Received Nutrient/Fluid Intake    Parenteral Calories (kcal): 1125  Parenteral Protein (gm): 90  Parenteral Fluid (mL): 1080  Lipid Calories (kcals): 500 kcals  GIR (Glucose Infusion Rate) (mg/kg/min): 2.31 mg/kg/min  Total Calories (kcal): 1625  % Kcal Needs: 87  % Protein Needs: 90  Energy Calories Required: meeting needs  Protein Required: meeting needs  Fluid Required: (per MD)  Tolerance: tolerating  % Intake of Estimated Energy Needs: 75 - 100 %  % Meal Intake: 25 - 50 %    Nutrition Risk    Level of Risk/Frequency of Follow-up: (f/u 1 x wk)     Assessment and Plan    Severe malnutrition  Nutrition Problem:  Severe Protein-Calorie Malnutrition  Malnutrition in the context of Chronic Illness/Injury     Related to (etiology):  Inadequate Energy Intake      Signs and Symptoms (as evidenced by):  Energy Intake: <50% of estimated energy  requirement for 1 month; <75% of estimated energy requirements for > 3 months   Body Fat Depletion: severe depletion of orbitals   Muscle Mass Depletion: severe depletion of temples and clavicle region   Weight Loss: JOSE ARMANDO      Interventions (treatment strategy):  Collaboration of nutrition care with other providers  Modified diet - clear liquid  Commercial Beverage - Boost Breeze   Parenteral Nutrition      Nutrition Diagnosis Status:  Continues            Monitor and Evaluation    Food and Nutrient Intake: energy intake, food and beverage intake, parenteral nutrition intake  Food and Nutrient Adminstration: diet order, enteral and parenteral nutrition administration  Physical Activity and Function: nutrition-related ADLs and IADLs  Anthropometric Measurements: weight, weight change  Biochemical Data, Medical Tests and Procedures: electrolyte and renal panel, gastrointestinal profile  Nutrition-Focused Physical Findings: overall appearance, skin     Malnutrition Assessment  Malnutrition Type: chronic illness          Weight Loss (Malnutrition): (JOSE ARMANDO)  Energy Intake (Malnutrition): less than 75% for greater than or equal to 3 months(<50% for greater than 1 month)   Orbital Region (Subcutaneous Fat Loss): severe depletion   [place] Region (Muscle Loss): severe depletion  Clavicle Bone Region (Muscle Loss): severe depletion                 Nutrition Follow-Up    RD Follow-up?: Yes

## 2020-09-15 NOTE — SUBJECTIVE & OBJECTIVE
Interval History: WBC improving. 15.24 from 19.26. Afebrile ON. Tolerating CLD.    Medications:  Continuous Infusions:   TPN ADULT CENTRAL LINE CUSTOM 45 mL/hr at 09/14/20 2114     Scheduled Meds:   acetaminophen  650 mg Per J Tube Q6H    amLODIPine benzoate  5 mg Per J Tube Daily    cloNIDine 0.3 mg/24 hr td ptwk  1 patch Transdermal Q7 Days    furosemide (LASIX) IV  20 mg Intravenous Once    heparin (porcine)  5,000 Units Subcutaneous Q8H    lidocaine  1 patch Transdermal Q24H    lipid (SMOFLIPID)  250 mL Intravenous Daily    metoprolol  25 mg Per J Tube BID    miconazole NITRATE 2 %   Topical (Top) BID    pantoprazole  40 mg Intravenous Daily    piperacillin-tazobactam (ZOSYN) IVPB  4.5 g Intravenous Q8H    sodium chloride 0.9%  10 mL Intravenous Q6H    vancomycin (VANCOCIN) IVPB  1,000 mg Intravenous Q24H     PRN Meds:sodium chloride, sodium chloride, albuterol-ipratropium, hydrALAZINE, HYDROmorphone, labetaloL, melatonin, metoprolol, oxyCODONE, oxyCODONE, Flushing PICC Protocol **AND** sodium chloride 0.9% **AND** sodium chloride 0.9%, Pharmacy to dose Vancomycin consult **AND** vancomycin - pharmacy to dose     Review of patient's allergies indicates:   Allergen Reactions    Latex      Objective:     Vital Signs (Most Recent):  Temp: 98.8 °F (37.1 °C) (09/15/20 0314)  Pulse: 106 (09/15/20 0314)  Resp: 18 (09/15/20 0418)  BP: 115/71 (09/15/20 0314)  SpO2: 99 % (09/15/20 0314) Vital Signs (24h Range):  Temp:  [97.6 °F (36.4 °C)-99.1 °F (37.3 °C)] 98.8 °F (37.1 °C)  Pulse:  [103-137] 106  Resp:  [16-22] 18  SpO2:  [95 %-100 %] 99 %  BP: (101-123)/(59-76) 115/71     Weight: 67.5 kg (148 lb 13 oz)  Body mass index is 27.22 kg/m².    Intake/Output - Last 3 Shifts       09/13 0700 - 09/14 0659 09/14 0700 - 09/15 0659 09/15 0700 - 09/16 0659    P.O. 650 1320     NG/GT  60     IV Piggyback 100 1150     TPN 1148.5 1597.9     Total Intake(mL/kg) 1898.5 (28.1) 4127.9 (61.2)     Urine (mL/kg/hr) 700 (0.4)  900 (0.6)     Drains 127.5 130     Stool 0 0     Total Output 827.5 1030     Net +1071 +3097.9            Urine Occurrence  9 x     Stool Occurrence 0 x 2 x           Physical Exam  Constitutional:       General: She is not in acute distress.  Cardiovascular:      Rate and Rhythm: Regular rhythm. Tachycardia present.   Pulmonary:      Effort: Pulmonary effort is normal. No respiratory distress.   Abdominal:      General: There is no distension.      Palpations: Abdomen is soft.      Tenderness: There is no abdominal tenderness.      Comments: Right sided drains with thick bile tinged output  Left drain serosang  Staple closure of midline laparotomy, old bloody drainage from inferior portion  G tube clamped   Musculoskeletal:      Comments: RUE picc   Neurological:      Mental Status: She is alert.         Significant Labs:  CBC:   Recent Labs   Lab 09/15/20  0315   WBC 15.24*   RBC 2.32*   HGB 6.8*   HCT 22.2*   *   MCV 96   MCH 29.3   MCHC 30.6*     CMP:   Recent Labs   Lab 09/15/20  0315   *   CALCIUM 8.4*   ALBUMIN 1.6*   PROT 6.9      K 3.4*   CO2 25      BUN 35*   CREATININE 1.3   ALKPHOS 173*   ALT 10   AST 19   BILITOT 0.3       Significant Diagnostics:  I have reviewed all pertinent imaging results/findings within the past 24 hours.

## 2020-09-15 NOTE — PLAN OF CARE
Problem: Occupational Therapy Goal  Goal: Occupational Therapy Goal  Description: Goals to be met by: 9/23/20     Patient will increase functional independence with ADLs by performing:    Feeding with [place].  UE Dressing with Stand-by Assistance.  LE Dressing with Minimal Assistance.  Grooming while standing at sink with Contact Guard Assistance.- MET 9/14  Updated 9/14:  Toileting from toilet with stand-by Assistance for hygiene and clothing management.   Toilet transfer to toilet with stand-by Assistance.    Outcome: Ongoing, Progressing    Continue with POC.  Diane Willingham OT  9/15/2020

## 2020-09-15 NOTE — PLAN OF CARE
POC reviewed with patient, states understanding. AOx4. VS WDL. Clear liquid diet, tolerated well. No complaints of nausea. Pain effectively managed per MAR. TPN infusing per order. ML incision with gauze, ABD pad, c/d/i. GODFREY x 2 to right flank, GODFREY to left flank. Up to bsc with assista. Will continue to manage POC.

## 2020-09-15 NOTE — PT/OT/SLP PROGRESS
Occupational Therapy   Co-Treatment    Name: Jaquan Farmer  MRN: [id number]  Admitting Diagnosis:  Duodenum disorder  4 Days Post-Op   Procedure(s):  REMOVAL, CATHETER, CENTRAL VENOUS, TUNNELED  CLOSURE, WOUND     Recommendations:     Discharge Recommendations: rehabilitation facility  Discharge Equipment Recommendations:  (TBD)  Barriers to discharge:  None    Assessment:     Jaquan Farmer is a 39 y.o. female with a medical diagnosis of Duodenum disorder.  She presents in distress with patient reporting itchiness and has visible hives all over her body. RN informed. Performance deficits affecting function are weakness, impaired endurance, impaired self care skills, impaired functional mobilty, gait instability, impaired balance, impaired skin.     Rehab Prognosis:  Good; patient would benefit from acute skilled OT services to address these deficits and reach maximum level of function.       Plan:     Patient to be seen 4 x/week to address the above listed problems via self-care/home management, therapeutic activities, therapeutic exercises  · Plan of Care Expires: 09/24/20  · Plan of Care Reviewed with: patient    Subjective     Pain/Comfort:  · Pain Rating 1: 0/10    Objective:     Communicated with: RN prior to session.  Patient found standing by the sofa with peripheral IV, telemetry, GODFREY drain upon OT entry to room.    General Precautions: Standard, fall   Orthopedic Precautions:N/A   Braces: N/A     Occupational Performance:     Bed Mobility:    · Did not observe    Functional Mobility/Transfers:  · Patient completed Sit <> Stand Transfer with contact guard assistance  with  hand-held assist   · Patient completed Toilet Transfer onto the bedside commode using Step Transfer technique with contact guard assistance with  hand-held assist  · Functional Mobility: ~20' with min assist/HHA and no AD    Activities of Daily Living:  · Upper Body Dressing: minimum assistance Patient doffed/donned gowns while  standing with min assist for line management.  · Toileting: stand by assistance Patient completed toileting on the bedside commode with SBA.    [facility] 6 Click ADL: 19    Treatment & Education:  Role of OT/POC  Call button for assistance    Patient left standing in the bathroom with the RN and PCT with all lines intact and RN and PCT presentEducation:      GOALS:   Multidisciplinary Problems     Occupational Therapy Goals        Problem: Occupational Therapy Goal    Goal Priority Disciplines Outcome Interventions   Occupational Therapy Goal     OT, PT/OT Ongoing, Progressing    Description: Goals to be met by: 9/23/20     Patient will increase functional independence with ADLs by performing:    Feeding with [place].  UE Dressing with Stand-by Assistance.  LE Dressing with Minimal Assistance.  Grooming while standing at sink with Contact Guard Assistance.- MET 9/14  Updated 9/14:  Toileting from toilet with stand-by Assistance for hygiene and clothing management.   Toilet transfer to toilet with stand-by Assistance.                     Time Tracking:     OT Date of Treatment: 09/15/20  OT Start Time: 0944  OT Stop Time: 1000  OT Total Time (min): 16 min    Billable Minutes:Self Care/Home Management 16 minutes    Diane Willingham OT  9/15/2020

## 2020-09-15 NOTE — ASSESSMENT & PLAN NOTE
38 yo F s/p antrectomy with BII reconstruction c/b duodenal necrosis requiring ex lap, washout, drainage c/b aspiration event. S/p duodenal resection with d-j and g-j anastomosis on 8/13.    - TPN   - Clear liquids with Boost  - Keep G tube clamped, unclamp for nausea/vomiting. Ok to use G tube for meds  - Trialysis line removed. Blood cultures ordered 9/13 - NGTD  - Zosyn/micafungin stopped 9/4 - restart zosyn 9/13 for fever and leukocytosis  - Vanc added on  - FLORIDALMA has resolved, permacath no longer needed  - GI and DVT ppx  - PT / OT  - continue PRNs for BP control

## 2020-09-15 NOTE — PT/OT/SLP PROGRESS
"Physical Therapy Treatment    Patient Name:  Jaquan Farmer   MRN:  [id number]    Recommendations:     Discharge Recommendations:  rehabilitation facility   Discharge Equipment Recommendations: (TBD)   Barriers to discharge: increased assist level for all mobility and self care    Assessment:     Jaquan Farmer is a 39 y.o. female admitted with a medical diagnosis of Duodenum disorder.  She presents with the following impairments/functional limitations:  weakness, impaired endurance, impaired self care skills, impaired functional mobilty, gait instability, impaired balance.  Patient progressing well with therapy but was distracted today due to terrible itching over body with noticeable hives. Transfers with SBA and was able to walk in room 20 ft with Min HHA.     Rehab Prognosis: Good; patient would benefit from acute skilled PT services to address these deficits and reach maximum level of function.    Recent Surgery: Procedure(s) (LRB):  REMOVAL, CATHETER, CENTRAL VENOUS, TUNNELED (Left)  CLOSURE, WOUND (N/A) 4 Days Post-Op    Plan:     During this hospitalization, patient to be seen 4 x/week to address the identified rehab impairments via gait training, therapeutic activities, therapeutic exercises and progress toward the following goals:    · Plan of Care Expires:  10/01/20    Subjective     Chief Complaint: itching over body, nurse notified  Patient/Family Comments/goals: "I need a shower so I'll stop itching".   Pain/Comfort:  · Pain Rating 1: 0/10  · Location - Orientation 1: (c/o itching over body, noted hives, nurse notified.)      Objective:     Communicated with nurse prior to session.  Patient found standing up at sofa, c/o itching.  with peripheral IV, telemetry, GODFREY drain upon PT entry to room.     General Precautions: Standard, fall   Orthopedic Precautions:N/A   Braces: N/A     Functional Mobility:  · Bed Mobility:  Standing Up in room upon entry  · Transfers:     · Sit to Stand:  stand by " assistance with hand-held assist  · Gait: 20 ft with Min HHA, unsteady      AM-PAC 6 CLICK MOBILITY  Turning over in bed (including adjusting bedclothes, sheets and blankets)?: 3  Sitting down on and standing up from a chair with arms (e.g., wheelchair, bedside commode, etc.): 3  Moving from lying on back to sitting on the side of the bed?: 3  Moving to and from a bed to a chair (including a wheelchair)?: 3  Need to walk in hospital room?: 3  Climbing 3-5 steps with a railing?: 2  Basic Mobility Total Score: 17       Therapeutic Activities and Exercises:   white board updated      Patient left in bathroom with nurse and PCT.  with all lines intact and call button in reach..    GOALS:   Multidisciplinary Problems     Physical Therapy Goals        Problem: Physical Therapy Goal    Goal Priority Disciplines Outcome Goal Variances Interventions   Physical Therapy Goal     PT, PT/OT Ongoing, Progressing     Description: Goals to be met by: 9/15/2020     Patient will increase functional independence with mobility by performin. Supine to sit with MInimal Assistance  2. Sit to stand transfer with Minimal Assistance with LRAD - met ()  3. Gait  x 50 feet with Minimal Assistance using LRAD.   4. Stand for 3 minutes with Contact Guard Assistance using LRAD - met ()  5. Lower extremity exercise program x15 reps per handout, with independence                     Time Tracking:     PT Received On: 09/15/20  PT Start Time: 944     PT Stop Time: 1000  PT Total Time (min): 16 min     Billable Minutes: Gait Training 16    Treatment Type: Treatment  PT/PTA: PTA     PTA Visit Number: 4     Hanane Rose PTA  09/15/2020

## 2020-09-15 NOTE — ASSESSMENT & PLAN NOTE
38 yo F s/p antrectomy with BII reconstruction c/b duodenal necrosis requiring ex lap, washout, drainage c/b aspiration event. S/p duodenal resection with d-j and g-j anastomosis on 8/13.    - On room air  - TPN   - Clear liquids with Boost  - Keep G tube clamped, unclamp for nausea/vomiting. Ok to use G tube for meds  - Trialysis line removed. Blood cultures ordered 9/13 - NGTD  - Zosyn/micafungin stopped 9/4 - restart zosyn 9/13 for fever and leukocytosis  - Vanc added on  - Daily labs  - FLORIDALMA has resolved, permacath no longer needed  - GI and DVT ppx  - PT / OT  - continue PRNs for BP control

## 2020-09-16 LAB
ALBUMIN SERPL BCP-MCNC: 1.7 G/DL (ref 3.5–5.2)
ALP SERPL-CCNC: 180 U/L (ref 55–135)
ALT SERPL W/O P-5'-P-CCNC: 12 U/L (ref 10–44)
ANION GAP SERPL CALC-SCNC: 12 MMOL/L (ref 8–16)
ANISOCYTOSIS BLD QL SMEAR: SLIGHT
AST SERPL-CCNC: 16 U/L (ref 10–40)
BASO STIPL BLD QL SMEAR: ABNORMAL
BASOPHILS # BLD AUTO: 0.06 K/UL (ref 0–0.2)
BASOPHILS NFR BLD: 0.4 % (ref 0–1.9)
BILIRUB SERPL-MCNC: 0.3 MG/DL (ref 0.1–1)
BUN SERPL-MCNC: 32 MG/DL (ref 6–20)
CALCIUM SERPL-MCNC: 8.8 MG/DL (ref 8.7–10.5)
CHLORIDE SERPL-SCNC: 100 MMOL/L (ref 95–110)
CO2 SERPL-SCNC: 25 MMOL/L (ref 23–29)
CREAT SERPL-MCNC: 1.1 MG/DL (ref 0.5–1.4)
DIFFERENTIAL METHOD: ABNORMAL
EOSINOPHIL # BLD AUTO: 0.3 K/UL (ref 0–0.5)
EOSINOPHIL NFR BLD: 2.4 % (ref 0–8)
ERYTHROCYTE [DISTWIDTH] IN BLOOD BY AUTOMATED COUNT: 15.7 % (ref 11.5–14.5)
EST. GFR  (AFRICAN AMERICAN): >60 ML/MIN/1.73 M^2
EST. GFR  (NON AFRICAN AMERICAN): >60 ML/MIN/1.73 M^2
GIANT PLATELETS BLD QL SMEAR: PRESENT
GLUCOSE SERPL-MCNC: 109 MG/DL (ref 70–110)
HCT VFR BLD AUTO: 28.1 % (ref 37–48.5)
HGB BLD-MCNC: 8.9 G/DL (ref 12–16)
IMM GRANULOCYTES # BLD AUTO: 0.17 K/UL (ref 0–0.04)
IMM GRANULOCYTES NFR BLD AUTO: 1.2 % (ref 0–0.5)
LYMPHOCYTES # BLD AUTO: 2.6 K/UL (ref 1–4.8)
LYMPHOCYTES NFR BLD: 19.3 % (ref 18–48)
MAGNESIUM SERPL-MCNC: 1.5 MG/DL (ref 1.6–2.6)
MCH RBC QN AUTO: 29.5 PG (ref 27–31)
MCHC RBC AUTO-ENTMCNC: 31.7 G/DL (ref 32–36)
MCV RBC AUTO: 93 FL (ref 82–98)
MONOCYTES # BLD AUTO: 1.1 K/UL (ref 0.3–1)
MONOCYTES NFR BLD: 8.3 % (ref 4–15)
NEUTROPHILS # BLD AUTO: 9.3 K/UL (ref 1.8–7.7)
NEUTROPHILS NFR BLD: 68.4 % (ref 38–73)
NRBC BLD-RTO: 0 /100 WBC
PHOSPHATE SERPL-MCNC: 3.3 MG/DL (ref 2.7–4.5)
PLATELET # BLD AUTO: 419 K/UL (ref 150–350)
PLATELET BLD QL SMEAR: ABNORMAL
PMV BLD AUTO: 9.9 FL (ref 9.2–12.9)
POCT GLUCOSE: 110 MG/DL (ref 70–110)
POCT GLUCOSE: 115 MG/DL (ref 70–110)
POCT GLUCOSE: 123 MG/DL (ref 70–110)
POCT GLUCOSE: 91 MG/DL (ref 70–110)
POLYCHROMASIA BLD QL SMEAR: ABNORMAL
POTASSIUM SERPL-SCNC: 3.8 MMOL/L (ref 3.5–5.1)
PROT SERPL-MCNC: 7.4 G/DL (ref 6–8.4)
RBC # BLD AUTO: 3.02 M/UL (ref 4–5.4)
SODIUM SERPL-SCNC: 137 MMOL/L (ref 136–145)
TOXIC GRANULES BLD QL SMEAR: PRESENT
VANCOMYCIN TROUGH SERPL-MCNC: 21.2 UG/ML (ref 10–22)
WBC # BLD AUTO: 13.66 K/UL (ref 3.9–12.7)

## 2020-09-16 PROCEDURE — 25000003 PHARM REV CODE 250: Performed by: SURGERY

## 2020-09-16 PROCEDURE — 63600175 PHARM REV CODE 636 W HCPCS: Performed by: STUDENT IN AN ORGANIZED HEALTH CARE EDUCATION/TRAINING PROGRAM

## 2020-09-16 PROCEDURE — 80053 COMPREHEN METABOLIC PANEL: CPT

## 2020-09-16 PROCEDURE — 84100 ASSAY OF PHOSPHORUS: CPT

## 2020-09-16 PROCEDURE — A4216 STERILE WATER/SALINE, 10 ML: HCPCS | Performed by: SURGERY

## 2020-09-16 PROCEDURE — 25000003 PHARM REV CODE 250: Performed by: STUDENT IN AN ORGANIZED HEALTH CARE EDUCATION/TRAINING PROGRAM

## 2020-09-16 PROCEDURE — 97116 GAIT TRAINING THERAPY: CPT | Mod: CQ

## 2020-09-16 PROCEDURE — 85025 COMPLETE CBC W/AUTO DIFF WBC: CPT

## 2020-09-16 PROCEDURE — C9113 INJ PANTOPRAZOLE SODIUM, VIA: HCPCS | Performed by: STUDENT IN AN ORGANIZED HEALTH CARE EDUCATION/TRAINING PROGRAM

## 2020-09-16 PROCEDURE — 25000242 PHARM REV CODE 250 ALT 637 W/ HCPCS: Performed by: STUDENT IN AN ORGANIZED HEALTH CARE EDUCATION/TRAINING PROGRAM

## 2020-09-16 PROCEDURE — 20600001 HC STEP DOWN PRIVATE ROOM

## 2020-09-16 PROCEDURE — 97530 THERAPEUTIC ACTIVITIES: CPT | Mod: CQ

## 2020-09-16 PROCEDURE — 83735 ASSAY OF MAGNESIUM: CPT

## 2020-09-16 PROCEDURE — 63600175 PHARM REV CODE 636 W HCPCS: Performed by: SURGERY

## 2020-09-16 PROCEDURE — 80202 ASSAY OF VANCOMYCIN: CPT

## 2020-09-16 RX ADMIN — Medication 10 ML: at 12:09

## 2020-09-16 RX ADMIN — ACETAMINOPHEN 650 MG: 160 SOLUTION ORAL at 12:09

## 2020-09-16 RX ADMIN — ACETAMINOPHEN 650 MG: 160 SOLUTION ORAL at 05:09

## 2020-09-16 RX ADMIN — PIPERACILLIN SODIUM AND TAZOBACTAM SODIUM 4.5 G: 4; .5 INJECTION, POWDER, LYOPHILIZED, FOR SOLUTION INTRAVENOUS at 09:09

## 2020-09-16 RX ADMIN — HEPARIN SODIUM 5000 UNITS: 5000 INJECTION INTRAVENOUS; SUBCUTANEOUS at 09:09

## 2020-09-16 RX ADMIN — OXYCODONE HYDROCHLORIDE 15 MG: 5 SOLUTION ORAL at 01:09

## 2020-09-16 RX ADMIN — HEPARIN SODIUM 5000 UNITS: 5000 INJECTION INTRAVENOUS; SUBCUTANEOUS at 02:09

## 2020-09-16 RX ADMIN — METOPROLOL TARTRATE 25 MG: 25 TABLET ORAL at 10:09

## 2020-09-16 RX ADMIN — VANCOMYCIN HYDROCHLORIDE 750 MG: 750 INJECTION, POWDER, LYOPHILIZED, FOR SOLUTION INTRAVENOUS at 10:09

## 2020-09-16 RX ADMIN — OXYCODONE HYDROCHLORIDE 10 MG: 5 SOLUTION ORAL at 10:09

## 2020-09-16 RX ADMIN — PANTOPRAZOLE SODIUM 40 MG: 40 INJECTION, POWDER, LYOPHILIZED, FOR SOLUTION INTRAVENOUS at 09:09

## 2020-09-16 RX ADMIN — OXYCODONE HYDROCHLORIDE 15 MG: 5 SOLUTION ORAL at 05:09

## 2020-09-16 RX ADMIN — HEPARIN SODIUM 5000 UNITS: 5000 INJECTION INTRAVENOUS; SUBCUTANEOUS at 05:09

## 2020-09-16 RX ADMIN — PIPERACILLIN SODIUM AND TAZOBACTAM SODIUM 4.5 G: 4; .5 INJECTION, POWDER, LYOPHILIZED, FOR SOLUTION INTRAVENOUS at 12:09

## 2020-09-16 RX ADMIN — CLONIDINE 1 PATCH: 0.3 PATCH TRANSDERMAL at 09:09

## 2020-09-16 RX ADMIN — MICONAZOLE NITRATE: 20 POWDER TOPICAL at 10:09

## 2020-09-16 RX ADMIN — PIPERACILLIN SODIUM AND TAZOBACTAM SODIUM 4.5 G: 4; .5 INJECTION, POWDER, LYOPHILIZED, FOR SOLUTION INTRAVENOUS at 04:09

## 2020-09-16 RX ADMIN — OXYCODONE HYDROCHLORIDE 15 MG: 5 SOLUTION ORAL at 02:09

## 2020-09-16 RX ADMIN — DIPHENHYDRAMINE HYDROCHLORIDE 25 MG: 25 CAPSULE ORAL at 04:09

## 2020-09-16 RX ADMIN — MICONAZOLE NITRATE: 20 POWDER TOPICAL at 09:09

## 2020-09-16 RX ADMIN — OXYCODONE HYDROCHLORIDE 15 MG: 5 SOLUTION ORAL at 10:09

## 2020-09-16 NOTE — PLAN OF CARE
POC reviewed with pt. Verbalized understanding. AAOX'4. VSS on RA. Pt on clears and TPN @45ml/hr tolerating well. Pt has q6h accuchecks no coverage needed. Pain managed with prn pain meds as per MD's order. GODFREY drains intact. Bandages to incision changed. Up to BSC with 2 BM's this shift. No acute events. Bed in lowest position with call light within reach. WCTM.

## 2020-09-16 NOTE — PROGRESS NOTES
Pharmacokinetic Assessment Follow Up: IV Vancomycin    Vancomycin serum concentration assessment(s):    The trough level was drawn correctly and can be used to guide therapy at this time. The measurement is above the desired definitive target range of 10 to 20 mcg/mL.    Vancomycin Regimen Plan:    Re-dose when the random level is less than 20 mcg/mL, next level to be drawn at 1300 on 9/16    Drug levels (last 3 results):  Recent Labs   Lab Result Units 09/16/20  0112   Vancomycin-Trough ug/mL 21.2       Pharmacy will continue to follow and monitor vancomycin.    Please contact pharmacy at extension [phone] for questions regarding this assessment.    Thank you for the consult,   Anthony Moraes       Patient brief summary:  Jaquan Farmer is a 39 y.o. female initiated on antimicrobial therapy with IV Vancomycin for treatment of intra-abdominal infection    The patient's current regimen is suspended until vancomycin level returns to therapeutic range    Drug Allergies:   Review of patient's allergies indicates:   Allergen Reactions    Latex        Actual Body Weight:   67.5kg    Renal Function:   Estimated Creatinine Clearance: 52.4 mL/min (based on SCr of 1.3 mg/dL).,     Dialysis Method (if applicable):  N/A    CBC (last 72 hours):  Recent Labs   Lab Result Units 09/13/20  0600 09/14/20  0440 09/15/20  0315 09/15/20  1700   WBC K/uL 24.22* 19.26* 15.24* 17.17*   Hemoglobin g/dL 8.1* 7.5* 6.8* 9.3*   Hematocrit % 26.0* 24.3* 22.2* 29.7*   Platelets K/uL 399* 387* 385* 428*   Gran% % 79.9* 78.4* 74.4* 73.7*   Lymph% % 11.1* 10.7* 11.9* 17.5*   Mono% % 7.4 8.1 8.3 6.7   Eosinophil% % 0.8 1.8 4.1 0.5   Basophil% % 0.2 0.3 0.3 0.3   Differential Method  Automated Automated Automated Automated       Metabolic Panel (last 72 hours):  Recent Labs   Lab Result Units 09/13/20  0600 09/14/20  0440 09/15/20  0315   Sodium mmol/L 139 137 137   Potassium mmol/L 3.6 3.4* 3.4*   Chloride mmol/L 104 101 102   CO2 mmol/L 22* 23  25   Glucose mg/dL 105 114* 145*   BUN, Bld mg/dL 43* 38* 35*   Creatinine mg/dL 1.4 1.2 1.3   Albumin g/dL 1.9* 1.8* 1.6*   Total Bilirubin mg/dL 0.4 0.4 0.3   Alkaline Phosphatase U/L 205* 213* 173*   AST U/L 17 19 19   ALT U/L 9* 11 10   Magnesium mg/dL 1.7 1.7 1.6   Phosphorus mg/dL 3.4 2.9 2.6*       Vancomycin Administrations:  vancomycin given in the last 96 hours                   vancomycin in dextrose 5 % 1 gram/250 mL IVPB 1,000 mg (mg) 1,000 mg New Bag 09/15/20 0051    vancomycin 1.25 g in dextrose 5% 250 mL IVPB (ready to mix) (mg) 1,250 mg New Bag 09/14/20 0044                Microbiologic Results:  Microbiology Results (last 7 days)     Procedure Component Value Units Date/Time    Blood culture [[id number]] Collected: 09/13/20 1239    Order Status: Completed Specimen: Blood Updated: 09/15/20 0613     Blood Culture, Routine No Growth to date      No Growth to date    Blood culture [[id number]] Collected: 09/13/20 1239    Order Status: Completed Specimen: Blood Updated: 09/15/20 0613     Blood Culture, Routine No Growth to date      No Growth to date

## 2020-09-16 NOTE — PLAN OF CARE
VSS  No signs of evident distress. Pain medication admin as per MAR  CBG WDL, accuchecks q 6  Pt. Up to bedside commode with one person assist.    Pt. Sat on sofa most of shift.  TPN infusion to Right upper arm PICC @ 45mL/hr  Right upper arm double lumen PICC dressing CDI  G tube clamped  Right GODFREY drains  with brown/tan output.  See Flowsheets  Left GODFREY drain with SS output.  See flowsheets.  Midline incision with sutures.  Inferior portion of incision with some edges not approximated. SS drainage noted.  MD aware.  Gauze and ABD pad applied  CT Abdomen/Pelvis ordered.  Pt. Tolerating clear liquid diet.    Will continue to monitor.

## 2020-09-16 NOTE — PROGRESS NOTES
Pharmacokinetic Assessment Follow Up: IV Vancomycin    Vancomycin serum concentration assessment(s):    The trough level was drawn correctly and can be used to guide therapy at this time. The measurement is above the desired definitive target range of 10 to 20 mcg/mL. Vancomycin level = 21.2 mcg/mL.     Vancomycin Regimen Plan:    Change regimen to Vancomycin 750 mg IV every 24 hours with next serum trough concentration measured at 0800 prior to third dose on 9/18    Drug levels (last 3 results):  Recent Labs   Lab Result Units 09/16/20  0112   Vancomycin-Trough ug/mL 21.2       Pharmacy will continue to follow and monitor vancomycin.    Please contact pharmacy at extension [phone] questions regarding this assessment.    Thank you for the consult,   El Shetty       Patient brief summary:  Jaquan Farmer is a 39 y.o. female initiated on antimicrobial therapy with IV Vancomycin for treatment of intra-abdominal infection    The patient's current regimen is suspended until vancomycin level returns to therapeutic range    Drug Allergies:   Review of patient's allergies indicates:   Allergen Reactions    Latex        Actual Body Weight:   67.5kg    Renal Function:   Estimated Creatinine Clearance: 61.9 mL/min (based on SCr of 1.1 mg/dL).,     Dialysis Method (if applicable):  N/A    CBC (last 72 hours):  Recent Labs   Lab Result Units 09/14/20  0440 09/15/20  0315 09/15/20  1700 09/16/20  0500   WBC K/uL 19.26* 15.24* 17.17* 13.66*   Hemoglobin g/dL 7.5* 6.8* 9.3* 8.9*   Hematocrit % 24.3* 22.2* 29.7* 28.1*   Platelets K/uL 387* 385* 428* 419*   Gran% % 78.4* 74.4* 73.7* 68.4   Lymph% % 10.7* 11.9* 17.5* 19.3   Mono% % 8.1 8.3 6.7 8.3   Eosinophil% % 1.8 4.1 0.5 2.4   Basophil% % 0.3 0.3 0.3 0.4   Differential Method  Automated Automated Automated Automated       Metabolic Panel (last 72 hours):  Recent Labs   Lab Result Units 09/14/20  0440 09/15/20  0315 09/16/20  0500   Sodium mmol/L 137 137 137   Potassium  mmol/L 3.4* 3.4* 3.8   Chloride mmol/L 101 102 100   CO2 mmol/L 23 25 25   Glucose mg/dL 114* 145* 109   BUN, Bld mg/dL 38* 35* 32*   Creatinine mg/dL 1.2 1.3 1.1   Albumin g/dL 1.8* 1.6* 1.7*   Total Bilirubin mg/dL 0.4 0.3 0.3   Alkaline Phosphatase U/L 213* 173* 180*   AST U/L 19 19 16   ALT U/L 11 10 12   Magnesium mg/dL 1.7 1.6 1.5*   Phosphorus mg/dL 2.9 2.6* 3.3       Vancomycin Administrations:  vancomycin given in the last 96 hours                   vancomycin in dextrose 5 % 1 gram/250 mL IVPB 1,000 mg (mg) 1,000 mg New Bag 09/15/20 0051    vancomycin 1.25 g in dextrose 5% 250 mL IVPB (ready to mix) (mg) 1,250 mg New Bag 09/14/20 0044                Microbiologic Results:  Microbiology Results (last 7 days)     Procedure Component Value Units Date/Time    Blood culture [[id number]] Collected: 09/13/20 1239    Order Status: Completed Specimen: Blood Updated: 09/16/20 0613     Blood Culture, Routine No Growth to date      No Growth to date      No Growth to date    Blood culture [[id number]] Collected: 09/13/20 1239    Order Status: Completed Specimen: Blood Updated: 09/16/20 0613     Blood Culture, Routine No Growth to date      No Growth to date      No Growth to date

## 2020-09-16 NOTE — PLAN OF CARE
Initiated patient's transfer back to [facility] with the [facility] Referral Surveyor (ext [phone]).  Spoke with Pat, stated she will work on transfer and keep this CM informed.  Will continue to follow for needs.    Marguerite Bernal RN, [facility] ([phone])

## 2020-09-16 NOTE — PROGRESS NOTES
[facility]  General Surgery  Progress Note    Subjective:     History of Present Illness:  Pt is a 40 yo F with recent history of antrectomy with BII reconstruction for ulcer disease at outside hospital. 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 Patient also reportedly aspirated on induction in the OR prior to this, resulting in severe pulmonary edema and hypoxia. Patient [place] [facility] [place] level of care. She arrived as a direct SICU admit on near maximal vent settings and requiring low dose vasopressors with HR in the upper 140s. Her midline laparotomy is closed with 4 Navdeep drains in place draining bilious output.     Post-Op Info:  Procedure(s) (LRB):  REMOVAL, CATHETER, CENTRAL VENOUS, TUNNELED (Left)  CLOSURE, WOUND (N/A)   5 Days Post-Op     Interval History: WBC improving. 13.66 from 15.24. Received 1U pRBCs on yesterday. Tolerating CLD. Decreased drainage from midline incision.    Medications:  Continuous Infusions:   TPN ADULT CENTRAL LINE CUSTOM 45 mL/hr at 09/14/20 2114     Scheduled Meds:   acetaminophen  650 mg Per J Tube Q6H    amLODIPine benzoate  5 mg Per J Tube Daily    cloNIDine 0.3 mg/24 hr td ptwk  1 patch Transdermal Q7 Days    furosemide (LASIX) IV  20 mg Intravenous Once    heparin (porcine)  5,000 Units Subcutaneous Q8H    lidocaine  1 patch Transdermal Q24H    lipid (SMOFLIPID)  250 mL Intravenous Daily    metoprolol  25 mg Per J Tube BID    miconazole NITRATE 2 %   Topical (Top) BID    pantoprazole  40 mg Intravenous Daily    piperacillin-tazobactam (ZOSYN) IVPB  4.5 g Intravenous Q8H    sodium chloride 0.9%  10 mL Intravenous Q6H    vancomycin (VANCOCIN) IVPB  1,000 mg Intravenous Q24H     PRN Meds:sodium chloride, sodium chloride, albuterol-ipratropium, hydrALAZINE, HYDROmorphone, labetaloL, melatonin, metoprolol, oxyCODONE, oxyCODONE, Flushing PICC Protocol **AND** sodium chloride  0.9% **AND** sodium chloride 0.9%, Pharmacy to dose Vancomycin consult **AND** vancomycin - pharmacy to dose     Review of patient's allergies indicates:   Allergen Reactions    Latex      Objective:     Vital Signs (Most Recent):  Temp: 98.8 °F (37.1 °C) (09/15/20 0314)  Pulse: 106 (09/15/20 0314)  Resp: 18 (09/15/20 0418)  BP: 115/71 (09/15/20 0314)  SpO2: 99 % (09/15/20 0314) Vital Signs (24h Range):  Temp:  [97.6 °F (36.4 °C)-99.1 °F (37.3 °C)] 98.8 °F (37.1 °C)  Pulse:  [103-137] 106  Resp:  [16-22] 18  SpO2:  [95 %-100 %] 99 %  BP: (101-123)/(59-76) 115/71     Weight: 67.5 kg (148 lb 13 oz)  Body mass index is 27.22 kg/m².    Intake/Output - Last 3 Shifts       09/13 0700 - 09/14 0659 09/14 0700 - 09/15 0659 09/15 0700 - 09/16 0659    P.O. 650 1320     NG/GT  60     IV Piggyback 100 1150     TPN 1148.5 1597.9     Total Intake(mL/kg) 1898.5 (28.1) 4127.9 (61.2)     Urine (mL/kg/hr) 700 (0.4) 900 (0.6)     Drains 127.5 130     Stool 0 0     Total Output 827.5 1030     Net +1071 +3097.9            Urine Occurrence  9 x     Stool Occurrence 0 x 2 x           Physical Exam  Constitutional:       General: She is not in acute distress.  Cardiovascular:      Rate and Rhythm: Regular rhythm. Tachycardia present.   Pulmonary:      Effort: Pulmonary effort is normal. No respiratory distress.   Abdominal:      General: There is no distension.      Palpations: Abdomen is soft.      Tenderness: There is no abdominal tenderness.      Comments: Right sided drains with thick bile tinged output  Left drain serosang  Staple closure of midline laparotomy, old bloody drainage from inferior portion  G tube clamped   Musculoskeletal:      Comments: RUE picc   Neurological:      Mental Status: She is alert.         Significant Labs:  CBC:   Recent Labs   Lab 09/15/20  0315   WBC 15.24*   RBC 2.32*   HGB 6.8*   HCT 22.2*   *   MCV 96   MCH 29.3   MCHC 30.6*     CMP:   Recent Labs   Lab 09/15/20  0315   *   CALCIUM 8.4*    ALBUMIN 1.6*   PROT 6.9      K 3.4*   CO2 25      BUN 35*   CREATININE 1.3   ALKPHOS 173*   ALT 10   AST 19   BILITOT 0.3       Significant Diagnostics:  I have reviewed all pertinent imaging results/findings within the past 24 hours.    Assessment/Plan:     * Duodenum disorder  40 yo F s/p antrectomy with BII reconstruction c/b duodenal necrosis requiring ex lap, washout, drainage c/b aspiration event. S/p duodenal resection with d-j and g-j anastomosis on 8/13.    - TPN   - Clear liquids with Boost  - Keep G tube clamped, unclamp for nausea/vomiting. Ok to use G tube for meds  - Trialysis line removed. Blood cultures ordered 9/13 - NGTD  - Zosyn/micafungin stopped 9/4 - restart zosyn 9/13 for fever and leukocytosis  - Vanc added on  - FLORIDALMA has resolved, permacath no longer needed  - GI and DVT ppx  - PT / OT  - continue PRNs for BP control          Collette Ferreira MD  General Surgery  [facility]

## 2020-09-16 NOTE — PROGRESS NOTES
Patient seen for wound care follow up.  Bilateral shin discoloration resolved. Skin breakdown to left lower buttocks healed, pink epithelial tissue noted.  Shins can be left open air. Recommend continuing triad barrier cream BID/prn to buttocks.  Nursing to continue care. Re-consult wound care if needed.

## 2020-09-16 NOTE — PT/OT/SLP PROGRESS
Physical Therapy Treatment    Patient Name:  Jaquan Farmer   MRN:  [id number]    Recommendations:     Discharge Recommendations:  rehabilitation facility   Discharge Equipment Recommendations: (TBD)   Barriers to discharge: decreased functional mobility    Assessment:     Jaquan Farmer is a 39 y.o. female admitted with a medical diagnosis of Duodenum disorder.  She presents with the following impairments/functional limitations:  weakness, impaired endurance, impaired self care skills, impaired functional mobilty, gait instability, impaired balance, impaired skin, impaired cardiopulmonary response to activity. Pt tolerated session well with focus on transfers and gait training. Pt progressing well with all mobility and improving gait distance and assistance needed. Pt will continue to benefit from therapy services to address impairments listed above.     Rehab Prognosis: Good; patient would benefit from acute skilled PT services to address these deficits and reach maximum level of function.    Recent Surgery: Procedure(s) (LRB):  REMOVAL, CATHETER, CENTRAL VENOUS, TUNNELED (Left)  CLOSURE, WOUND (N/A) 5 Days Post-Op    Plan:     During this hospitalization, patient to be seen 4 x/week to address the identified rehab impairments via gait training, therapeutic activities, therapeutic exercises and progress toward the following goals:    · Plan of Care Expires:  10/01/20    Subjective     Chief Complaint: no c/o  Pain/Comfort:  · Pain Rating 1: 2/10  · Location - Orientation 1: generalized  · Location 1: abdomen  · Pain Addressed 1: Reposition, Distraction  · Pain Rating Post-Intervention 1: 4/10      Objective:     Communicated with NSG prior to session.  Patient found sitting on bedside sofa with telemetry, peripheral IV, GODFREY drain upon PTA entry to room.     General Precautions: Standard, fall   Orthopedic Precautions:N/A   Braces: N/A     Functional Mobility:  · Transfers:     · Sit to Stand:  stand by  assistance with rolling walker  · Toilet Transfer: stand by assistance with  bedside commode  using  Stand Pivot  · Gait: Pt ambulates ~80 ft, ~100 ft, and ~90 ft with RW and CGA to SBA. Pt limited by fatigue, minor c/o pain increase with upright activity. Pt with chair follow and seated rest between trials. Heavy reliance on BUE support, cued for improved posture and increased reliance on LE support rather than offloading with UE.       AM-PAC 6 CLICK MOBILITY  Turning over in bed (including adjusting bedclothes, sheets and blankets)?: 3  Sitting down on and standing up from a chair with arms (e.g., wheelchair, bedside commode, etc.): 3  Moving from lying on back to sitting on the side of the bed?: 3  Moving to and from a bed to a chair (including a wheelchair)?: 3  Need to walk in hospital room?: 3  Climbing 3-5 steps with a railing?: 2  Basic Mobility Total Score: 17       Therapeutic Activities and Exercises:   Pt assisted with functional mobility as noted above.   Pt educated on importance of increased mobilization with pt encouraged to ambulate with increased frequency with NSG assistance.     Patient left seated on bedside sofa with all lines intact and call button in reach.    GOALS:   Multidisciplinary Problems     Physical Therapy Goals        Problem: Physical Therapy Goal    Goal Priority Disciplines Outcome Goal Variances Interventions   Physical Therapy Goal     PT, PT/OT Ongoing, Progressing     Description: Goals to be met by: 9/15/2020     Patient will increase functional independence with mobility by performin. Supine to sit with MInimal Assistance  2. Sit to stand transfer with Minimal Assistance with LRAD - met ()  3. Gait  x 50 feet with Minimal Assistance using LRAD.   4. Stand for 3 minutes with Contact Guard Assistance using LRAD - met ()  5. Lower extremity exercise program x15 reps per handout, with independence                     Time Tracking:     PT Received On:  09/16/20  PT Start Time: 0823     PT Stop Time: 0847  PT Total Time (min): 24 min     Billable Minutes: Gait Training 16 and Therapeutic Activity 8    Treatment Type: Treatment  PT/PTA: PTA     PTA Visit Number: 5     Frank Bruno PTA  09/16/2020

## 2020-09-17 LAB
ALBUMIN SERPL BCP-MCNC: 1.9 G/DL (ref 3.5–5.2)
ALP SERPL-CCNC: 217 U/L (ref 55–135)
ALT SERPL W/O P-5'-P-CCNC: 16 U/L (ref 10–44)
ANION GAP SERPL CALC-SCNC: 10 MMOL/L (ref 8–16)
ANISOCYTOSIS BLD QL SMEAR: SLIGHT
AST SERPL-CCNC: 22 U/L (ref 10–40)
BASOPHILS # BLD AUTO: 0.08 K/UL (ref 0–0.2)
BASOPHILS NFR BLD: 0.5 % (ref 0–1.9)
BILIRUB SERPL-MCNC: 0.4 MG/DL (ref 0.1–1)
BUN SERPL-MCNC: 24 MG/DL (ref 6–20)
CALCIUM SERPL-MCNC: 9 MG/DL (ref 8.7–10.5)
CHLORIDE SERPL-SCNC: 98 MMOL/L (ref 95–110)
CO2 SERPL-SCNC: 28 MMOL/L (ref 23–29)
CREAT SERPL-MCNC: 1 MG/DL (ref 0.5–1.4)
DIFFERENTIAL METHOD: ABNORMAL
EOSINOPHIL # BLD AUTO: 0.4 K/UL (ref 0–0.5)
EOSINOPHIL NFR BLD: 2.4 % (ref 0–8)
ERYTHROCYTE [DISTWIDTH] IN BLOOD BY AUTOMATED COUNT: 15.4 % (ref 11.5–14.5)
EST. GFR  (AFRICAN AMERICAN): >60 ML/MIN/1.73 M^2
EST. GFR  (NON AFRICAN AMERICAN): >60 ML/MIN/1.73 M^2
GLUCOSE SERPL-MCNC: 83 MG/DL (ref 70–110)
HCT VFR BLD AUTO: 30.6 % (ref 37–48.5)
HGB BLD-MCNC: 9.3 G/DL (ref 12–16)
IMM GRANULOCYTES # BLD AUTO: 0.22 K/UL (ref 0–0.04)
IMM GRANULOCYTES NFR BLD AUTO: 1.3 % (ref 0–0.5)
LYMPHOCYTES # BLD AUTO: 3.3 K/UL (ref 1–4.8)
LYMPHOCYTES NFR BLD: 19.1 % (ref 18–48)
MAGNESIUM SERPL-MCNC: 1.6 MG/DL (ref 1.6–2.6)
MCH RBC QN AUTO: 28.6 PG (ref 27–31)
MCHC RBC AUTO-ENTMCNC: 30.4 G/DL (ref 32–36)
MCV RBC AUTO: 94 FL (ref 82–98)
MONOCYTES # BLD AUTO: 1.6 K/UL (ref 0.3–1)
MONOCYTES NFR BLD: 9.3 % (ref 4–15)
NEUTROPHILS # BLD AUTO: 11.6 K/UL (ref 1.8–7.7)
NEUTROPHILS NFR BLD: 67.4 % (ref 38–73)
NRBC BLD-RTO: 0 /100 WBC
PHOSPHATE SERPL-MCNC: 4.3 MG/DL (ref 2.7–4.5)
PLATELET # BLD AUTO: 482 K/UL (ref 150–350)
PLATELET BLD QL SMEAR: ABNORMAL
PMV BLD AUTO: 10.1 FL (ref 9.2–12.9)
POCT GLUCOSE: 106 MG/DL (ref 70–110)
POLYCHROMASIA BLD QL SMEAR: ABNORMAL
POTASSIUM SERPL-SCNC: 4.4 MMOL/L (ref 3.5–5.1)
PROT SERPL-MCNC: 7.9 G/DL (ref 6–8.4)
RBC # BLD AUTO: 3.25 M/UL (ref 4–5.4)
SODIUM SERPL-SCNC: 136 MMOL/L (ref 136–145)
TRIGL SERPL-MCNC: 185 MG/DL (ref 30–150)
WBC # BLD AUTO: 17.16 K/UL (ref 3.9–12.7)

## 2020-09-17 PROCEDURE — A4216 STERILE WATER/SALINE, 10 ML: HCPCS | Performed by: SURGERY

## 2020-09-17 PROCEDURE — 85025 COMPLETE CBC W/AUTO DIFF WBC: CPT

## 2020-09-17 PROCEDURE — 63600175 PHARM REV CODE 636 W HCPCS: Performed by: STUDENT IN AN ORGANIZED HEALTH CARE EDUCATION/TRAINING PROGRAM

## 2020-09-17 PROCEDURE — B4185 PARENTERAL SOL 10 GM LIPIDS: HCPCS | Performed by: STUDENT IN AN ORGANIZED HEALTH CARE EDUCATION/TRAINING PROGRAM

## 2020-09-17 PROCEDURE — 84478 ASSAY OF TRIGLYCERIDES: CPT

## 2020-09-17 PROCEDURE — 25500020 PHARM REV CODE 255: Performed by: STUDENT IN AN ORGANIZED HEALTH CARE EDUCATION/TRAINING PROGRAM

## 2020-09-17 PROCEDURE — 25000003 PHARM REV CODE 250: Performed by: STUDENT IN AN ORGANIZED HEALTH CARE EDUCATION/TRAINING PROGRAM

## 2020-09-17 PROCEDURE — 25000242 PHARM REV CODE 250 ALT 637 W/ HCPCS: Performed by: STUDENT IN AN ORGANIZED HEALTH CARE EDUCATION/TRAINING PROGRAM

## 2020-09-17 PROCEDURE — A4217 STERILE WATER/SALINE, 500 ML: HCPCS | Performed by: STUDENT IN AN ORGANIZED HEALTH CARE EDUCATION/TRAINING PROGRAM

## 2020-09-17 PROCEDURE — 97530 THERAPEUTIC ACTIVITIES: CPT

## 2020-09-17 PROCEDURE — 94761 N-INVAS EAR/PLS OXIMETRY MLT: CPT

## 2020-09-17 PROCEDURE — 83735 ASSAY OF MAGNESIUM: CPT

## 2020-09-17 PROCEDURE — 63600175 PHARM REV CODE 636 W HCPCS: Performed by: SURGERY

## 2020-09-17 PROCEDURE — 25000003 PHARM REV CODE 250: Performed by: SURGERY

## 2020-09-17 PROCEDURE — 97535 SELF CARE MNGMENT TRAINING: CPT

## 2020-09-17 PROCEDURE — 84100 ASSAY OF PHOSPHORUS: CPT

## 2020-09-17 PROCEDURE — 80053 COMPREHEN METABOLIC PANEL: CPT

## 2020-09-17 PROCEDURE — C9113 INJ PANTOPRAZOLE SODIUM, VIA: HCPCS | Performed by: STUDENT IN AN ORGANIZED HEALTH CARE EDUCATION/TRAINING PROGRAM

## 2020-09-17 PROCEDURE — 20600001 HC STEP DOWN PRIVATE ROOM

## 2020-09-17 RX ADMIN — PANTOPRAZOLE SODIUM 40 MG: 40 INJECTION, POWDER, LYOPHILIZED, FOR SOLUTION INTRAVENOUS at 10:09

## 2020-09-17 RX ADMIN — ACETAMINOPHEN 650 MG: 160 SOLUTION ORAL at 12:09

## 2020-09-17 RX ADMIN — MAGNESIUM SULFATE HEPTAHYDRATE: 500 INJECTION, SOLUTION INTRAMUSCULAR; INTRAVENOUS at 02:09

## 2020-09-17 RX ADMIN — ACETAMINOPHEN 650 MG: 160 SOLUTION ORAL at 06:09

## 2020-09-17 RX ADMIN — MICONAZOLE NITRATE: 20 POWDER TOPICAL at 10:09

## 2020-09-17 RX ADMIN — Medication 10 ML: at 10:09

## 2020-09-17 RX ADMIN — IOHEXOL 15 ML: 350 INJECTION, SOLUTION INTRAVENOUS at 02:09

## 2020-09-17 RX ADMIN — HEPARIN SODIUM 5000 UNITS: 5000 INJECTION INTRAVENOUS; SUBCUTANEOUS at 03:09

## 2020-09-17 RX ADMIN — PIPERACILLIN SODIUM AND TAZOBACTAM SODIUM 4.5 G: 4; .5 INJECTION, POWDER, LYOPHILIZED, FOR SOLUTION INTRAVENOUS at 04:09

## 2020-09-17 RX ADMIN — METOPROLOL TARTRATE 25 MG: 25 TABLET ORAL at 09:09

## 2020-09-17 RX ADMIN — METOPROLOL TARTRATE 25 MG: 25 TABLET ORAL at 10:09

## 2020-09-17 RX ADMIN — VANCOMYCIN HYDROCHLORIDE 750 MG: 750 INJECTION, POWDER, LYOPHILIZED, FOR SOLUTION INTRAVENOUS at 10:09

## 2020-09-17 RX ADMIN — MAGNESIUM SULFATE HEPTAHYDRATE: 500 INJECTION, SOLUTION INTRAMUSCULAR; INTRAVENOUS at 09:09

## 2020-09-17 RX ADMIN — HEPARIN SODIUM 5000 UNITS: 5000 INJECTION INTRAVENOUS; SUBCUTANEOUS at 06:09

## 2020-09-17 RX ADMIN — PIPERACILLIN SODIUM AND TAZOBACTAM SODIUM 4.5 G: 4; .5 INJECTION, POWDER, LYOPHILIZED, FOR SOLUTION INTRAVENOUS at 09:09

## 2020-09-17 RX ADMIN — SMOFLIPID 250 ML: 6; 6; 5; 3 INJECTION, EMULSION INTRAVENOUS at 09:09

## 2020-09-17 RX ADMIN — ACETAMINOPHEN 650 MG: 160 SOLUTION ORAL at 11:09

## 2020-09-17 RX ADMIN — OXYCODONE HYDROCHLORIDE 15 MG: 5 SOLUTION ORAL at 04:09

## 2020-09-17 RX ADMIN — Medication 10 ML: at 07:09

## 2020-09-17 RX ADMIN — OXYCODONE HYDROCHLORIDE 10 MG: 5 SOLUTION ORAL at 09:09

## 2020-09-17 RX ADMIN — HEPARIN SODIUM 5000 UNITS: 5000 INJECTION INTRAVENOUS; SUBCUTANEOUS at 09:09

## 2020-09-17 RX ADMIN — MICONAZOLE NITRATE: 20 POWDER TOPICAL at 09:09

## 2020-09-17 RX ADMIN — AMLODIPINE 5 MG: 1 SUSPENSION ORAL at 10:09

## 2020-09-17 RX ADMIN — PIPERACILLIN SODIUM AND TAZOBACTAM SODIUM 4.5 G: 4; .5 INJECTION, POWDER, LYOPHILIZED, FOR SOLUTION INTRAVENOUS at 12:09

## 2020-09-17 RX ADMIN — Medication 10 ML: at 12:09

## 2020-09-17 RX ADMIN — SMOFLIPID 250 ML: 6; 6; 5; 3 INJECTION, EMULSION INTRAVENOUS at 02:09

## 2020-09-17 RX ADMIN — OXYCODONE HYDROCHLORIDE 15 MG: 5 SOLUTION ORAL at 03:09

## 2020-09-17 NOTE — PROGRESS NOTES
[facility]  General Surgery  Progress Note    Subjective:     History of Present Illness:  Pt is a 40 yo F with recent history of antrectomy with BII reconstruction for ulcer disease at outside hospital. 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 Patient also reportedly aspirated on induction in the OR prior to this, resulting in severe pulmonary edema and hypoxia. Patient was transferred to [facility] [place] [facility] urgently for higher level of care. She arrived as a direct SICU admit on near maximal vent settings and requiring low dose vasopressors with HR in the upper 140s. Her midline laparotomy is closed with 4 Navdeep drains in place draining bilious output.     Post-Op Info:  Procedure(s) (LRB):  REMOVAL, CATHETER, CENTRAL VENOUS, TUNNELED (Left)  CLOSURE, WOUND (N/A)   6 Days Post-Op     Interval History: WBC 17.16 this AM. Up from 13.66. H/H stable. Pending set up for transfer back to [place].    Medications:  Continuous Infusions:   TPN ADULT CENTRAL LINE CUSTOM 45 mL/hr at 09/17/20 0250     Scheduled Meds:   acetaminophen  650 mg Per J Tube Q6H    amLODIPine benzoate  5 mg Per J Tube Daily    cloNIDine 0.3 mg/24 hr td ptwk  1 patch Transdermal Q7 Days    heparin (porcine)  5,000 Units Subcutaneous Q8H    lidocaine  1 patch Transdermal Q24H    lipid (SMOFLIPID)  250 mL Intravenous Daily    metoprolol  25 mg Per J Tube BID    miconazole NITRATE 2 %   Topical (Top) BID    pantoprazole  40 mg Intravenous Daily    piperacillin-tazobactam (ZOSYN) IVPB  4.5 g Intravenous Q8H    sodium chloride 0.9%  10 mL Intravenous Q6H    vancomycin (VANCOCIN) IVPB  750 mg Intravenous Q24H     PRN Meds:sodium chloride, sodium chloride, albuterol-ipratropium, diphenhydrAMINE, hydrALAZINE, HYDROmorphone, iohexol, labetaloL, melatonin, metoprolol, oxyCODONE, oxyCODONE, Flushing PICC Protocol **AND** sodium chloride 0.9% **AND** sodium chloride 0.9%, Pharmacy to dose  Vancomycin consult **AND** vancomycin - pharmacy to dose     Review of patient's allergies indicates:   Allergen Reactions    Latex      Objective:     Vital Signs (Most Recent):  Temp: 98.2 °F (36.8 °C) (09/17/20 0503)  Pulse: 103 (09/17/20 0503)  Resp: 18 (09/17/20 0503)  BP: 137/80 (09/17/20 0503)  SpO2: 100 % (09/17/20 0503) Vital Signs (24h Range):  Temp:  [96.7 °F (35.9 °C)-98.7 °F (37.1 °C)] 98.2 °F (36.8 °C)  Pulse:  [] 103  Resp:  [16-20] 18  SpO2:  [97 %-100 %] 100 %  BP: (122-137)/(74-83) 137/80     Weight: 67.5 kg (148 lb 13 oz)  Body mass index is 27.22 kg/m².    Intake/Output - Last 3 Shifts       09/15 0700 - 09/16 0659 09/16 0700 - 09/17 0659 09/17 0700 - 09/18 0659    P.O.  360     Blood 447.5      NG/GT 60 60     IV Piggyback       TPN       Total Intake(mL/kg) 507.5 (7.5) 420 (6.2)     Urine (mL/kg/hr) 800 (0.5) 300 (0.2)     Drains 130 125     Stool 0 1     Total Output 930 426     Net -422.5 -6            Urine Occurrence  7 x     Stool Occurrence 3 x 4 x           Physical Exam  Constitutional:       General: She is not in acute distress.  Cardiovascular:      Rate and Rhythm: Regular rhythm. Tachycardia present.   Pulmonary:      Effort: Pulmonary effort is normal. No respiratory distress.   Abdominal:      General: There is no distension.      Palpations: Abdomen is soft.      Tenderness: There is no abdominal tenderness.      Comments: Right sided drains with thick bile tinged output  Left drain serosang  Staple closure of midline laparotomy, old bloody drainage from inferior portion  G tube clamped   Musculoskeletal:      Comments: RUE picc   Neurological:      Mental Status: She is alert.         Significant Labs:  CBC:   Recent Labs   Lab 09/17/20  0440   WBC 17.16*   RBC 3.25*   HGB 9.3*   HCT 30.6*   *   MCV 94   MCH 28.6   MCHC 30.4*     CMP:   Recent Labs   Lab 09/17/20  0440   GLU 83   CALCIUM 9.0   ALBUMIN 1.9*   PROT 7.9      K 4.4   CO2 28   CL 98   BUN 24*    CREATININE 1.0   ALKPHOS 217*   ALT 16   AST 22   BILITOT 0.4       Significant Diagnostics:  I have reviewed all pertinent imaging results/findings within the past 24 hours.    Assessment/Plan:     * Duodenum disorder  38 yo F s/p antrectomy with BII reconstruction c/b duodenal necrosis requiring ex lap, washout, drainage c/b aspiration event. S/p duodenal resection with d-j and g-j anastomosis on 8/13.    - TPN   - Clear liquids with Boost  - Keep G tube clamped, unclamp for nausea/vomiting. Ok to use G tube for meds  - Trialysis line removed. Blood cultures ordered 9/13 - NGTD  - Zosyn/micafungin stopped 9/4 - restart zosyn 9/13 for fever and leukocytosis  - Vanc added on  - FLORIDALMA has resolved, permacath no longer needed  - GI and DVT ppx  - PT / OT  - continue PRNs for BP control    Dispo: Transfer back to [place] pending set up          Collette Ferreira MD  General Surgery  [facility]

## 2020-09-17 NOTE — SUBJECTIVE & OBJECTIVE
Interval History: WBC 17.16 this AM. Up from 13.66. H/H stable. Pending set up for transfer back to [place].    Medications:  Continuous Infusions:   TPN ADULT CENTRAL LINE CUSTOM 45 mL/hr at 09/17/20 0250     Scheduled Meds:   acetaminophen  650 mg Per J Tube Q6H    amLODIPine benzoate  5 mg Per J Tube Daily    cloNIDine 0.3 mg/24 hr td ptwk  1 patch Transdermal Q7 Days    heparin (porcine)  5,000 Units Subcutaneous Q8H    lidocaine  1 patch Transdermal Q24H    lipid (SMOFLIPID)  250 mL Intravenous Daily    metoprolol  25 mg Per J Tube BID    miconazole NITRATE 2 %   Topical (Top) BID    pantoprazole  40 mg Intravenous Daily    piperacillin-tazobactam (ZOSYN) IVPB  4.5 g Intravenous Q8H    sodium chloride 0.9%  10 mL Intravenous Q6H    vancomycin (VANCOCIN) IVPB  750 mg Intravenous Q24H     PRN Meds:sodium chloride, sodium chloride, albuterol-ipratropium, diphenhydrAMINE, hydrALAZINE, HYDROmorphone, iohexol, labetaloL, melatonin, metoprolol, oxyCODONE, oxyCODONE, Flushing PICC Protocol **AND** sodium chloride 0.9% **AND** sodium chloride 0.9%, Pharmacy to dose Vancomycin consult **AND** vancomycin - pharmacy to dose     Review of patient's allergies indicates:   Allergen Reactions    Latex      Objective:     Vital Signs (Most Recent):  Temp: 98.2 °F (36.8 °C) (09/17/20 0503)  Pulse: 103 (09/17/20 0503)  Resp: 18 (09/17/20 0503)  BP: 137/80 (09/17/20 0503)  SpO2: 100 % (09/17/20 0503) Vital Signs (24h Range):  Temp:  [96.7 °F (35.9 °C)-98.7 °F (37.1 °C)] 98.2 °F (36.8 °C)  Pulse:  [] 103  Resp:  [16-20] 18  SpO2:  [97 %-100 %] 100 %  BP: (122-137)/(74-83) 137/80     Weight: 67.5 kg (148 lb 13 oz)  Body mass index is 27.22 kg/m².    Intake/Output - Last 3 Shifts       09/15 0700 - 09/16 0659 09/16 0700 - 09/17 0659 09/17 0700 - 09/18 0659    P.O.  360     Blood 447.5      NG/GT 60 60     IV Piggyback       TPN       Total Intake(mL/kg) 507.5 (7.5) 420 (6.2)     Urine (mL/kg/hr) 800 (0.5) 300  (0.2)     Drains 130 125     Stool 0 1     Total Output 930 426     Net -422.5 -6            Urine Occurrence  7 x     Stool Occurrence 3 x 4 x           Physical Exam  Constitutional:       General: She is not in acute distress.  Cardiovascular:      Rate and Rhythm: Regular rhythm. Tachycardia present.   Pulmonary:      Effort: Pulmonary effort is normal. No respiratory distress.   Abdominal:      General: There is no distension.      Palpations: Abdomen is soft.      Tenderness: There is no abdominal tenderness.      Comments: Right sided drains with thick bile tinged output  Left drain serosang  Staple closure of midline laparotomy, old bloody drainage from inferior portion  G tube clamped   Musculoskeletal:      Comments: RUE picc   Neurological:      Mental Status: She is alert.         Significant Labs:  CBC:   Recent Labs   Lab 09/17/20  0440   WBC 17.16*   RBC 3.25*   HGB 9.3*   HCT 30.6*   *   MCV 94   MCH 28.6   MCHC 30.4*     CMP:   Recent Labs   Lab 09/17/20  0440   GLU 83   CALCIUM 9.0   ALBUMIN 1.9*   PROT 7.9      K 4.4   CO2 28   CL 98   BUN 24*   CREATININE 1.0   ALKPHOS 217*   ALT 16   AST 22   BILITOT 0.4       Significant Diagnostics:  I have reviewed all pertinent imaging results/findings within the past 24 hours.

## 2020-09-17 NOTE — PLAN OF CARE
POC reviewed with pt. Verbalized understanding. AAOX'4. VSS on RA. Pt on clears and TPN @45ml/hr tolerating well. Pt has q6h accuchecks no coverage needed. Pain managed with prn pain meds as per MD's order. GODFREY drains intact. Bandages to incision changed. Pt up to [facility] [place] [facility] the patient had CT scan tolerated well.No acute events. Bed in lowest position with call light within reach. WCTM.

## 2020-09-17 NOTE — PLAN OF CARE
Problem: Adult Inpatient Plan of Care  Goal: Plan of Care Review  Outcome: Ongoing, Progressing  Goal: Patient-Specific Goal (Individualization)  Outcome: Ongoing, Progressing  Goal: Optimal Comfort and Wellbeing  Outcome: Ongoing, Progressing  Goal: Readiness for Transition of Care  Outcome: Ongoing, Progressing  Goal: Rounds/Family Conference  Outcome: Ongoing, Progressing     Problem: Infection  Goal: Infection Symptom Resolution  Outcome: Ongoing, Progressing       Problem: Skin Injury Risk Increased  Goal: Skin Health and Integrity  Outcome: Ongoing, Progressing     Problem: Wound  Goal: Optimal Wound Healing  Outcome: Ongoing, Progressing     Problem: Malnutrition  Goal: Improved Nutritional Intake  Outcome: Ongoing, Progressing

## 2020-09-17 NOTE — PT/OT/SLP PROGRESS
Occupational Therapy   Treatment    Name: Jaquan Farmer  MRN: [id number]  Admitting Diagnosis:  Duodenum disorder  6 Days Post-Op   Procedure(s):  REMOVAL, CATHETER, CENTRAL VENOUS, TUNNELED  CLOSURE, WOUND     Recommendations:     Discharge Recommendations: rehabilitation facility  Discharge Equipment Recommendations:  (TBD)  Barriers to discharge:  None    Assessment:     Jaquan Farmer is a 39 y.o. female with a medical diagnosis of Duodenum disorder.  She presents with fatigue but agreeable to participate and tolerates session well. Patient exhibits decreased endurance but remains motivated to return to [facility]. Performance deficits affecting function are weakness, impaired endurance, impaired self care skills, impaired functional mobilty, gait instability, impaired skin, impaired cardiopulmonary response to activity, pain.     Rehab Prognosis:  Good; patient would benefit from acute skilled OT services to address these deficits and reach maximum level of function.       Plan:     Patient to be seen 4 x/week to address the above listed problems via self-care/home management, therapeutic activities, therapeutic exercises  · Plan of Care Expires: 09/24/20  · Plan of Care Reviewed with: patient    Subjective     Pain/Comfort:  · Pain Rating 1: (Did not rate initially)  · Location 1: abdomen  · Pain Addressed 1: Nurse notified  · Pain Rating Post-Intervention 1: 6/10    Objective:     Communicated with: RN prior to session.  Patient found HOB elevated with telemetry, GODFREY drain, peripheral IV upon OT entry to room.    General Precautions: Standard, fall   Orthopedic Precautions:N/A   Braces: N/A     Occupational Performance:     Bed Mobility:    · Patient completed Supine to Sit with moderate assistance at her trunk and BLE    Functional Mobility/Transfers:  · Patient completed Sit <> Stand Transfer with contact guard assistance with rolling walker   · Patient completed Toilet Transfer onto the bedside commode  using Step Transfer technique with contact guard assistance with no AD  · Functional Mobility: ~120' with CGA and RW, SOB noted but declined rest break    Activities of Daily Living:  · Upper Body Dressing: minimum assistance Patient donned hospital gown as a robe while sitting EOB with min assist.  · Toileting: supervision Patient completed toileting on the bedside commode with SPV.    [facility] 6 Click ADL: 19    Treatment & Education:  Role of OT/POC  Call button for assistance    Patient left sitting on the sofa with all lines intact, call button in reach and RN notifiedEducation:      GOALS:   Multidisciplinary Problems     Occupational Therapy Goals        Problem: Occupational Therapy Goal    Goal Priority Disciplines Outcome Interventions   Occupational Therapy Goal     OT, PT/OT Ongoing, Progressing    Description: Goals to be met by: 9/23/20     Patient will increase functional independence with ADLs by performing:    Feeding with [place].  UE Dressing with Stand-by Assistance.  LE Dressing with Minimal Assistance.  Grooming while standing at sink with Contact Guard Assistance.- MET 9/14  Updated 9/14:  Toileting from toilet with stand-by Assistance for hygiene and clothing management.   Toilet transfer to toilet with stand-by Assistance.                     Time Tracking:     OT Date of Treatment: 09/17/20  OT Start Time: 1511  OT Stop Time: 1534  OT Total Time (min): 23 min    Billable Minutes:Self Care/Home Management 10 minutes  Therapeutic Activity 13 minutes    Diane Willingham OT  9/17/2020

## 2020-09-18 LAB
ALBUMIN SERPL BCP-MCNC: 1.7 G/DL (ref 3.5–5.2)
ALP SERPL-CCNC: 177 U/L (ref 55–135)
ALT SERPL W/O P-5'-P-CCNC: 14 U/L (ref 10–44)
ANION GAP SERPL CALC-SCNC: 10 MMOL/L (ref 8–16)
ANISOCYTOSIS BLD QL SMEAR: SLIGHT
AST SERPL-CCNC: 19 U/L (ref 10–40)
BASOPHILS # BLD AUTO: 0.07 K/UL (ref 0–0.2)
BASOPHILS NFR BLD: 0.5 % (ref 0–1.9)
BILIRUB SERPL-MCNC: 0.2 MG/DL (ref 0.1–1)
BUN SERPL-MCNC: 24 MG/DL (ref 6–20)
CALCIUM SERPL-MCNC: 8.8 MG/DL (ref 8.7–10.5)
CHLORIDE SERPL-SCNC: 100 MMOL/L (ref 95–110)
CO2 SERPL-SCNC: 27 MMOL/L (ref 23–29)
CREAT SERPL-MCNC: 0.9 MG/DL (ref 0.5–1.4)
DIFFERENTIAL METHOD: ABNORMAL
EOSINOPHIL # BLD AUTO: 0.4 K/UL (ref 0–0.5)
EOSINOPHIL NFR BLD: 2.9 % (ref 0–8)
ERYTHROCYTE [DISTWIDTH] IN BLOOD BY AUTOMATED COUNT: 15.5 % (ref 11.5–14.5)
EST. GFR  (AFRICAN AMERICAN): >60 ML/MIN/1.73 M^2
EST. GFR  (NON AFRICAN AMERICAN): >60 ML/MIN/1.73 M^2
GLUCOSE SERPL-MCNC: 96 MG/DL (ref 70–110)
HCT VFR BLD AUTO: 26.5 % (ref 37–48.5)
HGB BLD-MCNC: 8.1 G/DL (ref 12–16)
IMM GRANULOCYTES # BLD AUTO: 0.25 K/UL (ref 0–0.04)
IMM GRANULOCYTES NFR BLD AUTO: 1.6 % (ref 0–0.5)
LYMPHOCYTES # BLD AUTO: 3.1 K/UL (ref 1–4.8)
LYMPHOCYTES NFR BLD: 20.1 % (ref 18–48)
MAGNESIUM SERPL-MCNC: 1.5 MG/DL (ref 1.6–2.6)
MCH RBC QN AUTO: 29.3 PG (ref 27–31)
MCHC RBC AUTO-ENTMCNC: 30.6 G/DL (ref 32–36)
MCV RBC AUTO: 96 FL (ref 82–98)
MONOCYTES # BLD AUTO: 1.3 K/UL (ref 0.3–1)
MONOCYTES NFR BLD: 8.5 % (ref 4–15)
NEUTROPHILS # BLD AUTO: 10.1 K/UL (ref 1.8–7.7)
NEUTROPHILS NFR BLD: 66.4 % (ref 38–73)
NRBC BLD-RTO: 0 /100 WBC
PHOSPHATE SERPL-MCNC: 4.7 MG/DL (ref 2.7–4.5)
PLATELET # BLD AUTO: 442 K/UL (ref 150–350)
PMV BLD AUTO: 10.2 FL (ref 9.2–12.9)
POCT GLUCOSE: 102 MG/DL (ref 70–110)
POCT GLUCOSE: 126 MG/DL (ref 70–110)
POIKILOCYTOSIS BLD QL SMEAR: SLIGHT
POTASSIUM SERPL-SCNC: 4.3 MMOL/L (ref 3.5–5.1)
PROT SERPL-MCNC: 7.3 G/DL (ref 6–8.4)
RBC # BLD AUTO: 2.76 M/UL (ref 4–5.4)
SODIUM SERPL-SCNC: 137 MMOL/L (ref 136–145)
TARGETS BLD QL SMEAR: ABNORMAL
VANCOMYCIN TROUGH SERPL-MCNC: 18 UG/ML (ref 10–22)
WBC # BLD AUTO: 15.27 K/UL (ref 3.9–12.7)

## 2020-09-18 PROCEDURE — 25000003 PHARM REV CODE 250: Performed by: STUDENT IN AN ORGANIZED HEALTH CARE EDUCATION/TRAINING PROGRAM

## 2020-09-18 PROCEDURE — 80202 ASSAY OF VANCOMYCIN: CPT

## 2020-09-18 PROCEDURE — 80053 COMPREHEN METABOLIC PANEL: CPT

## 2020-09-18 PROCEDURE — 97535 SELF CARE MNGMENT TRAINING: CPT

## 2020-09-18 PROCEDURE — 63600175 PHARM REV CODE 636 W HCPCS: Performed by: STUDENT IN AN ORGANIZED HEALTH CARE EDUCATION/TRAINING PROGRAM

## 2020-09-18 PROCEDURE — A4217 STERILE WATER/SALINE, 500 ML: HCPCS | Performed by: STUDENT IN AN ORGANIZED HEALTH CARE EDUCATION/TRAINING PROGRAM

## 2020-09-18 PROCEDURE — B4185 PARENTERAL SOL 10 GM LIPIDS: HCPCS | Performed by: STUDENT IN AN ORGANIZED HEALTH CARE EDUCATION/TRAINING PROGRAM

## 2020-09-18 PROCEDURE — 25000242 PHARM REV CODE 250 ALT 637 W/ HCPCS: Performed by: STUDENT IN AN ORGANIZED HEALTH CARE EDUCATION/TRAINING PROGRAM

## 2020-09-18 PROCEDURE — 83735 ASSAY OF MAGNESIUM: CPT

## 2020-09-18 PROCEDURE — 85025 COMPLETE CBC W/AUTO DIFF WBC: CPT

## 2020-09-18 PROCEDURE — 84100 ASSAY OF PHOSPHORUS: CPT

## 2020-09-18 PROCEDURE — 20600001 HC STEP DOWN PRIVATE ROOM

## 2020-09-18 PROCEDURE — A4216 STERILE WATER/SALINE, 10 ML: HCPCS | Performed by: SURGERY

## 2020-09-18 PROCEDURE — C9113 INJ PANTOPRAZOLE SODIUM, VIA: HCPCS | Performed by: STUDENT IN AN ORGANIZED HEALTH CARE EDUCATION/TRAINING PROGRAM

## 2020-09-18 PROCEDURE — 63600175 PHARM REV CODE 636 W HCPCS: Performed by: SURGERY

## 2020-09-18 PROCEDURE — 25000003 PHARM REV CODE 250: Performed by: SURGERY

## 2020-09-18 RX ORDER — GUAIFENESIN 100 MG/5ML
200 SOLUTION ORAL EVERY 6 HOURS PRN
Status: DISCONTINUED | OUTPATIENT
Start: 2020-09-18 | End: 2020-09-29 | Stop reason: HOSPADM

## 2020-09-18 RX ADMIN — METOPROLOL TARTRATE 25 MG: 25 TABLET ORAL at 09:09

## 2020-09-18 RX ADMIN — VANCOMYCIN HYDROCHLORIDE 750 MG: 750 INJECTION, POWDER, LYOPHILIZED, FOR SOLUTION INTRAVENOUS at 11:09

## 2020-09-18 RX ADMIN — GUAIFENESIN 200 MG: 200 SOLUTION ORAL at 08:09

## 2020-09-18 RX ADMIN — OXYCODONE HYDROCHLORIDE 10 MG: 5 SOLUTION ORAL at 09:09

## 2020-09-18 RX ADMIN — Medication 10 ML: at 05:09

## 2020-09-18 RX ADMIN — PIPERACILLIN SODIUM AND TAZOBACTAM SODIUM 4.5 G: 4; .5 INJECTION, POWDER, LYOPHILIZED, FOR SOLUTION INTRAVENOUS at 09:09

## 2020-09-18 RX ADMIN — Medication 10 ML: at 12:09

## 2020-09-18 RX ADMIN — MICONAZOLE NITRATE: 20 POWDER TOPICAL at 09:09

## 2020-09-18 RX ADMIN — Medication 10 ML: at 11:09

## 2020-09-18 RX ADMIN — PIPERACILLIN SODIUM AND TAZOBACTAM SODIUM 4.5 G: 4; .5 INJECTION, POWDER, LYOPHILIZED, FOR SOLUTION INTRAVENOUS at 01:09

## 2020-09-18 RX ADMIN — OXYCODONE HYDROCHLORIDE 10 MG: 5 SOLUTION ORAL at 05:09

## 2020-09-18 RX ADMIN — AMLODIPINE 5 MG: 1 SUSPENSION ORAL at 11:09

## 2020-09-18 RX ADMIN — ACETAMINOPHEN 650 MG: 160 SOLUTION ORAL at 06:09

## 2020-09-18 RX ADMIN — PIPERACILLIN SODIUM AND TAZOBACTAM SODIUM 4.5 G: 4; .5 INJECTION, POWDER, LYOPHILIZED, FOR SOLUTION INTRAVENOUS at 05:09

## 2020-09-18 RX ADMIN — HEPARIN SODIUM 5000 UNITS: 5000 INJECTION INTRAVENOUS; SUBCUTANEOUS at 09:09

## 2020-09-18 RX ADMIN — PANTOPRAZOLE SODIUM 40 MG: 40 INJECTION, POWDER, LYOPHILIZED, FOR SOLUTION INTRAVENOUS at 09:09

## 2020-09-18 RX ADMIN — SMOFLIPID 250 ML: 6; 6; 5; 3 INJECTION, EMULSION INTRAVENOUS at 09:09

## 2020-09-18 RX ADMIN — ACETAMINOPHEN 650 MG: 160 SOLUTION ORAL at 05:09

## 2020-09-18 RX ADMIN — MAGNESIUM SULFATE HEPTAHYDRATE: 500 INJECTION, SOLUTION INTRAMUSCULAR; INTRAVENOUS at 09:09

## 2020-09-18 RX ADMIN — ACETAMINOPHEN 650 MG: 160 SOLUTION ORAL at 11:09

## 2020-09-18 RX ADMIN — HEPARIN SODIUM 5000 UNITS: 5000 INJECTION INTRAVENOUS; SUBCUTANEOUS at 05:09

## 2020-09-18 RX ADMIN — HEPARIN SODIUM 5000 UNITS: 5000 INJECTION INTRAVENOUS; SUBCUTANEOUS at 01:09

## 2020-09-18 RX ADMIN — OXYCODONE HYDROCHLORIDE 10 MG: 5 SOLUTION ORAL at 01:09

## 2020-09-18 RX ADMIN — Medication 10 ML: at 06:09

## 2020-09-18 NOTE — PROGRESS NOTES
Pharmacokinetic Assessment Follow Up: IV Vancomycin    Vancomycin serum concentration assessment(s):    The trough level was drawn correctly and can be used to guide therapy at this time. The measurement is within the desired definitive target range of 10 to 20 mcg/mL.    Vancomycin Regimen Plan:    Continue regimen to Vancomycin 750 mg IV every 24 hours with next serum trough concentration measured at 0930 prior to third dose on 9/20    Drug levels (last 3 results):  Recent Labs   Lab Result Units 09/16/20  0112 09/18/20  0915   Vancomycin-Trough ug/mL 21.2 18.0       Pharmacy will continue to follow and monitor vancomycin.    Please contact pharmacy at extension [phone] for questions regarding this assessment.    Thank you for the consult,   El Shetty       Patient brief summary:  Jaquan Farmer is a 39 y.o. female initiated on antimicrobial therapy with IV Vancomycin for treatment of intra-abdominal infection    The patient's current regimen is Vancomycin 750 mg every 24 hours    Drug Allergies:   Review of patient's allergies indicates:   Allergen Reactions    Latex        Actual Body Weight:   67.5 kg    Renal Function:   Estimated Creatinine Clearance: 75.6 mL/min (based on SCr of 0.9 mg/dL).,     Dialysis Method (if applicable):  N/A    CBC (last 72 hours):  Recent Labs   Lab Result Units 09/15/20  1700 09/16/20  0500 09/17/20  0440 09/18/20  0335   WBC K/uL 17.17* 13.66* 17.16* 15.27*   Hemoglobin g/dL 9.3* 8.9* 9.3* 8.1*   Hematocrit % 29.7* 28.1* 30.6* 26.5*   Platelets K/uL 428* 419* 482* 442*   Gran% % 73.7* 68.4 67.4 66.4   Lymph% % 17.5* 19.3 19.1 20.1   Mono% % 6.7 8.3 9.3 8.5   Eosinophil% % 0.5 2.4 2.4 2.9   Basophil% % 0.3 0.4 0.5 0.5   Differential Method  Automated Automated Automated Automated       Metabolic Panel (last 72 hours):  Recent Labs   Lab Result Units 09/16/20  0500 09/17/20  0440 09/18/20  0335   Sodium mmol/L 137 136 137   Potassium mmol/L 3.8 4.4 4.3   Chloride mmol/L 100  98 100   CO2 mmol/L 25 28 27   Glucose mg/dL 109 83 96   BUN, Bld mg/dL 32* 24* 24*   Creatinine mg/dL 1.1 1.0 0.9   Albumin g/dL 1.7* 1.9* 1.7*   Total Bilirubin mg/dL 0.3 0.4 0.2   Alkaline Phosphatase U/L 180* 217* 177*   AST U/L 16 22 19   ALT U/L 12 16 14   Magnesium mg/dL 1.5* 1.6 1.5*   Phosphorus mg/dL 3.3 4.3 4.7*       Vancomycin Administrations:  vancomycin given in the last 96 hours                   vancomycin 750 mg in dextrose 5 % 250 mL IVPB (ready to mix system) (mg) 750 mg New Bag 09/17/20 1035     750 mg New Bag 09/16/20 1033    vancomycin in dextrose 5 % 1 gram/250 mL IVPB 1,000 mg (mg) 1,000 mg New Bag 09/15/20 0051                Microbiologic Results:  Microbiology Results (last 7 days)     Procedure Component Value Units Date/Time    Blood culture [[id number]] Collected: 09/13/20 1239    Order Status: Completed Specimen: Blood Updated: 09/18/20 0613     Blood Culture, Routine No Growth to date      No Growth to date      No Growth to date      No Growth to date      No Growth to date    Blood culture [[id number]] Collected: 09/13/20 1239    Order Status: Completed Specimen: Blood Updated: 09/18/20 0613     Blood Culture, Routine No Growth to date      No Growth to date      No Growth to date      No Growth to date      No Growth to date

## 2020-09-18 NOTE — PT/OT/SLP PROGRESS
Physical Therapy      Patient Name:  Jaquan Farmer   MRN:  [id number]    Attempted to visit pt in AM for treatment session, pt reports just working with OT and request PT to return later in day.  PT unable to return later in day for additional attempt. Will follow-up next scheduled date.    Chris Gannon, PT   .9/18/2020

## 2020-09-18 NOTE — ASSESSMENT & PLAN NOTE
38 yo F s/p antrectomy with BII reconstruction c/b duodenal necrosis requiring ex lap, washout, drainage c/b aspiration event. S/p duodenal resection with d-j and g-j anastomosis on 8/13.    - TPN   - Clear liquids with Boost  - Keep G tube clamped, unclamp for nausea/vomiting. Ok to use G tube for meds  - Zosyn/micafungin stopped 9/4 - restart zosyn 9/13 for fever and leukocytosis  - Vanc added on  - GI and DVT ppx  - PT / OT  - continue PRNs for BP control  - Discontinue left subcutaneous drain    Dispo: Transfer back to [place] pending set up

## 2020-09-18 NOTE — PROGRESS NOTES
[facility]  General Surgery  Progress Note    Subjective:     History of Present Illness:  Pt is a 38 yo F with recent history of antrectomy with BII reconstruction for ulcer disease at outside hospital. 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 Patient also reportedly aspirated on induction in the OR prior to this, resulting in severe pulmonary edema and hypoxia. Patient was transferred to [facility] urgently for higher level of care. She arrived as a direct SICU admit on near maximal vent settings and requiring low dose vasopressors with HR in the upper 140s. Her midline laparotomy is closed with 4 Navdeep drains in place draining bilious output.     Post-Op Info:  Procedure(s) (LRB):  REMOVAL, CATHETER, CENTRAL VENOUS, TUNNELED (Left)  CLOSURE, WOUND (N/A)   7 Days Post-Op     Interval History:   NAEON  Doing well  Minimal output from subcutaneous drain    Medications:  Continuous Infusions:   TPN ADULT CENTRAL LINE CUSTOM 45 mL/hr at 09/17/20 2108     Scheduled Meds:   acetaminophen  650 mg Per J Tube Q6H    amLODIPine benzoate  5 mg Per J Tube Daily    cloNIDine 0.3 mg/24 hr td ptwk  1 patch Transdermal Q7 Days    heparin (porcine)  5,000 Units Subcutaneous Q8H    lidocaine  1 patch Transdermal Q24H    lipid (SMOFLIPID)  250 mL Intravenous Daily    metoprolol  25 mg Per J Tube BID    miconazole NITRATE 2 %   Topical (Top) BID    pantoprazole  40 mg Intravenous Daily    piperacillin-tazobactam (ZOSYN) IVPB  4.5 g Intravenous Q8H    sodium chloride 0.9%  10 mL Intravenous Q6H    vancomycin (VANCOCIN) IVPB  750 mg Intravenous Q24H     PRN Meds:sodium chloride, sodium chloride, albuterol-ipratropium, diphenhydrAMINE, hydrALAZINE, HYDROmorphone, iohexol, labetaloL, melatonin, metoprolol, oxyCODONE, oxyCODONE, Flushing PICC Protocol **AND** sodium chloride 0.9% **AND** sodium chloride 0.9%, Pharmacy to dose Vancomycin consult **AND** vancomycin  - pharmacy to dose     Review of patient's allergies indicates:   Allergen Reactions    Latex      Objective:     Vital Signs (Most Recent):  Temp: 97.3 °F (36.3 °C) (09/18/20 0439)  Pulse: 95 (09/18/20 0439)  Resp: 17 (09/18/20 0519)  BP: 122/85 (09/18/20 0439)  SpO2: 100 % (09/18/20 0439) Vital Signs (24h Range):  Temp:  [97.3 °F (36.3 °C)-98.5 °F (36.9 °C)] 97.3 °F (36.3 °C)  Pulse:  [] 95  Resp:  [14-18] 17  SpO2:  [98 %-100 %] 100 %  BP: (122-138)/(75-85) 122/85     Weight: 67.5 kg (148 lb 13 oz)  Body mass index is 27.22 kg/m².    Intake/Output - Last 3 Shifts       09/16 0700 - 09/17 0659 09/17 0700 - 09/18 0659 09/18 0700 - 09/19 0659    P.O. 360      Blood       NG/GT 60 30     IV Piggyback  200     TPN  2629.4     Total Intake(mL/kg) 420 (6.2) 2859.4 (42.4)     Urine (mL/kg/hr) 300 (0.2) 0 (0)     Drains 125 40     Stool 1 0     Total Output 426 40     Net -6 +2819.4            Urine Occurrence 7 x 11 x     Stool Occurrence 4 x 1 x           Physical Exam  Constitutional:       General: She is not in acute distress.     Appearance: She is well-developed.   Cardiovascular:      Rate and Rhythm: Normal rate and regular rhythm.   Pulmonary:      Effort: Pulmonary effort is normal. No respiratory distress.   Abdominal:      General: There is no distension.      Palpations: Abdomen is soft.      Tenderness: There is no abdominal tenderness.      Comments: Drains with minimal output   Skin:     General: Skin is warm and dry.   Neurological:      Mental Status: She is alert and oriented to person, place, and time.   Psychiatric:         Behavior: Behavior normal.         Significant Labs:  CBC:   Recent Labs   Lab 09/18/20  0335   WBC 15.27*   RBC 2.76*   HGB 8.1*   HCT 26.5*   *   MCV 96   MCH 29.3   MCHC 30.6*     CMP:   Recent Labs   Lab 09/18/20  0335   GLU 96   CALCIUM 8.8   ALBUMIN 1.7*   PROT 7.3      K 4.3   CO2 27      BUN 24*   CREATININE 0.9   ALKPHOS 177*   ALT 14   AST 19    BILITOT 0.2       Significant Diagnostics:  I have reviewed all pertinent imaging results/findings within the past 24 hours.    Assessment/Plan:     * Duodenum disorder  40 yo F s/p antrectomy with BII reconstruction c/b duodenal necrosis requiring ex lap, washout, drainage c/b aspiration event. S/p duodenal resection with d-j and g-j anastomosis on 8/13.    - TPN   - Clear liquids with Boost  - Keep G tube clamped, unclamp for nausea/vomiting. Ok to use G tube for meds  - Zosyn/micafungin stopped 9/4 - restart zosyn 9/13 for fever and leukocytosis  - Vanc added on  - GI and DVT ppx  - PT / OT  - continue PRNs for BP control  - Discontinue left subcutaneous drain    Dispo: Transfer back to [place] pending set up          Christy Nicole MD  General Surgery  [facility]

## 2020-09-18 NOTE — PLAN OF CARE
POC reviewed with patient, states understanding. AOx4. VS WDL. Clear liquid diet, tolerated well. No complaints of nausea. Pain effectively managed per MAR. TPN infusing per order. ML incision, dressing c/d/i. GODFREY drains x3. Up to bsc with assist. Will continue to manage POC.

## 2020-09-18 NOTE — PHYSICIAN QUERY
PT Name: Jaquan Farmer  MR #: [id number]     PHYSICIAN QUERY - INTEGUMENTARY CLARIFICATION     CDS: Dayna CARROLL RN  Contact information: [email or web address]    This form is a permanent document in the medical record.     Query Date: September 18, 2020    By submitting this query, we are merely seeking further clarification of documentation.  Please utilize your independent clinical judgment when addressing the question(s) below.    The Medical Record contains the following:   Indicators   Supporting Clinical Findings Location in Medical Record    Redness     X Decubitus, Pressure, Ulcer, etc. Skin: patient turned Q2H. Contact moisture dermatitis on sacrum, cleansed and foam applied. Plan of Care Duncan Rao RN 9/4/2020    Deep Tissue Injury     X Wound care consult Partial thickness skin loss noted to patient's left lower buttocks, unknown etiology. Recommend BID/prn clean with bathing cloths, apply Triad barrier cream.    Left lower buttocks: partial thickness skin loss, moist pink wound bed, no drainage or odor noted, periwound intact. 1cm x 2.5cm x 0.1cm      Skin breakdown to left lower buttocks healed, pink epithelial tissue noted. Consults Wound Care 9/4/2020      Consults Wound Care 9/4/2020                                  Pn Wound Care 9/16/2020    Medication:      Treatment:      Other:          National Pressure Ulcer Advisory Panel (2007) Pressure Ulcer Definitions & Stages:  Stage I:                     Intact skin with non-blanchable redness of a localized area usually over a bony prominence.   Stage II:                    Partial thickness loss of dermis presenting as a shallow open ulcer with a red pink wound bed, without slough.   Stage III:                   Full thickness tissue loss. Subcutaneous fat may be visible but bone, tendon or muscle is not exposed. Slough may be                                      present but does not obscure the depth of tissue loss. May include undermining  and tunneling.  Stage IV:                  Full thickness tissue loss with exposed bone, tendon or muscle. Slough or eschar may be present on some parts of the                                      wound bed. Often include undermining and tunneling.  Unstageable:           Full thickness tissue loss but base of ulcer is covered by slough and/or eschar in the wound bed. Until enough                                        slough and/or eschar is removed to expose wound base, its true depth, and therefore stage, cannot be determined  Deep Tissue Injury: Purple or maroon localized area of discolored intact skin or blood-filled blister due to damage of underlying soft tissue   from pressure and/or shear.  Suspected deep tissue injuries may develop into a stageable ulcer or turn out to be another diagnosis (e.g. an ecchymosis)     Provider, please clarify/ provide the diagnosis related to the documentation:  [   ] Decubitus (Pressure) Ulcer    [   ] Deep Tissue Pressure Injury   [   ] Deep Tissue Pressure Injury, resolved during the admission   [   ] Deep Tissue Pressure Injury that transformed into a Decubitus (Pressure) Ulcer   [   ] Moisture associated Dermatitis   [   ] Non-pressure ulcer   [ x  ] Shearing   [   ] Other Integumentary Diagnosis (please specify):______________   [  ] Diagnosis Clinically Undetermined   [   ] Diagnosis Ruled Out       Please document in your progress notes daily for the duration of treatment until resolved and include in your discharge summary.

## 2020-09-18 NOTE — SUBJECTIVE & OBJECTIVE
Interval History:   CLAUDEEON  Doing well  Minimal output from subcutaneous drain    Medications:  Continuous Infusions:   TPN ADULT CENTRAL LINE CUSTOM 45 mL/hr at 09/17/20 2108     Scheduled Meds:   acetaminophen  650 mg Per J Tube Q6H    amLODIPine benzoate  5 mg Per J Tube Daily    cloNIDine 0.3 mg/24 hr td ptwk  1 patch Transdermal Q7 Days    heparin (porcine)  5,000 Units Subcutaneous Q8H    lidocaine  1 patch Transdermal Q24H    lipid (SMOFLIPID)  250 mL Intravenous Daily    metoprolol  25 mg Per J Tube BID    miconazole NITRATE 2 %   Topical (Top) BID    pantoprazole  40 mg Intravenous Daily    piperacillin-tazobactam (ZOSYN) IVPB  4.5 g Intravenous Q8H    sodium chloride 0.9%  10 mL Intravenous Q6H    vancomycin (VANCOCIN) IVPB  750 mg Intravenous Q24H     PRN Meds:sodium chloride, sodium chloride, albuterol-ipratropium, diphenhydrAMINE, hydrALAZINE, HYDROmorphone, iohexol, labetaloL, melatonin, metoprolol, oxyCODONE, oxyCODONE, Flushing PICC Protocol **AND** sodium chloride 0.9% **AND** sodium chloride 0.9%, Pharmacy to dose Vancomycin consult **AND** vancomycin - pharmacy to dose     Review of patient's allergies indicates:   Allergen Reactions    Latex      Objective:     Vital Signs (Most Recent):  Temp: 97.3 °F (36.3 °C) (09/18/20 0439)  Pulse: 95 (09/18/20 0439)  Resp: 17 (09/18/20 0519)  BP: 122/85 (09/18/20 0439)  SpO2: 100 % (09/18/20 0439) Vital Signs (24h Range):  Temp:  [97.3 °F (36.3 °C)-98.5 °F (36.9 °C)] 97.3 °F (36.3 °C)  Pulse:  [] 95  Resp:  [14-18] 17  SpO2:  [98 %-100 %] 100 %  BP: (122-138)/(75-85) 122/85     Weight: 67.5 kg (148 lb 13 oz)  Body mass index is 27.22 kg/m².    Intake/Output - Last 3 Shifts       09/16 0700 - 09/17 0659 09/17 0700 - 09/18 0659 09/18 0700 - 09/19 0659    P.O. 360      Blood       NG/GT 60 30     IV Piggyback  200     TPN  2629.4     Total Intake(mL/kg) 420 (6.2) 2859.4 (42.4)     Urine (mL/kg/hr) 300 (0.2) 0 (0)     Drains 125 40     Stool 1  0     Total Output 426 40     Net -6 +2819.4            Urine Occurrence 7 x 11 x     Stool Occurrence 4 x 1 x           Physical Exam  Constitutional:       General: She is not in acute distress.     Appearance: She is well-developed.   Cardiovascular:      Rate and Rhythm: Normal rate and regular rhythm.   Pulmonary:      Effort: Pulmonary effort is normal. No respiratory distress.   Abdominal:      General: There is no distension.      Palpations: Abdomen is soft.      Tenderness: There is no abdominal tenderness.      Comments: Drains with minimal output   Skin:     General: Skin is warm and dry.   Neurological:      Mental Status: She is alert and oriented to person, place, and time.   Psychiatric:         Behavior: Behavior normal.         Significant Labs:  CBC:   Recent Labs   Lab 09/18/20  0335   WBC 15.27*   RBC 2.76*   HGB 8.1*   HCT 26.5*   *   MCV 96   MCH 29.3   MCHC 30.6*     CMP:   Recent Labs   Lab 09/18/20  0335   GLU 96   CALCIUM 8.8   ALBUMIN 1.7*   PROT 7.3      K 4.3   CO2 27      BUN 24*   CREATININE 0.9   ALKPHOS 177*   ALT 14   AST 19   BILITOT 0.2       Significant Diagnostics:  I have reviewed all pertinent imaging results/findings within the past 24 hours.

## 2020-09-18 NOTE — PT/OT/SLP PROGRESS
Occupational Therapy   Treatment    Name: Jaquan Farmer  MRN: [id number]  Admitting Diagnosis:  Duodenum disorder  7 Days Post-Op    Recommendations:     Discharge Recommendations: rehabilitation facility  Discharge Equipment Recommendations:  bedside commode, walker, rolling, shower chair  Barriers to discharge:  None    Assessment:     Jaquan Farmer is a 39 y.o. female with a medical diagnosis of Duodenum disorder.  She presents with the following performance deficits affecting function are weakness, impaired endurance, impaired self care skills, impaired functional mobilty, gait instability, impaired cardiopulmonary response to activity, pain. Patient participated very well with therapy and is very pleasant, required stand by to contact guard assist for ADL's and transfers. Patient will continue to benefit from skilled OT services during this admission.     Rehab Prognosis:  Good; patient would benefit from acute skilled OT services to address these deficits and reach maximum level of function.       Plan:     Patient to be seen 4 x/week to address the above listed problems via self-care/home management, therapeutic activities, therapeutic exercises  · Plan of Care Expires: 09/24/20  · Plan of Care Reviewed with: patient    Subjective     Pain/Comfort:  · Pain Rating 1: 2/10  · Location - Side 1: Bilateral  · Location - Orientation 1: generalized  · Location 1: abdomen  · Pain Addressed 1: Nurse notified  · Pain Rating Post-Intervention 1: 2/10    Objective:     Communicated with: Nursing prior to session.  Patient found seated on bedside couch with peripheral IV, telemetry, GODFREY drain upon OT entry to room.    General Precautions: Standard, fall   Orthopedic Precautions:N/A   Braces: N/A     Occupational Performance:     Functional Mobility/Transfers:  · Patient completed Sit <> Stand Transfer with contact guard assistance  with  no assistive device   · Patient completed Toilet Transfer Step Transfer  technique with contact guard assistance with  no AD  · Functional Mobility: ~10 feet within room to navigate from bedside couch to bedside commode, no AD, FF trunk for comfort due to abdominal pain and increased time required to complete, wide RENATE and good use of adaptive strategies     Activities of Daily Living:  · Feeding:  set up level assist seated edge of bed  · Grooming: stand by assistance seated on bedside couch  · Upper Body Dressing: stand by assistance seated on beside couch  · Toileting: contact guard assistance on bedside commode    [facility] 6 Click ADL: 19    Treatment & Education:  -Patient completed ADL's and transfers as outlined above, difficulty with abdominal discomfort and urgency on this date.  -Patient educated on roles/goals of OT and POC.  -White board updated.  -Therapist provided time for questions and answered within scope of practice.  -Patient educated on importance of EOB/OOB activity to maximize recovery.    Patient left seated on bedside couch with all lines intact and call button in reachEducation:      GOALS:   Multidisciplinary Problems     Occupational Therapy Goals        Problem: Occupational Therapy Goal    Goal Priority Disciplines Outcome Interventions   Occupational Therapy Goal     OT, PT/OT Ongoing, Progressing    Description: Goals to be met by: 9/23/20     Patient will increase functional independence with ADLs by performing:    Feeding with [place].  UE Dressing with Stand-by Assistance.  LE Dressing with Minimal Assistance.  Grooming while standing at sink with Contact Guard Assistance.- MET 9/14  Updated 9/14:  Toileting from toilet with stand-by Assistance for hygiene and clothing management.   Toilet transfer to toilet with stand-by Assistance.                     Time Tracking:     OT Date of Treatment: 09/18/20  OT Start Time: 1115  OT Stop Time: 1140  OT Total Time (min): 25 min    Billable Minutes:Self Care/Home Management 25 minutes    Shreya Barnhart  OT  9/18/2020

## 2020-09-18 NOTE — PLAN OF CARE
Continue per OT POC, all goals remain appropriate.    Problem: Occupational Therapy Goal  Goal: Occupational Therapy Goal  Description: Goals to be met by: 9/23/20     Patient will increase functional independence with ADLs by performing:    Feeding with Vivian.  UE Dressing with Stand-by Assistance.  LE Dressing with Minimal Assistance.  Grooming while standing at sink with Contact Guard Assistance.- MET 9/14  Updated 9/14:  Toileting from toilet with stand-by Assistance for hygiene and clothing management. -- MET 9/18, REVISED to modified independent  Toilet transfer to toilet with stand-by Assistance.    Outcome: Ongoing, Progressing

## 2020-09-19 LAB
ALBUMIN SERPL BCP-MCNC: 1.6 G/DL (ref 3.5–5.2)
ALP SERPL-CCNC: 164 U/L (ref 55–135)
ALT SERPL W/O P-5'-P-CCNC: 12 U/L (ref 10–44)
ANION GAP SERPL CALC-SCNC: 10 MMOL/L (ref 8–16)
ANION GAP SERPL CALC-SCNC: 9 MMOL/L (ref 8–16)
ANION GAP SERPL CALC-SCNC: 9 MMOL/L (ref 8–16)
ANISOCYTOSIS BLD QL SMEAR: SLIGHT
AST SERPL-CCNC: 16 U/L (ref 10–40)
BACTERIA BLD CULT: NORMAL
BACTERIA BLD CULT: NORMAL
BASOPHILS # BLD AUTO: 0.07 K/UL (ref 0–0.2)
BASOPHILS NFR BLD: 0.4 % (ref 0–1.9)
BILIRUB SERPL-MCNC: 0.2 MG/DL (ref 0.1–1)
BUN SERPL-MCNC: 19 MG/DL (ref 6–20)
BUN SERPL-MCNC: 20 MG/DL (ref 6–20)
BUN SERPL-MCNC: 22 MG/DL (ref 6–20)
CALCIUM SERPL-MCNC: 8.6 MG/DL (ref 8.7–10.5)
CALCIUM SERPL-MCNC: 8.7 MG/DL (ref 8.7–10.5)
CALCIUM SERPL-MCNC: 8.7 MG/DL (ref 8.7–10.5)
CHLORIDE SERPL-SCNC: 100 MMOL/L (ref 95–110)
CHLORIDE SERPL-SCNC: 101 MMOL/L (ref 95–110)
CHLORIDE SERPL-SCNC: 98 MMOL/L (ref 95–110)
CO2 SERPL-SCNC: 23 MMOL/L (ref 23–29)
CO2 SERPL-SCNC: 25 MMOL/L (ref 23–29)
CO2 SERPL-SCNC: 26 MMOL/L (ref 23–29)
CREAT SERPL-MCNC: 0.9 MG/DL (ref 0.5–1.4)
CREAT SERPL-MCNC: 0.9 MG/DL (ref 0.5–1.4)
CREAT SERPL-MCNC: 1.1 MG/DL (ref 0.5–1.4)
DIFFERENTIAL METHOD: ABNORMAL
EOSINOPHIL # BLD AUTO: 0.4 K/UL (ref 0–0.5)
EOSINOPHIL NFR BLD: 2.2 % (ref 0–8)
ERYTHROCYTE [DISTWIDTH] IN BLOOD BY AUTOMATED COUNT: 15.5 % (ref 11.5–14.5)
EST. GFR  (AFRICAN AMERICAN): >60 ML/MIN/1.73 M^2
EST. GFR  (NON AFRICAN AMERICAN): >60 ML/MIN/1.73 M^2
GLUCOSE SERPL-MCNC: 441 MG/DL (ref 70–110)
GLUCOSE SERPL-MCNC: 97 MG/DL (ref 70–110)
GLUCOSE SERPL-MCNC: 99 MG/DL (ref 70–110)
HCT VFR BLD AUTO: 26.1 % (ref 37–48.5)
HGB BLD-MCNC: 8.2 G/DL (ref 12–16)
HYPOCHROMIA BLD QL SMEAR: ABNORMAL
IMM GRANULOCYTES # BLD AUTO: 0.27 K/UL (ref 0–0.04)
IMM GRANULOCYTES NFR BLD AUTO: 1.6 % (ref 0–0.5)
LYMPHOCYTES # BLD AUTO: 3.5 K/UL (ref 1–4.8)
LYMPHOCYTES NFR BLD: 20.8 % (ref 18–48)
MAGNESIUM SERPL-MCNC: 1.6 MG/DL (ref 1.6–2.6)
MCH RBC QN AUTO: 29.7 PG (ref 27–31)
MCHC RBC AUTO-ENTMCNC: 31.4 G/DL (ref 32–36)
MCV RBC AUTO: 95 FL (ref 82–98)
MONOCYTES # BLD AUTO: 1.3 K/UL (ref 0.3–1)
MONOCYTES NFR BLD: 8.1 % (ref 4–15)
NEUTROPHILS # BLD AUTO: 11.1 K/UL (ref 1.8–7.7)
NEUTROPHILS NFR BLD: 66.9 % (ref 38–73)
NRBC BLD-RTO: 0 /100 WBC
PHOSPHATE SERPL-MCNC: 5.6 MG/DL (ref 2.7–4.5)
PLATELET # BLD AUTO: 416 K/UL (ref 150–350)
PLATELET BLD QL SMEAR: ABNORMAL
PMV BLD AUTO: 10 FL (ref 9.2–12.9)
POCT GLUCOSE: 114 MG/DL (ref 70–110)
POCT GLUCOSE: 117 MG/DL (ref 70–110)
POCT GLUCOSE: 124 MG/DL (ref 70–110)
POTASSIUM SERPL-SCNC: 4.3 MMOL/L (ref 3.5–5.1)
POTASSIUM SERPL-SCNC: 4.6 MMOL/L (ref 3.5–5.1)
POTASSIUM SERPL-SCNC: 6 MMOL/L (ref 3.5–5.1)
PROT SERPL-MCNC: 7 G/DL (ref 6–8.4)
RBC # BLD AUTO: 2.76 M/UL (ref 4–5.4)
SODIUM SERPL-SCNC: 131 MMOL/L (ref 136–145)
SODIUM SERPL-SCNC: 134 MMOL/L (ref 136–145)
SODIUM SERPL-SCNC: 136 MMOL/L (ref 136–145)
WBC # BLD AUTO: 16.6 K/UL (ref 3.9–12.7)

## 2020-09-19 PROCEDURE — 25000242 PHARM REV CODE 250 ALT 637 W/ HCPCS: Performed by: STUDENT IN AN ORGANIZED HEALTH CARE EDUCATION/TRAINING PROGRAM

## 2020-09-19 PROCEDURE — 94761 N-INVAS EAR/PLS OXIMETRY MLT: CPT

## 2020-09-19 PROCEDURE — 63600175 PHARM REV CODE 636 W HCPCS: Performed by: STUDENT IN AN ORGANIZED HEALTH CARE EDUCATION/TRAINING PROGRAM

## 2020-09-19 PROCEDURE — 25000003 PHARM REV CODE 250: Performed by: STUDENT IN AN ORGANIZED HEALTH CARE EDUCATION/TRAINING PROGRAM

## 2020-09-19 PROCEDURE — 93010 EKG 12-LEAD: ICD-10-PCS | Mod: ,,, | Performed by: INTERNAL MEDICINE

## 2020-09-19 PROCEDURE — 84100 ASSAY OF PHOSPHORUS: CPT

## 2020-09-19 PROCEDURE — 80048 BASIC METABOLIC PNL TOTAL CA: CPT

## 2020-09-19 PROCEDURE — 63600175 PHARM REV CODE 636 W HCPCS: Performed by: SURGERY

## 2020-09-19 PROCEDURE — 83735 ASSAY OF MAGNESIUM: CPT

## 2020-09-19 PROCEDURE — B4185 PARENTERAL SOL 10 GM LIPIDS: HCPCS | Performed by: STUDENT IN AN ORGANIZED HEALTH CARE EDUCATION/TRAINING PROGRAM

## 2020-09-19 PROCEDURE — 85025 COMPLETE CBC W/AUTO DIFF WBC: CPT

## 2020-09-19 PROCEDURE — 93005 ELECTROCARDIOGRAM TRACING: CPT

## 2020-09-19 PROCEDURE — 80053 COMPREHEN METABOLIC PANEL: CPT

## 2020-09-19 PROCEDURE — 80048 BASIC METABOLIC PNL TOTAL CA: CPT | Mod: 91

## 2020-09-19 PROCEDURE — 94640 AIRWAY INHALATION TREATMENT: CPT

## 2020-09-19 PROCEDURE — 25000003 PHARM REV CODE 250: Performed by: SURGERY

## 2020-09-19 PROCEDURE — 93010 ELECTROCARDIOGRAM REPORT: CPT | Mod: ,,, | Performed by: INTERNAL MEDICINE

## 2020-09-19 PROCEDURE — A4216 STERILE WATER/SALINE, 10 ML: HCPCS | Performed by: SURGERY

## 2020-09-19 PROCEDURE — A4217 STERILE WATER/SALINE, 500 ML: HCPCS | Performed by: STUDENT IN AN ORGANIZED HEALTH CARE EDUCATION/TRAINING PROGRAM

## 2020-09-19 PROCEDURE — 20600001 HC STEP DOWN PRIVATE ROOM

## 2020-09-19 PROCEDURE — C9113 INJ PANTOPRAZOLE SODIUM, VIA: HCPCS | Performed by: STUDENT IN AN ORGANIZED HEALTH CARE EDUCATION/TRAINING PROGRAM

## 2020-09-19 RX ADMIN — Medication 10 ML: at 10:09

## 2020-09-19 RX ADMIN — Medication 10 ML: at 06:09

## 2020-09-19 RX ADMIN — VANCOMYCIN HYDROCHLORIDE 750 MG: 750 INJECTION, POWDER, LYOPHILIZED, FOR SOLUTION INTRAVENOUS at 10:09

## 2020-09-19 RX ADMIN — PANTOPRAZOLE SODIUM 40 MG: 40 INJECTION, POWDER, LYOPHILIZED, FOR SOLUTION INTRAVENOUS at 08:09

## 2020-09-19 RX ADMIN — METOPROLOL TARTRATE 25 MG: 25 TABLET ORAL at 09:09

## 2020-09-19 RX ADMIN — MICONAZOLE NITRATE: 20 POWDER TOPICAL at 08:09

## 2020-09-19 RX ADMIN — MICONAZOLE NITRATE: 20 POWDER TOPICAL at 09:09

## 2020-09-19 RX ADMIN — IPRATROPIUM BROMIDE AND ALBUTEROL SULFATE 3 ML: .5; 2.5 SOLUTION RESPIRATORY (INHALATION) at 04:09

## 2020-09-19 RX ADMIN — OXYCODONE HYDROCHLORIDE 10 MG: 5 SOLUTION ORAL at 04:09

## 2020-09-19 RX ADMIN — OXYCODONE HYDROCHLORIDE 10 MG: 5 SOLUTION ORAL at 03:09

## 2020-09-19 RX ADMIN — ACETAMINOPHEN 650 MG: 160 SOLUTION ORAL at 12:09

## 2020-09-19 RX ADMIN — GUAIFENESIN 200 MG: 200 SOLUTION ORAL at 03:09

## 2020-09-19 RX ADMIN — HEPARIN SODIUM 5000 UNITS: 5000 INJECTION INTRAVENOUS; SUBCUTANEOUS at 03:09

## 2020-09-19 RX ADMIN — METOPROLOL TARTRATE 25 MG: 25 TABLET ORAL at 08:09

## 2020-09-19 RX ADMIN — SMOFLIPID 250 ML: 6; 6; 5; 3 INJECTION, EMULSION INTRAVENOUS at 10:09

## 2020-09-19 RX ADMIN — ACETAMINOPHEN 650 MG: 160 SOLUTION ORAL at 11:09

## 2020-09-19 RX ADMIN — HEPARIN SODIUM 5000 UNITS: 5000 INJECTION INTRAVENOUS; SUBCUTANEOUS at 10:09

## 2020-09-19 RX ADMIN — PIPERACILLIN SODIUM AND TAZOBACTAM SODIUM 4.5 G: 4; .5 INJECTION, POWDER, LYOPHILIZED, FOR SOLUTION INTRAVENOUS at 04:09

## 2020-09-19 RX ADMIN — ACETAMINOPHEN 650 MG: 160 SOLUTION ORAL at 05:09

## 2020-09-19 RX ADMIN — AMLODIPINE 5 MG: 1 SUSPENSION ORAL at 10:09

## 2020-09-19 RX ADMIN — PIPERACILLIN SODIUM AND TAZOBACTAM SODIUM 4.5 G: 4; .5 INJECTION, POWDER, LYOPHILIZED, FOR SOLUTION INTRAVENOUS at 11:09

## 2020-09-19 RX ADMIN — ACETAMINOPHEN 650 MG: 160 SOLUTION ORAL at 06:09

## 2020-09-19 RX ADMIN — Medication 10 ML: at 12:09

## 2020-09-19 RX ADMIN — MAGNESIUM SULFATE HEPTAHYDRATE: 500 INJECTION, SOLUTION INTRAMUSCULAR; INTRAVENOUS at 10:09

## 2020-09-19 RX ADMIN — PIPERACILLIN SODIUM AND TAZOBACTAM SODIUM 4.5 G: 4; .5 INJECTION, POWDER, LYOPHILIZED, FOR SOLUTION INTRAVENOUS at 09:09

## 2020-09-19 RX ADMIN — HEPARIN SODIUM 5000 UNITS: 5000 INJECTION INTRAVENOUS; SUBCUTANEOUS at 05:09

## 2020-09-19 NOTE — SUBJECTIVE & OBJECTIVE
Interval History:   SHELBI  Doing well  Minimal output from subcutaneous drain  Waiting on bed availability    Medications:  Continuous Infusions:   TPN ADULT CENTRAL LINE CUSTOM 45 mL/hr at 09/18/20 2154     Scheduled Meds:   acetaminophen  650 mg Per J Tube Q6H    amLODIPine benzoate  5 mg Per J Tube Daily    cloNIDine 0.3 mg/24 hr td ptwk  1 patch Transdermal Q7 Days    heparin (porcine)  5,000 Units Subcutaneous Q8H    lidocaine  1 patch Transdermal Q24H    lipid (SMOFLIPID)  250 mL Intravenous Daily    metoprolol  25 mg Per J Tube BID    miconazole NITRATE 2 %   Topical (Top) BID    pantoprazole  40 mg Intravenous Daily    piperacillin-tazobactam (ZOSYN) IVPB  4.5 g Intravenous Q8H    sodium chloride 0.9%  10 mL Intravenous Q6H    vancomycin (VANCOCIN) IVPB  750 mg Intravenous Q24H     PRN Meds:sodium chloride, sodium chloride, albuterol-ipratropium, diphenhydrAMINE, guaifenesin 100 mg/5 ml, hydrALAZINE, HYDROmorphone, iohexol, labetaloL, melatonin, metoprolol, oxyCODONE, oxyCODONE, Flushing PICC Protocol **AND** sodium chloride 0.9% **AND** sodium chloride 0.9%, Pharmacy to dose Vancomycin consult **AND** vancomycin - pharmacy to dose     Review of patient's allergies indicates:   Allergen Reactions    Dilaudid [hydromorphone] Anaphylaxis and Rash    Latex      Objective:     Vital Signs (Most Recent):  Temp: 98.1 °F (36.7 °C) (09/19/20 0422)  Pulse: 97 (09/19/20 0422)  Resp: 17 (09/19/20 0422)  BP: 121/80 (09/19/20 0422)  SpO2: 100 % (09/19/20 0422) Vital Signs (24h Range):  Temp:  [97.4 °F (36.3 °C)-98.8 °F (37.1 °C)] 98.1 °F (36.7 °C)  Pulse:  [] 97  Resp:  [17-18] 17  SpO2:  [98 %-100 %] 100 %  BP: (121-139)/(73-81) 121/80     Weight: 67.5 kg (148 lb 13 oz)  Body mass index is 27.22 kg/m².    Intake/Output - Last 3 Shifts       09/17 0700 - 09/18 0659 09/18 0700 - 09/19 0659 09/19 0700 - 09/20 0659    P.O.  590     NG/GT 30      IV Piggyback 200 1050     TPN 2629.4 1131.5     Total  Intake(mL/kg) 2859.4 (42.4) 2771.5 (41.1)     Urine (mL/kg/hr) 0 (0) 0 (0)     Drains 40 35     Stool 0 0     Total Output 40 35     Net +2819.4 +2736.5            Urine Occurrence 11 x 7 x     Stool Occurrence 1 x 2 x           Physical Exam  Constitutional:       General: She is not in acute distress.     Appearance: She is well-developed.   Cardiovascular:      Rate and Rhythm: Normal rate and regular rhythm.   Pulmonary:      Effort: Pulmonary effort is normal. No respiratory distress.   Abdominal:      General: There is no distension.      Palpations: Abdomen is soft.      Tenderness: There is no abdominal tenderness.      Comments: Drains with minimal output   Skin:     General: Skin is warm and dry.   Neurological:      Mental Status: She is alert and oriented to person, place, and time.   Psychiatric:         Behavior: Behavior normal.         Significant Labs:  CBC:   Recent Labs   Lab 09/19/20  0330   WBC 16.60*   RBC 2.76*   HGB 8.2*   HCT 26.1*   *   MCV 95   MCH 29.7   MCHC 31.4*     CMP:   Recent Labs   Lab 09/19/20  0330   GLU 99   CALCIUM 8.6*   ALBUMIN 1.6*   PROT 7.0      K 4.3   CO2 26      BUN 22*   CREATININE 0.9   ALKPHOS 164*   ALT 12   AST 16   BILITOT 0.2       Significant Diagnostics:  I have reviewed all pertinent imaging results/findings within the past 24 hours.

## 2020-09-19 NOTE — PROGRESS NOTES
[facility]  General Surgery  Progress Note    Subjective:     History of Present Illness:  Pt is a 40 yo F with recent history of antrectomy with BII reconstruction for ulcer disease at outside hospital. 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 Patient also reportedly aspirated on induction in the OR prior to this, resulting in severe pulmonary edema and hypoxia. [place] [facility] [place] level of care. She arrived as a direct SICU admit on near maximal vent settings and requiring low dose vasopressors with HR in the upper 140s. Her midline laparotomy is closed with 4 Navdeep drains in place draining bilious output.     Post-Op Info:  Procedure(s) (LRB):  REMOVAL, CATHETER, CENTRAL VENOUS, TUNNELED (Left)  CLOSURE, WOUND (N/A)   8 Days Post-Op     Interval History:   CLAUDEEON  Doing well  Minimal output from subcutaneous drain  Waiting on bed availability    Medications:  Continuous Infusions:   TPN ADULT CENTRAL LINE CUSTOM 45 mL/hr at 09/18/20 2154     Scheduled Meds:   acetaminophen  650 mg Per J Tube Q6H    amLODIPine benzoate  5 mg Per J Tube Daily    cloNIDine 0.3 mg/24 hr td ptwk  1 patch Transdermal Q7 Days    heparin (porcine)  5,000 Units Subcutaneous Q8H    lidocaine  1 patch Transdermal Q24H    lipid (SMOFLIPID)  250 mL Intravenous Daily    metoprolol  25 mg Per J Tube BID    miconazole NITRATE 2 %   Topical (Top) BID    pantoprazole  40 mg Intravenous Daily    piperacillin-tazobactam (ZOSYN) IVPB  4.5 g Intravenous Q8H    sodium chloride 0.9%  10 mL Intravenous Q6H    vancomycin (VANCOCIN) IVPB  750 mg Intravenous Q24H     PRN Meds:sodium chloride, sodium chloride, albuterol-ipratropium, diphenhydrAMINE, guaifenesin 100 mg/5 ml, hydrALAZINE, HYDROmorphone, iohexol, labetaloL, melatonin, metoprolol, oxyCODONE, oxyCODONE, Flushing PICC Protocol **AND** sodium chloride 0.9% **AND** sodium chloride 0.9%,  Pharmacy to dose Vancomycin consult **AND** vancomycin - pharmacy to dose     Review of patient's allergies indicates:   Allergen Reactions    Dilaudid [hydromorphone] Anaphylaxis and Rash    Latex      Objective:     Vital Signs (Most Recent):  Temp: 98.1 °F (36.7 °C) (09/19/20 0422)  Pulse: 97 (09/19/20 0422)  Resp: 17 (09/19/20 0422)  BP: 121/80 (09/19/20 0422)  SpO2: 100 % (09/19/20 0422) Vital Signs (24h Range):  Temp:  [97.4 °F (36.3 °C)-98.8 °F (37.1 °C)] 98.1 °F (36.7 °C)  Pulse:  [] 97  Resp:  [17-18] 17  SpO2:  [98 %-100 %] 100 %  BP: (121-139)/(73-81) 121/80     Weight: 67.5 kg (148 lb 13 oz)  Body mass index is 27.22 kg/m².    Intake/Output - Last 3 Shifts       09/17 0700 - 09/18 0659 09/18 0700 - 09/19 0659 09/19 0700 - 09/20 0659    P.O.  590     NG/GT 30      IV Piggyback 200 1050     TPN 2629.4 1131.5     Total Intake(mL/kg) 2859.4 (42.4) 2771.5 (41.1)     Urine (mL/kg/hr) 0 (0) 0 (0)     Drains 40 35     Stool 0 0     Total Output 40 35     Net +2819.4 +2736.5            Urine Occurrence 11 x 7 x     Stool Occurrence 1 x 2 x           Physical Exam  Constitutional:       General: She is not in acute distress.     Appearance: She is well-developed.   Cardiovascular:      Rate and Rhythm: Normal rate and regular rhythm.   Pulmonary:      Effort: Pulmonary effort is normal. No respiratory distress.   Abdominal:      General: There is no distension.      Palpations: Abdomen is soft.      Tenderness: There is no abdominal tenderness.      Comments: Drains with minimal output   Skin:     General: Skin is warm and dry.   Neurological:      Mental Status: She is alert and oriented to person, place, and time.   Psychiatric:         Behavior: Behavior normal.         Significant Labs:  CBC:   Recent Labs   Lab 09/19/20  0330   WBC 16.60*   RBC 2.76*   HGB 8.2*   HCT 26.1*   *   MCV 95   MCH 29.7   MCHC 31.4*     CMP:   Recent Labs   Lab 09/19/20  0330   GLU 99   CALCIUM 8.6*   ALBUMIN 1.6*    PROT 7.0      K 4.3   CO2 26      BUN 22*   CREATININE 0.9   ALKPHOS 164*   ALT 12   AST 16   BILITOT 0.2       Significant Diagnostics:  I have reviewed all pertinent imaging results/findings within the past 24 hours.    Assessment/Plan:     * Duodenum disorder  40 yo F s/p antrectomy with BII reconstruction c/b duodenal necrosis requiring ex lap, washout, drainage c/b aspiration event. S/p duodenal resection with d-j and g-j anastomosis on 8/13.    - TPN   - Clear liquids with Boost  - Keep G tube clamped, unclamp for nausea/vomiting. Ok to use G tube for meds  - Zosyn/micafungin stopped 9/4 - restart zosyn 9/13 for fever and leukocytosis  - Vanc added on  - GI and DVT ppx  - PT / OT  - continue PRNs for BP control  - Discontinue left subcutaneous drain    Dispo: Transfer back to [place] pending set up          Collette Ferreira MD  General Surgery  [facility]

## 2020-09-19 NOTE — ASSESSMENT & PLAN NOTE
38 yo F s/p antrectomy with BII reconstruction c/b duodenal necrosis requiring ex lap, washout, drainage c/b aspiration event. S/p duodenal resection with d-j and g-j anastomosis on 8/13.    - TPN   - Clear liquids with Boost  - Keep G tube clamped, unclamp for nausea/vomiting. Ok to use G tube for meds  - Zosyn/micafungin stopped 9/4 - restart zosyn 9/13 for fever and leukocytosis  - Vanc added on  - GI and DVT ppx  - PT / OT  - continue PRNs for BP control  - Discontinue left subcutaneous drain    Dispo: Transfer back to [place] pending set up

## 2020-09-19 NOTE — NURSING
Dressing changed to midline abdominal incision, abd pad and tape, moderate drainage noted, cleaned with sterile water, dehiscence noted to lower suture line.

## 2020-09-19 NOTE — PLAN OF CARE
POC reviewed with patient, states understanding. AOx4. VS WDL. Clear liquid diet, tolerated well. No complaints of nausea. Pain effectively managed per MAR. TPN infusing per order. ML incision, dressing dressing changed x2. GODFREY drains x2. Up to bsc with assist. Will continue to manage POC.

## 2020-09-19 NOTE — PLAN OF CARE
Patient has been accepted back to [facility], but, no bed available.  [facility] has called every day and updated CM of no bed since acceptance earlier in the week.  Patient updated daily.  Will continue to follow for needs.       09/19/20 1019   Discharge Reassessment   Assessment Type Discharge Planning Reassessment   Discharge Plan A   (transfer back to [facility])   Post-Acute Status   Post-Acute Authorization Other  (transfer back to [facility])   Discharge Delays (!) Other  (Awaiting bed availability at [facility])

## 2020-09-19 NOTE — PLAN OF CARE
Plan of care discussed with patient, all questions answered at this time, will continue to monitor.      Dx:  Duodenal disorder      Shift Events: No falls or injuries this shift. Patient afebrile, free from s/s of infection. Ambulated in room, continue on IV ABX and TPN. G-tube clamped.    Goals of Care: Pain well controlled with PRN , Weight shifts imposed      Neuro: AAO x4, SBAX1     Vital Signs: WNL    Rhythm: N/A    Respiratory: Room air     Diet: Clear liquid Diet     Gtts: TPN @ 45ml/hr;  IV Zosyn and IV Vanc     Urine Output: voids per bedside commode; BM 9/19/20     Drains: GODFREY X2 RLQ,  #1=20cc, #2=1cc     (day shift total output)     Labs/Accuchecks: Accuchecks Q6h / BMP sent at 1300     Skin: Left Buttocks healed, Midline abdominal incision with ABD Pads C/D/I

## 2020-09-19 NOTE — NURSING
Abnormal bmp results called into [facility] Beck+ of 6, stat ekg and redraw ordered.  EKG results show MD DURAN aware. BMP redraw results WNL, no new orders.

## 2020-09-20 LAB
ALBUMIN SERPL BCP-MCNC: 1.7 G/DL (ref 3.5–5.2)
ALP SERPL-CCNC: 164 U/L (ref 55–135)
ALT SERPL W/O P-5'-P-CCNC: 11 U/L (ref 10–44)
ANION GAP SERPL CALC-SCNC: 10 MMOL/L (ref 8–16)
AST SERPL-CCNC: 23 U/L (ref 10–40)
BASOPHILS # BLD AUTO: 0.07 K/UL (ref 0–0.2)
BASOPHILS NFR BLD: 0.4 % (ref 0–1.9)
BILIRUB SERPL-MCNC: 0.2 MG/DL (ref 0.1–1)
BUN SERPL-MCNC: 20 MG/DL (ref 6–20)
CALCIUM SERPL-MCNC: 8.8 MG/DL (ref 8.7–10.5)
CHLORIDE SERPL-SCNC: 101 MMOL/L (ref 95–110)
CO2 SERPL-SCNC: 25 MMOL/L (ref 23–29)
CREAT SERPL-MCNC: 0.8 MG/DL (ref 0.5–1.4)
DIFFERENTIAL METHOD: ABNORMAL
EOSINOPHIL # BLD AUTO: 0.4 K/UL (ref 0–0.5)
EOSINOPHIL NFR BLD: 2 % (ref 0–8)
ERYTHROCYTE [DISTWIDTH] IN BLOOD BY AUTOMATED COUNT: 15.1 % (ref 11.5–14.5)
EST. GFR  (AFRICAN AMERICAN): >60 ML/MIN/1.73 M^2
EST. GFR  (NON AFRICAN AMERICAN): >60 ML/MIN/1.73 M^2
GLUCOSE SERPL-MCNC: 108 MG/DL (ref 70–110)
HCT VFR BLD AUTO: 26.7 % (ref 37–48.5)
HGB BLD-MCNC: 8.2 G/DL (ref 12–16)
IMM GRANULOCYTES # BLD AUTO: 0.2 K/UL (ref 0–0.04)
IMM GRANULOCYTES NFR BLD AUTO: 1.1 % (ref 0–0.5)
LYMPHOCYTES # BLD AUTO: 3.1 K/UL (ref 1–4.8)
LYMPHOCYTES NFR BLD: 17.1 % (ref 18–48)
MAGNESIUM SERPL-MCNC: 1.5 MG/DL (ref 1.6–2.6)
MCH RBC QN AUTO: 29.1 PG (ref 27–31)
MCHC RBC AUTO-ENTMCNC: 30.7 G/DL (ref 32–36)
MCV RBC AUTO: 95 FL (ref 82–98)
MONOCYTES # BLD AUTO: 1.3 K/UL (ref 0.3–1)
MONOCYTES NFR BLD: 7.1 % (ref 4–15)
NEUTROPHILS # BLD AUTO: 12.9 K/UL (ref 1.8–7.7)
NEUTROPHILS NFR BLD: 72.3 % (ref 38–73)
NRBC BLD-RTO: 0 /100 WBC
PHOSPHATE SERPL-MCNC: 5.7 MG/DL (ref 2.7–4.5)
PLATELET # BLD AUTO: 448 K/UL (ref 150–350)
PMV BLD AUTO: 10 FL (ref 9.2–12.9)
POCT GLUCOSE: 133 MG/DL (ref 70–110)
POCT GLUCOSE: 145 MG/DL (ref 70–110)
POTASSIUM SERPL-SCNC: 4.6 MMOL/L (ref 3.5–5.1)
PROT SERPL-MCNC: 7.2 G/DL (ref 6–8.4)
RBC # BLD AUTO: 2.82 M/UL (ref 4–5.4)
SODIUM SERPL-SCNC: 136 MMOL/L (ref 136–145)
WBC # BLD AUTO: 17.82 K/UL (ref 3.9–12.7)

## 2020-09-20 PROCEDURE — 25000003 PHARM REV CODE 250: Performed by: STUDENT IN AN ORGANIZED HEALTH CARE EDUCATION/TRAINING PROGRAM

## 2020-09-20 PROCEDURE — 80053 COMPREHEN METABOLIC PANEL: CPT

## 2020-09-20 PROCEDURE — 63600175 PHARM REV CODE 636 W HCPCS: Performed by: STUDENT IN AN ORGANIZED HEALTH CARE EDUCATION/TRAINING PROGRAM

## 2020-09-20 PROCEDURE — 25000003 PHARM REV CODE 250: Performed by: SURGERY

## 2020-09-20 PROCEDURE — C9113 INJ PANTOPRAZOLE SODIUM, VIA: HCPCS | Performed by: STUDENT IN AN ORGANIZED HEALTH CARE EDUCATION/TRAINING PROGRAM

## 2020-09-20 PROCEDURE — 85025 COMPLETE CBC W/AUTO DIFF WBC: CPT

## 2020-09-20 PROCEDURE — B4185 PARENTERAL SOL 10 GM LIPIDS: HCPCS | Performed by: STUDENT IN AN ORGANIZED HEALTH CARE EDUCATION/TRAINING PROGRAM

## 2020-09-20 PROCEDURE — 83735 ASSAY OF MAGNESIUM: CPT

## 2020-09-20 PROCEDURE — 84100 ASSAY OF PHOSPHORUS: CPT

## 2020-09-20 PROCEDURE — 63600175 PHARM REV CODE 636 W HCPCS: Performed by: SURGERY

## 2020-09-20 PROCEDURE — A4216 STERILE WATER/SALINE, 10 ML: HCPCS | Performed by: SURGERY

## 2020-09-20 PROCEDURE — A4217 STERILE WATER/SALINE, 500 ML: HCPCS | Performed by: STUDENT IN AN ORGANIZED HEALTH CARE EDUCATION/TRAINING PROGRAM

## 2020-09-20 PROCEDURE — 20600001 HC STEP DOWN PRIVATE ROOM

## 2020-09-20 PROCEDURE — 25000242 PHARM REV CODE 250 ALT 637 W/ HCPCS: Performed by: STUDENT IN AN ORGANIZED HEALTH CARE EDUCATION/TRAINING PROGRAM

## 2020-09-20 RX ADMIN — PIPERACILLIN SODIUM AND TAZOBACTAM SODIUM 4.5 G: 4; .5 INJECTION, POWDER, LYOPHILIZED, FOR SOLUTION INTRAVENOUS at 08:09

## 2020-09-20 RX ADMIN — PANTOPRAZOLE SODIUM 40 MG: 40 INJECTION, POWDER, LYOPHILIZED, FOR SOLUTION INTRAVENOUS at 09:09

## 2020-09-20 RX ADMIN — PIPERACILLIN SODIUM AND TAZOBACTAM SODIUM 4.5 G: 4; .5 INJECTION, POWDER, LYOPHILIZED, FOR SOLUTION INTRAVENOUS at 01:09

## 2020-09-20 RX ADMIN — Medication 10 ML: at 05:09

## 2020-09-20 RX ADMIN — OXYCODONE HYDROCHLORIDE 15 MG: 5 SOLUTION ORAL at 01:09

## 2020-09-20 RX ADMIN — OXYCODONE HYDROCHLORIDE 10 MG: 5 SOLUTION ORAL at 03:09

## 2020-09-20 RX ADMIN — HEPARIN SODIUM 5000 UNITS: 5000 INJECTION INTRAVENOUS; SUBCUTANEOUS at 02:09

## 2020-09-20 RX ADMIN — MAGNESIUM SULFATE HEPTAHYDRATE: 500 INJECTION, SOLUTION INTRAMUSCULAR; INTRAVENOUS at 09:09

## 2020-09-20 RX ADMIN — MICONAZOLE NITRATE: 20 POWDER TOPICAL at 09:09

## 2020-09-20 RX ADMIN — AMLODIPINE 5 MG: 1 SUSPENSION ORAL at 11:09

## 2020-09-20 RX ADMIN — OXYCODONE HYDROCHLORIDE 15 MG: 5 SOLUTION ORAL at 05:09

## 2020-09-20 RX ADMIN — OXYCODONE HYDROCHLORIDE 15 MG: 5 SOLUTION ORAL at 08:09

## 2020-09-20 RX ADMIN — PIPERACILLIN SODIUM AND TAZOBACTAM SODIUM 4.5 G: 4; .5 INJECTION, POWDER, LYOPHILIZED, FOR SOLUTION INTRAVENOUS at 05:09

## 2020-09-20 RX ADMIN — OXYCODONE HYDROCHLORIDE 10 MG: 5 SOLUTION ORAL at 10:09

## 2020-09-20 RX ADMIN — Medication 10 ML: at 12:09

## 2020-09-20 RX ADMIN — ACETAMINOPHEN 650 MG: 160 SOLUTION ORAL at 11:09

## 2020-09-20 RX ADMIN — METOPROLOL TARTRATE 25 MG: 25 TABLET ORAL at 10:09

## 2020-09-20 RX ADMIN — HEPARIN SODIUM 5000 UNITS: 5000 INJECTION INTRAVENOUS; SUBCUTANEOUS at 05:09

## 2020-09-20 RX ADMIN — HEPARIN SODIUM 5000 UNITS: 5000 INJECTION INTRAVENOUS; SUBCUTANEOUS at 10:09

## 2020-09-20 RX ADMIN — ACETAMINOPHEN 650 MG: 160 SOLUTION ORAL at 05:09

## 2020-09-20 RX ADMIN — VANCOMYCIN HYDROCHLORIDE 750 MG: 750 INJECTION, POWDER, LYOPHILIZED, FOR SOLUTION INTRAVENOUS at 09:09

## 2020-09-20 RX ADMIN — Medication 10 ML: at 06:09

## 2020-09-20 RX ADMIN — SMOFLIPID 250 ML: 6; 6; 5; 3 INJECTION, EMULSION INTRAVENOUS at 09:09

## 2020-09-20 RX ADMIN — METOPROLOL TARTRATE 25 MG: 25 TABLET ORAL at 09:09

## 2020-09-20 NOTE — PROGRESS NOTES
[facility]  General Surgery  Progress Note    Subjective:     History of Present Illness:  Pt is a 38 yo F with recent history of antrectomy with BII reconstruction for ulcer disease at outside hospital. 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 Patient also reportedly aspirated on induction in the OR prior to this, resulting in severe pulmonary edema and hypoxia. Patient was transferred to [facility] urgently for higher level of care. She arrived as a direct SICU admit on near maximal vent settings and requiring low dose vasopressors with HR in the upper 140s. Her midline laparotomy is closed with 4 Navdeep drains in place draining bilious output.     Post-Op Info:  Procedure(s) (LRB):  REMOVAL, CATHETER, CENTRAL VENOUS, TUNNELED (Left)  CLOSURE, WOUND (N/A)   9 Days Post-Op     Interval History:   CLAUDEEON  Doing well  Waiting on bed availability    Medications:  Continuous Infusions:   TPN ADULT CENTRAL LINE CUSTOM 45 mL/hr at 09/19/20 2244     Scheduled Meds:   acetaminophen  650 mg Per J Tube Q6H    amLODIPine benzoate  5 mg Per J Tube Daily    cloNIDine 0.3 mg/24 hr td ptwk  1 patch Transdermal Q7 Days    heparin (porcine)  5,000 Units Subcutaneous Q8H    lidocaine  1 patch Transdermal Q24H    lipid (SMOFLIPID)  250 mL Intravenous Daily    metoprolol  25 mg Per J Tube BID    miconazole NITRATE 2 %   Topical (Top) BID    pantoprazole  40 mg Intravenous Daily    piperacillin-tazobactam (ZOSYN) IVPB  4.5 g Intravenous Q8H    sodium chloride 0.9%  10 mL Intravenous Q6H    vancomycin (VANCOCIN) IVPB  750 mg Intravenous Q24H     PRN Meds:sodium chloride, sodium chloride, albuterol-ipratropium, diphenhydrAMINE, guaifenesin 100 mg/5 ml, hydrALAZINE, HYDROmorphone, iohexol, labetaloL, melatonin, metoprolol, oxyCODONE, oxyCODONE, Flushing PICC Protocol **AND** sodium chloride 0.9% **AND** sodium chloride 0.9%, Pharmacy to dose Vancomycin consult  **AND** vancomycin - pharmacy to dose     Review of patient's allergies indicates:   Allergen Reactions    Dilaudid [hydromorphone] Anaphylaxis and Rash    Latex      Objective:     Vital Signs (Most Recent):  Temp: 98.6 °F (37 °C) (09/20/20 0501)  Pulse: 89 (09/20/20 0501)  Resp: 18 (09/20/20 0552)  BP: 133/78 (09/20/20 0501)  SpO2: 100 % (09/20/20 0501) Vital Signs (24h Range):  Temp:  [97.6 °F (36.4 °C)-98.6 °F (37 °C)] 98.6 °F (37 °C)  Pulse:  [] 89  Resp:  [17-18] 18  SpO2:  [94 %-100 %] 100 %  BP: (129-150)/(78-83) 133/78     Weight: 67.5 kg (148 lb 13 oz)  Body mass index is 27.22 kg/m².    Intake/Output - Last 3 Shifts       09/18 0700 - 09/19 0659 09/19 0700 - 09/20 0659 09/20 0700 - 09/21 0659    P.O. 590 2511     I.V. (mL/kg)  10 (0.1)     NG/GT       IV Piggyback 1050 350     TPN 1131.5 606.3     Total Intake(mL/kg) 2771.5 (41.1) 3477.3 (51.5)     Urine (mL/kg/hr) 0 (0) 3000 (1.9)     Emesis/NG output  0     Drains 35 31     Other  0     Stool 0 0     Blood  0     Total Output 35 3031     Net +2736.5 +446.3            Urine Occurrence 7 x 3 x     Stool Occurrence 2 x 1 x           Physical Exam  Constitutional:       General: She is not in acute distress.     Appearance: She is well-developed.   Cardiovascular:      Rate and Rhythm: Normal rate and regular rhythm.   Pulmonary:      Effort: Pulmonary effort is normal. No respiratory distress.   Abdominal:      General: There is no distension.      Palpations: Abdomen is soft.      Tenderness: There is no abdominal tenderness.      Comments: Drains with minimal output   Skin:     General: Skin is warm and dry.   Neurological:      Mental Status: She is alert and oriented to person, place, and time.   Psychiatric:         Behavior: Behavior normal.         Significant Labs:  CBC:   Recent Labs   Lab 09/20/20 0449   WBC 17.82*   RBC 2.82*   HGB 8.2*   HCT 26.7*   *   MCV 95   MCH 29.1   MCHC 30.7*     CMP:   Recent Labs   Lab 09/20/20 0449       CALCIUM 8.8   ALBUMIN 1.7*   PROT 7.2      K 4.6   CO2 25      BUN 20   CREATININE 0.8   ALKPHOS 164*   ALT 11   AST 23   BILITOT 0.2       Significant Diagnostics:  I have reviewed all pertinent imaging results/findings within the past 24 hours.    Assessment/Plan:     * Duodenum disorder  40 yo F s/p antrectomy with BII reconstruction c/b duodenal necrosis requiring ex lap, washout, drainage c/b aspiration event. S/p duodenal resection with d-j and g-j anastomosis on 8/13.    - TPN   - Clear liquids with Boost  - GI and DVT ppx  - PT / OT  - continue PRNs for BP control    Dispo: Transfer back to [place] pending set up. Will follow up with CM/SW          Collette Ferreira MD  General Surgery  [facility]

## 2020-09-20 NOTE — ASSESSMENT & PLAN NOTE
40 yo F s/p antrectomy with BII reconstruction c/b duodenal necrosis requiring ex lap, washout, drainage c/b aspiration event. S/p duodenal resection with d-j and g-j anastomosis on 8/13.    - TPN   - Clear liquids with Boost  - GI and DVT ppx  - PT / OT  - continue PRNs for BP control    Dispo: Transfer back to [place] pending set up. Will follow up with CM/SW

## 2020-09-20 NOTE — SUBJECTIVE & OBJECTIVE
Interval History:   SHELBI  Doing well  Waiting on bed availability    Medications:  Continuous Infusions:   TPN ADULT CENTRAL LINE CUSTOM 45 mL/hr at 09/19/20 2244     Scheduled Meds:   acetaminophen  650 mg Per J Tube Q6H    amLODIPine benzoate  5 mg Per J Tube Daily    cloNIDine 0.3 mg/24 hr td ptwk  1 patch Transdermal Q7 Days    heparin (porcine)  5,000 Units Subcutaneous Q8H    lidocaine  1 patch Transdermal Q24H    lipid (SMOFLIPID)  250 mL Intravenous Daily    metoprolol  25 mg Per J Tube BID    miconazole NITRATE 2 %   Topical (Top) BID    pantoprazole  40 mg Intravenous Daily    piperacillin-tazobactam (ZOSYN) IVPB  4.5 g Intravenous Q8H    sodium chloride 0.9%  10 mL Intravenous Q6H    vancomycin (VANCOCIN) IVPB  750 mg Intravenous Q24H     PRN Meds:sodium chloride, sodium chloride, albuterol-ipratropium, diphenhydrAMINE, guaifenesin 100 mg/5 ml, hydrALAZINE, HYDROmorphone, iohexol, labetaloL, melatonin, metoprolol, oxyCODONE, oxyCODONE, Flushing PICC Protocol **AND** sodium chloride 0.9% **AND** sodium chloride 0.9%, Pharmacy to dose Vancomycin consult **AND** vancomycin - pharmacy to dose     Review of patient's allergies indicates:   Allergen Reactions    Dilaudid [hydromorphone] Anaphylaxis and Rash    Latex      Objective:     Vital Signs (Most Recent):  Temp: 98.6 °F (37 °C) (09/20/20 0501)  Pulse: 89 (09/20/20 0501)  Resp: 18 (09/20/20 0552)  BP: 133/78 (09/20/20 0501)  SpO2: 100 % (09/20/20 0501) Vital Signs (24h Range):  Temp:  [97.6 °F (36.4 °C)-98.6 °F (37 °C)] 98.6 °F (37 °C)  Pulse:  [] 89  Resp:  [17-18] 18  SpO2:  [94 %-100 %] 100 %  BP: (129-150)/(78-83) 133/78     Weight: 67.5 kg (148 lb 13 oz)  Body mass index is 27.22 kg/m².    Intake/Output - Last 3 Shifts       09/18 0700 - 09/19 0659 09/19 0700 - 09/20 0659 09/20 0700 - 09/21 0659    P.O. 590 4331     I.V. (mL/kg)  10 (0.1)     NG/GT       IV Piggyback 1050 350     TPN 1131.5 606.3     Total Intake(mL/kg) 2771.5  (41.1) 3477.3 (51.5)     Urine (mL/kg/hr) 0 (0) 3000 (1.9)     Emesis/NG output  0     Drains 35 31     Other  0     Stool 0 0     Blood  0     Total Output 35 3031     Net +2736.5 +446.3            Urine Occurrence 7 x 3 x     Stool Occurrence 2 x 1 x           Physical Exam  Constitutional:       General: She is not in acute distress.     Appearance: She is well-developed.   Cardiovascular:      Rate and Rhythm: Normal rate and regular rhythm.   Pulmonary:      Effort: Pulmonary effort is normal. No respiratory distress.   Abdominal:      General: There is no distension.      Palpations: Abdomen is soft.      Tenderness: There is no abdominal tenderness.      Comments: Drains with minimal output   Skin:     General: Skin is warm and dry.   Neurological:      Mental Status: She is alert and oriented to person, place, and time.   Psychiatric:         Behavior: Behavior normal.         Significant Labs:  CBC:   Recent Labs   Lab 09/20/20  0449   WBC 17.82*   RBC 2.82*   HGB 8.2*   HCT 26.7*   *   MCV 95   MCH 29.1   MCHC 30.7*     CMP:   Recent Labs   Lab 09/20/20  0449      CALCIUM 8.8   ALBUMIN 1.7*   PROT 7.2      K 4.6   CO2 25      BUN 20   CREATININE 0.8   ALKPHOS 164*   ALT 11   AST 23   BILITOT 0.2       Significant Diagnostics:  I have reviewed all pertinent imaging results/findings within the past 24 hours.

## 2020-09-21 LAB
ALBUMIN SERPL BCP-MCNC: 1.9 G/DL (ref 3.5–5.2)
ALP SERPL-CCNC: 175 U/L (ref 55–135)
ALT SERPL W/O P-5'-P-CCNC: 13 U/L (ref 10–44)
ANION GAP SERPL CALC-SCNC: 12 MMOL/L (ref 8–16)
AST SERPL-CCNC: 21 U/L (ref 10–40)
BASOPHILS # BLD AUTO: 0.08 K/UL (ref 0–0.2)
BASOPHILS NFR BLD: 0.4 % (ref 0–1.9)
BILIRUB SERPL-MCNC: 0.2 MG/DL (ref 0.1–1)
BUN SERPL-MCNC: 22 MG/DL (ref 6–20)
CALCIUM SERPL-MCNC: 9 MG/DL (ref 8.7–10.5)
CHLORIDE SERPL-SCNC: 99 MMOL/L (ref 95–110)
CO2 SERPL-SCNC: 23 MMOL/L (ref 23–29)
CREAT SERPL-MCNC: 0.9 MG/DL (ref 0.5–1.4)
DIFFERENTIAL METHOD: ABNORMAL
EOSINOPHIL # BLD AUTO: 0.4 K/UL (ref 0–0.5)
EOSINOPHIL NFR BLD: 2.3 % (ref 0–8)
ERYTHROCYTE [DISTWIDTH] IN BLOOD BY AUTOMATED COUNT: 15.1 % (ref 11.5–14.5)
EST. GFR  (AFRICAN AMERICAN): >60 ML/MIN/1.73 M^2
EST. GFR  (NON AFRICAN AMERICAN): >60 ML/MIN/1.73 M^2
GLUCOSE SERPL-MCNC: 101 MG/DL (ref 70–110)
HCT VFR BLD AUTO: 29.2 % (ref 37–48.5)
HGB BLD-MCNC: 8.7 G/DL (ref 12–16)
IMM GRANULOCYTES # BLD AUTO: 0.18 K/UL (ref 0–0.04)
IMM GRANULOCYTES NFR BLD AUTO: 1 % (ref 0–0.5)
LYMPHOCYTES # BLD AUTO: 3.7 K/UL (ref 1–4.8)
LYMPHOCYTES NFR BLD: 20.4 % (ref 18–48)
MAGNESIUM SERPL-MCNC: 1.6 MG/DL (ref 1.6–2.6)
MCH RBC QN AUTO: 28.9 PG (ref 27–31)
MCHC RBC AUTO-ENTMCNC: 29.8 G/DL (ref 32–36)
MCV RBC AUTO: 97 FL (ref 82–98)
MONOCYTES # BLD AUTO: 1.2 K/UL (ref 0.3–1)
MONOCYTES NFR BLD: 6.5 % (ref 4–15)
NEUTROPHILS # BLD AUTO: 12.6 K/UL (ref 1.8–7.7)
NEUTROPHILS NFR BLD: 69.4 % (ref 38–73)
NRBC BLD-RTO: 0 /100 WBC
PHOSPHATE SERPL-MCNC: 5.8 MG/DL (ref 2.7–4.5)
PLATELET # BLD AUTO: 487 K/UL (ref 150–350)
PLATELET BLD QL SMEAR: ABNORMAL
PMV BLD AUTO: 9.9 FL (ref 9.2–12.9)
POCT GLUCOSE: 109 MG/DL (ref 70–110)
POCT GLUCOSE: 90 MG/DL (ref 70–110)
POTASSIUM SERPL-SCNC: 4.6 MMOL/L (ref 3.5–5.1)
PROT SERPL-MCNC: 7.9 G/DL (ref 6–8.4)
RBC # BLD AUTO: 3.01 M/UL (ref 4–5.4)
SMUDGE CELLS BLD QL SMEAR: PRESENT
SODIUM SERPL-SCNC: 134 MMOL/L (ref 136–145)
TRIGL SERPL-MCNC: 264 MG/DL (ref 30–150)
VANCOMYCIN TROUGH SERPL-MCNC: 14.8 UG/ML (ref 10–22)
WBC # BLD AUTO: 18.13 K/UL (ref 3.9–12.7)

## 2020-09-21 PROCEDURE — 97530 THERAPEUTIC ACTIVITIES: CPT

## 2020-09-21 PROCEDURE — 80053 COMPREHEN METABOLIC PANEL: CPT

## 2020-09-21 PROCEDURE — 63600175 PHARM REV CODE 636 W HCPCS: Performed by: STUDENT IN AN ORGANIZED HEALTH CARE EDUCATION/TRAINING PROGRAM

## 2020-09-21 PROCEDURE — 25000003 PHARM REV CODE 250: Performed by: STUDENT IN AN ORGANIZED HEALTH CARE EDUCATION/TRAINING PROGRAM

## 2020-09-21 PROCEDURE — 20600001 HC STEP DOWN PRIVATE ROOM

## 2020-09-21 PROCEDURE — 25000242 PHARM REV CODE 250 ALT 637 W/ HCPCS: Performed by: STUDENT IN AN ORGANIZED HEALTH CARE EDUCATION/TRAINING PROGRAM

## 2020-09-21 PROCEDURE — 84100 ASSAY OF PHOSPHORUS: CPT

## 2020-09-21 PROCEDURE — 83735 ASSAY OF MAGNESIUM: CPT

## 2020-09-21 PROCEDURE — 97116 GAIT TRAINING THERAPY: CPT

## 2020-09-21 PROCEDURE — 85025 COMPLETE CBC W/AUTO DIFF WBC: CPT

## 2020-09-21 PROCEDURE — C9113 INJ PANTOPRAZOLE SODIUM, VIA: HCPCS | Performed by: STUDENT IN AN ORGANIZED HEALTH CARE EDUCATION/TRAINING PROGRAM

## 2020-09-21 PROCEDURE — 63600175 PHARM REV CODE 636 W HCPCS: Performed by: SURGERY

## 2020-09-21 PROCEDURE — 25000003 PHARM REV CODE 250: Performed by: SURGERY

## 2020-09-21 PROCEDURE — B4185 PARENTERAL SOL 10 GM LIPIDS: HCPCS | Performed by: STUDENT IN AN ORGANIZED HEALTH CARE EDUCATION/TRAINING PROGRAM

## 2020-09-21 PROCEDURE — A4216 STERILE WATER/SALINE, 10 ML: HCPCS | Performed by: SURGERY

## 2020-09-21 PROCEDURE — 84478 ASSAY OF TRIGLYCERIDES: CPT

## 2020-09-21 PROCEDURE — A4217 STERILE WATER/SALINE, 500 ML: HCPCS | Performed by: STUDENT IN AN ORGANIZED HEALTH CARE EDUCATION/TRAINING PROGRAM

## 2020-09-21 PROCEDURE — 80202 ASSAY OF VANCOMYCIN: CPT

## 2020-09-21 RX ORDER — FUROSEMIDE 10 MG/ML
40 INJECTION INTRAMUSCULAR; INTRAVENOUS ONCE
Status: COMPLETED | OUTPATIENT
Start: 2020-09-21 | End: 2020-09-21

## 2020-09-21 RX ADMIN — HEPARIN SODIUM 5000 UNITS: 5000 INJECTION INTRAVENOUS; SUBCUTANEOUS at 02:09

## 2020-09-21 RX ADMIN — Medication 10 ML: at 12:09

## 2020-09-21 RX ADMIN — Medication 10 ML: at 03:09

## 2020-09-21 RX ADMIN — FUROSEMIDE 40 MG: 10 INJECTION, SOLUTION INTRAMUSCULAR; INTRAVENOUS at 09:09

## 2020-09-21 RX ADMIN — VANCOMYCIN HYDROCHLORIDE 750 MG: 750 INJECTION, POWDER, LYOPHILIZED, FOR SOLUTION INTRAVENOUS at 09:09

## 2020-09-21 RX ADMIN — OXYCODONE HYDROCHLORIDE 10 MG: 5 SOLUTION ORAL at 10:09

## 2020-09-21 RX ADMIN — SMOFLIPID 250 ML: 6; 6; 5; 3 INJECTION, EMULSION INTRAVENOUS at 09:09

## 2020-09-21 RX ADMIN — ACETAMINOPHEN 650 MG: 160 SOLUTION ORAL at 05:09

## 2020-09-21 RX ADMIN — HEPARIN SODIUM 5000 UNITS: 5000 INJECTION INTRAVENOUS; SUBCUTANEOUS at 08:09

## 2020-09-21 RX ADMIN — ACETAMINOPHEN 650 MG: 160 SOLUTION ORAL at 03:09

## 2020-09-21 RX ADMIN — MICONAZOLE NITRATE: 20 POWDER TOPICAL at 09:09

## 2020-09-21 RX ADMIN — ACETAMINOPHEN 650 MG: 160 SOLUTION ORAL at 12:09

## 2020-09-21 RX ADMIN — Medication 10 ML: at 05:09

## 2020-09-21 RX ADMIN — MAGNESIUM SULFATE HEPTAHYDRATE: 500 INJECTION, SOLUTION INTRAMUSCULAR; INTRAVENOUS at 09:09

## 2020-09-21 RX ADMIN — OXYCODONE HYDROCHLORIDE 15 MG: 5 SOLUTION ORAL at 09:09

## 2020-09-21 RX ADMIN — PANTOPRAZOLE SODIUM 40 MG: 40 INJECTION, POWDER, LYOPHILIZED, FOR SOLUTION INTRAVENOUS at 09:09

## 2020-09-21 RX ADMIN — METOPROLOL TARTRATE 25 MG: 25 TABLET ORAL at 09:09

## 2020-09-21 RX ADMIN — OXYCODONE HYDROCHLORIDE 15 MG: 5 SOLUTION ORAL at 03:09

## 2020-09-21 RX ADMIN — PIPERACILLIN SODIUM AND TAZOBACTAM SODIUM 4.5 G: 4; .5 INJECTION, POWDER, LYOPHILIZED, FOR SOLUTION INTRAVENOUS at 12:09

## 2020-09-21 RX ADMIN — ACETAMINOPHEN 650 MG: 160 SOLUTION ORAL at 11:09

## 2020-09-21 RX ADMIN — Medication 10 ML: at 11:09

## 2020-09-21 RX ADMIN — MICONAZOLE NITRATE: 20 POWDER TOPICAL at 08:09

## 2020-09-21 RX ADMIN — PIPERACILLIN SODIUM AND TAZOBACTAM SODIUM 4.5 G: 4; .5 INJECTION, POWDER, LYOPHILIZED, FOR SOLUTION INTRAVENOUS at 05:09

## 2020-09-21 RX ADMIN — METOPROLOL TARTRATE 25 MG: 25 TABLET ORAL at 08:09

## 2020-09-21 RX ADMIN — HEPARIN SODIUM 5000 UNITS: 5000 INJECTION INTRAVENOUS; SUBCUTANEOUS at 05:09

## 2020-09-21 RX ADMIN — AMLODIPINE 5 MG: 1 SUSPENSION ORAL at 10:09

## 2020-09-21 NOTE — ASSESSMENT & PLAN NOTE
38 yo F s/p antrectomy with BII reconstruction c/b duodenal necrosis requiring ex lap, washout, drainage c/b aspiration event. S/p duodenal resection with d-j and g-j anastomosis on 8/13.    - TPN   - Clear liquids with Boost  - GI and DVT ppx  - PT / OT  - continue PRNs for BP control    Dispo: Transfer back to [place] pending set up. Will follow up with CM/SW

## 2020-09-21 NOTE — PLAN OF CARE
Problem: Physical Therapy Goal  Goal: Physical Therapy Goal  Description: Goals to be met by: 10/5/2020     Patient will increase functional independence with mobility by performin. Supine to sit with MInimal Assistance - met ()  2. Sit to stand transfer with Minimal Assistance with LRAD - met ()  3. Gait  x 50 feet with Minimal Assistance using LRAD. - met ()  REVISED: 150ft without AD Supervision  4. Stand for 3 minutes with Contact Guard Assistance using LRAD - met ()  5. Lower extremity exercise program x15 reps per handout, with independence    Outcome: Ongoing, Progressing   Pt met 2 goals, goals updated, progressing well    Chris Gannon, PT,DPT  2020

## 2020-09-21 NOTE — CLINICAL REVIEW
Physical Therapy 6th Visit     Patient Name:  aJquan Farmer   MRN:  [id number]    PT/PTA met face to face to discuss patient's treatment plan and progress towards established goals.  Treatment will be continued as described in initial report/eval and progress notes.  Patient will be seen by physical therapist every sixth visit and minimally once per month.    Additional information: Progressing well with therapy.  Performing mobility with Supervision - SBA.  Currently recommending IP rehab for d/c rec but may progress to Home with HH pending further improvement.    Chris Gannon, PT,DPT  9/21/2020

## 2020-09-21 NOTE — PLAN OF CARE
Problem: Adult Inpatient Plan of Care  Goal: Plan of Care Review  Outcome: Ongoing, Progressing     Problem: Adult Inpatient Plan of Care  Goal: Optimal Comfort and Wellbeing  Outcome: Ongoing, Progressing     Problem: Malnutrition  Goal: Improved Nutritional Intake  Outcome: Ongoing, Progressing

## 2020-09-21 NOTE — PROGRESS NOTES
[facility]  General Surgery  Progress Note    Subjective:     History of Present Illness:  Pt is a 38 yo F with recent history of antrectomy with BII reconstruction for ulcer disease at outside hospital. 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 Patient also reportedly aspirated on induction in the OR prior to this, resulting in severe pulmonary edema and hypoxia. Patient was transferred to [facility] urgently for higher level of care. She arrived as a direct SICU admit on near maximal vent settings and requiring low dose vasopressors with HR in the upper 140s. Her midline laparotomy is closed with 4 Navdeep drains in place draining bilious output.     Post-Op Info:  Procedure(s) (LRB):  REMOVAL, CATHETER, CENTRAL VENOUS, TUNNELED (Left)  CLOSURE, WOUND (N/A)   10 Days Post-Op     Interval History:   NAEON. WBC trending up. Afebrile. On antibiotics  Doing well  Waiting on bed availability    Medications:  Continuous Infusions:   TPN ADULT CENTRAL LINE CUSTOM 45 mL/hr at 09/20/20 2115     Scheduled Meds:   acetaminophen  650 mg Per J Tube Q6H    amLODIPine benzoate  5 mg Per J Tube Daily    cloNIDine 0.3 mg/24 hr td ptwk  1 patch Transdermal Q7 Days    heparin (porcine)  5,000 Units Subcutaneous Q8H    lidocaine  1 patch Transdermal Q24H    lipid (SMOFLIPID)  250 mL Intravenous Daily    metoprolol  25 mg Per J Tube BID    miconazole NITRATE 2 %   Topical (Top) BID    pantoprazole  40 mg Intravenous Daily    piperacillin-tazobactam (ZOSYN) IVPB  4.5 g Intravenous Q8H    sodium chloride 0.9%  10 mL Intravenous Q6H    vancomycin (VANCOCIN) IVPB  750 mg Intravenous Q24H     PRN Meds:sodium chloride, sodium chloride, albuterol-ipratropium, diphenhydrAMINE, guaifenesin 100 mg/5 ml, hydrALAZINE, HYDROmorphone, iohexol, labetaloL, melatonin, metoprolol, oxyCODONE, oxyCODONE, Flushing PICC Protocol **AND** sodium chloride 0.9% **AND** sodium chloride  0.9%, Pharmacy to dose Vancomycin consult **AND** vancomycin - pharmacy to dose     Review of patient's allergies indicates:   Allergen Reactions    Dilaudid [hydromorphone] Anaphylaxis and Rash    Latex      Objective:     Vital Signs (Most Recent):  Temp: 98 °F (36.7 °C) (09/21/20 0335)  Pulse: 72 (09/21/20 0335)  Resp: 18 (09/21/20 0335)  BP: 118/72 (09/21/20 0335)  SpO2: 98 % (09/21/20 0335) Vital Signs (24h Range):  Temp:  [96.4 °F (35.8 °C)-98.9 °F (37.2 °C)] 98 °F (36.7 °C)  Pulse:  [72-96] 72  Resp:  [16-20] 18  SpO2:  [95 %-100 %] 98 %  BP: (118-128)/(70-81) 118/72     Weight: 67.5 kg (148 lb 13 oz)  Body mass index is 27.22 kg/m².    Intake/Output - Last 3 Shifts       09/19 0700 - 09/20 0659 09/20 0700 - 09/21 0659 09/21 0700 - 09/22 0659    P.O. 2511 690     I.V. (mL/kg) 10 (0.1)      IV Piggyback 350 400     .3      Total Intake(mL/kg) 3477.3 (51.5) 1090 (16.1)     Urine (mL/kg/hr) 3000 (1.9) 1603 (1)     Emesis/NG output 0      Drains 31 0     Other 0      Stool 0 1     Blood 0      Total Output 3031 1604     Net +446.3 -514            Urine Occurrence 3 x      Stool Occurrence 1 x 1 x           Physical Exam  Constitutional:       General: She is not in acute distress.     Appearance: She is well-developed.   Cardiovascular:      Rate and Rhythm: Normal rate and regular rhythm.   Pulmonary:      Effort: Pulmonary effort is normal. No respiratory distress.   Abdominal:      General: There is no distension.      Palpations: Abdomen is soft.      Tenderness: There is no abdominal tenderness.      Comments: Drains with minimal output   Skin:     General: Skin is warm and dry.   Neurological:      Mental Status: She is alert and oriented to person, place, and time.   Psychiatric:         Behavior: Behavior normal.         Significant Labs:  CBC:   Recent Labs   Lab 09/20/20  0449   WBC 17.82*   RBC 2.82*   HGB 8.2*   HCT 26.7*   *   MCV 95   MCH 29.1   MCHC 30.7*     CMP:   Recent Labs    Lab 09/21/20  0537      CALCIUM 9.0   ALBUMIN 1.9*   PROT 7.9   *   K 4.6   CO2 23   CL 99   BUN 22*   CREATININE 0.9   ALKPHOS 175*   ALT 13   AST 21   BILITOT 0.2       Significant Diagnostics:  I have reviewed all pertinent imaging results/findings within the past 24 hours.    Assessment/Plan:     * Duodenum disorder  40 yo F s/p antrectomy with BII reconstruction c/b duodenal necrosis requiring ex lap, washout, drainage c/b aspiration event. S/p duodenal resection with d-j and g-j anastomosis on 8/13.    - TPN   - Clear liquids with Boost  - GI and DVT ppx  - PT / OT  - continue PRNs for BP control    Dispo: Transfer back to [facility] pending set up. Will follow up with CM/SW          Collette Ferreira MD  General Surgery  [facility]

## 2020-09-21 NOTE — PROGRESS NOTES
Pharmacokinetic Assessment Follow Up: IV Vancomycin    Vancomycin serum concentration assessment(s):    The trough level was drawn correctly and can be used to guide therapy at this time. The measurement is within the desired definitive target range of 10 to 20 mcg/mL.    Vancomycin Regimen Plan:    Continue regimen to Vancomycin 750 mg IV every 24 hours with next serum trough concentration measured in 3 to 5 days (two therapeutic levels at current regimen).     Drug levels (last 3 results):  Recent Labs   Lab Result Units 09/21/20  0932   Vancomycin-Trough ug/mL 14.8       Pharmacy will continue to follow and monitor vancomycin.    Please contact pharmacy at extension [phone] for questions regarding this assessment.    Thank you for the consult,   El Shetty       Patient brief summary:  Jaquan Farmer is a 39 y.o. female initiated on antimicrobial therapy with IV Vancomycin for treatment of intra-abdominal infection    The patient's current regimen is Vancomycin 750 mg every 24 hours    Drug Allergies:   Review of patient's allergies indicates:   Allergen Reactions    Dilaudid [hydromorphone] Anaphylaxis and Rash    Latex        Actual Body Weight:   67.5 kg    Renal Function:   Estimated Creatinine Clearance: 75.6 mL/min (based on SCr of 0.9 mg/dL).,     Dialysis Method (if applicable):  N/A    CBC (last 72 hours):  Recent Labs   Lab Result Units 09/19/20  0330 09/20/20  0449 09/21/20  0537   WBC K/uL 16.60* 17.82* 18.13*   Hemoglobin g/dL 8.2* 8.2* 8.7*   Hematocrit % 26.1* 26.7* 29.2*   Platelets K/uL 416* 448* 487*   Gran% % 66.9 72.3 69.4   Lymph% % 20.8 17.1* 20.4   Mono% % 8.1 7.1 6.5   Eosinophil% % 2.2 2.0 2.3   Basophil% % 0.4 0.4 0.4   Differential Method  Automated Automated Automated       Metabolic Panel (last 72 hours):  Recent Labs   Lab Result Units 09/19/20  0330 09/19/20  1249 09/19/20  1613 09/20/20  0449 09/21/20  0537   Sodium mmol/L 136 131* 134* 136 134*   Potassium mmol/L 4.3 6.0*  4.6 4.6 4.6   Chloride mmol/L 101 98 100 101 99   CO2 mmol/L 26 23 25 25 23   Glucose mg/dL 99 441* 97 108 101   BUN, Bld mg/dL 22* 19 20 20 22*   Creatinine mg/dL 0.9 1.1 0.9 0.8 0.9   Albumin g/dL 1.6*  --   --  1.7* 1.9*   Total Bilirubin mg/dL 0.2  --   --  0.2 0.2   Alkaline Phosphatase U/L 164*  --   --  164* 175*   AST U/L 16  --   --  23 21   ALT U/L 12  --   --  11 13   Magnesium mg/dL 1.6  --   --  1.5* 1.6   Phosphorus mg/dL 5.6*  --   --  5.7* 5.8*       Vancomycin Administrations:  vancomycin given in the last 96 hours                   vancomycin 750 mg in dextrose 5 % 250 mL IVPB (ready to mix system) (mg) 750 mg New Bag 09/17/20 1035     750 mg New Bag 09/16/20 1033    vancomycin in dextrose 5 % 1 gram/250 mL IVPB 1,000 mg (mg) 1,000 mg New Bag 09/15/20 0051                Microbiologic Results:  Microbiology Results (last 7 days)     Procedure Component Value Units Date/Time    Blood culture [[id number]] Collected: 09/13/20 1239    Order Status: Completed Specimen: Blood Updated: 09/19/20 0612     Blood Culture, Routine No growth after 5 days.    Blood culture [[id number]] Collected: 09/13/20 1239    Order Status: Completed Specimen: Blood Updated: 09/19/20 0612     Blood Culture, Routine No growth after 5 days.

## 2020-09-21 NOTE — PT/OT/SLP PROGRESS
"Physical Therapy Treatment    Patient Name:  Jaquan Farmer   MRN:  [id number]    Recommendations:     Discharge Recommendations:  rehabilitation facility   Discharge Equipment Recommendations: bedside commode, shower chair   Barriers to discharge: decreased functional mobility    Assessment:     Jaquan Farmer is a 39 y.o. female admitted with a medical diagnosis of Duodenum disorder.  She presents with the following impairments/functional limitations:  weakness, impaired self care skills, impaired endurance, impaired functional mobilty, impaired balance, gait instability, impaired cardiopulmonary response to activity.  Tolerated session with no c/o of difficulty.  Performed mobility with S-SBA.  Pt able to amb household distance without AD and demo decreased gait speed, mild FFP, steady gait and labored breathing requiring 1x seated rest break.  Pt safe to amb with assistance of 1x person.  Pt would benefit from continued skilled acute PT 3x/wk to improve functional mobility.      Rehab Prognosis: Good; patient would benefit from acute skilled PT services to address these deficits and reach maximum level of function.    Recent Surgery: Procedure(s) (LRB):  REMOVAL, CATHETER, CENTRAL VENOUS, TUNNELED (Left)  CLOSURE, WOUND (N/A) 10 Days Post-Op    Plan:     During this hospitalization, patient to be seen 3 x/week to address the identified rehab impairments via gait training, therapeutic activities, therapeutic exercises, neuromuscular re-education and progress toward the following goals:    · Plan of Care Expires:  10/01/20    Subjective     Chief Complaint: fatigue  Patient/Family Comments/goals: "That lasix has me going so much."  Pain/Comfort:  · Pain Rating 1: 0/10      Objective:     Communicated with RN and OT prior to session.  Patient found HOB elevated with telemetry, peripheral IV, GODFREY drain upon PT entry to room.     General Precautions: Standard, fall   Orthopedic Precautions:N/A   Braces: N/A "     Functional Mobility:  · Bed Mobility:     · Rolling Right: supervision  · Scooting: supervision  · Supine to Sit: supervision  · Transfers:     · Sit to Stand:  stand by assistance with no AD  · Bed to Chair: stand by assistance with  no AD  using  Step Transfer  · Gait: 20ft, 150ft, 150ft without AD SBA    · demo decreased gait speed, mild FFP, steady gait and labored breathing requiring 1x seated rest break  · Balance: standing (SBA)      AM-PAC 6 CLICK MOBILITY  Turning over in bed (including adjusting bedclothes, sheets and blankets)?: 3  Sitting down on and standing up from a chair with arms (e.g., wheelchair, bedside commode, etc.): 3  Moving from lying on back to sitting on the side of the bed?: 3  Moving to and from a bed to a chair (including a wheelchair)?: 3  Need to walk in hospital room?: 3  Climbing 3-5 steps with a railing?: 3  Basic Mobility Total Score: 18       Therapeutic Activities and Exercises:  Pt educated on: PT role/POC; safety c mobility; benefits of OOB activities; performing therex; d/c recs - v/u  -sit<>stand 6x  -standing x4mins at sink for self-care  -transferred to C for voiding 2x, decline use of bathroom secondary to convenience      Patient left up in chair with all lines intact, call button in reach and RN notified..    GOALS:   Multidisciplinary Problems     Physical Therapy Goals        Problem: Physical Therapy Goal    Goal Priority Disciplines Outcome Goal Variances Interventions   Physical Therapy Goal     PT, PT/OT Ongoing, Progressing     Description: Goals to be met by: 10/5/2020     Patient will increase functional independence with mobility by performin. Supine to sit with MInimal Assistance - met ()  2. Sit to stand transfer with Minimal Assistance with LRAD - met ()  3. Gait  x 50 feet with Minimal Assistance using LRAD. - met ()  REVISED: 150ft without AD Supervision  4. Stand for 3 minutes with Contact Guard Assistance using LRAD - met  (9/8)  5. Lower extremity exercise program x15 reps per handout, with independence                     Time Tracking:     PT Received On: 09/21/20  PT Start Time: 0935     PT Stop Time: 1000  PT Total Time (min): 25 min     Billable Minutes: Gait Training 15 min and Therapeutic Activity 10 min    Treatment Type: Treatment  PT/PTA: PT     PTA Visit Number: 0     Chris Gannon, PT  09/21/2020

## 2020-09-21 NOTE — PLAN OF CARE
Plan of care reviewed with pt/verbalized understanding, vss, patient on clear liquid diet tolerating with no c/o of nausea/vomiting, dressing to abdomen changed this shift, patient up to bedside commode with assistance, patient c/o of pain to abdomen relieved with prn medication, patient denies any other complaints @ this time, will continue to monitor.

## 2020-09-21 NOTE — PROGRESS NOTES
VANCOMYCIN DOSING BY PHARMACY DISCONTINUATION NOTE    Jaquan Farmer is a 39 y.o. female who had been consulted for vancomycin dosing.    The pharmacy consult for vancomycin dosing has been discontinued.     Vancomycin Dosing by Pharmacy Consult will sign-off. Please reconsult if necessary. Thank you for allowing us to participate in this patient's care.     Jose Angel Murray Pharm.D.  [phone]

## 2020-09-21 NOTE — PLAN OF CARE
Patient's transfer back to [facility] is still pending at this time. Patient accepted, However beds remain unavailable at Facility per [facility] notes on 9/21 regarding follow-up with CM at Facility. MD and XIMENA updated. Will continue to follow.

## 2020-09-21 NOTE — SUBJECTIVE & OBJECTIVE
Interval History:   NAEON. WBC trending up. Afebrile. On antibiotics  Doing well  Waiting on bed availability    Medications:  Continuous Infusions:   TPN ADULT CENTRAL LINE CUSTOM 45 mL/hr at 09/20/20 2115     Scheduled Meds:   acetaminophen  650 mg Per J Tube Q6H    amLODIPine benzoate  5 mg Per J Tube Daily    cloNIDine 0.3 mg/24 hr td ptwk  1 patch Transdermal Q7 Days    heparin (porcine)  5,000 Units Subcutaneous Q8H    lidocaine  1 patch Transdermal Q24H    lipid (SMOFLIPID)  250 mL Intravenous Daily    metoprolol  25 mg Per J Tube BID    miconazole NITRATE 2 %   Topical (Top) BID    pantoprazole  40 mg Intravenous Daily    piperacillin-tazobactam (ZOSYN) IVPB  4.5 g Intravenous Q8H    sodium chloride 0.9%  10 mL Intravenous Q6H    vancomycin (VANCOCIN) IVPB  750 mg Intravenous Q24H     PRN Meds:sodium chloride, sodium chloride, albuterol-ipratropium, diphenhydrAMINE, guaifenesin 100 mg/5 ml, hydrALAZINE, HYDROmorphone, iohexol, labetaloL, melatonin, metoprolol, oxyCODONE, oxyCODONE, Flushing PICC Protocol **AND** sodium chloride 0.9% **AND** sodium chloride 0.9%, Pharmacy to dose Vancomycin consult **AND** vancomycin - pharmacy to dose     Review of patient's allergies indicates:   Allergen Reactions    Dilaudid [hydromorphone] Anaphylaxis and Rash    Latex      Objective:     Vital Signs (Most Recent):  Temp: 98 °F (36.7 °C) (09/21/20 0335)  Pulse: 72 (09/21/20 0335)  Resp: 18 (09/21/20 0335)  BP: 118/72 (09/21/20 0335)  SpO2: 98 % (09/21/20 0335) Vital Signs (24h Range):  Temp:  [96.4 °F (35.8 °C)-98.9 °F (37.2 °C)] 98 °F (36.7 °C)  Pulse:  [72-96] 72  Resp:  [16-20] 18  SpO2:  [95 %-100 %] 98 %  BP: (118-128)/(70-81) 118/72     Weight: 67.5 kg (148 lb 13 oz)  Body mass index is 27.22 kg/m².    Intake/Output - Last 3 Shifts       09/19 0700 - 09/20 0659 09/20 0700 - 09/21 0659 09/21 0700 - 09/22 0659    P.O. 5449 690     I.V. (mL/kg) 10 (0.1)      IV Piggyback 350 400     .3      Total  Intake(mL/kg) 3477.3 (51.5) 1090 (16.1)     Urine (mL/kg/hr) 3000 (1.9) 1603 (1)     Emesis/NG output 0      Drains 31 0     Other 0      Stool 0 1     Blood 0      Total Output 3031 1604     Net +446.3 -514            Urine Occurrence 3 x      Stool Occurrence 1 x 1 x           Physical Exam  Constitutional:       General: She is not in acute distress.     Appearance: She is well-developed.   Cardiovascular:      Rate and Rhythm: Normal rate and regular rhythm.   Pulmonary:      Effort: Pulmonary effort is normal. No respiratory distress.   Abdominal:      General: There is no distension.      Palpations: Abdomen is soft.      Tenderness: There is no abdominal tenderness.      Comments: Drains with minimal output   Skin:     General: Skin is warm and dry.   Neurological:      Mental Status: She is alert and oriented to person, place, and time.   Psychiatric:         Behavior: Behavior normal.         Significant Labs:  CBC:   Recent Labs   Lab 09/20/20  0449   WBC 17.82*   RBC 2.82*   HGB 8.2*   HCT 26.7*   *   MCV 95   MCH 29.1   MCHC 30.7*     CMP:   Recent Labs   Lab 09/21/20  0537      CALCIUM 9.0   ALBUMIN 1.9*   PROT 7.9   *   K 4.6   CO2 23   CL 99   BUN 22*   CREATININE 0.9   ALKPHOS 175*   ALT 13   AST 21   BILITOT 0.2       Significant Diagnostics:  I have reviewed all pertinent imaging results/findings within the past 24 hours.

## 2020-09-22 LAB
ALBUMIN SERPL BCP-MCNC: 1.9 G/DL (ref 3.5–5.2)
ALP SERPL-CCNC: 162 U/L (ref 55–135)
ALT SERPL W/O P-5'-P-CCNC: 13 U/L (ref 10–44)
ANION GAP SERPL CALC-SCNC: 10 MMOL/L (ref 8–16)
AST SERPL-CCNC: 16 U/L (ref 10–40)
BASOPHILS # BLD AUTO: 0.07 K/UL (ref 0–0.2)
BASOPHILS NFR BLD: 0.4 % (ref 0–1.9)
BILIRUB SERPL-MCNC: 0.2 MG/DL (ref 0.1–1)
BUN SERPL-MCNC: 20 MG/DL (ref 6–20)
CALCIUM SERPL-MCNC: 9.1 MG/DL (ref 8.7–10.5)
CHLORIDE SERPL-SCNC: 100 MMOL/L (ref 95–110)
CO2 SERPL-SCNC: 25 MMOL/L (ref 23–29)
CREAT SERPL-MCNC: 0.9 MG/DL (ref 0.5–1.4)
DIFFERENTIAL METHOD: ABNORMAL
EOSINOPHIL # BLD AUTO: 0.4 K/UL (ref 0–0.5)
EOSINOPHIL NFR BLD: 2.3 % (ref 0–8)
ERYTHROCYTE [DISTWIDTH] IN BLOOD BY AUTOMATED COUNT: 15.1 % (ref 11.5–14.5)
EST. GFR  (AFRICAN AMERICAN): >60 ML/MIN/1.73 M^2
EST. GFR  (NON AFRICAN AMERICAN): >60 ML/MIN/1.73 M^2
GLUCOSE SERPL-MCNC: 107 MG/DL (ref 70–110)
HCT VFR BLD AUTO: 28.1 % (ref 37–48.5)
HGB BLD-MCNC: 8.5 G/DL (ref 12–16)
IMM GRANULOCYTES # BLD AUTO: 0.13 K/UL (ref 0–0.04)
IMM GRANULOCYTES NFR BLD AUTO: 0.7 % (ref 0–0.5)
LYMPHOCYTES # BLD AUTO: 3.9 K/UL (ref 1–4.8)
LYMPHOCYTES NFR BLD: 21.8 % (ref 18–48)
MAGNESIUM SERPL-MCNC: 1.7 MG/DL (ref 1.6–2.6)
MCH RBC QN AUTO: 29 PG (ref 27–31)
MCHC RBC AUTO-ENTMCNC: 30.2 G/DL (ref 32–36)
MCV RBC AUTO: 96 FL (ref 82–98)
MONOCYTES # BLD AUTO: 1 K/UL (ref 0.3–1)
MONOCYTES NFR BLD: 5.7 % (ref 4–15)
NEUTROPHILS # BLD AUTO: 12.4 K/UL (ref 1.8–7.7)
NEUTROPHILS NFR BLD: 69.1 % (ref 38–73)
NRBC BLD-RTO: 0 /100 WBC
PHOSPHATE SERPL-MCNC: 6.3 MG/DL (ref 2.7–4.5)
PLATELET # BLD AUTO: 474 K/UL (ref 150–350)
PMV BLD AUTO: 9.9 FL (ref 9.2–12.9)
POCT GLUCOSE: 113 MG/DL (ref 70–110)
POTASSIUM SERPL-SCNC: 4.7 MMOL/L (ref 3.5–5.1)
PROT SERPL-MCNC: 7.6 G/DL (ref 6–8.4)
RBC # BLD AUTO: 2.93 M/UL (ref 4–5.4)
SODIUM SERPL-SCNC: 135 MMOL/L (ref 136–145)
WBC # BLD AUTO: 17.96 K/UL (ref 3.9–12.7)

## 2020-09-22 PROCEDURE — 63600175 PHARM REV CODE 636 W HCPCS: Performed by: STUDENT IN AN ORGANIZED HEALTH CARE EDUCATION/TRAINING PROGRAM

## 2020-09-22 PROCEDURE — 83735 ASSAY OF MAGNESIUM: CPT

## 2020-09-22 PROCEDURE — 25000003 PHARM REV CODE 250: Performed by: STUDENT IN AN ORGANIZED HEALTH CARE EDUCATION/TRAINING PROGRAM

## 2020-09-22 PROCEDURE — 97803 MED NUTRITION INDIV SUBSEQ: CPT

## 2020-09-22 PROCEDURE — 97530 THERAPEUTIC ACTIVITIES: CPT

## 2020-09-22 PROCEDURE — A4216 STERILE WATER/SALINE, 10 ML: HCPCS | Performed by: SURGERY

## 2020-09-22 PROCEDURE — 25000242 PHARM REV CODE 250 ALT 637 W/ HCPCS: Performed by: STUDENT IN AN ORGANIZED HEALTH CARE EDUCATION/TRAINING PROGRAM

## 2020-09-22 PROCEDURE — 80053 COMPREHEN METABOLIC PANEL: CPT

## 2020-09-22 PROCEDURE — 20600001 HC STEP DOWN PRIVATE ROOM

## 2020-09-22 PROCEDURE — 94761 N-INVAS EAR/PLS OXIMETRY MLT: CPT

## 2020-09-22 PROCEDURE — 84100 ASSAY OF PHOSPHORUS: CPT

## 2020-09-22 PROCEDURE — C9113 INJ PANTOPRAZOLE SODIUM, VIA: HCPCS | Performed by: STUDENT IN AN ORGANIZED HEALTH CARE EDUCATION/TRAINING PROGRAM

## 2020-09-22 PROCEDURE — 85025 COMPLETE CBC W/AUTO DIFF WBC: CPT

## 2020-09-22 PROCEDURE — 25000003 PHARM REV CODE 250: Performed by: SURGERY

## 2020-09-22 PROCEDURE — 27000221 HC OXYGEN, UP TO 24 HOURS

## 2020-09-22 RX ORDER — MELATONIN 1 MG/ML
3 LIQUID (ML) ORAL NIGHTLY PRN
Status: DISCONTINUED | OUTPATIENT
Start: 2020-09-22 | End: 2020-09-29 | Stop reason: HOSPADM

## 2020-09-22 RX ORDER — ACETAMINOPHEN 650 MG/20.3ML
650 LIQUID ORAL EVERY 6 HOURS
Status: DISCONTINUED | OUTPATIENT
Start: 2020-09-22 | End: 2020-09-29 | Stop reason: HOSPADM

## 2020-09-22 RX ORDER — OXYCODONE HCL 5 MG/5 ML
15 SOLUTION, ORAL ORAL EVERY 4 HOURS PRN
Status: DISCONTINUED | OUTPATIENT
Start: 2020-09-22 | End: 2020-09-29 | Stop reason: HOSPADM

## 2020-09-22 RX ORDER — DIPHENHYDRAMINE HCL 25 MG
25 CAPSULE ORAL EVERY 4 HOURS PRN
Status: DISCONTINUED | OUTPATIENT
Start: 2020-09-22 | End: 2020-09-29 | Stop reason: HOSPADM

## 2020-09-22 RX ORDER — OXYCODONE HCL 5 MG/5 ML
10 SOLUTION, ORAL ORAL EVERY 4 HOURS PRN
Status: DISCONTINUED | OUTPATIENT
Start: 2020-09-22 | End: 2020-09-29 | Stop reason: HOSPADM

## 2020-09-22 RX ADMIN — ACETAMINOPHEN 650 MG: 160 SOLUTION ORAL at 06:09

## 2020-09-22 RX ADMIN — ACETAMINOPHEN 650 MG: 160 SOLUTION ORAL at 12:09

## 2020-09-22 RX ADMIN — PANTOPRAZOLE SODIUM 40 MG: 40 INJECTION, POWDER, LYOPHILIZED, FOR SOLUTION INTRAVENOUS at 08:09

## 2020-09-22 RX ADMIN — OXYCODONE HYDROCHLORIDE 15 MG: 5 SOLUTION ORAL at 12:09

## 2020-09-22 RX ADMIN — AMLODIPINE 5 MG: 1 SUSPENSION ORAL at 09:09

## 2020-09-22 RX ADMIN — Medication 10 ML: at 06:09

## 2020-09-22 RX ADMIN — Medication 10 ML: at 12:09

## 2020-09-22 RX ADMIN — ACETAMINOPHEN 650 MG: 160 SOLUTION ORAL at 05:09

## 2020-09-22 RX ADMIN — HEPARIN SODIUM 5000 UNITS: 5000 INJECTION INTRAVENOUS; SUBCUTANEOUS at 10:09

## 2020-09-22 RX ADMIN — HEPARIN SODIUM 5000 UNITS: 5000 INJECTION INTRAVENOUS; SUBCUTANEOUS at 06:09

## 2020-09-22 RX ADMIN — METOPROLOL TARTRATE 25 MG: 25 TABLET ORAL at 08:09

## 2020-09-22 RX ADMIN — OXYCODONE HYDROCHLORIDE 15 MG: 5 SOLUTION ORAL at 02:09

## 2020-09-22 RX ADMIN — HEPARIN SODIUM 5000 UNITS: 5000 INJECTION INTRAVENOUS; SUBCUTANEOUS at 02:09

## 2020-09-22 RX ADMIN — Medication 3 MG: at 10:09

## 2020-09-22 RX ADMIN — METOPROLOL TARTRATE 25 MG: 25 TABLET ORAL at 10:09

## 2020-09-22 RX ADMIN — OXYCODONE HYDROCHLORIDE 15 MG: 5 SOLUTION ORAL at 05:09

## 2020-09-22 RX ADMIN — OXYCODONE HYDROCHLORIDE 10 MG: 5 SOLUTION ORAL at 10:09

## 2020-09-22 NOTE — PLAN OF CARE
Patient received Prn pain medication with full relief. VSS. Afebrile. Free of falls.  Drains overall output is minimal. Had a great conversation with Jaquan she is exhibiting signs of emotional resilience. Great patient to work with.     Problem: Adult Inpatient Plan of Care  Goal: Plan of Care Review  Outcome: Ongoing, Progressing     Problem: Infection  Goal: Infection Symptom Resolution  Outcome: Ongoing, Progressing     Problem: Malnutrition  Goal: Improved Nutritional Intake  Outcome: Ongoing, Progressing     Problem: Wound  Goal: Optimal Wound Healing  Outcome: Ongoing, Not Progressing

## 2020-09-22 NOTE — PLAN OF CARE
Problem: Occupational Therapy Goal  Goal: Occupational Therapy Goal  Description: Goals to be met by: 9/23/20     Patient will increase functional independence with ADLs by performing:    Feeding with [place].  UE Dressing with Stand-by Assistance.  LE Dressing with Minimal Assistance.  Grooming while standing at sink with Contact Guard Assistance.- MET 9/14  Updated 9/14:  Toileting from toilet with stand-by Assistance for hygiene and clothing management.   Toilet transfer to toilet with stand-by Assistance.    Outcome: Ongoing, Progressing    Continue with POC  Diane Willingham OT  9/22/2020

## 2020-09-22 NOTE — PLAN OF CARE
Patient has been accepted by [facility] to return, but, no bed is available and yesterday had patient's waiting for beds in the ER.      Patient is expected to start on diet and may be able to discharge home.  Patient has recs from PT/OT for rehab facility, but, patient pending [place] Medicaid.  Will continue to follow for needs.    Marguerite Bernal RN, [facility] ([phone])         09/22/20 1101   Discharge Reassessment   Assessment Type Discharge Planning Reassessment   Discharge Plan A Other  ([facility])   Discharge Plan B Home with family   Anticipated Discharge Disposition   ([facility])   Post-Acute Status   Post-Acute Authorization Other  (Hospital Transfer)   Post-Acute Placement Status Pending Bed Availability

## 2020-09-22 NOTE — ASSESSMENT & PLAN NOTE
Nutrition Problem:  Severe Protein-Calorie Malnutrition  Malnutrition in the context of Chronic Illness/Injury     Related to (etiology):  Inadequate Energy Intake      Signs and Symptoms (as evidenced by):  Energy Intake: <50% of estimated energy requirement for 1 month; <75% of estimated energy requirements for > 3 months   Body Fat Depletion: severe depletion of orbitals, triceps   Muscle Mass Depletion: severe depletion of temples and clavicle region   Weight Loss: JOSE ARMANDO      Interventions (treatment strategy):  Collaboration of nutrition care with other providers  Modified diet - clear liquid  Commercial Beverage - Boost Breeze   Parenteral Nutrition      Nutrition Diagnosis Status:  Continues

## 2020-09-22 NOTE — PROGRESS NOTES
"[facility]  Adult Nutrition  Progress Note    SUMMARY       Recommendations    Pt meets chronic severe malnutrition.     1. Continue current TPN 90 gm AA / 225 gm Dex + SMOFLIPIDS - meeting needs.      2. As medically able, ADAT to regular + Boost Plus with texture per SLP.      3. RD following.      Goals: 1.) Pt to meet >75% of estimated energy and protein needs over the course of 7 days.  Nutrition Goal Status: goal met  Communication of RD Recs: (POC)    Reason for Assessment    Reason For Assessment: RD follow-up  Diagnosis: (Duodenum disorder)  Relevant Medical History: HTN, anxiety, respiratory distress, leukocytosis, sepsis, pulmonary HTN  Interdisciplinary Rounds: attended  General Information Comments: Pt sitting with no family members at bedside. Pt reports good appetite and eating some clear liquid and 1 Boost Breeze/day. Noted several Boost Breeze at bedside. Denies N/V/D/C. Pt reports that she is not ready for solid foods yet. Noted wt loss since admit, but possibly fluid related. NFPE completed 8/17, 9/22. Pt continues with severe muscle/fat wasting. Pt meets chronic severe malnutrition.  Nutrition Discharge Planning: Unable to determine at this time.    Nutrition Risk Screen    Nutrition Risk Screen: tube feeding or parenteral nutrition    Nutrition/Diet History    Spiritual, Cultural Beliefs, Restorationism Practices, Values that Affect Care: no  Food Allergies: NKFA  Factors Affecting Nutritional Intake: altered gastrointestinal function, decreased appetite, clear liquid diet    Anthropometrics    Temp: 98.2 °F (36.8 °C)  Height Method: Stated(from outside records)  Height: 5' 2" (157.5 cm)  Height (inches): 62 in  Weight Method: Bed Scale  Weight: 67.5 kg (148 lb 13 oz)  Weight (lb): 148.81 lb  Ideal Body Weight (IBW), Female: 110 lb  % Ideal Body Weight, Female (lb): 129.09 %  BMI (Calculated): 27.2  BMI Grade: 25 - 29.9 - overweight  Usual Body Weight (UBW), kg: (No wt " history)  Weight Loss Since Admission: 30 lb 6.8 oz(17% weight loss since admission 8/12)     Lab/Procedures/Meds    Pertinent Labs Reviewed: reviewed  Pertinent Labs Comments: Na 135, phos 6.3, alk phos 162  Pertinent Medications Reviewed: reviewed  Pertinent Medications Comments: noted    Estimated/Assessed Needs    Weight Used For Calorie Calculations: 67.5 kg (148 lb 13 oz)  Energy Calorie Requirements (kcal): 4436-5433 kcal/day  Energy Need Method: Kcal/kg(25-30)  Protein Requirements:  gm (1.2-1.5gm/kg)  Weight Used For Protein Calculations: 77 kg (169 lb 12.1 oz)  Fluid Requirements (mL): 1mL/kcal or per MD recommendations  Estimated Fluid Requirement Method: RDA Method  RDA Method (mL): 1687     Nutrition Prescription Ordered    Current Diet Order: Bariatric High Protein Liquid   Current Nutrition Support Formula Ordered: (Custom TPN 90 gm AA / 225 gm Dex + SMOFLIPIDS)  Current Nutrition Support Rate Ordered: 45 (ml)  Current Nutrition Support Frequency Ordered: mL/hr  Oral Nutrition Supplement: Boost Breeze    Evaluation of Received Nutrient/Fluid Intake    Parenteral Calories (kcal): 1125  Parenteral Protein (gm): 90  Parenteral Fluid (mL): 1080  Lipid Calories (kcals): 500 kcals  GIR (Glucose Infusion Rate) (mg/kg/min): 2.31 mg/kg/min  Total Calories (kcal): 1625  % Kcal Needs: 87  % Protein Needs: 90  Energy Calories Required: meeting needs  Protein Required: meeting needs  Fluid Required: (per MD)  Tolerance: tolerating  % Intake of Estimated Energy Needs: 75 - 100 %  % Meal Intake: 25 - 50 %    Nutrition Risk    Level of Risk/Frequency of Follow-up: (f/u 1 x wk)     Assessment and Plan    Severe malnutrition  Nutrition Problem:  Severe Protein-Calorie Malnutrition  Malnutrition in the context of Chronic Illness/Injury     Related to (etiology):  Inadequate Energy Intake      Signs and Symptoms (as evidenced by):  Energy Intake: <50% of estimated energy requirement for 1 month; <75% of estimated  energy requirements for > 3 months   Body Fat Depletion: severe depletion of orbitals, triceps   Muscle Mass Depletion: severe depletion of temples and clavicle region   Weight Loss: JOSE ARMANDO      Interventions (treatment strategy):  Collaboration of nutrition care with other providers  Modified diet - clear liquid  Commercial Beverage - Boost Breeze   Parenteral Nutrition      Nutrition Diagnosis Status:  Continues            Monitor and Evaluation    Food and Nutrient Intake: energy intake, food and beverage intake, parenteral nutrition intake  Food and Nutrient Adminstration: diet order, enteral and parenteral nutrition administration  Physical Activity and Function: nutrition-related ADLs and IADLs  Anthropometric Measurements: weight, weight change  Biochemical Data, Medical Tests and Procedures: electrolyte and renal panel, gastrointestinal profile  Nutrition-Focused Physical Findings: overall appearance, skin     Malnutrition Assessment  Malnutrition Type: chronic illness          Weight Loss (Malnutrition): (JOSE ARMANDO)  Energy Intake (Malnutrition): less than 75% for greater than or equal to 3 months(<50% for greater than 1 month)   Orbital Region (Subcutaneous Fat Loss): severe depletion  Upper Arm Region (Subcutaneous Fat Loss): severe depletion   [place] Region (Muscle Loss): severe depletion  Clavicle Bone Region (Muscle Loss): severe depletion  Clavicle and Acromion Bone Region (Muscle Loss): severe depletion  Patellar Region (Muscle Loss): severe depletion  Posterior Calf Region (Muscle Loss): mild depletion(edema)                 Nutrition Follow-Up    RD Follow-up?: Yes

## 2020-09-22 NOTE — ASSESSMENT & PLAN NOTE
40 yo F s/p antrectomy with BII reconstruction c/b duodenal necrosis requiring ex lap, washout, drainage c/b aspiration event. S/p duodenal resection with d-j and g-j anastomosis on 8/13.    - TPN   - High protein diet with Boost  - GI and DVT ppx  - PT / OT  - continue PRNs for BP control    Dispo: Transfer back to [place] pending set up. Will follow up with CM/SW. Discuss possibility of discharging to home

## 2020-09-22 NOTE — SUBJECTIVE & OBJECTIVE
Interval History:   NAEON.  Doing well  Waiting on bed availability    Medications:  Continuous Infusions:   TPN ADULT CENTRAL LINE CUSTOM 45 mL/hr at 09/21/20 2104     Scheduled Meds:   acetaminophen  650 mg Per J Tube Q6H    amLODIPine benzoate  5 mg Per J Tube Daily    cloNIDine 0.3 mg/24 hr td ptwk  1 patch Transdermal Q7 Days    heparin (porcine)  5,000 Units Subcutaneous Q8H    lidocaine  1 patch Transdermal Q24H    lipid (SMOFLIPID)  250 mL Intravenous Daily    metoprolol  25 mg Per J Tube BID    miconazole NITRATE 2 %   Topical (Top) BID    pantoprazole  40 mg Intravenous Daily    sodium chloride 0.9%  10 mL Intravenous Q6H     PRN Meds:sodium chloride, sodium chloride, albuterol-ipratropium, diphenhydrAMINE, guaifenesin 100 mg/5 ml, hydrALAZINE, HYDROmorphone, iohexol, labetaloL, melatonin, metoprolol, oxyCODONE, oxyCODONE, Flushing PICC Protocol **AND** sodium chloride 0.9% **AND** sodium chloride 0.9%, Pharmacy to dose Vancomycin consult **AND** vancomycin - pharmacy to dose     Review of patient's allergies indicates:   Allergen Reactions    Dilaudid [hydromorphone] Anaphylaxis and Rash    Latex      Objective:     Vital Signs (Most Recent):  Temp: 98.2 °F (36.8 °C) (09/22/20 0250)  Pulse: 87 (09/22/20 0340)  Resp: 14 (09/22/20 0340)  BP: (!) 140/80 (09/22/20 0250)  SpO2: 98 % (09/22/20 0340) Vital Signs (24h Range):  Temp:  [97.4 °F (36.3 °C)-98.6 °F (37 °C)] 98.2 °F (36.8 °C)  Pulse:  [87-95] 87  Resp:  [14-18] 14  SpO2:  [97 %-100 %] 98 %  BP: (117-140)/(69-81) 140/80     Weight: 67.5 kg (148 lb 13 oz)  Body mass index is 27.22 kg/m².    Intake/Output - Last 3 Shifts       09/20 0700 - 09/21 0659 09/21 0700 - 09/22 0659    P.O. 690 100    IV Piggyback 400     TPN  700    Total Intake(mL/kg) 1090 (16.1) 800 (11.9)    Urine (mL/kg/hr) 1603 (1) 500 (0.3)    Drains 0 10    Stool 1     Total Output 1604 510    Net -514 +290          Urine Occurrence  4 x    Stool Occurrence 1 x            Physical Exam  Constitutional:       General: She is not in acute distress.     Appearance: She is well-developed.   Cardiovascular:      Rate and Rhythm: Normal rate and regular rhythm.   Pulmonary:      Effort: Pulmonary effort is normal. No respiratory distress.   Abdominal:      General: There is no distension.      Palpations: Abdomen is soft.      Tenderness: There is no abdominal tenderness.      Comments: Drains with minimal output   Skin:     General: Skin is warm and dry.   Neurological:      Mental Status: She is alert and oriented to person, place, and time.   Psychiatric:         Behavior: Behavior normal.         Significant Labs:  CBC:   Recent Labs   Lab 09/21/20  0537   WBC 18.13*   RBC 3.01*   HGB 8.7*   HCT 29.2*   *   MCV 97   MCH 28.9   MCHC 29.8*     CMP:   Recent Labs   Lab 09/21/20  0537      CALCIUM 9.0   ALBUMIN 1.9*   PROT 7.9   *   K 4.6   CO2 23   CL 99   BUN 22*   CREATININE 0.9   ALKPHOS 175*   ALT 13   AST 21   BILITOT 0.2       Significant Diagnostics:  I have reviewed all pertinent imaging results/findings within the past 24 hours.

## 2020-09-22 NOTE — PLAN OF CARE
Pt awake and alert. Ambulating in room. Prn Oxycodone given for pain per order. Minimal drainage in GODFREY drains. Denies requests. Wcm.

## 2020-09-22 NOTE — PT/OT/SLP PROGRESS
Occupational Therapy   Treatment    Name: Jaquan Farmer  MRN: [id number]  Admitting Diagnosis:  Duodenum disorder  11 Days Post-Op   Procedure(s):  REMOVAL, CATHETER, CENTRAL VENOUS, TUNNELED  CLOSURE, WOUND     Recommendations:     Discharge Recommendations: rehabilitation facility  Discharge Equipment Recommendations:  bedside commode, shower chair  Barriers to discharge:  None    Assessment:     Jaquan Farmer is a 39 y.o. female with a medical diagnosis of Duodenum disorder.  She presents with no pain this day and tolerates session well. Patient participates in functional mobility without the use of a RW requiring SBA-CGA throughout. Performance deficits affecting function are weakness, impaired endurance, impaired self care skills, impaired functional mobilty, gait instability, impaired balance, impaired skin, impaired cardiopulmonary response to activity.     Rehab Prognosis:  Good; patient would benefit from acute skilled OT services to address these deficits and reach maximum level of function.       Plan:     Patient to be seen 4 x/week to address the above listed problems via self-care/home management, therapeutic activities, therapeutic exercises  · Plan of Care Expires: 10/08/20  · Plan of Care Reviewed with: patient    Subjective     Pain/Comfort:  · Pain Rating 1: 0/10    Objective:     Communicated with: RN prior to session.  Patient found HOB elevated with GODFREY drain, peripheral IV, telemetry upon OT entry to room.    General Precautions: Standard, fall   Orthopedic Precautions:N/A   Braces: N/A     Occupational Performance:     Bed Mobility:    · Patient completed Supine to Sit to R side EOB with supervision  · Patient completed Scooting anteriorly to EOB for foot placement on floor with supervision  · Patient completed Sit to Supine with supervision     Functional Mobility/Transfers:  · Patient completed Sit <> Stand Transfer with supervision with no assistive device   · Patient completed  Toilet Transfer onto the bedside commode using Step Transfer technique with supervision with no AD  · Functional Mobility: ~100' x2 with SBA-CGA and no AD, patient intermittently utilizing unilateral UE support on wall rail for steadying assist but no overt LOB noted, minimal SOB, standing rest break long term through    Activities of Daily Living:  · Toileting: supervision Patient completed toileting on the bedside commode with SUP.      AMPA 6 Click ADL: 20    Treatment & Education:  Role of OT/POC  Call button for assistance    Patient left HOB elevated with all lines intact, call button in reach and RN notifiedEducation:      GOALS:   Multidisciplinary Problems     Occupational Therapy Goals        Problem: Occupational Therapy Goal    Goal Priority Disciplines Outcome Interventions   Occupational Therapy Goal     OT, PT/OT Ongoing, Progressing    Description: Goals to be met by: 9/23/20     Patient will increase functional independence with ADLs by performing:    Feeding with [place].  UE Dressing with Stand-by Assistance.  LE Dressing with Minimal Assistance.  Grooming while standing at sink with Contact Guard Assistance.- MET 9/14  Updated 9/14:  Toileting from toilet with stand-by Assistance for hygiene and clothing management.   Toilet transfer to toilet with stand-by Assistance.                     Time Tracking:     OT Date of Treatment: 09/22/20  OT Start Time: 1542  OT Stop Time: 1559  OT Total Time (min): 17 min    Billable Minutes:Therapeutic Activity 17 minutes    Diane Willingham OT  9/22/2020

## 2020-09-22 NOTE — PROGRESS NOTES
[facility]  General Surgery  Progress Note    Subjective:     History of Present Illness:  Pt is a 40 yo F with recent history of antrectomy with BII reconstruction for ulcer disease at outside hospital. 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 Patient also reportedly aspirated on induction in the OR prior to this, resulting in severe pulmonary edema and hypoxia. Patient was transferred to [facility] urgently for higher level of care. She arrived as a direct SICU admit on near maximal vent settings and requiring low dose vasopressors with HR in the upper 140s. Her midline laparotomy is closed with 4 Navdeep drains in place draining bilious output.     Post-Op Info:  Procedure(s) (LRB):  REMOVAL, CATHETER, CENTRAL VENOUS, TUNNELED (Left)  CLOSURE, WOUND (N/A)   11 Days Post-Op     Interval History:   NAEON.  Doing well  Waiting on bed availability    Medications:  Continuous Infusions:   TPN ADULT CENTRAL LINE CUSTOM 45 mL/hr at 09/21/20 2104     Scheduled Meds:   acetaminophen  650 mg Per J Tube Q6H    amLODIPine benzoate  5 mg Per J Tube Daily    cloNIDine 0.3 mg/24 hr td ptwk  1 patch Transdermal Q7 Days    heparin (porcine)  5,000 Units Subcutaneous Q8H    lidocaine  1 patch Transdermal Q24H    lipid (SMOFLIPID)  250 mL Intravenous Daily    metoprolol  25 mg Per J Tube BID    miconazole NITRATE 2 %   Topical (Top) BID    pantoprazole  40 mg Intravenous Daily    sodium chloride 0.9%  10 mL Intravenous Q6H     PRN Meds:sodium chloride, sodium chloride, albuterol-ipratropium, diphenhydrAMINE, guaifenesin 100 mg/5 ml, hydrALAZINE, HYDROmorphone, iohexol, labetaloL, melatonin, metoprolol, oxyCODONE, oxyCODONE, Flushing PICC Protocol **AND** sodium chloride 0.9% **AND** sodium chloride 0.9%, Pharmacy to dose Vancomycin consult **AND** vancomycin - pharmacy to dose     Review of patient's allergies indicates:   Allergen Reactions    Dilaudid  [hydromorphone] Anaphylaxis and Rash    Latex      Objective:     Vital Signs (Most Recent):  Temp: 98.2 °F (36.8 °C) (09/22/20 0250)  Pulse: 87 (09/22/20 0340)  Resp: 14 (09/22/20 0340)  BP: (!) 140/80 (09/22/20 0250)  SpO2: 98 % (09/22/20 0340) Vital Signs (24h Range):  Temp:  [97.4 °F (36.3 °C)-98.6 °F (37 °C)] 98.2 °F (36.8 °C)  Pulse:  [87-95] 87  Resp:  [14-18] 14  SpO2:  [97 %-100 %] 98 %  BP: (117-140)/(69-81) 140/80     Weight: 67.5 kg (148 lb 13 oz)  Body mass index is 27.22 kg/m².    Intake/Output - Last 3 Shifts       09/20 0700 - 09/21 0659 09/21 0700 - 09/22 0659    P.O. 690 100    IV Piggyback 400     TPN  700    Total Intake(mL/kg) 1090 (16.1) 800 (11.9)    Urine (mL/kg/hr) 1603 (1) 500 (0.3)    Drains 0 10    Stool 1     Total Output 1604 510    Net -514 +290          Urine Occurrence  4 x    Stool Occurrence 1 x           Physical Exam  Constitutional:       General: She is not in acute distress.     Appearance: She is well-developed.   Cardiovascular:      Rate and Rhythm: Normal rate and regular rhythm.   Pulmonary:      Effort: Pulmonary effort is normal. No respiratory distress.   Abdominal:      General: There is no distension.      Palpations: Abdomen is soft.      Tenderness: There is no abdominal tenderness.      Comments: Drains with minimal output   Skin:     General: Skin is warm and dry.   Neurological:      Mental Status: She is alert and oriented to person, place, and time.   Psychiatric:         Behavior: Behavior normal.         Significant Labs:  CBC:   Recent Labs   Lab 09/21/20  0537   WBC 18.13*   RBC 3.01*   HGB 8.7*   HCT 29.2*   *   MCV 97   MCH 28.9   MCHC 29.8*     CMP:   Recent Labs   Lab 09/21/20  0537      CALCIUM 9.0   ALBUMIN 1.9*   PROT 7.9   *   K 4.6   CO2 23   CL 99   BUN 22*   CREATININE 0.9   ALKPHOS 175*   ALT 13   AST 21   BILITOT 0.2       Significant Diagnostics:  I have reviewed all pertinent imaging results/findings within the past 24  hours.    Assessment/Plan:     * Duodenum disorder  40 yo F s/p antrectomy with BII reconstruction c/b duodenal necrosis requiring ex lap, washout, drainage c/b aspiration event. S/p duodenal resection with d-j and g-j anastomosis on 8/13.    - TPN   - High protein diet with Boost  - GI and DVT ppx  - PT / OT  - continue PRNs for BP control    Dispo: Transfer back to [place] pending set up. Will follow up with CM/SW. Discuss possibility of discharging to home          Collette Ferreira MD  General Surgery  [facility]

## 2020-09-23 LAB
ALBUMIN SERPL BCP-MCNC: 2 G/DL (ref 3.5–5.2)
ALP SERPL-CCNC: 174 U/L (ref 55–135)
ALT SERPL W/O P-5'-P-CCNC: 14 U/L (ref 10–44)
ANION GAP SERPL CALC-SCNC: 12 MMOL/L (ref 8–16)
ANISOCYTOSIS BLD QL SMEAR: SLIGHT
AST SERPL-CCNC: 23 U/L (ref 10–40)
BASOPHILS # BLD AUTO: 0.11 K/UL (ref 0–0.2)
BASOPHILS NFR BLD: 0.5 % (ref 0–1.9)
BILIRUB SERPL-MCNC: 0.3 MG/DL (ref 0.1–1)
BUN SERPL-MCNC: 23 MG/DL (ref 6–20)
CALCIUM SERPL-MCNC: 9.4 MG/DL (ref 8.7–10.5)
CHLORIDE SERPL-SCNC: 100 MMOL/L (ref 95–110)
CO2 SERPL-SCNC: 23 MMOL/L (ref 23–29)
CREAT SERPL-MCNC: 0.9 MG/DL (ref 0.5–1.4)
DIFFERENTIAL METHOD: ABNORMAL
EOSINOPHIL # BLD AUTO: 0.3 K/UL (ref 0–0.5)
EOSINOPHIL NFR BLD: 1.3 % (ref 0–8)
ERYTHROCYTE [DISTWIDTH] IN BLOOD BY AUTOMATED COUNT: 14.9 % (ref 11.5–14.5)
EST. GFR  (AFRICAN AMERICAN): >60 ML/MIN/1.73 M^2
EST. GFR  (NON AFRICAN AMERICAN): >60 ML/MIN/1.73 M^2
GLUCOSE SERPL-MCNC: 89 MG/DL (ref 70–110)
HCT VFR BLD AUTO: 27.8 % (ref 37–48.5)
HGB BLD-MCNC: 8.5 G/DL (ref 12–16)
IMM GRANULOCYTES # BLD AUTO: 0.18 K/UL (ref 0–0.04)
IMM GRANULOCYTES NFR BLD AUTO: 0.8 % (ref 0–0.5)
LYMPHOCYTES # BLD AUTO: 5 K/UL (ref 1–4.8)
LYMPHOCYTES NFR BLD: 22.1 % (ref 18–48)
MAGNESIUM SERPL-MCNC: 1.7 MG/DL (ref 1.6–2.6)
MCH RBC QN AUTO: 28.8 PG (ref 27–31)
MCHC RBC AUTO-ENTMCNC: 30.6 G/DL (ref 32–36)
MCV RBC AUTO: 94 FL (ref 82–98)
MONOCYTES # BLD AUTO: 1.2 K/UL (ref 0.3–1)
MONOCYTES NFR BLD: 5.5 % (ref 4–15)
NEUTROPHILS # BLD AUTO: 15.7 K/UL (ref 1.8–7.7)
NEUTROPHILS NFR BLD: 69.8 % (ref 38–73)
NRBC BLD-RTO: 0 /100 WBC
PHOSPHATE SERPL-MCNC: 6.5 MG/DL (ref 2.7–4.5)
PLATELET # BLD AUTO: 459 K/UL (ref 150–350)
PLATELET BLD QL SMEAR: ABNORMAL
PMV BLD AUTO: 9.6 FL (ref 9.2–12.9)
POCT GLUCOSE: 79 MG/DL (ref 70–110)
POTASSIUM SERPL-SCNC: 4.8 MMOL/L (ref 3.5–5.1)
PREALB SERPL-MCNC: 21 MG/DL (ref 20–43)
PROT SERPL-MCNC: 8.1 G/DL (ref 6–8.4)
RBC # BLD AUTO: 2.95 M/UL (ref 4–5.4)
SODIUM SERPL-SCNC: 135 MMOL/L (ref 136–145)
WBC # BLD AUTO: 22.49 K/UL (ref 3.9–12.7)

## 2020-09-23 PROCEDURE — 25000003 PHARM REV CODE 250: Performed by: STUDENT IN AN ORGANIZED HEALTH CARE EDUCATION/TRAINING PROGRAM

## 2020-09-23 PROCEDURE — 83735 ASSAY OF MAGNESIUM: CPT

## 2020-09-23 PROCEDURE — 80053 COMPREHEN METABOLIC PANEL: CPT

## 2020-09-23 PROCEDURE — A4216 STERILE WATER/SALINE, 10 ML: HCPCS | Performed by: SURGERY

## 2020-09-23 PROCEDURE — 97116 GAIT TRAINING THERAPY: CPT | Mod: CQ

## 2020-09-23 PROCEDURE — 63600175 PHARM REV CODE 636 W HCPCS: Performed by: STUDENT IN AN ORGANIZED HEALTH CARE EDUCATION/TRAINING PROGRAM

## 2020-09-23 PROCEDURE — 97530 THERAPEUTIC ACTIVITIES: CPT | Mod: CQ

## 2020-09-23 PROCEDURE — 25000242 PHARM REV CODE 250 ALT 637 W/ HCPCS: Performed by: STUDENT IN AN ORGANIZED HEALTH CARE EDUCATION/TRAINING PROGRAM

## 2020-09-23 PROCEDURE — 25000003 PHARM REV CODE 250: Performed by: SURGERY

## 2020-09-23 PROCEDURE — C9113 INJ PANTOPRAZOLE SODIUM, VIA: HCPCS | Performed by: STUDENT IN AN ORGANIZED HEALTH CARE EDUCATION/TRAINING PROGRAM

## 2020-09-23 PROCEDURE — 97535 SELF CARE MNGMENT TRAINING: CPT

## 2020-09-23 PROCEDURE — 84100 ASSAY OF PHOSPHORUS: CPT

## 2020-09-23 PROCEDURE — 84134 ASSAY OF PREALBUMIN: CPT

## 2020-09-23 PROCEDURE — 85025 COMPLETE CBC W/AUTO DIFF WBC: CPT

## 2020-09-23 PROCEDURE — 20600001 HC STEP DOWN PRIVATE ROOM

## 2020-09-23 RX ORDER — LANOLIN ALCOHOL/MO/W.PET/CERES
800 CREAM (GRAM) TOPICAL ONCE
Status: COMPLETED | OUTPATIENT
Start: 2020-09-23 | End: 2020-09-23

## 2020-09-23 RX ADMIN — ACETAMINOPHEN 650 MG: 160 SOLUTION ORAL at 01:09

## 2020-09-23 RX ADMIN — OXYCODONE HYDROCHLORIDE 15 MG: 5 SOLUTION ORAL at 10:09

## 2020-09-23 RX ADMIN — Medication 800 MG: at 10:09

## 2020-09-23 RX ADMIN — ACETAMINOPHEN 650 MG: 160 SOLUTION ORAL at 12:09

## 2020-09-23 RX ADMIN — Medication 10 ML: at 12:09

## 2020-09-23 RX ADMIN — ACETAMINOPHEN 650 MG: 160 SOLUTION ORAL at 06:09

## 2020-09-23 RX ADMIN — METOPROLOL TARTRATE 25 MG: 25 TABLET ORAL at 10:09

## 2020-09-23 RX ADMIN — METOPROLOL TARTRATE 25 MG: 25 TABLET ORAL at 09:09

## 2020-09-23 RX ADMIN — Medication 10 ML: at 06:09

## 2020-09-23 RX ADMIN — HEPARIN SODIUM 5000 UNITS: 5000 INJECTION INTRAVENOUS; SUBCUTANEOUS at 01:09

## 2020-09-23 RX ADMIN — PANTOPRAZOLE SODIUM 40 MG: 40 INJECTION, POWDER, LYOPHILIZED, FOR SOLUTION INTRAVENOUS at 10:09

## 2020-09-23 RX ADMIN — Medication 3 MG: at 09:09

## 2020-09-23 RX ADMIN — HEPARIN SODIUM 5000 UNITS: 5000 INJECTION INTRAVENOUS; SUBCUTANEOUS at 06:09

## 2020-09-23 RX ADMIN — OXYCODONE HYDROCHLORIDE 15 MG: 5 SOLUTION ORAL at 06:09

## 2020-09-23 RX ADMIN — AMLODIPINE 5 MG: 1 SUSPENSION ORAL at 10:09

## 2020-09-23 RX ADMIN — HEPARIN SODIUM 5000 UNITS: 5000 INJECTION INTRAVENOUS; SUBCUTANEOUS at 09:09

## 2020-09-23 RX ADMIN — OXYCODONE HYDROCHLORIDE 15 MG: 5 SOLUTION ORAL at 03:09

## 2020-09-23 RX ADMIN — CLONIDINE 1 PATCH: 0.3 PATCH TRANSDERMAL at 01:09

## 2020-09-23 NOTE — PT/OT/SLP PROGRESS
Occupational Therapy   Treatment    Name: Jaquan Farmer  MRN: [id number]  Admitting Diagnosis:  Duodenum disorder  12 Days Post-Op   Procedure(s):  REMOVAL, CATHETER, CENTRAL VENOUS, TUNNELED  CLOSURE, WOUND     Recommendations:     Discharge Recommendations: home health OT  Discharge Equipment Recommendations:  bedside commode, shower chair  Barriers to discharge:  None    Assessment:     Jaquan Farmer is a 39 y.o. female with a medical diagnosis of Duodenum disorder.  She presents with good effort and agreeable to participate with ADLs. Performance deficits affecting function are weakness, impaired endurance, impaired self care skills, impaired functional mobilty, gait instability, impaired balance, impaired skin, impaired cardiopulmonary response to activity.     Rehab Prognosis:  Good; patient would benefit from acute skilled OT services to address these deficits and reach maximum level of function.       Plan:     Patient to be seen 3 x/week to address the above listed problems via self-care/home management, therapeutic activities, therapeutic exercises  · Plan of Care Expires: 10/08/20  · Plan of Care Reviewed with: patient    Subjective     Pain/Comfort:  · Pain Rating 1: 0/10    Objective:     Communicated with: RN prior to session.  Patient found HOB elevated with GODFREY drain, telemetry, PEG Tube upon OT entry to room.    General Precautions: Standard, fall   Orthopedic Precautions:N/A   Braces: N/A     Occupational Performance:     Bed Mobility:    · Patient completed Supine to Sit to R side EOB with supervision  · Patient completed Scooting anteriorly to EOB for foot placement on floor with supervision  · Patient completed Sit to Supine with minimum assistance for BLE    Functional Mobility/Transfers:  · Patient completed Sit <> Stand Transfer with supervision with no assistive device   · Patient completed Toilet Transfer onto the toilet in the restroom using Step Transfer technique with supervision  with no AD  · Functional Mobility: ~20' x2 with SUP and no AD    Activities of Daily Living:  · Grooming: independence Patient participated in oral care, hand hygiene, and face wash while standing at the sink in the restroom independently.  · Upper Body Dressing: stand by assistance Patient donned/doffed hospital gown while sitting EOB with SBA.  · Toileting: independence Patient completed toileting on the toilet in the restroom independently.    [facility] 6 Click ADL: 22    Treatment & Education:  Role of OT/POC  Call button for assistance    Patient left HOB elevated with all lines intact, call button in reach and RN notifiedEducation:      GOALS:   Multidisciplinary Problems     Occupational Therapy Goals        Problem: Occupational Therapy Goal    Goal Priority Disciplines Outcome Interventions   Occupational Therapy Goal     OT, PT/OT Ongoing, Progressing    Description: Goals to be met by: 9/23/20     Patient will increase functional independence with ADLs by performing:    Feeding with [place].  UE Dressing with Stand-by Assistance.  LE Dressing with Minimal Assistance.  Grooming while standing at sink with Contact Guard Assistance.- MET 9/14  Updated 9/14:  Toileting from toilet with stand-by Assistance for hygiene and clothing management.   Toilet transfer to toilet with stand-by Assistance.                     Time Tracking:     OT Date of Treatment: 09/23/20  OT Start Time: 1506  OT Stop Time: 1520  OT Total Time (min): 14 min    Billable Minutes:Self Care/Home Management 14 minutes    Diane Willingham OT  9/23/2020

## 2020-09-23 NOTE — PLAN OF CARE
Plan of care reviewed with pt. Verbalized understanding. Pt AAOx4. VS stable on RA. Pain managed with PRN medicine.     ML w. abd pad is dry and intact. R abd GODFREY x2 are intact and healing w/ minimal output.     Pt ambulates independently to BSC, adequate output. Pt tolerating bariatric high protein liquid diet. No complaints of nausea. Frequent rounds made for pt safety. Bed low and locked, call light in reach. WCTM

## 2020-09-23 NOTE — PT/OT/SLP PROGRESS
Physical Therapy Treatment    Patient Name:  Jaquan Farmer   MRN:  [id number]    Recommendations:     Discharge Recommendations:  rehabilitation facility   Discharge Equipment Recommendations: bedside commode, shower chair   Barriers to discharge: decreased functional mobility    Assessment:     Jaquan Farmer is a 39 y.o. female admitted with a medical diagnosis of Duodenum disorder.  She presents with the following impairments/functional limitations:  weakness, impaired endurance, impaired self care skills, impaired functional mobilty, gait instability, impaired balance, decreased safety awareness, pain, impaired cardiopulmonary response to activity.    Rehab Prognosis: Good; patient would benefit from acute skilled PT services to address these deficits and reach maximum level of function.    Recent Surgery: Procedure(s) (LRB):  REMOVAL, CATHETER, CENTRAL VENOUS, TUNNELED (Left)  CLOSURE, WOUND (N/A) 12 Days Post-Op    Plan:     During this hospitalization, patient to be seen 3 x/week to address the identified rehab impairments via gait training, therapeutic activities, therapeutic exercises, neuromuscular re-education and progress toward the following goals:    · Plan of Care Expires:  10/01/20    Subjective     Chief Complaint: no c/o  Pain/Comfort:  · Pain Rating 1: 0/10  · Pain Rating Post-Intervention 1: 0/10      Objective:     Communicated with NSG prior to session.  Patient found HOB elevated with telemetry, GODFREY drain upon PTA entry to room.     General Precautions: Standard, fall   Orthopedic Precautions:N/A   Braces: N/A     Functional Mobility:  · Bed Mobility:     · Supine to Sit: modified independence  · Sit to Supine: modified independence  · Transfers:     · Sit to Stand:  independence with no AD  · Toilet Transfer: independence with  no AD  using  Step Transfer  · Gait: Pt ambulates over 500 ft with no AD and independence. Pt limited by dyspnea on exertion and requires 2 standing rests during  gait trial. Pt otherwise safe and stable throughout. Slow randell.       AM-PAC 6 CLICK MOBILITY  Turning over in bed (including adjusting bedclothes, sheets and blankets)?: 4  Sitting down on and standing up from a chair with arms (e.g., wheelchair, bedside commode, etc.): 4  Moving from lying on back to sitting on the side of the bed?: 4  Moving to and from a bed to a chair (including a wheelchair)?: 4  Need to walk in hospital room?: 4  Climbing 3-5 steps with a railing?: 3  Basic Mobility Total Score: 23       Patient left HOB elevated with all lines intact and call button in reach.    GOALS:   Multidisciplinary Problems     Physical Therapy Goals        Problem: Physical Therapy Goal    Goal Priority Disciplines Outcome Goal Variances Interventions   Physical Therapy Goal     PT, PT/OT Ongoing, Progressing     Description: Goals to be met by: 10/5/2020     Patient will increase functional independence with mobility by performin. Supine to sit with MInimal Assistance - met ()  2. Sit to stand transfer with Minimal Assistance with LRAD - met ()  3. Gait  x 50 feet with Minimal Assistance using LRAD. - met ()  REVISED: 150ft without AD Supervision  4. Stand for 3 minutes with Contact Guard Assistance using LRAD - met ()  5. Lower extremity exercise program x15 reps per handout, with independence                     Time Tracking:     PT Received On: 20  PT Start Time: 846     PT Stop Time: 913  PT Total Time (min): 27 min     Billable Minutes: Gait Training 17 and Therapeutic Activity 10    Treatment Type: Treatment  PT/PTA: PTA     PTA Visit Number: 1     Frank Bruno, EDEN  2020

## 2020-09-23 NOTE — ASSESSMENT & PLAN NOTE
38 yo F s/p antrectomy with BII reconstruction c/b duodenal necrosis requiring ex lap, washout, drainage c/b aspiration event. S/p duodenal resection with d-j and g-j anastomosis on 8/13.    - TPN discontinued.   - Advanced diet to FLD + Boost  - GI and DVT ppx  - PT / OT  - continue PRNs for BP control    Dispo: she has adequate family support, now planning to send home with home health.

## 2020-09-23 NOTE — SUBJECTIVE & OBJECTIVE
Interval History:   NAEON.  Doing well  TPN and antibiotics discontinued on yesterday  Now planning to send home w/ HH once stable    Medications:  Continuous Infusions:    Scheduled Meds:   acetaminophen  650 mg Oral Q6H    amLODIPine benzoate  5 mg Oral Daily    cloNIDine 0.3 mg/24 hr td ptwk  1 patch Transdermal Q7 Days    heparin (porcine)  5,000 Units Subcutaneous Q8H    lidocaine  1 patch Transdermal Q24H    metoprolol  25 mg Oral BID    miconazole NITRATE 2 %   Topical (Top) BID    pantoprazole  40 mg Intravenous Daily    sodium chloride 0.9%  10 mL Intravenous Q6H     PRN Meds:sodium chloride, sodium chloride, albuterol-ipratropium, diphenhydrAMINE, guaifenesin 100 mg/5 ml, hydrALAZINE, HYDROmorphone, iohexol, labetaloL, melatonin, metoprolol, oxyCODONE, oxyCODONE, Flushing PICC Protocol **AND** sodium chloride 0.9% **AND** sodium chloride 0.9%     Review of patient's allergies indicates:   Allergen Reactions    Dilaudid [hydromorphone] Anaphylaxis and Rash    Latex      Objective:     Vital Signs (Most Recent):  Temp: 98.7 °F (37.1 °C) (09/23/20 0458)  Pulse: 86 (09/23/20 0458)  Resp: 20 (09/23/20 0458)  BP: 132/76 (09/23/20 0458)  SpO2: 100 % (09/23/20 0458) Vital Signs (24h Range):  Temp:  [96.2 °F (35.7 °C)-98.7 °F (37.1 °C)] 98.7 °F (37.1 °C)  Pulse:  [81-98] 86  Resp:  [16-73] 20  SpO2:  [98 %-100 %] 100 %  BP: (122-133)/(74-81) 132/76     Weight: 67.5 kg (148 lb 13 oz)  Body mass index is 27.22 kg/m².    Intake/Output - Last 3 Shifts       09/21 0700 - 09/22 0659 09/22 0700 - 09/23 0659    P.O. 100 200         Total Intake(mL/kg) 800 (11.9) 200 (3)    Urine (mL/kg/hr) 500 (0.3)     Drains 10     Total Output 510     Net +290 +200          Urine Occurrence 4 x 3 x    Stool Occurrence  0 x          Physical Exam  Constitutional:       General: She is not in acute distress.     Appearance: She is well-developed.   HENT:      Head: Normocephalic and atraumatic.   Cardiovascular:       Rate and Rhythm: Normal rate and regular rhythm.   Pulmonary:      Effort: Pulmonary effort is normal. No respiratory distress.   Abdominal:      General: There is no distension.      Palpations: Abdomen is soft.      Tenderness: There is no abdominal tenderness.      Comments: Drains with minimal output   Skin:     General: Skin is warm and dry.   Neurological:      General: No focal deficit present.      Mental Status: She is alert and oriented to person, place, and time.   Psychiatric:         Mood and Affect: Mood normal.         Behavior: Behavior normal.         Significant Labs:  CBC:   Recent Labs   Lab 09/23/20  0400   WBC 22.49*   RBC 2.95*   HGB 8.5*   HCT 27.8*   *   MCV 94   MCH 28.8   MCHC 30.6*     CMP:   Recent Labs   Lab 09/22/20  0633 09/23/20  0400    89   CALCIUM 9.1 9.4   ALBUMIN 1.9* 2.0*   PROT 7.6 8.1   * 135*   K 4.7 4.8   CO2 25 23    100   BUN 20 23*   CREATININE 0.9  --    ALKPHOS 162* 174*   ALT 13 14   AST 16 23   BILITOT 0.2 0.3       Significant Diagnostics:  I have reviewed all pertinent imaging results/findings within the past 24 hours.

## 2020-09-23 NOTE — PROGRESS NOTES
[facility]  General Surgery  Progress Note    Subjective:     History of Present Illness:  Pt is a 38 yo F with recent history of antrectomy with BII reconstruction for ulcer disease at outside hospital. 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 Patient also reportedly aspirated on induction in the OR prior to this, resulting in severe pulmonary edema and hypoxia. [place] [facility] [place]. She arrived as a direct SICU admit on near maximal vent settings and requiring low dose vasopressors with HR in the upper 140s. Her midline laparotomy is closed with 4 Navdeep drains in place draining bilious output.     Post-Op Info:  Procedure(s) (LRB):  REMOVAL, CATHETER, CENTRAL VENOUS, TUNNELED (Left)  CLOSURE, WOUND (N/A)   12 Days Post-Op     Interval History:   NAEON.  Doing well  TPN and antibiotics discontinued on yesterday  Now planning to send home w/ HH once stable    Medications:  Continuous Infusions:    Scheduled Meds:   acetaminophen  650 mg Oral Q6H    amLODIPine benzoate  5 mg Oral Daily    cloNIDine 0.3 mg/24 hr td ptwk  1 patch Transdermal Q7 Days    heparin (porcine)  5,000 Units Subcutaneous Q8H    lidocaine  1 patch Transdermal Q24H    metoprolol  25 mg Oral BID    miconazole NITRATE 2 %   Topical (Top) BID    pantoprazole  40 mg Intravenous Daily    sodium chloride 0.9%  10 mL Intravenous Q6H     PRN Meds:sodium chloride, sodium chloride, albuterol-ipratropium, diphenhydrAMINE, guaifenesin 100 mg/5 ml, hydrALAZINE, HYDROmorphone, iohexol, labetaloL, melatonin, metoprolol, oxyCODONE, oxyCODONE, Flushing PICC Protocol **AND** sodium chloride 0.9% **AND** sodium chloride 0.9%     Review of patient's allergies indicates:   Allergen Reactions    Dilaudid [hydromorphone] Anaphylaxis and Rash    Latex      Objective:     Vital Signs (Most Recent):  Temp: 98.7 °F (37.1 °C) (09/23/20 0458)  Pulse:  86 (09/23/20 0458)  Resp: 20 (09/23/20 0458)  BP: 132/76 (09/23/20 0458)  SpO2: 100 % (09/23/20 0458) Vital Signs (24h Range):  Temp:  [96.2 °F (35.7 °C)-98.7 °F (37.1 °C)] 98.7 °F (37.1 °C)  Pulse:  [81-98] 86  Resp:  [16-73] 20  SpO2:  [98 %-100 %] 100 %  BP: (122-133)/(74-81) 132/76     Weight: 67.5 kg (148 lb 13 oz)  Body mass index is 27.22 kg/m².    Intake/Output - Last 3 Shifts       09/21 0700 - 09/22 0659 09/22 0700 - 09/23 0659    P.O. 100 200         Total Intake(mL/kg) 800 (11.9) 200 (3)    Urine (mL/kg/hr) 500 (0.3)     Drains 10     Total Output 510     Net +290 +200          Urine Occurrence 4 x 3 x    Stool Occurrence  0 x          Physical Exam  Constitutional:       General: She is not in acute distress.     Appearance: She is well-developed.   HENT:      Head: Normocephalic and atraumatic.   Cardiovascular:      Rate and Rhythm: Normal rate and regular rhythm.   Pulmonary:      Effort: Pulmonary effort is normal. No respiratory distress.   Abdominal:      General: There is no distension.      Palpations: Abdomen is soft.      Tenderness: There is no abdominal tenderness.      Comments: Drains with minimal output   Skin:     General: Skin is warm and dry.   Neurological:      General: No focal deficit present.      Mental Status: She is alert and oriented to person, place, and time.   Psychiatric:         Mood and Affect: Mood normal.         Behavior: Behavior normal.         Significant Labs:  CBC:   Recent Labs   Lab 09/23/20  0400   WBC 22.49*   RBC 2.95*   HGB 8.5*   HCT 27.8*   *   MCV 94   MCH 28.8   MCHC 30.6*     CMP:   Recent Labs   Lab 09/22/20  0633 09/23/20  0400    89   CALCIUM 9.1 9.4   ALBUMIN 1.9* 2.0*   PROT 7.6 8.1   * 135*   K 4.7 4.8   CO2 25 23    100   BUN 20 23*   CREATININE 0.9  --    ALKPHOS 162* 174*   ALT 13 14   AST 16 23   BILITOT 0.2 0.3       Significant Diagnostics:  I have reviewed all pertinent imaging results/findings within the  past 24 hours.    Assessment/Plan:     * Duodenum disorder  38 yo F s/p antrectomy with BII reconstruction c/b duodenal necrosis requiring ex lap, washout, drainage c/b aspiration event. S/p duodenal resection with d-j and g-j anastomosis on 8/13.    - TPN discontinued.   - Advanced diet to FLD + Boost  - GI and DVT ppx  - PT / OT  - continue PRNs for BP control    Dispo: she has adequate family support, now planning to send home with home health.        Collette Ferreira MD  General Surgery  [facility]

## 2020-09-24 LAB
ALBUMIN SERPL BCP-MCNC: 1.9 G/DL (ref 3.5–5.2)
ALP SERPL-CCNC: 167 U/L (ref 55–135)
ALT SERPL W/O P-5'-P-CCNC: 16 U/L (ref 10–44)
ANION GAP SERPL CALC-SCNC: 14 MMOL/L (ref 8–16)
AST SERPL-CCNC: 24 U/L (ref 10–40)
BASOPHILS # BLD AUTO: 0.09 K/UL (ref 0–0.2)
BASOPHILS NFR BLD: 0.4 % (ref 0–1.9)
BILIRUB SERPL-MCNC: 0.3 MG/DL (ref 0.1–1)
BUN SERPL-MCNC: 16 MG/DL (ref 6–20)
CALCIUM SERPL-MCNC: 9.1 MG/DL (ref 8.7–10.5)
CHLORIDE SERPL-SCNC: 100 MMOL/L (ref 95–110)
CO2 SERPL-SCNC: 20 MMOL/L (ref 23–29)
CREAT SERPL-MCNC: 0.9 MG/DL (ref 0.5–1.4)
DIFFERENTIAL METHOD: ABNORMAL
EOSINOPHIL # BLD AUTO: 0.3 K/UL (ref 0–0.5)
EOSINOPHIL NFR BLD: 1.2 % (ref 0–8)
ERYTHROCYTE [DISTWIDTH] IN BLOOD BY AUTOMATED COUNT: 14.7 % (ref 11.5–14.5)
EST. GFR  (AFRICAN AMERICAN): >60 ML/MIN/1.73 M^2
EST. GFR  (NON AFRICAN AMERICAN): >60 ML/MIN/1.73 M^2
GLUCOSE SERPL-MCNC: 76 MG/DL (ref 70–110)
HCT VFR BLD AUTO: 27.2 % (ref 37–48.5)
HGB BLD-MCNC: 8.3 G/DL (ref 12–16)
IMM GRANULOCYTES # BLD AUTO: 0.15 K/UL (ref 0–0.04)
IMM GRANULOCYTES NFR BLD AUTO: 0.7 % (ref 0–0.5)
LYMPHOCYTES # BLD AUTO: 4.4 K/UL (ref 1–4.8)
LYMPHOCYTES NFR BLD: 20.4 % (ref 18–48)
MAGNESIUM SERPL-MCNC: 1.5 MG/DL (ref 1.6–2.6)
MCH RBC QN AUTO: 29.2 PG (ref 27–31)
MCHC RBC AUTO-ENTMCNC: 30.5 G/DL (ref 32–36)
MCV RBC AUTO: 96 FL (ref 82–98)
MONOCYTES # BLD AUTO: 1.3 K/UL (ref 0.3–1)
MONOCYTES NFR BLD: 5.8 % (ref 4–15)
NEUTROPHILS # BLD AUTO: 15.5 K/UL (ref 1.8–7.7)
NEUTROPHILS NFR BLD: 71.5 % (ref 38–73)
NRBC BLD-RTO: 0 /100 WBC
PHOSPHATE SERPL-MCNC: 6.9 MG/DL (ref 2.7–4.5)
PLATELET # BLD AUTO: 449 K/UL (ref 150–350)
PMV BLD AUTO: 9.3 FL (ref 9.2–12.9)
POTASSIUM SERPL-SCNC: 4.4 MMOL/L (ref 3.5–5.1)
PROT SERPL-MCNC: 7.6 G/DL (ref 6–8.4)
RBC # BLD AUTO: 2.84 M/UL (ref 4–5.4)
SODIUM SERPL-SCNC: 134 MMOL/L (ref 136–145)
TRIGL SERPL-MCNC: 175 MG/DL (ref 30–150)
WBC # BLD AUTO: 21.63 K/UL (ref 3.9–12.7)

## 2020-09-24 PROCEDURE — 25500020 PHARM REV CODE 255: Performed by: SURGERY

## 2020-09-24 PROCEDURE — 83735 ASSAY OF MAGNESIUM: CPT

## 2020-09-24 PROCEDURE — 84100 ASSAY OF PHOSPHORUS: CPT

## 2020-09-24 PROCEDURE — 85025 COMPLETE CBC W/AUTO DIFF WBC: CPT

## 2020-09-24 PROCEDURE — 25000003 PHARM REV CODE 250: Performed by: STUDENT IN AN ORGANIZED HEALTH CARE EDUCATION/TRAINING PROGRAM

## 2020-09-24 PROCEDURE — A4216 STERILE WATER/SALINE, 10 ML: HCPCS | Performed by: SURGERY

## 2020-09-24 PROCEDURE — 84478 ASSAY OF TRIGLYCERIDES: CPT

## 2020-09-24 PROCEDURE — 80053 COMPREHEN METABOLIC PANEL: CPT

## 2020-09-24 PROCEDURE — 63600175 PHARM REV CODE 636 W HCPCS: Performed by: STUDENT IN AN ORGANIZED HEALTH CARE EDUCATION/TRAINING PROGRAM

## 2020-09-24 PROCEDURE — 20600001 HC STEP DOWN PRIVATE ROOM

## 2020-09-24 PROCEDURE — C9113 INJ PANTOPRAZOLE SODIUM, VIA: HCPCS | Performed by: STUDENT IN AN ORGANIZED HEALTH CARE EDUCATION/TRAINING PROGRAM

## 2020-09-24 PROCEDURE — 25000242 PHARM REV CODE 250 ALT 637 W/ HCPCS: Performed by: STUDENT IN AN ORGANIZED HEALTH CARE EDUCATION/TRAINING PROGRAM

## 2020-09-24 PROCEDURE — 25000003 PHARM REV CODE 250: Performed by: SURGERY

## 2020-09-24 PROCEDURE — 25500020 PHARM REV CODE 255: Performed by: STUDENT IN AN ORGANIZED HEALTH CARE EDUCATION/TRAINING PROGRAM

## 2020-09-24 RX ADMIN — ACETAMINOPHEN 650 MG: 160 SOLUTION ORAL at 06:09

## 2020-09-24 RX ADMIN — OXYCODONE HYDROCHLORIDE 15 MG: 5 SOLUTION ORAL at 10:09

## 2020-09-24 RX ADMIN — HEPARIN SODIUM 5000 UNITS: 5000 INJECTION INTRAVENOUS; SUBCUTANEOUS at 01:09

## 2020-09-24 RX ADMIN — Medication 10 ML: at 12:09

## 2020-09-24 RX ADMIN — OXYCODONE HYDROCHLORIDE 15 MG: 5 SOLUTION ORAL at 04:09

## 2020-09-24 RX ADMIN — HEPARIN SODIUM 5000 UNITS: 5000 INJECTION INTRAVENOUS; SUBCUTANEOUS at 09:09

## 2020-09-24 RX ADMIN — ACETAMINOPHEN 650 MG: 160 SOLUTION ORAL at 01:09

## 2020-09-24 RX ADMIN — Medication 10 ML: at 06:09

## 2020-09-24 RX ADMIN — HEPARIN SODIUM 5000 UNITS: 5000 INJECTION INTRAVENOUS; SUBCUTANEOUS at 06:09

## 2020-09-24 RX ADMIN — Medication 10 ML: at 01:09

## 2020-09-24 RX ADMIN — OXYCODONE HYDROCHLORIDE 15 MG: 5 SOLUTION ORAL at 09:09

## 2020-09-24 RX ADMIN — PANTOPRAZOLE SODIUM 40 MG: 40 INJECTION, POWDER, LYOPHILIZED, FOR SOLUTION INTRAVENOUS at 08:09

## 2020-09-24 RX ADMIN — METOPROLOL TARTRATE 25 MG: 25 TABLET ORAL at 08:09

## 2020-09-24 RX ADMIN — SODIUM PHOSPHATE, DIBASIC AND SODIUM PHOSPHATE, MONOBASIC 1 ENEMA: 7; 19 ENEMA RECTAL at 09:09

## 2020-09-24 RX ADMIN — METOPROLOL TARTRATE 25 MG: 25 TABLET ORAL at 09:09

## 2020-09-24 RX ADMIN — IOHEXOL 75 ML: 350 INJECTION, SOLUTION INTRAVENOUS at 12:09

## 2020-09-24 RX ADMIN — IOHEXOL 15 ML: 350 INJECTION, SOLUTION INTRAVENOUS at 10:09

## 2020-09-24 NOTE — CONSULTS
Please see H&P from same day.     Plan tentatively for CT-guided placement of drain into a pelvic fluid collection tomorrow.   Make NPO at midnight and hold anticoagulation.    Jessica Shetty, PGY-4

## 2020-09-24 NOTE — PROGRESS NOTES
[facility]  General Surgery  Progress Note    Subjective:     History of Present Illness:  Pt is a 40 yo F with recent history of antrectomy with BII reconstruction for ulcer disease at outside hospital. 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 Patient also reportedly aspirated on induction in the OR prior to this, resulting in severe pulmonary edema and hypoxia. Patient [place] [facility] [place] level of care. She arrived as a direct SICU admit on near maximal vent settings and requiring low dose vasopressors with HR in the upper 140s. Her midline laparotomy is closed with 4 Navdeep drains in place draining bilious output.     Post-Op Info:  Procedure(s) (LRB):  REMOVAL, CATHETER, CENTRAL VENOUS, TUNNELED (Left)  CLOSURE, WOUND (N/A)   13 Days Post-Op     Interval History: No acute events overnight.  Tolerating a full liquid diet, getting protein with boost breeze. Had BM yesterday. No fever/chills or nausea/vomiting. Persistent leukocytosis of 21.     Medications:  Continuous Infusions:  Scheduled Meds:   acetaminophen  650 mg Oral Q6H    amLODIPine benzoate  5 mg Oral Daily    cloNIDine 0.3 mg/24 hr td ptwk  1 patch Transdermal Q7 Days    heparin (porcine)  5,000 Units Subcutaneous Q8H    lidocaine  1 patch Transdermal Q24H    metoprolol  25 mg Oral BID    miconazole NITRATE 2 %   Topical (Top) BID    pantoprazole  40 mg Intravenous Daily    sodium chloride 0.9%  10 mL Intravenous Q6H     PRN Meds:albuterol-ipratropium, diphenhydrAMINE, guaifenesin 100 mg/5 ml, hydrALAZINE, iohexol, labetaloL, melatonin, oxyCODONE, oxyCODONE, Flushing PICC Protocol **AND** sodium chloride 0.9% **AND** sodium chloride 0.9%     Review of patient's allergies indicates:   Allergen Reactions    Dilaudid [hydromorphone] Anaphylaxis and Rash    Latex      Objective:     Vital Signs (Most Recent):  Temp: 98.6 °F (37 °C) (09/24/20  0800)  Pulse: 82 (09/24/20 0800)  Resp: 16 (09/24/20 0455)  BP: 138/77 (09/24/20 0800)  SpO2: 100 % (09/24/20 0800) Vital Signs (24h Range):  Temp:  [97.4 °F (36.3 °C)-99 °F (37.2 °C)] 98.6 °F (37 °C)  Pulse:  [82-92] 82  Resp:  [16-20] 16  SpO2:  [98 %-100 %] 100 %  BP: (131-140)/(76-83) 138/77     Weight: 67.5 kg (148 lb 13 oz)  Body mass index is 27.22 kg/m².    Intake/Output - Last 3 Shifts       09/22 0700 - 09/23 0659 09/23 0700 - 09/24 0659 09/24 0700 - 09/25 0659    P.O. 700 480 100    TPN       Total Intake(mL/kg) 700 (10.4) 480 (7.1) 100 (1.5)    Urine (mL/kg/hr)       Drains       Total Output       Net +700 +480 +100           Urine Occurrence 4 x 5 x     Stool Occurrence 0 x 0 x           Physical Exam  Constitutional:       General: She is not in acute distress.     Appearance: She is well-developed.   HENT:      Head: Normocephalic and atraumatic.   Cardiovascular:      Rate and Rhythm: Normal rate and regular rhythm.   Pulmonary:      Effort: Pulmonary effort is normal. No respiratory distress.   Abdominal:      General: There is no distension.      Palpations: Abdomen is soft.      Tenderness: There is no abdominal tenderness.      Comments: Drains with minimal output, light green and thick   Skin:     General: Skin is warm and dry.   Neurological:      General: No focal deficit present.      Mental Status: She is alert and oriented to person, place, and time.   Psychiatric:         Mood and Affect: Mood normal.         Behavior: Behavior normal.         Significant Labs:  CBC:   Recent Labs   Lab 09/24/20  0330   WBC 21.63*   RBC 2.84*   HGB 8.3*   HCT 27.2*   *   MCV 96   MCH 29.2   MCHC 30.5*     CMP:   Recent Labs   Lab 09/24/20  0330   GLU 76   CALCIUM 9.1   ALBUMIN 1.9*   PROT 7.6   *   K 4.4   CO2 20*      BUN 16   CREATININE 0.9   ALKPHOS 167*   ALT 16   AST 24   BILITOT 0.3       Significant Diagnostics:  I have reviewed all pertinent imaging results/findings within the  past 24 hours.    Assessment/Plan:     * Duodenum disorder  40 yo F s/p antrectomy with BII reconstruction c/b duodenal necrosis requiring ex lap, washout, drainage c/b aspiration event. S/p duodenal resection with d-j and g-j anastomosis on 8/13.    - CT scan today to evaluate fluid collections. If improved, will plan on slowly backing christine drains out over the next few days.  - Advanced diet to FLD + Boost  - GI and DVT ppx  - PT / OT  - continue PRNs for BP control    Dispo: she has adequate family support, now planning on sending home. Does not qualify for home health as she does not have insurance. May be ready by Sunday.         Verna Burris MD  General Surgery  [facility]

## 2020-09-24 NOTE — ASSESSMENT & PLAN NOTE
40 yo F s/p antrectomy with BII reconstruction c/b duodenal necrosis requiring ex lap, washout, drainage c/b aspiration event. S/p duodenal resection with d-j and g-j anastomosis on 8/13.    - CT scan today to evaluate fluid collections. If improved, will plan on slowly backing christine drains out over the next few days.  - Advanced diet to FLD + Boost  - GI and DVT ppx  - PT / OT  - continue PRNs for BP control    Dispo: she has adequate family support, now planning on sending home. Does not qualify for home health as she does not have insurance. May be ready by Sunday.

## 2020-09-24 NOTE — PLAN OF CARE
Pt awake and alert. Ambulating in room. 2-3+ edema noted on bilateral lower extremities. Tolerating diet well. Wcm.

## 2020-09-24 NOTE — SUBJECTIVE & OBJECTIVE
Interval History: No acute events overnight.  Tolerating a full liquid diet, getting protein with boost breeze. Had BM yesterday. No fever/chills or nausea/vomiting. Persistent leukocytosis of 21.     Medications:  Continuous Infusions:  Scheduled Meds:   acetaminophen  650 mg Oral Q6H    amLODIPine benzoate  5 mg Oral Daily    cloNIDine 0.3 mg/24 hr td ptwk  1 patch Transdermal Q7 Days    heparin (porcine)  5,000 Units Subcutaneous Q8H    lidocaine  1 patch Transdermal Q24H    metoprolol  25 mg Oral BID    miconazole NITRATE 2 %   Topical (Top) BID    pantoprazole  40 mg Intravenous Daily    sodium chloride 0.9%  10 mL Intravenous Q6H     PRN Meds:albuterol-ipratropium, diphenhydrAMINE, guaifenesin 100 mg/5 ml, hydrALAZINE, iohexol, labetaloL, melatonin, oxyCODONE, oxyCODONE, Flushing PICC Protocol **AND** sodium chloride 0.9% **AND** sodium chloride 0.9%     Review of patient's allergies indicates:   Allergen Reactions    Dilaudid [hydromorphone] Anaphylaxis and Rash    Latex      Objective:     Vital Signs (Most Recent):  Temp: 98.6 °F (37 °C) (09/24/20 0800)  Pulse: 82 (09/24/20 0800)  Resp: 16 (09/24/20 0455)  BP: 138/77 (09/24/20 0800)  SpO2: 100 % (09/24/20 0800) Vital Signs (24h Range):  Temp:  [97.4 °F (36.3 °C)-99 °F (37.2 °C)] 98.6 °F (37 °C)  Pulse:  [82-92] 82  Resp:  [16-20] 16  SpO2:  [98 %-100 %] 100 %  BP: (131-140)/(76-83) 138/77     Weight: 67.5 kg (148 lb 13 oz)  Body mass index is 27.22 kg/m².    Intake/Output - Last 3 Shifts       09/22 0700 - 09/23 0659 09/23 0700 - 09/24 0659 09/24 0700 - 09/25 0659    P.O. 700 480 100    TPN       Total Intake(mL/kg) 700 (10.4) 480 (7.1) 100 (1.5)    Urine (mL/kg/hr)       Drains       Total Output       Net +700 +480 +100           Urine Occurrence 4 x 5 x     Stool Occurrence 0 x 0 x           Physical Exam  Constitutional:       General: She is not in acute distress.     Appearance: She is well-developed.   HENT:      Head: Normocephalic and  atraumatic.   Cardiovascular:      Rate and Rhythm: Normal rate and regular rhythm.   Pulmonary:      Effort: Pulmonary effort is normal. No respiratory distress.   Abdominal:      General: There is no distension.      Palpations: Abdomen is soft.      Tenderness: There is no abdominal tenderness.      Comments: Drains with minimal output, light green and thick   Skin:     General: Skin is warm and dry.   Neurological:      General: No focal deficit present.      Mental Status: She is alert and oriented to person, place, and time.   Psychiatric:         Mood and Affect: Mood normal.         Behavior: Behavior normal.         Significant Labs:  CBC:   Recent Labs   Lab 09/24/20  0330   WBC 21.63*   RBC 2.84*   HGB 8.3*   HCT 27.2*   *   MCV 96   MCH 29.2   MCHC 30.5*     CMP:   Recent Labs   Lab 09/24/20 0330   GLU 76   CALCIUM 9.1   ALBUMIN 1.9*   PROT 7.6   *   K 4.4   CO2 20*      BUN 16   CREATININE 0.9   ALKPHOS 167*   ALT 16   AST 24   BILITOT 0.3       Significant Diagnostics:  I have reviewed all pertinent imaging results/findings within the past 24 hours.

## 2020-09-24 NOTE — PLAN OF CARE
Plan of care reviewed with pt. Verbalized understanding. Pt AAOx4. VS stable on RA. Pain managed with PRN medicine.      ML w. abd pad w/ moderate brown drainage. Dressing changed and is now dry and intact. R abd GODFREY x2 are intact and healing w/ minimal output.      Pt ambulates independently to BSC, adequate output. Pt tolerating full liquid. No complaints of nausea. Frequent rounds made for pt safety. Bed low and locked, call light in reach. WCTM

## 2020-09-25 LAB
ALBUMIN SERPL BCP-MCNC: 2.1 G/DL (ref 3.5–5.2)
ALP SERPL-CCNC: 189 U/L (ref 55–135)
ALT SERPL W/O P-5'-P-CCNC: 14 U/L (ref 10–44)
ANION GAP SERPL CALC-SCNC: 14 MMOL/L (ref 8–16)
AST SERPL-CCNC: 18 U/L (ref 10–40)
BASOPHILS # BLD AUTO: 0.07 K/UL (ref 0–0.2)
BASOPHILS NFR BLD: 0.3 % (ref 0–1.9)
BILIRUB SERPL-MCNC: 0.3 MG/DL (ref 0.1–1)
BUN SERPL-MCNC: 14 MG/DL (ref 6–20)
CALCIUM SERPL-MCNC: 9.4 MG/DL (ref 8.7–10.5)
CHLORIDE SERPL-SCNC: 99 MMOL/L (ref 95–110)
CO2 SERPL-SCNC: 20 MMOL/L (ref 23–29)
CREAT SERPL-MCNC: 0.9 MG/DL (ref 0.5–1.4)
DIFFERENTIAL METHOD: ABNORMAL
EOSINOPHIL # BLD AUTO: 0.2 K/UL (ref 0–0.5)
EOSINOPHIL NFR BLD: 0.7 % (ref 0–8)
ERYTHROCYTE [DISTWIDTH] IN BLOOD BY AUTOMATED COUNT: 14.5 % (ref 11.5–14.5)
EST. GFR  (AFRICAN AMERICAN): >60 ML/MIN/1.73 M^2
EST. GFR  (NON AFRICAN AMERICAN): >60 ML/MIN/1.73 M^2
GLUCOSE SERPL-MCNC: 82 MG/DL (ref 70–110)
HCT VFR BLD AUTO: 29.4 % (ref 37–48.5)
HGB BLD-MCNC: 9 G/DL (ref 12–16)
IMM GRANULOCYTES # BLD AUTO: 0.13 K/UL (ref 0–0.04)
IMM GRANULOCYTES NFR BLD AUTO: 0.5 % (ref 0–0.5)
LYMPHOCYTES # BLD AUTO: 4.6 K/UL (ref 1–4.8)
LYMPHOCYTES NFR BLD: 19 % (ref 18–48)
MAGNESIUM SERPL-MCNC: 1.3 MG/DL (ref 1.6–2.6)
MCH RBC QN AUTO: 28.6 PG (ref 27–31)
MCHC RBC AUTO-ENTMCNC: 30.6 G/DL (ref 32–36)
MCV RBC AUTO: 93 FL (ref 82–98)
MONOCYTES # BLD AUTO: 1.2 K/UL (ref 0.3–1)
MONOCYTES NFR BLD: 5 % (ref 4–15)
NEUTROPHILS # BLD AUTO: 17.9 K/UL (ref 1.8–7.7)
NEUTROPHILS NFR BLD: 74.5 % (ref 38–73)
NRBC BLD-RTO: 0 /100 WBC
PHOSPHATE SERPL-MCNC: 6 MG/DL (ref 2.7–4.5)
PLATELET # BLD AUTO: 501 K/UL (ref 150–350)
PMV BLD AUTO: 9.5 FL (ref 9.2–12.9)
POTASSIUM SERPL-SCNC: 4.2 MMOL/L (ref 3.5–5.1)
PROT SERPL-MCNC: 8.5 G/DL (ref 6–8.4)
RBC # BLD AUTO: 3.15 M/UL (ref 4–5.4)
SODIUM SERPL-SCNC: 133 MMOL/L (ref 136–145)
WBC # BLD AUTO: 23.97 K/UL (ref 3.9–12.7)

## 2020-09-25 PROCEDURE — 84100 ASSAY OF PHOSPHORUS: CPT

## 2020-09-25 PROCEDURE — 25000003 PHARM REV CODE 250: Performed by: SURGERY

## 2020-09-25 PROCEDURE — 25000003 PHARM REV CODE 250: Performed by: STUDENT IN AN ORGANIZED HEALTH CARE EDUCATION/TRAINING PROGRAM

## 2020-09-25 PROCEDURE — 25000242 PHARM REV CODE 250 ALT 637 W/ HCPCS: Performed by: STUDENT IN AN ORGANIZED HEALTH CARE EDUCATION/TRAINING PROGRAM

## 2020-09-25 PROCEDURE — 63600175 PHARM REV CODE 636 W HCPCS: Performed by: STUDENT IN AN ORGANIZED HEALTH CARE EDUCATION/TRAINING PROGRAM

## 2020-09-25 PROCEDURE — 94761 N-INVAS EAR/PLS OXIMETRY MLT: CPT

## 2020-09-25 PROCEDURE — C9113 INJ PANTOPRAZOLE SODIUM, VIA: HCPCS | Performed by: STUDENT IN AN ORGANIZED HEALTH CARE EDUCATION/TRAINING PROGRAM

## 2020-09-25 PROCEDURE — 83735 ASSAY OF MAGNESIUM: CPT

## 2020-09-25 PROCEDURE — A4216 STERILE WATER/SALINE, 10 ML: HCPCS | Performed by: SURGERY

## 2020-09-25 PROCEDURE — 20600001 HC STEP DOWN PRIVATE ROOM

## 2020-09-25 PROCEDURE — 85025 COMPLETE CBC W/AUTO DIFF WBC: CPT

## 2020-09-25 PROCEDURE — 80053 COMPREHEN METABOLIC PANEL: CPT

## 2020-09-25 RX ORDER — DIPHENHYDRAMINE HYDROCHLORIDE 50 MG/ML
INJECTION INTRAMUSCULAR; INTRAVENOUS CODE/TRAUMA/SEDATION MEDICATION
Status: COMPLETED | OUTPATIENT
Start: 2020-09-25 | End: 2020-09-25

## 2020-09-25 RX ORDER — LANOLIN ALCOHOL/MO/W.PET/CERES
800 CREAM (GRAM) TOPICAL ONCE
Status: COMPLETED | OUTPATIENT
Start: 2020-09-25 | End: 2020-09-25

## 2020-09-25 RX ORDER — MIDAZOLAM HYDROCHLORIDE 1 MG/ML
INJECTION INTRAMUSCULAR; INTRAVENOUS CODE/TRAUMA/SEDATION MEDICATION
Status: COMPLETED | OUTPATIENT
Start: 2020-09-25 | End: 2020-09-25

## 2020-09-25 RX ADMIN — Medication 10 ML: at 06:09

## 2020-09-25 RX ADMIN — AMLODIPINE 5 MG: 1 SUSPENSION ORAL at 11:09

## 2020-09-25 RX ADMIN — OXYCODONE HYDROCHLORIDE 15 MG: 5 SOLUTION ORAL at 11:09

## 2020-09-25 RX ADMIN — MICONAZOLE NITRATE: 20 POWDER TOPICAL at 11:09

## 2020-09-25 RX ADMIN — PANTOPRAZOLE SODIUM 40 MG: 40 INJECTION, POWDER, LYOPHILIZED, FOR SOLUTION INTRAVENOUS at 11:09

## 2020-09-25 RX ADMIN — OXYCODONE HYDROCHLORIDE 15 MG: 5 SOLUTION ORAL at 05:09

## 2020-09-25 RX ADMIN — ACETAMINOPHEN 650 MG: 160 SOLUTION ORAL at 05:09

## 2020-09-25 RX ADMIN — ACETAMINOPHEN 650 MG: 160 SOLUTION ORAL at 06:09

## 2020-09-25 RX ADMIN — HEPARIN SODIUM 5000 UNITS: 5000 INJECTION INTRAVENOUS; SUBCUTANEOUS at 01:09

## 2020-09-25 RX ADMIN — OXYCODONE HYDROCHLORIDE 15 MG: 5 SOLUTION ORAL at 02:09

## 2020-09-25 RX ADMIN — MIDAZOLAM HYDROCHLORIDE 1 MG: 1 INJECTION, SOLUTION INTRAMUSCULAR; INTRAVENOUS at 08:09

## 2020-09-25 RX ADMIN — ACETAMINOPHEN 650 MG: 160 SOLUTION ORAL at 11:09

## 2020-09-25 RX ADMIN — DIPHENHYDRAMINE HYDROCHLORIDE 50 MG: 50 INJECTION INTRAMUSCULAR; INTRAVENOUS at 08:09

## 2020-09-25 RX ADMIN — METOPROLOL TARTRATE 25 MG: 25 TABLET ORAL at 11:09

## 2020-09-25 RX ADMIN — HEPARIN SODIUM 5000 UNITS: 5000 INJECTION INTRAVENOUS; SUBCUTANEOUS at 09:09

## 2020-09-25 RX ADMIN — METOPROLOL TARTRATE 25 MG: 25 TABLET ORAL at 08:09

## 2020-09-25 RX ADMIN — OXYCODONE HYDROCHLORIDE 15 MG: 5 SOLUTION ORAL at 08:09

## 2020-09-25 RX ADMIN — Medication 10 ML: at 11:09

## 2020-09-25 RX ADMIN — Medication 10 ML: at 12:09

## 2020-09-25 RX ADMIN — Medication 800 MG: at 01:09

## 2020-09-25 RX ADMIN — HEPARIN SODIUM 5000 UNITS: 5000 INJECTION INTRAVENOUS; SUBCUTANEOUS at 05:09

## 2020-09-25 NOTE — PLAN OF CARE
Plan of care reviewed with pt/verbalized understanding, vss, patien on full-liquid diet tolerating well patient denies c/o of nausea/vomiting, patient of abdominal pain relieved with prn medication, call-light within reach, will continue to monitor.

## 2020-09-25 NOTE — PROCEDURES
Radiology Post-Procedure Note    Pre Op Diagnosis: pelvic abscess    Post Op Diagnosis: pelvic abscess    Procedure: pelvic abscess drainage    Procedure performed by: Lai Rodriguez MD    Written Informed Consent Obtained: Yes    Specimen Removed: YES, 20 cc of serosanguineous fluid     Estimated Blood Loss: Minimal    Findings: CT was used for localization of abnormal fluid collection. A needle was inserted into the fluid collection and serosanguinous fluid was aspirated.  A wire was inserted into the collection and the tract was dilated.  A 10 [place] all-purpose drainage catheter was inserted and a pigtail loop of the distal end was formed.  The catheter was sutured into place and approximately  20 mL fluid was removed.     The patient tolerated procedure well and there were no complications. Please see procedure report under Imaging for further details.    Jessica Shetty, PGY-4

## 2020-09-25 NOTE — PROGRESS NOTES
Procedure completed, pt tolerated well and without event, Rt upper buttox pelvic drain site is clean, dry,  Intact and without bleeding or hematoma, escorted to ROCU, bedside handoff provided to SOUTH Cor[place]jo  RN, report called to  Primary nurse, pt will recover in ROCU piror to return to room

## 2020-09-25 NOTE — PLAN OF CARE
Pt arrived to IR procedure room 173 for CT guided abcess drainage and drainage tube placement, allergies reviewed, preparing for procedure

## 2020-09-25 NOTE — H&P
Inpatient Radiology Pre-procedure Note    History of Present Illness:  Jaquan Farmer is a 39 y.o. 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 Patient presents for CT-guided drainage of a pelvic fluid collection.    Admission H&P reviewed.  Past Medical History:   Diagnosis Date    Anxiety     Aortic insufficiency     Hyperbilirubinemia     Hypertension     Insomnia     Leukocytosis     Migraines     MR (mitral regurgitation)     Peptic ulcer disease     Pulmonary hypertension     Respiratory distress     Sepsis      Past Surgical History:   Procedure Laterality Date    APPLICATION OF WOUND VACUUM-ASSISTED CLOSURE DEVICE  8/13/2020    Procedure: APPLICATION, WOUND VAC WITH ABTHERA;  Surgeon: Donis Roa MD;  Location: [facility] OR [facility] FLR;  Service: General;;    APPLICATION OF WOUND VACUUM-ASSISTED CLOSURE DEVICE N/A 8/21/2020    Procedure: APPLICATION, WOUND VAC;  Surgeon: Donis Roa MD;  Location: [facility] OR [facility] FLR;  Service: General;  Laterality: N/A;    BRONCHOSCOPY N/A 8/31/2020    Procedure: BRONCHOSCOPY;  Surgeon: Donis Roa MD;  [facility];  Service: General;  Laterality: N/A;    CLOSURE OF WOUND N/A 9/11/2020    Procedure: CLOSURE, WOUND;  Surgeon: Donis Roa MD;  [facility];  Service: General;  Laterality: N/A;  delayed primary closure of abdominal wall       COLONOSCOPY      DUODENECTOMY  8/13/2020    Procedure: KOCHERIZATION OF DUODENUM, DUODENECTOMY;  Surgeon: Donis Roa MD;  [facility];  Service: General;;    ESOPHAGOGASTRODUODENOSCOPY  01/23/2018    ESOPHAGOGASTRODUODENOSCOPY N/A 8/13/2020    Procedure: EGD (ESOPHAGOGASTRODUODENOSCOPY);  Surgeon: Donis Roa MD;  [facility];  Service: General;  Laterality: N/A;    EVACUATION OF HEMATOMA N/A 8/21/2020    Procedure: EVACUATION, HEMATOMA;   Surgeon: Donis Roa MD;  Location: [facility] OR 2ND FLR;  Service: General;  Laterality: N/A;  OF RECTUS SHEET HEMATOMA    PERCUTANEOUS INSERTION OF GASTROSTOMY TUBE  8/15/2020    Procedure: INSERTION, GASTROSTOMY TUBE, PERCUTANEOUS;  Surgeon: Donis Roa MD;  Location: [facility] OR [facility] FLR;  Service: General;;    PLACEMENT OF DUAL-LUMEN VASCULAR CATHETER Left 8/31/2020    Procedure: INSERTION-CATHETER-RUDY;  Surgeon: Donis Roa MD;  Location: [facility] OR [facility] FLR;  Service: General;  Laterality: Left;    REMOVAL OF TUNNELED CENTRAL VENOUS CATHETER (CVC) Left 9/11/2020    Procedure: REMOVAL, CATHETER, CENTRAL VENOUS, TUNNELED;  Surgeon: Donis Roa MD;  Location: [facility] OR [facility] FLR;  Service: General;  Laterality: Left;    REPLACEMENT OF WOUND VACUUM-ASSISTED CLOSURE DEVICE N/A 8/31/2020    Procedure: REPLACEMENT, WOUND VAC;  Surgeon: Donis Roa MD;  [facility];  Service: General;  Laterality: N/A;  Sponge replacement on wound vac    TUBAL LIGATION      TUBE THORACOTOMY Left 8/31/2020    Procedure: INSERTION, CATHETER, INTERCOSTAL, FOR DRAINAGE;  Surgeon: Donis Roa MD;  [facility];  Service: General;  Laterality: Left;    WOUND EXPLORATION N/A 8/21/2020    Procedure: EXPLORATION, WOUND;  Surgeon: Donis Roa MD;  [facility];  Service: General;  Laterality: N/A;       Review of Systems:   As documented in primary team H&P    Home Meds:   Prior to Admission medications    Medication Sig Start Date End Date Taking? Authorizing Provider   aspirin (ECOTRIN) 81 MG EC tablet   = 1 tab, Oral, Daily, Ordered by Dr. Moreira, # 90 tab, 3 Refill(s), Maintenance 12/19/17  Yes Historical Provider   sumatriptan (IMITREX) 50 MG tablet   = 1 tab, Oral, Daily, PRN for migraine headache, may repeat dose after 2 hours up to a maximum of 200 mg in 24 hours, # 9 tab, 1 Refill(s), Maintenance, Pharmacy: [facility] 6/19/18  Yes  Historical Provider   metoprolol tartrate (LOPRESSOR) 25 MG tablet   See Instructions, TAKE 1 TABLET BY MOUTH TWICE DAILY, tab, # 60, eRx: [facility] 10/11/18   Historical Provider     Scheduled Meds:    acetaminophen  650 mg Oral Q6H    amLODIPine benzoate  5 mg Oral Daily    cloNIDine 0.3 mg/24 hr td ptwk  1 patch Transdermal Q7 Days    heparin (porcine)  5,000 Units Subcutaneous Q8H    lidocaine  1 patch Transdermal Q24H    metoprolol  25 mg Oral BID    miconazole NITRATE 2 %   Topical (Top) BID    pantoprazole  40 mg Intravenous Daily    sodium chloride 0.9%  10 mL Intravenous Q6H     Continuous Infusions:   PRN Meds:albuterol-ipratropium, diphenhydrAMINE, guaifenesin 100 mg/5 ml, hydrALAZINE, labetaloL, melatonin, oxyCODONE, oxyCODONE, Flushing PICC Protocol **AND** sodium chloride 0.9% **AND** sodium chloride 0.9%  Anticoagulants/Antiplatelets: aspirin    Allergies:   Review of patient's allergies indicates:   Allergen Reactions    Dilaudid [hydromorphone] Anaphylaxis and Rash    Latex      Sedation Hx: have not been any systemic reactions    Labs:  No results for input(s): INR in the last 168 hours.    Invalid input(s):  PT,  PTT    Recent Labs   Lab 09/25/20  0545   WBC 23.97*   HGB 9.0*   HCT 29.4*   MCV 93   *      Recent Labs   Lab 09/25/20  0545   GLU 82   *   K 4.2   CL 99   CO2 20*   BUN 14   CREATININE 0.9   CALCIUM 9.4   MG 1.3*   ALT 14   AST 18   ALBUMIN 2.1*   BILITOT 0.3         Vitals:  Temp: 98.2 °F (36.8 °C) (09/25/20 0731)  Pulse: 90 (09/25/20 0731)  Resp: 18 (09/25/20 0731)  BP: 110/73 (09/25/20 0731)  SpO2: 100 % (09/25/20 0731)     Physical Exam:  ASA: III  Mallampati: II    General: no acute distress  Mental Status: alert and oriented to person, place and time  HEENT: normocephalic, atraumatic  Chest: unlabored breathing  Heart: regular heart rate  Abdomen: nondistended  Extremity: moves all extremities    Plan:     Patient will undergo CT-guided  placement of a drain into a pelvic fluid collection.     Sedation Plan: annie Shetty, PGY-3

## 2020-09-25 NOTE — PT/OT/SLP PROGRESS
Occupational Therapy      Patient Name:  Jaquan Farmer   MRN:  [id number]    Patient not seen today secondary to Unavailable (Comment)(Pt BRIANDA for IR procedure. Unable to make second attempt.). Will follow-up as scheduled.    Angelica Cruz OT  9/25/2020

## 2020-09-25 NOTE — PROGRESS NOTES
Pt arrived to [facility] w/ NAD drowsy / placed to monitor / Sat and NIBP / will remain for allowed time then plan to return to room / R buttocks drain site with redness or drainage dressing intact / existing GODFREY drains x 2 noted

## 2020-09-25 NOTE — PT/OT/SLP PROGRESS
Physical Therapy      Patient Name:  Jaquan Farmer   MRN:  [id number]    Patient not seen today secondary to Patient fatigue, Pain. Pt off the floor for drain placement and fatigued and sore per pt c/o when therapist makes attempt in PM. Therapy will follow-up per POC as appropriate.     Frank Bruno, PTA

## 2020-09-25 NOTE — PROGRESS NOTES
[facility]  General Surgery  Progress Note    Subjective:     History of Present Illness:  Pt is a 38 yo F with recent history of antrectomy with BII reconstruction for ulcer disease at outside hospital. 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 Patient also reportedly aspirated on induction in the OR prior to this, resulting in severe pulmonary edema and hypoxia. Patient was transferred to [facility] urgently for higher level of care. She arrived as a direct SICU admit on near maximal vent settings and requiring low dose vasopressors with HR in the upper 140s. Her midline laparotomy is closed with 4 Navdeep drains in place draining bilious output.     Post-Op Info:  Procedure(s) (LRB):  REMOVAL, CATHETER, CENTRAL VENOUS, TUNNELED (Left)  CLOSURE, WOUND (N/A)   14 Days Post-Op     Interval History: No acute events overnight.  Increased complaints of cramping pain internally around her G tube site (not pain at the skin). Afebrile. WBC 23.97. S/p IR pelvic drain placement today; serosanguinous output.     Medications:  Continuous Infusions:  Scheduled Meds:   acetaminophen  650 mg Oral Q6H    amLODIPine benzoate  5 mg Oral Daily    cloNIDine 0.3 mg/24 hr td ptwk  1 patch Transdermal Q7 Days    heparin (porcine)  5,000 Units Subcutaneous Q8H    lidocaine  1 patch Transdermal Q24H    metoprolol  25 mg Oral BID    miconazole NITRATE 2 %   Topical (Top) BID    pantoprazole  40 mg Intravenous Daily    sodium chloride 0.9%  10 mL Intravenous Q6H     PRN Meds:albuterol-ipratropium, diphenhydrAMINE, guaifenesin 100 mg/5 ml, hydrALAZINE, labetaloL, melatonin, oxyCODONE, oxyCODONE, Flushing PICC Protocol **AND** sodium chloride 0.9% **AND** sodium chloride 0.9%     Review of patient's allergies indicates:   Allergen Reactions    Dilaudid [hydromorphone] Anaphylaxis and Rash    Latex      Objective:     Vital Signs (Most Recent):  Temp: 98.5 °F (36.9 °C)  (09/25/20 1116)  Pulse: 79 (09/25/20 1116)  Resp: 18 (09/25/20 1116)  BP: (!) 159/86 (09/25/20 1116)  SpO2: 99 % (09/25/20 1116) Vital Signs (24h Range):  Temp:  [98 °F (36.7 °C)-98.9 °F (37.2 °C)] 98.5 °F (36.9 °C)  Pulse:  [74-98] 79  Resp:  [16-20] 18  SpO2:  [95 %-100 %] 99 %  BP: (110-171)/(73-95) 159/86     Weight: 67.5 kg (148 lb 13 oz)  Body mass index is 27.22 kg/m².    Intake/Output - Last 3 Shifts       09/23 0700 - 09/24 0659 09/24 0700 - 09/25 0659 09/25 0700 - 09/26 0659    P.O. 480 580     Total Intake(mL/kg) 480 (7.1) 580 (8.6)     Urine (mL/kg/hr)  900 (0.6)     Drains  10     Stool  0     Total Output  910     Net +480 -330            Urine Occurrence 5 x      Stool Occurrence 0 x 0 x           Physical Exam  Constitutional:       General: She is not in acute distress.     Appearance: She is well-developed.   HENT:      Head: Normocephalic and atraumatic.   Cardiovascular:      Rate and Rhythm: Normal rate and regular rhythm.   Pulmonary:      Effort: Pulmonary effort is normal. No respiratory distress.   Abdominal:      General: There is no distension.      Palpations: Abdomen is soft.      Tenderness: There is no abdominal tenderness.      Comments: Drains with minimal output, light green and thick   Musculoskeletal:      Comments: Right buttock IR drain with serosanguinous output.   Skin:     General: Skin is warm and dry.   Neurological:      General: No focal deficit present.      Mental Status: She is alert and oriented to person, place, and time.   Psychiatric:         Mood and Affect: Mood normal.         Behavior: Behavior normal.         Significant Labs:  CBC:   Recent Labs   Lab 09/25/20  0545   WBC 23.97*   RBC 3.15*   HGB 9.0*   HCT 29.4*   *   MCV 93   MCH 28.6   MCHC 30.6*     CMP:   Recent Labs   Lab 09/25/20  0545   GLU 82   CALCIUM 9.4   ALBUMIN 2.1*   PROT 8.5*   *   K 4.2   CO2 20*   CL 99   BUN 14   CREATININE 0.9   ALKPHOS 189*   ALT 14   AST 18   BILITOT 0.3        Significant Diagnostics:  I have reviewed all pertinent imaging results/findings within the past 24 hours.    Assessment/Plan:     * Duodenum disorder  38 yo F s/p antrectomy with BII reconstruction c/b duodenal necrosis requiring ex lap, washout, drainage c/b aspiration event. S/p duodenal resection with d-j and g-j anastomosis on 8/13.    - CT scan 9/24 showed multiple fluid collections, largest in pelvis. S/p IR pelvic drain placement this morning. Serosanguinous output.   - Advanced diet to FLD + Boost  - GI and DVT ppx  - PT / OT  - continue PRNs for BP control    Dispo: she has adequate family support, now planning on sending home. Does not qualify for home health as she does not have insurance. May be ready by Sunday.         Verna Burris MD  General Surgery  [facility]

## 2020-09-25 NOTE — PLAN OF CARE
Patient not medically ready for discharge, WBC 23.97. S/p IR pelvic drain placement today.  Patient off TPN, so will go home eating with boost supplements +/- 9/28/2020.  Will continue to follow for needs.       09/25/20 1518   Discharge Reassessment   Assessment Type Discharge Planning Reassessment   Discharge Plan A Home with family   Anticipated Discharge Disposition Home   Post-Acute Status   Post-Acute Authorization Other   Other Status No Post-Acute Service Needs

## 2020-09-25 NOTE — ASSESSMENT & PLAN NOTE
38 yo F s/p antrectomy with BII reconstruction c/b duodenal necrosis requiring ex lap, washout, drainage c/b aspiration event. S/p duodenal resection with d-j and g-j anastomosis on 8/13.    - CT scan 9/24 showed multiple fluid collections, largest in pelvis. S/p IR pelvic drain placement this morning. Serosanguinous output.   - Advanced diet to FLD + Boost  - GI and DVT ppx  - PT / OT  - continue PRNs for BP control    Dispo: she has adequate family support, now planning on sending home. Does not qualify for home health as she does not have insurance. May be ready by Sunday.

## 2020-09-25 NOTE — SUBJECTIVE & OBJECTIVE
Interval History: No acute events overnight.  Increased complaints of cramping pain internally around her G tube site (not pain at the skin). Afebrile. WBC 23.97. S/p IR pelvic drain placement today; serosanguinous output.     Medications:  Continuous Infusions:  Scheduled Meds:   acetaminophen  650 mg Oral Q6H    amLODIPine benzoate  5 mg Oral Daily    cloNIDine 0.3 mg/24 hr td ptwk  1 patch Transdermal Q7 Days    heparin (porcine)  5,000 Units Subcutaneous Q8H    lidocaine  1 patch Transdermal Q24H    metoprolol  25 mg Oral BID    miconazole NITRATE 2 %   Topical (Top) BID    pantoprazole  40 mg Intravenous Daily    sodium chloride 0.9%  10 mL Intravenous Q6H     PRN Meds:albuterol-ipratropium, diphenhydrAMINE, guaifenesin 100 mg/5 ml, hydrALAZINE, labetaloL, melatonin, oxyCODONE, oxyCODONE, Flushing PICC Protocol **AND** sodium chloride 0.9% **AND** sodium chloride 0.9%     Review of patient's allergies indicates:   Allergen Reactions    Dilaudid [hydromorphone] Anaphylaxis and Rash    Latex      Objective:     Vital Signs (Most Recent):  Temp: 98.5 °F (36.9 °C) (09/25/20 1116)  Pulse: 79 (09/25/20 1116)  Resp: 18 (09/25/20 1116)  BP: (!) 159/86 (09/25/20 1116)  SpO2: 99 % (09/25/20 1116) Vital Signs (24h Range):  Temp:  [98 °F (36.7 °C)-98.9 °F (37.2 °C)] 98.5 °F (36.9 °C)  Pulse:  [74-98] 79  Resp:  [16-20] 18  SpO2:  [95 %-100 %] 99 %  BP: (110-171)/(73-95) 159/86     Weight: 67.5 kg (148 lb 13 oz)  Body mass index is 27.22 kg/m².    Intake/Output - Last 3 Shifts       09/23 0700 - 09/24 0659 09/24 0700 - 09/25 0659 09/25 0700 - 09/26 0659    P.O. 480 580     Total Intake(mL/kg) 480 (7.1) 580 (8.6)     Urine (mL/kg/hr)  900 (0.6)     Drains  10     Stool  0     Total Output  910     Net +480 -330            Urine Occurrence 5 x      Stool Occurrence 0 x 0 x           Physical Exam  Constitutional:       General: She is not in acute distress.     Appearance: She is well-developed.   HENT:      Head:  Normocephalic and atraumatic.   Cardiovascular:      Rate and Rhythm: Normal rate and regular rhythm.   Pulmonary:      Effort: Pulmonary effort is normal. No respiratory distress.   Abdominal:      General: There is no distension.      Palpations: Abdomen is soft.      Tenderness: There is no abdominal tenderness.      Comments: Drains with minimal output, light green and thick   Musculoskeletal:      Comments: Right buttock IR drain with serosanguinous output.   Skin:     General: Skin is warm and dry.   Neurological:      General: No focal deficit present.      Mental Status: She is alert and oriented to person, place, and time.   Psychiatric:         Mood and Affect: Mood normal.         Behavior: Behavior normal.         Significant Labs:  CBC:   Recent Labs   Lab 09/25/20  0545   WBC 23.97*   RBC 3.15*   HGB 9.0*   HCT 29.4*   *   MCV 93   MCH 28.6   MCHC 30.6*     CMP:   Recent Labs   Lab 09/25/20  0545   GLU 82   CALCIUM 9.4   ALBUMIN 2.1*   PROT 8.5*   *   K 4.2   CO2 20*   CL 99   BUN 14   CREATININE 0.9   ALKPHOS 189*   ALT 14   AST 18   BILITOT 0.3       Significant Diagnostics:  I have reviewed all pertinent imaging results/findings within the past 24 hours.

## 2020-09-26 LAB
ALBUMIN SERPL BCP-MCNC: 2 G/DL (ref 3.5–5.2)
ALP SERPL-CCNC: 189 U/L (ref 55–135)
ALT SERPL W/O P-5'-P-CCNC: 14 U/L (ref 10–44)
ANION GAP SERPL CALC-SCNC: 11 MMOL/L (ref 8–16)
AST SERPL-CCNC: 23 U/L (ref 10–40)
BASOPHILS # BLD AUTO: 0.08 K/UL (ref 0–0.2)
BASOPHILS NFR BLD: 0.3 % (ref 0–1.9)
BILIRUB SERPL-MCNC: 0.2 MG/DL (ref 0.1–1)
BUN SERPL-MCNC: 11 MG/DL (ref 6–20)
CALCIUM SERPL-MCNC: 9.2 MG/DL (ref 8.7–10.5)
CHLORIDE SERPL-SCNC: 99 MMOL/L (ref 95–110)
CO2 SERPL-SCNC: 22 MMOL/L (ref 23–29)
CREAT SERPL-MCNC: 0.9 MG/DL (ref 0.5–1.4)
DIFFERENTIAL METHOD: ABNORMAL
EOSINOPHIL # BLD AUTO: 0.2 K/UL (ref 0–0.5)
EOSINOPHIL NFR BLD: 0.7 % (ref 0–8)
ERYTHROCYTE [DISTWIDTH] IN BLOOD BY AUTOMATED COUNT: 14.4 % (ref 11.5–14.5)
EST. GFR  (AFRICAN AMERICAN): >60 ML/MIN/1.73 M^2
EST. GFR  (NON AFRICAN AMERICAN): >60 ML/MIN/1.73 M^2
GLUCOSE SERPL-MCNC: 89 MG/DL (ref 70–110)
HCT VFR BLD AUTO: 27.6 % (ref 37–48.5)
HGB BLD-MCNC: 8.5 G/DL (ref 12–16)
IMM GRANULOCYTES # BLD AUTO: 0.12 K/UL (ref 0–0.04)
IMM GRANULOCYTES NFR BLD AUTO: 0.5 % (ref 0–0.5)
LYMPHOCYTES # BLD AUTO: 4.3 K/UL (ref 1–4.8)
LYMPHOCYTES NFR BLD: 18.2 % (ref 18–48)
MAGNESIUM SERPL-MCNC: 1.3 MG/DL (ref 1.6–2.6)
MCH RBC QN AUTO: 28.6 PG (ref 27–31)
MCHC RBC AUTO-ENTMCNC: 30.8 G/DL (ref 32–36)
MCV RBC AUTO: 93 FL (ref 82–98)
MONOCYTES # BLD AUTO: 1.2 K/UL (ref 0.3–1)
MONOCYTES NFR BLD: 5.2 % (ref 4–15)
NEUTROPHILS # BLD AUTO: 17.8 K/UL (ref 1.8–7.7)
NEUTROPHILS NFR BLD: 75.1 % (ref 38–73)
NRBC BLD-RTO: 0 /100 WBC
PHOSPHATE SERPL-MCNC: 5.6 MG/DL (ref 2.7–4.5)
PLATELET # BLD AUTO: 471 K/UL (ref 150–350)
PMV BLD AUTO: 9.9 FL (ref 9.2–12.9)
POTASSIUM SERPL-SCNC: 4 MMOL/L (ref 3.5–5.1)
PROT SERPL-MCNC: 8 G/DL (ref 6–8.4)
RBC # BLD AUTO: 2.97 M/UL (ref 4–5.4)
SODIUM SERPL-SCNC: 132 MMOL/L (ref 136–145)
WBC # BLD AUTO: 23.65 K/UL (ref 3.9–12.7)

## 2020-09-26 PROCEDURE — 63600175 PHARM REV CODE 636 W HCPCS: Performed by: STUDENT IN AN ORGANIZED HEALTH CARE EDUCATION/TRAINING PROGRAM

## 2020-09-26 PROCEDURE — 85025 COMPLETE CBC W/AUTO DIFF WBC: CPT

## 2020-09-26 PROCEDURE — 83735 ASSAY OF MAGNESIUM: CPT

## 2020-09-26 PROCEDURE — A4216 STERILE WATER/SALINE, 10 ML: HCPCS | Performed by: SURGERY

## 2020-09-26 PROCEDURE — 25000003 PHARM REV CODE 250: Performed by: SURGERY

## 2020-09-26 PROCEDURE — 25000003 PHARM REV CODE 250: Performed by: STUDENT IN AN ORGANIZED HEALTH CARE EDUCATION/TRAINING PROGRAM

## 2020-09-26 PROCEDURE — 84100 ASSAY OF PHOSPHORUS: CPT

## 2020-09-26 PROCEDURE — 80053 COMPREHEN METABOLIC PANEL: CPT

## 2020-09-26 PROCEDURE — 94761 N-INVAS EAR/PLS OXIMETRY MLT: CPT

## 2020-09-26 PROCEDURE — C9113 INJ PANTOPRAZOLE SODIUM, VIA: HCPCS | Performed by: STUDENT IN AN ORGANIZED HEALTH CARE EDUCATION/TRAINING PROGRAM

## 2020-09-26 PROCEDURE — 20600001 HC STEP DOWN PRIVATE ROOM

## 2020-09-26 PROCEDURE — 99900035 HC TECH TIME PER 15 MIN (STAT)

## 2020-09-26 PROCEDURE — 25000242 PHARM REV CODE 250 ALT 637 W/ HCPCS: Performed by: STUDENT IN AN ORGANIZED HEALTH CARE EDUCATION/TRAINING PROGRAM

## 2020-09-26 RX ORDER — LANOLIN ALCOHOL/MO/W.PET/CERES
800 CREAM (GRAM) TOPICAL 2 TIMES DAILY
Status: COMPLETED | OUTPATIENT
Start: 2020-09-26 | End: 2020-09-26

## 2020-09-26 RX ORDER — PANTOPRAZOLE SODIUM 40 MG/10ML
40 INJECTION, POWDER, LYOPHILIZED, FOR SOLUTION INTRAVENOUS DAILY
Status: DISCONTINUED | OUTPATIENT
Start: 2020-09-27 | End: 2020-09-28

## 2020-09-26 RX ADMIN — METOPROLOL TARTRATE 25 MG: 25 TABLET ORAL at 09:09

## 2020-09-26 RX ADMIN — AMLODIPINE 5 MG: 1 SUSPENSION ORAL at 08:09

## 2020-09-26 RX ADMIN — Medication 10 ML: at 06:09

## 2020-09-26 RX ADMIN — OXYCODONE HYDROCHLORIDE 15 MG: 5 SOLUTION ORAL at 09:09

## 2020-09-26 RX ADMIN — Medication 10 ML: at 12:09

## 2020-09-26 RX ADMIN — MICONAZOLE NITRATE: 20 POWDER TOPICAL at 08:09

## 2020-09-26 RX ADMIN — ACETAMINOPHEN 650 MG: 160 SOLUTION ORAL at 05:09

## 2020-09-26 RX ADMIN — Medication 800 MG: at 09:09

## 2020-09-26 RX ADMIN — HEPARIN SODIUM 5000 UNITS: 5000 INJECTION INTRAVENOUS; SUBCUTANEOUS at 05:09

## 2020-09-26 RX ADMIN — ACETAMINOPHEN 650 MG: 160 SOLUTION ORAL at 11:09

## 2020-09-26 RX ADMIN — OXYCODONE HYDROCHLORIDE 15 MG: 5 SOLUTION ORAL at 08:09

## 2020-09-26 RX ADMIN — MICONAZOLE NITRATE: 20 POWDER TOPICAL at 09:09

## 2020-09-26 RX ADMIN — OXYCODONE HYDROCHLORIDE 15 MG: 5 SOLUTION ORAL at 01:09

## 2020-09-26 RX ADMIN — Medication 10 ML: at 05:09

## 2020-09-26 RX ADMIN — HEPARIN SODIUM 5000 UNITS: 5000 INJECTION INTRAVENOUS; SUBCUTANEOUS at 09:09

## 2020-09-26 RX ADMIN — METOPROLOL TARTRATE 25 MG: 25 TABLET ORAL at 08:09

## 2020-09-26 RX ADMIN — PANTOPRAZOLE SODIUM 40 MG: 40 INJECTION, POWDER, LYOPHILIZED, FOR SOLUTION INTRAVENOUS at 08:09

## 2020-09-26 RX ADMIN — OXYCODONE HYDROCHLORIDE 15 MG: 5 SOLUTION ORAL at 03:09

## 2020-09-26 RX ADMIN — HEPARIN SODIUM 5000 UNITS: 5000 INJECTION INTRAVENOUS; SUBCUTANEOUS at 02:09

## 2020-09-26 RX ADMIN — Medication 10 ML: at 11:09

## 2020-09-26 RX ADMIN — Medication 800 MG: at 10:09

## 2020-09-26 NOTE — SUBJECTIVE & OBJECTIVE
Interval History:   NAEON  Leukocytosis stable since drain placement  Pain controlled  Tolerating diet    Medications:  Continuous Infusions:  Scheduled Meds:   acetaminophen  650 mg Oral Q6H    amLODIPine benzoate  5 mg Oral Daily    cloNIDine 0.3 mg/24 hr td ptwk  1 patch Transdermal Q7 Days    heparin (porcine)  5,000 Units Subcutaneous Q8H    lidocaine  1 patch Transdermal Q24H    magnesium oxide  800 mg Oral BID    metoprolol  25 mg Oral BID    miconazole NITRATE 2 %   Topical (Top) BID    pantoprazole  40 mg Intravenous Daily    sodium chloride 0.9%  10 mL Intravenous Q6H     PRN Meds:albuterol-ipratropium, diphenhydrAMINE, guaifenesin 100 mg/5 ml, hydrALAZINE, labetaloL, melatonin, oxyCODONE, oxyCODONE, Flushing PICC Protocol **AND** sodium chloride 0.9% **AND** sodium chloride 0.9%     Review of patient's allergies indicates:   Allergen Reactions    Dilaudid [hydromorphone] Anaphylaxis and Rash    Latex      Objective:     Vital Signs (Most Recent):  Temp: 97.6 °F (36.4 °C) (09/26/20 0723)  Pulse: 92 (09/26/20 0723)  Resp: 20 (09/26/20 0823)  BP: (!) 150/80 (09/26/20 0723)  SpO2: 98 % (09/26/20 0723) Vital Signs (24h Range):  Temp:  [97.6 °F (36.4 °C)-98.5 °F (36.9 °C)] 97.6 °F (36.4 °C)  Pulse:  [74-92] 92  Resp:  [16-20] 20  SpO2:  [96 %-100 %] 98 %  BP: (139-159)/(80-86) 150/80     Weight: 67.5 kg (148 lb 13 oz)  Body mass index is 27.22 kg/m².    Intake/Output - Last 3 Shifts       09/24 0700 - 09/25 0659 09/25 0700 - 09/26 0659 09/26 0700 - 09/27 0659    P.O. 580 720     I.V. (mL/kg)  0 (0)     Other  0     Total Intake(mL/kg) 580 (8.6) 720 (10.7)     Urine (mL/kg/hr) 900 (0.6) 1875 (1.2)     Emesis/NG output  0     Drains 10 35     Other  0     Stool 0 0     Blood  0     Total Output 910 1910     Net -330 -1190            Stool Occurrence 0 x 0 x           Physical Exam  Constitutional:       General: She is not in acute distress.     Appearance: She is well-developed.   Cardiovascular:       Rate and Rhythm: Normal rate and regular rhythm.   Pulmonary:      Effort: Pulmonary effort is normal. No respiratory distress.   Abdominal:      General: There is no distension.      Palpations: Abdomen is soft.      Tenderness: There is no abdominal tenderness.      Comments: Pelvic drain with serosang output  Minimal output from right upper quadrant drains   Skin:     General: Skin is warm and dry.   Neurological:      Mental Status: She is alert and oriented to person, place, and time.   Psychiatric:         Behavior: Behavior normal.         Significant Labs:  CBC:   Recent Labs   Lab 09/26/20  0446   WBC 23.65*   RBC 2.97*   HGB 8.5*   HCT 27.6*   *   MCV 93   MCH 28.6   MCHC 30.8*     BMP:   Recent Labs   Lab 09/26/20  0446   GLU 89   *   K 4.0   CL 99   CO2 22*   BUN 11   CREATININE 0.9   CALCIUM 9.2   MG 1.3*       Significant Diagnostics:  I have reviewed all pertinent imaging results/findings within the past 24 hours.

## 2020-09-26 NOTE — PROGRESS NOTES
[facility]  General Surgery  Progress Note    Subjective:     History of Present Illness:  Pt is a 40 yo F with recent history of antrectomy with BII reconstruction for ulcer disease at outside hospital. 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 Patient also reportedly aspirated on induction in the OR prior to this, resulting in severe pulmonary edema and hypoxia. Patient was transferred to [facility] urgently for higher level of care. She arrived as a direct SICU admit on near maximal vent settings and requiring low dose vasopressors with HR in the upper 140s. Her midline laparotomy is closed with 4 Navdeep drains in place draining bilious output.     Post-Op Info:  Procedure(s) (LRB):  REMOVAL, CATHETER, CENTRAL VENOUS, TUNNELED (Left)  CLOSURE, WOUND (N/A)   15 Days Post-Op     Interval History:   NAEON  Leukocytosis stable since drain placement  Pain controlled  Tolerating diet    Medications:  Continuous Infusions:  Scheduled Meds:   acetaminophen  650 mg Oral Q6H    amLODIPine benzoate  5 mg Oral Daily    cloNIDine 0.3 mg/24 hr td ptwk  1 patch Transdermal Q7 Days    heparin (porcine)  5,000 Units Subcutaneous Q8H    lidocaine  1 patch Transdermal Q24H    magnesium oxide  800 mg Oral BID    metoprolol  25 mg Oral BID    miconazole NITRATE 2 %   Topical (Top) BID    pantoprazole  40 mg Intravenous Daily    sodium chloride 0.9%  10 mL Intravenous Q6H     PRN Meds:albuterol-ipratropium, diphenhydrAMINE, guaifenesin 100 mg/5 ml, hydrALAZINE, labetaloL, melatonin, oxyCODONE, oxyCODONE, Flushing PICC Protocol **AND** sodium chloride 0.9% **AND** sodium chloride 0.9%     Review of patient's allergies indicates:   Allergen Reactions    Dilaudid [hydromorphone] Anaphylaxis and Rash    Latex      Objective:     Vital Signs (Most Recent):  Temp: 97.6 °F (36.4 °C) (09/26/20 0723)  Pulse: 92 (09/26/20 0723)  Resp: 20 (09/26/20 0823)  BP: (!) 150/80  (09/26/20 0723)  SpO2: 98 % (09/26/20 0723) Vital Signs (24h Range):  Temp:  [97.6 °F (36.4 °C)-98.5 °F (36.9 °C)] 97.6 °F (36.4 °C)  Pulse:  [74-92] 92  Resp:  [16-20] 20  SpO2:  [96 %-100 %] 98 %  BP: (139-159)/(80-86) 150/80     Weight: 67.5 kg (148 lb 13 oz)  Body mass index is 27.22 kg/m².    Intake/Output - Last 3 Shifts       09/24 0700 - 09/25 0659 09/25 0700 - 09/26 0659 09/26 0700 - 09/27 0659    P.O. 580 720     I.V. (mL/kg)  0 (0)     Other  0     Total Intake(mL/kg) 580 (8.6) 720 (10.7)     Urine (mL/kg/hr) 900 (0.6) 1875 (1.2)     Emesis/NG output  0     Drains 10 35     Other  0     Stool 0 0     Blood  0     Total Output 910 1910     Net -330 -1190            Stool Occurrence 0 x 0 x           Physical Exam  Constitutional:       General: She is not in acute distress.     Appearance: She is well-developed.   Cardiovascular:      Rate and Rhythm: Normal rate and regular rhythm.   Pulmonary:      Effort: Pulmonary effort is normal. No respiratory distress.   Abdominal:      General: There is no distension.      Palpations: Abdomen is soft.      Tenderness: There is no abdominal tenderness.      Comments: Pelvic drain with serosang output  Minimal output from right upper quadrant drains   Skin:     General: Skin is warm and dry.   Neurological:      Mental Status: She is alert and oriented to person, place, and time.   Psychiatric:         Behavior: Behavior normal.         Significant Labs:  CBC:   Recent Labs   Lab 09/26/20 0446   WBC 23.65*   RBC 2.97*   HGB 8.5*   HCT 27.6*   *   MCV 93   MCH 28.6   MCHC 30.8*     BMP:   Recent Labs   Lab 09/26/20 0446   GLU 89   *   K 4.0   CL 99   CO2 22*   BUN 11   CREATININE 0.9   CALCIUM 9.2   MG 1.3*       Significant Diagnostics:  I have reviewed all pertinent imaging results/findings within the past 24 hours.    Assessment/Plan:     * Duodenum disorder  38 yo F s/p antrectomy with BII reconstruction c/b duodenal necrosis requiring ex lap,  washout, drainage c/b aspiration event. S/p duodenal resection with d-j and g-j anastomosis on 8/13.    - Slowly backing out right upper quadrant drains  - With leukocytosis stable since drain placement, will hold on antibiotics  - Advanced diet to FLD + Boost  - GI and DVT ppx  - PT / OT  - continue PRNs for BP control    Dispo: she has adequate family support, now planning on sending home. Does not qualify for home health as she does not have insurance.         Christy Nicole MD  General Surgery  [facility]

## 2020-09-26 NOTE — NURSING
Pt resting quietly in the bed watching videos on her smartphone.  Pt has had occasional c/o pain but has tolerated repositioning and moving to BSC with minimal medications.  Pt has had very little output from her drains today.  RAHEL

## 2020-09-26 NOTE — ASSESSMENT & PLAN NOTE
40 yo F s/p antrectomy with BII reconstruction c/b duodenal necrosis requiring ex lap, washout, drainage c/b aspiration event. S/p duodenal resection with d-j and g-j anastomosis on 8/13.    - Slowly backing out right upper quadrant drains  - With leukocytosis stable since drain placement, will hold on antibiotics  - Advanced diet to FLD + Boost  - GI and DVT ppx  - PT / OT  - continue PRNs for BP control    Dispo: she has adequate family support, now planning on sending home. Does not qualify for home health as she does not have insurance.

## 2020-09-26 NOTE — PLAN OF CARE
Plan of care reviewed with pt/verbalized understanding, vss, patient up to bedside commode with assistance, patient full liquid diet tolerating well with no c/o of nausea/vomiting, patient c/o of abdominal pain relieved with prn medication, call-light within reach, will continue to monitor.

## 2020-09-27 LAB
ALBUMIN SERPL BCP-MCNC: 2.1 G/DL (ref 3.5–5.2)
ALP SERPL-CCNC: 166 U/L (ref 55–135)
ALT SERPL W/O P-5'-P-CCNC: 11 U/L (ref 10–44)
ANION GAP SERPL CALC-SCNC: 10 MMOL/L (ref 8–16)
AST SERPL-CCNC: 17 U/L (ref 10–40)
BASOPHILS # BLD AUTO: 0.08 K/UL (ref 0–0.2)
BASOPHILS NFR BLD: 0.4 % (ref 0–1.9)
BILIRUB SERPL-MCNC: 0.3 MG/DL (ref 0.1–1)
BUN SERPL-MCNC: 8 MG/DL (ref 6–20)
CALCIUM SERPL-MCNC: 9.1 MG/DL (ref 8.7–10.5)
CHLORIDE SERPL-SCNC: 101 MMOL/L (ref 95–110)
CO2 SERPL-SCNC: 22 MMOL/L (ref 23–29)
CREAT SERPL-MCNC: 0.8 MG/DL (ref 0.5–1.4)
DIFFERENTIAL METHOD: ABNORMAL
EOSINOPHIL # BLD AUTO: 0.2 K/UL (ref 0–0.5)
EOSINOPHIL NFR BLD: 0.7 % (ref 0–8)
ERYTHROCYTE [DISTWIDTH] IN BLOOD BY AUTOMATED COUNT: 14.1 % (ref 11.5–14.5)
EST. GFR  (AFRICAN AMERICAN): >60 ML/MIN/1.73 M^2
EST. GFR  (NON AFRICAN AMERICAN): >60 ML/MIN/1.73 M^2
GLUCOSE SERPL-MCNC: 92 MG/DL (ref 70–110)
HCT VFR BLD AUTO: 28.2 % (ref 37–48.5)
HGB BLD-MCNC: 8.7 G/DL (ref 12–16)
IMM GRANULOCYTES # BLD AUTO: 0.11 K/UL (ref 0–0.04)
IMM GRANULOCYTES NFR BLD AUTO: 0.5 % (ref 0–0.5)
LYMPHOCYTES # BLD AUTO: 4.2 K/UL (ref 1–4.8)
LYMPHOCYTES NFR BLD: 18.8 % (ref 18–48)
MAGNESIUM SERPL-MCNC: 1.4 MG/DL (ref 1.6–2.6)
MCH RBC QN AUTO: 29.2 PG (ref 27–31)
MCHC RBC AUTO-ENTMCNC: 30.9 G/DL (ref 32–36)
MCV RBC AUTO: 95 FL (ref 82–98)
MONOCYTES # BLD AUTO: 1.4 K/UL (ref 0.3–1)
MONOCYTES NFR BLD: 6.3 % (ref 4–15)
NEUTROPHILS # BLD AUTO: 16.2 K/UL (ref 1.8–7.7)
NEUTROPHILS NFR BLD: 73.3 % (ref 38–73)
NRBC BLD-RTO: 0 /100 WBC
PHOSPHATE SERPL-MCNC: 5.8 MG/DL (ref 2.7–4.5)
PLATELET # BLD AUTO: 463 K/UL (ref 150–350)
PMV BLD AUTO: 9.4 FL (ref 9.2–12.9)
POTASSIUM SERPL-SCNC: 3.8 MMOL/L (ref 3.5–5.1)
PROT SERPL-MCNC: 8.1 G/DL (ref 6–8.4)
RBC # BLD AUTO: 2.98 M/UL (ref 4–5.4)
SODIUM SERPL-SCNC: 133 MMOL/L (ref 136–145)
WBC # BLD AUTO: 22.12 K/UL (ref 3.9–12.7)

## 2020-09-27 PROCEDURE — 80053 COMPREHEN METABOLIC PANEL: CPT

## 2020-09-27 PROCEDURE — 63600175 PHARM REV CODE 636 W HCPCS: Performed by: SURGERY

## 2020-09-27 PROCEDURE — 83735 ASSAY OF MAGNESIUM: CPT

## 2020-09-27 PROCEDURE — 25000242 PHARM REV CODE 250 ALT 637 W/ HCPCS: Performed by: STUDENT IN AN ORGANIZED HEALTH CARE EDUCATION/TRAINING PROGRAM

## 2020-09-27 PROCEDURE — 63600175 PHARM REV CODE 636 W HCPCS: Performed by: STUDENT IN AN ORGANIZED HEALTH CARE EDUCATION/TRAINING PROGRAM

## 2020-09-27 PROCEDURE — 20600001 HC STEP DOWN PRIVATE ROOM

## 2020-09-27 PROCEDURE — 25000003 PHARM REV CODE 250: Performed by: SURGERY

## 2020-09-27 PROCEDURE — 84100 ASSAY OF PHOSPHORUS: CPT

## 2020-09-27 PROCEDURE — A4216 STERILE WATER/SALINE, 10 ML: HCPCS | Performed by: SURGERY

## 2020-09-27 PROCEDURE — 25000003 PHARM REV CODE 250: Performed by: STUDENT IN AN ORGANIZED HEALTH CARE EDUCATION/TRAINING PROGRAM

## 2020-09-27 PROCEDURE — 85025 COMPLETE CBC W/AUTO DIFF WBC: CPT

## 2020-09-27 PROCEDURE — C9113 INJ PANTOPRAZOLE SODIUM, VIA: HCPCS | Performed by: SURGERY

## 2020-09-27 RX ORDER — MAGNESIUM SULFATE HEPTAHYDRATE 40 MG/ML
2 INJECTION, SOLUTION INTRAVENOUS ONCE
Status: COMPLETED | OUTPATIENT
Start: 2020-09-27 | End: 2020-09-27

## 2020-09-27 RX ADMIN — MICONAZOLE NITRATE: 20 POWDER TOPICAL at 08:09

## 2020-09-27 RX ADMIN — Medication 10 ML: at 12:09

## 2020-09-27 RX ADMIN — Medication 10 ML: at 06:09

## 2020-09-27 RX ADMIN — ACETAMINOPHEN 650 MG: 160 SOLUTION ORAL at 05:09

## 2020-09-27 RX ADMIN — MICONAZOLE NITRATE: 20 POWDER TOPICAL at 09:09

## 2020-09-27 RX ADMIN — OXYCODONE HYDROCHLORIDE 15 MG: 5 SOLUTION ORAL at 02:09

## 2020-09-27 RX ADMIN — MAGNESIUM SULFATE 2 G: 2 INJECTION INTRAVENOUS at 09:09

## 2020-09-27 RX ADMIN — HEPARIN SODIUM 5000 UNITS: 5000 INJECTION INTRAVENOUS; SUBCUTANEOUS at 09:09

## 2020-09-27 RX ADMIN — PANTOPRAZOLE SODIUM 40 MG: 40 INJECTION, POWDER, LYOPHILIZED, FOR SOLUTION INTRAVENOUS at 09:09

## 2020-09-27 RX ADMIN — OXYCODONE HYDROCHLORIDE 15 MG: 5 SOLUTION ORAL at 04:09

## 2020-09-27 RX ADMIN — OXYCODONE HYDROCHLORIDE 15 MG: 5 SOLUTION ORAL at 08:09

## 2020-09-27 RX ADMIN — HEPARIN SODIUM 5000 UNITS: 5000 INJECTION INTRAVENOUS; SUBCUTANEOUS at 01:09

## 2020-09-27 RX ADMIN — METOPROLOL TARTRATE 25 MG: 25 TABLET ORAL at 09:09

## 2020-09-27 RX ADMIN — HEPARIN SODIUM 5000 UNITS: 5000 INJECTION INTRAVENOUS; SUBCUTANEOUS at 05:09

## 2020-09-27 RX ADMIN — METOPROLOL TARTRATE 25 MG: 25 TABLET ORAL at 08:09

## 2020-09-27 RX ADMIN — ACETAMINOPHEN 650 MG: 160 SOLUTION ORAL at 11:09

## 2020-09-27 NOTE — SUBJECTIVE & OBJECTIVE
Interval History:   NAEON  Drains with minimal output  Leukocytosis stable    Medications:  Continuous Infusions:  Scheduled Meds:   acetaminophen  650 mg Oral Q6H    amLODIPine benzoate  5 mg Oral Daily    cloNIDine 0.3 mg/24 hr td ptwk  1 patch Transdermal Q7 Days    heparin (porcine)  5,000 Units Subcutaneous Q8H    lidocaine  1 patch Transdermal Q24H    magnesium sulfate IVPB  2 g Intravenous Once    metoprolol  25 mg Oral BID    miconazole NITRATE 2 %   Topical (Top) BID    pantoprazole  40 mg Intravenous Daily    sodium chloride 0.9%  10 mL Intravenous Q6H     PRN Meds:albuterol-ipratropium, diphenhydrAMINE, guaifenesin 100 mg/5 ml, hydrALAZINE, labetaloL, melatonin, oxyCODONE, oxyCODONE, Flushing PICC Protocol **AND** sodium chloride 0.9% **AND** sodium chloride 0.9%     Review of patient's allergies indicates:   Allergen Reactions    Dilaudid [hydromorphone] Anaphylaxis and Rash    Latex      Objective:     Vital Signs (Most Recent):  Temp: 98.2 °F (36.8 °C) (09/27/20 0900)  Pulse: 84 (09/27/20 0900)  Resp: 16 (09/27/20 0900)  BP: 139/78 (09/27/20 0900)  SpO2: 100 % (09/27/20 0900) Vital Signs (24h Range):  Temp:  [97.6 °F (36.4 °C)-98.2 °F (36.8 °C)] 98.2 °F (36.8 °C)  Pulse:  [] 84  Resp:  [16-19] 16  SpO2:  [96 %-100 %] 100 %  BP: (131-148)/(75-82) 139/78     Weight: 67.5 kg (148 lb 13 oz)  Body mass index is 27.22 kg/m².    Intake/Output - Last 3 Shifts       09/25 0700 - 09/26 0659 09/26 0700 - 09/27 0659 09/27 0700 - 09/28 0659    P.O. 720 1037     I.V. (mL/kg) 0 (0)      Other 0      Total Intake(mL/kg) 720 (10.7) 1037 (15.4)     Urine (mL/kg/hr) 1875 (1.2) 0 (0)     Emesis/NG output 0      Drains 35 27.5     Other 0      Stool 0 0     Blood 0      Total Output 1910 27.5     Net -1190 +1009.5            Urine Occurrence  5 x     Stool Occurrence 0 x 2 x 0 x          Physical Exam  Constitutional:       General: She is not in acute distress.     Appearance: She is well-developed.    Cardiovascular:      Rate and Rhythm: Normal rate and regular rhythm.   Pulmonary:      Effort: Pulmonary effort is normal. No respiratory distress.   Abdominal:      General: There is no distension.      Palpations: Abdomen is soft.      Tenderness: There is no abdominal tenderness.      Comments: Buttock drain with dark thin output  RUQ drains with minimal output   Skin:     General: Skin is warm and dry.   Neurological:      Mental Status: She is alert and oriented to person, place, and time.   Psychiatric:         Behavior: Behavior normal.         Significant Labs:  CBC:   Recent Labs   Lab 09/27/20  0500   WBC 22.12*   RBC 2.98*   HGB 8.7*   HCT 28.2*   *   MCV 95   MCH 29.2   MCHC 30.9*     CMP:   Recent Labs   Lab 09/27/20  0500   GLU 92   CALCIUM 9.1   ALBUMIN 2.1*   PROT 8.1   *   K 3.8   CO2 22*      BUN 8   CREATININE 0.8   ALKPHOS 166*   ALT 11   AST 17   BILITOT 0.3       Significant Diagnostics:  I have reviewed all pertinent imaging results/findings within the past 24 hours.

## 2020-09-27 NOTE — PLAN OF CARE
Promote optimized nutritional intake. Develop trust and rapport by proactively providing information, encouraging questions, and concerns.

## 2020-09-27 NOTE — PROGRESS NOTES
[facility]  General Surgery  Progress Note    Subjective:     History of Present Illness:  Pt is a 40 yo F with recent history of antrectomy with BII reconstruction for ulcer disease at outside hospital. 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 Patient also reportedly aspirated on induction in the OR prior to this, resulting in severe pulmonary edema and hypoxia. Patient [place] [facility] [place] of care. She arrived as a direct SICU admit on near maximal vent settings and requiring low dose vasopressors with HR in the upper 140s. Her midline laparotomy is closed with 4 Navdeep drains in place draining bilious output.     Post-Op Info:  Procedure(s) (LRB):  REMOVAL, CATHETER, CENTRAL VENOUS, TUNNELED (Left)  CLOSURE, WOUND (N/A)   16 Days Post-Op     Interval History:   NAEON  Drains with minimal output  Leukocytosis stable    Medications:  Continuous Infusions:  Scheduled Meds:   acetaminophen  650 mg Oral Q6H    amLODIPine benzoate  5 mg Oral Daily    cloNIDine 0.3 mg/24 hr td ptwk  1 patch Transdermal Q7 Days    heparin (porcine)  5,000 Units Subcutaneous Q8H    lidocaine  1 patch Transdermal Q24H    magnesium sulfate IVPB  2 g Intravenous Once    metoprolol  25 mg Oral BID    miconazole NITRATE 2 %   Topical (Top) BID    pantoprazole  40 mg Intravenous Daily    sodium chloride 0.9%  10 mL Intravenous Q6H     PRN Meds:albuterol-ipratropium, diphenhydrAMINE, guaifenesin 100 mg/5 ml, hydrALAZINE, labetaloL, melatonin, oxyCODONE, oxyCODONE, Flushing PICC Protocol **AND** sodium chloride 0.9% **AND** sodium chloride 0.9%     Review of patient's allergies indicates:   Allergen Reactions    Dilaudid [hydromorphone] Anaphylaxis and Rash    Latex      Objective:     Vital Signs (Most Recent):  Temp: 98.2 °F (36.8 °C) (09/27/20 0900)  Pulse: 84 (09/27/20 0900)  Resp: 16 (09/27/20 0900)  BP: 139/78 (09/27/20 0900)  SpO2:  100 % (09/27/20 0900) Vital Signs (24h Range):  Temp:  [97.6 °F (36.4 °C)-98.2 °F (36.8 °C)] 98.2 °F (36.8 °C)  Pulse:  [] 84  Resp:  [16-19] 16  SpO2:  [96 %-100 %] 100 %  BP: (131-148)/(75-82) 139/78     Weight: 67.5 kg (148 lb 13 oz)  Body mass index is 27.22 kg/m².    Intake/Output - Last 3 Shifts       09/25 0700 - 09/26 0659 09/26 0700 - 09/27 0659 09/27 0700 - 09/28 0659    P.O. 720 1037     I.V. (mL/kg) 0 (0)      Other 0      Total Intake(mL/kg) 720 (10.7) 1037 (15.4)     Urine (mL/kg/hr) 1875 (1.2) 0 (0)     Emesis/NG output 0      Drains 35 27.5     Other 0      Stool 0 0     Blood 0      Total Output 1910 27.5     Net -1190 +1009.5            Urine Occurrence  5 x     Stool Occurrence 0 x 2 x 0 x          Physical Exam  Constitutional:       General: She is not in acute distress.     Appearance: She is well-developed.   Cardiovascular:      Rate and Rhythm: Normal rate and regular rhythm.   Pulmonary:      Effort: Pulmonary effort is normal. No respiratory distress.   Abdominal:      General: There is no distension.      Palpations: Abdomen is soft.      Tenderness: There is no abdominal tenderness.      Comments: Buttock drain with dark thin output  RUQ drains with minimal output   Skin:     General: Skin is warm and dry.   Neurological:      Mental Status: She is alert and oriented to person, place, and time.   Psychiatric:         Behavior: Behavior normal.         Significant Labs:  CBC:   Recent Labs   Lab 09/27/20  0500   WBC 22.12*   RBC 2.98*   HGB 8.7*   HCT 28.2*   *   MCV 95   MCH 29.2   MCHC 30.9*     CMP:   Recent Labs   Lab 09/27/20  0500   GLU 92   CALCIUM 9.1   ALBUMIN 2.1*   PROT 8.1   *   K 3.8   CO2 22*      BUN 8   CREATININE 0.8   ALKPHOS 166*   ALT 11   AST 17   BILITOT 0.3       Significant Diagnostics:  I have reviewed all pertinent imaging results/findings within the past 24 hours.    Assessment/Plan:     * Duodenum disorder  38 yo F s/p antrectomy with  BII reconstruction c/b duodenal necrosis requiring ex lap, washout, drainage c/b aspiration event. S/p duodenal resection with d-j and g-j anastomosis on 8/13.    - Slowly backing out right upper quadrant drains  - With leukocytosis stable since drain placement, will hold on antibiotics  - Advanced diet to FLD + Boost  - GI and DVT ppx  - PT / OT  - continue PRNs for BP control    Dispo: she has adequate family support, now planning on sending home. Does not qualify for home health as she does not have insurance.         Christy Nicole MD  General Surgery  [facility]

## 2020-09-27 NOTE — PLAN OF CARE
POC reviewed w/ pt, verbalized understanding. Pt AAOx4. VSS on RA. Pain managed with PRN pain meds. Pt voids per BSC, with adequate UOP overnight. Pt tolerating full liquid diet with no c/o nausea. Pt slept between care. Frequent rounds made for pt safety. Call light in reach and bed in lowest position. WCTM

## 2020-09-28 LAB
ALBUMIN SERPL BCP-MCNC: 2.1 G/DL (ref 3.5–5.2)
ALP SERPL-CCNC: 157 U/L (ref 55–135)
ALT SERPL W/O P-5'-P-CCNC: 11 U/L (ref 10–44)
ANION GAP SERPL CALC-SCNC: 8 MMOL/L (ref 8–16)
ANISOCYTOSIS BLD QL SMEAR: SLIGHT
AST SERPL-CCNC: 17 U/L (ref 10–40)
BASOPHILS # BLD AUTO: 0.08 K/UL (ref 0–0.2)
BASOPHILS NFR BLD: 0.4 % (ref 0–1.9)
BILIRUB SERPL-MCNC: 0.3 MG/DL (ref 0.1–1)
BUN SERPL-MCNC: 7 MG/DL (ref 6–20)
CALCIUM SERPL-MCNC: 9.2 MG/DL (ref 8.7–10.5)
CHLORIDE SERPL-SCNC: 100 MMOL/L (ref 95–110)
CO2 SERPL-SCNC: 23 MMOL/L (ref 23–29)
CREAT SERPL-MCNC: 0.8 MG/DL (ref 0.5–1.4)
DIFFERENTIAL METHOD: ABNORMAL
EOSINOPHIL # BLD AUTO: 0.3 K/UL (ref 0–0.5)
EOSINOPHIL NFR BLD: 1.2 % (ref 0–8)
ERYTHROCYTE [DISTWIDTH] IN BLOOD BY AUTOMATED COUNT: 14.1 % (ref 11.5–14.5)
EST. GFR  (AFRICAN AMERICAN): >60 ML/MIN/1.73 M^2
EST. GFR  (NON AFRICAN AMERICAN): >60 ML/MIN/1.73 M^2
GLUCOSE SERPL-MCNC: 86 MG/DL (ref 70–110)
HCT VFR BLD AUTO: 28.7 % (ref 37–48.5)
HGB BLD-MCNC: 8.6 G/DL (ref 12–16)
IMM GRANULOCYTES # BLD AUTO: 0.12 K/UL (ref 0–0.04)
IMM GRANULOCYTES NFR BLD AUTO: 0.6 % (ref 0–0.5)
LYMPHOCYTES # BLD AUTO: 4.4 K/UL (ref 1–4.8)
LYMPHOCYTES NFR BLD: 20.3 % (ref 18–48)
MAGNESIUM SERPL-MCNC: 1.6 MG/DL (ref 1.6–2.6)
MCH RBC QN AUTO: 28.2 PG (ref 27–31)
MCHC RBC AUTO-ENTMCNC: 30 G/DL (ref 32–36)
MCV RBC AUTO: 94 FL (ref 82–98)
MONOCYTES # BLD AUTO: 1.4 K/UL (ref 0.3–1)
MONOCYTES NFR BLD: 6.6 % (ref 4–15)
NEUTROPHILS # BLD AUTO: 15.3 K/UL (ref 1.8–7.7)
NEUTROPHILS NFR BLD: 70.9 % (ref 38–73)
NRBC BLD-RTO: 0 /100 WBC
PHOSPHATE SERPL-MCNC: 5.4 MG/DL (ref 2.7–4.5)
PLATELET # BLD AUTO: 493 K/UL (ref 150–350)
PLATELET BLD QL SMEAR: ABNORMAL
PMV BLD AUTO: 9.7 FL (ref 9.2–12.9)
POTASSIUM SERPL-SCNC: 3.7 MMOL/L (ref 3.5–5.1)
PROT SERPL-MCNC: 8 G/DL (ref 6–8.4)
RBC # BLD AUTO: 3.05 M/UL (ref 4–5.4)
SODIUM SERPL-SCNC: 131 MMOL/L (ref 136–145)
TRIGL SERPL-MCNC: 183 MG/DL (ref 30–150)
WBC # BLD AUTO: 21.61 K/UL (ref 3.9–12.7)

## 2020-09-28 PROCEDURE — 80053 COMPREHEN METABOLIC PANEL: CPT

## 2020-09-28 PROCEDURE — A4216 STERILE WATER/SALINE, 10 ML: HCPCS | Performed by: SURGERY

## 2020-09-28 PROCEDURE — 63600175 PHARM REV CODE 636 W HCPCS: Performed by: STUDENT IN AN ORGANIZED HEALTH CARE EDUCATION/TRAINING PROGRAM

## 2020-09-28 PROCEDURE — 83735 ASSAY OF MAGNESIUM: CPT

## 2020-09-28 PROCEDURE — 25000242 PHARM REV CODE 250 ALT 637 W/ HCPCS: Performed by: STUDENT IN AN ORGANIZED HEALTH CARE EDUCATION/TRAINING PROGRAM

## 2020-09-28 PROCEDURE — 85025 COMPLETE CBC W/AUTO DIFF WBC: CPT

## 2020-09-28 PROCEDURE — 63600175 PHARM REV CODE 636 W HCPCS: Performed by: SURGERY

## 2020-09-28 PROCEDURE — 97803 MED NUTRITION INDIV SUBSEQ: CPT

## 2020-09-28 PROCEDURE — 25000003 PHARM REV CODE 250: Performed by: SURGERY

## 2020-09-28 PROCEDURE — C9113 INJ PANTOPRAZOLE SODIUM, VIA: HCPCS | Performed by: SURGERY

## 2020-09-28 PROCEDURE — 84100 ASSAY OF PHOSPHORUS: CPT

## 2020-09-28 PROCEDURE — 84478 ASSAY OF TRIGLYCERIDES: CPT

## 2020-09-28 PROCEDURE — 25000003 PHARM REV CODE 250: Performed by: STUDENT IN AN ORGANIZED HEALTH CARE EDUCATION/TRAINING PROGRAM

## 2020-09-28 PROCEDURE — 20600001 HC STEP DOWN PRIVATE ROOM

## 2020-09-28 RX ORDER — PANTOPRAZOLE SODIUM 40 MG/1
40 TABLET, DELAYED RELEASE ORAL DAILY
Status: DISCONTINUED | OUTPATIENT
Start: 2020-09-29 | End: 2020-09-29 | Stop reason: HOSPADM

## 2020-09-28 RX ADMIN — MICONAZOLE NITRATE: 20 POWDER TOPICAL at 10:09

## 2020-09-28 RX ADMIN — ACETAMINOPHEN 650 MG: 160 SOLUTION ORAL at 06:09

## 2020-09-28 RX ADMIN — MICONAZOLE NITRATE: 20 POWDER TOPICAL at 09:09

## 2020-09-28 RX ADMIN — PANTOPRAZOLE SODIUM 40 MG: 40 INJECTION, POWDER, LYOPHILIZED, FOR SOLUTION INTRAVENOUS at 09:09

## 2020-09-28 RX ADMIN — HEPARIN SODIUM 5000 UNITS: 5000 INJECTION INTRAVENOUS; SUBCUTANEOUS at 05:09

## 2020-09-28 RX ADMIN — Medication 10 ML: at 06:09

## 2020-09-28 RX ADMIN — ACETAMINOPHEN 650 MG: 160 SOLUTION ORAL at 01:09

## 2020-09-28 RX ADMIN — HEPARIN SODIUM 5000 UNITS: 5000 INJECTION INTRAVENOUS; SUBCUTANEOUS at 09:09

## 2020-09-28 RX ADMIN — METOPROLOL TARTRATE 25 MG: 25 TABLET ORAL at 09:09

## 2020-09-28 RX ADMIN — OXYCODONE HYDROCHLORIDE 15 MG: 5 SOLUTION ORAL at 01:09

## 2020-09-28 RX ADMIN — ACETAMINOPHEN 650 MG: 160 SOLUTION ORAL at 05:09

## 2020-09-28 RX ADMIN — OXYCODONE HYDROCHLORIDE 15 MG: 5 SOLUTION ORAL at 09:09

## 2020-09-28 RX ADMIN — HEPARIN SODIUM 5000 UNITS: 5000 INJECTION INTRAVENOUS; SUBCUTANEOUS at 01:09

## 2020-09-28 RX ADMIN — Medication 10 ML: at 12:09

## 2020-09-28 RX ADMIN — AMLODIPINE 5 MG: 1 SUSPENSION ORAL at 09:09

## 2020-09-28 NOTE — PLAN OF CARE
Patient with one drain out today and the other drain slowly being pulled back until it is discontinued.  Patient is expected to discharge home with no needs +/- 9/30/2020.  Will continue to follow for needs.       09/28/20 6406   Discharge Reassessment   Assessment Type Discharge Planning Reassessment   Discharge Plan A Home with family   Discharge Plan B Home with family   Anticipated Discharge Disposition Home   Post-Acute Status   Other Status No Post-Acute Service Needs

## 2020-09-28 NOTE — PROGRESS NOTES
[facility]  General Surgery  Progress Note    Subjective:     History of Present Illness:  Pt is a 38 yo F with recent history of antrectomy with BII reconstruction for ulcer disease at outside hospital. 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 Patient also reportedly aspirated on induction in the OR prior to this, resulting in severe pulmonary edema and hypoxia. Patient was transferred to [facility] urgently for higher level of care. She arrived as a direct SICU admit on near maximal vent settings and requiring low dose vasopressors with HR in the upper 140s. Her midline laparotomy is closed with 4 Navdeep drains in place draining bilious output.     Post-Op Info:  Procedure(s) (LRB):  REMOVAL, CATHETER, CENTRAL VENOUS, TUNNELED (Left)  CLOSURE, WOUND (N/A)   17 Days Post-Op     Interval History:   NAEON  Drains with minimal output. Continue to back out.   Leukocytosis trending down.     Medications:  Continuous Infusions:  Scheduled Meds:   acetaminophen  650 mg Oral Q6H    amLODIPine benzoate  5 mg Oral Daily    cloNIDine 0.3 mg/24 hr td ptwk  1 patch Transdermal Q7 Days    heparin (porcine)  5,000 Units Subcutaneous Q8H    lidocaine  1 patch Transdermal Q24H    metoprolol  25 mg Oral BID    miconazole NITRATE 2 %   Topical (Top) BID    pantoprazole  40 mg Intravenous Daily    sodium chloride 0.9%  10 mL Intravenous Q6H     PRN Meds:albuterol-ipratropium, diphenhydrAMINE, guaifenesin 100 mg/5 ml, hydrALAZINE, labetaloL, melatonin, oxyCODONE, oxyCODONE, Flushing PICC Protocol **AND** sodium chloride 0.9% **AND** sodium chloride 0.9%     Review of patient's allergies indicates:   Allergen Reactions    Dilaudid [hydromorphone] Anaphylaxis and Rash    Latex      Objective:     Vital Signs (Most Recent):  Temp: 97.7 °F (36.5 °C) (09/28/20 0749)  Pulse: 79 (09/28/20 0749)  Resp: 17 (09/28/20 0749)  BP: 138/83 (09/28/20 0749)  SpO2: 99 % (09/28/20  0749) Vital Signs (24h Range):  Temp:  [96.8 °F (36 °C)-98.6 °F (37 °C)] 97.7 °F (36.5 °C)  Pulse:  [79-98] 79  Resp:  [16-18] 17  SpO2:  [97 %-100 %] 99 %  BP: (127-151)/(78-84) 138/83     Weight: 67.5 kg (148 lb 13 oz)  Body mass index is 27.22 kg/m².    Intake/Output - Last 3 Shifts       09/26 0700 - 09/27 0659 09/27 0700 - 09/28 0659 09/28 0700 - 09/29 0659    P.O. 1037      I.V. (mL/kg)       Other       Total Intake(mL/kg) 1037 (15.4)      Urine (mL/kg/hr) 0 (0) 0 (0)     Emesis/NG output       Drains 27.5 10     Other       Stool 0 0     Blood       Total Output 27.5 10     Net +1009.5 -10            Urine Occurrence 5 x 8 x     Stool Occurrence 2 x 0 x           Physical Exam  Constitutional:       General: She is not in acute distress.     Appearance: She is well-developed.   Cardiovascular:      Rate and Rhythm: Normal rate and regular rhythm.   Pulmonary:      Effort: Pulmonary effort is normal. No respiratory distress.   Abdominal:      General: There is no distension.      Palpations: Abdomen is soft.      Tenderness: There is no abdominal tenderness.      Comments: Buttock drain with dark thin output  RUQ drains with minimal output   Skin:     General: Skin is warm and dry.   Neurological:      Mental Status: She is alert and oriented to person, place, and time.   Psychiatric:         Behavior: Behavior normal.         Significant Labs:  CBC:   Recent Labs   Lab 09/28/20  0510   WBC 21.61*   RBC 3.05*   HGB 8.6*   HCT 28.7*   *   MCV 94   MCH 28.2   MCHC 30.0*     CMP:   Recent Labs   Lab 09/28/20  0510   GLU 86   CALCIUM 9.2   ALBUMIN 2.1*   PROT 8.0   *   K 3.7   CO2 23      BUN 7   CREATININE 0.8   ALKPHOS 157*   ALT 11   AST 17   BILITOT 0.3       Significant Diagnostics:  I have reviewed all pertinent imaging results/findings within the past 24 hours.    Assessment/Plan:     * Duodenum disorder  38 yo F s/p antrectomy with BII reconstruction c/b duodenal necrosis requiring ex  lap, washout, drainage c/b aspiration event. S/p duodenal resection with d-j and g-j anastomosis on 8/13.    - Slowly backing out right upper quadrant drains  - With leukocytosis stable since drain placement, will hold on antibiotics  - Advanced diet to FLD + Boost  - GI and DVT ppx  - PT / OT  - continue PRNs for BP control    Dispo: she has adequate family support, now planning on sending home. Does not qualify for home health as she does not have insurance.         Collette Ferreira MD  General Surgery  [facility]

## 2020-09-28 NOTE — PROGRESS NOTES
"[facility] [address]  Adult Nutrition  Progress Note    SUMMARY       Recommendations    Pt meets chronic severe malnutrition.     1. As medically able, ADAT to regular + Boost Plus with texture per SLP.      2. Encourage po and ONS intake.     3. RD following.      Goals: 1.) Pt to meet >75% of estimated energy and protein needs over the course of 7 days.  Nutrition Goal Status: progressing towards goal  Communication of RD Recs: (POC)    Reason for Assessment    Reason For Assessment: RD follow-up  Diagnosis: (Duodenum disorder)  Relevant Medical History: HTN, anxiety, respiratory distress, leukocytosis, sepsis, pulmonary HTN  Interdisciplinary Rounds: attended  General Information Comments: TPN + IV lipids discontinued, diet advanced to fluid liquids. Pt resting in bed with no family members at bedside. Pt reports good appetite at this time. Pt reports eating all of full liquids and 2 Boost Plus/day. Denies N/V/D/C. Noted several ONS at bedside. Noted wt loss since admit, but possibly fluid related. NFPE completed 8/17, 9/22. Pt continues with severe muscle/fat wasting. Pt meets chronic severe malnutrition.  Nutrition Discharge Planning: Unable to determine at this time.    Nutrition Risk Screen    Nutrition Risk Screen: no indicators present    Nutrition/Diet History    Spiritual, Cultural Beliefs, Hoahaoism Practices, Values that Affect Care: no  Food Allergies: NKFA  Factors Affecting Nutritional Intake: altered gastrointestinal function, decreased appetite(full liquid diet)    Anthropometrics    Temp: 97.7 °F (36.5 °C)  Height Method: Stated(from outside records)  Height: 5' 2" (157.5 cm)  Height (inches): 62 in  Weight Method: Bed Scale  Weight: 67.5 kg (148 lb 13 oz)  Weight (lb): 148.81 lb  Ideal Body Weight (IBW), Female: 110 lb  % Ideal Body Weight, Female (lb): 129.09 %  BMI (Calculated): 27.2  BMI Grade: 25 - 29.9 - overweight  Usual Body Weight (UBW), kg: (No wt history)  Weight Loss Since " Admission: 30 lb 6.8 oz(17% weight loss since admission 8/12)     Lab/Procedures/Meds    Pertinent Labs Reviewed: reviewed  Pertinent Labs Comments: BUN 25, phos 4.9, alk phos 150, ALT 49  Pertinent Medications Reviewed: reviewed  Pertinent Medications Comments: noted    Estimated/Assessed Needs    Weight Used For Calorie Calculations: 67.5 kg (148 lb 13 oz)  Energy Calorie Requirements (kcal): 3564-3249 kcal/day  Energy Need Method: Kcal/kg(25-30)  Protein Requirements:  gm (1.2-1.5gm/kg)  Weight Used For Protein Calculations: 77 kg (169 lb 12.1 oz)  Fluid Requirements (mL): 1mL/kcal or per MD recommendations  Estimated Fluid Requirement Method: RDA Method  RDA Method (mL): 1687     Nutrition Prescription Ordered    Current Diet Order: Full Liquid  Oral Nutrition Supplement: Boost Plus    Evaluation of Received Nutrient/Fluid Intake    Comments: LBM 9/26  Tolerance: tolerating  % Intake of Estimated Energy Needs: 25 - 50 %  % Meal Intake: 75 - 100 % per pt    Nutrition Risk    Level of Risk/Frequency of Follow-up: (f/u 1 x wk)     Assessment and Plan    Severe malnutrition  Nutrition Problem:  Severe Protein-Calorie Malnutrition  Malnutrition in the context of Chronic Illness/Injury     Related to (etiology):  Inadequate Energy Intake      Signs and Symptoms (as evidenced by):  Energy Intake: <50% of estimated energy requirement for 1 month; <75% of estimated energy requirements for > 3 months   Body Fat Depletion: severe depletion of orbitals, triceps   Muscle Mass Depletion: severe depletion of temples and clavicle region   Weight Loss: JOSE ARMANDO      Interventions (treatment strategy):  Collaboration of nutrition care with other providers  Modified diet - Full Liquid   Commercial Beverage - Boost Plus     Nutrition Diagnosis Status:  Continues            Monitor and Evaluation    Food and Nutrient Intake: energy intake, food and beverage intake  Food and Nutrient Adminstration: diet order  Physical Activity and  Function: nutrition-related ADLs and IADLs  Anthropometric Measurements: weight, weight change  Biochemical Data, Medical Tests and Procedures: electrolyte and renal panel, gastrointestinal profile  Nutrition-Focused Physical Findings: overall appearance, skin     Malnutrition Assessment  Malnutrition Type: chronic illness          Weight Loss (Malnutrition): (JOSE ARMANDO)  Energy Intake (Malnutrition): less than 75% for greater than or equal to 3 months(<50% for greater than 1 month)   Orbital Region (Subcutaneous Fat Loss): severe depletion  Upper Arm Region (Subcutaneous Fat Loss): severe depletion   Sikh Region (Muscle Loss): severe depletion  Clavicle Bone Region (Muscle Loss): severe depletion  Clavicle and Acromion Bone Region (Muscle Loss): severe depletion  Patellar Region (Muscle Loss): severe depletion  Posterior Calf Region (Muscle Loss): mild depletion(edema)                 Nutrition Follow-Up    RD Follow-up?: Yes

## 2020-09-28 NOTE — SUBJECTIVE & OBJECTIVE
Interval History:   NAEON  Drains with minimal output. Continue to back out.   Leukocytosis trending down.     Medications:  Continuous Infusions:  Scheduled Meds:   acetaminophen  650 mg Oral Q6H    amLODIPine benzoate  5 mg Oral Daily    cloNIDine 0.3 mg/24 hr td ptwk  1 patch Transdermal Q7 Days    heparin (porcine)  5,000 Units Subcutaneous Q8H    lidocaine  1 patch Transdermal Q24H    metoprolol  25 mg Oral BID    miconazole NITRATE 2 %   Topical (Top) BID    pantoprazole  40 mg Intravenous Daily    sodium chloride 0.9%  10 mL Intravenous Q6H     PRN Meds:albuterol-ipratropium, diphenhydrAMINE, guaifenesin 100 mg/5 ml, hydrALAZINE, labetaloL, melatonin, oxyCODONE, oxyCODONE, Flushing PICC Protocol **AND** sodium chloride 0.9% **AND** sodium chloride 0.9%     Review of patient's allergies indicates:   Allergen Reactions    Dilaudid [hydromorphone] Anaphylaxis and Rash    Latex      Objective:     Vital Signs (Most Recent):  Temp: 97.7 °F (36.5 °C) (09/28/20 0749)  Pulse: 79 (09/28/20 0749)  Resp: 17 (09/28/20 0749)  BP: 138/83 (09/28/20 0749)  SpO2: 99 % (09/28/20 0749) Vital Signs (24h Range):  Temp:  [96.8 °F (36 °C)-98.6 °F (37 °C)] 97.7 °F (36.5 °C)  Pulse:  [79-98] 79  Resp:  [16-18] 17  SpO2:  [97 %-100 %] 99 %  BP: (127-151)/(78-84) 138/83     Weight: 67.5 kg (148 lb 13 oz)  Body mass index is 27.22 kg/m².    Intake/Output - Last 3 Shifts       09/26 0700 - 09/27 0659 09/27 0700 - 09/28 0659 09/28 0700 - 09/29 0659    P.O. 1037      I.V. (mL/kg)       Other       Total Intake(mL/kg) 1037 (15.4)      Urine (mL/kg/hr) 0 (0) 0 (0)     Emesis/NG output       Drains 27.5 10     Other       Stool 0 0     Blood       Total Output 27.5 10     Net +1009.5 -10            Urine Occurrence 5 x 8 x     Stool Occurrence 2 x 0 x           Physical Exam  Constitutional:       General: She is not in acute distress.     Appearance: She is well-developed.   Cardiovascular:      Rate and Rhythm: Normal rate and  regular rhythm.   Pulmonary:      Effort: Pulmonary effort is normal. No respiratory distress.   Abdominal:      General: There is no distension.      Palpations: Abdomen is soft.      Tenderness: There is no abdominal tenderness.      Comments: Buttock drain with dark thin output  RUQ drains with minimal output   Skin:     General: Skin is warm and dry.   Neurological:      Mental Status: She is alert and oriented to person, place, and time.   Psychiatric:         Behavior: Behavior normal.         Significant Labs:  CBC:   Recent Labs   Lab 09/28/20  0510   WBC 21.61*   RBC 3.05*   HGB 8.6*   HCT 28.7*   *   MCV 94   MCH 28.2   MCHC 30.0*     CMP:   Recent Labs   Lab 09/28/20  0510   GLU 86   CALCIUM 9.2   ALBUMIN 2.1*   PROT 8.0   *   K 3.7   CO2 23      BUN 7   CREATININE 0.8   ALKPHOS 157*   ALT 11   AST 17   BILITOT 0.3       Significant Diagnostics:  I have reviewed all pertinent imaging results/findings within the past 24 hours.

## 2020-09-28 NOTE — NURSING
Right upper side of  abd GODFRYE fell out.  Gauze placed without any drainage noted.  Called On call for Dr Zhu, awaiting call back.

## 2020-09-28 NOTE — PROGRESS NOTES
Pharmacist Intervention IV to PO Note    Jaquan Farmer is a 39 y.o. female being treated with IV medication pantoprazole    Patient Data:    Vital Signs (Most Recent):  Temp: 97.7 °F (36.5 °C) (09/28/20 0749)  Pulse: 79 (09/28/20 0749)  Resp: 17 (09/28/20 0749)  BP: 138/83 (09/28/20 0749)  SpO2: 99 % (09/28/20 0749) Vital Signs (72h Range):  Temp:  [96.8 °F (36 °C)-98.6 °F (37 °C)]   Pulse:  []   Resp:  [16-20]   BP: (127-159)/(75-86)   SpO2:  [96 %-100 %]      CBC:  Recent Labs   Lab 09/26/20  0446 09/27/20  0500 09/28/20  0510   WBC 23.65* 22.12* 21.61*   RBC 2.97* 2.98* 3.05*   HGB 8.5* 8.7* 8.6*   HCT 27.6* 28.2* 28.7*   * 463* 493*   MCV 93 95 94   MCH 28.6 29.2 28.2   MCHC 30.8* 30.9* 30.0*     CMP:     Recent Labs   Lab 09/26/20 0446 09/27/20  0500 09/28/20  0510   GLU 89 92 86   CALCIUM 9.2 9.1 9.2   ALBUMIN 2.0* 2.1* 2.1*   PROT 8.0 8.1 8.0   * 133* 131*   K 4.0 3.8 3.7   CO2 22* 22* 23   CL 99 101 100   BUN 11 8 7   CREATININE 0.9 0.8 0.8   ALKPHOS 189* 166* 157*   ALT 14 11 11   AST 23 17 17   BILITOT 0.2 0.3 0.3       Dietary Orders:  Diet Orders            Dietary nutrition supplements [facility]; Boost Plus - Any flavor starting at 09/25 1715    Diet full liquid: Full Liquid starting at 09/25 1147    Dietary nutrition supplements [facility]; Boost Breeze - Any flavor starting at 09/12 1800            Based on the following criteria, this patient qualifies for intravenous to oral conversion:  [x] The patients gastrointestinal tract is functioning (tolerating medications via oral or enteral route for 24 hours and tolerating food or enteral feeds for 24 hours).  [x] The patient is hemodynamically stable for 24 hours (heart rate <100 beats per minute, systolic blood pressure >99 mm Hg, and respiratory rate <20 breaths per minute).  [x] The patient shows clinical improvement (afebrile for at least 24 hours and white blood cell count downtrending or normalized).  Additionally, the patient must be non-neutropenic (absolute neutrophil count >500 cells/mm3).  [x] For antimicrobials, the patient has received IV therapy for at least 24 hours.    IV medication pantoprazole will be changed to oral medication pantoprazole    Pharmacist's Name: Lane Alvarez  Pharmacist's Extension: [phone]

## 2020-09-28 NOTE — PLAN OF CARE
Problem: Malnutrition  Goal: Improved Nutritional Intake  Outcome: Ongoing, Progressing         Recommendations    Pt meets chronic severe malnutrition.     1. As medically able, ADAT to regular + Boost Plus with texture per SLP.      2. Encourage po and ONS intake.     3. RD following.      Goals: 1.) Pt to meet >75% of estimated energy and protein needs over the course of 7 days.  Nutrition Goal Status: progressing towards goal

## 2020-09-28 NOTE — PLAN OF CARE
Patient alert and oriented. Able to make needs known.  Up and down to the commode during the shift.  Both GODFREY drains fell out of abdomen, no drainage at the sites. Covered with gauze. Dr. Stanford made aware. IR drain remains in place.  Comprehends her plan of care.  Pain meds given as ordered per patient's request and relief expressed. Will continue to monitor.

## 2020-09-28 NOTE — ASSESSMENT & PLAN NOTE
Nutrition Problem:  Severe Protein-Calorie Malnutrition  Malnutrition in the context of Chronic Illness/Injury     Related to (etiology):  Inadequate Energy Intake      Signs and Symptoms (as evidenced by):  Energy Intake: <50% of estimated energy requirement for 1 month; <75% of estimated energy requirements for > 3 months   Body Fat Depletion: severe depletion of orbitals, triceps   Muscle Mass Depletion: severe depletion of temples and clavicle region   Weight Loss: JOSE ARMANDO      Interventions (treatment strategy):  Collaboration of nutrition care with other providers  Modified diet - Full Liquid   Commercial Beverage - Boost Plus     Nutrition Diagnosis Status:  Continues

## 2020-09-29 VITALS
SYSTOLIC BLOOD PRESSURE: 128 MMHG | HEART RATE: 82 BPM | WEIGHT: 148.81 LBS | TEMPERATURE: 97 F | RESPIRATION RATE: 17 BRPM | OXYGEN SATURATION: 98 % | HEIGHT: 62 IN | DIASTOLIC BLOOD PRESSURE: 76 MMHG | BODY MASS INDEX: 27.38 KG/M2

## 2020-09-29 PROBLEM — E43 SEVERE MALNUTRITION: Status: RESOLVED | Noted: 2020-09-15 | Resolved: 2020-09-29

## 2020-09-29 PROBLEM — N17.0 ACUTE RENAL FAILURE WITH TUBULAR NECROSIS: Status: RESOLVED | Noted: 2020-08-14 | Resolved: 2020-09-29

## 2020-09-29 PROBLEM — E87.20 METABOLIC ACIDOSIS: Status: RESOLVED | Noted: 2020-08-14 | Resolved: 2020-09-29

## 2020-09-29 LAB
ALBUMIN SERPL BCP-MCNC: 2.2 G/DL (ref 3.5–5.2)
ALP SERPL-CCNC: 168 U/L (ref 55–135)
ALT SERPL W/O P-5'-P-CCNC: 13 U/L (ref 10–44)
ANION GAP SERPL CALC-SCNC: 12 MMOL/L (ref 8–16)
ANISOCYTOSIS BLD QL SMEAR: SLIGHT
AST SERPL-CCNC: 19 U/L (ref 10–40)
BASOPHILS # BLD AUTO: 0.1 K/UL (ref 0–0.2)
BASOPHILS NFR BLD: 0.4 % (ref 0–1.9)
BILIRUB SERPL-MCNC: 0.3 MG/DL (ref 0.1–1)
BUN SERPL-MCNC: 7 MG/DL (ref 6–20)
CALCIUM SERPL-MCNC: 9.6 MG/DL (ref 8.7–10.5)
CHLORIDE SERPL-SCNC: 101 MMOL/L (ref 95–110)
CO2 SERPL-SCNC: 20 MMOL/L (ref 23–29)
CREAT SERPL-MCNC: 0.8 MG/DL (ref 0.5–1.4)
DIFFERENTIAL METHOD: ABNORMAL
EOSINOPHIL # BLD AUTO: 0.2 K/UL (ref 0–0.5)
EOSINOPHIL NFR BLD: 0.8 % (ref 0–8)
ERYTHROCYTE [DISTWIDTH] IN BLOOD BY AUTOMATED COUNT: 13.9 % (ref 11.5–14.5)
EST. GFR  (AFRICAN AMERICAN): >60 ML/MIN/1.73 M^2
EST. GFR  (NON AFRICAN AMERICAN): >60 ML/MIN/1.73 M^2
GLUCOSE SERPL-MCNC: 88 MG/DL (ref 70–110)
HCT VFR BLD AUTO: 30.2 % (ref 37–48.5)
HGB BLD-MCNC: 9.2 G/DL (ref 12–16)
HYPOCHROMIA BLD QL SMEAR: ABNORMAL
IMM GRANULOCYTES # BLD AUTO: 0.12 K/UL (ref 0–0.04)
IMM GRANULOCYTES NFR BLD AUTO: 0.5 % (ref 0–0.5)
LYMPHOCYTES # BLD AUTO: 5.2 K/UL (ref 1–4.8)
LYMPHOCYTES NFR BLD: 23.1 % (ref 18–48)
MAGNESIUM SERPL-MCNC: 1.5 MG/DL (ref 1.6–2.6)
MCH RBC QN AUTO: 28.8 PG (ref 27–31)
MCHC RBC AUTO-ENTMCNC: 30.5 G/DL (ref 32–36)
MCV RBC AUTO: 95 FL (ref 82–98)
MONOCYTES # BLD AUTO: 1.3 K/UL (ref 0.3–1)
MONOCYTES NFR BLD: 5.9 % (ref 4–15)
NEUTROPHILS # BLD AUTO: 15.5 K/UL (ref 1.8–7.7)
NEUTROPHILS NFR BLD: 69.3 % (ref 38–73)
NRBC BLD-RTO: 0 /100 WBC
PHOSPHATE SERPL-MCNC: 5.8 MG/DL (ref 2.7–4.5)
PLATELET # BLD AUTO: 521 K/UL (ref 150–350)
PLATELET BLD QL SMEAR: ABNORMAL
PMV BLD AUTO: 9.3 FL (ref 9.2–12.9)
POCT GLUCOSE: 151 MG/DL (ref 70–110)
POTASSIUM SERPL-SCNC: 3.9 MMOL/L (ref 3.5–5.1)
PROT SERPL-MCNC: 8.5 G/DL (ref 6–8.4)
RBC # BLD AUTO: 3.19 M/UL (ref 4–5.4)
SODIUM SERPL-SCNC: 133 MMOL/L (ref 136–145)
WBC # BLD AUTO: 22.38 K/UL (ref 3.9–12.7)

## 2020-09-29 PROCEDURE — A4216 STERILE WATER/SALINE, 10 ML: HCPCS | Performed by: SURGERY

## 2020-09-29 PROCEDURE — 63600175 PHARM REV CODE 636 W HCPCS: Performed by: STUDENT IN AN ORGANIZED HEALTH CARE EDUCATION/TRAINING PROGRAM

## 2020-09-29 PROCEDURE — 85025 COMPLETE CBC W/AUTO DIFF WBC: CPT

## 2020-09-29 PROCEDURE — 25000003 PHARM REV CODE 250: Performed by: STUDENT IN AN ORGANIZED HEALTH CARE EDUCATION/TRAINING PROGRAM

## 2020-09-29 PROCEDURE — 25000003 PHARM REV CODE 250: Performed by: SURGERY

## 2020-09-29 PROCEDURE — 83735 ASSAY OF MAGNESIUM: CPT

## 2020-09-29 PROCEDURE — 80053 COMPREHEN METABOLIC PANEL: CPT

## 2020-09-29 PROCEDURE — 84100 ASSAY OF PHOSPHORUS: CPT

## 2020-09-29 PROCEDURE — 97110 THERAPEUTIC EXERCISES: CPT | Mod: CQ

## 2020-09-29 PROCEDURE — 25000242 PHARM REV CODE 250 ALT 637 W/ HCPCS: Performed by: STUDENT IN AN ORGANIZED HEALTH CARE EDUCATION/TRAINING PROGRAM

## 2020-09-29 RX ORDER — OXYCODONE HCL 5 MG/5 ML
10 SOLUTION, ORAL ORAL EVERY 4 HOURS PRN
Qty: 473 ML | Refills: 0 | Status: SHIPPED | OUTPATIENT
Start: 2020-09-29 | End: 2020-09-29 | Stop reason: HOSPADM

## 2020-09-29 RX ORDER — PANTOPRAZOLE SODIUM 40 MG/1
40 TABLET, DELAYED RELEASE ORAL DAILY
Qty: 30 TABLET | Refills: 11 | Status: SHIPPED | OUTPATIENT
Start: 2020-09-29 | End: 2021-09-29

## 2020-09-29 RX ORDER — MICONAZOLE NITRATE 2 %
POWDER (GRAM) TOPICAL 2 TIMES DAILY
Qty: 43 G | Refills: 0 | Status: SHIPPED | OUTPATIENT
Start: 2020-09-29

## 2020-09-29 RX ORDER — OXYCODONE HYDROCHLORIDE 5 MG/1
5 TABLET ORAL EVERY 6 HOURS PRN
Qty: 30 TABLET | Refills: 0 | Status: SHIPPED | OUTPATIENT
Start: 2020-09-29

## 2020-09-29 RX ORDER — LIDOCAINE 50 MG/G
1 PATCH TOPICAL DAILY
Qty: 14 PATCH | Refills: 0 | Status: SHIPPED | OUTPATIENT
Start: 2020-09-29 | End: 2020-10-13

## 2020-09-29 RX ADMIN — ACETAMINOPHEN 650 MG: 160 SOLUTION ORAL at 12:09

## 2020-09-29 RX ADMIN — METOPROLOL TARTRATE 25 MG: 25 TABLET ORAL at 09:09

## 2020-09-29 RX ADMIN — Medication 10 ML: at 12:09

## 2020-09-29 RX ADMIN — AMLODIPINE 5 MG: 1 SUSPENSION ORAL at 09:09

## 2020-09-29 RX ADMIN — PANTOPRAZOLE SODIUM 40 MG: 40 TABLET, DELAYED RELEASE ORAL at 09:09

## 2020-09-29 RX ADMIN — Medication 10 ML: at 05:09

## 2020-09-29 RX ADMIN — MICONAZOLE NITRATE: 20 POWDER TOPICAL at 09:09

## 2020-09-29 RX ADMIN — HEPARIN SODIUM 5000 UNITS: 5000 INJECTION INTRAVENOUS; SUBCUTANEOUS at 05:09

## 2020-09-29 RX ADMIN — OXYCODONE HYDROCHLORIDE 15 MG: 5 SOLUTION ORAL at 05:09

## 2020-09-29 NOTE — PT/OT/SLP PROGRESS
"Physical Therapy Treatment    Patient Name:  Jaquan Farmer   MRN:  [id number]    Recommendations:     Discharge Recommendations:  home health PT (changed per recommendation of cosigning therapist)  Discharge Equipment Recommendations: bedside commode, shower chair   Barriers to discharge: None    Assessment:     Jaquan Farmer is a 39 y.o. female admitted with a medical diagnosis of Duodenum disorder.  She presents with the following impairments/functional limitations:  weakness, impaired endurance, impaired self care skills, impaired functional mobilty, gait instability, impaired balance, impaired skin, impaired cardiopulmonary response to activity. Pt mobilizes this day with full independence with observed mobility. Sessions focus on mobility, increased gait distance for therapeutic exercise and increased endurance as pt likely unable to receive HHPT per chart review. Pt tolerates well and able to ambulate over 800 ft with no c/o fatigue or dyspnea on exertion. Pt progression reported to cosigning PT who recommends change in discharge disposition to HHPT. PT to assess pt for acute therapy needs on next follow-up.    Rehab Prognosis: Good; patient would benefit from acute skilled PT services to address these deficits and reach maximum level of function.    Recent Surgery: Procedure(s) (LRB):  REMOVAL, CATHETER, CENTRAL VENOUS, TUNNELED (Left)  CLOSURE, WOUND (N/A) 18 Days Post-Op    Plan:     During this hospitalization, patient to be seen 3 x/week to address the identified rehab impairments via gait training, therapeutic activities, therapeutic exercises, neuromuscular re-education and progress toward the following goals:    · Plan of Care Expires:  10/01/20    Subjective     Chief Complaint: no c/o  Patient/Family Comments/goals: "I'm ready to leave, I'm waiting to see if they're putting my discharge in."  Pain/Comfort:  · Pain Rating 1: 0/10  · Pain Rating Post-Intervention 1: 0/10      Objective: "     Communicated with NSG prior to session.  Patient found HOB elevated with telemetry, GODFREY drain, PEG Tube upon PTA entry to room.     General Precautions: Standard, fall   Orthopedic Precautions:N/A   Braces: N/A     Functional Mobility:  · Bed Mobility:     · Supine to Sit: modified independence  · Transfers:     · Sit to Stand:  independence with no AD  · Gait: Pt ambulates over 800 ft this day with no AD and independence. Pt steady with good mechanics and no overt LOB. Pt with no c/o dyspnea on exertion this day.      AM-PAC 6 CLICK MOBILITY  Turning over in bed (including adjusting bedclothes, sheets and blankets)?: 4  Sitting down on and standing up from a chair with arms (e.g., wheelchair, bedside commode, etc.): 4  Moving from lying on back to sitting on the side of the bed?: 4  Moving to and from a bed to a chair (including a wheelchair)?: 4  Need to walk in hospital room?: 4  Climbing 3-5 steps with a railing?: 4  Basic Mobility Total Score: 24       Therapeutic Activities and Exercises:   Mobility performed for therapeutic exercise to increase pts overall endurance.     Patient left seated at EOB with all lines intact, call button in reach and NSG present.    GOALS:   Multidisciplinary Problems     Physical Therapy Goals        Problem: Physical Therapy Goal    Goal Priority Disciplines Outcome Goal Variances Interventions   Physical Therapy Goal     PT, PT/OT Ongoing, Progressing     Description: Goals to be met by: 10/5/2020     Patient will increase functional independence with mobility by performin. Supine to sit with MInimal Assistance - met ()  2. Sit to stand transfer with Minimal Assistance with LRAD - met ()  3. Gait  x 50 feet with Minimal Assistance using LRAD. - met ()  REVISED: 150ft without AD Supervision  4. Stand for 3 minutes with Contact Guard Assistance using LRAD - met ()  5. Lower extremity exercise program x15 reps per handout, with independence                      Time Tracking:     PT Received On: 09/29/20  PT Start Time: 0849     PT Stop Time: 0905  PT Total Time (min): 16 min     Billable Minutes: Therapeutic Exercise 16    Treatment Type: Treatment  PT/PTA: PTA     PTA Visit Number: 2     Frank Bruno, EDEN  09/29/2020

## 2020-09-29 NOTE — PROGRESS NOTES
[facility]  General Surgery  Progress Note    Subjective:     History of Present Illness:  Pt is a 40 yo F with recent history of antrectomy with BII reconstruction for ulcer disease at outside hospital. 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 Patient also reportedly aspirated on induction in the OR prior to this, resulting in severe pulmonary edema and hypoxia. Patient was [place] [facility] [place] higher level of care. She arrived as a direct SICU admit on near maximal vent settings and requiring low dose vasopressors with HR in the upper 140s. Her midline laparotomy is closed with 4 Navdeep drains in place draining bilious output.     Post-Op Info:  Procedure(s) (LRB):  REMOVAL, CATHETER, CENTRAL VENOUS, TUNNELED (Left)  CLOSURE, WOUND (N/A)   18 Days Post-Op     Interval History: No acute events overnight.  Right lower quadrant drains removed in their entirety yesterday. IR drain to right buttock with minimal output. Patient feels well. Tolerating full liquid diet. AF, VSS.    Medications:  Continuous Infusions:  Scheduled Meds:   acetaminophen  650 mg Oral Q6H    amLODIPine benzoate  5 mg Oral Daily    cloNIDine 0.3 mg/24 hr td ptwk  1 patch Transdermal Q7 Days    heparin (porcine)  5,000 Units Subcutaneous Q8H    lidocaine  1 patch Transdermal Q24H    metoprolol  25 mg Oral BID    miconazole NITRATE 2 %   Topical (Top) BID    pantoprazole  40 mg Oral Daily    sodium chloride 0.9%  10 mL Intravenous Q6H     PRN Meds:albuterol-ipratropium, diphenhydrAMINE, guaifenesin 100 mg/5 ml, hydrALAZINE, labetaloL, melatonin, oxyCODONE, oxyCODONE, Flushing PICC Protocol **AND** sodium chloride 0.9% **AND** sodium chloride 0.9%     Review of patient's allergies indicates:   Allergen Reactions    Dilaudid [hydromorphone] Anaphylaxis and Rash    Latex      Objective:     Vital Signs (Most Recent):  Temp: 96.7 °F (35.9 °C) (09/29/20  0742)  Pulse: 82 (09/29/20 0742)  Resp: 17 (09/29/20 0742)  BP: 128/76 (09/29/20 0742)  SpO2: 98 % (09/29/20 0742) Vital Signs (24h Range):  Temp:  [96.7 °F (35.9 °C)-98.6 °F (37 °C)] 96.7 °F (35.9 °C)  Pulse:  [75-83] 82  Resp:  [16-20] 17  SpO2:  [98 %-100 %] 98 %  BP: (123-140)/(75-83) 128/76     Weight: 67.5 kg (148 lb 13 oz)  Body mass index is 27.22 kg/m².    Intake/Output - Last 3 Shifts       09/27 0700 - 09/28 0659 09/28 0700 - 09/29 0659 09/29 0700 - 09/30 0659    P.O.  25     Total Intake(mL/kg)  25 (0.4)     Urine (mL/kg/hr) 0 (0) 500 (0.3)     Drains 10 2.5     Stool 0 0     Total Output 10 502.5     Net -10 -477.5            Urine Occurrence 8 x 3 x     Stool Occurrence 0 x 1 x           Physical Exam  Constitutional:       General: She is not in acute distress.     Appearance: She is well-developed.   Cardiovascular:      Rate and Rhythm: Normal rate and regular rhythm.   Pulmonary:      Effort: Pulmonary effort is normal. No respiratory distress.   Abdominal:      General: There is no distension.      Palpations: Abdomen is soft.      Tenderness: There is no abdominal tenderness.      Comments: Buttock drain with dark thin output  RUQ drains with minimal output   Skin:     General: Skin is warm and dry.   Neurological:      Mental Status: She is alert and oriented to person, place, and time.   Psychiatric:         Behavior: Behavior normal.         Significant Labs:  CBC:   Recent Labs   Lab 09/29/20  0450   WBC 22.38*   RBC 3.19*   HGB 9.2*   HCT 30.2*   *   MCV 95   MCH 28.8   MCHC 30.5*     CMP:   Recent Labs   Lab 09/29/20  0450   GLU 88   CALCIUM 9.6   ALBUMIN 2.2*   PROT 8.5*   *   K 3.9   CO2 20*      BUN 7   CREATININE 0.8   ALKPHOS 168*   ALT 13   AST 19   BILITOT 0.3       Significant Diagnostics:  I have reviewed all pertinent imaging results/findings within the past 24 hours.    Assessment/Plan:     * Duodenum disorder  40 yo F s/p antrectomy with BII reconstruction c/b  duodenal necrosis requiring ex lap, washout, drainage c/b aspiration event. S/p duodenal resection with d-j and g-j anastomosis on 8/13.    - Right upper quadrant drains removed entirely on 9/28  - Will remove Right buttock IR pelvic drain today  - With leukocytosis stable since drain placement, will hold on antibiotics  - Advanced diet to FLD + Boost  - GI and DVT ppx  - PT / OT  - continue PRNs for BP control    Dispo: she has adequate family support, now planning on sending home. DISCHARGE TO HOME TODAY.        Verna Burris MD  General Surgery  [facility]

## 2020-09-29 NOTE — ASSESSMENT & PLAN NOTE
38 yo F s/p antrectomy with BII reconstruction c/b duodenal necrosis requiring ex lap, washout, drainage c/b aspiration event. S/p duodenal resection with d-j and g-j anastomosis on 8/13.    - Right upper quadrant drains removed entirely on 9/28  - Will remove Right buttock IR pelvic drain today  - With leukocytosis stable since drain placement, will hold on antibiotics  - Advanced diet to FLD + Boost  - GI and DVT ppx  - PT / OT  - continue PRNs for BP control    Dispo: she has adequate family support, now planning on sending home. DISCHARGE TO HOME TODAY.

## 2020-09-29 NOTE — PLAN OF CARE
Patient is alert and oriented.  Comprehends her discharge instructions.  IR drain removed by MD prior to discharge. Gauze covered site. Discharged home with sister.

## 2020-09-29 NOTE — PLAN OF CARE
Patient discharged home with sister with no needs except shower chair from [facility] paid for by Case Management.       09/29/20 1502   Final Note   Assessment Type Final Discharge Note   Anticipated Discharge Disposition Home   Hospital Follow Up  Appt(s) scheduled? Yes   Right Care Referral Info   Post Acute Recommendation No Care   Post-Acute Status   Post-Acute Authorization [facility] Status Set-up Complete

## 2020-09-29 NOTE — PLAN OF CARE
POC reviewed with pt. Pt verbalized understanding. AAOX'4. VSS on RA. Pt on full liquid diet tolerating well. Pt ambulatory to BSC w/o assistance. IR drain in place. Pain managed with PRN pain meds as per MD's order. No acute events. Bed in lowest position with call light within reach. WCTM.

## 2020-09-29 NOTE — PLAN OF CARE
Dae faxed over cost transfer form to [facility] for pt's shower chair. It has already been released for delivery.    Radha Alonso LMSW  [facility]  [phone]

## 2020-09-29 NOTE — SUBJECTIVE & OBJECTIVE
Interval History: No acute events overnight.  Right lower quadrant drains removed in their entirety yesterday. IR drain to right buttock with minimal output. Patient feels well. Tolerating full liquid diet. AF, VSS.    Medications:  Continuous Infusions:  Scheduled Meds:   acetaminophen  650 mg Oral Q6H    amLODIPine benzoate  5 mg Oral Daily    cloNIDine 0.3 mg/24 hr td ptwk  1 patch Transdermal Q7 Days    heparin (porcine)  5,000 Units Subcutaneous Q8H    lidocaine  1 patch Transdermal Q24H    metoprolol  25 mg Oral BID    miconazole NITRATE 2 %   Topical (Top) BID    pantoprazole  40 mg Oral Daily    sodium chloride 0.9%  10 mL Intravenous Q6H     PRN Meds:albuterol-ipratropium, diphenhydrAMINE, guaifenesin 100 mg/5 ml, hydrALAZINE, labetaloL, melatonin, oxyCODONE, oxyCODONE, Flushing PICC Protocol **AND** sodium chloride 0.9% **AND** sodium chloride 0.9%     Review of patient's allergies indicates:   Allergen Reactions    Dilaudid [hydromorphone] Anaphylaxis and Rash    Latex      Objective:     Vital Signs (Most Recent):  Temp: 96.7 °F (35.9 °C) (09/29/20 0742)  Pulse: 82 (09/29/20 0742)  Resp: 17 (09/29/20 0742)  BP: 128/76 (09/29/20 0742)  SpO2: 98 % (09/29/20 0742) Vital Signs (24h Range):  Temp:  [96.7 °F (35.9 °C)-98.6 °F (37 °C)] 96.7 °F (35.9 °C)  Pulse:  [75-83] 82  Resp:  [16-20] 17  SpO2:  [98 %-100 %] 98 %  BP: (123-140)/(75-83) 128/76     Weight: 67.5 kg (148 lb 13 oz)  Body mass index is 27.22 kg/m².    Intake/Output - Last 3 Shifts       09/27 0700 - 09/28 0659 09/28 0700 - 09/29 0659 09/29 0700 - 09/30 0659    P.O.  25     Total Intake(mL/kg)  25 (0.4)     Urine (mL/kg/hr) 0 (0) 500 (0.3)     Drains 10 2.5     Stool 0 0     Total Output 10 502.5     Net -10 -477.5            Urine Occurrence 8 x 3 x     Stool Occurrence 0 x 1 x           Physical Exam  Constitutional:       General: She is not in acute distress.     Appearance: She is well-developed.   Cardiovascular:      Rate and  Rhythm: Normal rate and regular rhythm.   Pulmonary:      Effort: Pulmonary effort is normal. No respiratory distress.   Abdominal:      General: There is no distension.      Palpations: Abdomen is soft.      Tenderness: There is no abdominal tenderness.      Comments: Buttock drain with dark thin output  RUQ drains with minimal output   Skin:     General: Skin is warm and dry.   Neurological:      Mental Status: She is alert and oriented to person, place, and time.   Psychiatric:         Behavior: Behavior normal.         Significant Labs:  CBC:   Recent Labs   Lab 09/29/20  0450   WBC 22.38*   RBC 3.19*   HGB 9.2*   HCT 30.2*   *   MCV 95   MCH 28.8   MCHC 30.5*     CMP:   Recent Labs   Lab 09/29/20 0450   GLU 88   CALCIUM 9.6   ALBUMIN 2.2*   PROT 8.5*   *   K 3.9   CO2 20*      BUN 7   CREATININE 0.8   ALKPHOS 168*   ALT 13   AST 19   BILITOT 0.3       Significant Diagnostics:  I have reviewed all pertinent imaging results/findings within the past 24 hours.

## 2020-10-01 ENCOUNTER — PATIENT OUTREACH (OUTPATIENT)
Dept: ADMINISTRATIVE | Facility: CLINIC | Age: 40
End: 2020-10-01

## 2020-10-01 NOTE — PATIENT INSTRUCTIONS
Incision Care  Remember: Follow-up visits allow your healthcare provider to make sure your incision is healing well. Be sure to keep your appointments.     Stitches (sutures), surgical staples, special strips of surgical tape, or surgical skin glue may be used to close incisions. They also help stop bleeding and speed healing. To help your incision heal, follow the tips on this handout.  Home care  Tips for home care include the following:  · Always wash your hands before touching your incision.  · Keep your incision clean and dry.  · Avoid doing things that could cause dirt or sweat to get on your incision.  · Dont pick at scabs. They help protect the wound.  · Keep your incision out of water.  · Take a sponge bath to avoid getting your incision wet, unless your healthcare provider tells you otherwise.  · Ask your provider when can you take a shower or bathe.  · Ask your provider about the best way to keep your incision dry when bathing or showering.  · Pat stitches dry if they get wet. Dont rub.  · Leave the bandage (dressing) in place until you are told to remove it or change it. Change it only as directed, using clean hands.  · After the first 12 hours, change your dressing every 24 hours, or as directed by your healthcare provider.  · Change your dressing if it gets wet or soiled.  Care for specific closures  Follow these guidelines unless your healthcare provider tells you otherwise:  · Stitches or staples. Once you no longer need to keep these dry, clean the wound daily. First remove the bandage using clean hands. Then wash the area gently with soap and warm water. Use a wet cotton swab to loosen and remove any blood or crust that forms. After cleaning, put a thin layer of antibiotic ointment on. Then put on a new bandage.  · Skin glue. Dont put liquid, ointment, or cream on your wound while the glue is in place. Avoid activities that cause heavy sweating. Protect the wound from sunlight. Do not scratch,  rub, or pick at the glue. Do not put tape directly over the glue. The glue should peel off within 5 to 10 days.  · Surgical tape. Keep the area dry. If it gets wet, blot the area dry with a clean towel. Surgical tape usually falls off within 7 to 10 days. If it has not fallen off after 10 days, contact your healthcare provider before taking it off yourself. If you are told to remove the tape, put mineral oil or petroleum jelly on a cotton ball. Gently rub the tape until it is removed.  Changing your dressing  Leave the dressing (bandage) in place until you are told to remove it or change it. Follow the instructions below unless told otherwise by your healthcare provider:  · Always wash your hands before changing your dressing.  · After the first 48 hours, the incision wound usually will have closed. At this point, leave the incision uncovered and open to the air. If the incision has not closed keep it covered.  · Cover your incision only if your clothing is rubbing it or causing irritation.  · Change your dressing if it gets wet or soiled.  Follow-up care  Follow up with your healthcare provider to ask how long sutures or staples should be left in place. Be sure to return for stitch or staple removal as directed. If dissolving stitches were used in your mouth, these will not need to be removed. They should fall out or dissolve on their own.  If tape closures were used, remove them yourself when your provider recommends if they have not fallen off on their own. If skin glue was used, the glue will wear off by itself.  When to seek medical care  Call your healthcare provider if you have any of the following:  · More pain, redness, swelling, bleeding, or foul-smelling discharge around the incision area  · Fever of 100.4°F (38ºC) or higher, or as directed by your healthcare provider  · Shaking chills  · Vomiting or nausea that doesn't go away  · Numbness, coldness, or tingling around the incision area, or changes in  skin color  · Opening of the sutures or wound  · Stitches or staples come apart or fall out or surgical tape falls off before 7 days or as directed by your healthcare provider   Date Last Reviewed: 12/1/2016  © 3019-1194 [employer]. [address] [place] [address], [place] [address]. All rights reserved. This information is not intended as a substitute for professional medical care. Always follow your healthcare professional's instructions.

## 2020-10-01 NOTE — DISCHARGE SUMMARY
[facility]  General Surgery  Discharge Summary      Patient Name: Jaquan Farmer  MRN: [id number]  Admission Date: 8/12/2020  Hospital Length of Stay: 48 days  Discharge Date and Time: 9/29/2020  2:22 PM  Attending Physician: Sandhya att. providers found   Discharging Provider: Verna Burris MD  Primary Care Provider: Primary Doctor Sandhya     HPI: Pt is a 38 yo F with recent history of antrectomy with BII reconstruction for ulcer disease at outside hospital. 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 Patient also reportedly aspirated on induction in the OR prior to this, resulting in severe pulmonary edema and hypoxia. Patient was transferred [place] [facility] [place] higher level of care. She arrived as a direct SICU admit on near maximal vent settings and requiring low dose vasopressors with HR in the upper 140s. Her midline laparotomy is closed with 4 Navdeep drains in place draining bilious output.     Procedure(s) (LRB):  REMOVAL, CATHETER, CENTRAL VENOUS, TUNNELED (Left)  CLOSURE, WOUND (N/A)     Hospital Course: Patient was admitted on 8/12/20 for necrosis of her gastrojejunal anastomosis after Billroth II reconstruction for ulcer disease. She was taken for exploratory laparotomy on 8/13/20. She was found to have patchy duodenal ischemia and with necrosis and perforation involving the proximal jejunum 3rd and 4th portions of the duodenum. She was left open in discontinuity with an abthera wound vac. She returned to the OR six times for washouts, G tube placement, Permacath placement, chest tubes, and additional drains. Please see operative notes for further details. After a prolonged ICU stay, she was eventually stepped down to the floor. Her nutrition had been managed by TPN. Her leaking from her GJ anatomosis finally slowed and her navdeep drains were slowly removed. She was able to tolerate a full liquid diet by the time of discharge and TPN was  discontinued. Meagan was able to ambulate independently, her pain was controlled on PO pain medications, and she was afebrile with stable vital signs. She was monitored for a persistent leukocytosis, but did not demonstrate and systemic signs of infection. She was deemed appropriate for discharge to home on 9/29/20.    Consults:   Consults (From admission, onward)        Status Ordering Provider     Inpatient consult to Interventional Radiology  Once     Provider:  (Not yet assigned)    Completed LALITO BLAIR     Inpatient consult to Midline team  Once     Provider:  (Not yet assigned)    Completed YUNG MANCINI     Inpatient consult to Nephrology  Once     Provider:  (Not yet assigned)    Completed LUBA BAR     Inpatient consult to Nephrology  Once     Provider:  (Not yet assigned)    Completed VJ MARIN     Inpatient consult to Pain Management  Once     Provider:  (Not yet assigned)    Completed VJ MARIN     Inpatient consult to PICC team ([facility])  Once     Provider:  (Not yet assigned)    Completed EMANUEL ROJAS     Inpatient consult to PICC team ([facility])  Once     Provider:  (Not yet assigned)    Completed BAILEY BERGERON     Inpatient consult to Registered Dietitian/Nutritionist  Once     Provider:  (Not yet assigned)    Completed DRISS HUANG     Pharmacy to dose Vancomycin consult  Once     Provider:  (Not yet assigned)    Completed EMANUEL ROJAS              Pending Diagnostic Studies:     None        Final Active Diagnoses:    Diagnosis Date Noted POA    PRINCIPAL PROBLEM:  Duodenum disorder [K31.9] 08/12/2020 Yes    Hypertension [I10] 08/26/2020 No      Problems Resolved During this Admission:    Diagnosis Date Noted Date Resolved POA    Severe malnutrition [E43] 09/15/2020 09/29/2020 Yes    Septic shock [A41.9, R65.21]  09/29/2020 Yes    Encounter for weaning from ventilator [Z99.11]  09/29/2020 Not Applicable    Acute hypoxemic respiratory  "failure [J96.01]  09/29/2020 Yes    Acute blood loss anemia [D62]  09/29/2020 Yes    Anemia, chronic disease [D63.8]  09/29/2020 Yes    Bandemia [D72.825]  09/29/2020 Yes    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    Specialties: General Surgery, Surgery  Why: hospital follow up  Contact information:  [address]  [facility] [address]  [phone]                 Patient Instructions:      BATH/SHOWER CHAIR FOR HOME USE     Order Specific Question Answer Comments   Height: 5' 2" (1.575 m)    Weight: 67.5 kg (148 lb 13 oz)    Does patient have medical equipment at home? none    Length of need (1-99 months): 99    Type: With back    Vendor: [facility]    Expected Date of Delivery: 9/29/2020      No driving until:   Order Comments: While taking narcotic pain medications.     Notify your health care provider if you experience any of the following:  temperature >100.4     Notify your health care provider if you experience any of the following:  persistent nausea and vomiting or diarrhea     Notify your health care provider if you experience any of the following:  severe uncontrolled pain     Notify your health care provider if you experience any of the following:  redness, tenderness, or signs of infection (pain, swelling, redness, odor or green/yellow discharge around incision site)     No dressing needed     Activity as tolerated   Order Comments: - Ok to shower.   - No heavy lifting greater than 10 pounds.     Medications:  Reconciled Home Medications:      Medication List      START taking these medications    lidocaine 5 %  Commonly known as: " LIDODERM  Place 1 patch onto the skin once daily. Remove & Discard patch within 12 hours or as directed by MD for 14 days     miconazole NITRATE 2 % 2 % top powder  Commonly known as: MICOTIN  Apply topically 2 (two) times daily.     oxyCODONE 5 MG immediate release tablet  Commonly known as: ROXICODONE  Take 1 tablet (5 mg total) by mouth every 6 (six) hours as needed for Pain (moderate to severe pain).     pantoprazole 40 MG tablet  Commonly known as: PROTONIX  Take 1 tablet (40 mg total) by mouth once daily.        CONTINUE taking these medications    aspirin 81 MG EC tablet  Commonly known as: ECOTRIN  = 1 tab, Oral, Daily, Ordered by Dr. Moreira, # 90 tab, 3 Refill(s), Maintenance     metoprolol tartrate 25 MG tablet  Commonly known as: LOPRESSOR  See Instructions, TAKE 1 TABLET BY MOUTH TWICE DAILY, tab, # 60, eRx: [facility] [id number]     sumatriptan 50 MG tablet  Commonly known as: IMITREX  = 1 tab, Oral, Daily, PRN for migraine headache, may repeat dose after 2 hours up to a maximum of 200 mg in 24 hours, # 9 tab, 1 Refill(s), Maintenance, Pharmacy: [facility] [id number]            Verna Burris MD  General Surgery  [facility]

## 2020-10-01 NOTE — PROGRESS NOTES
Please forward this important TCC information to your provider in order to maximize the post discharge care delivery of this patient.    C3 nurse spoke with Jaquan Farmer  for a TCC post hospital discharge follow up call. The patient does not have a scheduled HOSFU appointment with Primary Doctor No within 7-14 days post hospital discharge date 09/29/2020. C3 nurse was unable to schedule HOSFU appointment in [facility].  Please contact pcp and schedule follow up appointment using HOSFU visit type on or before 10/13/2020    Respectfully,  Nafisa Nicholson LPN    [facility] C[facility]    [email or web address]       Please do not reply to this message, as this inbox is not routinely monitored.

## 2020-10-02 NOTE — PHYSICIAN QUERY
PT Name: Jaquan Farmer  MR #: [id number]     Documentation Clarification      CDS: Dayna CARROLL RN  Contact information: [email or web address]    This form is a permanent document in the medical record.     Query Date: October 2, 2020    By submitting this query, we are merely seeking further clarification of documentation. Please utilize your independent clinical judgment when addressing the question(s) below.    The Medical Record reflects the following:    Supporting Clinical Findings Location in Medical Record   Chief Complaint/Reason for Admission: Duodenal necrosis, sepsis, ARDS  History of Present Illness: Pt is a 38 yo F with recent history of antrectomy with BII reconstruction for ulcer disease at outside hospital. 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 Patient also reportedly aspirated on induction in the OR prior to this, resulting in severe pulmonary edema and hypoxia. Patient was transferred to [facility] urgently for higher level of care. She arrived as a direct SICU admit on near maximal vent settings and requiring low dose vasopressors with HR in the upper 140s.    Severe sepsis  38 yo F s/p antrectomy with BII reconstruction c/b duodenal necrosis requiring ex lap, washout, drainage c/b aspiration event. Now with severe sepsis and ARDS    Impression:  Pulmonary edema pneumonia aspiration and sepsis.    Indications - sign 39-year-old patient was transferred from an outlying hospital after surgery for perforated ulcer that included vagotomy and antrectomy about a week after surgery bilious output was noted in the abdomen reoperation demonstrated duodenal ischemia leading to the decision to transfer    An EGD was inserted through this area of perforation allowing for assessment of the integrity of the duodenum mucosa profound necrosis was identified and the distal part of the duodenum proximally the posterior medial wall the duodenum appeared viable  however  The anterior and lateral walls were ischemic    Mrs. Farmer is a 39 year old female with antrectomy at [facility] complicated duodenal necrosis post op. She aspirated, was intubated, and became septic. She developed renal failure and required max vent settings and was transferred here for higher level of care. On arrival she was hypotensive on pressers, max vent setting, and had minimal urine output.    Septic shock.  Resolved following surgery and resuscitation.  Continues to have significant leukocytosis, but not rising.  Currently on no pressors with good perfusion.  Continue broad-spectrum antibiotics and plan for 4 days further following abdominal closure yesterday.  Given continued leukocytosis, will obtain blood, urine, and bronchial washing cultures.  Plan to get PICC line out after peripheral access. H&P Gen Surg 8/13/2020                  H&P Gen Surg 8/13/2020        Chest X- Ray 8/13/2020      Op Note 8/14/2020          Op Note 8/14/2020            Consult Nephro 8/14/2020            PN CCS 8/19/2020                                                                              Provider, please provide diagnosis or diagnoses associated with above clinical findings.    [ x  ] Sepsis due to surgery complication of duodenal necrosis      [   ] Sepsis due to aspiration on induction     [   ] Sepsis due to other, please specify ______________________________     [   ] Other clarification  (please specify): ____________     [  ] Clinically undetermined                                                                                                           Present on admission (POA) status:   [   ] Yes (Y)                          [  ] Clinically Undetermined (W)  [   ] No (N)                            [   ] Documentation insufficient to determine if condition is POA (U)

## 2020-10-22 ENCOUNTER — OFFICE VISIT (OUTPATIENT)
Dept: SURGERY | Facility: CLINIC | Age: 40
End: 2020-10-22
Payer: MEDICAID

## 2020-10-22 VITALS
DIASTOLIC BLOOD PRESSURE: 106 MMHG | SYSTOLIC BLOOD PRESSURE: 178 MMHG | BODY MASS INDEX: 18.32 KG/M2 | HEIGHT: 62 IN | TEMPERATURE: 97 F | WEIGHT: 99.56 LBS | HEART RATE: 88 BPM

## 2020-10-22 DIAGNOSIS — K55.029 ISCHEMIC NECROSIS OF SMALL BOWEL: Primary | ICD-10-CM

## 2020-10-22 PROCEDURE — 99213 OFFICE O/P EST LOW 20 MIN: CPT | Mod: PBBFAC | Performed by: SURGERY

## 2020-10-22 PROCEDURE — 99024 POSTOP FOLLOW-UP VISIT: CPT | Mod: ,,, | Performed by: SURGERY

## 2020-10-22 PROCEDURE — 99999 PR PBB SHADOW E&M-EST. PATIENT-LVL III: ICD-10-PCS | Mod: PBBFAC,,, | Performed by: SURGERY

## 2020-10-22 PROCEDURE — 99999 PR PBB SHADOW E&M-EST. PATIENT-LVL III: CPT | Mod: PBBFAC,,, | Performed by: SURGERY

## 2020-10-22 PROCEDURE — 99024 PR POST-OP FOLLOW-UP VISIT: ICD-10-PCS | Mod: ,,, | Performed by: SURGERY

## 2020-10-22 NOTE — PROGRESS NOTES
GENERAL SURGERY  Clinic Note        SUBJECTIVE:     HISTORY OF PRESENT ILLNESS:  Jaquan Farmer is a 39 y.o. female s/p antrectomy with BII reconstruction for ulcer disease at [facility], post-operative course complicated by necrosis of her gastrojejunal anastomosis after Billroth II reconstruction for ulcer disease. She was taken for exploratory laparotomy on 8/13/20. She was left open in discontinuity with an abthera wound vac. She returned to the OR six times for washouts, G tube placement, Permacath placement, chest tubes, and additional drains. Her leaking from her GJ anatomosis finally slowed and her christine drains were slowly removed. She was able to tolerate a full liquid diet by the time of discharge and TPN was discontinued. Since hospital discharge on 9/29/20, she reports that her appetite and energy levels have improved, and she is tolerating a regular diet. She denies any weight loss since discharge, ambulating in the house without concern. She denies fever, chills, nausea or vomiting.         MEDICATIONS:  Home Medications:  Current Outpatient Medications on File Prior to Visit   Medication Sig Dispense Refill    aspirin (ECOTRIN) 81 MG EC tablet   = 1 tab, Oral, Daily, Ordered by Dr. Moreira, # 90 tab, 3 Refill(s), Maintenance      metoprolol tartrate (LOPRESSOR) 25 MG tablet   See Instructions, TAKE 1 TABLET BY MOUTH TWICE DAILY, tab, # 60, eRx: [facility] [id number]      miconazole NITRATE 2 % (MICOTIN) 2 % top powder Apply topically 2 (two) times daily. 43 g 0    oxyCODONE (ROXICODONE) 5 MG immediate release tablet Take 1 tablet (5 mg total) by mouth every 6 (six) hours as needed for Pain (moderate to severe pain). 30 tablet 0    pantoprazole (PROTONIX) 40 MG tablet Take 1 tablet (40 mg total) by mouth once daily. 30 tablet 11    sumatriptan (IMITREX) 50 MG tablet   = 1 tab, Oral, Daily, PRN for migraine headache, may repeat dose after 2 hours up to a maximum of 200 mg in 24 hours, # 9 tab, 1  Refill(s), Maintenance, Pharmacy: [facility] [id number]       No current facility-administered medications on file prior to visit.        ALLERGIES:    Review of patient's allergies indicates:   Allergen Reactions    Dilaudid [hydromorphone] Anaphylaxis and Rash    Latex        PAST MEDICAL HISTORY:    Past Medical History:   Diagnosis Date    Anxiety     Aortic insufficiency     Hyperbilirubinemia     Hypertension     Insomnia     Leukocytosis     Migraines     MR (mitral regurgitation)     Peptic ulcer disease     Pulmonary hypertension     Respiratory distress     Sepsis        SURGICAL HISTORY:  Past Surgical History:   Procedure Laterality Date    APPLICATION OF WOUND VACUUM-ASSISTED CLOSURE DEVICE  8/13/2020    Procedure: APPLICATION, WOUND VAC WITH ABTHERA;  Surgeon: Donis Roa MD;  [facility];  Service: General;;    APPLICATION OF WOUND VACUUM-ASSISTED CLOSURE DEVICE N/A 8/21/2020    Procedure: APPLICATION, WOUND VAC;  Surgeon: Donis Roa MD;  [facility];  Service: General;  Laterality: N/A;    BRONCHOSCOPY N/A 8/31/2020    Procedure: BRONCHOSCOPY;  Surgeon: Donis Roa MD;  [facility];  Service: General;  Laterality: N/A;    CLOSURE OF WOUND N/A 9/11/2020    Procedure: CLOSURE, WOUND;  Surgeon: Donis Roa MD;  [facility];  Service: General;  Laterality: N/A;  delayed primary closure of abdominal wall       COLONOSCOPY      DUODENECTOMY  8/13/2020    Procedure: KOCHERIZATION OF DUODENUM, DUODENECTOMY;  Surgeon: Donis Roa MD;  [facility] [address] [place] [address];  Service: General;;    ESOPHAGOGASTRODUODENOSCOPY  01/23/2018    ESOPHAGOGASTRODUODENOSCOPY N/A 8/13/2020    Procedure: EGD (ESOPHAGOGASTRODUODENOSCOPY);  Surgeon: Donis Roa MD;  [facility];  Service: General;  Laterality: N/A;    EVACUATION OF HEMATOMA N/A 8/21/2020    Procedure: EVACUATION, HEMATOMA;   Surgeon: Donis Roa MD;  [facility];  Service: General;  Laterality: N/A;  OF RECTUS SHEET HEMATOMA    PERCUTANEOUS INSERTION OF GASTROSTOMY TUBE  8/15/2020    Procedure: INSERTION, GASTROSTOMY TUBE, PERCUTANEOUS;  Surgeon: Donis Roa MD;  [facility];  Service: General;;    PLACEMENT OF DUAL-LUMEN VASCULAR CATHETER Left 8/31/2020    Procedure: INSERTION-CATHETER-RUDY;  Surgeon: Donis Roa MD;  [facility];  Service: General;  Laterality: Left;    REMOVAL OF TUNNELED CENTRAL VENOUS CATHETER (CVC) Left 9/11/2020    Procedure: REMOVAL, CATHETER, CENTRAL VENOUS, TUNNELED;  Surgeon: Donis Roa MD;  [facility];  Service: General;  Laterality: Left;    REPLACEMENT OF WOUND VACUUM-ASSISTED CLOSURE DEVICE N/A 8/31/2020    Procedure: REPLACEMENT, WOUND VAC;  Surgeon: Donis Roa MD;  Location: [facility] OR [address] [place] [address];  Service: General;  Laterality: N/A;  Sponge replacement on wound vac    TUBAL LIGATION      TUBE THORACOTOMY Left 8/31/2020    Procedure: INSERTION, CATHETER, INTERCOSTAL, FOR DRAINAGE;  Surgeon: Donis Roa MD;  Location: [facility] OR [address] [place] [address];  Service: General;  Laterality: Left;    WOUND EXPLORATION N/A 8/21/2020    Procedure: EXPLORATION, WOUND;  Surgeon: Donis Roa MD;  Location: [facility] OR [address] [place] [address];  Service: General;  Laterality: N/A;       FAMILY HISTORY:  No family history on file.    SOCIAL HISTORY:  Social History     Tobacco Use    Smoking status: Current Some Day Smoker     Packs/day: 0.25     Types: Cigarettes    Smokeless tobacco: Never Used   Substance Use Topics    Alcohol use: Not Currently    Drug use: Not on file        REVIEW OF SYSTEMS:  Review of Systems   Constitutional: Negative for activity change, appetite change, chills and fatigue.   Eyes: Negative for discharge and itching.   Respiratory: Negative for apnea, choking and shortness of breath.    Cardiovascular: Negative for  chest pain and palpitations.   Gastrointestinal: Negative for abdominal distention and abdominal pain.        Midline incision with sutures intact. Drain with 3-way valve closed    Endocrine: Negative for cold intolerance and heat intolerance.   Genitourinary: Negative for difficulty urinating and dysuria.   Musculoskeletal: Negative for arthralgias and back pain.         OBJECTIVE:     Most Recent Vitals:  Temp: 97.2 °F (36.2 °C) (10/22/20 0947)  Pulse: 88 (10/22/20 0947)  BP: (!) 178/106 (10/22/20 0947)      PHYSICAL EXAM:  Physical Exam   Constitutional: She is well-developed, well-nourished, and in no distress.   Cardiovascular: Normal rate and intact distal pulses.   Pulmonary/Chest: Effort normal. No respiratory distress.   Abdominal: Soft. She exhibits no distension. There is no abdominal tenderness.   Midline incision with sutures intact, Drain with 3-way valve closed. Soft, non-distended and non-tender abdomen. No erythema or purulence around incisions         LABORATORY VALUES:  Lab Results   Component Value Date    WBC 22.38 (H) 09/29/2020    HGB 9.2 (L) 09/29/2020    HCT 30.2 (L) 09/29/2020     (H) 09/29/2020     Lab Results   Component Value Date     (L) 09/29/2020    K 3.9 09/29/2020     09/29/2020    CO2 20 (L) 09/29/2020    BUN 7 09/29/2020    CREATININE 0.8 09/29/2020    GLU 88 09/29/2020    CALCIUM 9.6 09/29/2020    CAION 1.14 09/08/2020    MG 1.5 (L) 09/29/2020    PHOS 5.8 (H) 09/29/2020    AST 19 09/29/2020    ALT 13 09/29/2020    ALKPHOS 168 (H) 09/29/2020    BILITOT 0.3 09/29/2020    PROT 8.5 (H) 09/29/2020    ALBUMIN 2.2 (L) 09/29/2020    AMYLASE 64 08/14/2020    LIPASE 52 08/21/2020    LIPASE 52 08/21/2020     Lab Results   Component Value Date    INR 1.0 08/21/2020     No results found for: TSH, FREET4, PTH, CEA      DIAGNOSTIC STUDIES:  Reviewed    ASSESSMENT:     Jaquan Farmer is a 39 y.o. female s/p antrectomy with BII reconstruction for ulcer disease at H,  post-operative course complicated by necrosis of her gastrojejunal anastomosis after Billroth II reconstruction requiring multiple washout operations and drainage presents to clinic following hospital discharge on 9/29/20. She reports an uncomplicated post-operative period with adequate energy levels and appetite, no abdominal pain. Return to clinic as needed. Sutures and drain removed today.    -- Claudia Díaz M.D  General Surgery  Pager: [phone]        I have personally performed a detailed history and physical examination on this patient. My findings are summarized in the resident's note included in the record.  Great result from a tough case  Dr Jaramillo informed by text

## 2020-12-14 ENCOUNTER — TELEPHONE (OUTPATIENT)
Dept: SURGERY | Facility: CLINIC | Age: 40
End: 2020-12-14

## 2020-12-14 NOTE — TELEPHONE ENCOUNTER
----- Message from Kianna Jordan sent at 12/14/2020 12:41 PM CST -----  Contact: [phone]  Caller says she keeps falling and is really off balanced since before the week November 30th, her legs are still weak when she is walking.   Sometimes when she has been standing for a long time her knees try to give out on her.  Since November 30th she fell, while she was walking.   Bruised herself.     Fell again after that 3 days later.    Caller says you talk her to start walking and she is trying but keeps falling.   Her knees keep buckling,  Has tight pain in her stomach when she walks.

## 2021-01-15 NOTE — NURSING
HPI Comments: 48 y.o. male with past medical history significant for gout, acid reflux, HTN, and hypercholesterolemia who presents from home via private vehicle with chief complaint of chest pain. 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 Pt took ASA when he got home from work and states that he has had no further episodes of CP. Pt has no h/o similar CP or any cardiac hx other than HTN. He had been on lisinopril but has not been taking that medication for ~6 years. There are no other acute medical concerns at this time. Social hx: Never smoker. Alcohol use. PCP: Mariana Flores MD    Note written by Karon Hinson, as dictated by Israel Abbasi MD 9:08 AM      The history is provided by the patient. Past Medical History:   Diagnosis Date    Acid reflux     Gout     Hypercholesterolemia 3/9/2011    Hypertension        History reviewed. No pertinent surgical history. Family History:   Problem Relation Age of Onset    Hypertension Mother     Cancer Father      gallbladder       Social History     Social History    Marital status:      Spouse name: N/A    Number of children: N/A    Years of education: N/A     Occupational History    Not on file. Social History Main Topics    Smoking status: Never Smoker    Smokeless tobacco: Never Used    Alcohol use 0.5 oz/week     1 Glasses of wine per week    Drug use: No    Sexual activity: Yes     Partners: Female     Birth control/ protection: Condom     Other Topics Concern    Not on file     Social History Narrative         ALLERGIES: Review of patient's allergies indicates no known allergies. Review of Systems   Constitutional: Negative for chills and fever. Respiratory: Negative for cough and shortness of breath. Cardiovascular: Positive for chest pain.    Gastrointestinal: Negative for abdominal Pt experiencing SOB and increased WOB. Denies SOB. 8L NC satting 88-90%. Stat CXR showed mucous plug. Tigre LERMA and team performed intubation and bronch. 2 versed, 50 rocuronium, and 120 propofol given.  Cultures taken. 7.0 tube placed measuring 20 at the teeth, with positive color change on capnography and positive lung sounds equal b/l. Pt gave verbal consent before procedures. Timeout performed. Pt tolerated well. WCTM.    pain, nausea and vomiting. All other systems reviewed and are negative. Vitals:    06/01/17 0840 06/01/17 0848   BP: (!) 188/111 179/84   Pulse: 70    Resp: 18    Temp: 98 °F (36.7 °C)    SpO2: 95%    Weight: 131.1 kg (289 lb)    Height: 5' 11\" (1.803 m)             Physical Exam   Constitutional: He is oriented to person, place, and time. He appears well-developed and well-nourished. No distress. HENT:   Head: Normocephalic and atraumatic. Eyes: Conjunctivae are normal. No scleral icterus. Neck: Neck supple. No tracheal deviation present. Cardiovascular: Normal rate, regular rhythm, normal heart sounds and intact distal pulses. Exam reveals no gallop and no friction rub. No murmur heard. Pulmonary/Chest: Effort normal and breath sounds normal. He has no wheezes. He has no rales. Abdominal: Soft. He exhibits no distension. There is no tenderness. There is no rebound and no guarding. Musculoskeletal: He exhibits no edema. Neurological: He is alert and oriented to person, place, and time. Skin: Skin is warm and dry. No rash noted. Psychiatric: He has a normal mood and affect. Nursing note and vitals reviewed. Note written by Karon Wray, as dictated by Yumiko Herrera MD 9:14 AM      [facility]  ED Course       Procedures        ED EKG interpretation:  Rhythm: normal sinus rhythm; and regular . Rate (approx.): 79; ST/T wave: no changes; Voltage criteria for LVH. Note written by Karon Wray, as dictated by Yumiko Herrera MD 9:14 AM      PROGRESS NOTE:  12:59 PM  Pt has 2 sets of enzymes that are negative. He had only one episode of CP that was not clearly exertional. No other risk factors. Will d/c home to f/u with cardiology for stress echo. Stable

## 2022-11-03 NOTE — PROGRESS NOTES
56yo M, undomiciled, unknown PMH, PPH of psychosis, multiple hospitalizations, no outpatient treatment, active substance use, unknown SA or self harm, unknown hx of aggression, hx of arrest, BIBEMS (not under arrest) after found spraying graffiti. Pt transferred to [facility] from [facility] ED.     DDx: schizophrenia     11/3  Stable and ready for discharge  Suicide and risk assessment performed prior to discharge.   The patient has a low acute risk and low chronic risk of self-harm and aggression towards others.   Protective factors include denying SI, no SIB, denying HI, no current mood symptoms, no hopelessness, future-oriented in returning to home, no access to firearms.    Risk factors include presenting illness. Immediate risk was minimized by inpatient admission to a safe environment with appropriate supervision and limited access to lethal means.   Future risk was minimized before discharge by treatment of acute episode, maximizing outpatient support, providing relevant patient education, discussing emergency procedures.  The patient remains at a low risk of self-harm, and such risk cannot be further ameliorated by continued inpatient treatment and the patient is therefore appropriate for discharge.    [facility]  General Surgery  Progress Note    Subjective:     History of Present Illness:  Pt is a 40 yo F with recent history of antrectomy with BII reconstruction for ulcer disease at outside hospital. 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 Patient also reportedly aspirated on induction in the OR prior to this, resulting in severe pulmonary edema and hypoxia. Patient was [place] [facility] [place] higher level of care. She arrived as a direct SICU admit on near maximal vent settings and requiring low dose vasopressors with HR in the upper 140s. Her midline laparotomy is closed with 4 Navdeep drains in place draining bilious output.     Post-Op Info:  Procedure(s) (LRB):  LAPAROTOMY, EXPLORATORY; abthera replacement (N/A)  INSERTION, GASTROSTOMY TUBE, PERCUTANEOUS  WASHOUT; abdominal (N/A)   3 Days Post-Op     Interval History: Stable overnight  Tolerating CRRT  GODFREY drains with minimal bile stained output.   Will plan for OR today     Medications:  Continuous Infusions:   sodium chloride 0.9% 200 mL/hr at 08/18/20 0700    fentanyl 12.5 mL/hr at 08/18/20 0700    lactated ringers 5 mL/hr at 08/16/20 0600    propofoL 40 mcg/kg/min (08/18/20 0700)    TPN ADULT CENTRAL LINE CUSTOM 45 mL/hr at 08/18/20 0700     Scheduled Meds:   famotidine (PF)  20 mg Intravenous Daily    fluconazole (DIFLUCAN) IVPB  200 mg Intravenous Q24H    heparin (porcine)  5,000 Units Subcutaneous Q8H    piperacillin-tazobactam (ZOSYN) IVPB  4.5 g Intravenous Q8H     PRN Meds:calcium gluconate IVPB, calcium gluconate IVPB, calcium gluconate IVPB, HYDROmorphone, magnesium sulfate IVPB, midazolam, ondansetron, sodium phosphate IVPB, sodium phosphate IVPB, sodium phosphate IVPB     Review of patient's allergies indicates:   Allergen Reactions    Latex      Objective:     Vital Signs (Most Recent):  Temp: 97.8 °F (36.6 °C) (08/18/20 0700)  Pulse: 102 (08/18/20  0748)  Resp: (!) 29 (08/18/20 0748)  BP: 130/78 (08/18/20 0745)  SpO2: 100 % (08/18/20 0748) Vital Signs (24h Range):  Temp:  [97.8 °F (36.6 °C)-99.6 °F (37.6 °C)] 97.8 °F (36.6 °C)  Pulse:  [] 102  Resp:  [18-39] 29  SpO2:  [93 %-100 %] 100 %  BP: (104-152)/(53-78) 130/78     Weight: 69.5 kg (153 lb 3.5 oz)  Body mass index is 28.02 kg/m².    Intake/Output - Last 3 Shifts       08/16 0700 - 08/17 0659 08/17 0700 - 08/18 0659 08/18 0700 - 08/19 0659    I.V. (mL/kg) 7830.1 (106.5) 6038.6 (86.9)     NG/GT   50    .8 975.9     Total Intake(mL/kg) 8258.8 (112.4) 7014.5 (100.9) 50 (0.7)    Urine (mL/kg/hr) 15 (0)      Drains 673 695 54    Other 9045 00214 972    Stool       Blood       Chest Tube 8 0 0    Total Output 9741 89256 1026    Net -1482.2 -4024.5 -976                 Physical Exam  Constitutional:       Interventions: She is sedated and intubated.   HENT:      Head: Normocephalic.   Cardiovascular:      Rate and Rhythm: Regular rhythm. Tachycardia present.      Comments: Improving  Pulmonary:      Effort: She is intubated.      Comments: Intubated and mechanically ventilated  Vent Mode: A/C  Oxygen Concentration (%):  (40-50) 40  Resp Rate Total:  (25 br/min-34 br/min) 26 br/min  Vt Set:  (300 mL) 300 mL  PEEP/CPAP:  (8 cmH20) 8 cmH20  Mean Airway Pressure:  (9.8 muS01-92 cmH20) 17 cmH20  Abdominal:      Comments: Abthera with ss output  GODFREY x2 with bile stained output   Musculoskeletal:         General: Swelling present.      Right lower leg: Edema present.      Left lower leg: Edema present.   Skin:     General: Skin is warm and dry.   Neurological:      Comments: Sedated on propofol         Significant Labs:  Reviewed    Significant Diagnostics:  reviewedI    Assessment/Plan:     Severe sepsis  40 yo F s/p antrectomy with BII reconstruction c/b duodenal necrosis requiring ex lap, washout, drainage c/b aspiration event. S/p duodenal resection with d-j anastomosis, g-j and abthera vac placement on  8/13 and washout with g-tube placement on 8/15.    .Neuro:  - Sedation with propofol  - fentanyl   - Sedation vacations, neuro exams    Resp:  - Intubated on vent  - Wean vent as tolerated    CV:  - HDS off pressors  - Tachycardia resolved    Heme:  - Hgb/Hct - stable  - Transfuse as indicated; received 2u PRBC on 8/13    ID:  - Broad spectrum abx/antifungal  - F/u cultures     Renal:  - Parker in place; oliguric  - Nephrology consulted; Continue CRRT with aggressive volume removal as tolerated    FEN/GI:  - NPO  - NGT to LIWS  - Maintain drains to bulb suction  - G tube to gravity  - GI ppx  - Start Tpn    Endo:  - Monitor BG    Dispo:  - Continue ICU care; appreciate SICU assistance; Plan for return to OR today for possible closure           Chico Ledezma MD  General Surgery  [facility]
